# Patient Record
Sex: MALE | Race: BLACK OR AFRICAN AMERICAN | Employment: OTHER | URBAN - METROPOLITAN AREA
[De-identification: names, ages, dates, MRNs, and addresses within clinical notes are randomized per-mention and may not be internally consistent; named-entity substitution may affect disease eponyms.]

---

## 2017-02-15 ENCOUNTER — OFFICE VISIT (OUTPATIENT)
Dept: INTERNAL MEDICINE CLINIC | Age: 82
End: 2017-02-15

## 2017-02-15 VITALS
TEMPERATURE: 97.9 F | WEIGHT: 182.2 LBS | BODY MASS INDEX: 27.61 KG/M2 | HEART RATE: 56 BPM | DIASTOLIC BLOOD PRESSURE: 88 MMHG | SYSTOLIC BLOOD PRESSURE: 157 MMHG | OXYGEN SATURATION: 94 % | HEIGHT: 68 IN

## 2017-02-15 DIAGNOSIS — C90.00 MULTIPLE MYELOMA, REMISSION STATUS UNSPECIFIED (HCC): ICD-10-CM

## 2017-02-15 DIAGNOSIS — I10 ESSENTIAL HYPERTENSION: ICD-10-CM

## 2017-02-15 DIAGNOSIS — Z95.2 S/P AVR (AORTIC VALVE REPLACEMENT): ICD-10-CM

## 2017-02-15 DIAGNOSIS — I48.0 PAROXYSMAL ATRIAL FIBRILLATION (HCC): Primary | ICD-10-CM

## 2017-02-15 NOTE — PROGRESS NOTES
Chief Complaint   Patient presents with    Other     patient states that he came in today because he \"feels sick\". 1. Have you been to the ER, urgent care clinic since your last visit? Hospitalized since your last visit? No    2. Have you seen or consulted any other health care providers outside of the [address] [place] [address] since your last visit? Include any pap smears or colon screening.  No

## 2017-02-15 NOTE — MR AVS SNAPSHOT
Visit Information Date & Time Provider Department Dept. Phone Encounter #  
 2/15/2017 11:30 AM Nicolle Hernandez [facility] Medicine and [address] [address] [id number] Follow-up Instructions Return in about 4 weeks (around 3/15/2017). Follow-up and Disposition History Your Appointments 2/28/2017  9:00 AM  
Any with Flora Mukherjee MD  
[address] [place] [address] and Primary Care [facility] Appt Note: 3 month follow up; 3 month follow up  
 Robel Melgar 89 [facility]  
  
   
 Robel Melgar [address] [id number] Upcoming Health Maintenance Date Due  
 MEDICARE YEARLY EXAM 5/17/2017 GLAUCOMA SCREENING Q2Y 1/21/2018 DTaP/Tdap/Td series (2 - Td) 5/16/2026 Allergies as of 2/15/2017  Review Complete On: 2/15/2017 By: Flora Mukherjee MD  
  
 Severity Noted Reaction Type Reactions Amiodarone  10/09/2014    Other (comments) Bradycardia -excessive Current Immunizations  Reviewed on 8/24/2016 No immunizations on file. Not reviewed this visit You Were Diagnosed With   
  
 Codes Comments Paroxysmal atrial fibrillation (HCC)    -  Primary ICD-10-CM: I48.0 ICD-9-CM: 427.31 Essential hypertension     ICD-10-CM: I10 
ICD-9-CM: 401.9 S/P AVR (aortic valve replacement)     ICD-10-CM: Z47.9 ICD-9-CM: V43.3 Multiple myeloma, remission status unspecified (HCC)     ICD-10-CM: C90.00 ICD-9-CM: 203.00 Vitals BP Pulse Temp Height(growth percentile) Weight(growth percentile) SpO2  
 157/88 (BP 1 Location: Left arm, BP Patient Position: Sitting) (!) 56 97.9 °F (36.6 °C) (Oral) 5' 8\" (1.727 m) 182 lb 3.2 oz (82.6 kg) 94% BMI Smoking Status 27.7 kg/m2 Former Smoker BMI and BSA Data Body Mass Index Body Surface Area  
 27.7 kg/m 2 1.99 m 2 Preferred Pharmacy Pharmacy Name Phone [id number] Senait Velazquez, [address] [place] [address] [phone] Your Updated Medication List  
  
   
This list is accurate as of: 2/15/17  2:14 PM.  Always use your most recent med list.  
  
  
  
  
 albuterol 90 mcg/actuation inhaler Commonly known as:  PROVENTIL HFA, VENTOLIN HFA, PROAIR HFA Take 1 Puff by inhalation every four (4) hours as needed for Wheezing. allopurinol 100 mg tablet Commonly known as:  Rawland Igor Take 1 Tab by mouth daily. amLODIPine 5 mg tablet Commonly known as:  Rhame Libra Take 1 Tab by mouth daily. apixaban 2.5 mg tablet Commonly known as:  Sarah Niece Take 1 Tab by mouth two (2) times a day. bumetanide 0.5 mg tablet Commonly known as:  Raven Gertrudis Take 2 Tabs by mouth two (2) times a day. folic acid 1 mg tablet Commonly known as:  FOLVITE  
  
 hydrALAZINE 50 mg tablet Commonly known as:  APRESOLINE Take 1 Tab by mouth three (3) times daily. omeprazole 20 mg capsule Commonly known as:  PRILOSEC Take 20 mg by mouth daily. potassium chloride 20 mEq tablet Commonly known as:  K-DUR, KLOR-CON Take 20 mEq by mouth daily. propafenone 150 mg tablet Commonly known as:  RYTHMOL Take 150 mg by mouth every eight (8) hours. simvastatin 40 mg tablet Commonly known as:  ZOCOR Take 1 Tab by mouth nightly.  
  
 sodium chloride 0.65 % nasal spray Commonly known as:  OCEAN  
2 Sprays by Both Nostrils route four (4) times daily. We Performed the Following AMB POC EKG ROUTINE W/ 12 LEADS, INTER & REP [68162 CPT(R)] CBC WITH AUTOMATED DIFF [31814 CPT(R)] METABOLIC PANEL, BASIC [17436 CPT(R)] TSH 3RD GENERATION [49549 CPT(R)] Follow-up Instructions Return in about 4 weeks (around 3/15/2017). Introducing [facility]!    
 Gemini Lyman introduces [facility] patient portal. Now you can access parts of your medical record, email your doctor's office, and request medication refills online. 1. In your internet browser, go to [email or web address] 2. Click on the First Time User? Click Here link in the Sign In box. You will see the New Member Sign Up page. 3. Enter your [email or web address] Access Code exactly as it appears below. You will not need to use this code after youve completed the sign-up process. If you do not sign up before the expiration date, you must request a new code. · [employer] Access Code: [email or web address] Expires: 2/26/2017  1:36 PM 
 
4. Enter the last four digits of your Social Security Number (xxxx) and Date of Birth (mm/dd/yyyy) as indicated and click Submit. You will be taken to the next sign-up page. 5. Create a [employer] ID. This will be your [employer] login ID and cannot be changed, so think of one that is secure and easy to remember. 6. Create a [employer] password. You can change your password at any time. 7. Enter your Password Reset Question and Answer. This can be used at a later time if you forget your password. 8. Enter your e-mail address. You will receive e-mail notification when new information is available in [address] 9. Click Sign Up. You can now view and download portions of your medical record. 10. Click the Download Summary menu link to download a portable copy of your medical information. If you have questions, please visit the Frequently Asked Questions section of the [employer] website. Remember, [employer] is NOT to be used for urgent needs. For medical emergencies, dial 911. Now available from your iPhone and Android! Please provide this summary of care documentation to your next provider. Your primary care clinician is listed as Viktoriya Kathleen. If you have any questions after today's visit, please call [phone].

## 2017-02-15 NOTE — PROGRESS NOTES
SPORTS MEDICINE AND PRIMARY CARE  Karen Cali MD, [address] [address]  Phone:  [phone]  Fax: [phone]      Chief Complaint   Patient presents with    Other     patient states that he came in today because he \"feels sick\". SUBECTIVE:    Jorge Marcos is a [de-identified] y.o. male Patient returns today ambulatory, alert and appropriate and has the capacity to give an accurate history. He has a known history of aortic valve replacement 7/26/16 by POLLO Marie MD, pulmonary hypertension, paroxsymal atrial fibrillation, multiple myeloma, gout, epistaxis, chronic kidney disease, and is seen for evaluation. He does not feel right. He feels that he is going through the same thing he did before when he was in atrial fibrillation and wonders if this is the reason for his \"feeling sick. \"  Patient states he \"just feels bad and feels half sick with no energy. \"        Current Outpatient Prescriptions   Medication Sig Dispense Refill    allopurinol (ZYLOPRIM) 100 mg tablet Take 1 Tab by mouth daily. 180 Tab 3    potassium chloride (K-DUR, KLOR-CON) 20 mEq tablet Take 20 mEq by mouth daily.  propafenone (RYTHMOL) 150 mg tablet Take 150 mg by mouth every eight (8) hours.  sodium chloride (OCEAN) 0.65 % nasal spray 2 Sprays by Both Nostrils route four (4) times daily. 45 mL 11    amLODIPine (NORVASC) 5 mg tablet Take 1 Tab by mouth daily. 90 Tab 1    apixaban (ELIQUIS) 2.5 mg tablet Take 1 Tab by mouth two (2) times a day. 180 Tab 3    hydrALAZINE (APRESOLINE) 50 mg tablet Take 1 Tab by mouth three (3) times daily. 100 Tab 11    albuterol (PROVENTIL HFA, VENTOLIN HFA, PROAIR HFA) 90 mcg/actuation inhaler Take 1 Puff by inhalation every four (4) hours as needed for Wheezing. 1 Inhaler 11    bumetanide (BUMEX) 0.5 mg tablet Take 2 Tabs by mouth two (2) times a day. 60 Tab 3    simvastatin (ZOCOR) 40 mg tablet Take 1 Tab by mouth nightly.  90 Tab 3    omeprazole (PRILOSEC) 20 mg capsule Take 20 mg by mouth daily.  folic acid (FOLVITE) 1 mg tablet   0     Past Medical History   Diagnosis Date    Adverse effect of anesthesia      nasal procedure didn't work-patient awake    Alcohol abuse     Aortic regurgitation     CKD (chronic kidney disease), stage III     Epistaxis 11/27/2016    Erectile dysfunction     GI bleed     Gout      gout, osteoarthritis    Hypertension     [facility] [address]     PAF (paroxysmal atrial fibrillation) ([facility] [address] 2/17/16    Pulmonary HTN ([facility] [address] [facility]     Right-sided epistaxis 11/27/2016    S/P AVR (aortic valve replacement) 07/26/2016     merrill gill md -     S/P cardiac cath 7/6/16     severe ai, normal coronaries     S/P colonoscopy 10-8-04    S/P colonoscopy with polypectomy 5/9/16    Skin lesion      Past Surgical History   Procedure Laterality Date    Hx heent       nasal procedure 20 years ago    Hx knee replacement Bilateral      bilateral knee replacement    Hx orthopaedic Bilateral      wrist    Pr cardiac surg procedure unlist       cardiac cath    Pr cardiac surg procedure unlist  07/26/2016     AVR     Allergies   Allergen Reactions    Amiodarone Other (comments)     Bradycardia -excessive       REVIEW OF SYSTEMS:   No chest pain. No shortness of breath.          Social History     Social History    Marital status:      Spouse name: N/A    Number of children: N/A    Years of education: N/A     Social History Main Topics    Smoking status: Former Smoker     Years: 4.00     Quit date: 1/1/1960    Smokeless tobacco: Never Used    Alcohol use No      Comment: quit currently- end of Jun 2016 - prior 4-6 oz/day    Drug use: No    Sexual activity: Not Asked     Other Topics Concern    None     Social History Narrative   r  Family History   Problem Relation Age of Onset    Anemia Mother     No Known Problems Father     Other Brother      drug abuse       OBJECTIVE:  Visit Vitals    /88 (BP 1 Location: Left arm, BP Patient Position: Sitting)    Pulse (!) 56    Temp 97.9 °F (36.6 °C) (Oral)    Ht 5' 8\" (1.727 m)    Wt 182 lb 3.2 oz (82.6 kg)    SpO2 94%    BMI 27.7 kg/m2     ENT: perrla,  eom intact  NECK: supple. Thyroid normal  CHEST: clear to ascultation and percussion   HEART: regular rate and rhythm  ABD: soft, bowel sounds active  EXTREMITIES: no edema, pulse 1+     No visits with results within 3 Month(s) from this visit. Latest known visit with results is:    Admission on 10/04/2016, Discharged on 10/04/2016   Component Date Value Ref Range Status    Ventricular Rate 10/04/2016 70  BPM Final    Atrial Rate 10/04/2016 87  BPM Final    QRS Duration 10/04/2016 98  ms Final    Q-T Interval 10/04/2016 426  ms Final    QTC Calculation (Bezet) 10/04/2016 460  ms Final    Calculated R Axis 10/04/2016 107  degrees Final    Calculated T Axis 10/04/2016 124  degrees Final    Diagnosis 10/04/2016    Final                    Value:Normal sinus rhythm premature atrial complexes with 2nd degree AV block   (Mobitz I)  Rightward axis  Anterior infarct , age undetermined Nonspecific ST and T wave abnormality  When compared with ECG of 27-JUL-2016 22:52,  Vent. rate has decreased BY  36 BPM  Nonspecific T wave abnormality, improved in Inferior leads  Confirmed by Ron Carballo M.D., Bailey Padgett ([id number]) on 10/4/2016 3:46:09 PM            ASSESSMENT:  1. Paroxysmal atrial fibrillation (HCC)    2. Essential hypertension    3. S/P AVR (aortic valve replacement)    4. Multiple myeloma, remission status unspecified (HCC)      Clinically the patient has an irregular heart rate by auscultation, whether it is atrial fibrillation, normal sinus rhythm or PAC's will be determined with an EKG. If he is back in atrial fibrillation we will refer him to Dr. Carl Hua for evaluation. Blood pressure is a little higher than what it usually runs at 157/88 when the nurse took it.       Repeat blood pressure is 126/84 which is completely acceptable. BMI is 27.7 which represents a 7 pound weight increase. We encourage a no added salt diet and suggest he not continue to gain weight as he is doing. Appropriate laboratory studies are requested. He will return to the office in six weeks. PLAN:  .  Orders Placed This Encounter    CBC WITH AUTOMATED DIFF    METABOLIC PANEL, BASIC    TSH 3RD GENERATION    AMB POC EKG ROUTINE W/ 12 LEADS, INTER & REP       Follow-up Disposition:  Return in about 4 weeks (around 3/15/2017). ATTENTION:   This medical record was transcribed using an electronic medical records system. Although proofread, it may and can contain electronic and spelling errors. Other human spelling and other errors may be present. Corrections may be executed at a later time. Please feel free to contact us for any clarifications as needed.

## 2017-02-16 LAB
BASOPHILS # BLD AUTO: 0 X10E3/UL (ref 0–0.2)
BASOPHILS NFR BLD AUTO: 0 %
BUN SERPL-MCNC: 21 MG/DL (ref 8–27)
BUN/CREAT SERPL: 13 (ref 10–22)
CALCIUM SERPL-MCNC: 9.4 MG/DL (ref 8.6–10.2)
CHLORIDE SERPL-SCNC: 100 MMOL/L (ref 96–106)
CO2 SERPL-SCNC: 26 MMOL/L (ref 18–29)
CREAT SERPL-MCNC: 1.57 MG/DL (ref 0.76–1.27)
EOSINOPHIL # BLD AUTO: 0.2 X10E3/UL (ref 0–0.4)
EOSINOPHIL NFR BLD AUTO: 4 %
ERYTHROCYTE [DISTWIDTH] IN BLOOD BY AUTOMATED COUNT: 15.7 % (ref 12.3–15.4)
GLUCOSE SERPL-MCNC: 81 MG/DL (ref 65–99)
HCT VFR BLD AUTO: 40.3 % (ref 37.5–51)
HGB BLD-MCNC: 13.2 G/DL (ref 12.6–17.7)
IMM GRANULOCYTES # BLD: 0 X10E3/UL (ref 0–0.1)
IMM GRANULOCYTES NFR BLD: 0 %
LYMPHOCYTES # BLD AUTO: 0.9 X10E3/UL (ref 0.7–3.1)
LYMPHOCYTES NFR BLD AUTO: 18 %
MCH RBC QN AUTO: 31.7 PG (ref 26.6–33)
MCHC RBC AUTO-ENTMCNC: 32.8 G/DL (ref 31.5–35.7)
MCV RBC AUTO: 97 FL (ref 79–97)
MONOCYTES # BLD AUTO: 0.4 X10E3/UL (ref 0.1–0.9)
MONOCYTES NFR BLD AUTO: 8 %
NEUTROPHILS # BLD AUTO: 3.5 X10E3/UL (ref 1.4–7)
NEUTROPHILS NFR BLD AUTO: 70 %
PLATELET # BLD AUTO: 249 X10E3/UL (ref 150–379)
POTASSIUM SERPL-SCNC: 4 MMOL/L (ref 3.5–5.2)
RBC # BLD AUTO: 4.17 X10E6/UL (ref 4.14–5.8)
SODIUM SERPL-SCNC: 142 MMOL/L (ref 134–144)
TSH SERPL DL<=0.005 MIU/L-ACNC: 1.63 UIU/ML (ref 0.45–4.5)
WBC # BLD AUTO: 5.1 X10E3/UL (ref 3.4–10.8)

## 2017-02-17 ENCOUNTER — HOSPITAL ENCOUNTER (OUTPATIENT)
Dept: CARDIAC CATH/INVASIVE PROCEDURES | Age: 82
Discharge: HOME | End: 2017-02-17
Attending: INTERNAL MEDICINE | Admitting: INTERNAL MEDICINE
Payer: MEDICARE

## 2017-02-17 VITALS
OXYGEN SATURATION: 99 % | TEMPERATURE: 98.3 F | RESPIRATION RATE: 16 BRPM | DIASTOLIC BLOOD PRESSURE: 85 MMHG | WEIGHT: 180 LBS | HEART RATE: 58 BPM | HEIGHT: 68 IN | SYSTOLIC BLOOD PRESSURE: 151 MMHG | BODY MASS INDEX: 27.28 KG/M2

## 2017-02-17 PROCEDURE — 93005 ELECTROCARDIOGRAM TRACING: CPT

## 2017-02-17 PROCEDURE — 77030018729 HC ELECTRD DEFIB PAD CARD -B

## 2017-02-17 PROCEDURE — 74011250636 HC RX REV CODE- 250/636: Performed by: INTERNAL MEDICINE

## 2017-02-17 PROCEDURE — 92960 CARDIOVERSION ELECTRIC EXT: CPT

## 2017-02-17 RX ORDER — SODIUM CHLORIDE 0.9 % (FLUSH) 0.9 %
5-10 SYRINGE (ML) INJECTION AS NEEDED
Status: DISCONTINUED | OUTPATIENT
Start: 2017-02-17 | End: 2017-02-17

## 2017-02-17 RX ORDER — SODIUM CHLORIDE 9 MG/ML
25 INJECTION, SOLUTION INTRAVENOUS CONTINUOUS
Status: DISCONTINUED | OUTPATIENT
Start: 2017-02-17 | End: 2017-02-17 | Stop reason: HOSPADM

## 2017-02-17 RX ORDER — MIDAZOLAM HYDROCHLORIDE 1 MG/ML
.5-1 INJECTION, SOLUTION INTRAMUSCULAR; INTRAVENOUS
Status: DISCONTINUED | OUTPATIENT
Start: 2017-02-17 | End: 2017-02-17

## 2017-02-17 RX ORDER — ATROPINE SULFATE 0.1 MG/ML
.5-1 INJECTION INTRAVENOUS AS NEEDED
Status: DISCONTINUED | OUTPATIENT
Start: 2017-02-17 | End: 2017-02-17

## 2017-02-17 RX ORDER — FENTANYL CITRATE 50 UG/ML
25-100 INJECTION, SOLUTION INTRAMUSCULAR; INTRAVENOUS
Status: DISCONTINUED | OUTPATIENT
Start: 2017-02-17 | End: 2017-02-17

## 2017-02-17 RX ORDER — PROPAFENONE HYDROCHLORIDE 150 MG/1
225 TABLET, FILM COATED ORAL EVERY 8 HOURS
Qty: 90 TAB | Refills: 5 | Status: SHIPPED | OUTPATIENT
Start: 2017-02-17 | End: 2017-07-28 | Stop reason: SDUPTHER

## 2017-02-17 RX ADMIN — FENTANYL CITRATE 50 MCG: 50 INJECTION, SOLUTION INTRAMUSCULAR; INTRAVENOUS at 13:21

## 2017-02-17 RX ADMIN — Medication 10 ML: at 13:25

## 2017-02-17 RX ADMIN — MIDAZOLAM HYDROCHLORIDE 3 MG: 1 INJECTION, SOLUTION INTRAMUSCULAR; INTRAVENOUS at 13:21

## 2017-02-17 RX ADMIN — SODIUM CHLORIDE 25 ML/HR: 900 INJECTION, SOLUTION INTRAVENOUS at 12:15

## 2017-02-17 NOTE — ROUTINE PROCESS
Cardiac Cath Lab Procedure Area Arrival Note:    Cindra Fail. arrived to Cardiac Cath Lab, Procedure Area. Patient identifiers verified with NAME and DATE OF BIRTH. Procedure verified with patient. Consent forms verified. Allergies verified. Patient informed of procedure and plan of care. Questions answered with review. Patient voiced understanding of procedure and plan of care. Patient on cardiac monitor, non-invasive blood pressure, SPO2 monitor. On O2 @ 2 lpm via nasal cannula. IV of normal saline on pump at 25 ml/hr. Patient status doing well without problems. Patient is A&Ox 4. Patient reports no pain. Patient medicated during procedure with orders obtained and verified by Dr. Skyler Kaplan. Refer to patients Cardiac Cath Lab PROCEDURE REPORT for vital signs, assessment, status, and response during procedure, printed at end of case. Printed report on chart or scanned into chart.

## 2017-02-17 NOTE — PROGRESS NOTES
TRANSFER - IN REPORT:    Verbal report received from RIK Bauer on Terrance Mckeon., Procedure Cardioversion , from the Cardiac Cath lab, for routine progression of care. Report consisted of patients Situation, Background, Assessment and Recommendations(SBAR). Information from the following report(s) Procedure Summary, MAR and Recent Results was reviewed with the receiving clinician. Opportunity for questions and clarification was provided. Assessment completed upon patients arrival to [address] [place] [address] and care assumed. Cardiac Cath Lab Recovery Arrival Note:     [facility] [employer] Mckeon. arrived to [facility]. Patient procedure= Cardioversion. Patient on cardiac monitor, non-invasive blood pressure, Patient status doing well without problems. Patient is A&Ox 4.  Patient reports no pain, no chest pain, no n/v.

## 2017-02-17 NOTE — IP AVS SNAPSHOT
Current Discharge Medication List  
  
Take these medications at their scheduled times Dose & Instructions Dispensing Information Comments Morning Noon Evening Bedtime  
 allopurinol 100 mg tablet Commonly known as:  Papa Costa Your next dose is: Today, Tomorrow Other:  ____________ Dose:  100 mg Take 1 Tab by mouth daily. Quantity:  180 Tab Refills:  3  
     
   
   
   
  
 amLODIPine 5 mg tablet Commonly known as:  Lincolnia Libra Your next dose is: Today, Tomorrow Other:  ____________ Dose:  5 mg Take 1 Tab by mouth daily. Quantity:  90 Tab Refills:  1  
     
   
   
   
  
 apixaban 2.5 mg tablet Commonly known as:  Sarah Guadarrama Your next dose is: Today, Tomorrow Other:  ____________ Dose:  2.5 mg Take 1 Tab by mouth two (2) times a day. Quantity:  180 Tab Refills:  3  
     
   
   
   
  
 bumetanide 0.5 mg tablet Commonly known as:  Raven Caba Your next dose is: Today, Tomorrow Other:  ____________ Dose:  1 mg Take 2 Tabs by mouth two (2) times a day. Quantity:  60 Tab Refills:  3  
     
   
   
   
  
 hydrALAZINE 50 mg tablet Commonly known as:  APRESOLINE Your next dose is: Today, Tomorrow Other:  ____________ Dose:  50 mg Take 1 Tab by mouth three (3) times daily. Quantity:  100 Tab Refills:  11  
     
   
   
   
  
 omeprazole 20 mg capsule Commonly known as:  PRILOSEC Your next dose is: Today, Tomorrow Other:  ____________ Dose:  20 mg Take 20 mg by mouth daily. Refills:  0  
     
   
   
   
  
 potassium chloride 20 mEq tablet Commonly known as:  K-DUR, KLOR-CON Your next dose is: Today, Tomorrow Other:  ____________ Dose:  20 mEq Take 20 mEq by mouth daily. Refills:  0  
     
   
   
   
  
 propafenone 150 mg tablet Commonly known as:  RYTHMOL Your next dose is: Today, Tomorrow Other:  ____________ Dose:  225 mg Take 1.5 Tabs by mouth every eight (8) hours. Quantity:  90 Tab Refills:  5  
     
   
   
   
  
 simvastatin 40 mg tablet Commonly known as:  ZOCOR Your next dose is: Today, Tomorrow Other:  ____________ Dose:  40 mg Take 1 Tab by mouth nightly. Quantity:  90 Tab Refills:  3  
     
   
   
   
  
 sodium chloride 0.65 % nasal spray Commonly known as:  OCEAN Your next dose is: Today, Tomorrow Other:  ____________ Dose:  2 Spray 2 Sprays by Both Nostrils route four (4) times daily. Quantity:  45 mL Refills:  11 Take these medications as needed Dose & Instructions Dispensing Information Comments Morning Noon Evening Bedtime  
 albuterol 90 mcg/actuation inhaler Commonly known as:  PROVENTIL HFA, VENTOLIN HFA, PROAIR HFA Your next dose is: Today, Tomorrow Other:  ____________ Dose:  1 Puff Take 1 Puff by inhalation every four (4) hours as needed for Wheezing. Quantity:  1 Inhaler Refills:  11 Take these medications as directed Dose & Instructions Dispensing Information Comments Morning Noon Evening Bedtime  
 folic acid 1 mg tablet Commonly known as:  Google Your next dose is: Today, Tomorrow Other:  ____________ Refills:  0 Where to Get Your Medications Information about where to get these medications is not yet available ! Ask your nurse or doctor about these medications  
  propafenone 150 mg tablet

## 2017-02-17 NOTE — IP AVS SNAPSHOT
[address] [place] [address] 
[phone] Patient: Bhakti Hutson. MRN: [id number] :1935 You are allergic to the following Allergen Reactions Amiodarone Other (comments) Bradycardia -excessive Recent Documentation Height Weight BMI Smoking Status 1.727 m 81.6 kg 27.37 kg/m2 Former Smoker Emergency Contacts Name Discharge Info Relation Home Work Mobile Raquel Rocha DISCHARGE CAREGIVER [3] Other Relative [6] [phone] RochaEz griffin  Child [2] [id number] About your hospitalization You were admitted on:  2017 You last received care in the:  [facility] [address] CATH LAB You were discharged on:  2017 Unit phone number:  [phone] Why you were hospitalized Your primary diagnosis was:  Not on File Providers Seen During Your Hospitalizations Provider Role Specialty Primary office phone Dimitry Marie MD Attending Provider Cardiology [phone] Your Primary Care Physician (PCP) Primary Care Physician Office Phone Office Fax Andria Adler [phone] Follow-up Information Follow up With Details Comments Contact Info Stewart Fair MD   [facility] [address] [place] [address] 
[phone] Your Appointments 2017  9:00 AM EST Any with Stewart Fair MD  
[address] [place] [address] and Primary Care [address] [place] [address]) Robel Melgar [address] [phone]  
[phone] 2017 10:30 AM EDT Any with Stewart Fair MD  
[address] [place] [address] and Primary Care [address] [place] [address]) Robel Melgar [address] [address]  
[phone] Current Discharge Medication List  
  
CONTINUE these medications which have CHANGED Dose & Instructions Dispensing Information Comments Morning Noon Evening Bedtime  
 propafenone 150 mg tablet Commonly known as:  RYTHMOL What changed:  how much to take Your next dose is: Today, Tomorrow Other:  _________ Dose:  225 mg Take 1.5 Tabs by mouth every eight (8) hours. Quantity:  90 Tab Refills:  5 CONTINUE these medications which have NOT CHANGED Dose & Instructions Dispensing Information Comments Morning Noon Evening Bedtime  
 albuterol 90 mcg/actuation inhaler Commonly known as:  PROVENTIL HFA, VENTOLIN HFA, PROAIR HFA Your next dose is: Today, Tomorrow Other:  _________ Dose:  1 Puff Take 1 Puff by inhalation every four (4) hours as needed for Wheezing. Quantity:  1 Inhaler Refills:  11  
     
   
   
   
  
 allopurinol 100 mg tablet Commonly known as:  Roxanne Valdez Your next dose is: Today, Tomorrow Other:  _________ Dose:  100 mg Take 1 Tab by mouth daily. Quantity:  180 Tab Refills:  3  
     
   
   
   
  
 amLODIPine 5 mg tablet Commonly known as:  Naveed Berenicefe Your next dose is: Today, Tomorrow Other:  _________ Dose:  5 mg Take 1 Tab by mouth daily. Quantity:  90 Tab Refills:  1  
     
   
   
   
  
 apixaban 2.5 mg tablet Commonly known as:  Lucas Emanuel Your next dose is: Today, Tomorrow Other:  _________ Dose:  2.5 mg Take 1 Tab by mouth two (2) times a day. Quantity:  180 Tab Refills:  3  
     
   
   
   
  
 bumetanide 0.5 mg tablet Commonly known as:  Grey Hy Your next dose is: Today, Tomorrow Other:  _________ Dose:  1 mg Take 2 Tabs by mouth two (2) times a day. Quantity:  60 Tab Refills:  3  
     
   
   
   
  
 folic acid 1 mg tablet Commonly known as:  Google Your next dose is: Today, Tomorrow Other:  _________ Refills:  0 hydrALAZINE 50 mg tablet Commonly known as:  APRESOLINE Your next dose is: Today, Tomorrow Other:  _________ Dose:  50 mg Take 1 Tab by mouth three (3) times daily. Quantity:  100 Tab Refills:  11  
     
   
   
   
  
 omeprazole 20 mg capsule Commonly known as:  PRILOSEC Your next dose is: Today, Tomorrow Other:  _________ Dose:  20 mg Take 20 mg by mouth daily. Refills:  0  
     
   
   
   
  
 potassium chloride 20 mEq tablet Commonly known as:  K-DUR, KLOR-CON Your next dose is: Today, Tomorrow Other:  _________ Dose:  20 mEq Take 20 mEq by mouth daily. Refills:  0  
     
   
   
   
  
 simvastatin 40 mg tablet Commonly known as:  ZOCOR Your next dose is: Today, Tomorrow Other:  _________ Dose:  40 mg Take 1 Tab by mouth nightly. Quantity:  90 Tab Refills:  3  
     
   
   
   
  
 sodium chloride 0.65 % nasal spray Commonly known as:  OCEAN Your next dose is: Today, Tomorrow Other:  _________ Dose:  2 Spray 2 Sprays by Both Nostrils route four (4) times daily. Quantity:  45 mL Refills:  11 Where to Get Your Medications Information on where to get these meds will be given to you by the nurse or doctor. ! Ask your nurse or doctor about these medications  
  propafenone 150 mg tablet Discharge Instructions Electrical Cardioversion: What to Expect at [facility] Your Recovery Electrical cardioversion is a treatment for an abnormal heartbeat, such as atrial fibrillation, supraventricular tachycardia, or ventricular tachycardia (VT). It uses a brief electrical shock to reset your heart's rhythm. After cardioversion, you may have redness, like a sunburn, where the patches were. The medicines you got to make you sleepy may make you feel drowsy for the rest of the day. Your doctor may have you take medicines to help the heart beat normally and to prevent blood clots. This care sheet gives you a general idea about how long it will take for you to recover. But each person recovers at a different pace. Follow the steps below to feel better as quickly as possible. How can you care for yourself at home? Medicines · Be safe with medicines. Take your medicines exactly as prescribed. Call your doctor if you think you are having a problem with your medicine. You may take one or more of the following medicines: 
¨ Rate-control medicines to slow the heart rate. These include beta-blockers, calcium channel blockers, and digoxin. ¨ Rhythm control medicines that help the heart keep a normal rhythm. ¨ Blood thinners, also called anticoagulants, which help prevent blood clots. You will get more details on the specific medicines your doctor prescribes. Be sure you know how to take your medicines safely. · Do not take any vitamins, over-the-counter medicines, or herbal products without talking to your doctor first. 
Exercise · Start light exercise if your doctor says that it is okay. Even a small amount will help you get stronger, have more energy, and manage your stress. Walking is an easy way to get exercise. Start out by walking a little more than you did in the hospital. Bit by bit, increase the amount you walk. · When you exercise, watch for signs that your heart is working too hard. You are pushing too hard if you cannot talk while you are exercising. If you become short of breath or dizzy or have chest pain, sit down and rest right away. · Check your pulse regularly. Place two fingers on the artery at the palm side of your wrist in line with your thumb. If your heartbeat seems uneven or fast, talk to your doctor. Other instructions · Ask your doctor when you can drive again. · Do not smoke.  If you need help quitting, talk to your doctor about stop-smoking programs and medicines. These can increase your chances of quitting for good. · Limit alcohol. Follow-up care is a key part of your treatment and safety. Be sure to make and go to all appointments, and call your doctor if you are having problems. It's also a good idea to know your test results and keep a list of the medicines you take. When should you call for help? Call 911 anytime you think you may need emergency care. For example, call if: 
· You have chest pain or pressure. This may occur with: ¨ Sweating. ¨ Shortness of breath. ¨ Nausea or vomiting. ¨ Pain that spreads from the chest to the neck, jaw, or one or both shoulders or arms. ¨ A fast or uneven pulse. After calling 911, the  may tell you to chew 1 adult-strength or 2 to 4 low-dose aspirin. Wait for an ambulance. Do not try to drive yourself. · You have symptoms of a stroke. These may include: 
¨ Sudden numbness, tingling, weakness, or loss of movement in your face, arm, or leg, especially on only one side of your body. ¨ Sudden vision changes. ¨ Sudden trouble speaking. ¨ Sudden confusion or trouble understanding simple statements. ¨ Sudden problems with walking or balance. ¨ A sudden, severe headache that is different from past headaches. · You vomit blood or what looks like coffee grounds. · You pass maroon or very bloody stools. · You passed out (lost consciousness). Call your doctor now or seek immediate medical care if: 
· You feel dizzy or lightheaded, or you feel like you may faint. · Your heart rate becomes irregular. · You have shortness of breath. · You have any unusual bleeding, such as: ¨ Bruises or blood spots under the skin. ¨ A nosebleed that you cannot stop. ¨ Bleeding gums when you brush your teeth. ¨ Blood in your urine. ¨ Vaginal bleeding when you are not having your period, or heavy period bleeding. ¨ Your stools are black and tarlike or have streaks of blood. Watch closely for any changes in your health, and be sure to contact your doctor if: 
· You do not get better as expected. Where can you learn more? Go to http://buck-anjelica.info/. Enter A617 in the search box to learn more about \"Electrical Cardioversion: What to Expect at Home. \" Current as of: January 27, 2016 Content Version: 11.1 © 3481-5405 [employer]. Care instructions adapted under license by [employer] (which disclaims liability or warranty for this information). If you have questions about a medical condition or this instruction, always ask your healthcare professional. [address] any warranty or liability for your use of this information. Discharge Orders None Introducing [facility] & HEALTH SERVICES! Lynn Becker introduces Book[employer] patient portal. Now you can access parts of your medical record, email your doctor's office, and request medication refills online. 1. In your internet browser, go to [email or web address] 2. Click on the First Time User? Click Here link in the Sign In box. You will see the New Member Sign Up page. 3. Enter your BookingBug Access Code exactly as it appears below. You will not need to use this code after youve completed the sign-up process. If you do not sign up before the expiration date, you must request a new code. · [employer] Access Code: [email or web address] Expires: 2/26/2017  1:36 PM 
 
4. Enter the last four digits of your Social Security Number (xxxx) and Date of Birth (mm/dd/yyyy) as indicated and click Submit. You will be taken to the next sign-up page. 5. Create a BookingBug ID. This will be your BookingBug login ID and cannot be changed, so think of one that is secure and easy to remember. 6. Create a Book[employer]Bug password. You can change your password at any time. 7. Enter your Password Reset Question and Answer. This can be used at a later time if you forget your password. 8. Enter your e-mail address. You will receive e-mail notification when new information is available in [address] 9. Click Sign Up. You can now view and download portions of your medical record. 10. Click the Download Summary menu link to download a portable copy of your medical information. If you have questions, please visit the Frequently Asked Questions section of the [employer] website. Remember, [employer] is NOT to be used for urgent needs. For medical emergencies, dial 911. Now available from your iPhone and Android! General Information Please provide this summary of care documentation to your next provider. Patient Signature:  ____________________________________________________________ Date:  ____________________________________________________________  
  
Josy Artist Provider Signature:  ____________________________________________________________ Date:  ____________________________________________________________

## 2017-02-17 NOTE — ROUTINE PROCESS
Cardiac Cath Lab Recovery Arrival Note:      Stacy Crawford. arrived to Cardiac Cath Lab, Recovery Area. Staff introduced to patient. Patient identifiers verified with NAME and DATE OF BIRTH. Procedure verified with patient. Consent forms reviewed and signed by patient or authorized representative and verified. Allergies verified. Patient and family oriented to department. Patient and family informed of procedure and plan of care. Questions answered with review. Patient prepped for procedure, per orders from physician, prior to arrival.    Patient on cardiac monitor, non-invasive blood pressure, SPO2 monitor. On RA. Patient is A&Ox 4. Patient reports no pain. Patient in stretcher, in low position, with side rails up, call bell within reach, patient instructed to call if assistance as needed. Patient prep in: [address], [place]. Patient family has pager #   Family in: . Prep by: TOD Flores RN

## 2017-02-17 NOTE — PROCEDURES
Cardiac Catheterization Procedure Note   Patient: Abhishek Smith. MRN: [id number]  SSN: [id number]   YOB: 1935 Age: [de-identified] y.o.   Sex: male    Date of Procedure: 2/17/2017   Pre-procedure Diagnosis: Atrial Fibrillation/Atrial Flutter  Post-procedure Diagnosis: sinus rhythm  Procedure: Cardioversion  :  Dr. Silva Dasilva MD    Assistant(s):  None  Anesthesia: Moderate Sedation   Estimated Blood Loss: Less than 10 mL   Specimens Removed: None  Findings: atrial fibrillation with controlled ventricular response converted to sinus rhythm with 200 joules of biphasic energy x 1 with no immediate complications noted  Complications: None   Implants:  None  Signed by:  Silva Dasilva MD  2/17/2017  1:28 PM

## 2017-02-18 LAB
ATRIAL RATE: 79 BPM
CALCULATED R AXIS, ECG10: 108 DEGREES
CALCULATED T AXIS, ECG11: 140 DEGREES
DIAGNOSIS, 93000: NORMAL
Q-T INTERVAL, ECG07: 436 MS
QRS DURATION, ECG06: 94 MS
QTC CALCULATION (BEZET), ECG08: 424 MS
VENTRICULAR RATE, ECG03: 57 BPM

## 2017-02-20 ENCOUNTER — PATIENT OUTREACH (OUTPATIENT)
Dept: INTERNAL MEDICINE CLINIC | Age: 82
End: 2017-02-20

## 2017-02-20 NOTE — PROGRESS NOTES
Outreach attempted post cardiac cath lab. LM on mobile number listed. Will await return call. Patient has upcoming appointment with Dr Paul Cai 2/28/17.

## 2017-02-25 NOTE — PROCEDURES
[address]   [address] [facility]  Fl[address]       Name:  Leonor Schultz   MR#:  [id number]   :  1935   Account #:  [de-identified]    Date of Procedure:  2017   Date of Adm:  2017       STUDY/CINE: **    ANESTHESIA:  3 mg of Versed and 50 mcg of Fentanyl    START TIME: 1318. STOP TIME: 1324. SEDATION: The patient received 3 mg of Midazolam and 50 mcg of   fentanyl for conscious sedation. HISTORY: This is an elderly. Gentleman who recently had aortic valve   replacement surgery, who has had recurrent atrial flutter, this being the   first episode since his operation. He has been therapeutically   anticoagulated on an uninterrupted basis now for at least 2-3 months. He was noted to be in atrial flutter in the office by his primary care   physician. Clearly, the patient feels worse with this abnormal rhythm   and he was brought to the catheterization lab for elective   cardioversion. DESCRIPTION OF PROCEDURE: The patient was adequately   sedated with the medications listed above. Biphasic energy of 200   joules was delivered x1, converting the rhythm to sinus rhythm. CONCLUSIONS   1. Successful conversion of atrial flutter to his normal sinus rhythm. 2. No immediate complications were noted. ESTIMATED BLOOD LOSS: 0.     SPECIMENS REMOVED: None.          MD NIVIA Lemus / Shira Delgadillo   D:  2017   11:49   T:  2017   12:50   Job #:  [id number]

## 2017-02-28 ENCOUNTER — OFFICE VISIT (OUTPATIENT)
Dept: INTERNAL MEDICINE CLINIC | Age: 82
End: 2017-02-28

## 2017-02-28 VITALS
TEMPERATURE: 98.5 F | RESPIRATION RATE: 16 BRPM | HEART RATE: 78 BPM | OXYGEN SATURATION: 97 % | SYSTOLIC BLOOD PRESSURE: 135 MMHG | HEIGHT: 68 IN | DIASTOLIC BLOOD PRESSURE: 85 MMHG | BODY MASS INDEX: 26.76 KG/M2 | WEIGHT: 176.6 LBS

## 2017-02-28 DIAGNOSIS — I35.1 NONRHEUMATIC AORTIC VALVE INSUFFICIENCY: ICD-10-CM

## 2017-02-28 DIAGNOSIS — I10 ESSENTIAL HYPERTENSION: ICD-10-CM

## 2017-02-28 DIAGNOSIS — I48.0 PAROXYSMAL ATRIAL FIBRILLATION (HCC): Primary | ICD-10-CM

## 2017-02-28 DIAGNOSIS — C90.00 MULTIPLE MYELOMA, REMISSION STATUS UNSPECIFIED (HCC): ICD-10-CM

## 2017-02-28 RX ORDER — ERYTHROMYCIN 5 MG/G
OINTMENT OPHTHALMIC
Refills: 0 | COMMUNITY
Start: 2017-01-11 | End: 2018-05-29 | Stop reason: ALTCHOICE

## 2017-02-28 RX ORDER — LOTEPREDNOL ETABONATE 5 MG/ML
SUSPENSION/ DROPS OPHTHALMIC
Refills: 1 | COMMUNITY
Start: 2017-01-27 | End: 2018-05-29 | Stop reason: ALTCHOICE

## 2017-02-28 RX ORDER — BRIMONIDINE TARTRATE, TIMOLOL MALEATE 2; 5 MG/ML; MG/ML
SOLUTION/ DROPS OPHTHALMIC
Refills: 1 | COMMUNITY
Start: 2017-02-24 | End: 2018-05-29 | Stop reason: ALTCHOICE

## 2017-02-28 RX ORDER — POTASSIUM CHLORIDE 1500 MG/1
TABLET, FILM COATED, EXTENDED RELEASE ORAL
Refills: 5 | COMMUNITY
Start: 2016-12-29 | End: 2019-12-16 | Stop reason: ALTCHOICE

## 2017-02-28 RX ORDER — PROPAFENONE HYDROCHLORIDE 225 MG/1
TABLET, FILM COATED ORAL
Refills: 3 | COMMUNITY
Start: 2017-01-09 | End: 2017-07-28 | Stop reason: DRUGHIGH

## 2017-02-28 RX ORDER — AMOXICILLIN 500 MG/1
CAPSULE ORAL
Refills: 11 | COMMUNITY
Start: 2016-12-28 | End: 2017-07-28 | Stop reason: ALTCHOICE

## 2017-02-28 NOTE — MR AVS SNAPSHOT
Visit Information Date & Time Provider Department Dept. Phone Encounter #  
 2/28/2017  9:00 AM Nicolle Crum [facility] Sports Medicine and Primary Care [phone] [id number] Follow-up Instructions Return in about 3 months (around 5/28/2017). Follow-up and Disposition History Your Appointments 3/29/2017 10:30 AM  
Any with Patria Rondon MD  
[address] [place] [address] and Primary Care [address] [place] [address]) Appt Note: 6 wk  
 Rob[place] Mel[address] [facility]  
  
   
 Rob[address] Mel[address] [phone] Upcoming Health Maintenance Date Due  
 MEDICARE YEARLY EXAM 5/17/2017 GLAUCOMA SCREENING Q2Y 1/21/2018 DTaP/Tdap/Td series (2 - Td) 5/16/2026 Allergies as of 2/28/2017  Review Complete On: 2/28/2017 By: Patria Rondon MD  
  
 Severity Noted Reaction Type Reactions Amiodarone  10/09/2014    Other (comments) Bradycardia -excessive Current Immunizations  Reviewed on 8/24/2016 No immunizations on file. Not reviewed this visit You Were Diagnosed With   
  
 Codes Comments Paroxysmal atrial fibrillation (HCC)    -  Primary ICD-10-CM: I48.0 ICD-9-CM: 427.31   
 Multiple myeloma, remission status unspecified (HCC)     ICD-10-CM: C90.00 ICD-9-CM: 203.00 Essential hypertension     ICD-10-CM: I10 
ICD-9-CM: 401.9 Nonrheumatic aortic valve insufficiency     ICD-10-CM: I35.1 ICD-9-CM: 424.1 Vitals BP  
  
  
  
  
  
 135/85 (BP 1 Location: Right arm, BP Patient Position: Sitting) BMI and BSA Data Body Mass Index Body Surface Area  
 26.85 kg/m 2 1.96 m 2 Preferred Pharmacy Pharmacy Name Phone [id number] [address] [phone] Your Updated Medication List  
  
   
This list is accurate as of: 2/28/17 11:16 AM.  Always use your most recent med list.  
  
  
  
  
 albuterol 90 mcg/actuation inhaler Commonly known as:  PROVENTIL HFA, VENTOLIN HFA, PROAIR HFA Take 1 Puff by inhalation every four (4) hours as needed for Wheezing. allopurinol 100 mg tablet Commonly known as:  Acquanetta Bunk Take 1 Tab by mouth daily. amLODIPine 5 mg tablet Commonly known as:  Cortes Del Take 1 Tab by mouth daily. amoxicillin 500 mg capsule Commonly known as:  AMOXIL TAKE 4 TABLETS BY ORAL ROUTE 60 MINUTES BEFORE YOUR PROCEDURE  
  
 apixaban 2.5 mg tablet Commonly known as:  Adam Reels Take 1 Tab by mouth two (2) times a day. bumetanide 0.5 mg tablet Commonly known as:  Starla Achilles Take 2 Tabs by mouth two (2) times a day. COMBIGAN 0.2-0.5 % Drop ophthalmic solution Generic drug:  brimonidine-timolol INSTILL 1 DROP INTO RIGHT EYE TWICE A DAY  
  
 erythromycin ophthalmic ointment Commonly known as:  ILOTYCIN  
APPLY (1CM) BY OPHTHALMIC ROUTE 2 TIMES EVERY DAY RIBBON INTO THE CANTHUS ARE IN THE RIGHT EYE  
  
 folic acid 1 mg tablet Commonly known as:  FOLVITE  
  
 hydrALAZINE 50 mg tablet Commonly known as:  APRESOLINE Take 1 Tab by mouth three (3) times daily. LOTEMAX 0.5 % ophthalmic suspension Generic drug:  loteprednol etabonate INSTILL 1 DROP 2 TIMES DAILY INTO RIGHT EYE  
  
 omeprazole 20 mg capsule Commonly known as:  PRILOSEC Take 20 mg by mouth daily. * potassium chloride 20 mEq tablet Commonly known as:  K-DUR, KLOR-CON Take 20 mEq by mouth daily. * potassium chloride SR 20 mEq tablet Commonly known as:  K-TAB  
TAKE 1 TABLET BY ORAL ROUTE EVERY DAY WITH FOOD * propafenone 225 mg tablet Commonly known as:  RYTHMOL  
TAKE 1 TABLET BY MOUTH EVERY 8 HRS * propafenone 150 mg tablet Commonly known as:  RYTHMOL Take 1.5 Tabs by mouth every eight (8) hours. simvastatin 40 mg tablet Commonly known as:  ZOCOR Take 1 Tab by mouth nightly.  
  
 sodium chloride 0.65 % nasal spray Commonly known as:  OCEAN  
2 Sprays by Both Nostrils route four (4) times daily. * Notice: This list has 4 medication(s) that are the same as other medications prescribed for you. Read the directions carefully, and ask your doctor or other care provider to review them with you. We Performed the Following AMB POC EKG ROUTINE W/ 12 LEADS, INTER & REP [97024 CPT(R)] Follow-up Instructions Return in about 3 months (around 5/28/2017). Introducing [facility] & HEALTH SERVICES! Connie Jordan introduces AddMyBest patient portal. Now you can access parts of your medical record, email your doctor's office, and request medication refills online. 1. In your internet browser, go to [email or web address] 2. Click on the First Time User? Click Here link in the Sign In box. You will see the New Member Sign Up page. 3. Enter your AddMyBest Access Code exactly as it appears below. You will not need to use this code after youve completed the sign-up process. If you do not sign up before the expiration date, you must request a new code. · AddMyBest Access Code: [email or web address] Expires: 5/29/2017 11:16 AM 
 
4. Enter the last four digits of your Social Security Number (xxxx) and Date of Birth (mm/dd/yyyy) as indicated and click Submit. You will be taken to the next sign-up page. 5. Create a AddMyBest ID. This will be your AddMyBest login ID and cannot be changed, so think of one that is secure and easy to remember. 6. Create a AddMyBest password. You can change your password at any time. 7. Enter your Password Reset Question and Answer. This can be used at a later time if you forget your password. 8. Enter your e-mail address. You will receive e-mail notification when new information is available in [address] 9. Click Sign Up. You can now view and download portions of your medical record. 10. Click the Download Summary menu link to download a portable copy of your medical information. If you have questions, please visit the Frequently Asked Questions section of the [employer] website. Remember, [employer] is NOT to be used for urgent needs. For medical emergencies, dial 911. Now available from your iPhone and Android! Please provide this summary of care documentation to your next provider. Your primary care clinician is listed as Kimmy Alonzo. If you have any questions after today's visit, please call [phone].

## 2017-02-28 NOTE — PROGRESS NOTES
1. Have you been to the ER, urgent care clinic since your last visit? Hospitalized since your last visit? No    2. Have you seen or consulted any other health care providers outside of the [address] [place] [address] since your last visit? Include any pap smears or colon screening.  No

## 2017-03-07 ENCOUNTER — OFFICE VISIT (OUTPATIENT)
Dept: INTERNAL MEDICINE CLINIC | Age: 82
End: 2017-03-07

## 2017-03-07 VITALS
OXYGEN SATURATION: 96 % | BODY MASS INDEX: 26.52 KG/M2 | RESPIRATION RATE: 16 BRPM | HEIGHT: 68 IN | TEMPERATURE: 98.1 F | SYSTOLIC BLOOD PRESSURE: 126 MMHG | HEART RATE: 84 BPM | DIASTOLIC BLOOD PRESSURE: 84 MMHG | WEIGHT: 175 LBS

## 2017-03-07 DIAGNOSIS — C90.00 MULTIPLE MYELOMA, REMISSION STATUS UNSPECIFIED (HCC): ICD-10-CM

## 2017-03-07 DIAGNOSIS — I48.0 PAROXYSMAL ATRIAL FIBRILLATION (HCC): Primary | ICD-10-CM

## 2017-03-07 DIAGNOSIS — Z98.890 HISTORY OF CARDIOVERSION: ICD-10-CM

## 2017-03-07 DIAGNOSIS — I10 ESSENTIAL HYPERTENSION: ICD-10-CM

## 2017-03-07 NOTE — MR AVS SNAPSHOT
Visit Information Date & Time Provider Department Dept. Phone Encounter #  
 3/7/2017 10:45 AM Radha Crystal MD [facility] Sports Medicine and [place] [address] [id number] Your Appointments 3/29/2017 10:30 AM  
Any with Radha Crystal MD  
[address] [place] [address] and Primary Care [address] [place] [address]) Appt Note: 6 wk  
 Robel Melgar 90 1 [facility]  
  
   
 Robel Morenojdona [phone]  
  
    
 5/30/2017 10:45 AM  
Any with Radha Crystal MD  
[address] [place] [address] and Primary Care [address] [place] [address]) Appt Note: 3 month follow up  
 [address] [place] [address]  
[phone] Upcoming Health Maintenance Date Due  
 MEDICARE YEARLY EXAM 5/17/2017 GLAUCOMA SCREENING Q2Y 1/21/2018 DTaP/Tdap/Td series (2 - Td) 5/16/2026 Allergies as of 3/7/2017  Review Complete On: 3/7/2017 By: Radha Crystal MD  
  
 Severity Noted Reaction Type Reactions Amiodarone  10/09/2014    Other (comments) Bradycardia -excessive Current Immunizations  Reviewed on 8/24/2016 No immunizations on file. Not reviewed this visit You Were Diagnosed With   
  
 Codes Comments Paroxysmal atrial fibrillation (HCC)    -  Primary ICD-10-CM: I48.0 ICD-9-CM: 427.31   
 Multiple myeloma, remission status unspecified (HCC)     ICD-10-CM: C90.00 ICD-9-CM: 203.00 Essential hypertension     ICD-10-CM: I10 
ICD-9-CM: 401.9 History of cardioversion     ICD-10-CM: Z98.890 ICD-9-CM: V15.1 Vitals BP Pulse Temp Resp Height(growth percentile) Weight(growth percentile) 126/84 (BP 1 Location: Right arm, BP Patient Position: Sitting) 84 98.1 °F (36.7 °C) (Oral) 16 5' 8\" (1.727 m) 175 lb (79.4 kg) SpO2 BMI Smoking Status 96% 26.61 kg/m2 Former Smoker Vitals History BMI and BSA Data  Body Mass Index Body Surface Area  
 26.61 kg/m 2 1.95 m 2  
  
  
 Preferred Pharmacy Pharmacy Name Phone [facility]/PHARMACY #[id number] Heriberto Tinoco[address] [place] [address] [phone] Your Updated Medication List  
  
   
This list is accurate as of: 3/7/17 12:19 PM.  Always use your most recent med list.  
  
  
  
  
 albuterol 90 mcg/actuation inhaler Commonly known as:  PROVENTIL HFA, VENTOLIN HFA, PROAIR HFA Take 1 Puff by inhalation every four (4) hours as needed for Wheezing. allopurinol 100 mg tablet Commonly known as:  Jackson Better Take 1 Tab by mouth daily. amLODIPine 5 mg tablet Commonly known as:  Roxana Pace Take 1 Tab by mouth daily. amoxicillin 500 mg capsule Commonly known as:  AMOXIL TAKE 4 TABLETS BY ORAL ROUTE 60 MINUTES BEFORE YOUR PROCEDURE  
  
 apixaban 2.5 mg tablet Commonly known as:  Emi Eriberto Take 1 Tab by mouth two (2) times a day. bumetanide 0.5 mg tablet Commonly known as:  Buelah Bake Take 2 Tabs by mouth two (2) times a day. COMBIGAN 0.2-0.5 % Drop ophthalmic solution Generic drug:  brimonidine-timolol INSTILL 1 DROP INTO RIGHT EYE TWICE A DAY  
  
 erythromycin ophthalmic ointment Commonly known as:  ILOTYCIN  
APPLY (1CM) BY OPHTHALMIC ROUTE 2 TIMES EVERY DAY RIBBON INTO THE CANTHUS ARE IN THE RIGHT EYE  
  
 folic acid 1 mg tablet Commonly known as:  FOLVITE  
  
 hydrALAZINE 50 mg tablet Commonly known as:  APRESOLINE Take 1 Tab by mouth three (3) times daily. LOTEMAX 0.5 % ophthalmic suspension Generic drug:  loteprednol etabonate INSTILL 1 DROP 2 TIMES DAILY INTO RIGHT EYE  
  
 omeprazole 20 mg capsule Commonly known as:  PRILOSEC Take 20 mg by mouth daily. * potassium chloride 20 mEq tablet Commonly known as:  K-DUR, KLOR-CON Take 20 mEq by mouth daily. * potassium chloride SR 20 mEq tablet Commonly known as:  K-TAB  
TAKE 1 TABLET BY ORAL ROUTE EVERY DAY WITH FOOD * propafenone 225 mg tablet Commonly known as:  RYTHMOL TAKE 1 TABLET BY MOUTH EVERY 8 HRS * propafenone 150 mg tablet Commonly known as:  RYTHMOL Take 1.5 Tabs by mouth every eight (8) hours. simvastatin 40 mg tablet Commonly known as:  ZOCOR Take 1 Tab by mouth nightly.  
  
 sodium chloride 0.65 % nasal spray Commonly known as:  OCEAN  
2 Sprays by Both Nostrils route four (4) times daily. * Notice: This list has 4 medication(s) that are the same as other medications prescribed for you. Read the directions carefully, and ask your doctor or other care provider to review them with you. We Performed the Following AMB POC EKG ROUTINE W/ 12 LEADS, INTER & REP [18556 CPT(R)] Introducing [facility]! Daija Yen introduces [facility] patient portal. Now you can access parts of your medical record, email your doctor's office, and request medication refills online. 1. In your internet browser, go to [email or web address] 2. Click on the First Time User? Click Here link in the Sign In box. You will see the New Member Sign Up page. 3. Enter your [facility] Access Code exactly as it appears below. You will not need to use this code after youve completed the sign-up process. If you do not sign up before the expiration date, you must request a new code. · [facility] Access Code: [email or web address] Expires: 5/29/2017 11:16 AM 
 
4. Enter the last four digits of your Social Security Number (xxxx) and Date of Birth (mm/dd/yyyy) as indicated and click Submit. You will be taken to the next sign-up page. 5. Create a Adwantedt ID. This will be your [facility] login ID and cannot be changed, so think of one that is secure and easy to remember. 6. Create a [facility] password. You can change your password at any time. 7. Enter your Password Reset Question and Answer. This can be used at a later time if you forget your password. 8. Enter your e-mail address.  You will receive e-mail notification when new information is available in LiveAction. 9. Click Sign Up. You can now view and download portions of your medical record. 10. Click the Download Summary menu link to download a portable copy of your medical information. If you have questions, please visit the Frequently Asked Questions section of the LiveAction website. Remember, LiveAction is NOT to be used for urgent needs. For medical emergencies, dial 911. Now available from your iPhone and Android! Please provide this summary of care documentation to your next provider. Your primary care clinician is listed as Constance Ballesteros. If you have any questions after today's visit, please call [phone].

## 2017-03-07 NOTE — PROGRESS NOTES
1. Have you been to the ER, urgent care clinic since your last visit? Hospitalized since your last visit? No    2. Have you seen or consulted any other health care providers outside of the [address] [place] [address] since your last visit? Include any pap smears or colon screening.  No

## 2017-03-07 NOTE — PROGRESS NOTES
SPORTS MEDICINE AND PRIMARY CARE  Mckenzie Schwartz MD, [address] [facility] [address]  Phone:  [phone]  Fax: [phone]      Chief Complaint   Patient presents with    Follow-up     sick visit for weakness, fatigue and SOB         SUBECTIVE:    Chaoy Rondon. is a [de-identified] y.o. male Patient returns today ambulatory, alert and appropriate and has the capacity to give an accurate history. Since we last saw him he was seen by Dr. Valentino Davis and on 2/17/17 underwent cardioversion with 200 joules of biphasic  times one with no complications and reverted to sinus rhythm. Patient returns today complaining of weakness, short of breath, just not feeling himself and some mild fluttering. This started about two days ago. Current Outpatient Prescriptions   Medication Sig Dispense Refill    amoxicillin (AMOXIL) 500 mg capsule TAKE 4 TABLETS BY ORAL ROUTE 60 MINUTES BEFORE YOUR PROCEDURE  11    COMBIGAN 0.2-0.5 % drop ophthalmic solution INSTILL 1 DROP INTO RIGHT EYE TWICE A DAY  1    erythromycin (ILOTYCIN) ophthalmic ointment APPLY (1CM) BY OPHTHALMIC ROUTE 2 TIMES EVERY DAY RIBBON INTO THE CANTHUS ARE IN THE RIGHT EYE  0    LOTEMAX 0.5 % ophthalmic suspension INSTILL 1 DROP 2 TIMES DAILY INTO RIGHT EYE  1    potassium chloride SR (K-TAB) 20 mEq tablet TAKE 1 TABLET BY ORAL ROUTE EVERY DAY WITH FOOD  5    propafenone (RYTHMOL) 225 mg tablet TAKE 1 TABLET BY MOUTH EVERY 8 HRS  3    propafenone (RYTHMOL) 150 mg tablet Take 1.5 Tabs by mouth every eight (8) hours. 90 Tab 5    allopurinol (ZYLOPRIM) 100 mg tablet Take 1 Tab by mouth daily. 180 Tab 3    potassium chloride (K-DUR, KLOR-CON) 20 mEq tablet Take 20 mEq by mouth daily.  sodium chloride (OCEAN) 0.65 % nasal spray 2 Sprays by Both Nostrils route four (4) times daily. 45 mL 11    amLODIPine (NORVASC) 5 mg tablet Take 1 Tab by mouth daily. 90 Tab 1    apixaban (ELIQUIS) 2.5 mg tablet Take 1 Tab by mouth two (2) times a day.  [address] [place] [address] Tab 3    hydrALAZINE (APRESOLINE) 50 mg tablet Take 1 Tab by mouth three (3) times daily. 100 Tab 11    omeprazole (PRILOSEC) 20 mg capsule Take 20 mg by mouth daily.  folic acid (FOLVITE) 1 mg tablet   0    albuterol (PROVENTIL HFA, VENTOLIN HFA, PROAIR HFA) 90 mcg/actuation inhaler Take 1 Puff by inhalation every four (4) hours as needed for Wheezing. 1 Inhaler 11    bumetanide (BUMEX) 0.5 mg tablet Take 2 Tabs by mouth two (2) times a day. 60 Tab 3    simvastatin (ZOCOR) 40 mg tablet Take 1 Tab by mouth nightly. 80 Tab 3     Past Medical History:   Diagnosis Date    Adverse effect of anesthesia     nasal procedure didn't work-patient awake    Alcohol abuse     Aortic regurgitation     CKD (chronic kidney disease), stage III     Epistaxis 11/27/2016    Erectile dysfunction     GI bleed     Gout     gout, osteoarthritis    History of cardioversion 02/17/2017    Hypertension     [facility] [address]     PAF (paroxysmal atrial fibrillation) ([facility] [address] 2/17/16    Pulmonary HTN ([facility]     Right-sided epistaxis 11/27/2016    S/P AVR (aortic valve replacement) 07/26/2016    merrill gill md -     S/P cardiac cath 7/6/16    severe ai, normal coronaries     S/P colonoscopy 10-8-04    S/P colonoscopy with polypectomy 5/9/16    Skin lesion      Past Surgical History:   Procedure Laterality Date    CARDIAC SURG PROCEDURE UNLIST      cardiac cath   [facility] CARDIAC SURG PROCEDURE UNLIST  07/26/2016    AVR    HX HEENT      nasal procedure 20 years ago    HX KNEE REPLACEMENT Bilateral     bilateral knee replacement    HX ORTHOPAEDIC Bilateral     wrist     Allergies   Allergen Reactions    Amiodarone Other (comments)     Bradycardia -excessive       REVIEW OF SYSTEMS:   No chills. No fever.         Social History     Social History    Marital status:      Spouse name: N/A    Number of children: N/A    Years of education: N/A     Social History Main Topics    Smoking status: Former Smoker     Years: 4.00     Quit date: 1960    Smokeless tobacco: Never Used    Alcohol use No      Comment: quit currently- end of 2016 - prior 4-6 oz/day    Drug use: No    Sexual activity: Not Asked     Other Topics Concern    None     Social History Narrative    Family History: Mother:  36 yrs, Chronic anemiaFather:  80 yrs, I think aneurysm hypertensionBrother(s):  79 yrs,    obese,dm,drug abuse,miSon(s): alive, adopted1 son(s) . Social History: Alcohol Use Patient uses alcohol, Drinks per occasion: 2, Drinks per w [place]: 10. Smoking Status Patient is a never smoker. Marital Status: . Lives w ith: alone. Occupation/W ork: employed full time insurance. Education/School: has highschool diploma.   r  Family History   Problem Relation Age of Onset    Anemia Mother     No Known Problems Father     Other Brother      drug abuse       OBJECTIVE:  Visit Vitals    BP (!) 143/92 (BP 1 Location: Left arm, BP Patient Position: Sitting)    Pulse 84    Temp 98.1 °F (36.7 °C) (Oral)    Resp 16    Ht 5' 8\" (1.727 m)    Wt 175 lb (79.4 kg)    SpO2 96%    BMI 26.61 kg/m2     ENT: perrla,  eom intact  NECK: supple.  Thyroid normal  CHEST: clear to ascultation and percussion   HEART: regular rate and rhythm  ABD: soft, bowel sounds active  EXTREMITIES: no edema, pulse 1+     Admission on 2017, Discharged on 2017   Component Date Value Ref Range Status    Ventricular Rate 2017 57  BPM Final    Atrial Rate 2017 79  BPM Final    QRS Duration 2017 94  ms Final    Q-T Interval 2017 436  ms Final    QTC Calculation (Bezet) 2017 424  ms Final    Calculated R Axis 2017 108  degrees Final    Calculated T Axis 2017 140  degrees Final    Diagnosis 2017    Final                    Value:Normal sinus rhythm with Mobitz I (Wenckebach) block  Rightward axis  Anterior infarct (cited on or before 04-OCT-2016)  ST & T wave abnormality, consider inferolateral ischemia or digitalis effect  When compared with ECG of 04-OCT-2016 14:08,  Rate is slower  Wenkebach block now present. Confirmed by Rizwana Muro M.D., Sylwia Braga ([id number]) on 2/18/2017 4:09:43 PM     Office Visit on 02/15/2017   Component Date Value Ref Range Status    WBC 02/15/2017 5.1  3.4 - 10.8 x10E3/uL Final    RBC 02/15/2017 4.17  4.14 - 5.80 x10E6/uL Final    HGB 02/15/2017 13.2  12.6 - 17.7 g/dL Final    HCT 02/15/2017 40.3  37.5 - 51.0 % Final    MCV 02/15/2017 97  79 - 97 fL Final    MCH 02/15/2017 31.7  26.6 - 33.0 pg Final    MCHC 02/15/2017 32.8  31.5 - 35.7 g/dL Final    RDW 02/15/2017 15.7* 12.3 - 15.4 % Final    PLATELET 91/27/8369 911  150 - 379 x10E3/uL Final    NEUTROPHILS 02/15/2017 70  % Final    Lymphocytes 02/15/2017 18  % Final    MONOCYTES 02/15/2017 8  % Final    EOSINOPHILS 02/15/2017 4  % Final    BASOPHILS 02/15/2017 0  % Final    ABS. NEUTROPHILS 02/15/2017 3.5  1.4 - 7.0 x10E3/uL Final    Abs Lymphocytes 02/15/2017 0.9  0.7 - 3.1 x10E3/uL Final    ABS. MONOCYTES 02/15/2017 0.4  0.1 - 0.9 x10E3/uL Final    ABS. EOSINOPHILS 02/15/2017 0.2  0.0 - 0.4 x10E3/uL Final    ABS. BASOPHILS 02/15/2017 0.0  0.0 - 0.2 x10E3/uL Final    IMMATURE GRANULOCYTES 02/15/2017 0  % Final    ABS. IMM.  GRANS. 02/15/2017 0.0  0.0 - 0.1 x10E3/uL Final    Glucose 02/15/2017 81  65 - 99 mg/dL Final    BUN 02/15/2017 21  8 - 27 mg/dL Final    Creatinine 02/15/2017 1.57* 0.76 - 1.27 mg/dL Final    GFR est non-AA 02/15/2017 41* >59 mL/min/1.73 Final    GFR est AA 02/15/2017 47* >59 mL/min/1.73 Final    BUN/Creatinine ratio 02/15/2017 13  10 - 22 Final    Sodium 02/15/2017 142  134 - 144 mmol/L Final    Potassium 02/15/2017 4.0  3.5 - 5.2 mmol/L Final    Chloride 02/15/2017 100  96 - 106 mmol/L Final    CO2 02/15/2017 26  18 - 29 mmol/L Final    Calcium 02/15/2017 9.4  8.6 - 10.2 mg/dL Final    TSH 02/15/2017 1.630  0.450 - 4.500 uIU/mL Final ASSESSMENT:  1. Paroxysmal atrial fibrillation (HCC)    2. Multiple myeloma, remission status unspecified ([facility] [address]    3. Essential hypertension    4. History of cardioversion      Patient wonders if he is getting ready to come down with a viral illness. However both of us are concerned about his heart. The rhythm is regular at 81 beats per minute. We will do an EKG to make a final determination. Repeat blood pressure is acceptable at 128/84. BMI is approaching ideal body weight. PLAN:  .  Orders Placed This Encounter    AMB POC EKG ROUTINE W/ 12 LEADS, INTER & REP       Follow-up Disposition: Not on File      ATTENTION:   This medical record was transcribed using an electronic medical records system. Although proofread, it may and can contain electronic and spelling errors. Other human spelling and other errors may be present. Corrections may be executed at a later time. Please feel free to contact us for any clarifications as needed.

## 2017-05-08 RX ORDER — SIMVASTATIN 40 MG/1
TABLET, FILM COATED ORAL
Qty: 90 TAB | Refills: 3 | Status: SHIPPED | OUTPATIENT
Start: 2017-05-08 | End: 2018-05-12 | Stop reason: SDUPTHER

## 2017-05-30 ENCOUNTER — OFFICE VISIT (OUTPATIENT)
Dept: INTERNAL MEDICINE CLINIC | Age: 82
End: 2017-05-30

## 2017-05-30 VITALS
HEIGHT: 68 IN | WEIGHT: 182 LBS | BODY MASS INDEX: 27.58 KG/M2 | SYSTOLIC BLOOD PRESSURE: 169 MMHG | HEART RATE: 77 BPM | TEMPERATURE: 98.2 F | DIASTOLIC BLOOD PRESSURE: 99 MMHG | OXYGEN SATURATION: 97 % | RESPIRATION RATE: 18 BRPM

## 2017-05-30 DIAGNOSIS — I48.0 PAROXYSMAL ATRIAL FIBRILLATION (HCC): ICD-10-CM

## 2017-05-30 DIAGNOSIS — I10 ESSENTIAL HYPERTENSION: ICD-10-CM

## 2017-05-30 DIAGNOSIS — Z13.39 SCREENING FOR ALCOHOLISM: ICD-10-CM

## 2017-05-30 DIAGNOSIS — C90.00 MULTIPLE MYELOMA, REMISSION STATUS UNSPECIFIED (HCC): ICD-10-CM

## 2017-05-30 DIAGNOSIS — Z00.00 ROUTINE GENERAL MEDICAL EXAMINATION AT A HEALTH CARE FACILITY: Primary | ICD-10-CM

## 2017-05-30 DIAGNOSIS — I27.20 PULMONARY HTN (HCC): ICD-10-CM

## 2017-05-30 DIAGNOSIS — N18.30 CKD (CHRONIC KIDNEY DISEASE), STAGE III (HCC): ICD-10-CM

## 2017-05-30 DIAGNOSIS — Z98.890 HISTORY OF CARDIOVERSION: ICD-10-CM

## 2017-05-30 RX ORDER — PREDNISOLONE ACETATE 10 MG/ML
SUSPENSION/ DROPS OPHTHALMIC
Refills: 1 | COMMUNITY
Start: 2017-03-10 | End: 2018-05-29 | Stop reason: ALTCHOICE

## 2017-05-30 NOTE — MR AVS SNAPSHOT
Visit Information Date & Time Provider Department Dept. Phone Encounter #  
 5/30/2017 10:45 AM Raheem White MD [facility] Sports Medicine and [place] [address] [id number] Follow-up Instructions Return in about 3 months (around 8/30/2017). Follow-up and Disposition History Upcoming Health Maintenance Date Due  
 MEDICARE YEARLY EXAM 5/17/2017 INFLUENZA AGE 9 TO ADULT 8/1/2017 GLAUCOMA SCREENING Q2Y 4/4/2019 DTaP/Tdap/Td series (2 - Td) 5/16/2026 Allergies as of 5/30/2017  Review Complete On: 5/30/2017 By: Raheem White MD  
  
 Severity Noted Reaction Type Reactions Amiodarone  10/09/2014    Other (comments) Bradycardia -excessive Current Immunizations  Reviewed on 8/24/2016 No immunizations on file. Not reviewed this visit You Were Diagnosed With   
  
 Codes Comments Routine general medical examination at a health care facility    -  Primary ICD-10-CM: Z00.00 ICD-9-CM: V70.0 Screening for alcoholism     ICD-10-CM: Z13.89 ICD-9-CM: V79.1 Multiple myeloma, remission status unspecified (HCC)     ICD-10-CM: C90.00 ICD-9-CM: 203.00 Pulmonary HTN ([address]     ICD-10-CM: I27.2 ICD-9-CM: 416.8 Paroxysmal atrial fibrillation (HCC)     ICD-10-CM: I48.0 ICD-9-CM: 427.31 Essential hypertension     ICD-10-CM: I10 
ICD-9-CM: 401.9 CKD (chronic kidney disease), stage III     ICD-10-CM: N18.3 ICD-9-CM: 585.3 History of cardioversion     ICD-10-CM: Z98.890 ICD-9-CM: V15.1 Vitals BP Pulse Temp Resp Height(growth percentile) Weight(growth percentile) (!) 169/99 (BP 1 Location: Right arm, BP Patient Position: Sitting) 77 98.2 °F (36.8 °C) (Oral) 18 5' 8\" (1.727 m) 182 lb (82.6 kg) SpO2 BMI Smoking Status 97% 27.67 kg/m2 Former Smoker BMI and BSA Data Body Mass Index Body Surface Area  
 27.67 kg/m 2 1.99 m 2 Preferred Pharmacy Pharmacy Name Phone [facility]/PHARMACY #[id number] Gabriel Contreras, [address] [place] [phone] Your Updated Medication List  
  
   
This list is accurate as of: 5/30/17  1:29 PM.  Always use your most recent med list.  
  
  
  
  
 albuterol 90 mcg/actuation inhaler Commonly known as:  PROVENTIL HFA, VENTOLIN HFA, PROAIR HFA Take 1 Puff by inhalation every four (4) hours as needed for Wheezing. allopurinol 100 mg tablet Commonly known as:  Jarome Mistry Take 1 Tab by mouth daily. amLODIPine 5 mg tablet Commonly known as:  Sharion [place] Take 1 Tab by mouth daily. amoxicillin 500 mg capsule Commonly known as:  AMOXIL TAKE 4 TABLETS BY ORAL ROUTE 60 MINUTES BEFORE YOUR PROCEDURE  
  
 apixaban 2.5 mg tablet Commonly known as:  Cyndia Rigoberto Take 1 Tab by mouth two (2) times a day. bumetanide 0.5 mg tablet Commonly known as:  Shirleyann Driscoll Take 2 Tabs by mouth two (2) times a day. COMBIGAN 0.2-0.5 % Drop ophthalmic solution Generic drug:  brimonidine-timolol INSTILL 1 DROP INTO RIGHT EYE TWICE A DAY  
  
 erythromycin ophthalmic ointment Commonly known as:  ILOTYCIN  
APPLY (1CM) BY OPHTHALMIC ROUTE 2 TIMES EVERY DAY RIBBON INTO THE CANTHUS ARE IN THE RIGHT EYE  
  
 folic acid 1 mg tablet Commonly known as:  FOLVITE  
  
 hydrALAZINE 50 mg tablet Commonly known as:  APRESOLINE Take 1 Tab by mouth three (3) times daily. LOTEMAX 0.5 % ophthalmic suspension Generic drug:  loteprednol etabonate INSTILL 1 DROP 2 TIMES DAILY INTO RIGHT EYE  
  
 omeprazole 20 mg capsule Commonly known as:  PRILOSEC Take 20 mg by mouth daily. * potassium chloride 20 mEq tablet Commonly known as:  K-DUR, KLOR-CON Take 20 mEq by mouth daily. * potassium chloride SR 20 mEq tablet Commonly known as:  K-TAB  
TAKE 1 TABLET BY ORAL ROUTE EVERY DAY WITH FOOD  
  
 prednisoLONE acetate 1 % ophthalmic suspension Commonly known as:  PRED FORTE USE 1 DROP INTO RIGHT EYE EVERY DAY UNTIL SEEN * propafenone 225 mg tablet Commonly known as:  RYTHMOL  
TAKE 1 TABLET BY MOUTH EVERY 8 HRS * propafenone 150 mg tablet Commonly known as:  RYTHMOL Take 1.5 Tabs by mouth every eight (8) hours. simvastatin 40 mg tablet Commonly known as:  ZOCOR  
TAKE 1 TABLET BY MOUTH AT BEDTIME  
  
 sodium chloride 0.65 % nasal spray Commonly known as:  OCEAN  
2 Sprays by Both Nostrils route four (4) times daily. * Notice: This list has 4 medication(s) that are the same as other medications prescribed for you. Read the directions carefully, and ask your doctor or other care provider to review them with you. We Performed the Following AMB POC EKG ROUTINE W/ 12 LEADS, INTER & REP [25050 CPT(R)] CBC WITH AUTOMATED DIFF [43267 CPT(R)] METABOLIC PANEL, BASIC [05656 CPT(R)] Follow-up Instructions Return in about 3 months (around 8/30/2017). Introducing [facility]! [facility] introduces Worklight patient portal. Now you can access parts of your medical record, email your doctor's office, and request medication refills online. 1. In your internet browser, go to [email or web address] 2. Click on the First Time User? Click Here link in the Sign In box. You will see the New Member Sign Up page. 3. Enter your Worklight Access Code exactly as it appears below. You will not need to use this code after youve completed the sign-up process. If you do not sign up before the expiration date, you must request a new code. · Worklight Access Code: [email or web address] Expires: 8/28/2017  1:29 PM 
 
4. Enter the last four digits of your Social Security Number (xxxx) and Date of Birth (mm/dd/yyyy) as indicated and click Submit. You will be taken to the next sign-up page. 5. Create a Worklight ID. This will be your Worklight login ID and cannot be changed, so think of one that is secure and easy to remember. 6. Create a Trover password. You can change your password at any time. 7. Enter your Password Reset Question and Answer. This can be used at a later time if you forget your password. 8. Enter your e-mail address. You will receive e-mail notification when new information is available in [address] 9. Click Sign Up. You can now view and download portions of your medical record. 10. Click the Download Summary menu link to download a portable copy of your medical information. If you have questions, please visit the Frequently Asked Questions section of the Trover website. Remember, Trover is NOT to be used for urgent needs. For medical emergencies, dial 911. Now available from your iPhone and Android! Please provide this summary of care documentation to your next provider. Your primary care clinician is listed as Esperanza Nowak. If you have any questions after today's visit, please call [phone].

## 2017-05-30 NOTE — PROGRESS NOTES
1. Have you been to the ER, urgent care clinic since your last visit? Hospitalized since your last visit? No    2. Have you seen or consulted any other health care providers outside of the [facility] since your last visit? Include any pap smears or colon screening. No    This is a Subsequent Medicare Annual Wellness Visit providing Personalized Prevention Plan Services (PPPS) (Performed 12 months after initial AWV and PPPS )    I have reviewed the patient's medical history in detail and updated the computerized patient record. HistoryPatient returns today ambulatory, alert and appropriate and has the capacity to give an accurate history. Patient is concerned about his ears which we will evaluate, his blood pressure as well as his weight. No specific complaints. Patient is seen for evaluation. Past Medical History:   Diagnosis Date    Adverse effect of anesthesia     nasal procedure didn't work-patient awake    Alcohol abuse     Aortic regurgitation     CKD (chronic kidney disease), stage III     Epistaxis 11/27/2016    Erectile dysfunction     GI bleed     Gout     gout, osteoarthritis    History of cardioversion 02/17/2017    Hypertension     [facility]     PAF (paroxysmal atrial fibrillation) ([facility] 2/17/16    Pulmonary HTN ([address]     Right-sided epistaxis 11/27/2016    S/P AVR (aortic valve replacement) 07/26/2016    merrill gill md -     S/P cardiac cath 7/6/16    severe ai, normal coronaries     S/P colonoscopy 10-8-04    S/P colonoscopy with polypectomy 5/9/16    Skin lesion       Past Surgical History:   Procedure Laterality Date    CARDIAC SURG PROCEDURE UNLIST      cardiac cath   [facility] CARDIAC SURG PROCEDURE UNLIST  07/26/2016    AVR    HX HEENT      nasal procedure 20 years ago    HX KNEE REPLACEMENT Bilateral     bilateral knee replacement    HX ORTHOPAEDIC Bilateral     wrist     Current Outpatient Prescriptions   Medication Sig Dispense Refill    prednisoLONE acetate (PRED FORTE) 1 % ophthalmic suspension USE 1 DROP INTO RIGHT EYE EVERY DAY UNTIL SEEN  1    simvastatin (ZOCOR) 40 mg tablet TAKE 1 TABLET BY MOUTH AT BEDTIME 90 Tab 3    COMBIGAN 0.2-0.5 % drop ophthalmic solution INSTILL 1 DROP INTO RIGHT EYE TWICE A DAY  1    erythromycin (ILOTYCIN) ophthalmic ointment APPLY (1CM) BY OPHTHALMIC ROUTE 2 TIMES EVERY DAY RIBBON INTO THE CANTHUS ARE IN THE RIGHT EYE  0    LOTEMAX 0.5 % ophthalmic suspension INSTILL 1 DROP 2 TIMES DAILY INTO RIGHT EYE  1    potassium chloride SR (K-TAB) 20 mEq tablet TAKE 1 TABLET BY ORAL ROUTE EVERY DAY WITH FOOD  5    propafenone (RYTHMOL) 225 mg tablet TAKE 1 TABLET BY MOUTH EVERY 8 HRS  3    propafenone (RYTHMOL) 150 mg tablet Take 1.5 Tabs by mouth every eight (8) hours. 90 Tab 5    allopurinol (ZYLOPRIM) 100 mg tablet Take 1 Tab by mouth daily. 180 Tab 3    potassium chloride (K-DUR, KLOR-CON) 20 mEq tablet Take 20 mEq by mouth daily.  sodium chloride (OCEAN) 0.65 % nasal spray 2 Sprays by Both Nostrils route four (4) times daily. 45 mL 11    amLODIPine (NORVASC) 5 mg tablet Take 1 Tab by mouth daily. 90 Tab 1    apixaban (ELIQUIS) 2.5 mg tablet Take 1 Tab by mouth two (2) times a day. 180 Tab 3    hydrALAZINE (APRESOLINE) 50 mg tablet Take 1 Tab by mouth three (3) times daily. 100 Tab 11    omeprazole (PRILOSEC) 20 mg capsule Take 20 mg by mouth daily.  folic acid (FOLVITE) 1 mg tablet   0    albuterol (PROVENTIL HFA, VENTOLIN HFA, PROAIR HFA) 90 mcg/actuation inhaler Take 1 Puff by inhalation every four (4) hours as needed for Wheezing. 1 Inhaler 11    bumetanide (BUMEX) 0.5 mg tablet Take 2 Tabs by mouth two (2) times a day.  60 Tab 3    amoxicillin (AMOXIL) 500 mg capsule TAKE 4 TABLETS BY ORAL ROUTE 60 MINUTES BEFORE YOUR PROCEDURE  11     Allergies   Allergen Reactions    Amiodarone Other (comments)     Bradycardia -excessive     Family History   Problem Relation Age of Onset    Anemia Mother     No Known Problems Father     Other Brother      drug abuse     Social History   Substance Use Topics    Smoking status: Former Smoker     Years: 4.00     Quit date: 1/1/1960    Smokeless tobacco: Never Used    Alcohol use No      Comment: quit currently- end of Jun 2016 - prior 4-6 oz/day     Patient Active Problem List   Diagnosis Code    Multiple myeloma ([facility] C90.00    Gout M10.9    Hypertension I10    Aortic regurgitation I35.1    CKD (chronic kidney disease), stage III N18.3    Pulmonary HTN (HCC) I27.2    Alcohol abuse F10.10    Erectile dysfunction N52.9    Paroxysmal atrial fibrillation (HCC) I48.0    PAF (paroxysmal atrial fibrillation) (HCC) I48.0    GI bleed K92.2    S/P colonoscopy with polypectomy Z98.890    Skin lesion L98.9    ACP (advance care planning) Z71.89    Acute congestive heart failure (HCC) I50.9    S/P AVR (aortic valve replacement) Z95.2    Postoperative anemia due to acute blood loss D62    Epistaxis R04.0    Right-sided epistaxis R04.0    History of cardioversion Z98.890       Depression Risk Factor Screening:     PHQ over the last two weeks 5/30/2017   Little interest or pleasure in doing things Not at all   Feeling down, depressed or hopeless Not at all   Total Score PHQ 2 0     Alcohol Risk Factor Screening: On any occasion during the past 3 months, have you had more than 4 drinks containing alcohol? No    Do you average more than 14 drinks per week? No      Functional Ability and Level of Safety:     Hearing Loss   mild-to-moderate    Activities of Daily Living   Self-care. Requires assistance with: no ADLs    Fall Risk     Fall Risk Assessment, last 12 mths 5/30/2017   Able to walk? Yes   Fall in past 12 months? No     Abuse Screen   None  Patient is not abused    Review of Systems   A comprehensive review of systems was negative except for that written in the HPI.     Physical Examination     Evaluation of Cognitive Function:  Mood/affect:  neutral  Appearance: age appropriate  Family member/caregiver input:     Visit Vitals    BP (!) 169/99 (BP 1 Location: Right arm, BP Patient Position: Sitting)    Pulse 77    Temp 98.2 °F (36.8 °C) (Oral)    Resp 18    Ht 5' 8\" (1.727 m)    Wt 182 lb (82.6 kg)    SpO2 97%    BMI 27.67 kg/m2     General:  Alert, cooperative, no distress, appears stated age. Head:  Normocephalic, without obvious abnormality, atraumatic. Eyes:  Conjunctivae/corneas clear. PERRL, EOMs intact. Fundi benign   Ears:  Normal TMs and external ear canals both ears. Nose: Nares normal. Septum midline. Mucosa normal. No drainage or sinus tenderness. Throat: Lips, mucosa, and tongue normal. Teeth and gums normal.   Neck: Supple, symmetrical, trachea midline, no adenopathy, thyroid: no enlargement/tenderness/nodules, no carotid bruit and no JVD. Back:   Symmetric, no curvature. ROM normal. No CVA tenderness. Lungs:   Clear to auscultation bilaterally. Chest wall:  No tenderness or deformity. Heart:  Regular rate and rhythm, S1, S2 normal, no murmur, click, rub or gallop. Abdomen:   Soft, non-tender. Bowel sounds normal. No masses,  No organomegaly. Genitalia:  na   Rectal:     Extremities: Extremities normal, atraumatic, no cyanosis or edema. Pulses: 2+ and symmetric all extremities. Skin: Skin color, texture, turgor normal. No rashes or lesions   Lymph nodes: Cervical, supraclavicular, and axillary nodes normal.   Neurologic: CNII-XII intact. Normal strength, sensation and reflexes throughout. Patient Care Team:  Larissa Esparza MD as PCP - General (Internal Medicine)  Sonya Pritchett MD as Referring Provider (Cardiology)  Tessie Lisa MD as Surgeon (Cardiothoracic Surgery)    Advice/Referrals/Counseling   Education and counseling provided:  Are appropriate based on today's review and evaluation      Assessment/Plan       ICD-10-CM ICD-9-CM    1.  Routine general medical examination at a health care facility Z00.00 V70.0    2. Screening for alcoholism Z13.89 V79.1    3. Multiple myeloma, remission status unspecified (HCC) C90.00 203.00 CBC WITH AUTOMATED DIFF      METABOLIC PANEL, BASIC   4. Pulmonary HTN (HCC) I27.2 416.8    5. Paroxysmal atrial fibrillation (HCC) I48.0 427.31 AMB POC EKG ROUTINE W/ 12 LEADS, INTER & REP   6. Essential hypertension I10 401.9    7. CKD (chronic kidney disease), stage III N18.3 585.3    8. History of cardioversion Z98.890 V15.1    . Patient's medical status is stable. He tells us that he has been drinking beer and also tells us that he is going to stop drinking. He wonders if that is contributing to the increase in weight which we do not disagree. I am sure that he will stop considering his heart condition. Patient's repeat blood pressure is 152/84 which I suspect it drops down to normal.      He has some extra systoles on auscultation. We will do an EKG today. His BMI is 27.67 representing a seven pound weight increase. We certainly encourage him to discontinue alcohol and eat a heart healthy diet. He is going to return to see us in a couple of months, sooner if he needs to. Appropriate laboratory studies have also been requested looking at his renal function.

## 2017-05-31 LAB
BASOPHILS # BLD AUTO: 0 X10E3/UL (ref 0–0.2)
BASOPHILS NFR BLD AUTO: 0 %
BUN SERPL-MCNC: 16 MG/DL (ref 8–27)
BUN/CREAT SERPL: 11 (ref 10–24)
CALCIUM SERPL-MCNC: 9.4 MG/DL (ref 8.6–10.2)
CHLORIDE SERPL-SCNC: 100 MMOL/L (ref 96–106)
CO2 SERPL-SCNC: 25 MMOL/L (ref 18–29)
CREAT SERPL-MCNC: 1.47 MG/DL (ref 0.76–1.27)
EOSINOPHIL # BLD AUTO: 0.2 X10E3/UL (ref 0–0.4)
EOSINOPHIL NFR BLD AUTO: 3 %
ERYTHROCYTE [DISTWIDTH] IN BLOOD BY AUTOMATED COUNT: 16.6 % (ref 12.3–15.4)
GLUCOSE SERPL-MCNC: 96 MG/DL (ref 65–99)
HCT VFR BLD AUTO: 43 % (ref 37.5–51)
HGB BLD-MCNC: 14.2 G/DL (ref 12.6–17.7)
IMM GRANULOCYTES # BLD: 0 X10E3/UL (ref 0–0.1)
IMM GRANULOCYTES NFR BLD: 0 %
LYMPHOCYTES # BLD AUTO: 0.8 X10E3/UL (ref 0.7–3.1)
LYMPHOCYTES NFR BLD AUTO: 17 %
MCH RBC QN AUTO: 31.4 PG (ref 26.6–33)
MCHC RBC AUTO-ENTMCNC: 33 G/DL (ref 31.5–35.7)
MCV RBC AUTO: 95 FL (ref 79–97)
MONOCYTES # BLD AUTO: 0.3 X10E3/UL (ref 0.1–0.9)
MONOCYTES NFR BLD AUTO: 7 %
NEUTROPHILS # BLD AUTO: 3.4 X10E3/UL (ref 1.4–7)
NEUTROPHILS NFR BLD AUTO: 73 %
PLATELET # BLD AUTO: 211 X10E3/UL (ref 150–379)
POTASSIUM SERPL-SCNC: 3.8 MMOL/L (ref 3.5–5.2)
RBC # BLD AUTO: 4.52 X10E6/UL (ref 4.14–5.8)
SODIUM SERPL-SCNC: 142 MMOL/L (ref 134–144)
WBC # BLD AUTO: 4.7 X10E3/UL (ref 3.4–10.8)

## 2017-07-19 ENCOUNTER — OFFICE VISIT (OUTPATIENT)
Dept: INTERNAL MEDICINE CLINIC | Age: 82
End: 2017-07-19

## 2017-07-19 VITALS
HEART RATE: 57 BPM | OXYGEN SATURATION: 96 % | DIASTOLIC BLOOD PRESSURE: 93 MMHG | RESPIRATION RATE: 18 BRPM | TEMPERATURE: 97.9 F | WEIGHT: 189.8 LBS | SYSTOLIC BLOOD PRESSURE: 159 MMHG | HEIGHT: 68 IN | BODY MASS INDEX: 28.76 KG/M2

## 2017-07-19 DIAGNOSIS — I48.0 PAF (PAROXYSMAL ATRIAL FIBRILLATION) (HCC): Primary | ICD-10-CM

## 2017-07-19 DIAGNOSIS — C90.00 MULTIPLE MYELOMA, REMISSION STATUS UNSPECIFIED (HCC): ICD-10-CM

## 2017-07-19 DIAGNOSIS — N18.30 CKD (CHRONIC KIDNEY DISEASE), STAGE III (HCC): ICD-10-CM

## 2017-07-19 DIAGNOSIS — I10 ESSENTIAL HYPERTENSION: ICD-10-CM

## 2017-07-19 NOTE — PROGRESS NOTES
Chief Complaint   Patient presents with    Memory Loss     1. Have you been to the ER, urgent care clinic since your last visit? Hospitalized since your last visit? No    2. Have you seen or consulted any other health care providers outside of the [address] [place] [address] since your last visit? Include any pap smears or colon screening.  No

## 2017-07-19 NOTE — MR AVS SNAPSHOT
Visit Information Date & Time Provider Department Dept. Phone Encounter #  
 7/19/2017  9:45 AM MD Shaan Friedrian [facility] [phone] [id number] Follow-up Instructions Return in about 2 months (around 9/19/2017). Your Appointments 8/1/2017  9:00 AM  
Any with Katie Ku MD  
[address] [facility] and Primary Care [address] [place] [address]) Appt Note: 2 month follow up  
 Robel Zhong [phone]  
  
   
 [phone]  
  
    
 9/20/2017  9:15 AM  
Any with Katie Ku MD  
[address] [place] [address] and Primary Care [address] [place] [address]) Appt Note: follow up 2mnths  
 [address]  
[phone] Upcoming Health Maintenance Date Due INFLUENZA AGE 9 TO ADULT 8/1/2017 MEDICARE YEARLY EXAM 5/31/2018 GLAUCOMA SCREENING Q2Y 4/4/2019 DTaP/Tdap/Td series (2 - Td) 5/16/2026 Allergies as of 7/19/2017  Review Complete On: 7/19/2017 By: Katie Ku MD  
  
 Severity Noted Reaction Type Reactions Amiodarone  10/09/2014    Other (comments) Bradycardia -excessive Current Immunizations  Reviewed on 8/24/2016 No immunizations on file. Not reviewed this visit You Were Diagnosed With   
  
 Codes Comments PA[facility]oxysmal [facility] fibrillation) [facility]    -  Primary ICD-10-CM: I48.0 ICD-9-CM: 427.31 Essential hypertension     ICD-10-CM: I10 
ICD-9-CM: 401.9 CKD (chronic kidney disease), stage III     ICD-10-CM: N18.3 ICD-9-CM: 585.3 Multiple myeloma, remission status unspecified (HCC)     ICD-10-CM: C90.00 ICD-9-CM: 203.00 Vitals BP Pulse Temp Resp Height(growth percentile) Weight(growth percentile) (!) 159/93 (BP 1 Location: Right arm, BP Patient Position: Sitting) (!) 57 97.9 °F (36.6 °C) (Oral) 18 5' 8\" (1.727 m) 189 lb 12.8 oz (86.1 kg) SpO2 BMI Smoking Status 96% 28.86 kg/m2 Former Smoker Vitals History BMI and BSA Data Body Mass Index Body Surface Area  
 28.86 kg/m 2 2.03 m 2 Preferred Pharmacy Pharmacy Name Phone [id number] Nella Camara [facility] [phone] Your Updated Medication List  
  
   
This list is accurate as of: 7/19/17 12:17 PM.  Always use your most recent med list.  
  
  
  
  
 albuterol 90 mcg/actuation inhaler Commonly known as:  PROVENTIL HFA, VENTOLIN HFA, PROAIR HFA Take 1 Puff by inhalation every four (4) hours as needed for Wheezing. allopurinol 100 mg tablet Commonly known as:  Vallecito Sale Take 1 Tab by mouth daily. amLODIPine 5 mg tablet Commonly known as:  Estella Cordial Take 1 Tab by mouth daily. amoxicillin 500 mg capsule Commonly known as:  AMOXIL TAKE 4 TABLETS BY ORAL ROUTE 60 MINUTES BEFORE YOUR PROCEDURE  
  
 apixaban 2.5 mg tablet Commonly known as:  Gwenetta Trenton Take 1 Tab by mouth two (2) times a day. bumetanide 0.5 mg tablet Commonly known as:  Veleria Mungo Take 2 Tabs by mouth two (2) times a day. COMBIGAN 0.2-0.5 % Drop ophthalmic solution Generic drug:  brimonidine-timolol INSTILL 1 DROP INTO RIGHT EYE TWICE A DAY  
  
 erythromycin ophthalmic ointment Commonly known as:  ILOTYCIN  
APPLY (1CM) BY OPHTHALMIC ROUTE 2 TIMES EVERY DAY RIBBON INTO THE CANTHUS ARE IN THE RIGHT EYE  
  
 folic acid 1 mg tablet Commonly known as:  FOLVITE  
  
 hydrALAZINE 50 mg tablet Commonly known as:  APRESOLINE Take 1 Tab by mouth three (3) times daily. LOTEMAX 0.5 % ophthalmic suspension Generic drug:  loteprednol etabonate INSTILL 1 DROP 2 TIMES DAILY INTO RIGHT EYE  
  
 omeprazole 20 mg capsule Commonly known as:  PRILOSEC Take 20 mg by mouth daily. * potassium chloride 20 mEq tablet Commonly known as:  K-DUR KLOR-CON Take 20 mEq by mouth daily. * potassium chloride SR 20 mEq tablet Commonly known as:  K-TAB  
TAKE 1 TABLET BY ORAL ROUTE EVERY DAY WITH FOOD  
  
 prednisoLONE acetate 1 % ophthalmic suspension Commonly known as:  PRED FORTE  
USE 1 DROP INTO RIGHT EYE EVERY DAY UNTIL SEEN * propafenone 225 mg tablet Commonly known as:  RYTHMOL  
TAKE 1 TABLET BY MOUTH EVERY 8 HRS * propafenone 150 mg tablet Commonly known as:  RYTHMOL Take 1.5 Tabs by mouth every eight (8) hours. simvastatin 40 mg tablet Commonly known as:  ZOCOR  
TAKE 1 TABLET BY MOUTH AT BEDTIME  
  
 sodium chloride 0.65 % nasal spray Commonly known as:  OCEAN  
2 Sprays by Both Nostrils route four (4) times daily. * Notice: This list has 4 medication(s) that are the same as other medications prescribed for you. Read the directions carefully, and ask your doctor or other care provider to review them with you. Follow-up Instructions Return in about 2 months (around 9/19/2017). Introducing hospitals & [facility] SERVICES! Lokesh Chaudhary introduces [employer] patient portal. Now you can access parts of your medical record, email your doctor's office, and request medication refills online. 1. In your internet browser, go to [email or web address] 2. Click on the First Time User? Click Here link in the Sign In box. You will see the New Member Sign Up page. 3. Enter your [employer] Access Code exactly as it appears below. You will not need to use this code after youve completed the sign-up process. If you do not sign up before the expiration date, you must request a new code. · [employer] Access Code: [email or web address] Expires: 8/28/2017  1:29 PM 
 
4. Enter the last four digits of your Social Security Number (xxxx) and Date of Birth (mm/dd/yyyy) as indicated and click Submit. You will be taken to the next sign-up page. 5. Create a [employer] ID. This will be your [employer] login ID and cannot be changed, so think of one that is secure and easy to remember. 6. Create a [employer] password. You can change your password at any time. 7. Enter your Password Reset Question and Answer. This can be used at a later time if you forget your password. 8. Enter your e-mail address. You will receive e-mail notification when new information is available in [address] 9. Click Sign Up. You can now view and download portions of your medical record. 10. Click the Download Summary menu link to download a portable copy of your medical information. If you have questions, please visit the Frequently Asked Questions section of the [employer] website. Remember, [employer] is NOT to be used for urgent needs. For medical emergencies, dial 911. Now available from your iPhone and Android! Please provide this summary of care documentation to your next provider. Your primary care clinician is listed as Nasim Garcia. If you have any questions after today's visit, please call [phone].

## 2017-07-19 NOTE — PROGRESS NOTES
SPORTS MEDICINE AND PRIMARY CARE  Lea Navarro MD, [address] [facility],[address] [address]  Phone:  [phone]  Fax: [phone]       Chief Complaint   Patient presents with    Memory Loss   . SUBJECTIVE:    Mir Began. is a [de-identified] y.o. male Patient returns today ambulatory, alert and appropriate and has the capacity to give an accurate history. He has a known history of aortic regurg, chronic kidney disease, gout, hypertension, multiple myeloma, paroxysmal atrial fibrillation and is seen for evaluation. Patient returns today concerned about his short term memory. He has been    most of his life and he would remember things easily. Now he has to write them down to remember and he saw the advertisement on television regarding [address] which apparently is an over-the-counter medication for memory. He wonders about the use of it. Current Outpatient Prescriptions   Medication Sig Dispense Refill    prednisoLONE acetate (PRED FORTE) 1 % ophthalmic suspension USE 1 DROP INTO RIGHT EYE EVERY DAY UNTIL SEEN  1    simvastatin (ZOCOR) 40 mg tablet TAKE 1 TABLET BY MOUTH AT BEDTIME 90 Tab 3    amoxicillin (AMOXIL) 500 mg capsule TAKE 4 TABLETS BY ORAL ROUTE 60 MINUTES BEFORE YOUR PROCEDURE  11    COMBIGAN 0.2-0.5 % drop ophthalmic solution INSTILL 1 DROP INTO RIGHT EYE TWICE A DAY  1    erythromycin (ILOTYCIN) ophthalmic ointment APPLY (1CM) BY OPHTHALMIC ROUTE 2 TIMES EVERY DAY RIBBON INTO THE CANTHUS ARE IN THE RIGHT EYE  0    LOTEMAX 0.5 % ophthalmic suspension INSTILL 1 DROP 2 TIMES DAILY INTO RIGHT EYE  1    potassium chloride SR (K-TAB) 20 mEq tablet TAKE 1 TABLET BY ORAL ROUTE EVERY DAY WITH FOOD  5    propafenone (RYTHMOL) 225 mg tablet TAKE 1 TABLET BY MOUTH EVERY 8 HRS  3    propafenone (RYTHMOL) 150 mg tablet Take 1.5 Tabs by mouth every eight (8) hours. 90 Tab 5    allopurinol (ZYLOPRIM) 100 mg tablet Take 1 Tab by mouth daily.  180 Tab 3    potassium chloride (K-DUR, KLOR-CON) 20 mEq tablet Take 20 mEq by mouth daily.  sodium chloride (OCEAN) 0.65 % nasal spray 2 Sprays by Both Nostrils route four (4) times daily. 45 mL 11    amLODIPine (NORVASC) 5 mg tablet Take 1 Tab by mouth daily. 90 Tab 1    apixaban (ELIQUIS) 2.5 mg tablet Take 1 Tab by mouth two (2) times a day. 180 Tab 3    hydrALAZINE (APRESOLINE) 50 mg tablet Take 1 Tab by mouth three (3) times daily. 100 Tab 11    omeprazole (PRILOSEC) 20 mg capsule Take 20 mg by mouth daily.  folic acid (FOLVITE) 1 mg tablet   0    albuterol (PROVENTIL HFA, VENTOLIN HFA, PROAIR HFA) 90 mcg/actuation inhaler Take 1 Puff by inhalation every four (4) hours as needed for Wheezing. 1 Inhaler 11    bumetanide (BUMEX) 0.5 mg tablet Take 2 Tabs by mouth two (2) times a day. 61 Tab 3     Past Medical History:   Diagnosis Date    Adverse effect of anesthesia     nasal procedure didn't work-patient awake    Alcohol abuse     Aortic regurgitation     CKD (chronic kidney disease), stage III     Epistaxis 11/27/2016    Erectile dysfunction     GI bleed     Gout     gout, osteoarthritis    History of cardioversion 02/17/2017    Hypertension     Multiple myeloma ([address]     PAF (paroxysmal atrial fibrillation) ([address] 2/17/16    Pulmonary HTN ([address]     Right-sided epistaxis 11/27/2016    S/P AVR (aortic valve replacement) 07/26/2016    merrill gill md -     S/P cardiac cath 7/6/16    severe ai, normal coronaries     S/P colonoscopy 10-8-04    S/P colonoscopy with polypectomy 5/9/16    Skin lesion      Past Surgical History:   Procedure Laterality Date    CARDIAC SURG PROCEDURE UNLIST      cardiac cath   [address] [place] [address] CARDIAC SURG PROCEDURE UNLIST  07/26/2016    AVR    HX HEENT      nasal procedure 20 years ago    HX KNEE REPLACEMENT Bilateral     bilateral knee replacement    HX ORTHOPAEDIC Bilateral     wrist     Allergies   Allergen Reactions    Amiodarone Other (comments)     Bradycardia -excessive         REVIEW OF SYSTEMS:  General: negative for - chills or fever  ENT: negative for - headaches, nasal congestion or tinnitus  Respiratory: negative for - cough, hemoptysis, shortness of breath or wheezing  Cardiovascular : negative for - chest pain, edema, palpitations or shortness of breath  Gastrointestinal: negative for - abdominal pain, blood in stools, heartburn or nausea/vomiting  Genito-Urinary: no dysuria, trouble voiding, or hematuria  Musculoskeletal: negative for - gait disturbance, joint pain, joint stiffness or joint swelling  Neurological: no TIA or stroke symptoms  Hematologic: no bruises, no bleeding, no swollen glands  Integument: no lumps, mole changes, nail changes or rash  Endocrine: no malaise/lethargy or unexpected weight changes      Social History     Social History    Marital status:      Spouse name: N/A    Number of children: N/A    Years of education: N/A     Social History Main Topics    Smoking status: Former Smoker     Years: 4.00     Quit date: 1960    Smokeless tobacco: Never Used    Alcohol use No      Comment: quit currently- end of 2016 - prior 4-6 oz/day    Drug use: No    Sexual activity: Not Asked     Other Topics Concern    None     Social History Narrative    Family History: Mother:  36 yrs, Chronic anemiaFather:  80 yrs, I think aneurysm hypertensionBrother(s):  79 yrs,    obese,dm,drug abuse,miSon(s): alive, adopted1 son(s) . Social History: Alcohol Use Patient uses alcohol, Drinks per occasion: 2, Drinks per w [place]: 10. Smoking Status Patient is a never smoker. Marital Status: . Lives w ith: alone. Occupation/W ork: employed full time insurance. Education/School: has highschool diploma.      Family History   Problem Relation Age of Onset    Anemia Mother     No Known Problems Father     Other Brother      drug abuse       OBJECTIVE:    Visit Vitals    BP (!) 159/93 (BP 1 Location: Right arm, BP Patient Position: Sitting)  Pulse (!) 57    Temp 97.9 °F (36.6 °C) (Oral)    Resp 18    Ht 5' 8\" (1.727 m)    Wt 189 lb 12.8 oz (86.1 kg)    SpO2 96%    BMI 28.86 kg/m2     CONSTITUTIONAL: well , well nourished, appears age appropriate  EYES: perrla, eom intact  ENMT:moist mucous membranes, pharynx clear  NECK: supple. Thyroid normal  RESPIRATORY: Chest: clear bilaterally   CARDIOVASCULAR: Heart: regular rate and rhythm  GASTROINTESTINAL: Abdomen: soft, bowel sounds active  HEMATOLOGIC: no pathological lymph nodes palpated  MUSCULOSKELETAL: Extremities: no edema, pulse 1+   INTEGUMENT: No unusual rashes or suspicious skin lesions noted. Nails appear normal.  NEUROLOGIC: non-focal exam   MENTAL STATUS: alert and oriented, appropriate affect           ASSESSMENT:  No diagnosis found. We advise the patient that to our knowledge there is no medication that returns memory. There is medication that slows down progression of loss of memory in patient's with Alzheimer's disease and we advise him of that. We further advise him that the complaints that he has regarding his memory are not related to dementia or Alzheimer's disease and given that reassurance. His blood pressure is a little higher than we would like to see it today. At home his blood pressure is usually in the 140/90. We will continue to monitor his blood pressure and make adjustments as needed. BMI approaches ideal body weight. He will return to see us in two or three months, sooner if he needs to. PLAN:  . No orders of the defined types were placed in this encounter. Follow-up Disposition:  Return in about 2 months (around 9/19/2017). ATTENTION:   This medical record was transcribed using an electronic medical records system. Although proofread, it may and can contain electronic and spelling errors. Other human spelling and other errors may be present. Corrections may be executed at a later time.   Please feel free to contact us for any clarifications as needed.

## 2017-07-28 ENCOUNTER — OFFICE VISIT (OUTPATIENT)
Dept: INTERNAL MEDICINE CLINIC | Age: 82
End: 2017-07-28

## 2017-07-28 VITALS
SYSTOLIC BLOOD PRESSURE: 158 MMHG | HEART RATE: 54 BPM | RESPIRATION RATE: 17 BRPM | DIASTOLIC BLOOD PRESSURE: 82 MMHG | WEIGHT: 188.4 LBS | BODY MASS INDEX: 28.55 KG/M2 | TEMPERATURE: 98.3 F | HEIGHT: 68 IN | OXYGEN SATURATION: 97 %

## 2017-07-28 DIAGNOSIS — I10 ESSENTIAL HYPERTENSION: ICD-10-CM

## 2017-07-28 DIAGNOSIS — I48.0 PAF (PAROXYSMAL ATRIAL FIBRILLATION) (HCC): Primary | ICD-10-CM

## 2017-07-28 DIAGNOSIS — R42 EPISODIC LIGHTHEADEDNESS: ICD-10-CM

## 2017-07-28 RX ORDER — PROPAFENONE HYDROCHLORIDE 150 MG/1
150 TABLET, FILM COATED ORAL 2 TIMES DAILY
Qty: 90 TAB | Refills: 5 | Status: ON HOLD
Start: 2017-07-28 | End: 2020-07-05

## 2017-07-28 NOTE — PROGRESS NOTES
SPORTS MEDICINE AND PRIMARY CARE  Roxanne Santa MD, [address] [address]  Phone:  [phone]  Fax: [phone]       Chief Complaint   Patient presents with    Follow-up     Dizziness    . SUBJECTIVE:    Kimberley Peraza is a [de-identified] y.o. male The patient returns today alert, appropriate, ambulatory and has the capacity to give an accurate history. He has a known history of atrial fibrillation on Eliquis and Rythmol, aortic valve replacement, pulmonary hypertension, and primary hypertension. For the past week, he has complained of lightheadedness. The lightheadedness is transient, lasts 30 - 60 seconds, like he is going to faint, but more recently, that is today and yesterday, it felt like he was having the same episodes he has had in the past, as if his heart was not beating correctly. The patient denies chest pain, no shortness of breath. The patient is seen for evaluation. Current Outpatient Prescriptions   Medication Sig Dispense Refill    prednisoLONE acetate (PRED FORTE) 1 % ophthalmic suspension USE 1 DROP INTO RIGHT EYE EVERY DAY UNTIL SEEN  1    simvastatin (ZOCOR) 40 mg tablet TAKE 1 TABLET BY MOUTH AT BEDTIME 90 Tab 3    COMBIGAN 0.2-0.5 % drop ophthalmic solution INSTILL 1 DROP INTO RIGHT EYE TWICE A DAY  1    erythromycin (ILOTYCIN) ophthalmic ointment APPLY (1CM) BY OPHTHALMIC ROUTE 2 TIMES EVERY DAY RIBBON INTO THE CANTHUS ARE IN THE RIGHT EYE  0    LOTEMAX 0.5 % ophthalmic suspension INSTILL 1 DROP 2 TIMES DAILY INTO RIGHT EYE  1    potassium chloride SR (K-TAB) 20 mEq tablet TAKE 1 TABLET BY ORAL ROUTE EVERY DAY WITH FOOD  5    propafenone (RYTHMOL) 150 mg tablet Take 1.5 Tabs by mouth every eight (8) hours. 90 Tab 5    allopurinol (ZYLOPRIM) 100 mg tablet Take 1 Tab by mouth daily. 180 Tab 3    potassium chloride (K-DUR, KLOR-CON) 20 mEq tablet Take 20 mEq by mouth daily.       sodium chloride (OCEAN) 0.65 % nasal spray 2 Sprays by Both Nostrils route four (4) times daily. 45 mL 11    amLODIPine (NORVASC) 5 mg tablet Take 1 Tab by mouth daily. 90 Tab 1    apixaban (ELIQUIS) 2.5 mg tablet Take 1 Tab by mouth two (2) times a day. 180 Tab 3    hydrALAZINE (APRESOLINE) 50 mg tablet Take 1 Tab by mouth three (3) times daily. 100 Tab 11    omeprazole (PRILOSEC) 20 mg capsule Take 20 mg by mouth daily.  folic acid (FOLVITE) 1 mg tablet   0    albuterol (PROVENTIL HFA, VENTOLIN HFA, PROAIR HFA) 90 mcg/actuation inhaler Take 1 Puff by inhalation every four (4) hours as needed for Wheezing. 1 Inhaler 11    bumetanide (BUMEX) 0.5 mg tablet Take 2 Tabs by mouth two (2) times a day. 61 Tab 3     Past Medical History:   Diagnosis Date    Adverse effect of anesthesia     nasal procedure didn't work-patient awake    Alcohol abuse     Aortic regurgitation     CKD (chronic kidney disease), stage III     Episodic lightheadedness 07/28/2017    Epistaxis 11/27/2016    Erectile dysfunction     GI bleed     Gout     gout, osteoarthritis    History of cardioversion 02/17/2017    Hypertension     Multiple myeloma ([address]     PAF (paroxysmal atrial fibrillation) ([address] 2/17/16    Pulmonary HTN ([address]     Right-sided epistaxis 11/27/2016    S/P AVR (aortic valve replacement) 07/26/2016    merrill gill md -     S/P cardiac cath 7/6/16    severe ai, normal coronaries     S/P colonoscopy 10-8-04    S/P colonoscopy with polypectomy 5/9/16    Skin lesion      Past Surgical History:   Procedure Laterality Date    CARDIAC SURG PROCEDURE UNLIST      cardiac cath   Tristen Prior CARDIAC SURG PROCEDURE UNLIST  07/26/2016    AVR    HX HEENT      nasal procedure 20 years ago    HX KNEE REPLACEMENT Bilateral     bilateral knee replacement    HX ORTHOPAEDIC Bilateral     wrist     Allergies   Allergen Reactions    Amiodarone Other (comments)     Bradycardia -excessive         REVIEW OF SYSTEMS:  General: negative for - chills or fever  ENT: negative for - headaches, nasal congestion or tinnitus  Respiratory: negative for - cough, hemoptysis, shortness of breath or wheezing  Cardiovascular : negative for - chest pain, edema, palpitations or shortness of breath  Gastrointestinal: negative for - abdominal pain, blood in stools, heartburn or nausea/vomiting  Genito-Urinary: no dysuria, trouble voiding, or hematuria  Musculoskeletal: negative for - gait disturbance, joint pain, joint stiffness or joint swelling  Neurological: no TIA or stroke symptoms  Hematologic: no bruises, no bleeding, no swollen glands  Integument: no lumps, mole changes, nail changes or rash  Endocrine: no malaise/lethargy or unexpected weight changes      Social History     Social History    Marital status:      Spouse name: N/A    Number of children: N/A    Years of education: N/A     Social History Main Topics    Smoking status: Former Smoker     Years: 4.00     Quit date: 1960    Smokeless tobacco: Never Used    Alcohol use No      Comment: quit currently- end of 2016 - prior 4-6 oz/day    Drug use: No    Sexual activity: Not Asked     Other Topics Concern    None     Social History Narrative    Family History: Mother:  36 yrs, Chronic anemiaFather:  80 yrs, I think aneurysm hypertensionBrother(s):  79 yrs,    obese,dm,drug abuse,miSon(s): alive, adopted1 son(s) . Social History: Alcohol Use Patient uses alcohol, Drinks per occasion: 2, Drinks per w [place]: 10. Smoking Status Patient is a never smoker. Marital Status: . Lives w ith: alone. Occupation/W ork: employed full time insurance. Education/School: has highschool diploma.      Family History   Problem Relation Age of Onset    Anemia Mother     No Known Problems Father     Other Brother      drug abuse       OBJECTIVE:    Visit Vitals    /82 (BP 1 Location: Right arm, BP Patient Position: Sitting)    Pulse (!) 54    Temp 98.3 °F (36.8 °C) (Oral)    Resp 17    Ht 5' 8\" (1.727 m)    Wt 188 lb 6.4 oz (85.5 kg)    SpO2 97%    BMI 28.65 kg/m2     CONSTITUTIONAL: well , well nourished, appears age appropriate  EYES: perrla, eom intact  ENMT:moist mucous membranes, pharynx clear  NECK: supple. Thyroid normal  RESPIRATORY: Chest: clear bilaterally   CARDIOVASCULAR: Heart: regular rate and rhythm  GASTROINTESTINAL: Abdomen: soft, bowel sounds active  HEMATOLOGIC: no pathological lymph nodes palpated  MUSCULOSKELETAL: Extremities: no edema, pulse 1+   INTEGUMENT: No unusual rashes or suspicious skin lesions noted. Nails appear normal.  NEUROLOGIC: non-focal exam   MENTAL STATUS: alert and oriented, appropriate affect           ASSESSMENT:  1. PAF (paroxysmal atrial fibrillation) (HCC)    2. Episodic lightheadedness    3. Essential hypertension      Evaluation of the lightheadedness included an EKG that revealed atrial fibrillation with a rate of 50. He has no postural changes. Blood pressure supine is 128/70, standing 138/78. There is no change in the pulse. We spoke briefly with Dr. Portillo Hughes and we will decrease the Rythmol to 150 mg twice daily. He will see him one day next week. The patient was also advised to go to the emergency room should he have any further concerns at [facility].      BMI is 28.6. We will check his electrolytes to exclude other pathology. No change in blood pressure pills at this time. We noted elevated blood pressure when he first came into the office, but all within acceptable range. He will return to see us in four to six weeks. PLAN:  .  Orders Placed This Encounter    CBC WITH AUTOMATED DIFF    METABOLIC PANEL, BASIC    MAGNESIUM    AMB POC EKG ROUTINE W/ 12 LEADS, INTER & REP       Follow-up Disposition:  Return in about 6 weeks (around 9/8/2017). ATTENTION:   This medical record was transcribed using an electronic medical records system. Although proofread, it may and can contain electronic and spelling errors.   Other human spelling and other errors may be present. Corrections may be executed at a later time. Please feel free to contact us for any clarifications as needed.

## 2017-07-28 NOTE — PROGRESS NOTES
1. Have you been to the ER, urgent care clinic since your last visit? Hospitalized since your last visit? No    2. Have you seen or consulted any other health care providers outside of the [address] [place] [address] since your last visit? Include any pap smears or colon screening.  No

## 2017-07-29 LAB
BASOPHILS # BLD AUTO: 0 X10E3/UL (ref 0–0.2)
BASOPHILS NFR BLD AUTO: 0 %
BUN SERPL-MCNC: 17 MG/DL (ref 8–27)
BUN/CREAT SERPL: 10 (ref 10–24)
CALCIUM SERPL-MCNC: 9.3 MG/DL (ref 8.6–10.2)
CHLORIDE SERPL-SCNC: 102 MMOL/L (ref 96–106)
CO2 SERPL-SCNC: 26 MMOL/L (ref 18–29)
CREAT SERPL-MCNC: 1.67 MG/DL (ref 0.76–1.27)
EOSINOPHIL # BLD AUTO: 0.2 X10E3/UL (ref 0–0.4)
EOSINOPHIL NFR BLD AUTO: 5 %
ERYTHROCYTE [DISTWIDTH] IN BLOOD BY AUTOMATED COUNT: 15.6 % (ref 12.3–15.4)
GLUCOSE SERPL-MCNC: 99 MG/DL (ref 65–99)
HCT VFR BLD AUTO: 37.7 % (ref 37.5–51)
HGB BLD-MCNC: 12.3 G/DL (ref 12.6–17.7)
IMM GRANULOCYTES # BLD: 0 X10E3/UL (ref 0–0.1)
IMM GRANULOCYTES NFR BLD: 0 %
LYMPHOCYTES # BLD AUTO: 0.8 X10E3/UL (ref 0.7–3.1)
LYMPHOCYTES NFR BLD AUTO: 20 %
MAGNESIUM SERPL-MCNC: 1.9 MG/DL (ref 1.6–2.3)
MCH RBC QN AUTO: 31.6 PG (ref 26.6–33)
MCHC RBC AUTO-ENTMCNC: 32.6 G/DL (ref 31.5–35.7)
MCV RBC AUTO: 97 FL (ref 79–97)
MONOCYTES # BLD AUTO: 0.3 X10E3/UL (ref 0.1–0.9)
MONOCYTES NFR BLD AUTO: 6 %
NEUTROPHILS # BLD AUTO: 2.9 X10E3/UL (ref 1.4–7)
NEUTROPHILS NFR BLD AUTO: 69 %
PLATELET # BLD AUTO: 206 X10E3/UL (ref 150–379)
POTASSIUM SERPL-SCNC: 3.7 MMOL/L (ref 3.5–5.2)
RBC # BLD AUTO: 3.89 X10E6/UL (ref 4.14–5.8)
SODIUM SERPL-SCNC: 144 MMOL/L (ref 134–144)
WBC # BLD AUTO: 4.2 X10E3/UL (ref 3.4–10.8)

## 2017-08-10 ENCOUNTER — HOSPITAL ENCOUNTER (EMERGENCY)
Age: 82
Discharge: HOME | End: 2017-08-10
Attending: STUDENT IN AN ORGANIZED HEALTH CARE EDUCATION/TRAINING PROGRAM
Payer: MEDICARE

## 2017-08-10 VITALS
SYSTOLIC BLOOD PRESSURE: 153 MMHG | OXYGEN SATURATION: 98 % | HEIGHT: 68 IN | TEMPERATURE: 97.9 F | HEART RATE: 55 BPM | BODY MASS INDEX: 27.28 KG/M2 | RESPIRATION RATE: 17 BRPM | DIASTOLIC BLOOD PRESSURE: 91 MMHG | WEIGHT: 180 LBS

## 2017-08-10 DIAGNOSIS — R42 EPISODIC LIGHTHEADEDNESS: Primary | ICD-10-CM

## 2017-08-10 LAB
ALBUMIN SERPL BCP-MCNC: 3.6 G/DL (ref 3.5–5)
ALBUMIN/GLOB SERPL: 0.9 {RATIO} (ref 1.1–2.2)
ALP SERPL-CCNC: 67 U/L (ref 45–117)
ALT SERPL-CCNC: 28 U/L (ref 12–78)
ANION GAP BLD CALC-SCNC: 5 MMOL/L (ref 5–15)
AST SERPL W P-5'-P-CCNC: 33 U/L (ref 15–37)
BASOPHILS # BLD AUTO: 0 K/UL (ref 0–0.1)
BASOPHILS # BLD: 0 % (ref 0–1)
BILIRUB SERPL-MCNC: 0.8 MG/DL (ref 0.2–1)
BUN SERPL-MCNC: 16 MG/DL (ref 6–20)
BUN/CREAT SERPL: 9 (ref 12–20)
CALCIUM SERPL-MCNC: 8.3 MG/DL (ref 8.5–10.1)
CHLORIDE SERPL-SCNC: 108 MMOL/L (ref 97–108)
CO2 SERPL-SCNC: 28 MMOL/L (ref 21–32)
CREAT SERPL-MCNC: 1.75 MG/DL (ref 0.7–1.3)
DIFFERENTIAL METHOD BLD: ABNORMAL
EOSINOPHIL # BLD: 0 K/UL (ref 0–0.4)
EOSINOPHIL NFR BLD: 1 % (ref 0–7)
ERYTHROCYTE [DISTWIDTH] IN BLOOD BY AUTOMATED COUNT: 14.5 % (ref 11.5–14.5)
GLOBULIN SER CALC-MCNC: 3.9 G/DL (ref 2–4)
GLUCOSE SERPL-MCNC: 109 MG/DL (ref 65–100)
HCT VFR BLD AUTO: 36.8 % (ref 36.6–50.3)
HGB BLD-MCNC: 12.5 G/DL (ref 12.1–17)
LYMPHOCYTES # BLD AUTO: 14 % (ref 12–49)
LYMPHOCYTES # BLD: 0.6 K/UL (ref 0.8–3.5)
MAGNESIUM SERPL-MCNC: 2.1 MG/DL (ref 1.6–2.4)
MCH RBC QN AUTO: 32.6 PG (ref 26–34)
MCHC RBC AUTO-ENTMCNC: 34 G/DL (ref 30–36.5)
MCV RBC AUTO: 95.8 FL (ref 80–99)
MONOCYTES # BLD: 0.3 K/UL (ref 0–1)
MONOCYTES NFR BLD AUTO: 7 % (ref 5–13)
NEUTS SEG # BLD: 3.4 K/UL (ref 1.8–8)
NEUTS SEG NFR BLD AUTO: 78 % (ref 32–75)
PLATELET # BLD AUTO: 186 K/UL (ref 150–400)
POTASSIUM SERPL-SCNC: 4.4 MMOL/L (ref 3.5–5.1)
PROT SERPL-MCNC: 7.5 G/DL (ref 6.4–8.2)
RBC # BLD AUTO: 3.84 M/UL (ref 4.1–5.7)
RBC MORPH BLD: ABNORMAL
SODIUM SERPL-SCNC: 141 MMOL/L (ref 136–145)
TROPONIN I SERPL-MCNC: 0.04 NG/ML
WBC # BLD AUTO: 4.3 K/UL (ref 4.1–11.1)

## 2017-08-10 PROCEDURE — 93005 ELECTROCARDIOGRAM TRACING: CPT

## 2017-08-10 PROCEDURE — 99285 EMERGENCY DEPT VISIT HI MDM: CPT

## 2017-08-10 PROCEDURE — 80053 COMPREHEN METABOLIC PANEL: CPT | Performed by: STUDENT IN AN ORGANIZED HEALTH CARE EDUCATION/TRAINING PROGRAM

## 2017-08-10 PROCEDURE — 84484 ASSAY OF TROPONIN QUANT: CPT | Performed by: STUDENT IN AN ORGANIZED HEALTH CARE EDUCATION/TRAINING PROGRAM

## 2017-08-10 PROCEDURE — 74011250637 HC RX REV CODE- 250/637: Performed by: STUDENT IN AN ORGANIZED HEALTH CARE EDUCATION/TRAINING PROGRAM

## 2017-08-10 PROCEDURE — 83735 ASSAY OF MAGNESIUM: CPT | Performed by: STUDENT IN AN ORGANIZED HEALTH CARE EDUCATION/TRAINING PROGRAM

## 2017-08-10 PROCEDURE — 74011250636 HC RX REV CODE- 250/636: Performed by: STUDENT IN AN ORGANIZED HEALTH CARE EDUCATION/TRAINING PROGRAM

## 2017-08-10 PROCEDURE — 36415 COLL VENOUS BLD VENIPUNCTURE: CPT | Performed by: STUDENT IN AN ORGANIZED HEALTH CARE EDUCATION/TRAINING PROGRAM

## 2017-08-10 PROCEDURE — 96360 HYDRATION IV INFUSION INIT: CPT

## 2017-08-10 PROCEDURE — 85025 COMPLETE CBC W/AUTO DIFF WBC: CPT | Performed by: STUDENT IN AN ORGANIZED HEALTH CARE EDUCATION/TRAINING PROGRAM

## 2017-08-10 RX ORDER — DIAZEPAM 5 MG/1
2.5 TABLET ORAL
Status: COMPLETED | OUTPATIENT
Start: 2017-08-10 | End: 2017-08-10

## 2017-08-10 RX ORDER — DIAZEPAM 2 MG/1
2 TABLET ORAL
Qty: 10 TAB | Refills: 0 | Status: SHIPPED | OUTPATIENT
Start: 2017-08-10 | End: 2018-05-29

## 2017-08-10 RX ADMIN — DIAZEPAM 2.5 MG: 5 TABLET ORAL at 16:19

## 2017-08-10 RX ADMIN — SODIUM CHLORIDE 1000 ML: 900 INJECTION, SOLUTION INTRAVENOUS at 16:19

## 2017-08-10 NOTE — ED NOTES
Pt given discharge instructions, patient education, prescriptions, and follow up information. Pt states understanding. All questions answered. Pt discharged to home in private vehicle, ride called, ambulatory. Pt A/Ox4, RA, pain controlled.

## 2017-08-10 NOTE — DISCHARGE INSTRUCTIONS
Dizziness: Care Instructions  Your Care Instructions  Dizziness is the feeling of unsteadiness or fuzziness in your head. It is different than having vertigo, which is a feeling that the room is spinning or that you are moving or falling. It is also different from lightheadedness, which is the feeling that you are about to faint. It can be hard to know what causes dizziness. Some people feel dizzy when they have migraine headaches. Sometimes bouts of flu can make you feel dizzy. Some medical conditions, such as heart problems or high blood pressure, can make you feel dizzy. Many medicines can cause dizziness, including medicines for high blood pressure, pain, or anxiety. If a medicine causes your symptoms, your doctor may recommend that you stop or change the medicine. If it is a problem with your heart, you may need medicine to help your heart work better. If there is no clear reason for your symptoms, your doctor may suggest watching and waiting for a while to see if the dizziness goes away on its own. Follow-up care is a key part of your treatment and safety. Be sure to make and go to all appointments, and call your doctor if you are having problems. It's also a good idea to know your test results and keep a list of the medicines you take. How can you care for yourself at home? · If your doctor recommends or prescribes medicine, take it exactly as directed. Call your doctor if you think you are having a problem with your medicine. · Do not drive while you feel dizzy. · Try to prevent falls. Steps you can take include:  ¨ Using nonskid mats, adding grab bars near the tub, and using night-lights. ¨ Clearing your home so that walkways are free of anything you might trip on. ¨ Letting family and friends know that you have been feeling dizzy. This will help them know how to help you. When should you call for help? Call 911 anytime you think you may need emergency care.  For example, call if:  · You passed out (lost consciousness). · You have dizziness along with symptoms of a heart attack. These may include:  ¨ Chest pain or pressure, or a strange feeling in the chest.  ¨ Sweating. ¨ Shortness of breath. ¨ Nausea or vomiting. ¨ Pain, pressure, or a strange feeling in the back, neck, jaw, or upper belly or in one or both shoulders or arms. ¨ Lightheadedness or sudden weakness. ¨ A fast or irregular heartbeat. · You have symptoms of a stroke. These may include:  ¨ Sudden numbness, tingling, weakness, or loss of movement in your face, arm, or leg, especially on only one side of your body. ¨ Sudden vision changes. ¨ Sudden trouble speaking. ¨ Sudden confusion or trouble understanding simple statements. ¨ Sudden problems with walking or balance. ¨ A sudden, severe headache that is different from past headaches. Call your doctor now or seek immediate medical care if:  · You feel dizzy and have a fever, headache, or ringing in your ears. · You have new or increased nausea and vomiting. · Your dizziness does not go away or comes back. Watch closely for changes in your health, and be sure to contact your doctor if:  · You do not get better as expected. Where can you learn more? Go to http://buck-anjelica.info/. Enter Q712 in the search box to learn more about \"Dizziness: Care Instructions. \"  Current as of: March 20, 2017  Content Version: 11.3  © 2181-1162 [employer]. Care instructions adapted under license by [employer] (which disclaims liability or warranty for this information). If you have questions about a medical condition or this instruction, always ask your healthcare professional. [place] [address] any warranty or liability for your use of this information. We hope that we have addressed all of your medical concerns.  The examination and treatment you received in the Emergency Department were for an emergent problem and were not intended as complete care. It is important that you follow up with your healthcare provider(s) for ongoing care. If your symptoms worsen or do not improve as expected, and you are unable to reach your usual health care provider(s), you should return to the Emergency Department. Today's healthcare is undergoing tremendous change, and patient satisfaction surveys are one of the many tools to assess the quality of medical care. You may receive a survey from the [employer] regarding your experience in the Emergency Department. I hope that your experience has been completely positive, particularly the medical care that I provided. As such, please participate in the survey; anything less than excellent does not meet my expectations or intentions. [facility] and [facility] participate in nationally recognized quality of care measures. If your blood pressure is greater than 120/80, as reported below, we urge that you seek medical care to address the potential of high blood pressure, commonly known as hypertension. Hypertension can be hereditary or can be caused by certain medical conditions, pain, stress, or \"white coat syndrome. \"       Please make an appointment with your health care provider(s) for follow up of your Emergency Department visit. VITALS:   Patient Vitals for the past 8 hrs:   Temp Pulse Resp BP SpO2   08/10/17 1645 - 67 20 - 97 %   08/10/17 1615 - (!) 55 16 (!) 139/92 97 %   08/10/17 1600 - (!) 54 17 138/79 99 %   08/10/17 1559 - (!) 53 18 124/72 99 %   08/10/17 1553 - - - - 97 %   08/10/17 1547 98.4 °F (36.9 °C) 68 16 147/86 99 %          Thank you for allowing us to provide you with medical care today. We realize that you have many choices for your emergency care needs. Please choose us in the future for any continued health care needs. [facility], [facility]. Office: [phone]            Recent Results (from the past 24 hour(s))   EKG, 12 LEAD, INITIAL    Collection Time: 08/10/17  3:46 PM   Result Value Ref Range    Ventricular Rate 59 BPM    Atrial Rate 78 BPM    QRS Duration 96 ms    Q-T Interval 446 ms    QTC Calculation (Bezet) 441 ms    Calculated R Axis 107 degrees    Calculated T Axis -155 degrees    Diagnosis       Atrial fibrillation  ST & T wave abnormality, consider inferolateral ischemia  When compared with ECG of 17-FEB-2017 13:36,  Atrial fibrillation has replaced Sinus rhythm     CBC WITH AUTOMATED DIFF    Collection Time: 08/10/17  3:52 PM   Result Value Ref Range    WBC 4.3 4.1 - 11.1 K/uL    RBC 3.84 (L) 4.10 - 5.70 M/uL    HGB 12.5 12.1 - 17.0 g/dL    HCT 36.8 36.6 - 50.3 %    MCV 95.8 80.0 - 99.0 FL    MCH 32.6 26.0 - 34.0 PG    MCHC 34.0 30.0 - 36.5 g/dL    RDW 14.5 11.5 - 14.5 %    PLATELET 046 557 - 184 K/uL    NEUTROPHILS PENDING %    LYMPHOCYTES PENDING %    MONOCYTES PENDING %    EOSINOPHILS PENDING %    BASOPHILS PENDING %    ABS. NEUTROPHILS PENDING K/UL    ABS. LYMPHOCYTES PENDING K/UL    ABS. MONOCYTES PENDING K/UL    ABS. EOSINOPHILS PENDING K/UL    ABS. BASOPHILS PENDING K/UL    DF PENDING    METABOLIC PANEL, COMPREHENSIVE    Collection Time: 08/10/17  3:52 PM   Result Value Ref Range    Sodium 141 136 - 145 mmol/L    Potassium 4.4 3.5 - 5.1 mmol/L    Chloride 108 97 - 108 mmol/L    CO2 28 21 - 32 mmol/L    Anion gap 5 5 - 15 mmol/L    Glucose 109 (H) 65 - 100 mg/dL    BUN 16 6 - 20 MG/DL    Creatinine 1.75 (H) 0.70 - 1.30 MG/DL    BUN/Creatinine ratio 9 (L) 12 - 20      GFR est AA 46 (L) >60 ml/min/1.73m2    GFR est non-AA 38 (L) >60 ml/min/1.73m2    Calcium 8.3 (L) 8.5 - 10.1 MG/DL    Bilirubin, total 0.8 0.2 - 1.0 MG/DL    ALT (SGPT) 28 12 - 78 U/L    AST (SGOT) 33 15 - 37 U/L    Alk.  phosphatase 67 45 - 117 U/L    Protein, total 7.5 6.4 - 8.2 g/dL    Albumin 3.6 3.5 - 5.0 g/dL    Globulin 3.9 2.0 - 4.0 g/dL    A-G Ratio 0.9 (L) 1.1 - 2.2     TROPONIN I    Collection Time: 08/10/17  3:52 PM   Result Value Ref Range    Troponin-I, Qt. 0.04 <0.05 ng/mL   MAGNESIUM    Collection Time: 08/10/17  3:52 PM   Result Value Ref Range    Magnesium 2.1 1.6 - 2.4 mg/dL       No results found.

## 2017-08-10 NOTE — ED PROVIDER NOTES
HPI Comments: [de-identified] y.o. male with extensive past medical history, please see list, significant for HTN, CKD, PAF who presents with chief complaint of lightheadedness. Patient complains of lightheadedness for an unspecified duration, stating it is worse when he stands. Patient states last night and this morning were the worse that the lightheadedness has been. Patient believes his lightheadedness is stress related though he does not have any medications for stress. Patient states he saw his PCP 1 week ago. Patient states he last saw his cardiologist 1 month ago. Patient does not feel as if he is currently in a fib. Patient is on eliquis. Patient states he has some swelling in his legs though he believes that is because he recently started walking. Patient denies syncope or falls. Patient denies any new medication. There are no other acute medical concerns at this time. Social hx: former smoker, current alcohol drinker  PCP: Rashida Galaviz MD  Cardiologist: Joni Jenkins MD, [facility] [place]    Note written by Mika Bailey, as dictated by Cesar Da Silva MD 3:56 PM     The history is provided by the patient. No  was used. Past Medical History:   Diagnosis Date    Adverse effect of anesthesia     nasal procedure didn't work-patient awake    Alcohol abuse     Aortic regurgitation     CKD (chronic kidney disease), stage III     Episodic lightheadedness 07/28/2017    Epistaxis 11/27/2016    Erectile dysfunction     GI bleed     Gout     gout, osteoarthritis    History of cardioversion 02/17/2017    Hypertension     Multiple myeloma ([address]     PAF (paroxysmal atrial fibrillation) ([address] 2/17/16    Pulmonary HTN ([address]     Right-sided epistaxis 11/27/2016    S/P AVR (aortic valve replacement) 07/26/2016    merrill gill md -     S/P cardiac cath 7/6/16    severe ai, normal coronaries     S/P colonoscopy 10-8-04    S/P colonoscopy with polypectomy 5/9/16    Skin lesion Past Surgical History:   Procedure Laterality Date    CARDIAC SURG PROCEDURE UNLIST      cardiac cath   Felicita Ken CARDIAC SURG PROCEDURE UNLIST  2016    AVR    HX HEENT      nasal procedure 20 years ago   Felicita Ken HX KNEE REPLACEMENT Bilateral     bilateral knee replacement    HX ORTHOPAEDIC Bilateral     wrist         Family History:   Problem Relation Age of Onset    Anemia Mother     No Known Problems Father     Other Brother      drug abuse       Social History     Social History    Marital status:      Spouse name: N/A    Number of children: N/A    Years of education: N/A     Occupational History    Not on file. Social History Main Topics    Smoking status: Former Smoker     Years: 4.00     Quit date: 1960    Smokeless tobacco: Never Used    Alcohol use Yes      Comment: 16 oz beer/day    Drug use: No    Sexual activity: Not on file     Other Topics Concern    Not on file     Social History Narrative    Family History: Mother:  36 yrs, Chronic anemiaFather:  80 yrs, I think aneurysm hypertensionBrother(s):  79 yrs,    obese,dm,drug abuse,miSon(s): alive, adopted1 son(s) . Social History: Alcohol Use Patient uses alcohol, Drinks per occasion: 2, Drinks per w [place]: 10. Smoking Status Patient is a never smoker. Marital Status: . Lives w ith: alone. Occupation/W ork: employed full time insurance. Education/School: has highschool diploma. ALLERGIES: Amiodarone    Review of Systems   Constitutional: Negative for appetite change, chills, fatigue and fever. HENT: Negative for congestion, rhinorrhea and sore throat. Respiratory: Negative for cough and shortness of breath. Cardiovascular: Negative for chest pain and leg swelling. Gastrointestinal: Negative for abdominal pain, constipation, nausea and vomiting. Genitourinary: Negative for difficulty urinating and dysuria. Musculoskeletal: Negative for back pain and neck pain.    Skin: Negative for rash and wound. Neurological: Positive for light-headedness. Negative for syncope and headaches. All other systems reviewed and are negative. Vitals:    08/10/17 1547 08/10/17 1553   BP: 147/86    Pulse: 68    Resp: 16    Temp: 98.4 °F (36.9 °C)    SpO2: 99% 97%   Weight: 81.6 kg (180 lb)    Height: 5' 8\" (1.727 m)             Physical Exam   Constitutional: He is oriented to person, place, and time. He appears well-developed. No distress. HENT:   Head: Normocephalic and atraumatic. Eyes: Conjunctivae and EOM are normal.   Neck: Normal range of motion. Neck supple. Cardiovascular: Normal rate and normal heart sounds. An irregular rhythm present. No murmur heard. Pulmonary/Chest: Effort normal and breath sounds normal. No respiratory distress. Abdominal: Soft. Bowel sounds are normal. He exhibits no distension. There is no tenderness. There is no rebound. Musculoskeletal: Normal range of motion. He exhibits edema. He exhibits no tenderness. Trace edema b/l   Neurological: He is alert and oriented to person, place, and time. No cranial nerve deficit. He exhibits normal muscle tone. Coordination normal.   Skin: Skin is warm and dry. Nursing note and vitals reviewed. Note written by Mika Schulz, as dictated by Janette Head MD 3:56 PM      [facility]  ED Course       Procedures    ED EKG interpretation:  Rhythm: atrial fib. Rate (approx.): 59; ST/T wave: non-specific changes; No STEMI. Note written by Mika Schulz, as dictated by Janette Head MD 3:46 PM     The patient is resting comfortably and feels better, is alert, talkative, interactive and in no distress. The repeat examination is unremarkable and benign. The patient is neurologically intact, has a normal mental status and is ambulatory in the ED.  The history, exam, diagnostic testing (if any) and the patient's current condition do not suggest pathologic dysrhythmias, meningitis, stroke, sepsis, subarachnoid hemorrhage, intracranial bleeding, encephalitis or other significant pathology that would warrant further testing, continued ED treatment, admission, neurological consultation, or other specialist evaluation at this point. The vital signs have been stable. The patient's condition is stable and appropriate for discharge. The patient will pursue further outpatient evaluation with the primary care physician or other designated or consulting physician as indicated in the discharge instructions.

## 2017-08-11 ENCOUNTER — PATIENT OUTREACH (OUTPATIENT)
Dept: INTERNAL MEDICINE CLINIC | Age: 82
End: 2017-08-11

## 2017-08-11 LAB
ATRIAL RATE: 78 BPM
CALCULATED R AXIS, ECG10: 107 DEGREES
CALCULATED T AXIS, ECG11: -155 DEGREES
DIAGNOSIS, 93000: NORMAL
Q-T INTERVAL, ECG07: 446 MS
QRS DURATION, ECG06: 96 MS
QTC CALCULATION (BEZET), ECG08: 441 MS
VENTRICULAR RATE, ECG03: 59 BPM

## 2017-08-11 NOTE — PROGRESS NOTES
[facility]  8/10/2017:  Episodic lightheadedness    Hospital Discharge Follow-Up      Date/Time:  2017 1:31 PM    Patient listed on discharge [facility]) report on 8/10/2017. Patient discharged from [facility] for episodic lightheadedness. RRAT score: Medium Risk            19       Total Score        3 Has Seen PCP in Last 6 Months (Yes=3, No=0)    16 Charlson Comorbidity Score (Age + Comorbid Conditions)        Criteria that do not apply:    . Living with Significant Other. Assisted Living. LTAC. SNF. or   Rehab    Patient Length of Stay (>5 days = 3)    IP Visits Last 12 Months (1-3=4, 4=9, >4=11)    Pt. Coverage (Medicare=5 , Medicaid, or Self-Pay=4)     Moderate    Medical History:     Past Medical History:   Diagnosis Date    Adverse effect of anesthesia     nasal procedure didn't work-patient awake    Alcohol abuse     Aortic regurgitation     CKD (chronic kidney disease), stage III     Episodic lightheadedness 2017    Epistaxis 2016    Erectile dysfunction     GI bleed     Gout     gout, osteoarthritis    History of cardioversion 2017    Hypertension     Multiple myeloma ([address]     PAF (paroxysmal atrial fibrillation) ([address] 16    Pulmonary HTN ([address]     Right-sided epistaxis 2016    S/P AVR (aortic valve replacement) 2016    merrill gill md -     S/P cardiac cath 16    severe ai, normal coronaries     S/P colonoscopy 10-8-04    S/P colonoscopy with polypectomy 16    Skin lesion        Nurse Navigator(NN) contacted the patient by telephone to perform post H(hospital/ED) discharge assessment. Verified  and address with patient as identifiers. Provided introduction to self, and explanation of the Nurses Navigator role. Chart Review:  [de-identified] y.o. male with extensive past medical history, please see list, significant for HTN, CKD, PAF who presents with chief complaint of lightheadedness.  Patient complains of lightheadedness for an unspecified duration, stating it is worse when he stands. Patient states last night and this morning were the worse that the lightheadedness has been. Patient believes his lightheadedness is stress related though he does not have any medications for stress. Patient states he saw his PCP 1 week ago. Patient states he last saw his cardiologist 1 month ago. Patient does not feel as if he is currently in a fib. Patient is on eliquis. Patient states he has some swelling in his legs though he believes that is because he recently started walking. Patient denies syncope or falls. Patient denies any new medication. There are no other acute medical concerns at this time.                               ED EKG interpretation:  Rhythm: atrial fib. Rate (approx.): 59; ST/T wave: non-specific changes; No STEMI.  08/10/17 1700 97.9 °F (36.6 °C)  55 17 57 beats per minute  153/91 --     RBC 3.84 (L     Glucose 109 (H) mg/dL      BUN 16 MG/DL     Creatinine 1.75 (H) MG/DL     BUN/Creatinine ratio 9 (L)       GFR est AA 46 (L) ml/min/1.73m2     GFR est non-AA 38 (L)     · Red Flags: You passed out (lost consciousness). · You have dizziness along with symptoms of a heart attack. These may include:  ¨ Chest pain or pressure, or a strange feeling in the chest.  ¨ Sweating. ¨ Shortness of breath. ¨ Nausea or vomiting. ¨ Pain, pressure, or a strange feeling in the back, neck, jaw, or upper belly or in one or both shoulders or arms. ¨ Lightheadedness or sudden weakness. ¨ A fast or irregular heartbeat. · You have symptoms of a stroke. These may include:  ¨ Sudden numbness, tingling, weakness, or loss of movement in your face, arm, or leg, especially on only one side of your body. ¨ Sudden vision changes. ¨ Sudden trouble speaking. ¨ Sudden confusion or trouble understanding simple statements. ¨ Sudden problems with walking or balance.   A sudden, severe headache that is different from past headaches. Diet:   Patient reports: Renal Diet     Activity:    Patient reports: somewalking outside the house    Medication:   Performed medication reconciliation with patient, and patient verbalizes understanding of administration of home medications. There were no barriers to obtaining medications identified at this time. Support system:  patient and friend    Discharge Instructions :  Reviewed discharge instructions with patient. Patient verbalizes understanding of discharge instructions and follow-up care. Labs Reviewed:  Recent Labs      08/10/17   1552   WBC  4.3   HGB  12.5   HCT  36.8   PLT  186     Recent Labs      08/10/17   1552   NA  141   K  4.4   CL  108   CO2  28   GLU  109*   BUN  16   CREA  1.75*   CA  8.3*   MG  2.1   ALB  3.6   SGOT  33   ALT  28     IAdvance Care Planning:   Patient was offered the opportunity to discuss advance care planning:  no     Does patient have an Advance Directive:  yes   If no, did you provide information on Caring Connections?  no     PCP/Specialist follow up: Patient scheduled to follow up with Magdalene Richard MD on 8/14/2017. Reviewed red flags with patient, and patient verbalizes understanding. Patient given an opportunity to ask questions. No other clinical/social/functional needs noted. The patient agrees to contact the PCP office for questions related to their healthcare. The patient expressed thanks, offered no additional questions and ended the call. Case management plan: Attempt to contact the patient by telephone or during office visit within the next 7-10 days. Will continue to follow as necessary for the next 30 days. Will reassess for case management needs prior to discharge from case management service on or about 30 days.

## 2017-08-14 ENCOUNTER — OFFICE VISIT (OUTPATIENT)
Dept: INTERNAL MEDICINE CLINIC | Age: 82
End: 2017-08-14

## 2017-08-14 VITALS
OXYGEN SATURATION: 98 % | BODY MASS INDEX: 28.37 KG/M2 | TEMPERATURE: 98.4 F | RESPIRATION RATE: 16 BRPM | SYSTOLIC BLOOD PRESSURE: 137 MMHG | DIASTOLIC BLOOD PRESSURE: 82 MMHG | HEIGHT: 68 IN | HEART RATE: 83 BPM | WEIGHT: 187.2 LBS

## 2017-08-14 DIAGNOSIS — Z95.2 S/P AVR (AORTIC VALVE REPLACEMENT): ICD-10-CM

## 2017-08-14 DIAGNOSIS — R42 EPISODIC LIGHTHEADEDNESS: Primary | ICD-10-CM

## 2017-08-14 DIAGNOSIS — I10 ESSENTIAL HYPERTENSION: ICD-10-CM

## 2017-08-14 RX ORDER — PAROXETINE 10 MG/1
10 TABLET, FILM COATED ORAL DAILY
Qty: 30 TAB | Refills: 3 | Status: SHIPPED | OUTPATIENT
Start: 2017-08-14 | End: 2017-12-08 | Stop reason: SDUPTHER

## 2017-08-14 NOTE — MR AVS SNAPSHOT
Visit Information Date & Time Provider Department Dept. Phone Encounter #  
 8/14/2017  9:00 AM Nicolle Moraes [facility] [phone] [id number] Follow-up Instructions Return in about 2 months (around 10/14/2017). Follow-up and Disposition History Your Appointments 9/20/2017  9:15 AM  
Any with Valerie Jacobson MD  
[address] [place] [address] and Primary Care [facility] Appt Note: follow up 2mnths  
 Robel Miguel[address]ann [address] [facility]  
  
   
 [address] [id number] Upcoming Health Maintenance Date Due INFLUENZA AGE 9 TO ADULT 8/1/2017 MEDICARE YEARLY EXAM 5/31/2018 GLAUCOMA SCREENING Q2Y 4/4/2019 DTaP/Tdap/Td series (2 - Td) 5/16/2026 Allergies as of 8/14/2017  Review Complete On: 8/14/2017 By: Valerie Jacobson MD  
  
 Severity Noted Reaction Type Reactions Amiodarone  10/09/2014    Other (comments) Bradycardia -excessive Current Immunizations  Reviewed on 8/24/2016 No immunizations on file. Not reviewed this visit You Were Diagnosed With   
  
 Codes Comments Episodic lightheadedness    -  Primary ICD-10-CM: N16 ICD-9-CM: 780.4 S/P AVR (aortic valve replacement)     ICD-10-CM: Q86.9 ICD-9-CM: V43.3 Essential hypertension     ICD-10-CM: I10 
ICD-9-CM: 401.9 Vitals BP Pulse Temp Resp Height(growth percentile) Weight(growth percentile) 137/82 (BP 1 Location: Left arm, BP Patient Position: Sitting) 83 98.4 °F (36.9 °C) (Oral) 16 5' 8\" (1.727 m) 187 lb 3.2 oz (84.9 kg) SpO2 BMI Smoking Status 98% 28.46 kg/m2 Former Smoker BMI and BSA Data Body Mass Index Body Surface Area  
 28.46 kg/m 2 2.02 m 2 Preferred Pharmacy Pharmacy Name Phone [id number] Nicole Citizen, [address] [place] [address] [phone] Your Updated Medication List  
  
   
 This list is accurate as of: 8/14/17 10:53 AM.  Always use your most recent med list.  
  
  
  
  
 albuterol 90 mcg/actuation inhaler Commonly known as:  PROVENTIL HFA, VENTOLIN HFA, PROAIR HFA Take 1 Puff by inhalation every four (4) hours as needed for Wheezing. allopurinol 100 mg tablet Commonly known as:  Tim Bloom Take 1 Tab by mouth daily. amLODIPine 5 mg tablet Commonly known as:  Mango Lute Take 1 Tab by mouth daily. apixaban 2.5 mg tablet Commonly known as:  Alvera Gouge Take 1 Tab by mouth two (2) times a day. bumetanide 0.5 mg tablet Commonly known as:  Bernell Rumble Take 2 Tabs by mouth two (2) times a day. COMBIGAN 0.2-0.5 % Drop ophthalmic solution Generic drug:  brimonidine-timolol INSTILL 1 DROP INTO RIGHT EYE TWICE A DAY  
  
 diazePAM 2 mg tablet Commonly known as:  VALIUM Take 1 Tab by mouth every eight (8) hours as needed for Anxiety. Max Daily Amount: 6 mg.  
  
 erythromycin ophthalmic ointment Commonly known as:  ILOTYCIN  
APPLY (1CM) BY OPHTHALMIC ROUTE 2 TIMES EVERY DAY RIBBON INTO THE CANTHUS ARE IN THE RIGHT EYE  
  
 folic acid 1 mg tablet Commonly known as:  FOLVITE  
  
 hydrALAZINE 50 mg tablet Commonly known as:  APRESOLINE Take 1 Tab by mouth three (3) times daily. LOTEMAX 0.5 % ophthalmic suspension Generic drug:  loteprednol etabonate INSTILL 1 DROP 2 TIMES DAILY INTO RIGHT EYE  
  
 omeprazole 20 mg capsule Commonly known as:  PRILOSEC Take 20 mg by mouth daily. PARoxetine 10 mg tablet Commonly known as:  PAXIL Take 1 Tab by mouth daily. * potassium chloride 20 mEq tablet Commonly known as:  K-DUR, KLOR-CON Take 20 mEq by mouth daily. * potassium chloride SR 20 mEq tablet Commonly known as:  K-TAB  
TAKE 1 TABLET BY ORAL ROUTE EVERY DAY WITH FOOD  
  
 prednisoLONE acetate 1 % ophthalmic suspension Commonly known as:  PRED FORTE  
USE 1 DROP INTO RIGHT EYE EVERY DAY UNTIL SEEN  
  
 propafenone 150 mg tablet Commonly known as:  RYTHMOL Take 1 Tab by mouth two (2) times a day. simvastatin 40 mg tablet Commonly known as:  ZOCOR  
TAKE 1 TABLET BY MOUTH AT BEDTIME  
  
 sodium chloride 0.65 % nasal spray Commonly known as:  OCEAN  
2 Sprays by Both Nostrils route four (4) times daily. * Notice: This list has 2 medication(s) that are the same as other medications prescribed for you. Read the directions carefully, and ask your doctor or other care provider to review them with you. Prescriptions Sent to Pharmacy Refills PARoxetine (PAXIL) 10 mg tablet 3 Sig: Take 1 Tab by mouth daily. Class: Normal  
 Pharmacy: [address] [place] [address] #: [phone] Route: Oral  
  
Follow-up Instructions Return in about 2 months (around 10/14/2017). Introducing [facility] SERVICES! Joseph Bowman introduces [employer] patient portal. Now you can access parts of your medical record, email your doctor's office, and request medication refills online. 1. In your internet browser, go to [email or web address]://e Health Access. [email or web address] Access 2. Click on the First Time User? Click Here link in the Sign In box. You will see the New Member Sign Up page. 3. Enter your [employer] Access Code exactly as it appears below. You will not need to use this code after youve completed the sign-up process. If you do not sign up before the expiration date, you must request a new code. · [employer] Access Code: [email or web address] Expires: 8/28/2017  1:29 PM 
 
4. Enter the last four digits of your Social Security Number (xxxx) and Date of Birth (mm/dd/yyyy) as indicated and click Submit. You will be taken to the next sign-up page. 5. Create a [employer] ID. This will be your [employer] login ID and cannot be changed, so think of one that is secure and easy to remember. 6. Create a [employer] password. You can change your password at any time. 7. Enter your Password Reset Question and Answer. This can be used at a later time if you forget your password. 8. Enter your e-mail address. You will receive e-mail notification when new information is available in [address] 9. Click Sign Up. You can now view and download portions of your medical record. 10. Click the Download Summary menu link to download a portable copy of your medical information. If you have questions, please visit the Frequently Asked Questions section of the [employer] website. Remember, [employer] is NOT to be used for urgent needs. For medical emergencies, dial 911. Now available from your iPhone and Android! Please provide this summary of care documentation to your next provider. Your primary care clinician is listed as Roshan Hilliard. If you have any questions after today's visit, please call [phone].

## 2017-08-14 NOTE — PROGRESS NOTES
SPORTS MEDICINE AND PRIMARY CARE  Sunday Chen MD, [address] [address]  Phone:  [phone]  Fax: [phone]       Chief Complaint   Patient presents with    Follow-up     ER follow up for Dizziness   . SUBJECTIVE:    Bennye Essex. is a [de-identified] y.o. male Patient returns today alert, ambulatory and has the capacity to give an accurate history. He has a known history of chronic kidney disease, paroxysmal atrial fibrillation, status post aortic valve replacement for severe AI and normal coronaries. Since we last saw him he was seen in the emergency department by Anthony Chilel M.D., complaining of lightheadedness. It was worse when he stood. Last night the lightheadedness was more severe. He also noted some swelling of the .  His exam was benign, neurologically intact, vital signs were stable. EKG - ST-T wave changes. CBC and CMP were all unremarkable. .  Troponin  negative. Antelmo Little He comes in today for follow up. Prior to the visit in the ER he had a heated debate with a family member, which may have been a trigger for his anxiety and lightheadedness. They gave him Diazepam, for which he's been taking it as he needs to for the lightheadedness. The anxiety has resolved. The patient is seen for evaluation. Current Outpatient Prescriptions   Medication Sig Dispense Refill    PARoxetine (PAXIL) 10 mg tablet Take 1 Tab by mouth daily. 30 Tab 3    diazePAM (VALIUM) 2 mg tablet Take 1 Tab by mouth every eight (8) hours as needed for Anxiety. Max Daily Amount: 6 mg. 10 Tab 0    propafenone (RYTHMOL) 150 mg tablet Take 1 Tab by mouth two (2) times a day.  90 Tab 5    prednisoLONE acetate (PRED FORTE) 1 % ophthalmic suspension USE 1 DROP INTO RIGHT EYE EVERY DAY UNTIL SEEN  1    simvastatin (ZOCOR) 40 mg tablet TAKE 1 TABLET BY MOUTH AT BEDTIME 90 Tab 3    COMBIGAN 0.2-0.5 % drop ophthalmic solution INSTILL 1 DROP INTO RIGHT EYE TWICE A DAY  1    erythromycin (ILOTYCIN) ophthalmic ointment APPLY (1CM) BY OPHTHALMIC ROUTE 2 TIMES EVERY DAY RIBBON INTO THE CANTHUS ARE IN THE RIGHT EYE  0    LOTEMAX 0.5 % ophthalmic suspension INSTILL 1 DROP 2 TIMES DAILY INTO RIGHT EYE  1    potassium chloride SR (K-TAB) 20 mEq tablet TAKE 1 TABLET BY ORAL ROUTE EVERY DAY WITH FOOD  5    allopurinol (ZYLOPRIM) 100 mg tablet Take 1 Tab by mouth daily. 180 Tab 3    potassium chloride (K-DUR, KLOR-CON) 20 mEq tablet Take 20 mEq by mouth daily.  sodium chloride (OCEAN) 0.65 % nasal spray 2 Sprays by Both Nostrils route four (4) times daily. 45 mL 11    amLODIPine (NORVASC) 5 mg tablet Take 1 Tab by mouth daily. 90 Tab 1    apixaban (ELIQUIS) 2.5 mg tablet Take 1 Tab by mouth two (2) times a day. 180 Tab 3    hydrALAZINE (APRESOLINE) 50 mg tablet Take 1 Tab by mouth three (3) times daily. 100 Tab 11    omeprazole (PRILOSEC) 20 mg capsule Take 20 mg by mouth daily.  folic acid (FOLVITE) 1 mg tablet   0    albuterol (PROVENTIL HFA, VENTOLIN HFA, PROAIR HFA) 90 mcg/actuation inhaler Take 1 Puff by inhalation every four (4) hours as needed for Wheezing. 1 Inhaler 11    bumetanide (BUMEX) 0.5 mg tablet Take 2 Tabs by mouth two (2) times a day. 61 Tab 3     Past Medical History:   Diagnosis Date    Adverse effect of anesthesia     nasal procedure didn't work-patient awake    Alcohol abuse     Aortic regurgitation     CKD (chronic kidney disease), stage III     Episodic lightheadedness 07/28/2017    Episodic lightheadedness     Epistaxis 11/27/2016    Erectile dysfunction     GI bleed     Gout     gout, osteoarthritis    History of cardioversion 02/17/2017    Hypertension     Multiple myeloma ([address]     PAF (paroxysmal atrial fibrillation) ([address] 2/17/16    Pulmonary HTN ([address]     Right-sided epistaxis 11/27/2016    S/P AVR (aortic valve replacement) 07/26/2016    merrill gill md -     S/P cardiac cath 7/6/16    severe ai, normal coronaries     S/P colonoscopy 10-8-04  S/P colonoscopy with polypectomy 16    Skin lesion      Past Surgical History:   Procedure Laterality Date    CARDIAC SURG PROCEDURE UNLIST      cardiac cath   [employer] CARDIAC SURG PROCEDURE UNLIST  2016    AVR    HX HEENT      nasal procedure 20 years ago    HX KNEE REPLACEMENT Bilateral     bilateral knee replacement    HX ORTHOPAEDIC Bilateral     wrist     Allergies   Allergen Reactions    Amiodarone Other (comments)     Bradycardia -excessive         REVIEW OF SYSTEMS:  General: negative for - chills or fever  ENT: negative for - headaches, nasal congestion or tinnitus  Respiratory: negative for - cough, hemoptysis, shortness of breath or wheezing  Cardiovascular : negative for - chest pain, edema, palpitations or shortness of breath  Gastrointestinal: negative for - abdominal pain, blood in stools, heartburn or nausea/vomiting  Genito-Urinary: no dysuria, trouble voiding, or hematuria  Musculoskeletal: negative for - gait disturbance, joint pain, joint stiffness or joint swelling  Neurological: no TIA or stroke symptoms  Hematologic: no bruises, no bleeding, no swollen glands  Integument: no lumps, mole changes, nail changes or rash  Endocrine: no malaise/lethargy or unexpected weight changes      Social History     Social History    Marital status:      Spouse name: N/A    Number of children: N/A    Years of education: N/A     Social History Main Topics    Smoking status: Former Smoker     Years: 4.00     Quit date: 1960    Smokeless tobacco: Never Used    Alcohol use Yes      Comment: 16 oz beer/day    Drug use: No    Sexual activity: Not Asked     Other Topics Concern    None     Social History Narrative    Family History: Mother:  36 yrs, Chronic anemiaFather:  80 yrs, I think aneurysm hypertensionBrother(s):  79 yrs,    obese,dm,drug abuse,miSon(s): alive, adopted1 son(s) .     Social History: Alcohol Use Patient uses alcohol, Drinks per occasion: 2, Drinks per w [place]: 10. Smoking Status Patient is a never smoker. Marital Status: . Lives w ith: alone. Occupation/W ork: employed full time insurance. Education/School: has highschool diploma. Family History   Problem Relation Age of Onset    Anemia Mother     No Known Problems Father     Other Brother      drug abuse       OBJECTIVE:    Visit Vitals    /82 (BP 1 Location: Left arm, BP Patient Position: Sitting)    Pulse 83    Temp 98.4 °F (36.9 °C) (Oral)    Resp 16    Ht 5' 8\" (1.727 m)    Wt 187 lb 3.2 oz (84.9 kg)    SpO2 98%    BMI 28.46 kg/m2     CONSTITUTIONAL: well , well nourished, appears age appropriate  EYES: perrla, eom intact  ENMT:moist mucous membranes, pharynx clear  NECK: supple. Thyroid normal  RESPIRATORY: Chest: clear bilaterally   CARDIOVASCULAR: Heart: regular rate and rhythm  GASTROINTESTINAL: Abdomen: soft, bowel sounds active  HEMATOLOGIC: no pathological lymph nodes palpated  MUSCULOSKELETAL: Extremities: no edema, pulse 1+   INTEGUMENT: No unusual rashes or suspicious skin lesions noted. Nails appear normal.  NEUROLOGIC: non-focal exam   MENTAL STATUS: alert and oriented, appropriate affect           ASSESSMENT:  1. Episodic lightheadedness    2. S/P AVR (aortic valve replacement)    3. Essential hypertension      Patient has no symptoms of dizziness when we do postural orthostatics. Supine is 122/84, sitting 144/90 and standing 146/92, demonstrating no postural hypotension. He is requesting a refill on the Valium 2 mg. We declined and explained to him the reason we don't use the drug. He'll use Paxil as a substitute. Blood pressure control at baseline is good. BMI has changed a little. He'll return to the office as previously scheduled. His auscultation of his heart confirms normal sinus rhythm.         PLAN:  .  Orders Placed This Encounter    PARoxetine (PAXIL) 10 mg tablet       Follow-up Disposition:  Return in about 2 months (around 10/14/2017). ATTENTION:   This medical record was transcribed using an electronic medical records system. Although proofread, it may and can contain electronic and spelling errors. Other human spelling and other errors may be present. Corrections may be executed at a later time. Please feel free to contact us for any clarifications as needed.

## 2017-08-14 NOTE — PROGRESS NOTES
1. Have you been to the ER, urgent care clinic since your last visit? Hospitalized since your last visit? Yes Where: [facility]     2. Have you seen or consulted any other health care providers outside of the [address] [place] [address] since your last visit? Include any pap smears or colon screening.  Yes Reason for visit: Dizziness

## 2017-08-27 RX ORDER — APIXABAN 2.5 MG/1
TABLET, FILM COATED ORAL
Qty: 180 TAB | Refills: 3 | Status: SHIPPED | OUTPATIENT
Start: 2017-08-27 | End: 2018-09-10 | Stop reason: SDUPTHER

## 2017-09-18 RX ORDER — HYDRALAZINE HYDROCHLORIDE 50 MG/1
TABLET, FILM COATED ORAL
Qty: 100 TAB | Refills: 5 | Status: SHIPPED | OUTPATIENT
Start: 2017-09-18 | End: 2017-10-19 | Stop reason: SDUPTHER

## 2017-10-16 ENCOUNTER — OFFICE VISIT (OUTPATIENT)
Dept: INTERNAL MEDICINE CLINIC | Age: 82
End: 2017-10-16

## 2017-10-16 VITALS
SYSTOLIC BLOOD PRESSURE: 127 MMHG | OXYGEN SATURATION: 97 % | WEIGHT: 176.4 LBS | TEMPERATURE: 97.7 F | HEART RATE: 68 BPM | BODY MASS INDEX: 26.73 KG/M2 | HEIGHT: 68 IN | RESPIRATION RATE: 16 BRPM | DIASTOLIC BLOOD PRESSURE: 79 MMHG

## 2017-10-16 DIAGNOSIS — I10 ESSENTIAL HYPERTENSION: Primary | ICD-10-CM

## 2017-10-16 DIAGNOSIS — N18.30 CKD (CHRONIC KIDNEY DISEASE), STAGE III (HCC): ICD-10-CM

## 2017-10-16 DIAGNOSIS — I48.0 PAROXYSMAL ATRIAL FIBRILLATION (HCC): ICD-10-CM

## 2017-10-16 NOTE — PROGRESS NOTES
SPORTS MEDICINE AND PRIMARY CARE  Jose Mulligan MD, Milagros Nettles, [address] [address]  Phone:  [phone]  Fax: [phone]      Chief Complaint   Patient presents with    Hypertension         SUBECTIVE:    Stacy Crawford. is a [de-identified] y.o. male Patient returns today alert, appropriate, ambulatory and has the capacity to give an accurate history. He has a known history of paroxysmal atrial fibrillation, status post aortic valve replacement, alcohol abuse, CKD, and is seen for evaluation. He admits he is no longer exercising, admits he is drinking at least one beer he claims once a week, although I suspect more often, and is seen for evaluation. Current Outpatient Prescriptions   Medication Sig Dispense Refill    hydrALAZINE (APRESOLINE) 50 mg tablet TAKE 1 TAB BY MOUTH THREE (3) TIMES DAILY. 100 Tab 5    ELIQUIS 2.5 mg tablet TAKE 1 TAB BY MOUTH TWO (2) TIMES A DAY. 180 Tab 3    PARoxetine (PAXIL) 10 mg tablet Take 1 Tab by mouth daily. 30 Tab 3    prednisoLONE acetate (PRED FORTE) 1 % ophthalmic suspension USE 1 DROP INTO RIGHT EYE EVERY DAY UNTIL SEEN  1    simvastatin (ZOCOR) 40 mg tablet TAKE 1 TABLET BY MOUTH AT BEDTIME 90 Tab 3    COMBIGAN 0.2-0.5 % drop ophthalmic solution INSTILL 1 DROP INTO RIGHT EYE TWICE A DAY  1    erythromycin (ILOTYCIN) ophthalmic ointment APPLY (1CM) BY OPHTHALMIC ROUTE 2 TIMES EVERY DAY RIBBON INTO THE CANTHUS ARE IN THE RIGHT EYE  0    LOTEMAX 0.5 % ophthalmic suspension INSTILL 1 DROP 2 TIMES DAILY INTO RIGHT EYE  1    potassium chloride SR (K-TAB) 20 mEq tablet TAKE 1 TABLET BY ORAL ROUTE EVERY DAY WITH FOOD  5    allopurinol (ZYLOPRIM) 100 mg tablet Take 1 Tab by mouth daily. 180 Tab 3    potassium chloride (K-DUR, KLOR-CON) 20 mEq tablet Take 20 mEq by mouth daily.  sodium chloride (OCEAN) 0.65 % nasal spray 2 Sprays by Both Nostrils route four (4) times daily. 45 mL 11    amLODIPine (NORVASC) 5 mg tablet Take 1 Tab by mouth daily. 90 Tab 1    omeprazole (PRILOSEC) 20 mg capsule Take 20 mg by mouth daily.  bumetanide (BUMEX) 0.5 mg tablet Take 2 Tabs by mouth two (2) times a day. 60 Tab 3    diazePAM (VALIUM) 2 mg tablet Take 1 Tab by mouth every eight (8) hours as needed for Anxiety. Max Daily Amount: 6 mg. 10 Tab 0    propafenone (RYTHMOL) 150 mg tablet Take 1 Tab by mouth two (2) times a day. 90 Tab 5    folic acid (FOLVITE) 1 mg tablet   0    albuterol (PROVENTIL HFA, VENTOLIN HFA, PROAIR HFA) 90 mcg/actuation inhaler Take 1 Puff by inhalation every four (4) hours as needed for Wheezing. 1 Inhaler 11     Past Medical History:   Diagnosis Date    Adverse effect of anesthesia     nasal procedure didn't work-patient awake    Alcohol abuse     Aortic regurgitation     CKD (chronic kidney disease), stage III     Episodic lightheadedness 07/28/2017    Episodic lightheadedness     Epistaxis 11/27/2016    Erectile dysfunction     GI bleed     Gout     gout, osteoarthritis    History of cardioversion 02/17/2017    Hypertension     Multiple myeloma ([address]     PAF (paroxysmal atrial fibrillation) ([address] 02/17/2016    eliquis    Pulmonary HTN     Right-sided epistaxis 11/27/2016    S/P AVR (aortic valve replacement) 07/26/2016    merrill gill md -     S/P cardiac cath 7/6/16    severe ai, normal coronaries     S/P colonoscopy 10-8-04    S/P colonoscopy with polypectomy 5/9/16    Skin lesion      Past Surgical History:   Procedure Laterality Date    CARDIAC SURG PROCEDURE UNLIST      cardiac cath   [facility] CARDIAC SURG PROCEDURE UNLIST  07/26/2016    AVR    HX HEENT      nasal procedure 20 years ago    HX KNEE REPLACEMENT Bilateral     bilateral knee replacement    HX ORTHOPAEDIC Bilateral     wrist     Allergies   Allergen Reactions    Amiodarone Other (comments)     Bradycardia -excessive       REVIEW OF SYSTEMS:   No chest pain, no shortness of breath.         Social History     Social History    Marital status:  Spouse name: N/A    Number of children: N/A    Years of education: N/A     Social History Main Topics    Smoking status: Former Smoker     Years: 4.00     Quit date: 1960    Smokeless tobacco: Never Used    Alcohol use Yes      Comment: 16 oz beer/day    Drug use: No    Sexual activity: Not Asked     Other Topics Concern    None     Social History Narrative    Family History: Mother:  36 yrs, Chronic anemiaFather:  80 yrs, I think aneurysm hypertensionBrother(s):  79 yrs,    obese,dm,drug abuse,miSon(s): alive, adopted1 son(s) . Social History: Alcohol Use Patient uses alcohol, Drinks per occasion: 2, Drinks per w [place]: 10. Smoking Status Patient is a never smoker. Marital Status: . Lives w ith: alone. Occupation/W ork: employed full time insurance. Education/School: has highschool diploma.   r  Family History   Problem Relation Age of Onset    Anemia Mother     No Known Problems Father     Other Brother      drug abuse       OBJECTIVE:  Visit Vitals    /79    Pulse 68    Temp 97.7 °F (36.5 °C) (Oral)    Resp 16    Ht 5' 8\" (1.727 m)    Wt 176 lb 6.4 oz (80 kg)    SpO2 97%    BMI 26.82 kg/m2     ENT: perrla,  eom intact  NECK: supple.  Thyroid normal  CHEST: clear to ascultation and percussion   HEART: regular rate and rhythm  ABD: soft, bowel sounds active  EXTREMITIES: no edema, pulse 1+     Admission on 08/10/2017, Discharged on 08/10/2017   Component Date Value Ref Range Status    Ventricular Rate 08/10/2017 59  BPM Final    Atrial Rate 08/10/2017 78  BPM Final    QRS Duration 08/10/2017 96  ms Final    Q-T Interval 08/10/2017 446  ms Final    QTC Calculation (Bezet) 08/10/2017 441  ms Final    Calculated R Axis 08/10/2017 107  degrees Final    Calculated T Axis 08/10/2017 -155  degrees Final    Diagnosis 08/10/2017    Final                    Value:Atrial fibrillation  ST & T wave abnormality, consider inferolateral ischemia  When compared with ECG of 17-FEB-2017 13:36,  Atrial fibrillation has replaced Sinus rhythm  Confirmed by Fuad Armendariz MD, Riana Hammonds ([id number]) on 8/11/2017 1:35:05 AM      WBC 08/10/2017 4.3  4.1 - 11.1 K/uL Final    RBC 08/10/2017 3.84* 4.10 - 5.70 M/uL Final    HGB 08/10/2017 12.5  12.1 - 17.0 g/dL Final    HCT 08/10/2017 36.8  36.6 - 50.3 % Final    MCV 08/10/2017 95.8  80.0 - 99.0 FL Final    MCH 08/10/2017 32.6  26.0 - 34.0 PG Final    MCHC 08/10/2017 34.0  30.0 - 36.5 g/dL Final    RDW 08/10/2017 14.5  11.5 - 14.5 % Final    PLATELET 26/55/1838 804  150 - 400 K/uL Final    NEUTROPHILS 08/10/2017 78* 32 - 75 % Final    LYMPHOCYTES 08/10/2017 14  12 - 49 % Final    MONOCYTES 08/10/2017 7  5 - 13 % Final    EOSINOPHILS 08/10/2017 1  0 - 7 % Final    BASOPHILS 08/10/2017 0  0 - 1 % Final    ABS. NEUTROPHILS 08/10/2017 3.4  1.8 - 8.0 K/UL Final    ABS. LYMPHOCYTES 08/10/2017 0.6* 0.8 - 3.5 K/UL Final    ABS. MONOCYTES 08/10/2017 0.3  0.0 - 1.0 K/UL Final    ABS. EOSINOPHILS 08/10/2017 0.0  0.0 - 0.4 K/UL Final    ABS.  BASOPHILS 08/10/2017 0.0  0.0 - 0.1 K/UL Final    DF 08/10/2017 SMEAR SCANNED    Final    RBC COMMENTS 08/10/2017     Final                    Value:ANISOCYTOSIS  1+      Sodium 08/10/2017 141  136 - 145 mmol/L Final    Potassium 08/10/2017 4.4  3.5 - 5.1 mmol/L Final    SPECIMEN HEMOLYZED, RESULTS MAY BE AFFECTED    Chloride 08/10/2017 108  97 - 108 mmol/L Final    CO2 08/10/2017 28  21 - 32 mmol/L Final    Anion gap 08/10/2017 5  5 - 15 mmol/L Final    Glucose 08/10/2017 109* 65 - 100 mg/dL Final    BUN 08/10/2017 16  6 - 20 MG/DL Final    Creatinine 08/10/2017 1.75* 0.70 - 1.30 MG/DL Final    BUN/Creatinine ratio 08/10/2017 9* 12 - 20   Final    GFR est AA 08/10/2017 46* >60 ml/min/1.73m2 Final    GFR est non-AA 08/10/2017 38* >60 ml/min/1.73m2 Final    Comment: Estimated GFR is calculated using the IDMS-traceable Modification of Diet in Renal Disease (MDRD) Study equation, reported for both  Americans (GFRAA) and non- Americans (GFRNA), and normalized to 1.73m2 body surface area. The physician must decide which value applies to the patient. The MDRD study equation should only be used in individuals age 25 or older. It has not been validated for the following: pregnant women, patients with serious comorbid conditions, or on certain medications, or persons with extremes of body size, muscle mass, or nutritional status.  Calcium 08/10/2017 8.3* 8.5 - 10.1 MG/DL Final    Bilirubin, total 08/10/2017 0.8  0.2 - 1.0 MG/DL Final    ALT (SGPT) 08/10/2017 28  12 - 78 U/L Final    AST (SGOT) 08/10/2017 33  15 - 37 U/L Final    Alk. phosphatase 08/10/2017 67  45 - 117 U/L Final    Protein, total 08/10/2017 7.5  6.4 - 8.2 g/dL Final    Albumin 08/10/2017 3.6  3.5 - 5.0 g/dL Final    Globulin 08/10/2017 3.9  2.0 - 4.0 g/dL Final    A-G Ratio 08/10/2017 0.9* 1.1 - 2.2   Final    Troponin-I, Qt. 08/10/2017 0.04  <0.05 ng/mL Final    Comment: The presence of detectable troponin above the reference range indicates myocardial injury which may be due to ischemia, myocarditis, trauma, etc.  Clinical correlation is necessary to establish the significance of this finding. Sequential testing is recommended to determine if the typical rise and fall of cTnI is demonstrated. Note:  Cardiac troponin I has a relatively long half life and may be present well after the CK MB has returned to baseline. The reference range is based on the 99th percentile of the referent population.       Magnesium 08/10/2017 2.1  1.6 - 2.4 mg/dL Final   Office Visit on 07/28/2017   Component Date Value Ref Range Status    WBC 07/28/2017 4.2  3.4 - 10.8 x10E3/uL Final    RBC 07/28/2017 3.89* 4.14 - 5.80 x10E6/uL Final    HGB 07/28/2017 12.3* 12.6 - 17.7 g/dL Final    HCT 07/28/2017 37.7  37.5 - 51.0 % Final    MCV 07/28/2017 97  79 - 97 fL Final    MCH 07/28/2017 31.6  26.6 - 33.0 pg Final    MCHC 07/28/2017 32.6  31.5 - 35.7 g/dL Final    RDW 07/28/2017 15.6* 12.3 - 15.4 % Final    PLATELET 22/55/9584 805  150 - 379 x10E3/uL Final    NEUTROPHILS 07/28/2017 69  % Final    Lymphocytes 07/28/2017 20  % Final    MONOCYTES 07/28/2017 6  % Final    EOSINOPHILS 07/28/2017 5  % Final    BASOPHILS 07/28/2017 0  % Final    ABS. NEUTROPHILS 07/28/2017 2.9  1.4 - 7.0 x10E3/uL Final    Abs Lymphocytes 07/28/2017 0.8  0.7 - 3.1 x10E3/uL Final    ABS. MONOCYTES 07/28/2017 0.3  0.1 - 0.9 x10E3/uL Final    ABS. EOSINOPHILS 07/28/2017 0.2  0.0 - 0.4 x10E3/uL Final    ABS. BASOPHILS 07/28/2017 0.0  0.0 - 0.2 x10E3/uL Final    IMMATURE GRANULOCYTES 07/28/2017 0  % Final    ABS. IMM. GRANS. 07/28/2017 0.0  0.0 - 0.1 x10E3/uL Final    Glucose 07/28/2017 99  65 - 99 mg/dL Final    BUN 07/28/2017 17  8 - 27 mg/dL Final    Creatinine 07/28/2017 1.67* 0.76 - 1.27 mg/dL Final    GFR est non-AA 07/28/2017 38* >59 mL/min/1.73 Final    GFR est AA 07/28/2017 44* >59 mL/min/1.73 Final    BUN/Creatinine ratio 07/28/2017 10  10 - 24 Final    Sodium 07/28/2017 144  134 - 144 mmol/L Final    Potassium 07/28/2017 3.7  3.5 - 5.2 mmol/L Final    Chloride 07/28/2017 102  96 - 106 mmol/L Final    CO2 07/28/2017 26  18 - 29 mmol/L Final    Calcium 07/28/2017 9.3  8.6 - 10.2 mg/dL Final    Magnesium 07/28/2017 1.9  1.6 - 2.3 mg/dL Final          ASSESSMENT:  1. Essential hypertension    2. CKD (chronic kidney disease), stage III    3. Paroxysmal atrial fibrillation (HCC)      We strongly encouraged the patient to discontinue alcohol use as he is slipping down to a path he was previously on. He assures me that he will. We encouraged him to increase his physical activity for 30 minutes five days a week. He tells me he will start tomorrow. He is approaching his ideal body weight  26.2. We . .. Chuckie Coleman He'll return to the office in three months, sooner if he has any problems. Will check his renal status .    sinus rhythm. PLAN:  .  Orders Placed This Encounter    RENAL FUNCTION PANEL       Follow-up Disposition:  Return in about 3 months (around 1/16/2018). ATTENTION:   This medical record was transcribed using an electronic medical records system. Although proofread, it may and can contain electronic and spelling errors. Other human spelling and other errors may be present. Corrections may be executed at a later time. Please feel free to contact us for any clarifications as needed.

## 2017-10-16 NOTE — MR AVS SNAPSHOT
Visit Information Date & Time Provider Department Dept. Phone Encounter #  
 10/16/2017  9:15 AM Nicol[facility]ward [facility] [phone] [id number] Follow-up Instructions Return in about 3 months (around 1/16/2018). Follow-up and Disposition History Upcoming Health Maintenance Date Due INFLUENZA AGE 9 TO ADULT 8/1/2017 MEDICARE YEARLY EXAM 5/31/2018 GLAUCOMA SCREENING Q2Y 8/31/2019 DTaP/Tdap/Td series (2 - Td) 5/16/2026 Allergies as of 10/16/2017  Review Complete On: 10/16/2017 By: Domenica Ruffin MD  
  
 Severity Noted Reaction Type Reactions Amiodarone  10/09/2014    Other (comments) Bradycardia -excessive Current Immunizations  Reviewed on 8/24/2016 No immunizations on file. Not reviewed this visit You Were Diagnosed With   
  
 Codes Comments Essential hypertension    -  Primary ICD-10-CM: I10 
ICD-9-CM: 401.9 CKD (chronic kidney disease), stage III     ICD-10-CM: N18.3 ICD-9-CM: 298. 3 Paroxysmal atrial fibrillation (HCC)     ICD-10-CM: I48.0 ICD-9-CM: 427.31 Vitals BP Pulse Temp Resp Height(growth percentile) Weight(growth percentile) 127/79 68 97.7 °F (36.5 °C) (Oral) 16 5' 8\" (1.727 m) 176 lb 6.4 oz (80 kg) SpO2 BMI Smoking Status 97% 26.82 kg/m2 Former Smoker BMI and BSA Data Body Mass Index Body Surface Area  
 26.82 kg/m 2 1.96 m 2 Preferred Pharmacy Pharmacy Name Phone [id number] Isac [address] [phone] Your Updated Medication List  
  
   
This list is accurate as of: 10/16/17 11:24 AM.  Always use your most recent med list.  
  
  
  
  
 albuterol 90 mcg/actuation inhaler Commonly known as:  PROVENTIL HFA, VENTOLIN HFA, PROAIR HFA Take 1 Puff by inhalation every four (4) hours as needed for Wheezing. allopurinol 100 mg tablet Commonly known as:  Madonna Riley Take 1 Tab by mouth daily. amLODIPine 5 mg tablet Commonly known as:  Christian Reyes Take 1 Tab by mouth daily. bumetanide 0.5 mg tablet Commonly known as:  Cleveland Acuña Take 2 Tabs by mouth two (2) times a day. COMBIGAN 0.2-0.5 % Drop ophthalmic solution Generic drug:  brimonidine-timolol INSTILL 1 DROP INTO RIGHT EYE TWICE A DAY  
  
 diazePAM 2 mg tablet Commonly known as:  VALIUM Take 1 Tab by mouth every eight (8) hours as needed for Anxiety. Max Daily Amount: 6 mg. ELIQUIS 2.5 mg tablet Generic drug:  apixaban TAKE 1 TAB BY MOUTH TWO (2) TIMES A DAY. erythromycin ophthalmic ointment Commonly known as:  ILOTYCIN  
APPLY (1CM) BY OPHTHALMIC ROUTE 2 TIMES EVERY DAY RIBBON INTO THE CANTHUS ARE IN THE RIGHT EYE  
  
 folic acid 1 mg tablet Commonly known as:  FOLVITE  
  
 hydrALAZINE 50 mg tablet Commonly known as:  APRESOLINE  
TAKE 1 TAB BY MOUTH THREE (3) TIMES DAILY. LOTEMAX 0.5 % ophthalmic suspension Generic drug:  loteprednol etabonate INSTILL 1 DROP 2 TIMES DAILY INTO RIGHT EYE  
  
 omeprazole 20 mg capsule Commonly known as:  PRILOSEC Take 20 mg by mouth daily. PARoxetine 10 mg tablet Commonly known as:  PAXIL Take 1 Tab by mouth daily. * potassium chloride 20 mEq tablet Commonly known as:  K-DUR, KLOR-CON Take 20 mEq by mouth daily. * potassium chloride SR 20 mEq tablet Commonly known as:  K-TAB  
TAKE 1 TABLET BY ORAL ROUTE EVERY DAY WITH FOOD  
  
 prednisoLONE acetate 1 % ophthalmic suspension Commonly known as:  PRED FORTE  
USE 1 DROP INTO RIGHT EYE EVERY DAY UNTIL SEEN  
  
 propafenone 150 mg tablet Commonly known as:  RYTHMOL Take 1 Tab by mouth two (2) times a day. simvastatin 40 mg tablet Commonly known as:  ZOCOR  
TAKE 1 TABLET BY MOUTH AT BEDTIME  
  
 sodium chloride 0.65 % nasal spray Commonly known as:  OCEAN  
 2 Sprays by Both Nostrils route four (4) times daily. * Notice: This list has 2 medication(s) that are the same as other medications prescribed for you. Read the directions carefully, and ask your doctor or other care provider to review them with you. We Performed the Following [place] COLLECTION VENOUS BLOOD,VENIPUNCTURE [id number] CPT(R)] RENAL FUNCTION PANEL [70612 CPT(R)] Follow-up Instructions Return in about 3 months (around 1/16/2018). Introducing [facility]! Montserrat Patel introduces [employer] patient portal. Now you can access parts of your medical record, email your doctor's office, and request medication refills online. 1. In your internet browser, go to [email or web address] 2. Click on the First Time User? Click Here link in the Sign In box. You will see the New Member Sign Up page. 3. Enter your [employer] Access Code exactly as it appears below. You will not need to use this code after youve completed the sign-up process. If you do not sign up before the expiration date, you must request a new code. · [employer] Access Code: [email or web address] Expires: 11/25/2017  1:54 PM 
 
4. Enter the last four digits of your Social Security Number (xxxx) and Date of Birth (mm/dd/yyyy) as indicated and click Submit. You will be taken to the next sign-up page. 5. Create a [employer] ID. This will be your [employer] login ID and cannot be changed, so think of one that is secure and easy to remember. 6. Create a [employer] password. You can change your password at any time. 7. Enter your Password Reset Question and Answer. This can be used at a later time if you forget your password. 8. Enter your e-mail address. You will receive e-mail notification when new information is available in [address] 9. Click Sign Up. You can now view and download portions of your medical record.  
10. Click the Download Summary menu link to download a portable copy of your medical information. If you have questions, please visit the Frequently Asked Questions section of the [employer] website. Remember, [employer] is NOT to be used for urgent needs. For medical emergencies, dial 911. Now available from your iPhone and Android! Please provide this summary of care documentation to your next provider. Your primary care clinician is listed as Hudson Nava. If you have any questions after today's visit, please call [phone].

## 2017-10-16 NOTE — PROGRESS NOTES
Patient returns today alert, appropriate, ambulatory, and has the capacity to give an accurate history. He has a known history of CKD, gout, hypertension, multiple myeloma, and is seen for evaluation. There is a history of paroxysmal atrial fibrillation, for which he is on Eliquis.

## 2017-10-16 NOTE — PROGRESS NOTES
1. Have you been to the ER, urgent care clinic since your last visit? Hospitalized since your last visit? No    2. Have you seen or consulted any other health care providers outside of the [address] [place] [address] since your last visit? Include any pap smears or colon screening.  No

## 2017-10-18 LAB
ALBUMIN SERPL-MCNC: 4.3 G/DL (ref 3.5–4.7)
BUN SERPL-MCNC: 16 MG/DL (ref 8–27)
BUN/CREAT SERPL: 10 (ref 10–24)
CALCIUM SERPL-MCNC: 8.8 MG/DL (ref 8.6–10.2)
CHLORIDE SERPL-SCNC: 99 MMOL/L (ref 96–106)
CO2 SERPL-SCNC: 29 MMOL/L (ref 18–29)
CREAT SERPL-MCNC: 1.57 MG/DL (ref 0.76–1.27)
GLUCOSE SERPL-MCNC: 83 MG/DL (ref 65–99)
PHOSPHATE SERPL-MCNC: 3.1 MG/DL (ref 2.5–4.5)
POTASSIUM SERPL-SCNC: 3.8 MMOL/L (ref 3.5–5.2)
SODIUM SERPL-SCNC: 146 MMOL/L (ref 134–144)

## 2017-10-19 RX ORDER — HYDRALAZINE HYDROCHLORIDE 50 MG/1
TABLET, FILM COATED ORAL
Qty: 270 TAB | Refills: 3 | Status: SHIPPED | OUTPATIENT
Start: 2017-10-19 | End: 2018-09-27 | Stop reason: ALTCHOICE

## 2017-12-08 RX ORDER — PAROXETINE 10 MG/1
TABLET, FILM COATED ORAL
Qty: 90 TAB | Refills: 3 | Status: SHIPPED | OUTPATIENT
Start: 2017-12-08 | End: 2018-09-27 | Stop reason: ALTCHOICE

## 2018-01-16 ENCOUNTER — OFFICE VISIT (OUTPATIENT)
Dept: INTERNAL MEDICINE CLINIC | Age: 83
End: 2018-01-16

## 2018-01-16 VITALS
TEMPERATURE: 97.8 F | SYSTOLIC BLOOD PRESSURE: 124 MMHG | HEART RATE: 64 BPM | OXYGEN SATURATION: 97 % | HEIGHT: 68 IN | WEIGHT: 181.5 LBS | DIASTOLIC BLOOD PRESSURE: 82 MMHG | RESPIRATION RATE: 16 BRPM | BODY MASS INDEX: 27.51 KG/M2

## 2018-01-16 DIAGNOSIS — N52.9 ERECTILE DYSFUNCTION, UNSPECIFIED ERECTILE DYSFUNCTION TYPE: ICD-10-CM

## 2018-01-16 DIAGNOSIS — N18.30 CKD (CHRONIC KIDNEY DISEASE), STAGE III (HCC): ICD-10-CM

## 2018-01-16 DIAGNOSIS — I50.9 ACUTE CONGESTIVE HEART FAILURE, UNSPECIFIED CONGESTIVE HEART FAILURE TYPE: ICD-10-CM

## 2018-01-16 DIAGNOSIS — C90.00 MULTIPLE MYELOMA, REMISSION STATUS UNSPECIFIED (HCC): ICD-10-CM

## 2018-01-16 DIAGNOSIS — I48.0 PAF (PAROXYSMAL ATRIAL FIBRILLATION) (HCC): ICD-10-CM

## 2018-01-16 DIAGNOSIS — R53.1 WEAKNESS: ICD-10-CM

## 2018-01-16 DIAGNOSIS — I10 ESSENTIAL HYPERTENSION: Primary | ICD-10-CM

## 2018-01-16 DIAGNOSIS — F10.10 ALCOHOL ABUSE: ICD-10-CM

## 2018-01-16 NOTE — PROGRESS NOTES
Diagnoses and all orders for this visit:    1. Essential hypertension    2. Multiple myeloma, remission status unspecified ([facility]  Assessment & Plan: This condition is managed by Specialist.  Key Oncology Meds     Patient is on no Oncologic meds. Key Pain Meds     The patient is on no pain meds. Lab Results   Component Value Date/Time    WBC 4.3 08/10/2017 03:52 PM    ABS. NEUTROPHILS 3.4 08/10/2017 03:52 PM    HGB 12.5 08/10/2017 03:52 PM    HCT 36.8 08/10/2017 03:52 PM    PLATELET 805 39/34/2347 03:52 PM    Creatinine 1.57 10/16/2017 06:18 PM    BUN 16 10/16/2017 06:18 PM    ALT (SGPT) 28 08/10/2017 03:52 PM    AST (SGOT) 33 08/10/2017 03:52 PM    Albumin 4.3 10/16/2017 06:18 PM         3. PAF (paroxysmal atrial fibrillation) ([facility]  Assessment & Plan: This condition is managed by Specialist.  Key CAD CHF Meds             hydrALAZINE (APRESOLINE) 50 mg tablet  (Taking) TAKE 1 TAB BY MOUTH THREE (3) TIMES DAILY. ELIQUIS 2.5 mg tablet  (Taking) TAKE 1 TAB BY MOUTH TWO (2) TIMES A DAY. propafenone (RYTHMOL) 150 mg tablet  (Taking) Take 1 Tab by mouth two (2) times a day. amLODIPine (NORVASC) 5 mg tablet  (Taking) Take 1 Tab by mouth daily. bumetanide (BUMEX) 0.5 mg tablet  (Taking) Take 2 Tabs by mouth two (2) times a day. Key Antihyperlipidemia Meds             simvastatin (ZOCOR) 40 mg tablet  (Taking) TAKE 1 TABLET BY MOUTH AT BEDTIME        Lab Results   Component Value Date/Time    B-type Natriuretic Peptide 109.5 08/25/2016 11:12 AM    Sodium 146 10/16/2017 06:18 PM    Potassium 3.8 10/16/2017 06:18 PM    INR 1.0 07/30/2016 03:55 AM    Prothrombin time 10.0 07/30/2016 03:55 AM         4. Acute congestive heart failure, unspecified congestive heart failure type [facility])  Assessment & Plan: This condition is managed by Specialist.  Key CAD CHF Meds             hydrALAZINE (APRESOLINE) 50 mg tablet  (Taking) TAKE 1 TAB BY MOUTH THREE (3) TIMES DAILY.     ELIQUIS 2.5 mg tablet (Taking) TAKE 1 TAB BY MOUTH TWO (2) TIMES A DAY. propafenone (RYTHMOL) 150 mg tablet  (Taking) Take 1 Tab by mouth two (2) times a day. amLODIPine (NORVASC) 5 mg tablet  (Taking) Take 1 Tab by mouth daily. bumetanide (BUMEX) 0.5 mg tablet  (Taking) Take 2 Tabs by mouth two (2) times a day. Key Antihyperlipidemia Meds             simvastatin (ZOCOR) 40 mg tablet  (Taking) TAKE 1 TABLET BY MOUTH AT BEDTIME        Lab Results   Component Value Date/Time    B-type Natriuretic Peptide 109.5 08/25/2016 11:12 AM    Sodium 146 10/16/2017 06:18 PM    Potassium 3.8 10/16/2017 06:18 PM    INR 1.0 07/30/2016 03:55 AM    Prothrombin time 10.0 07/30/2016 03:55 AM         5. CKD (chronic kidney disease), stage III  -     HEPATITIS PANEL, ACUTE  -     RENAL FUNCTION PANEL  -     RPR    6. Alcohol abuse  -     HIV 1/2 AG/AB, 4TH GENERATION,W RFLX CONFIRM    7. Erectile dysfunction, unspecified erectile dysfunction type    8. Weakness   -     HEPATITIS PANEL, ACUTE    SPORTS MEDICINE AND PRIMARY CARE  Leonetta Heimlich, MD, [place] [id number]  Phone:  [phone]  Fax: [phone]       Chief Complaint   Patient presents with    Hypertension     f/u   . Elysia Meek returns today with known history of multiple myeloma, paroxysmal atrial fibrillation, chronic kidney disease, primary hypertension and is seen for evaluation. Patient complains of left groin discomfort, wonders if he has a hernia, although thought he saw some puffiness there that is no longer present. Patient also continues to have a drink every once in a while he states. Other new complaints denied except he's not exercising. Corinn Bloch. is a 81 y.o. male        Current Outpatient Prescriptions   Medication Sig Dispense Refill    PARoxetine (PAXIL) 10 mg tablet TAKE 1 TAB BY MOUTH DAILY. 90 Tab 3    hydrALAZINE (APRESOLINE) 50 mg tablet TAKE 1 TAB BY MOUTH THREE (3) TIMES DAILY.  270 Tab 3    ELIQUIS 2.5 mg tablet TAKE 1 TAB BY MOUTH TWO (2) TIMES A DAY. 180 Tab 3    diazePAM (VALIUM) 2 mg tablet Take 1 Tab by mouth every eight (8) hours as needed for Anxiety. Max Daily Amount: 6 mg. 10 Tab 0    propafenone (RYTHMOL) 150 mg tablet Take 1 Tab by mouth two (2) times a day. 90 Tab 5    prednisoLONE acetate (PRED FORTE) 1 % ophthalmic suspension USE 1 DROP INTO RIGHT EYE EVERY DAY UNTIL SEEN  1    simvastatin (ZOCOR) 40 mg tablet TAKE 1 TABLET BY MOUTH AT BEDTIME 90 Tab 3    COMBIGAN 0.2-0.5 % drop ophthalmic solution INSTILL 1 DROP INTO RIGHT EYE TWICE A DAY  1    erythromycin (ILOTYCIN) ophthalmic ointment APPLY (1CM) BY OPHTHALMIC ROUTE 2 TIMES EVERY DAY RIBBON INTO THE CANTHUS ARE IN THE RIGHT EYE  0    potassium chloride SR (K-TAB) 20 mEq tablet TAKE 1 TABLET BY ORAL ROUTE EVERY DAY WITH FOOD  5    allopurinol (ZYLOPRIM) 100 mg tablet Take 1 Tab by mouth daily. 180 Tab 3    potassium chloride (K-DUR, KLOR-CON) 20 mEq tablet Take 20 mEq by mouth daily.  sodium chloride (OCEAN) 0.65 % nasal spray 2 Sprays by Both Nostrils route four (4) times daily. 45 mL 11    amLODIPine (NORVASC) 5 mg tablet Take 1 Tab by mouth daily. 90 Tab 1    omeprazole (PRILOSEC) 20 mg capsule Take 20 mg by mouth daily.  bumetanide (BUMEX) 0.5 mg tablet Take 2 Tabs by mouth two (2) times a day. 60 Tab 3    LOTEMAX 0.5 % ophthalmic suspension INSTILL 1 DROP 2 TIMES DAILY INTO RIGHT EYE  1    folic acid (FOLVITE) 1 mg tablet   0    albuterol (PROVENTIL HFA, VENTOLIN HFA, PROAIR HFA) 90 mcg/actuation inhaler Take 1 Puff by inhalation every four (4) hours as needed for Wheezing.  1 Inhaler 11     Past Medical History:   Diagnosis Date    Adverse effect of anesthesia     nasal procedure didn't work-patient awake    Alcohol abuse     Aortic regurgitation     CKD (chronic kidney disease), stage III     Episodic lightheadedness 07/28/2017    Episodic lightheadedness     Epistaxis 11/27/2016    Erectile dysfunction     GI bleed     Gout     gout, osteoarthritis    History of cardioversion 02/17/2017    Hypertension     [facility] myeloma ([facility]     PAF (paroxysmal atrial fibrillation) ([facility] 02/17/2016    eliquis    Pulmonary HTN     Right-sided epistaxis 11/27/2016    S/P AVR (aortic valve replacement) 07/26/2016    merrill gill md -     S/P cardiac cath 7/6/16    severe ai, normal coronaries     S/P colonoscopy 10-8-04    S/P colonoscopy with polypectomy 5/9/16    Skin lesion      Past Surgical History:   Procedure Laterality Date    CARDIAC SURG PROCEDURE UNLIST      cardiac cath   [facility] CARDIAC SURG PROCEDURE UNLIST  07/26/2016    AVR    HX HEENT      nasal procedure 20 years ago    HX KNEE REPLACEMENT Bilateral     bilateral knee replacement    HX ORTHOPAEDIC Bilateral     wrist     Allergies   Allergen Reactions    Amiodarone Other (comments)     Bradycardia -excessive         REVIEW OF SYSTEMS:  General: negative for - chills or fever  ENT: negative for - headaches, nasal congestion or tinnitus  Respiratory: negative for - cough, hemoptysis, shortness of breath or wheezing  Cardiovascular : negative for - chest pain, edema, palpitations or shortness of breath  Gastrointestinal: negative for - abdominal pain, blood in stools, heartburn or nausea/vomiting  Genito-Urinary: no dysuria, trouble voiding, or hematuria  Musculoskeletal: negative for - gait disturbance, joint pain, joint stiffness or joint swelling  Neurological: no TIA or stroke symptoms  Hematologic: no bruises, no bleeding, no swollen glands  Integument: no lumps, mole changes, nail changes or rash  Endocrine: no malaise/lethargy or unexpected weight changes      Social History     Social History    Marital status:      Spouse name: N/A    Number of children: N/A    Years of education: N/A     Social History Main Topics    Smoking status: Former Smoker     Years: 4.00     Quit date: 1/1/1960    Smokeless tobacco: Never Used   [facility] Alcohol use Yes      Comment: 16 oz beer/day    Drug use: No    Sexual activity: Not Currently     Other Topics Concern    None     Social History Narrative    Family History: Mother:  36 yrs, Chronic anemiaFather:  80 yrs, I think aneurysm hypertensionBrother(s):  79 yrs,    obese,dm,drug abuse,miSon(s): alive, adopted1 son(s) . Social History: Alcohol Use Patient uses alcohol, Drinks per occasion: 2, Drinks per w [place]: 10. Smoking Status Patient is a never smoker. Marital Status: . Lives w ith: alone. Occupation/W ork: employed full time insurance. Education/School: has highschool diploma. Family History   Problem Relation Age of Onset    Anemia Mother     No Known Problems Father     Other Brother      drug abuse       OBJECTIVE:    Visit Vitals    /82    Pulse 64    Temp 97.8 °F (36.6 °C) (Oral)    Resp 16    Ht 5' 8\" (1.727 m)    Wt 181 lb 8 oz (82.3 kg)    SpO2 97%    BMI 27.6 kg/m2     CONSTITUTIONAL: well , well nourished, appears age appropriate  EYES: perrla, eom intact  ENMT:moist mucous membranes, pharynx clear  NECK: supple. Thyroid normal  RESPIRATORY: Chest: clear bilaterally   CARDIOVASCULAR: Heart: regular rate and rhythm  GASTROINTESTINAL: Abdomen: soft, bowel sounds active  HEMATOLOGIC: no pathological lymph nodes palpated  MUSCULOSKELETAL: Extremities: no edema, pulse 1+   INTEGUMENT: No unusual rashes or suspicious skin lesions noted. Nails appear normal.  NEUROLOGIC: non-focal exam   MENTAL STATUS: alert and oriented, appropriate affect           ASSESSMENT:  1. Essential hypertension    2. Multiple myeloma, remission status unspecified ([address]    3. PAF (paroxysmal atrial fibrillation) ([address]    4. Acute congestive heart failure, unspecified congestive heart failure type ([address]    5. CKD (chronic kidney disease), stage III    6. Alcohol abuse    7. Erectile dysfunction, unspecified erectile dysfunction type    8.  Weakness       Blood pressure control is at goal and we congratulate him. We encouraged physical activity for 30 minutes five days a week, as well as a heart healthy diet. His BMI represents approaching ideal body weight. He's considering intimate relationship with his current friend. He's going to try Viagra, which we don't see a contraindication for. His dentist asked him to stop Eliquis for 48 hours prior to the procedure. We suggest 2-3 days prior to the procedure so we agree with the dentist.  He wants to make it as short as possible because of the risk of stroke. He continues to drink alcohol. We advised him that he has a diagnosis of alcoholic dependence, which of course he denies, and states he'll work on it. He'll return to see me in about 3-4 months, sooner if he needs to. He's reminded he can walk in and see us at any time if he's unable to get an appointment and has an urgency. PLAN:  .  Orders Placed This Encounter    HEPATITIS PANEL, ACUTE    RENAL FUNCTION PANEL    HIV 1/2 AG/AB, 4TH GENERATION,W RFLX CONFIRM    RPR       Follow-up Disposition:  Return in about 3 months (around 4/16/2018). ATTENTION:   This medical record was transcribed using an electronic medical records system. Although proofread, it may and can contain electronic and spelling errors. Other human spelling and other errors may be present. Corrections may be executed at a later time. Please feel free to contact us for any clarifications as needed.

## 2018-01-16 NOTE — PROGRESS NOTES
1. Have you been to the ER, urgent care clinic since your last visit? Hospitalized since your last visit? No    2. Have you seen or consulted any other health care providers outside of the [address] [place] [address] since your last visit? Include any pap smears or colon screening.  No     Complaints of abdominal pain

## 2018-01-16 NOTE — ASSESSMENT & PLAN NOTE
This condition is managed by Specialist.  Key CAD CHF Meds             hydrALAZINE (APRESOLINE) 50 mg tablet  (Taking) TAKE 1 TAB BY MOUTH THREE (3) TIMES DAILY. ELIQUIS 2.5 mg tablet  (Taking) TAKE 1 TAB BY MOUTH TWO (2) TIMES A DAY. propafenone (RYTHMOL) 150 mg tablet  (Taking) Take 1 Tab by mouth two (2) times a day. amLODIPine (NORVASC) 5 mg tablet  (Taking) Take 1 Tab by mouth daily. bumetanide (BUMEX) 0.5 mg tablet  (Taking) Take 2 Tabs by mouth two (2) times a day.         Key Antihyperlipidemia Meds             simvastatin (ZOCOR) 40 mg tablet  (Taking) TAKE 1 TABLET BY MOUTH AT BEDTIME        Lab Results   Component Value Date/Time    B-type Natriuretic Peptide 109.5 08/25/2016 11:12 AM    Sodium 146 10/16/2017 06:18 PM    Potassium 3.8 10/16/2017 06:18 PM    INR 1.0 07/30/2016 03:55 AM    Prothrombin time 10.0 07/30/2016 03:55 AM

## 2018-01-16 NOTE — MR AVS SNAPSHOT
Domingo Zelaya 
 
 
 [address] [address] 
[phone] Patient: Nichol Edge. MRN: [id number] :1935 Visit Information Date & Time Provider Department Dept. Phone Encounter #  
 2018 10:30 AM MD Guru Cabrales [facility] Sports Medicine and [place] [id number] Follow-up Instructions Return in about 3 months (around 2018). Follow-up and Disposition History Your Appointments 2018  9:00 AM  
Any with Dana Ahumada MD  
[address] [place] [address] and Primary Care [address] [place] [address]) Appt Note: 3 month f/u htn  
 [address]  
  
   
 [address] [id number] Upcoming Health Maintenance Date Due  
 MEDICARE YEARLY EXAM 2018 GLAUCOMA SCREENING Q2Y 2019 DTaP/Tdap/Td series (2 - Td) 2026 Allergies as of 2018  Review Complete On: 2018 By: Dana Ahumada MD  
  
 Severity Noted Reaction Type Reactions Amiodarone  10/09/2014    Other (comments) Bradycardia -excessive Current Immunizations  Reviewed on 2016 No immunizations on file. Not reviewed this visit You Were Diagnosed With   
  
 Codes Comments Essential hypertension    -  Primary ICD-10-CM: I10 
ICD-9-CM: 401.9 Multiple myeloma, remission status unspecified (HCC)     ICD-10-CM: C90.00 ICD-9-CM: 203.00   
 PAF (paroxysmal atrial fibrillation) ([facility])     ICD-10-CM: I48.0 ICD-9-CM: 427.31 Acute congestive heart failure, unspecified congestive heart failure type ([facility]     ICD-10-CM: I50.9 ICD-9-CM: 428.0 CKD (chronic kidney disease), stage III     ICD-10-CM: N18.3 ICD-9-CM: 689. 3 Alcohol abuse     ICD-10-CM: F10.10 ICD-9-CM: 305.00 Erectile dysfunction, unspecified erectile dysfunction type     ICD-10-CM: N52.9 ICD-9-CM: 607.84 Weakness     ICD-10-CM: R53.1 ICD-9-CM: 780.79 Vitals BP Pulse Temp Resp Height(growth percentile) Weight(growth percentile) 124/82 64 97.8 °F (36.6 °C) (Oral) 16 5' 8\" (1.727 m) 181 lb 8 oz (82.3 kg) SpO2 BMI Smoking Status 97% 27.6 kg/m2 Former Smoker Vitals History BMI and BSA Data Body Mass Index Body Surface Area  
 27.6 kg/m 2 1.99 m 2 Preferred Pharmacy Pharmacy Name Phone [id number] Andrew Lock, [address] [place] [address] [phone] Your Updated Medication List  
  
   
This list is accurate as of: 1/16/18 12:39 PM.  Always use your most recent med list.  
  
  
  
  
 albuterol 90 mcg/actuation inhaler Commonly known as:  PROVENTIL HFA, VENTOLIN HFA, PROAIR HFA Take 1 Puff by inhalation every four (4) hours as needed for Wheezing. allopurinol 100 mg tablet Commonly known as:  Richie Dwayne Take 1 Tab by mouth daily. amLODIPine 5 mg tablet Commonly known as:  Michael Moi Take 1 Tab by mouth daily. bumetanide 0.5 mg tablet Commonly known as:  Lexx Schumacher Take 2 Tabs by mouth two (2) times a day. COMBIGAN 0.2-0.5 % Drop ophthalmic solution Generic drug:  brimonidine-timolol INSTILL 1 DROP INTO RIGHT EYE TWICE A DAY  
  
 diazePAM 2 mg tablet Commonly known as:  VALIUM Take 1 Tab by mouth every eight (8) hours as needed for Anxiety. Max Daily Amount: 6 mg. ELIQUIS 2.5 mg tablet Generic drug:  apixaban TAKE 1 TAB BY MOUTH TWO (2) TIMES A DAY. erythromycin ophthalmic ointment Commonly known as:  ILOTYCIN  
APPLY (1CM) BY OPHTHALMIC ROUTE 2 TIMES EVERY DAY RIBBON INTO THE CANTHUS ARE IN THE RIGHT EYE  
  
 folic acid 1 mg tablet Commonly known as:  FOLVITE  
  
 hydrALAZINE 50 mg tablet Commonly known as:  APRESOLINE  
TAKE 1 TAB BY MOUTH THREE (3) TIMES DAILY. LOTEMAX 0.5 % ophthalmic suspension Generic drug:  loteprednol etabonate INSTILL 1 DROP 2 TIMES DAILY INTO RIGHT EYE  
  
 omeprazole 20 mg capsule Commonly known as:  PRILOSEC Take 20 mg by mouth daily. PARoxetine 10 mg tablet Commonly known as:  PAXIL TAKE 1 TAB BY MOUTH DAILY. * potassium chloride 20 mEq tablet Commonly known as:  K-DUR, KLOR-CON Take 20 mEq by mouth daily. * potassium chloride SR 20 mEq tablet Commonly known as:  K-TAB  
TAKE 1 TABLET BY ORAL ROUTE EVERY DAY WITH FOOD  
  
 prednisoLONE acetate 1 % ophthalmic suspension Commonly known as:  PRED FORTE  
USE 1 DROP INTO RIGHT EYE EVERY DAY UNTIL SEEN  
  
 propafenone 150 mg tablet Commonly known as:  RYTHMOL Take 1 Tab by mouth two (2) times a day. simvastatin 40 mg tablet Commonly known as:  ZOCOR  
TAKE 1 TABLET BY MOUTH AT BEDTIME  
  
 sodium chloride 0.65 % nasal spray Commonly known as:  OCEAN  
2 Sprays by Both Nostrils route four (4) times daily. * Notice: This list has 2 medication(s) that are the same as other medications prescribed for you. Read the directions carefully, and ask your doctor or other care provider to review them with you. We Performed the Following HEPATITIS PANEL, ACUTE [50624 CPT(R)] HIV 1/2 AG/AB, 4TH GENERATION,W RFLX CONFIRM J728654 CPT(R)] RENAL FUNCTION PANEL [86591 CPT(R)] RPR [58881 CPT(R)] Follow-up Instructions Return in about 3 months (around 4/16/2018). Introducing [facility]! Shashank Luu introduces Etherpad patient portal. Now you can access parts of your medical record, email your doctor's office, and request medication refills online. 1. In your internet browser, go to [email or web address] 2. Click on the First Time User? Click Here link in the Sign In box. You will see the New Member Sign Up page. 3. Enter your Etherpad Access Code exactly as it appears below. You will not need to use this code after youve completed the sign-up process.  If you do not sign up before the expiration date, you must request a new code. · Quantason Access Code: [email or web address] Expires: 4/16/2018 12:39 PM 
 
4. Enter the last four digits of your Social Security Number (xxxx) and Date of Birth (mm/dd/yyyy) as indicated and click Submit. You will be taken to the next sign-up page. 5. Create a Quantason ID. This will be your Quantason login ID and cannot be changed, so think of one that is secure and easy to remember. 6. Create a [employer] password. You can change your password at any time. 7. Enter your Password Reset Question and Answer. This can be used at a later time if you forget your password. 8. Enter your e-mail address. You will receive e-mail notification when new information is available in [address] 9. Click Sign Up. You can now view and download portions of your medical record. 10. Click the Download Summary menu link to download a portable copy of your medical information. If you have questions, please visit the Frequently Asked Questions section of the [employer] website. Remember, Quantason is NOT to be used for urgent needs. For medical emergencies, dial 911. Now available from your iPhone and Android! Please provide this summary of care documentation to your next provider. Your primary care clinician is listed as Mariana Tesfaye. If you have any questions after today's visit, please call [phone].

## 2018-01-16 NOTE — ASSESSMENT & PLAN NOTE
This condition is managed by Specialist.  Key Oncology Meds     Patient is on no Oncologic meds. Key Pain Meds     The patient is on no pain meds. Lab Results   Component Value Date/Time    WBC 4.3 08/10/2017 03:52 PM    ABS.  NEUTROPHILS 3.4 08/10/2017 03:52 PM    HGB 12.5 08/10/2017 03:52 PM    HCT 36.8 08/10/2017 03:52 PM    PLATELET 036 80/50/2779 03:52 PM    Creatinine 1.57 10/16/2017 06:18 PM    BUN 16 10/16/2017 06:18 PM    ALT (SGPT) 28 08/10/2017 03:52 PM    AST (SGOT) 33 08/10/2017 03:52 PM    Albumin 4.3 10/16/2017 06:18 PM

## 2018-01-17 LAB
ALBUMIN SERPL-MCNC: 4 G/DL (ref 3.5–4.7)
BUN SERPL-MCNC: 20 MG/DL (ref 8–27)
BUN/CREAT SERPL: 12 (ref 10–24)
CALCIUM SERPL-MCNC: 8.8 MG/DL (ref 8.6–10.2)
CHLORIDE SERPL-SCNC: 101 MMOL/L (ref 96–106)
CO2 SERPL-SCNC: 25 MMOL/L (ref 18–29)
CREAT SERPL-MCNC: 1.66 MG/DL (ref 0.76–1.27)
GLUCOSE SERPL-MCNC: 94 MG/DL (ref 65–99)
HAV IGM SERPL QL IA: NEGATIVE
HBV CORE IGM SERPL QL IA: NEGATIVE
HBV SURFACE AG SERPL QL IA: NEGATIVE
HCV AB S/CO SERPL IA: 0.1 S/CO RATIO (ref 0–0.9)
HIV 1+2 AB+HIV1 P24 AG SERPL QL IA: NON REACTIVE
PHOSPHATE SERPL-MCNC: 3 MG/DL (ref 2.5–4.5)
POTASSIUM SERPL-SCNC: 3.6 MMOL/L (ref 3.5–5.2)
RPR SER QL: NON REACTIVE
SODIUM SERPL-SCNC: 140 MMOL/L (ref 134–144)

## 2018-02-22 RX ORDER — ALLOPURINOL 100 MG/1
TABLET ORAL
Qty: 180 TAB | Refills: 2 | Status: SHIPPED | OUTPATIENT
Start: 2018-02-22 | End: 2018-12-10 | Stop reason: SDUPTHER

## 2018-04-11 ENCOUNTER — TELEPHONE (OUTPATIENT)
Dept: INTERNAL MEDICINE CLINIC | Age: 83
End: 2018-04-11

## 2018-04-11 NOTE — TELEPHONE ENCOUNTER
----- Message from [address] [place] [address] sent at 4/11/2018  1:59 PM EDT -----  Regarding: FW: Dr. Armaan Thomas      ----- Message -----     From: Marium Perez     Sent: 4/11/2018   1:32 PM       To: [facility] Front Office  Subject: Dr. Bhargavi Marquis wants to speak with the nurse about his latest labs about abnormal Creatine and wanted to know if he should come in for another lab work before his next appt on 4/16? Pt best contact [phone].

## 2018-04-11 NOTE — TELEPHONE ENCOUNTER
I called patient and he wants to come in for a lab appointment so the results are back for Dr. Kj Menon review for his appointment next week on the 16th

## 2018-04-12 ENCOUNTER — CLINICAL SUPPORT (OUTPATIENT)
Dept: INTERNAL MEDICINE CLINIC | Age: 83
End: 2018-04-12

## 2018-04-12 DIAGNOSIS — N18.30 CKD (CHRONIC KIDNEY DISEASE), STAGE III (HCC): Primary | ICD-10-CM

## 2018-04-13 LAB
ALBUMIN SERPL-MCNC: 4.3 G/DL (ref 3.5–4.7)
BASOPHILS # BLD AUTO: 0 X10E3/UL (ref 0–0.2)
BASOPHILS NFR BLD AUTO: 0 %
BUN SERPL-MCNC: 22 MG/DL (ref 8–27)
BUN/CREAT SERPL: 13 (ref 10–24)
CALCIUM SERPL-MCNC: 8.5 MG/DL (ref 8.6–10.2)
CHLORIDE SERPL-SCNC: 100 MMOL/L (ref 96–106)
CO2 SERPL-SCNC: 28 MMOL/L (ref 18–29)
CREAT SERPL-MCNC: 1.75 MG/DL (ref 0.76–1.27)
EOSINOPHIL # BLD AUTO: 0.1 X10E3/UL (ref 0–0.4)
EOSINOPHIL NFR BLD AUTO: 3 %
ERYTHROCYTE [DISTWIDTH] IN BLOOD BY AUTOMATED COUNT: 15.1 % (ref 12.3–15.4)
GFR SERPLBLD CREATININE-BSD FMLA CKD-EPI: 36 ML/MIN/1.73
GFR SERPLBLD CREATININE-BSD FMLA CKD-EPI: 41 ML/MIN/1.73
GLUCOSE SERPL-MCNC: 92 MG/DL (ref 65–99)
HCT VFR BLD AUTO: 40.9 % (ref 37.5–51)
HGB BLD-MCNC: 13.3 G/DL (ref 13–17.7)
IMM GRANULOCYTES # BLD: 0 X10E3/UL (ref 0–0.1)
IMM GRANULOCYTES NFR BLD: 0 %
LYMPHOCYTES # BLD AUTO: 0.8 X10E3/UL (ref 0.7–3.1)
LYMPHOCYTES NFR BLD AUTO: 20 %
MCH RBC QN AUTO: 32.4 PG (ref 26.6–33)
MCHC RBC AUTO-ENTMCNC: 32.5 G/DL (ref 31.5–35.7)
MCV RBC AUTO: 100 FL (ref 79–97)
MONOCYTES # BLD AUTO: 0.2 X10E3/UL (ref 0.1–0.9)
MONOCYTES NFR BLD AUTO: 6 %
NEUTROPHILS # BLD AUTO: 2.9 X10E3/UL (ref 1.4–7)
NEUTROPHILS NFR BLD AUTO: 71 %
PHOSPHATE SERPL-MCNC: 3.1 MG/DL (ref 2.5–4.5)
PLATELET # BLD AUTO: 196 X10E3/UL (ref 150–379)
POTASSIUM SERPL-SCNC: 3.6 MMOL/L (ref 3.5–5.2)
RBC # BLD AUTO: 4.1 X10E6/UL (ref 4.14–5.8)
SODIUM SERPL-SCNC: 144 MMOL/L (ref 134–144)
WBC # BLD AUTO: 4.2 X10E3/UL (ref 3.4–10.8)

## 2018-04-25 ENCOUNTER — OFFICE VISIT (OUTPATIENT)
Dept: INTERNAL MEDICINE CLINIC | Age: 83
End: 2018-04-25

## 2018-04-25 VITALS
DIASTOLIC BLOOD PRESSURE: 80 MMHG | SYSTOLIC BLOOD PRESSURE: 124 MMHG | OXYGEN SATURATION: 98 % | BODY MASS INDEX: 27.43 KG/M2 | HEIGHT: 68 IN | TEMPERATURE: 97.8 F | HEART RATE: 70 BPM | WEIGHT: 181 LBS | RESPIRATION RATE: 18 BRPM

## 2018-04-25 DIAGNOSIS — I10 ESSENTIAL HYPERTENSION: ICD-10-CM

## 2018-04-25 DIAGNOSIS — Z98.890 HISTORY OF CARDIOVERSION: ICD-10-CM

## 2018-04-25 DIAGNOSIS — C90.00 MULTIPLE MYELOMA, REMISSION STATUS UNSPECIFIED (HCC): ICD-10-CM

## 2018-04-25 DIAGNOSIS — I48.0 PAF (PAROXYSMAL ATRIAL FIBRILLATION) (HCC): ICD-10-CM

## 2018-04-25 DIAGNOSIS — F10.10 ALCOHOL ABUSE: ICD-10-CM

## 2018-04-25 DIAGNOSIS — N18.30 CKD (CHRONIC KIDNEY DISEASE), STAGE III (HCC): Primary | ICD-10-CM

## 2018-04-25 NOTE — PROGRESS NOTES
SPORTS MEDICINE AND PRIMARY CARE  Drew Yepez MD, [address] [address]  Phone:  [phone]  Fax: [phone]      Chief Complaint   Patient presents with    Hypertension     f/u         SUBECTIVE:    Papito Chavez is a 81 y.o. male Patient returns today with known history of chronic alcoholism, CKD, primary hypertension, multiple myeloma, paroxysmal atrial fibrillation, and is seen for evaluation. He's concerned about the creatinine. We have had a discussion with him in the past and have a discussion with him again today. Other new complaints denied and patient is seen for evaluation. He's not drinking as much as he used to he tells me. Current Outpatient Prescriptions   Medication Sig Dispense Refill    allopurinol (ZYLOPRIM) 100 mg tablet TAKE 1 TAB BY MOUTH DAILY. 180 Tab 2    PARoxetine (PAXIL) 10 mg tablet TAKE 1 TAB BY MOUTH DAILY. 90 Tab 3    hydrALAZINE (APRESOLINE) 50 mg tablet TAKE 1 TAB BY MOUTH THREE (3) TIMES DAILY. 270 Tab 3    ELIQUIS 2.5 mg tablet TAKE 1 TAB BY MOUTH TWO (2) TIMES A DAY. 180 Tab 3    prednisoLONE acetate (PRED FORTE) 1 % ophthalmic suspension USE 1 DROP INTO RIGHT EYE EVERY DAY UNTIL SEEN  1    simvastatin (ZOCOR) 40 mg tablet TAKE 1 TABLET BY MOUTH AT BEDTIME 90 Tab 3    COMBIGAN 0.2-0.5 % drop ophthalmic solution INSTILL 1 DROP INTO RIGHT EYE TWICE A DAY  1    potassium chloride SR (K-TAB) 20 mEq tablet TAKE 1 TABLET BY ORAL ROUTE EVERY DAY WITH FOOD  5    potassium chloride (K-DUR, KLOR-CON) 20 mEq tablet Take 20 mEq by mouth daily.  sodium chloride (OCEAN) 0.65 % nasal spray 2 Sprays by Both Nostrils route four (4) times daily. 45 mL 11    amLODIPine (NORVASC) 5 mg tablet Take 1 Tab by mouth daily. 90 Tab 1    omeprazole (PRILOSEC) 20 mg capsule Take 20 mg by mouth daily.       albuterol (PROVENTIL HFA, VENTOLIN HFA, PROAIR HFA) 90 mcg/actuation inhaler Take 1 Puff by inhalation every four (4) hours as needed for Wheezing. 1 Inhaler 11    bumetanide (BUMEX) 0.5 mg tablet Take 2 Tabs by mouth two (2) times a day. 60 Tab 3    diazePAM (VALIUM) 2 mg tablet Take 1 Tab by mouth every eight (8) hours as needed for Anxiety. Max Daily Amount: 6 mg. 10 Tab 0    propafenone (RYTHMOL) 150 mg tablet Take 1 Tab by mouth two (2) times a day. 90 Tab 5    erythromycin (ILOTYCIN) ophthalmic ointment APPLY (1CM) BY OPHTHALMIC ROUTE 2 TIMES EVERY DAY RIBBON INTO THE CANTHUS ARE IN THE RIGHT EYE  0    LOTEMAX 0.5 % ophthalmic suspension INSTILL 1 DROP 2 TIMES DAILY INTO RIGHT EYE  1    folic acid (FOLVITE) 1 mg tablet   0     Past Medical History:   Diagnosis Date    Adverse effect of anesthesia     nasal procedure didn't work-patient awake    Alcohol abuse     Aortic regurgitation     CKD (chronic kidney disease), stage III 2011    Episodic lightheadedness 07/28/2017    Episodic lightheadedness     Epistaxis 11/27/2016    Erectile dysfunction     GI bleed     Gout     gout, osteoarthritis    History of cardioversion 02/17/2017    Hypertension     Multiple myeloma ([address]     PAF (paroxysmal atrial fibrillation) ([address] 02/17/2016    eliquis    [facility] HTN [facility]     Right-sided epistaxis 11/27/2016    S/P AVR (aortic valve replacement) 07/26/2016    merrill gill md -     S/P cardiac cath 7/6/16    severe ai, normal coronaries     S/P colonoscopy 10-8-04    S/P colonoscopy with polypectomy 5/9/16    Skin lesion      Past Surgical History:   Procedure Laterality Date    CARDIAC SURG PROCEDURE UNLIST      cardiac cath   [facility] CARDIAC SURG PROCEDURE UNLIST  07/26/2016    AVR    HX HEENT      nasal procedure 20 years ago    HX KNEE REPLACEMENT Bilateral     bilateral knee replacement    HX ORTHOPAEDIC Bilateral     wrist     Allergies   Allergen Reactions    Amiodarone Other (comments)     Bradycardia -excessive       REVIEW OF SYSTEMS:   States the alcoholism is not an addiction, it's just a habit.         Social History Social History    Marital status:      Spouse name: N/A    Number of children: N/A    Years of education: N/A     Social History Main Topics    Smoking status: Former Smoker     Years: 4.00     Quit date: 1960    Smokeless tobacco: Never Used    Alcohol use Yes      Comment: 16 oz beer/day    Drug use: No    Sexual activity: Not Currently     Other Topics Concern    None     Social History Narrative    Family History: Mother:  36 yrs, Chronic anemiaFather:  80 yrs, I think aneurysm hypertensionBrother(s):  79 yrs,    obese,dm,drug abuse,miSon(s): alive, adopted1 son(s) . Social History: Alcohol Use Patient uses alcohol, Drinks per occasion: 2, Drinks per w [place]: 10. Smoking Status Patient is a never smoker. Marital Status: . Lives w ith: alone. Occupation/W ork: employed full time insurance. Education/School: has highschool diploma.   r  Family History   Problem Relation Age of Onset    Anemia Mother     No Known Problems Father     Other Brother      drug abuse       OBJECTIVE:  Visit Vitals    BP (!) 144/94    Pulse 70    Temp 97.8 °F (36.6 °C) (Oral)    Resp 18    Ht 5' 8\" (1.727 m)    Wt 181 lb (82.1 kg)    SpO2 98%    BMI 27.52 kg/m2     ENT: perrla,  eom intact  NECK: supple.  Thyroid normal  CHEST: clear to ascultation and percussion   HEART: regular rate and rhythm  ABD: soft, bowel sounds active  EXTREMITIES: no edema, pulse 1+     Clinical Support on 2018   Component Date Value Ref Range Status    WBC 2018 4.2  3.4 - 10.8 x10E3/uL Final    RBC 2018 4.10* 4.14 - 5.80 x10E6/uL Final    HGB 2018 13.3  13.0 - 17.7 g/dL Final    HCT 2018 40.9  37.5 - 51.0 % Final    MCV 2018 100* 79 - 97 fL Final    MCH 2018 32.4  26.6 - 33.0 pg Final    MCHC 2018 32.5  31.5 - 35.7 g/dL Final    RDW 2018 15.1  12.3 - 15.4 % Final    PLATELET  633  150 - 379 x10E3/uL Final    NEUTROPHILS 04/12/2018 71  Not Estab. % Final    Lymphocytes 04/12/2018 20  Not Estab. % Final    MONOCYTES 04/12/2018 6  Not Estab. % Final    EOSINOPHILS 04/12/2018 3  Not Estab. % Final    BASOPHILS 04/12/2018 0  Not Estab. % Final    ABS. NEUTROPHILS 04/12/2018 2.9  1.4 - 7.0 x10E3/uL Final    Abs Lymphocytes 04/12/2018 0.8  0.7 - 3.1 x10E3/uL Final    ABS. MONOCYTES 04/12/2018 0.2  0.1 - 0.9 x10E3/uL Final    ABS. EOSINOPHILS 04/12/2018 0.1  0.0 - 0.4 x10E3/uL Final    ABS. BASOPHILS 04/12/2018 0.0  0.0 - 0.2 x10E3/uL Final    IMMATURE GRANULOCYTES 04/12/2018 0  Not Estab. % Final    ABS. IMM. GRANS. 04/12/2018 0.0  0.0 - 0.1 x10E3/uL Final    Glucose 04/12/2018 92  65 - 99 mg/dL Final    BUN 04/12/2018 22  8 - 27 mg/dL Final    Creatinine 04/12/2018 1.75* 0.76 - 1.27 mg/dL Final    GFR est non-AA 04/12/2018 36* >59 mL/min/1.73 Final    GFR est AA 04/12/2018 41* >59 mL/min/1.73 Final    BUN/Creatinine ratio 04/12/2018 13  10 - 24 Final    Sodium 04/12/2018 144  134 - 144 mmol/L Final    Potassium 04/12/2018 3.6  3.5 - 5.2 mmol/L Final    Chloride 04/12/2018 100  96 - 106 mmol/L Final    CO2 04/12/2018 28  18 - 29 mmol/L Final    Calcium 04/12/2018 8.5* 8.6 - 10.2 mg/dL Final    Phosphorus 04/12/2018 3.1  2.5 - 4.5 mg/dL Final    Albumin 04/12/2018 4.3  3.5 - 4.7 g/dL Final          ASSESSMENT:  1. CKD (chronic kidney disease), stage III    2. PAF (paroxysmal atrial fibrillation) ([facility]    3. Multiple myeloma, remission status unspecified ([facility]    4. History of cardioversion    5. Essential hypertension    6. Alcohol abuse      As we have previously on numerous occasions we encourage him to discontinue alcohol completely. MCV elevation is noted, will check a B12 level. He also has a history of vitamin D deficiency, which we'll repeat at the level today. BP repeat is 124/80, which is completely acceptable. He had a component of white coat hypertension when he first came in.     We encouraged physical activity 30 minutes five days a week and continue a heart healthy diet and most importantly discontinue the alcohol. PLAN:  .  Orders Placed This Encounter    RENAL FUNCTION PANEL    VITAMIN B12 & FOLATE    VITAMIN D, 1, 25 DIHYDROXY       Follow-up Disposition:  Return in about 3 months (around 7/25/2018). ATTENTION:   This medical record was transcribed using an electronic medical records system. Although proofread, it may and can contain electronic and spelling errors. Other human spelling and other errors may be present. Corrections may be executed at a later time. Please feel free to contact us for any clarifications as needed.

## 2018-04-25 NOTE — PROGRESS NOTES
1. Have you been to the ER, urgent care clinic since your last visit? Hospitalized since your last visit? No    2. Have you seen or consulted any other health care providers outside of the [address] [place] [address] since your last visit? Include any pap smears or colon screening.  No     Wants to discuss lab levels

## 2018-04-26 LAB
1,25(OH)2D3 SERPL-MCNC: 62.7 PG/ML (ref 19.9–79.3)
ALBUMIN SERPL-MCNC: 4.1 G/DL (ref 3.5–4.7)
BUN SERPL-MCNC: 14 MG/DL (ref 8–27)
BUN/CREAT SERPL: 9 (ref 10–24)
CALCIUM SERPL-MCNC: 8.5 MG/DL (ref 8.6–10.2)
CHLORIDE SERPL-SCNC: 102 MMOL/L (ref 96–106)
CO2 SERPL-SCNC: 26 MMOL/L (ref 18–29)
CREAT SERPL-MCNC: 1.64 MG/DL (ref 0.76–1.27)
FOLATE SERPL-MCNC: 7.7 NG/ML
GFR SERPLBLD CREATININE-BSD FMLA CKD-EPI: 39 ML/MIN/1.73
GFR SERPLBLD CREATININE-BSD FMLA CKD-EPI: 45 ML/MIN/1.73
GLUCOSE SERPL-MCNC: 88 MG/DL (ref 65–99)
PHOSPHATE SERPL-MCNC: 3.4 MG/DL (ref 2.5–4.5)
POTASSIUM SERPL-SCNC: 3.8 MMOL/L (ref 3.5–5.2)
SODIUM SERPL-SCNC: 143 MMOL/L (ref 134–144)
VIT B12 SERPL-MCNC: 558 PG/ML (ref 232–1245)

## 2018-04-30 ENCOUNTER — HOSPITAL ENCOUNTER (EMERGENCY)
Age: 83
Discharge: HOME | End: 2018-04-30
Attending: EMERGENCY MEDICINE
Payer: MEDICARE

## 2018-04-30 ENCOUNTER — APPOINTMENT (OUTPATIENT)
Dept: GENERAL RADIOLOGY | Age: 83
End: 2018-04-30
Attending: EMERGENCY MEDICINE
Payer: MEDICARE

## 2018-04-30 VITALS
WEIGHT: 183 LBS | RESPIRATION RATE: 18 BRPM | SYSTOLIC BLOOD PRESSURE: 144 MMHG | TEMPERATURE: 98 F | HEART RATE: 61 BPM | DIASTOLIC BLOOD PRESSURE: 76 MMHG | HEIGHT: 68 IN | BODY MASS INDEX: 27.74 KG/M2 | OXYGEN SATURATION: 99 %

## 2018-04-30 DIAGNOSIS — R00.1 BRADYCARDIA: ICD-10-CM

## 2018-04-30 DIAGNOSIS — R10.13 ABDOMINAL PAIN, EPIGASTRIC: Primary | ICD-10-CM

## 2018-04-30 DIAGNOSIS — I48.91 ATRIAL FIBRILLATION, UNSPECIFIED TYPE (HCC): ICD-10-CM

## 2018-04-30 LAB
ALBUMIN SERPL-MCNC: 3.3 G/DL (ref 3.5–5)
ALBUMIN/GLOB SERPL: 0.9 {RATIO} (ref 1.1–2.2)
ALP SERPL-CCNC: 76 U/L (ref 45–117)
ALT SERPL-CCNC: 37 U/L (ref 12–78)
ANION GAP SERPL CALC-SCNC: 11 MMOL/L (ref 5–15)
APPEARANCE UR: CLEAR
AST SERPL-CCNC: 46 U/L (ref 15–37)
BACTERIA URNS QL MICRO: NEGATIVE /HPF
BASOPHILS # BLD: 0 K/UL (ref 0–0.1)
BASOPHILS NFR BLD: 0 % (ref 0–1)
BILIRUB SERPL-MCNC: 0.5 MG/DL (ref 0.2–1)
BILIRUB UR QL: NEGATIVE
BUN SERPL-MCNC: 19 MG/DL (ref 6–20)
BUN/CREAT SERPL: 12 (ref 12–20)
CALCIUM SERPL-MCNC: 7.9 MG/DL (ref 8.5–10.1)
CHLORIDE SERPL-SCNC: 106 MMOL/L (ref 97–108)
CO2 SERPL-SCNC: 27 MMOL/L (ref 21–32)
COLOR UR: ABNORMAL
CREAT SERPL-MCNC: 1.64 MG/DL (ref 0.7–1.3)
DIFFERENTIAL METHOD BLD: ABNORMAL
EOSINOPHIL # BLD: 0.1 K/UL (ref 0–0.4)
EOSINOPHIL NFR BLD: 3 % (ref 0–7)
EPITH CASTS URNS QL MICRO: NORMAL /LPF
ERYTHROCYTE [DISTWIDTH] IN BLOOD BY AUTOMATED COUNT: 14.3 % (ref 11.5–14.5)
GLOBULIN SER CALC-MCNC: 3.6 G/DL (ref 2–4)
GLUCOSE SERPL-MCNC: 97 MG/DL (ref 65–100)
GLUCOSE UR STRIP.AUTO-MCNC: NEGATIVE MG/DL
HCT VFR BLD AUTO: 37.5 % (ref 36.6–50.3)
HGB BLD-MCNC: 12.7 G/DL (ref 12.1–17)
HGB UR QL STRIP: NEGATIVE
IMM GRANULOCYTES # BLD: 0 K/UL (ref 0–0.04)
IMM GRANULOCYTES NFR BLD AUTO: 0 % (ref 0–0.5)
KETONES UR QL STRIP.AUTO: NEGATIVE MG/DL
LEUKOCYTE ESTERASE UR QL STRIP.AUTO: NEGATIVE
LIPASE SERPL-CCNC: 105 U/L (ref 73–393)
LYMPHOCYTES # BLD: 0.8 K/UL (ref 0.8–3.5)
LYMPHOCYTES NFR BLD: 18 % (ref 12–49)
MCH RBC QN AUTO: 33 PG (ref 26–34)
MCHC RBC AUTO-ENTMCNC: 33.9 G/DL (ref 30–36.5)
MCV RBC AUTO: 97.4 FL (ref 80–99)
MONOCYTES # BLD: 0.3 K/UL (ref 0–1)
MONOCYTES NFR BLD: 6 % (ref 5–13)
NEUTS SEG # BLD: 3.3 K/UL (ref 1.8–8)
NEUTS SEG NFR BLD: 73 % (ref 32–75)
NITRITE UR QL STRIP.AUTO: NEGATIVE
NRBC # BLD: 0 K/UL (ref 0–0.01)
NRBC BLD-RTO: 0 PER 100 WBC
PH UR STRIP: 5.5 [PH] (ref 5–8)
PLATELET # BLD AUTO: 164 K/UL (ref 150–400)
PMV BLD AUTO: 9.4 FL (ref 8.9–12.9)
POTASSIUM SERPL-SCNC: 3.2 MMOL/L (ref 3.5–5.1)
PROT SERPL-MCNC: 6.9 G/DL (ref 6.4–8.2)
PROT UR STRIP-MCNC: 30 MG/DL
RBC # BLD AUTO: 3.85 M/UL (ref 4.1–5.7)
RBC #/AREA URNS HPF: NORMAL /HPF (ref 0–5)
RBC MORPH BLD: ABNORMAL
SODIUM SERPL-SCNC: 144 MMOL/L (ref 136–145)
SP GR UR REFRACTOMETRY: 1.01 (ref 1–1.03)
TROPONIN I BLD-MCNC: <0.04 NG/ML (ref 0–0.08)
TROPONIN I BLD-MCNC: <0.04 NG/ML (ref 0–0.08)
UROBILINOGEN UR QL STRIP.AUTO: 0.2 EU/DL (ref 0.2–1)
WBC # BLD AUTO: 4.5 K/UL (ref 4.1–11.1)
WBC URNS QL MICRO: NORMAL /HPF (ref 0–4)

## 2018-04-30 PROCEDURE — 83690 ASSAY OF LIPASE: CPT | Performed by: EMERGENCY MEDICINE

## 2018-04-30 PROCEDURE — 36415 COLL VENOUS BLD VENIPUNCTURE: CPT | Performed by: EMERGENCY MEDICINE

## 2018-04-30 PROCEDURE — 81001 URINALYSIS AUTO W/SCOPE: CPT | Performed by: EMERGENCY MEDICINE

## 2018-04-30 PROCEDURE — 74011250637 HC RX REV CODE- 250/637: Performed by: EMERGENCY MEDICINE

## 2018-04-30 PROCEDURE — 71046 X-RAY EXAM CHEST 2 VIEWS: CPT

## 2018-04-30 PROCEDURE — 84484 ASSAY OF TROPONIN QUANT: CPT

## 2018-04-30 PROCEDURE — 85025 COMPLETE CBC W/AUTO DIFF WBC: CPT | Performed by: EMERGENCY MEDICINE

## 2018-04-30 PROCEDURE — 93005 ELECTROCARDIOGRAM TRACING: CPT

## 2018-04-30 PROCEDURE — 80053 COMPREHEN METABOLIC PANEL: CPT | Performed by: EMERGENCY MEDICINE

## 2018-04-30 PROCEDURE — 99285 EMERGENCY DEPT VISIT HI MDM: CPT

## 2018-04-30 RX ORDER — GUAIFENESIN 100 MG/5ML
325 LIQUID (ML) ORAL
Status: COMPLETED | OUTPATIENT
Start: 2018-04-30 | End: 2018-04-30

## 2018-04-30 RX ORDER — POTASSIUM CHLORIDE 750 MG/1
40 TABLET, FILM COATED, EXTENDED RELEASE ORAL
Status: COMPLETED | OUTPATIENT
Start: 2018-04-30 | End: 2018-04-30

## 2018-04-30 RX ADMIN — POTASSIUM CHLORIDE 40 MEQ: 750 TABLET, FILM COATED, EXTENDED RELEASE ORAL at 06:06

## 2018-04-30 RX ADMIN — ASPIRIN 81 MG 324 MG: 81 TABLET ORAL at 03:58

## 2018-04-30 NOTE — DISCHARGE INSTRUCTIONS
Atrial Fibrillation: Care Instructions  Your Care Instructions    Atrial fibrillation is an irregular and often fast heartbeat. Treating this condition is important for several reasons. It can cause blood clots, which can travel from your heart to your brain and cause a stroke. If you have a fast heartbeat, you may feel lightheaded, dizzy, and weak. An irregular heartbeat can also increase your risk for heart failure. Atrial fibrillation is often the result of another heart condition, such as high blood pressure or coronary artery disease. Making changes to improve your heart condition will help you stay healthy and active. Follow-up care is a key part of your treatment and safety. Be sure to make and go to all appointments, and call your doctor if you are having problems. It's also a good idea to know your test results and keep a list of the medicines you take. How can you care for yourself at home? Medicines  ? · Take your medicines exactly as prescribed. Call your doctor if you think you are having a problem with your medicine. You will get more details on the specific medicines your doctor prescribes. ? · If your doctor has given you a blood thinner to prevent a stroke, be sure you get instructions about how to take your medicine safely. Blood thinners can cause serious bleeding problems. ? · Do not take any vitamins, over-the-counter drugs, or herbal products without talking to your doctor first.   ? Lifestyle changes  ? · Do not smoke. Smoking can increase your chance of a stroke and heart attack. If you need help quitting, talk to your doctor about stop-smoking programs and medicines. These can increase your chances of quitting for good. ? · Eat a heart-healthy diet. ? · Stay at a healthy weight. Lose weight if you need to.   ? · Limit alcohol to 2 drinks a day for men and 1 drink a day for women. Too much alcohol can cause health problems. ? · Avoid colds and flu.  Get a pneumococcal vaccine shot. If you have had one before, ask your doctor whether you need another dose. Get a flu shot every year. If you must be around people with colds or flu, wash your hands often. Activity  ? · If your doctor recommends it, get more exercise. Walking is a good choice. Bit by bit, increase the amount you walk every day. Try for at least 30 minutes on most days of the week. You also may want to swim, bike, or do other activities. Your doctor may suggest that you join a cardiac rehabilitation program so that you can have help increasing your physical activity safely. ? · Start light exercise if your doctor says it is okay. Even a small amount will help you get stronger, have more energy, and manage stress. Walking is an easy way to get exercise. Start out by walking a little more than you did in the hospital. Gradually increase the amount you walk. ? · When you exercise, watch for signs that your heart is working too hard. You are pushing too hard if you cannot talk while you are exercising. If you become short of breath or dizzy or have chest pain, sit down and rest immediately. ? · Check your pulse regularly. Place two fingers on the artery at the palm side of your wrist, in line with your thumb. If your heartbeat seems uneven or fast, talk to your doctor. When should you call for help? Call 911 anytime you think you may need emergency care. For example, call if:  ? · You have symptoms of a heart attack. These may include:  ¨ Chest pain or pressure, or a strange feeling in the chest.  ¨ Sweating. ¨ Shortness of breath. ¨ Nausea or vomiting. ¨ Pain, pressure, or a strange feeling in the back, neck, jaw, or upper belly or in one or both shoulders or arms. ¨ Lightheadedness or sudden weakness. ¨ A fast or irregular heartbeat. After you call 911, the  may tell you to chew 1 adult-strength or 2 to 4 low-dose aspirin. Wait for an ambulance. Do not try to drive yourself.    ? · You have symptoms of a stroke. These may include:  ¨ Sudden numbness, tingling, weakness, or loss of movement in your face, arm, or leg, especially on only one side of your body. ¨ Sudden vision changes. ¨ Sudden trouble speaking. ¨ Sudden confusion or trouble understanding simple statements. ¨ Sudden problems with walking or balance. ¨ A sudden, severe headache that is different from past headaches. ? · You passed out (lost consciousness). ?Call your doctor now or seek immediate medical care if:  ? · You have new or increased shortness of breath. ? · You feel dizzy or lightheaded, or you feel like you may faint. ? · Your heart rate becomes irregular. ? · You can feel your heart flutter in your chest or skip heartbeats. Tell your doctor if these symptoms are new or worse. ? Watch closely for changes in your health, and be sure to contact your doctor if you have any problems. Where can you learn more? Go to http://buck-anjelica.info/. Enter U020 in the search box to learn more about \"Atrial Fibrillation: Care Instructions. \"  Current as of: September 21, 2016  Content Version: 11.4  © 1227-1097 [employer]. Care instructions adapted under license by [employer] (which disclaims liability or warranty for this information). If you have questions about a medical condition or this instruction, always ask your healthcare professional. [address] any warranty or liability for your use of this information. Bradycardia: Care Instructions  Your Care Instructions    Bradycardia is a slow heart rate. If your heart beats too slowly, it can't supply your body with enough blood. This can make you weak or dizzy. Or it may make you pass out. Sometimes medicine can cause this problem. If this happens, your doctor may have you adjust one of your medicines. If a medicine is not the problem, your doctor may recommend a pacemaker.   It is important to treat bradycardia so that you don't get more serious health problems. Your doctor will want to see you on a routine schedule to make sure that your heartbeat is normal.  Follow-up care is a key part of your treatment and safety. Be sure to make and go to all appointments, and call your doctor if you are having problems. It's also a good idea to know your test results and keep a list of the medicines you take. How can you care for yourself at home? · Take your medicines exactly as prescribed. Call your doctor if you think you are having a problem with your medicine. If your bradycardia is caused by another disease, your doctor will try to treat the disease. If it is caused by heart medicines, he or she will adjust your medicines. · Make lifestyle changes to improve your heart health. ¨ Get regular exercise. Try for 30 minutes on most days of the week. If you do not have other heart problems, you likely do not have limits on the type or level of activity that you can do. You may want to walk, swim, bike, or do other activities. Ask your doctor what level of exercise is safe for you. ¨ To control your cholesterol, avoid foods with a lot of fat, saturated fat, or sodium. Try to eat more fiber. And if your doctor says it's okay, get some exercise on most days. ¨ Do not smoke. Smoking can make your heart condition worse. If you need help quitting, talk to your doctor about stop-smoking programs and medicines. These can increase your chances of quitting for good. ¨ Limit alcohol to 2 drinks a day for men and 1 drink a day for women. Too much alcohol can cause health problems. Pacemaker  If you have a pacemaker, you will get more specific information about it. Be sure to:  · Check your pulse as your doctor tells you. · Have your pacemaker checked as often as your doctor recommends. You may be able to do this over the phone or computer. · Avoid strong magnetic or electrical fields.  These include wand metal detectors used in airports, MRIs, welding equipment, and generators. · You will be checked several times right after you get your pacemaker and when it is time to have the battery changed. Batteries last for 5 to 15 years. · You can talk on a cell phone. But keep it 6 inches away from your pacemaker. · Microwaves, TVs, radios, and kitchen and bathroom appliances won't harm you. When should you call for help? Call 911 anytime you think you may need emergency care. For example, call if:  ? · You have symptoms of sudden heart failure. These may include:  ¨ Severe trouble breathing. ¨ A fast or irregular heartbeat. ¨ Coughing up pink, foamy mucus. ¨ You passed out. ? · You have symptoms of a stroke. These may include:  ¨ Sudden numbness, tingling, weakness, or loss of movement in your face, arm, or leg, especially on only one side of your body. ¨ Sudden vision changes. ¨ Sudden trouble speaking. ¨ Sudden confusion or trouble understanding simple statements. ¨ Sudden problems with walking or balance. ¨ A sudden, severe headache that is different from past headaches. ?Call your doctor now or seek immediate medical care if:  ? · You have new or changed symptoms of heart failure, such as:  ¨ New or increased shortness of breath. ¨ New or worse swelling in your legs, ankles, or feet. ¨ Sudden weight gain, such as more than 2 to 3 pounds in a day or 5 pounds in a week. (Your doctor may suggest a different range of weight gain.)  ¨ Feeling dizzy or lightheaded or like you may faint. ¨ Feeling so tired or weak that you cannot do your usual activities. ¨ Not sleeping well. Shortness of breath wakes you at night. You need extra pillows to prop yourself up to breathe easier. ? Watch closely for changes in your health, and be sure to contact your doctor if:  ? · You do not get better as expected. Where can you learn more? Go to http://buck-anjelica.info/.   Enter U572 in the search box to learn more about \"Bradycardia: Care Instructions. \"  Current as of: September 21, 2016  Content Version: 11.4  © 1691-7898 [employer]. Care instructions adapted under license by [employer] (which disclaims liability or warranty for this information). If you have questions about a medical condition or this instruction, always ask your healthcare professional. [facility] any warranty or liability for your use of this information. Indigestion (Dyspepsia or Heartburn): Care Instructions  Your Care Instructions  Sometimes it can be hard to pinpoint the cause of indigestion. (It is also called dyspepsia or heartburn.) Most cases of an upset stomach with bloating, burning, burping, and nausea are minor and go away within several hours. Home treatment and over-the-counter medicine often are able to control symptoms. But if you take medicine to relieve your indigestion without making diet and lifestyle changes, your symptoms are likely to return again and again. If you get indigestion often, it may be a sign of a more serious medical problem. Be sure to follow up with your doctor, who may want to do tests to be sure of the cause of your indigestion. Follow-up care is a key part of your treatment and safety. Be sure to make and go to all appointments, and call your doctor if you are having problems. It's also a good idea to know your test results and keep a list of the medicines you take. How can you care for yourself at home? · Your doctor may recommend over-the-counter medicine. For mild or occasional indigestion, antacids such as Gaviscon, Mylanta, Maalox, or Tums, may help. Be safe with medicines. Be careful when you take over-the-counter antacid medicines. Many of these medicines have aspirin in them. Read the label to make sure that you are not taking more than the recommended dose. Too much aspirin can be harmful.   · Your doctor also may recommend over-the-counter acid reducers, such as Pepcid AC, Tagamet HB, Zantac 75, or Prilosec. Read and follow all instructions on the label. If you use these medicines often, talk with your doctor. · Change your eating habits. ¨ It's best to eat several small meals instead of two or three large meals. ¨ After you eat, wait 2 to 3 hours before you lie down. ¨ Chocolate, mint, and alcohol can make GERD worse. ¨ Spicy foods, foods that have a lot of acid (like tomatoes and oranges), and coffee can make GERD symptoms worse in some people. If your symptoms are worse after you eat a certain food, you may want to stop eating that food to see if your symptoms get better. · Do not smoke or chew tobacco. Smoking can make GERD worse. If you need help quitting, talk to your doctor about stop-smoking programs and medicines. These can increase your chances of quitting for good. · If you have GERD symptoms at night, raise the head of your bed 6 to 8 inches. You can do this by putting the frame on blocks or placing a foam wedge under the head of your mattress. (Adding extra pillows does not work.)  · Do not wear tight clothing around your middle. · Lose weight if you need to. Losing just 5 to 10 pounds can help. · Do not take anti-inflammatory medicines, such as aspirin, ibuprofen (Advil, Motrin), or naproxen (Aleve). These can irritate the stomach. If you need a pain medicine, try acetaminophen (Tylenol), which does not cause stomach upset. When should you call for help? Call your doctor now or seek immediate medical care if:  ? · You have new or worse belly pain. ? · You are vomiting. ? Watch closely for changes in your health, and be sure to contact your doctor if:  ? · You have new or worse symptoms of indigestion. ? · You have trouble or pain swallowing. ? · You are losing weight. ? · You do not get better as expected. Where can you learn more? Go to http://leigh.info/.   Enter Q501 in the search box to learn more about \"Indigestion (Dyspepsia or Heartburn): Care Instructions. \"  Current as of: May 12, 2017  Content Version: 11.4  © 2337-0798 [employer]. Care instructions adapted under license by [employer] (which disclaims liability or warranty for this information). If you have questions about a medical condition or this instruction, always ask your healthcare professional. [place] [address] any warranty or liability for your use of this information.

## 2018-04-30 NOTE — ED NOTES
Patient educated on discharge instructions  . Patient verbalized understanding of eduction. Patient given discharge instructions . Patient ambulated out of ED . No acute distress noted. Emergency Department Nursing Plan of Care       The Nursing Plan of Care is developed from the Nursing assessment and Emergency Department Attending provider initial evaluation. The plan of care may be reviewed in the ED Provider note.     The Plan of Care was developed with the following considerations:   Patient / Family readiness to learn indicated by:verbalized understanding  Persons(s) to be included in education: patient  Barriers to Learning/Limitations:No    Signed     Jenifer Peralta RN    4/30/2018   6:20 AM

## 2018-04-30 NOTE — ED NOTES
Patient presented to the ED today for complaints of blood pressure check . Patient says he feel like he has ingestion. Patient reports not having a history of ingestion. Respirations are even and unlabored. Skin is dry,warm, and intact.

## 2018-05-01 LAB
ATRIAL RATE: 78 BPM
CALCULATED R AXIS, ECG10: 113 DEGREES
CALCULATED T AXIS, ECG11: 155 DEGREES
DIAGNOSIS, 93000: NORMAL
Q-T INTERVAL, ECG07: 464 MS
QRS DURATION, ECG06: 108 MS
QTC CALCULATION (BEZET), ECG08: 447 MS
VENTRICULAR RATE, ECG03: 56 BPM

## 2018-05-12 RX ORDER — SIMVASTATIN 40 MG/1
TABLET, FILM COATED ORAL
Qty: 90 TAB | Refills: 3 | Status: SHIPPED | OUTPATIENT
Start: 2018-05-12 | End: 2018-05-25 | Stop reason: SDUPTHER

## 2018-05-25 ENCOUNTER — CLINICAL SUPPORT (OUTPATIENT)
Dept: INTERNAL MEDICINE CLINIC | Age: 83
End: 2018-05-25

## 2018-05-25 VITALS
HEART RATE: 74 BPM | DIASTOLIC BLOOD PRESSURE: 88 MMHG | HEIGHT: 68 IN | BODY MASS INDEX: 27.74 KG/M2 | WEIGHT: 183 LBS | SYSTOLIC BLOOD PRESSURE: 145 MMHG

## 2018-05-25 DIAGNOSIS — I10 ESSENTIAL HYPERTENSION: Primary | ICD-10-CM

## 2018-05-25 RX ORDER — SIMVASTATIN 40 MG/1
TABLET, FILM COATED ORAL
Qty: 90 TAB | Refills: 3 | Status: SHIPPED | OUTPATIENT
Start: 2018-05-25 | End: 2018-05-25 | Stop reason: SDUPTHER

## 2018-05-25 RX ORDER — SIMVASTATIN 40 MG/1
TABLET, FILM COATED ORAL
Qty: 90 TAB | Refills: 3 | Status: SHIPPED | OUTPATIENT
Start: 2018-05-25 | End: 2019-05-26 | Stop reason: SDUPTHER

## 2018-05-25 NOTE — PROGRESS NOTES
Chief Complaint   Patient presents with    Blood Pressure Check       Patient came in for bp check today and he takes amlodipine 5 mg, bumex 0.5 mg, and hydralazine 50 mg. Patient's bp was elevated reading at 145/88 pulse 74. Patient was told to make follow up apt within the next couple of weeks to see Dr. Kathryn Washburn.

## 2018-05-25 NOTE — MR AVS SNAPSHOT
[facility] 
 
 
 [id number] 
[phone] Patient: Lily Underwood. MRN: [id number] :1935 Visit Information Date & Time Provider Department Dept. Phone Encounter #  
 2018  4:00 PM [facility] and [address] [place] [address] [phone] [id number] Your Appointments 2018 11:45 AM  
Any with Magdalene Richard MD  
[address] [place] [address] and Primary Care [facility]) Appt Note: follow up bp  
 [address] [phone]  
  
   
 [address] [id number]  
  
    
 2018  9:45 AM  
Any with Magdalene Richard MD  
[address] [place] [address] and Primary Care [facility]) Appt Note: 3 MONTH FOLLOW UP  
 [address] [id number]  
[phone] Upcoming Health Maintenance Date Due  
 MEDICARE YEARLY EXAM 2018 Influenza Age 5 to Adult 2018 GLAUCOMA SCREENING Q2Y 3/8/2020 DTaP/Tdap/Td series (2 - Td) 2026 Allergies as of 2018  Review Complete On: 2018 By: José Luis Carrillo Severity Noted Reaction Type Reactions Amiodarone  10/09/2014    Other (comments) Bradycardia -excessive Current Immunizations  Reviewed on 2016 No immunizations on file. Not reviewed this visit Vitals BP Pulse Height(growth percentile) Weight(growth percentile) BMI Smoking Status 145/88 (BP 1 Location: Right arm, BP Patient Position: Sitting) 74 5' 8\" (1.727 m) 183 lb (83 kg) 27.83 kg/m2 Former Smoker BMI and BSA Data Body Mass Index Body Surface Area  
 27.83 kg/m 2 2 m 2 Preferred Pharmacy Pharmacy Name Phone [id number] [place] [phone] Your Updated Medication List  
  
   
This list is accurate as of 18  8:35 PM.  Always use your most recent med list.  
  
  
  
  
 albuterol 90 mcg/actuation inhaler Commonly known as:  PROVENTIL HFA, VENTOLIN HFA, PROAIR HFA Take 1 Puff by inhalation every four (4) hours as needed for Wheezing. allopurinol 100 mg tablet Commonly known as:  ZYLOPRIM  
TAKE 1 TAB BY MOUTH DAILY. bumetanide 0.5 mg tablet Commonly known as:  Carter Saul Take 2 Tabs by mouth two (2) times a day. COMBIGAN 0.2-0.5 % Drop ophthalmic solution Generic drug:  brimonidine-timolol INSTILL 1 DROP INTO RIGHT EYE TWICE A DAY  
  
 diazePAM 2 mg tablet Commonly known as:  VALIUM Take 1 Tab by mouth every eight (8) hours as needed for Anxiety. Max Daily Amount: 6 mg. ELIQUIS 2.5 mg tablet Generic drug:  apixaban TAKE 1 TAB BY MOUTH TWO (2) TIMES A DAY. erythromycin ophthalmic ointment Commonly known as:  ILOTYCIN  
APPLY (1CM) BY OPHTHALMIC ROUTE 2 TIMES EVERY DAY RIBBON INTO THE CANTHUS ARE IN THE RIGHT EYE  
  
 folic acid 1 mg tablet Commonly known as:  FOLVITE  
  
 hydrALAZINE 50 mg tablet Commonly known as:  APRESOLINE  
TAKE 1 TAB BY MOUTH THREE (3) TIMES DAILY. LOTEMAX 0.5 % ophthalmic suspension Generic drug:  loteprednol etabonate INSTILL 1 DROP 2 TIMES DAILY INTO RIGHT EYE  
  
 omeprazole 20 mg capsule Commonly known as:  PRILOSEC Take 20 mg by mouth daily. PARoxetine 10 mg tablet Commonly known as:  PAXIL TAKE 1 TAB BY MOUTH DAILY. * potassium chloride 20 mEq tablet Commonly known as:  K-DUR, KLOR-CON Take 20 mEq by mouth daily. * potassium chloride SR 20 mEq tablet Commonly known as:  K-TAB  
TAKE 1 TABLET BY ORAL ROUTE EVERY DAY WITH FOOD  
  
 prednisoLONE acetate 1 % ophthalmic suspension Commonly known as:  PRED FORTE  
USE 1 DROP INTO RIGHT EYE EVERY DAY UNTIL SEEN  
  
 propafenone 150 mg tablet Commonly known as:  RYTHMOL Take 1 Tab by mouth two (2) times a day. simvastatin 40 mg tablet Commonly known as:  ZOCOR  
TAKE 1 TABLET BY MOUTH AT BEDTIME  
 sodium chloride 0.65 % nasal squeeze bottle Commonly known as:  OCEAN  
2 Sprays by Both Nostrils route four (4) times daily. * Notice: This list has 2 medication(s) that are the same as other medications prescribed for you. Read the directions carefully, and ask your doctor or other care provider to review them with you. Introducing [facility] SERVICES! Sena Jones introduces [employer] patient portal. Now you can access parts of your medical record, email your doctor's office, and request medication refills online. 1. In your internet browser, go to [email or web address] 2. Click on the First Time User? Click Here link in the Sign In box. You will see the New Member Sign Up page. 3. Enter your [employer] Access Code exactly as it appears below. You will not need to use this code after youve completed the sign-up process. If you do not sign up before the expiration date, you must request a new code. · [employer] Access Code: [email or web address] Expires: 7/24/2018 11:17 AM 
 
4. Enter the last four digits of your Social Security Number (xxxx) and Date of Birth (mm/dd/yyyy) as indicated and click Submit. You will be taken to the next sign-up page. 5. Create a [employer] ID. This will be your [employer] login ID and cannot be changed, so think of one that is secure and easy to remember. 6. Create a [employer] password. You can change your password at any time. 7. Enter your Password Reset Question and Answer. This can be used at a later time if you forget your password. 8. Enter your e-mail address. You will receive e-mail notification when new information is available in [address] 9. Click Sign Up. You can now view and download portions of your medical record. 10. Click the Download Summary menu link to download a portable copy of your medical information.  
 
If you have questions, please visit the Frequently Asked Questions section of the [employer]. Remember, atokoret is NOT to be used for urgent needs. For medical emergencies, dial 911. Now available from your iPhone and Android! Please provide this summary of care documentation to your next provider. Your primary care clinician is listed as Casey Gomez. If you have any questions after today's visit, please call [phone].

## 2018-05-29 ENCOUNTER — OFFICE VISIT (OUTPATIENT)
Dept: INTERNAL MEDICINE CLINIC | Age: 83
End: 2018-05-29

## 2018-05-29 VITALS
OXYGEN SATURATION: 98 % | BODY MASS INDEX: 26.83 KG/M2 | HEART RATE: 81 BPM | TEMPERATURE: 97.8 F | HEIGHT: 68 IN | RESPIRATION RATE: 18 BRPM | WEIGHT: 177 LBS | DIASTOLIC BLOOD PRESSURE: 81 MMHG | SYSTOLIC BLOOD PRESSURE: 121 MMHG

## 2018-05-29 DIAGNOSIS — N18.30 CKD (CHRONIC KIDNEY DISEASE), STAGE III (HCC): ICD-10-CM

## 2018-05-29 DIAGNOSIS — I48.0 PAF (PAROXYSMAL ATRIAL FIBRILLATION) (HCC): Primary | ICD-10-CM

## 2018-05-29 DIAGNOSIS — R06.02 SOB (SHORTNESS OF BREATH): ICD-10-CM

## 2018-05-29 DIAGNOSIS — I10 ESSENTIAL HYPERTENSION: ICD-10-CM

## 2018-05-29 DIAGNOSIS — I35.1 NONRHEUMATIC AORTIC VALVE INSUFFICIENCY: ICD-10-CM

## 2018-05-29 DIAGNOSIS — Z98.890 HISTORY OF CARDIOVERSION: ICD-10-CM

## 2018-05-29 DIAGNOSIS — C90.00 MULTIPLE MYELOMA, REMISSION STATUS UNSPECIFIED (HCC): ICD-10-CM

## 2018-05-29 NOTE — ACP (ADVANCE CARE PLANNING)

## 2018-05-29 NOTE — PROGRESS NOTES
1. Have you been to the ER, urgent care clinic since your last visit? Hospitalized since your last visit? No    2. Have you seen or consulted any other health care providers outside of the [address] [place] [address] since your last visit? Include any pap smears or colon screening.  No     Wants to discuss BP and ED

## 2018-05-29 NOTE — PATIENT INSTRUCTIONS
Body Mass Index: Care Instructions  Your Care Instructions    Body mass index (BMI) can help you see if your weight is raising your risk for health problems. It uses a formula to compare how much you weigh with how tall you are. · A BMI lower than 18.5 is considered underweight. · A BMI between 18.5 and 24.9 is considered healthy. · A BMI between 25 and 29.9 is considered overweight. A BMI of 30 or higher is considered obese. If your BMI is in the normal range, it means that you have a lower risk for weight-related health problems. If your BMI is in the overweight or obese range, you may be at increased risk for weight-related health problems, such as high blood pressure, heart disease, stroke, arthritis or joint pain, and diabetes. If your BMI is in the underweight range, you may be at increased risk for health problems such as fatigue, lower protection (immunity) against illness, muscle loss, bone loss, hair loss, and hormone problems. BMI is just one measure of your risk for weight-related health problems. You may be at higher risk for health problems if you are not active, you eat an unhealthy diet, or you drink too much alcohol or use tobacco products. Follow-up care is a key part of your treatment and safety. Be sure to make and go to all appointments, and call your doctor if you are having problems. It's also a good idea to know your test results and keep a list of the medicines you take. How can you care for yourself at home? · Practice healthy eating habits. This includes eating plenty of fruits, vegetables, whole grains, lean protein, and low-fat dairy. · If your doctor recommends it, get more exercise. Walking is a good choice. Bit by bit, increase the amount you walk every day. Try for at least 30 minutes on most days of the week. · Do not smoke. Smoking can increase your risk for health problems. If you need help quitting, talk to your doctor about stop-smoking programs and medicines. These can increase your chances of quitting for good. · Limit alcohol to 2 drinks a day for men and 1 drink a day for women. Too much alcohol can cause health problems. If you have a BMI higher than 25  · Your doctor may do other tests to check your risk for weight-related health problems. This may include measuring the distance around your waist. A waist measurement of more than 40 inches in men or 35 inches in women can increase the risk of weight-related health problems. · Talk with your doctor about steps you can take to stay healthy or improve your health. You may need to make lifestyle changes to lose weight and stay healthy, such as changing your diet and getting regular exercise. If you have a BMI lower than 18.5  · Your doctor may do other tests to check your risk for health problems. · Talk with your doctor about steps you can take to stay healthy or improve your health. You may need to make lifestyle changes to gain or maintain weight and stay healthy, such as getting more healthy foods in your diet and doing exercises to build muscle. Where can you learn more? Go to http://buck-anjelica.info/. Enter S176 in the search box to learn more about \"Body Mass Index: Care Instructions. \"  Current as of: October 13, 2016  Content Version: 11.4  © 7100-4899 [employer]. Care instructions adapted under license by [employer] (which disclaims liability or warranty for this information). If you have questions about a medical condition or this instruction, always ask your healthcare professional. [address] any warranty or liability for your use of this information.

## 2018-05-29 NOTE — PROGRESS NOTES
SPORTS MEDICINE AND PRIMARY CARE  Roxanne Santa MD, [address] [address]  Phone:  [phone]  Fax: [phone]       Chief Complaint   Patient presents with    Hypertension     f/u   . SUBJECTIVE:    Kimberley Dumont. is a 81 y.o. male Patient returns today with known history of paroxysmal atrial fibrillation, primary hypertension, alcohol abuse, CKD, CHF, multiple myeloma, S/P aortic valve replacement, and is seen for evaluation. Since we last saw him he went to the ER because he thought he had acute indigestion and was evaluated and subsequently released with reassurance. He's also been seen by urologist over at [facility] on two different occasions, first to receive some Viagra, which was not helpful, and secondly to find out why he's having problems with erections. patient is seen for evaluation. Current Outpatient Prescriptions   Medication Sig Dispense Refill    simvastatin (ZOCOR) 40 mg tablet TAKE 1 TABLET BY MOUTH AT BEDTIME 90 Tab 3    allopurinol (ZYLOPRIM) 100 mg tablet TAKE 1 TAB BY MOUTH DAILY. 180 Tab 2    PARoxetine (PAXIL) 10 mg tablet TAKE 1 TAB BY MOUTH DAILY. 90 Tab 3    hydrALAZINE (APRESOLINE) 50 mg tablet TAKE 1 TAB BY MOUTH THREE (3) TIMES DAILY. 270 Tab 3    ELIQUIS 2.5 mg tablet TAKE 1 TAB BY MOUTH TWO (2) TIMES A DAY. 180 Tab 3    propafenone (RYTHMOL) 150 mg tablet Take 1 Tab by mouth two (2) times a day. 90 Tab 5    potassium chloride SR (K-TAB) 20 mEq tablet TAKE 1 TABLET BY ORAL ROUTE EVERY DAY WITH FOOD  5    sodium chloride (OCEAN) 0.65 % nasal spray 2 Sprays by Both Nostrils route four (4) times daily. 45 mL 11    omeprazole (PRILOSEC) 20 mg capsule Take 20 mg by mouth daily.  albuterol (PROVENTIL HFA, VENTOLIN HFA, PROAIR HFA) 90 mcg/actuation inhaler Take 1 Puff by inhalation every four (4) hours as needed for Wheezing.  1 Inhaler 11     Past Medical History:   Diagnosis Date    Adverse effect of anesthesia nasal procedure didn't work-patient awake    Alcohol abuse     Aortic regurgitation     CKD (chronic kidney disease), stage III 2011    Episodic lightheadedness 07/28/2017    Episodic lightheadedness     Epistaxis 11/27/2016    Erectile dysfunction     GI bleed     Gout     gout, osteoarthritis    Heart failure (HCC)     acute heart failure    History of cardioversion 02/17/2017    Hypertension     Ill-defined condition     weakness    [facility]     PAF (paroxysmal atrial fibrillation) ([facility] 02/17/2016    eliquis    Pulmonary HTN ([facility]     Right-sided epistaxis 11/27/2016    S/P AVR (aortic valve replacement) 07/26/2016    merrill gill md -     S/P cardiac cath 7/6/16    severe ai, normal coronaries     S/P colonoscopy 10-8-04    S/P colonoscopy with polypectomy 5/9/16    Skin lesion      Past Surgical History:   Procedure Laterality Date    CARDIAC SURG PROCEDURE UNLIST      cardiac cath   [facility] CARDIAC SURG PROCEDURE UNLIST  07/26/2016    AVR    HX HEENT      nasal procedure 20 years ago    HX KNEE REPLACEMENT Bilateral     bilateral knee replacement    HX ORTHOPAEDIC Bilateral     wrist     Allergies   Allergen Reactions    Amiodarone Other (comments)     Bradycardia -excessive         REVIEW OF SYSTEMS:  General: negative for - chills or fever  ENT: negative for - headaches, nasal congestion or tinnitus  Respiratory: negative for - cough, hemoptysis, shortness of breath or wheezing  Cardiovascular : negative for - chest pain, edema, palpitations or shortness of breath  Gastrointestinal: negative for - abdominal pain, blood in stools, heartburn or nausea/vomiting  Genito-Urinary: no dysuria, trouble voiding, or hematuria  Musculoskeletal: negative for - gait disturbance, joint pain, joint stiffness or joint swelling  Neurological: no TIA or stroke symptoms  Hematologic: no bruises, no bleeding, no swollen glands  Integument: no lumps, mole changes, nail changes or rash  Endocrine: no malaise/lethargy or unexpected weight changes      Social History     Social History    Marital status:      Spouse name: N/A    Number of children: N/A    Years of education: N/A     Social History Main Topics    Smoking status: Former Smoker     Years: 4.00     Quit date: 1960    Smokeless tobacco: Never Used    Alcohol use Yes      Comment: 16 oz beer/day    Drug use: No    Sexual activity: Not Currently     Other Topics Concern    None     Social History Narrative    Family History: Mother:  36 yrs, Chronic anemiaFather:  80 yrs, I think aneurysm hypertensionBrother(s):  79 yrs,    obese,dm,drug abuse,miSon(s): alive, adopted1 son(s) . Social History: Alcohol Use Patient uses alcohol, Drinks per occasion: 2, Drinks per w [place]: 10. Smoking Status Patient is a never smoker. Marital Status: . Lives w ith: alone. Occupation/W ork: employed full time insurance. Education/School: has highschool diploma. Family History   Problem Relation Age of Onset    Anemia Mother     No Known Problems Father     Other Brother      drug abuse       OBJECTIVE:    Visit Vitals    /81    Pulse 81    Temp 97.8 °F (36.6 °C) (Oral)    Resp 18    Ht 5' 8\" (1.727 m)    Wt 177 lb (80.3 kg)    SpO2 98%    BMI 26.91 kg/m2     CONSTITUTIONAL: well , well nourished, appears age appropriate  EYES: perrla, eom intact  ENMT:moist mucous membranes, pharynx clear  NECK: supple. Thyroid normal  RESPIRATORY: Chest: clear bilaterally   CARDIOVASCULAR: Heart: regular rate and rhythm  GASTROINTESTINAL: Abdomen: soft, bowel sounds active  HEMATOLOGIC: no pathological lymph nodes palpated  MUSCULOSKELETAL: Extremities: no edema, pulse 1+   INTEGUMENT: No unusual rashes or suspicious skin lesions noted. Nails appear normal.  NEUROLOGIC: non-focal exam   MENTAL STATUS: alert and oriented, appropriate affect           ASSESSMENT:  1.  PAF (paroxysmal atrial fibrillation) ([facility] 2. Multiple myeloma, remission status unspecified ([facility]    3. History of cardioversion    4. Essential hypertension    5. CKD (chronic kidney disease), stage III    6. Nonrheumatic aortic valve insufficiency    7. SOB (shortness of breath)      Patient is clinically in sinus rhythm currently with occasional extrasystole. We note he was in [place] on his last EKG. We advised him to follow urology's recommendations, they want to do a testosterone level and then make appropriate recommendations. I advised him again that alcohol is a contributing factor to his ED. I don't think it's testosterone, although he may have a low testosterone level because of his age, I don't think that is contributing to his ED. Will assess his CKD. BMI is discussed below. He'll return to the office in 2-3 months or as needed. PLAN:  .  Orders Placed This Encounter    CBC WITH AUTOMATED DIFF    METABOLIC PANEL, BASIC    BNP       Follow-up Disposition:  Return in about 3 months (around 8/29/2018). ATTENTION:   This medical record was transcribed using an electronic medical records system. Although proofread, it may and can contain electronic and spelling errors. Other human spelling and other errors may be present. Corrections may be executed at a later time. Please feel free to contact us for any clarifications as needed.

## 2018-05-29 NOTE — MR AVS SNAPSHOT
[facility] 
 
 
 [address] [address] 
[phone] Patient: Ti Justice. MRN: [id number] :1935 Visit Information Date & Time Provider Department Dept. Phone Encounter #  
 2018 11:45 AM Clara Powell MD [facility] Sports Medicine and [facility] 34 [id number] Follow-up Instructions Return in about 3 months (around 2018). Follow-up and Disposition History Your Appointments 2018  9:00 AM  
Any with Clara Powell MD  
[address] [place] [address] and Primary Care Libia Hanson) Appt Note: 3 month f/u  
 Robel Melgar 90 1 [facility]  
  
   
 Robel Melgar 90 [id number] Upcoming Health Maintenance Date Due  
 MEDICARE YEARLY EXAM 2018 Influenza Age 5 to Adult 2018 GLAUCOMA SCREENING Q2Y 3/8/2020 DTaP/Tdap/Td series (2 - Td) 2026 Allergies as of 2018  Review Complete On: 2018 By: Clara Powell MD  
  
 Severity Noted Reaction Type Reactions Amiodarone  10/09/2014    Other (comments) Bradycardia -excessive Current Immunizations  Reviewed on 2016 No immunizations on file. Not reviewed this visit You Were Diagnosed With   
  
 Codes Comments PAF (paroxysmal atrial fibrillation) ([facility].)    -  Primary ICD-10-CM: I48.0 ICD-9-CM: 427.31   
 Multiple myeloma, remission status unspecified (HCC)     ICD-10-CM: C90.00 ICD-9-CM: 203.00 History of cardioversion     ICD-10-CM: Z98.890 ICD-9-CM: V15.1 Essential hypertension     ICD-10-CM: I10 
ICD-9-CM: 401.9 CKD (chronic kidney disease), stage III     ICD-10-CM: N18.3 ICD-9-CM: 585.3 Nonrheumatic aortic valve insufficiency     ICD-10-CM: I35.1 ICD-9-CM: 424.1 SOB (shortness of breath)     ICD-10-CM: R06.02 
ICD-9-CM: 786.05 Vitals BP Pulse Temp Resp Height(growth percentile) Weight(growth percentile) 121/81 81 97.8 °F (36.6 °C) (Oral) 18 5' 8\" (1.727 m) 177 lb (80.3 kg) SpO2 BMI Smoking Status 98% 26.91 kg/m2 Former Smoker Vitals History BMI and BSA Data Body Mass Index Body Surface Area  
 26.91 kg/m 2 1.96 m 2 Preferred Pharmacy Pharmacy Name Phone [facility]/PHARMACY #[id number] Tino Riley [address] [place] [address] [phone] Your Updated Medication List  
  
   
This list is accurate as of 5/29/18  1:41 PM.  Always use your most recent med list.  
  
  
  
  
 albuterol 90 mcg/actuation inhaler Commonly known as:  PROVENTIL HFA, VENTOLIN HFA, PROAIR HFA Take 1 Puff by inhalation every four (4) hours as needed for Wheezing. allopurinol 100 mg tablet Commonly known as:  ZYLOPRIM  
TAKE 1 TAB BY MOUTH DAILY. ELIQUIS 2.5 mg tablet Generic drug:  apixaban TAKE 1 TAB BY MOUTH TWO (2) TIMES A DAY. hydrALAZINE 50 mg tablet Commonly known as:  APRESOLINE  
TAKE 1 TAB BY MOUTH THREE (3) TIMES DAILY. omeprazole 20 mg capsule Commonly known as:  PRILOSEC Take 20 mg by mouth daily. PARoxetine 10 mg tablet Commonly known as:  PAXIL TAKE 1 TAB BY MOUTH DAILY. potassium chloride SR 20 mEq tablet Commonly known as:  K-TAB  
TAKE 1 TABLET BY ORAL ROUTE EVERY DAY WITH FOOD  
  
 propafenone 150 mg tablet Commonly known as:  RYTHMOL Take 1 Tab by mouth two (2) times a day. simvastatin 40 mg tablet Commonly known as:  ZOCOR  
TAKE 1 TABLET BY MOUTH AT BEDTIME  
  
 sodium chloride 0.65 % nasal squeeze bottle Commonly known as:  OCEAN  
2 Sprays by Both Nostrils route four (4) times daily. We Performed the Following BNP [id number] CPT(R)] CBC WITH AUTOMATED DIFF [58192 CPT(R)] METABOLIC PANEL, BASIC [93655 CPT(R)] Follow-up Instructions Return in about 3 months (around 8/29/2018). Patient Instructions Body Mass Index: Care Instructions Your Care Instructions Body mass index (BMI) can help you see if your weight is raising your risk for health problems. It uses a formula to compare how much you weigh with how tall you are. · A BMI lower than 18.5 is considered underweight. · A BMI between 18.5 and 24.9 is considered healthy. · A BMI between 25 and 29.9 is considered overweight. A BMI of 30 or higher is considered obese. If your BMI is in the normal range, it means that you have a lower risk for weight-related health problems. If your BMI is in the overweight or obese range, you may be at increased risk for weight-related health problems, such as high blood pressure, heart disease, stroke, arthritis or joint pain, and diabetes. If your BMI is in the underweight range, you may be at increased risk for health problems such as fatigue, lower protection (immunity) against illness, muscle loss, bone loss, hair loss, and hormone problems. BMI is just one measure of your risk for weight-related health problems. You may be at higher risk for health problems if you are not active, you eat an unhealthy diet, or you drink too much alcohol or use tobacco products. Follow-up care is a key part of your treatment and safety. Be sure to make and go to all appointments, and call your doctor if you are having problems. It's also a good idea to know your test results and keep a list of the medicines you take. How can you care for yourself at home? · Practice healthy eating habits. This includes eating plenty of fruits, vegetables, whole grains, lean protein, and low-fat dairy. · If your doctor recommends it, get more exercise. Walking is a good choice. Bit by bit, increase the amount you walk every day. Try for at least 30 minutes on most days of the week. · Do not smoke. Smoking can increase your risk for health problems.  If you need help quitting, talk to your doctor about stop-smoking programs and medicines. These can increase your chances of quitting for good. · Limit alcohol to 2 drinks a day for men and 1 drink a day for women. Too much alcohol can cause health problems. If you have a BMI higher than 25 · Your doctor may do other tests to check your risk for weight-related health problems. This may include measuring the distance around your waist. A waist measurement of more than 40 inches in men or 35 inches in women can increase the risk of weight-related health problems. · Talk with your doctor about steps you can take to stay healthy or improve your health. You may need to make lifestyle changes to lose weight and stay healthy, such as changing your diet and getting regular exercise. If you have a BMI lower than 18.5 · Your doctor may do other tests to check your risk for health problems. · Talk with your doctor about steps you can take to stay healthy or improve your health. You may need to make lifestyle changes to gain or maintain weight and stay healthy, such as getting more healthy foods in your diet and doing exercises to build muscle. Where can you learn more? Go to http://buck-anjelica.info/. Enter S176 in the search box to learn more about \"Body Mass Index: Care Instructions. \" Current as of: October 13, 2016 Content Version: 11.4 © 6635-1512 Healthwise, Incorporated. Care instructions adapted under license by [employer] (which disclaims liability or warranty for this information). If you have questions about a medical condition or this instruction, always ask your healthcare professional. [place] [address] any warranty or liability for your use of this information. Introducing [facility] & HEALTH SERVICES! [facility] introduces [employer] patient portal. Now you can access parts of your medical record, email your doctor's office, and request medication refills online. 1. In your internet browser, go to [email or web address] Materialt 2. Click on the First Time User? Click Here link in the Sign In box. You will see the New Member Sign Up page. 3. Enter your [employer] Access Code exactly as it appears below. You will not need to use this code after youve completed the sign-up process. If you do not sign up before the expiration date, you must request a new code. · [employer] Access Code: [email or web address] Expires: 7/24/2018 11:17 AM 
 
4. Enter the last four digits of your Social Security Number (xxxx) and Date of Birth (mm/dd/yyyy) as indicated and click Submit. You will be taken to the next sign-up page. 5. Create a Misocat ID. This will be your [employer] login ID and cannot be changed, so think of one that is secure and easy to remember. 6. Create a [employer] password. You can change your password at any time. 7. Enter your Password Reset Question and Answer. This can be used at a later time if you forget your password. 8. Enter your e-mail address. You will receive e-mail notification when new information is available in [address] 9. Click Sign Up. You can now view and download portions of your medical record. 10. Click the Download Summary menu link to download a portable copy of your medical information. If you have questions, please visit the Frequently Asked Questions section of the [employer] website. Remember, [employer] is NOT to be used for urgent needs. For medical emergencies, dial 911. Now available from your iPhone and Android! Please provide this summary of care documentation to your next provider. Your primary care clinician is listed as Olga Camarena. If you have any questions after today's visit, please call [phone].

## 2018-05-30 LAB
BASOPHILS # BLD AUTO: 0 X10E3/UL (ref 0–0.2)
BASOPHILS NFR BLD AUTO: 0 %
BNP SERPL-MCNC: 168.3 PG/ML (ref 0–100)
BUN SERPL-MCNC: 23 MG/DL (ref 8–27)
BUN/CREAT SERPL: 16 (ref 10–24)
CALCIUM SERPL-MCNC: 8.9 MG/DL (ref 8.6–10.2)
CHLORIDE SERPL-SCNC: 105 MMOL/L (ref 96–106)
CO2 SERPL-SCNC: 24 MMOL/L (ref 18–29)
CREAT SERPL-MCNC: 1.48 MG/DL (ref 0.76–1.27)
EOSINOPHIL # BLD AUTO: 0.1 X10E3/UL (ref 0–0.4)
EOSINOPHIL NFR BLD AUTO: 3 %
ERYTHROCYTE [DISTWIDTH] IN BLOOD BY AUTOMATED COUNT: 15.2 % (ref 12.3–15.4)
GFR SERPLBLD CREATININE-BSD FMLA CKD-EPI: 44 ML/MIN/1.73
GFR SERPLBLD CREATININE-BSD FMLA CKD-EPI: 51 ML/MIN/1.73
GLUCOSE SERPL-MCNC: 85 MG/DL (ref 65–99)
HCT VFR BLD AUTO: 40.3 % (ref 37.5–51)
HGB BLD-MCNC: 13.3 G/DL (ref 13–17.7)
IMM GRANULOCYTES # BLD: 0 X10E3/UL (ref 0–0.1)
IMM GRANULOCYTES NFR BLD: 0 %
LYMPHOCYTES # BLD AUTO: 0.7 X10E3/UL (ref 0.7–3.1)
LYMPHOCYTES NFR BLD AUTO: 18 %
MCH RBC QN AUTO: 32.5 PG (ref 26.6–33)
MCHC RBC AUTO-ENTMCNC: 33 G/DL (ref 31.5–35.7)
MCV RBC AUTO: 99 FL (ref 79–97)
MONOCYTES # BLD AUTO: 0.2 X10E3/UL (ref 0.1–0.9)
MONOCYTES NFR BLD AUTO: 5 %
NEUTROPHILS # BLD AUTO: 3 X10E3/UL (ref 1.4–7)
NEUTROPHILS NFR BLD AUTO: 74 %
PLATELET # BLD AUTO: 210 X10E3/UL (ref 150–379)
POTASSIUM SERPL-SCNC: 3.9 MMOL/L (ref 3.5–5.2)
RBC # BLD AUTO: 4.09 X10E6/UL (ref 4.14–5.8)
SODIUM SERPL-SCNC: 144 MMOL/L (ref 134–144)
WBC # BLD AUTO: 4 X10E3/UL (ref 3.4–10.8)

## 2018-06-04 ENCOUNTER — OFFICE VISIT (OUTPATIENT)
Dept: INTERNAL MEDICINE CLINIC | Age: 83
End: 2018-06-04

## 2018-06-04 VITALS
WEIGHT: 176.5 LBS | RESPIRATION RATE: 16 BRPM | HEIGHT: 68 IN | OXYGEN SATURATION: 97 % | HEART RATE: 52 BPM | DIASTOLIC BLOOD PRESSURE: 82 MMHG | TEMPERATURE: 97.7 F | BODY MASS INDEX: 26.75 KG/M2 | SYSTOLIC BLOOD PRESSURE: 134 MMHG

## 2018-06-04 DIAGNOSIS — F10.10 ALCOHOL ABUSE: ICD-10-CM

## 2018-06-04 DIAGNOSIS — I10 ESSENTIAL HYPERTENSION: ICD-10-CM

## 2018-06-04 DIAGNOSIS — K64.2 GRADE III HEMORRHOIDS: Primary | ICD-10-CM

## 2018-06-04 DIAGNOSIS — N18.30 CKD (CHRONIC KIDNEY DISEASE), STAGE III (HCC): ICD-10-CM

## 2018-06-04 DIAGNOSIS — K92.2 GASTROINTESTINAL HEMORRHAGE, UNSPECIFIED GASTROINTESTINAL HEMORRHAGE TYPE: ICD-10-CM

## 2018-06-04 RX ORDER — HYDROCORTISONE ACETATE 25 MG/1
25 SUPPOSITORY RECTAL EVERY 12 HOURS
Qty: 12 EACH | Refills: 11 | Status: SHIPPED | OUTPATIENT
Start: 2018-06-04 | End: 2019-01-14

## 2018-06-04 NOTE — MR AVS SNAPSHOT
[address] [facility] 
 
 
 Robel More[address] [address] 
[phone] Patient: Vonnie Hilario. MRN: [id number] :[id number] Visit Information Date & Time Provider Department Dept. Phone Encounter #  
 2018 11:30 AM Nicolle Mancia 80 Sports Medicine and [facility] 34 [id number] Follow-up Instructions Follow-up and Disposition History Your Appointments 2018  9:00 AM  
Any with Steven Rene MD  
[address] [place] [address] and Primary Care [address] [place] [address]) Appt Note: 3 month f/u  
 Robel Morenojdona [address] 1 [facility]  
  
   
 Robel [address] 90 [id number] Upcoming Health Maintenance Date Due  
 MEDICARE YEARLY EXAM 2018 Influenza Age 5 to Adult 2018 GLAUCOMA SCREENING Q2Y 3/8/2020 DTaP/Tdap/Td series (2 - Td) 2026 Allergies as of 2018  Review Complete On: 2018 By: Daren Grace LPN Severity Noted Reaction Type Reactions Amiodarone  10/09/2014    Other (comments) Bradycardia -excessive Current Immunizations  Reviewed on 2016 No immunizations on file. Not reviewed this visit You Were Diagnosed With   
  
 Codes Comments Grade III hemorrhoids    -  Primary ICD-10-CM: N63.6 ICD-9-CM: 455.0 CKD (chronic kidney disease), stage III     ICD-10-CM: N18.3 ICD-9-CM: 013. 3 Essential hypertension     ICD-10-CM: I10 
ICD-9-CM: 401.9 Gastrointestinal hemorrhage, unspecified gastrointestinal hemorrhage type     ICD-10-CM: K92.2 ICD-9-CM: 578.9 Alcohol abuse     ICD-10-CM: F10.10 ICD-9-CM: 305.00 Vitals BP Pulse Temp Resp Height(growth percentile) Weight(growth percentile) 134/82 (!) 52 97.7 °F (36.5 °C) (Oral) 16 5' 8\" (1.727 m) 176 lb 8 oz (80.1 kg) SpO2 BMI Smoking Status 97% 26.84 kg/m2 Former Smoker Vitals History BMI and BSA Data Body Mass Index Body Surface Area  
 26.84 kg/m 2 1.96 m 2 Preferred Pharmacy Pharmacy Name Phone [facility]/PHARMACY #[id number] Sofi Elsa, [address] [place] [address] [phone] Your Updated Medication List  
  
   
This list is accurate as of 6/4/18  2:00 PM.  Always use your most recent med list.  
  
  
  
  
 albuterol 90 mcg/actuation inhaler Commonly known as:  PROVENTIL HFA, VENTOLIN HFA, PROAIR HFA Take 1 Puff by inhalation every four (4) hours as needed for Wheezing. allopurinol 100 mg tablet Commonly known as:  ZYLOPRIM  
TAKE 1 TAB BY MOUTH DAILY. ELIQUIS 2.5 mg tablet Generic drug:  apixaban TAKE 1 TAB BY MOUTH TWO (2) TIMES A DAY. hydrALAZINE 50 mg tablet Commonly known as:  APRESOLINE  
TAKE 1 TAB BY MOUTH THREE (3) TIMES DAILY. hydrocortisone 25 mg Supp Commonly known as:  ANUSOL-HC Insert 1 Suppository into rectum every twelve (12) hours. omeprazole 20 mg capsule Commonly known as:  PRILOSEC Take 20 mg by mouth daily. PARoxetine 10 mg tablet Commonly known as:  PAXIL TAKE 1 TAB BY MOUTH DAILY. potassium chloride SR 20 mEq tablet Commonly known as:  K-TAB  
TAKE 1 TABLET BY ORAL ROUTE EVERY DAY WITH FOOD  
  
 propafenone 150 mg tablet Commonly known as:  RYTHMOL Take 1 Tab by mouth two (2) times a day. simvastatin 40 mg tablet Commonly known as:  ZOCOR  
TAKE 1 TABLET BY MOUTH AT BEDTIME  
  
 sodium chloride 0.65 % nasal squeeze bottle Commonly known as:  OCEAN  
2 Sprays by Both Nostrils route four (4) times daily. witch hazel-glycerin 12.5-50 % pad Commonly known as:  TUCKS  
1 Pad by PeriANAL route as needed for Pain. Prescriptions Sent to Pharmacy Refills  
 hydrocortisone (ANUSOL-HC) 25 mg supp 11 Sig: Insert 1 Suppository into rectum every twelve (12) hours.   
 Class: Normal  
 Pharmacy: [address] [place] [address] Ph #: [phone] Route: Rectal  
 witch hazel-glycerin (TUCKS) 12.5-50 % pad 11 Si Pad by PeriANAL route as needed for Pain. Class: Normal  
 Pharmacy: [address] Ph #: [phone] Route: PeriANAL Introducing \[facility]\"" & HEALTH SERVICES! [facility] introduces [employer] patient portal. Now you can access parts of your medical record, email your doctor's office, and request medication refills online. 1. In your internet browser, go to [email or web address] 2. Click on the First Time User? Click Here link in the Sign In box. You will see the New Member Sign Up page. 3. Enter your [employer] Access Code exactly as it appears below. You will not need to use this code after youve completed the sign-up process. If you do not sign up before the expiration date, you must request a new code. · [employer] Access Code: [email or web address] Expires: 2018 11:17 AM 
 
4. Enter the last four digits of your Social Security Number (xxxx) and Date of Birth (mm/dd/yyyy) as indicated and click Submit. You will be taken to the next sign-up page. 5. Create a iT[employer]in ID. This will be your [employer] login ID and cannot be changed, so think of one that is secure and easy to remember. 6. Create a iT[employer] password. You can change your password at any time. 7. Enter your Password Reset Question and Answer. This can be used at a later time if you forget your password. 8. Enter your e-mail address. You will receive e-mail notification when new information is available in [address] 9. Click Sign Up. You can now view and download portions of your medical record. 10. Click the Download Summary menu link to download a portable copy of your medical information. If you have questions, please visit the Frequently Asked Questions section of the [employer] website. Remember, [employer] is NOT to be used for urgent needs. For medical emergencies, dial 911. Now available from your iPhone and Android! Please provide this summary of care documentation to your next provider. Your primary care clinician is listed as Hiral Pruett. If you have any questions after today's visit, please call [phone].

## 2018-06-04 NOTE — PROGRESS NOTES
SPORTS MEDICINE AND PRIMARY CARE  Francisco Valenzuela MD, [address] [address]  Phone:  [phone]  Fax: [phone]      Chief Complaint   Patient presents with    Melena         SUBECTIVE:    Austin Aguero is a 81 y.o. male Patient returns today with known history of polyps, S/P colonoscopy and polypectomy 05/09/16, chronic alcohol abuse, CKD, multiple myeloma, primary hypertension and is seen for evaluation. He complains of rectal bleeding and notes blood on the tissue when he wipes. He had that on the last visit, but failed to mention it. Other new complaints denied and patient is seen for evaluation. Current Outpatient Prescriptions   Medication Sig Dispense Refill    hydrocortisone (ANUSOL-HC) 25 mg supp Insert 1 Suppository into rectum every twelve (12) hours. 12 Each 11    witch hazel-glycerin (TUCKS) 12.5-50 % pad 1 Pad by PeriANAL route as needed for Pain. 100 Pad 11    simvastatin (ZOCOR) 40 mg tablet TAKE 1 TABLET BY MOUTH AT BEDTIME 90 Tab 3    PARoxetine (PAXIL) 10 mg tablet TAKE 1 TAB BY MOUTH DAILY. 90 Tab 3    ELIQUIS 2.5 mg tablet TAKE 1 TAB BY MOUTH TWO (2) TIMES A DAY. 180 Tab 3    propafenone (RYTHMOL) 150 mg tablet Take 1 Tab by mouth two (2) times a day. 90 Tab 5    potassium chloride SR (K-TAB) 20 mEq tablet TAKE 1 TABLET BY ORAL ROUTE EVERY DAY WITH FOOD  5    sodium chloride (OCEAN) 0.65 % nasal spray 2 Sprays by Both Nostrils route four (4) times daily. 45 mL 11    omeprazole (PRILOSEC) 20 mg capsule Take 20 mg by mouth daily.  albuterol (PROVENTIL HFA, VENTOLIN HFA, PROAIR HFA) 90 mcg/actuation inhaler Take 1 Puff by inhalation every four (4) hours as needed for Wheezing. 1 Inhaler 11    allopurinol (ZYLOPRIM) 100 mg tablet TAKE 1 TAB BY MOUTH DAILY. 180 Tab 2    hydrALAZINE (APRESOLINE) 50 mg tablet TAKE 1 TAB BY MOUTH THREE (3) TIMES DAILY.  270 Tab 3     Past Medical History:   Diagnosis Date    Adverse effect of anesthesia nasal procedure didn't work-patient awake    Alcohol abuse     Aortic regurgitation     CKD (chronic kidney disease), stage III     Episodic lightheadedness 2017    Episodic lightheadedness     Epistaxis 2016    Erectile dysfunction     GI bleed     Gout     gout, osteoarthritis    Heart failure (HCC)     acute heart failure    Hemorrhoids     History of cardioversion 2017    Hypertension     Ill-defined condition     weakness    Multiple myeloma (HCC)     PAF (paroxysmal atrial fibrillation) ([facility] [address] 2016    eliquis    Pulmonary HTN ([facility] [address]     Right-sided epistaxis 2016    S/P AVR (aortic valve replacement) 2016    merrill gill md -     S/P cardiac cath 16    severe ai, normal coronaries     S/P colonoscopy 10-8-04    S/P colonoscopy with polypectomy 2016    Kar Yeboah MD    Skin lesion      Past Surgical History:   Procedure Laterality Date    CARDIAC SURG PROCEDURE UNLIST      cardiac cath   [address] [place] [address] CARDIAC SURG PROCEDURE UNLIST  2016    AVR    HX HEENT      nasal procedure 20 years ago    HX KNEE REPLACEMENT Bilateral     bilateral knee replacement    HX ORTHOPAEDIC Bilateral     wrist     Allergies   Allergen Reactions    Amiodarone Other (comments)     Bradycardia -excessive       REVIEW OF SYSTEMS:   No melena and no magdiel clots or bleeding. Social History     Social History    Marital status:      Spouse name: N/A    Number of children: N/A    Years of education: N/A     Social History Main Topics    Smoking status: Former Smoker     Years: 4.00     Quit date: 1960    Smokeless tobacco: Never Used    Alcohol use Yes      Comment: ocassional    Drug use: No    Sexual activity: Not Currently     Other Topics Concern    None     Social History Narrative    Family History:  Mother:  36 yrs, Chronic anemiaFather:  80 yrs, I think aneurysm hypertensionBrother(s):  79 yrs, obese,dm,drug abuse,miSon(s): alive, adopted1 son(s) . Social History: Alcohol Use Patient uses alcohol, Drinks per occasion: 2, Drinks per w [place]: 10. Smoking Status Patient is a never smoker. Marital Status: . Lives w ith: alone. Occupation/W ork: employed full time insurance. Education/School: has highschool diploma.   r  Family History   Problem Relation Age of Onset    Anemia Mother     No Known Problems Father     Other Brother      drug abuse       OBJECTIVE:  Visit Vitals    /82    Pulse (!) 52    Temp 97.7 °F (36.5 °C) (Oral)    Resp 16    Ht 5' 8\" (1.727 m)    Wt 176 lb 8 oz (80.1 kg)    SpO2 97%    BMI 26.84 kg/m2     ENT: perrla,  eom intact  NECK: supple. Thyroid normal  CHEST: clear to ascultation and percussion   HEART: regular rate and rhythm  ABD: soft, bowel sounds active  EXTREMITIES: no edema, pulse 1+     Office Visit on 05/29/2018   Component Date Value Ref Range Status    WBC 05/29/2018 4.0  3.4 - 10.8 x10E3/uL Final    RBC 05/29/2018 4.09* 4.14 - 5.80 x10E6/uL Final    HGB 05/29/2018 13.3  13.0 - 17.7 g/dL Final    HCT 05/29/2018 40.3  37.5 - 51.0 % Final    MCV 05/29/2018 99* 79 - 97 fL Final    MCH 05/29/2018 32.5  26.6 - 33.0 pg Final    MCHC 05/29/2018 33.0  31.5 - 35.7 g/dL Final    RDW 05/29/2018 15.2  12.3 - 15.4 % Final    PLATELET 09/51/3523 330  150 - 379 x10E3/uL Final    NEUTROPHILS 05/29/2018 74  Not Estab. % Final    Lymphocytes 05/29/2018 18  Not Estab. % Final    MONOCYTES 05/29/2018 5  Not Estab. % Final    EOSINOPHILS 05/29/2018 3  Not Estab. % Final    BASOPHILS 05/29/2018 0  Not Estab. % Final    ABS. NEUTROPHILS 05/29/2018 3.0  1.4 - 7.0 x10E3/uL Final    Abs Lymphocytes 05/29/2018 0.7  0.7 - 3.1 x10E3/uL Final    ABS. MONOCYTES 05/29/2018 0.2  0.1 - 0.9 x10E3/uL Final    ABS. EOSINOPHILS 05/29/2018 0.1  0.0 - 0.4 x10E3/uL Final    ABS.  BASOPHILS 05/29/2018 0.0  0.0 - 0.2 x10E3/uL Final    IMMATURE GRANULOCYTES 05/29/2018 0  Not Estab. % Final    ABS. IMM. GRANS. 05/29/2018 0.0  0.0 - 0.1 x10E3/uL Final    Glucose 05/29/2018 85  65 - 99 mg/dL Final    Comment: Specimen received in contact with cells. No visible hemolysis  present. However GLUC may be decreased and K increased. Clinical  correlation indicated.  BUN 05/29/2018 23  8 - 27 mg/dL Final    Creatinine 05/29/2018 1.48* 0.76 - 1.27 mg/dL Final    GFR est non-AA 05/29/2018 44* >59 mL/min/1.73 Final    GFR est AA 05/29/2018 51* >59 mL/min/1.73 Final    BUN/Creatinine ratio 05/29/2018 16  10 - 24 Final    Sodium 05/29/2018 144  134 - 144 mmol/L Final    Potassium 05/29/2018 3.9  3.5 - 5.2 mmol/L Final    Comment: Specimen received in contact with cells. No visible hemolysis  present. However GLUC may be decreased and K increased. Clinical  correlation indicated.  Chloride 05/29/2018 105  96 - 106 mmol/L Final    CO2 05/29/2018 24  18 - 29 mmol/L Final    Comment: **Effective June 11, 2018 Carbon Dioxide, Total**    reference interval will be changing to:                Age                  Male          Female       0 days   - 30 days         16 - 34        16 - 34      31 days   -  1 year         15 - 25        15 - 25       2 years  -  5 years        16 - 34        14 - 32       6 years  - 12 years        23 - 32        22 - 32                 >12 years        21 - 34        20 - 34      Calcium 05/29/2018 8.9  8.6 - 10.2 mg/dL Final    B-type Natriuretic Peptide 05/29/2018 168.3* 0.0 - 100.0 pg/mL Final   Admission on 04/30/2018, Discharged on 04/30/2018   Component Date Value Ref Range Status    Ventricular Rate 04/30/2018 56  BPM Final    Atrial Rate 04/30/2018 78  BPM Final    QRS Duration 04/30/2018 108  ms Final    Q-T Interval 04/30/2018 464  ms Final    QTC Calculation (Bezet) 04/30/2018 447  ms Final    Calculated R Axis 04/30/2018 113  degrees Final    Calculated T Axis 04/30/2018 155  degrees Final    Diagnosis 04/30/2018    Final Value:Probable sinus rhythm with probable Wenckebach second degree AV HB  Right axis deviation  ST & T wave abnormality, consider inferolateral ischemia  When compared with ECG of 10-AUG-2017 15:46,  No significant change was found  Confirmed by [facility] ([id number]) on 5/1/2018 9:31:49 PM      WBC 04/30/2018 4.5  4.1 - 11.1 K/uL Final    Comment: Due to mathematical rounding between the [address], and the new [employer] analyzers, the reported automated differential may vary by up to +/- 0.5% per cell line. This finding may produce a result that is 100% +/- 3%, which is clinically insignificant.  RBC 04/30/2018 3.85* 4.10 - 5.70 M/uL Final    HGB 04/30/2018 12.7  12.1 - 17.0 g/dL Final    HCT 04/30/2018 37.5  36.6 - 50.3 % Final    MCV 04/30/2018 97.4  80.0 - 99.0 FL Final    MCH 04/30/2018 33.0  26.0 - 34.0 PG Final    MCHC 04/30/2018 33.9  30.0 - 36.5 g/dL Final    RDW 04/30/2018 14.3  11.5 - 14.5 % Final    PLATELET 29/51/6830 417  150 - 400 K/uL Final    MPV 04/30/2018 9.4  8.9 - 12.9 FL Final    NRBC 04/30/2018 0.0  0  WBC Final    ABSOLUTE NRBC 04/30/2018 0.00  0.00 - 0.01 K/uL Final    NEUTROPHILS 04/30/2018 73  32 - 75 % Final    LYMPHOCYTES 04/30/2018 18  12 - 49 % Final    MONOCYTES 04/30/2018 6  5 - 13 % Final    EOSINOPHILS 04/30/2018 3  0 - 7 % Final    BASOPHILS 04/30/2018 0  0 - 1 % Final    IMMATURE GRANULOCYTES 04/30/2018 0  0.0 - 0.5 % Final    ABS. NEUTROPHILS 04/30/2018 3.3  1.8 - 8.0 K/UL Final    ABS. LYMPHOCYTES 04/30/2018 0.8  0.8 - 3.5 K/UL Final    ABS. MONOCYTES 04/30/2018 0.3  0.0 - 1.0 K/UL Final    ABS. EOSINOPHILS 04/30/2018 0.1  0.0 - 0.4 K/UL Final    ABS. BASOPHILS 04/30/2018 0.0  0.0 - 0.1 K/UL Final    ABS. IMM.  GRANS. 04/30/2018 0.0  0.00 - 0.04 K/UL Final    DF 04/30/2018 SMEAR SCANNED    Final    RBC COMMENTS 04/30/2018 NORMOCYTIC, NORMOCHROMIC    Final    Lipase 04/30/2018 105  73 - 393 U/L Final    Sodium 04/30/2018 144  136 - 145 mmol/L Final    Potassium 04/30/2018 3.2* 3.5 - 5.1 mmol/L Final    Chloride 04/30/2018 106  97 - 108 mmol/L Final    CO2 04/30/2018 27  21 - 32 mmol/L Final    Anion gap 04/30/2018 11  5 - 15 mmol/L Final    Glucose 04/30/2018 97  65 - 100 mg/dL Final    BUN 04/30/2018 19  6 - 20 MG/DL Final    Creatinine 04/30/2018 1.64* 0.70 - 1.30 MG/DL Final    BUN/Creatinine ratio 04/30/2018 12  12 - 20   Final    GFR est AA 04/30/2018 49* >60 ml/min/1.73m2 Final    GFR est non-AA 04/30/2018 41* >60 ml/min/1.73m2 Final    Comment: Estimated GFR is calculated using the IDMS-traceable Modification of Diet in Renal Disease (MDRD) Study equation, reported for both  Americans (GFRAA) and non- Americans (GFRNA), and normalized to 1.73m2 body surface area. The physician must decide which value applies to the patient. The MDRD study equation should only be used in individuals age 25 or older. It has not been validated for the following: pregnant women, patients with serious comorbid conditions, or on certain medications, or persons with extremes of body size, muscle mass, or nutritional status.  Calcium 04/30/2018 7.9* 8.5 - 10.1 MG/DL Final    Bilirubin, total 04/30/2018 0.5  0.2 - 1.0 MG/DL Final    ALT (SGPT) 04/30/2018 37  12 - 78 U/L Final    AST (SGOT) 04/30/2018 46* 15 - 37 U/L Final    Alk.  phosphatase 04/30/2018 76  45 - 117 U/L Final    Protein, total 04/30/2018 6.9  6.4 - 8.2 g/dL Final    Albumin 04/30/2018 3.3* 3.5 - 5.0 g/dL Final    Globulin 04/30/2018 3.6  2.0 - 4.0 g/dL Final    A-G Ratio 04/30/2018 0.9* 1.1 - 2.2   Final    Color 04/30/2018 YELLOW/STRAW    Final    Color Reference Range: Straw, Yellow or Dark Yellow    Appearance 04/30/2018 CLEAR  CLEAR   Final    Specific gravity 04/30/2018 1.015  1.003 - 1.030   Final    pH (UA) 04/30/2018 5.5  5.0 - 8.0   Final    Protein 04/30/2018 30* NEG mg/dL Final    Glucose 04/30/2018 NEGATIVE   NEG mg/dL Final    Ketone 04/30/2018 NEGATIVE   NEG mg/dL Final    Bilirubin 04/30/2018 NEGATIVE   NEG   Final    Blood 04/30/2018 NEGATIVE   NEG   Final    Urobilinogen 04/30/2018 0.2  0.2 - 1.0 EU/dL Final    Nitrites 04/30/2018 NEGATIVE   NEG   Final    Leukocyte Esterase 04/30/2018 NEGATIVE   NEG   Final    Troponin-I (POC) 04/30/2018 <0.04  0.00 - 0.08 ng/mL Final    Comment: (NOTE)  The presence of detectable troponin indicates myocardial injury which   may be due to ischemia, myocarditis, trauma, etc. Clinical   correlation is necessary to determine the significance of this   finding. Recommend establishing a baseline troponin using main   clinical laboratory method if serial determinations are anticipated.  WBC 04/30/2018 0-4  0 - 4 /hpf Final    RBC 04/30/2018 0-5  0 - 5 /hpf Final    Epithelial cells 04/30/2018 FEW  FEW /lpf Final    Epithelial cell category consists of squamous cells and /or transitional urothelial cells. Renal tubular cells, if present, are separately identified as such.  Bacteria 04/30/2018 NEGATIVE   NEG /hpf Final    Troponin-I (POC) 04/30/2018 <0.04  0.00 - 0.08 ng/mL Final    Comment: (NOTE)  The presence of detectable troponin indicates myocardial injury which   may be due to ischemia, myocarditis, trauma, etc. Clinical   correlation is necessary to determine the significance of this   finding. Recommend establishing a baseline troponin using main   clinical laboratory method if serial determinations are anticipated.      Office Visit on 04/25/2018   Component Date Value Ref Range Status    Glucose 04/25/2018 88  65 - 99 mg/dL Final    BUN 04/25/2018 14  8 - 27 mg/dL Final    Creatinine 04/25/2018 1.64* 0.76 - 1.27 mg/dL Final    GFR est non-AA 04/25/2018 39* >59 mL/min/1.73 Final    GFR est AA 04/25/2018 45* >59 mL/min/1.73 Final    BUN/Creatinine ratio 04/25/2018 9* 10 - 24 Final    Sodium 04/25/2018 143  134 - 144 mmol/L Final    Potassium 04/25/2018 3.8  3.5 - 5.2 mmol/L Final    Chloride 04/25/2018 102  96 - 106 mmol/L Final    CO2 04/25/2018 26  18 - 29 mmol/L Final    Calcium 04/25/2018 8.5* 8.6 - 10.2 mg/dL Final    Phosphorus 04/25/2018 3.4  2.5 - 4.5 mg/dL Final    Albumin 04/25/2018 4.1  3.5 - 4.7 g/dL Final    Vitamin B12 04/25/2018 558  232 - 1245 pg/mL Final    Folate 04/25/2018 7.7  >3.0 ng/mL Final    Comment: A serum folate concentration of less than 3.1 ng/mL is  considered to represent clinical deficiency.  Calcitriol (Vit D 1, 25 di-OH) 04/25/2018 62.7  19.9 - 79.3 pg/mL Final   Clinical Support on 04/12/2018   Component Date Value Ref Range Status    WBC 04/12/2018 4.2  3.4 - 10.8 x10E3/uL Final    RBC 04/12/2018 4.10* 4.14 - 5.80 x10E6/uL Final    HGB 04/12/2018 13.3  13.0 - 17.7 g/dL Final    HCT 04/12/2018 40.9  37.5 - 51.0 % Final    MCV 04/12/2018 100* 79 - 97 fL Final    MCH 04/12/2018 32.4  26.6 - 33.0 pg Final    MCHC 04/12/2018 32.5  31.5 - 35.7 g/dL Final    RDW 04/12/2018 15.1  12.3 - 15.4 % Final    PLATELET 14/29/6794 049  150 - 379 x10E3/uL Final    NEUTROPHILS 04/12/2018 71  Not Estab. % Final    Lymphocytes 04/12/2018 20  Not Estab. % Final    MONOCYTES 04/12/2018 6  Not Estab. % Final    EOSINOPHILS 04/12/2018 3  Not Estab. % Final    BASOPHILS 04/12/2018 0  Not Estab. % Final    ABS. NEUTROPHILS 04/12/2018 2.9  1.4 - 7.0 x10E3/uL Final    Abs Lymphocytes 04/12/2018 0.8  0.7 - 3.1 x10E3/uL Final    ABS. MONOCYTES 04/12/2018 0.2  0.1 - 0.9 x10E3/uL Final    ABS. EOSINOPHILS 04/12/2018 0.1  0.0 - 0.4 x10E3/uL Final    ABS. BASOPHILS 04/12/2018 0.0  0.0 - 0.2 x10E3/uL Final    IMMATURE GRANULOCYTES 04/12/2018 0  Not Estab. % Final    ABS. IMM.  GRANS. 04/12/2018 0.0  0.0 - 0.1 x10E3/uL Final    Glucose 04/12/2018 92  65 - 99 mg/dL Final    BUN 04/12/2018 22  8 - 27 mg/dL Final    Creatinine 04/12/2018 1.75* 0.76 - 1.27 mg/dL Final    GFR est non-AA 04/12/2018 36* >59 mL/min/1.73 Final    GFR est AA 04/12/2018 41* >59 mL/min/1.73 Final    BUN/Creatinine ratio 04/12/2018 13  10 - 24 Final    Sodium 04/12/2018 144  134 - 144 mmol/L Final    Potassium 04/12/2018 3.6  3.5 - 5.2 mmol/L Final    Chloride 04/12/2018 100  96 - 106 mmol/L Final    CO2 04/12/2018 28  18 - 29 mmol/L Final    Calcium 04/12/2018 8.5* 8.6 - 10.2 mg/dL Final    Phosphorus 04/12/2018 3.1  2.5 - 4.5 mg/dL Final    Albumin 04/12/2018 4.3  3.5 - 4.7 g/dL Final          ASSESSMENT:  1. Grade III hemorrhoids    2. CKD (chronic kidney disease), stage III    3. Essential hypertension    4. Gastrointestinal hemorrhage, unspecified gastrointestinal hemorrhage type    5. Alcohol abuse      Rectal exam is remarkable for external hemorrhoids, particularly superiorly, as well as internal hemorrhoids. Prostate is 1+. Stool is brown. He has hemorrhoidal bleeding as the likely cause of the complaint today and we'll give him Tucks and Anusol HC suppositories. If this doesn't allow the symptoms to subside we will refer him back to his gastroenterologist.    BP control is at goal.    As usual we encouraged him to discontinue alcohol completely. He'll return to the office as scheduled or in about 2-3 months. PLAN:  .  Orders Placed This Encounter    hydrocortisone (ANUSOL-HC) 25 mg supp    witch hazel-glycerin (TUCKS) 12.5-50 % pad       Follow-up Disposition: Not on File      ATTENTION:   This medical record was transcribed using an electronic medical records system. Although proofread, it may and can contain electronic and spelling errors. Other human spelling and other errors may be present. Corrections may be executed at a later time. Please feel free to contact us for any clarifications as needed.

## 2018-06-04 NOTE — PROGRESS NOTES
1. Have you been to the ER, urgent care clinic since your last visit? Hospitalized since your last visit? No    2. Have you seen or consulted any other health care providers outside of the [address] [place] [address] since your last visit? Include any pap smears or colon screening.  No     Complaints of blood in stool

## 2018-07-05 ENCOUNTER — TELEPHONE (OUTPATIENT)
Dept: INTERNAL MEDICINE CLINIC | Age: 83
End: 2018-07-05

## 2018-07-05 NOTE — TELEPHONE ENCOUNTER
Patient called stating that the Paxil is causing his sexual desire to go away and he wants to stop the medication. Dr.. Bessy Saldaña states it is okay to stop if he wants too, but its not the paxil he doesn't think so.

## 2018-09-10 RX ORDER — APIXABAN 2.5 MG/1
TABLET, FILM COATED ORAL
Qty: 180 TAB | Refills: 3 | Status: SHIPPED | OUTPATIENT
Start: 2018-09-10 | End: 2018-09-15 | Stop reason: CLARIF

## 2018-09-15 RX ORDER — APIXABAN 2.5 MG/1
TABLET, FILM COATED ORAL
Qty: 180 TAB | Refills: 3 | Status: SHIPPED | OUTPATIENT
Start: 2018-09-15 | End: 2019-09-29 | Stop reason: SDUPTHER

## 2018-09-20 ENCOUNTER — TELEPHONE (OUTPATIENT)
Dept: INTERNAL MEDICINE CLINIC | Age: 83
End: 2018-09-20

## 2018-09-20 NOTE — TELEPHONE ENCOUNTER
Patient called stating that his cardiologist wants to switch him to Metoprolol instead of the hydralazine, per Dr. Kizzy Robertson that is okay. Patient will call with the dose.

## 2018-09-27 ENCOUNTER — OFFICE VISIT (OUTPATIENT)
Dept: INTERNAL MEDICINE CLINIC | Age: 83
End: 2018-09-27

## 2018-09-27 VITALS
RESPIRATION RATE: 17 BRPM | TEMPERATURE: 98 F | WEIGHT: 169.6 LBS | HEART RATE: 80 BPM | BODY MASS INDEX: 25.7 KG/M2 | DIASTOLIC BLOOD PRESSURE: 82 MMHG | OXYGEN SATURATION: 98 % | SYSTOLIC BLOOD PRESSURE: 129 MMHG | HEIGHT: 68 IN

## 2018-09-27 DIAGNOSIS — Z98.890 HISTORY OF CARDIOVERSION: ICD-10-CM

## 2018-09-27 DIAGNOSIS — I10 ESSENTIAL HYPERTENSION: ICD-10-CM

## 2018-09-27 DIAGNOSIS — Z00.00 MEDICARE ANNUAL WELLNESS VISIT, SUBSEQUENT: Primary | ICD-10-CM

## 2018-09-27 DIAGNOSIS — R42 EPISODIC LIGHTHEADEDNESS: ICD-10-CM

## 2018-09-27 DIAGNOSIS — Z13.31 SCREENING FOR DEPRESSION: ICD-10-CM

## 2018-09-27 DIAGNOSIS — I27.20 PULMONARY HTN (HCC): ICD-10-CM

## 2018-09-27 DIAGNOSIS — N18.30 CKD (CHRONIC KIDNEY DISEASE), STAGE III (HCC): ICD-10-CM

## 2018-09-27 DIAGNOSIS — Z13.39 SCREENING FOR ALCOHOLISM: ICD-10-CM

## 2018-09-27 DIAGNOSIS — I48.0 PAF (PAROXYSMAL ATRIAL FIBRILLATION) (HCC): ICD-10-CM

## 2018-09-27 DIAGNOSIS — R06.02 SOB (SHORTNESS OF BREATH): ICD-10-CM

## 2018-09-27 DIAGNOSIS — C90.00 MULTIPLE MYELOMA, REMISSION STATUS UNSPECIFIED (HCC): ICD-10-CM

## 2018-09-27 DIAGNOSIS — I35.1 NONRHEUMATIC AORTIC VALVE INSUFFICIENCY: ICD-10-CM

## 2018-09-27 RX ORDER — BUMETANIDE 1 MG/1
1 TABLET ORAL DAILY
Qty: 30 TAB | Refills: 5 | Status: SHIPPED | OUTPATIENT
Start: 2018-09-27 | End: 2018-09-28 | Stop reason: SDUPTHER

## 2018-09-27 RX ORDER — METOPROLOL SUCCINATE 25 MG/1
25 TABLET, EXTENDED RELEASE ORAL
Qty: 30 TAB | Refills: 11 | Status: SHIPPED | OUTPATIENT
Start: 2018-09-27 | End: 2018-09-28 | Stop reason: SDUPTHER

## 2018-09-27 RX ORDER — AMLODIPINE BESYLATE 5 MG/1
TABLET ORAL
COMMUNITY
Start: 2018-07-12 | End: 2019-08-09

## 2018-09-27 NOTE — MR AVS SNAPSHOT
Patricio Erik 
 
 
 [address] [id number] 
[phone] Patient: Jearldine Nissen. MRN: [id number] :1935 Visit Information Date & Time Provider Department Dept. Phone Encounter #  
 2018 12:15 PM Cesar Garcia MD [facility] [phone] [id number] Follow-up Instructions Return in about 1 week (around 10/4/2018) for bp check. Follow-up and Disposition History Upcoming Health Maintenance Date Due Shingrix Vaccine Age 50> (1 of 2) 1986 MEDICARE YEARLY EXAM 2018 Influenza Age 5 to Adult 3/31/2019* GLAUCOMA SCREENING Q2Y 2020 DTaP/Tdap/Td series (2 - Td) 2026 *Topic was postponed. The date shown is not the original due date. Allergies as of 2018  Review Complete On: 2018 By: Cesar Garcia MD  
  
 Severity Noted Reaction Type Reactions Amiodarone  10/09/2014    Other (comments) Bradycardia -excessive Current Immunizations  Reviewed on 2016 No immunizations on file. Not reviewed this visit You Were Diagnosed With   
  
 Codes Comments Medicare annual wellness visit, subsequent    -  Primary ICD-10-CM: Z00.00 ICD-9-CM: V70.0 Screening for alcoholism     ICD-10-CM: Z13.89 ICD-9-CM: V79.1 Screening for depression     ICD-10-CM: Z13.89 ICD-9-CM: V79.0   
 PAF (paroxysmal atrial fibrillation) (HCC)     ICD-10-CM: I48.0 ICD-9-CM: 427.31   
 Multiple myeloma, remission status unspecified (HCC)     ICD-10-CM: C90.00 ICD-9-CM: 203.00 Pulmonary HTN ([address]     ICD-10-CM: I27.20 ICD-9-CM: 416.8 Episodic lightheadedness     ICD-10-CM: R42 ICD-9-CM: 780.4 History of cardioversion     ICD-10-CM: Z98.890 ICD-9-CM: V15.1 Essential hypertension     ICD-10-CM: I10 
ICD-9-CM: 401.9 Nonrheumatic aortic valve insufficiency     ICD-10-CM: I35.1 ICD-9-CM: 424.1 CKD (chronic kidney disease), stage III     ICD-10-CM: N18.3 ICD-9-CM: 585.3 SOB (shortness of breath)     ICD-10-CM: R06.02 
ICD-9-CM: 786.05 Vitals BP Pulse Temp Resp Height(growth percentile) Weight(growth percentile) 129/82 (BP 1 Location: Left arm, BP Patient Position: Sitting) 80 98 °F (36.7 °C) (Oral) 17 5' 8\" (1.727 m) 169 lb 9.6 oz (76.9 kg) SpO2 BMI Smoking Status 98% 25.79 kg/m2 Former Smoker Vitals History BMI and BSA Data Body Mass Index Body Surface Area 25.79 kg/m 2 1.92 m 2 Preferred Pharmacy Pharmacy Name Phone [facility] #[id number] Senaitumesh Velazquez, [address] [place] [address] [phone] Your Updated Medication List  
  
   
This list is accurate as of 9/27/18  2:04 PM.  Always use your most recent med list.  
  
  
  
  
 albuterol 90 mcg/actuation inhaler Commonly known as:  PROVENTIL HFA, VENTOLIN HFA, PROAIR HFA Take 1 Puff by inhalation every four (4) hours as needed for Wheezing. allopurinol 100 mg tablet Commonly known as:  ZYLOPRIM  
TAKE 1 TAB BY MOUTH DAILY. amLODIPine 5 mg tablet Commonly known as:  NORVASC  
  
 bumetanide 1 mg tablet Commonly known as:  Raven Gertrudis Take 1 Tab by mouth daily. ELIQUIS 2.5 mg tablet Generic drug:  apixaban TAKE 1 TAB BY MOUTH TWO (2) TIMES A DAY. hydrocortisone 25 mg Supp Commonly known as:  ANUSOL-HC Insert 1 Suppository into rectum every twelve (12) hours. metoprolol succinate 25 mg XL tablet Commonly known as:  TOPROL-XL Take 1 Tab by mouth nightly. omeprazole 20 mg capsule Commonly known as:  PRILOSEC Take 20 mg by mouth daily. potassium chloride SR 20 mEq tablet Commonly known as:  K-TAB  
TAKE 1 TABLET BY ORAL ROUTE EVERY DAY WITH FOOD  
  
 propafenone 150 mg tablet Commonly known as:  RYTHMOL Take 1 Tab by mouth two (2) times a day. simvastatin 40 mg tablet Commonly known as:  ZOCOR  
 TAKE 1 TABLET BY MOUTH AT BEDTIME  
  
 sodium chloride 0.65 % nasal squeeze bottle Commonly known as:  OCEAN  
2 Sprays by Both Nostrils route four (4) times daily. witch hazel-glycerin 12.5-50 % pad Commonly known as:  TUCKS  
1 Pad by PeriANAL route as needed for Pain. Prescriptions Sent to Pharmacy Refills  
 bumetanide (BUMEX) 1 mg tablet 5 Sig: Take 1 Tab by mouth daily. Class: Normal  
 Pharmacy: [address] [place], [place] [address]kins [facility] Ph #: [phone] Route: Oral  
 metoprolol succinate (TOPROL-XL) 25 mg XL tablet 11 Sig: Take 1 Tab by mouth nightly. Class: Normal  
 Pharmacy: [address] [place] [address] Ph #: [phone] Route: Oral  
  
We Performed the Following BNP [id number] CPT(R)] [address] [[id number] HCPCS] METABOLIC PANEL, BASIC [36163 CPT(R)] MI ANNUAL ALCOHOL SCREEN 15 MIN [id number] HCPCS] Follow-up Instructions Return in about 1 week (around 10/4/2018) for bp check. Patient Instructions Medicare Wellness Visit, Male The best way to live healthy is to have a lifestyle where you eat a well-balanced diet, exercise regularly, limit alcohol use, and quit all forms of tobacco/nicotine, if applicable. Regular preventive services are another way to keep healthy. Preventive services (vaccines, screening tests, monitoring & exams) can help personalize your care plan, which helps you manage your own care. Screening tests can find health problems at the earliest stages, when they are easiest to treat. Cheryl Villanueva follows the current, evidence-based guidelines published by the [facility] Jayme Gifty (USPSTF) when recommending preventive services for our patients.  Because we follow these guidelines, sometimes recommendations change over time as research supports it. (For example, a prostate screening blood test is no longer routinely recommended for men with no symptoms.) Of course, you and your doctor may decide to screen more often for some diseases, based on your risk and co-morbidities (chronic disease you are already diagnosed with). Preventive services for you include: - Medicare offers their members a free annual wellness visit, which is time for you and your primary care provider to discuss and plan for your preventive service needs. Take advantage of this benefit every year! 
-All adults over age 72 should receive the recommended pneumonia vaccines. Current USPSTF guidelines recommend a series of two vaccines for the best pneumonia protection.  
-All adults should have a flu vaccine yearly and an ECG. All adults age 61 and older should receive a shingles vaccine once in their lifetime.   
-All adults age 38-68 who are overweight should have a diabetes screening test once every three years.  
-Other screening tests & preventive services for persons with diabetes include: an eye exam to screen for diabetic retinopathy, a kidney function test, a foot exam, and stricter control over your cholesterol.  
-Cardiovascular screening for adults with routine risk involves an electrocardiogram (ECG) at intervals determined by the provider.  
-Colorectal cancer screening should be done for adults age 54-65 with no increased risk factors for colorectal cancer. There are a number of acceptable methods of screening for this type of cancer. Each test has its own benefits and drawbacks. Discuss with your provider what is most appropriate for you during your annual wellness visit.  The different tests include: colonoscopy (considered the best screening method), a fecal occult blood test, a fecal DNA test, and sigmoidoscopy. 
-All adults born between [facility] should be screened once for Hepatitis C. 
 -An Abdominal Aortic Aneurysm (AAA) Screening is recommended for men age 73-68 who has ever smoked in their lifetime. Here is a list of your current Health Maintenance items (your personalized list of preventive services) with a due date: 
Health Maintenance Due Topic Date Due  Shingles Vaccine (1 of 2) 11/01/1985 [place] Annual Well Visit  05/31/2018 Introducing [facility]! Daija Yen introduces [employer] patient portal. Now you can access parts of your medical record, email your doctor's office, and request medication refills online. 1. In your internet browser, go to [email or web address] 2. Click on the First Time User? Click Here link in the Sign In box. You will see the New Member Sign Up page. 3. Enter your [employer] Access Code exactly as it appears below. You will not need to use this code after youve completed the sign-up process. If you do not sign up before the expiration date, you must request a new code. · [employer] Access Code: [email or web address] Expires: 12/26/2018  2:04 PM 
 
4. Enter the last four digits of your Social Security Number (xxxx) and Date of Birth (mm/dd/yyyy) as indicated and click Submit. You will be taken to the next sign-up page. 5. Create a [employer] ID. This will be your [employer] login ID and cannot be changed, so think of one that is secure and easy to remember. 6. Create a [employer] password. You can change your password at any time. 7. Enter your Password Reset Question and Answer. This can be used at a later time if you forget your password. 8. Enter your e-mail address. You will receive e-mail notification when new information is available in [address] 9. Click Sign Up. You can now view and download portions of your medical record. 10. Click the Download Summary menu link to download a portable copy of your medical information.  
 
If you have questions, please visit the Frequently Asked Questions section of the [employer]. Remember, [employer] is NOT to be used for urgent needs. For medical emergencies, dial 911. Now available from your iPhone and Android! Please provide this summary of care documentation to your next provider. Your primary care clinician is listed as Wiley Prabhakar. If you have any questions after today's visit, please call [phone].

## 2018-09-27 NOTE — ACP (ADVANCE CARE PLANNING)

## 2018-09-27 NOTE — PATIENT INSTRUCTIONS
Medicare Wellness Visit, Male The best way to live healthy is to have a lifestyle where you eat a well-balanced diet, exercise regularly, limit alcohol use, and quit all forms of tobacco/nicotine, if applicable. Regular preventive services are another way to keep healthy. Preventive services (vaccines, screening tests, monitoring & exams) can help personalize your care plan, which helps you manage your own care. Screening tests can find health problems at the earliest stages, when they are easiest to treat. [address] T[facility]ha Villanueva follows the current, evidence-based guidelines published by the [facility] Jayme Gifty ([facility]) when recommending preventive services for our patients. Because we follow these guidelines, sometimes recommendations change over time as research supports it. (For example, a prostate screening blood test is no longer routinely recommended for men with no symptoms.) Of course, you and your doctor may decide to screen more often for some diseases, based on your risk and co-morbidities (chronic disease you are already diagnosed with). Preventive services for you include: - Medicare offers their members a free annual wellness visit, which is time for you and your primary care provider to discuss and plan for your preventive service needs. Take advantage of this benefit every year! 
-All adults over age 72 should receive the recommended pneumonia vaccines. Current USPSTF guidelines recommend a series of two vaccines for the best pneumonia protection.  
-All adults should have a flu vaccine yearly and an ECG.  All adults age 61 and older should receive a shingles vaccine once in their lifetime.   
-All adults age 38-68 who are overweight should have a diabetes screening test once every three years.  
-Other screening tests & preventive services for persons with diabetes include: an eye exam to screen for diabetic retinopathy, a kidney function test, a foot exam, and stricter control over your cholesterol.  
-Cardiovascular screening for adults with routine risk involves an electrocardiogram (ECG) at intervals determined by the provider.  
-Colorectal cancer screening should be done for adults age 54-65 with no increased risk factors for colorectal cancer. There are a number of acceptable methods of screening for this type of cancer. Each test has its own benefits and drawbacks. Discuss with your provider what is most appropriate for you during your annual wellness visit. The different tests include: colonoscopy (considered the best screening method), a fecal occult blood test, a fecal DNA test, and sigmoidoscopy. 
-All adults born between [facility] should be screened once for Hepatitis C. 
-An Abdominal Aortic Aneurysm (AAA) Screening is recommended for men age 73-68 who has ever smoked in their lifetime. Here is a list of your current Health Maintenance items (your personalized list of preventive services) with a due date: 
Health Maintenance Due Topic Date Due  Shingles Vaccine (1 of 2) 11/01/1985 [facility] Annual Well Visit  05/31/2018

## 2018-09-27 NOTE — PROGRESS NOTES
This is the Subsequent Medicare Annual Wellness Exam, performed 12 months or more after the Initial AWV or the last Subsequent AWV I have reviewed the patient's medical history in detail and updated the computerized patient record. History Past Medical History:  
Diagnosis Date  Adverse effect of anesthesia   
 nasal procedure didn't work-patient awake  Alcohol abuse  Aortic regurgitation  CKD (chronic kidney disease), stage III 2011  Episodic lightheadedness 07/28/2017  Episodic lightheadedness  Epistaxis 11/27/2016  Erectile dysfunction  GI bleed  Gout   
 gout, osteoarthritis  Heart failure (HCC)   
 acute heart failure  Hemorrhoids  History of cardioversion 02/17/2017  Hypertension  Ill-defined condition   
 weakness  Multiple myeloma ([facility]  PAF (paroxysmal atrial fibrillation) ([facility] 02/17/2016  
 eliquis  Pulmonary HTN ([facility]  Right-sided epistaxis 11/27/2016  S/P AVR (aortic valve replacement) 07/26/2016  
 merrill gill md -   
 S/P cardiac cath 7/6/16  
 severe ai, normal coronaries  S/P colonoscopy 10-8-04  S/P colonoscopy with polypectomy 05/09/2016 Kar Billingsley MD  
 Skin lesion Past Surgical History:  
Procedure Laterality Date  CARDIAC SURG PROCEDURE UNLIST    
 cardiac cath  CARDIAC SURG PROCEDURE UNLIST  07/26/2016 AVR  HX HEENT    
 nasal procedure 20 years ago  HX KNEE REPLACEMENT Bilateral   
 bilateral knee replacement  HX ORTHOPAEDIC Bilateral   
 wrist  
 
Current Outpatient Prescriptions Medication Sig Dispense Refill  amLODIPine (NORVASC) 5 mg tablet  bumetanide (BUMEX) 1 mg tablet Take 1 Tab by mouth daily. 30 Tab 5  
 metoprolol succinate (TOPROL-XL) 25 mg XL tablet Take 1 Tab by mouth nightly. 30 Tab 11  
 ELIQUIS 2.5 mg tablet TAKE 1 TAB BY MOUTH TWO (2) TIMES A DAY.  180 Tab 3  
 simvastatin (ZOCOR) 40 mg tablet TAKE 1 TABLET BY MOUTH AT BEDTIME 90 Tab 3  
  allopurinol (ZYLOPRIM) 100 mg tablet TAKE 1 TAB BY MOUTH DAILY. 180 Tab 2  propafenone (RYTHMOL) 150 mg tablet Take 1 Tab by mouth two (2) times a day. 90 Tab 5  potassium chloride SR (K-TAB) 20 mEq tablet TAKE 1 TABLET BY ORAL ROUTE EVERY DAY WITH FOOD  5  
 sodium chloride (OCEAN) 0.65 % nasal spray 2 Sprays by Both Nostrils route four (4) times daily. 45 mL 11  
 hydrocortisone (ANUSOL-HC) 25 mg supp Insert 1 Suppository into rectum every twelve (12) hours. 12 Each 11  
 witch hazel-glycerin (TUCKS) 12.5-50 % pad 1 Pad by PeriANAL route as needed for Pain. 100 Pad 11  
 omeprazole (PRILOSEC) 20 mg capsule Take 20 mg by mouth daily.  albuterol (PROVENTIL HFA, VENTOLIN HFA, PROAIR HFA) 90 mcg/actuation inhaler Take 1 Puff by inhalation every four (4) hours as needed for Wheezing. 1 Inhaler 11 Allergies Allergen Reactions  Amiodarone Other (comments) Bradycardia -excessive Family History Problem Relation Age of Onset  Anemia Mother  No Known Problems Father  Other Brother   
  drug abuse Social History Substance Use Topics  Smoking status: Former Smoker Years: 4.00 Quit date: 1/1/1960  Smokeless tobacco: Never Used  Alcohol use Yes Comment: ocassional  
 
Patient Active Problem List  
Diagnosis Code  Multiple myeloma (HCC) C90.00  Gout M10.9  Hypertension I10  
 Aortic regurgitation I35.1  CKD (chronic kidney disease), stage III N18.3  Pulmonary HTN (HCC) I27.20  Alcohol abuse F10.10  Erectile dysfunction N52.9  
 PAF (paroxysmal atrial fibrillation) (HCC) I48.0  GI bleed K92.2  
 S/P colonoscopy with polypectomy Z98.890  
 Skin lesion L98.9  ACP (advance care planning) Z71.89  
 Acute congestive heart failure (HCC) I50.9  S/P AVR (aortic valve replacement) Z95.2  Postoperative anemia due to acute blood loss D62  Epistaxis R04.0  Right-sided epistaxis R04.0  History of cardioversion P58.349  
  Episodic lightheadedness R42  Hemorrhoids K64.9 Depression Risk Factor Screening: PHQ over the last two weeks 6/4/2018 Little interest or pleasure in doing things Not at all Feeling down, depressed, irritable, or hopeless Not at all Total Score PHQ 2 0 Alcohol Risk Factor Screening: You do not drink alcohol or very rarely. Functional Ability and Level of Safety:  
Hearing Loss Hearing is good. Activities of Daily Living The home contains: no safety equipment. Patient does total self care Fall Risk Fall Risk Assessment, last 12 mths 6/4/2018 Able to walk? Yes Fall in past 12 months? No  
 
 
Abuse Screen Patient is not abused Cognitive Screening Evaluation of Cognitive Function: 
Has your family/caregiver stated any concerns about your memory: no 
Normal 
 
Patient Care Team  
Patient Care Team: 
Linnie Boxer, MD as PCP - General (Internal Medicine) Gerhardt Hard, MD as Referring Provider (Cardiology) Mervat Rodriguez MD as Surgeon (Cardiothoracic Surgery) Ramandeep Tellez RN as Ambulatory Care Navigator Assessment/Plan Education and counseling provided: 
Are appropriate based on today's review and evaluation Diagnoses and all orders for this visit: 
 
1. Medicare annual wellness visit, subsequent -     Annual  Alcohol Screen 15 min () -     Depression Screen Annual 
 
2. Screening for alcoholism -     Annual  Alcohol Screen 15 min () 3. Screening for depression -     Depression Screen Annual 
 
4. PAF (paroxysmal atrial fibrillation) ([facility] 5. Multiple myeloma, remission status unspecified ([facility] 6. Pulmonary HTN ([facility] 7. Episodic lightheadedness 8. History of cardioversion 9. Essential hypertension 10. Nonrheumatic aortic valve insufficiency 11. CKD (chronic kidney disease), stage III 
-     METABOLIC PANEL, BASIC 12. SOB (shortness of breath) -     BNP Other orders -     bumetanide (BUMEX) 1 mg tablet; Take 1 Tab by mouth daily. -     metoprolol succinate (TOPROL-XL) 25 mg XL tablet; Take 1 Tab by mouth nightly. Health Maintenance Due Topic Date Due  Shingrix Vaccine Age 50> (1 of 2) 11/01/1986  MEDICARE YEARLY EXAM  05/31/2018 [facility] Collin Lopez MD, [address] [address] Phone:  [phone]  Fax: [phone] Chief Complaint Patient presents with  Hypertension SUBECTIVE: 
 
Eulalio Garibay. is a 81 y.o. male Patient returns today with history of paroxysmal atrial fibrillation, primary hypertension, multiple myeloma, cardiomyopathy, aortic regurgitation,CKD and is seen for evaluation. Since we last saw him he saw Dr. Aleida Kowalski, as he'd been referred by Dr. Nanette Parker to consider EP studies. Patient wasn't having symptoms at that time and therefore was not pursuing. He now notes he's probably having symptoms and is going to consider it. Dr. Aleida Kowalski wanted him placed on a beta blocker and referred him back to us to make that adjustment and change. Patient is seen for evaluation. Current Outpatient Prescriptions Medication Sig Dispense Refill  amLODIPine (NORVASC) 5 mg tablet  bumetanide (BUMEX) 1 mg tablet Take 1 Tab by mouth daily. 30 Tab 5  
 metoprolol succinate (TOPROL-XL) 25 mg XL tablet Take 1 Tab by mouth nightly. 30 Tab 11  
 ELIQUIS 2.5 mg tablet TAKE 1 TAB BY MOUTH TWO (2) TIMES A DAY. 180 Tab 3  
 simvastatin (ZOCOR) 40 mg tablet TAKE 1 TABLET BY MOUTH AT BEDTIME 90 Tab 3  
 allopurinol (ZYLOPRIM) 100 mg tablet TAKE 1 TAB BY MOUTH DAILY. 180 Tab 2  propafenone (RYTHMOL) 150 mg tablet Take 1 Tab by mouth two (2) times a day. 90 Tab 5  potassium chloride SR (K-TAB) 20 mEq tablet TAKE 1 TABLET BY ORAL ROUTE EVERY DAY WITH FOOD  5  
 sodium chloride (OCEAN) 0.65 % nasal spray 2 Sprays by Both Nostrils route four (4) times daily.  45 mL 11  
  hydrocortisone (ANUSOL-HC) 25 mg supp Insert 1 Suppository into rectum every twelve (12) hours. 12 Each 11  
 witch hazel-glycerin (TUCKS) 12.5-50 % pad 1 Pad by PeriANAL route as needed for Pain. 100 Pad 11  
 omeprazole (PRILOSEC) 20 mg capsule Take 20 mg by mouth daily.  albuterol (PROVENTIL HFA, VENTOLIN HFA, PROAIR HFA) 90 mcg/actuation inhaler Take 1 Puff by inhalation every four (4) hours as needed for Wheezing. 1 Inhaler 11 Past Medical History:  
Diagnosis Date  Adverse effect of anesthesia   
 nasal procedure didn't work-patient awake  Alcohol abuse  Aortic regurgitation  CKD (chronic kidney disease), stage III 2011  Episodic lightheadedness 07/28/2017  Episodic lightheadedness  Epistaxis 11/27/2016  Erectile dysfunction  GI bleed  Gout   
 gout, osteoarthritis  Heart failure (HCC)   
 acute heart failure  Hemorrhoids  History of cardioversion 02/17/2017  Hypertension  Ill-defined condition   
 weakness  Multiple myeloma ([facility]  PAF (paroxysmal atrial fibrillation) ([facility] 02/17/2016  
 eliquis  Pulmonary HTN ([facility]  Right-sided epistaxis 11/27/2016  S/P AVR (aortic valve replacement) 07/26/2016  
 merrill gill md -   
 S/P cardiac cath 7/6/16  
 severe ai, normal coronaries  S/P colonoscopy 10-8-04  S/P colonoscopy with polypectomy 05/09/2016 Kar Jeffrey MD  
 Skin lesion Past Surgical History:  
Procedure Laterality Date  CARDIAC SURG PROCEDURE UNLIST    
 cardiac cath  CARDIAC SURG PROCEDURE UNLIST  07/26/2016 AVR  HX HEENT    
 nasal procedure 20 years ago  HX KNEE REPLACEMENT Bilateral   
 bilateral knee replacement  HX ORTHOPAEDIC Bilateral   
 wrist  
 
Allergies Allergen Reactions  Amiodarone Other (comments) Bradycardia -excessive REVIEW OF SYSTEMS: 
 Patient stopped the Paxil. He doesn't feel he needs it anymore. Social History Social History  Marital status:  Spouse name: N/A  
 Number of children: N/A  
 Years of education: N/A Social History Main Topics  Smoking status: Former Smoker Years: 4.00 Quit date: 1960  Smokeless tobacco: Never Used  Alcohol use Yes Comment: ocassional  
 Drug use: No  
 Sexual activity: Not Currently Other Topics Concern  None Social History Narrative Family History: Mother:  36 yrs, Chronic anemiaFather:  80 yrs, I think aneurysm hypertensionBrother(s):  79 yrs,  
 obese,dm,drug abuse,miSon(s): alive, adopted1 son(s) . Social History: Alcohol Use Patient uses alcohol, Drinks per occasion: 2, Drinks per w [place]: 10. Smoking Status Patient is a never smoker. Marital Status: . Lives w ith: alone. Occupation/W ork: employed full time insurance. Education/School: has highschool diploma.  
r 
Family History Problem Relation Age of Onset  Anemia Mother  No Known Problems Father  Other Brother   
  drug abuse OBJECTIVE: 
Visit Vitals  /82 (BP 1 Location: Left arm, BP Patient Position: Sitting)  Pulse 80  Temp 98 °F (36.7 °C) (Oral)  Resp 17  Ht 5' 8\" (1.727 m)  Wt 169 lb 9.6 oz (76.9 kg)  SpO2 98%  BMI 25.79 kg/m2 ENT: perrla,  eom intact NECK: supple. Thyroid normal 
CHEST: clear to ascultation and percussion HEART: irregular rate and rhythm ABD: soft, bowel sounds active EXTREMITIES: no edema, pulse 1+ No visits with results within 3 Month(s) from this visit. Latest known visit with results is: 
 
Office Visit on 2018 Component Date Value Ref Range Status  WBC 2018 4.0  3.4 - 10.8 x10E3/uL Final  
 RBC 2018 4.09* 4.14 - 5.80 x10E6/uL Final  
 HGB 2018 13.3  13.0 - 17.7 g/dL Final  
 HCT 2018 40.3  37.5 - 51.0 % Final  
 MCV 2018 99* 79 - 97 fL Final  
 MCH 2018 32.5  26.6 - 33.0 pg Final  
  MCHC 05/29/2018 33.0  31.5 - 35.7 g/dL Final  
 RDW 05/29/2018 15.2  12.3 - 15.4 % Final  
 PLATELET 15/36/4396 542  150 - 379 x10E3/uL Final  
 NEUTROPHILS 05/29/2018 74  Not Estab. % Final  
 Lymphocytes 05/29/2018 18  Not Estab. % Final  
 MONOCYTES 05/29/2018 5  Not Estab. % Final  
 EOSINOPHILS 05/29/2018 3  Not Estab. % Final  
 BASOPHILS 05/29/2018 0  Not Estab. % Final  
 ABS. NEUTROPHILS 05/29/2018 3.0  1.4 - 7.0 x10E3/uL Final  
 Abs Lymphocytes 05/29/2018 0.7  0.7 - 3.1 x10E3/uL Final  
 ABS. MONOCYTES 05/29/2018 0.2  0.1 - 0.9 x10E3/uL Final  
 ABS. EOSINOPHILS 05/29/2018 0.1  0.0 - 0.4 x10E3/uL Final  
 ABS. BASOPHILS 05/29/2018 0.0  0.0 - 0.2 x10E3/uL Final  
 IMMATURE GRANULOCYTES 05/29/2018 0  Not Estab. % Final  
 ABS. IMM. GRANS. 05/29/2018 0.0  0.0 - 0.1 x10E3/uL Final  
 Glucose 05/29/2018 85  65 - 99 mg/dL Final  
 Comment: Specimen received in contact with cells. No visible hemolysis 
present. However GLUC may be decreased and K increased. Clinical 
correlation indicated.  BUN 05/29/2018 23  8 - 27 mg/dL Final  
 Creatinine 05/29/2018 1.48* 0.76 - 1.27 mg/dL Final  
 GFR est non-AA 05/29/2018 44* >59 mL/min/1.73 Final  
 GFR est AA 05/29/2018 51* >59 mL/min/1.73 Final  
 BUN/Creatinine ratio 05/29/2018 16  10 - 24 Final  
 Sodium 05/29/2018 144  134 - 144 mmol/L Final  
 Potassium 05/29/2018 3.9  3.5 - 5.2 mmol/L Final  
 Comment: Specimen received in contact with cells. No visible hemolysis 
present. However GLUC may be decreased and K increased. Clinical 
correlation indicated.  Chloride 05/29/2018 105  96 - 106 mmol/L Final  
 CO2 05/29/2018 24  18 - 29 mmol/L Final  
 Comment: **Effective June 11, 2018 Carbon Dioxide, Total** 
  reference interval will be changing to: Age                  Male          Female 
     0 days   - 30 days         12 - 34        16 - 34 
    31 days   -  1 year         13 - 21        12 - 22 2 years  -  5 years        17 - 26        17 - 26 
     6 years  - 12 years        23 - 32        19 - 32 
               >12 years        20 - 34        20 - 34 
  
 Calcium 05/29/2018 8.9  8.6 - 10.2 mg/dL Final  
 B-type Natriuretic Peptide 05/29/2018 168.3* 0.0 - 100.0 pg/mL Final  
 
  
 
ASSESSMENT: 
1. Medicare annual wellness visit, subsequent 2. Screening for alcoholism 3. Screening for depression 4. PAF ([facility]oxysmal [facility] fibrillation) ([facility] 5. Multiple myeloma, remission status unspecified ([facility] 6. Pulmonary HTN ([facility] 7. Episodic lightheadedness 8. History of cardioversion 9. Essential hypertension 10. Nonrheumatic aortic valve insufficiency 11. CKD (chronic kidney disease), stage III 12. SOB (shortness of breath) Barbi's allergist has requested change from Hydralazine to a beta blocker. We'll make that change. He's only taking Hydralazine twice a day, not three times a day. He's not taking Bumex as requested, but is taking the potassium. We encouraged him to reinstitute Bumex. Will stop Hydralazine and place him on Toprol XL 25 mg q.h.s. Will check a renal function today, as well as BNP. He'll return to the office in one month primarily for a blood pressure check. I have discussed the diagnosis with the patient and the intended plan as seen in the 
orders above. The patient understands and agees with the plan. The patient has  
received an after visit summary and questions were answered concerning 
future plans Patient labs and/or xrays were reviewed Past records were reviewed. PLAN: 
. Orders Placed This Encounter  Depression Screen Annual  
 METABOLIC PANEL, BASIC  BNP  amLODIPine (NORVASC) 5 mg tablet  bumetanide (BUMEX) 1 mg tablet  metoprolol succinate (TOPROL-XL) 25 mg XL tablet Follow-up Disposition: 
Return in about 1 week (around 10/4/2018) for bp check.  
 
 
 
 
 
 
ATTENTION:  
 This medical record was transcribed using an electronic medical records system. Although proofread, it may and can contain electronic and spelling errors. Other human spelling and other errors may be present. Corrections may be executed at a later time. Please feel free to contact us for any clarifications as needed.

## 2018-09-28 LAB
BNP SERPL-MCNC: 134.3 PG/ML (ref 0–100)
BUN SERPL-MCNC: 12 MG/DL (ref 8–27)
BUN/CREAT SERPL: 8 (ref 10–24)
CALCIUM SERPL-MCNC: 9.1 MG/DL (ref 8.6–10.2)
CHLORIDE SERPL-SCNC: 103 MMOL/L (ref 96–106)
CO2 SERPL-SCNC: 21 MMOL/L (ref 20–29)
CREAT SERPL-MCNC: 1.47 MG/DL (ref 0.76–1.27)
GLUCOSE SERPL-MCNC: 92 MG/DL (ref 65–99)
POTASSIUM SERPL-SCNC: 3.6 MMOL/L (ref 3.5–5.2)
SODIUM SERPL-SCNC: 147 MMOL/L (ref 134–144)

## 2018-09-28 RX ORDER — METOPROLOL SUCCINATE 25 MG/1
25 TABLET, EXTENDED RELEASE ORAL
Qty: 90 TAB | Refills: 3 | Status: SHIPPED | OUTPATIENT
Start: 2018-09-28 | End: 2019-07-17

## 2018-09-28 RX ORDER — BUMETANIDE 1 MG/1
1 TABLET ORAL DAILY
Qty: 90 TAB | Refills: 3 | Status: SHIPPED | OUTPATIENT
Start: 2018-09-28 | End: 2018-10-24 | Stop reason: SDUPTHER

## 2018-10-04 ENCOUNTER — CLINICAL SUPPORT (OUTPATIENT)
Dept: INTERNAL MEDICINE CLINIC | Age: 83
End: 2018-10-04

## 2018-10-04 VITALS
SYSTOLIC BLOOD PRESSURE: 143 MMHG | DIASTOLIC BLOOD PRESSURE: 84 MMHG | WEIGHT: 171.9 LBS | BODY MASS INDEX: 26.14 KG/M2 | HEART RATE: 60 BPM

## 2018-10-04 DIAGNOSIS — I10 ESSENTIAL HYPERTENSION: Primary | ICD-10-CM

## 2018-10-04 RX ORDER — PAROXETINE 10 MG/1
TABLET, FILM COATED ORAL
Qty: 90 TAB | Refills: 3 | Status: SHIPPED | OUTPATIENT
Start: 2018-10-04 | End: 2019-01-14

## 2018-10-04 NOTE — MR AVS SNAPSHOT
[place] [address] [address] 
[phone] Patient: Lida Palm. MRN: [id number] :[id number] Visit Information Date & Time Provider Department Dept. Phone Encounter #  
 10/4/2018  9:40 AM [facility] Medicine and [address] [place] [address] [phone] [id number] Your Appointments 1/3/2019  9:15 AM  
Any with Anamaria Weinstein MD  
[address] [place] [address] and Primary Care Hernán Johnson) Appt Note: 3 Month Follow Up  
 [address] [place] [id number] Upcoming Health Maintenance Date Due Shingrix Vaccine Age 50> (1 of 2) 1986 Influenza Age 5 to Adult 3/31/2019* MEDICARE YEARLY EXAM 2019 GLAUCOMA SCREENING Q2Y 2020 DTaP/Tdap/Td series (2 - Td) 2026 *Topic was postponed. The date shown is not the original due date. Allergies as of 10/4/2018  Review Complete On: 2018 By: Juanito Huggins MD  
  
 Severity Noted Reaction Type Reactions Amiodarone  10/09/2014    Other (comments) Bradycardia -excessive Current Immunizations  Reviewed on 2016 No immunizations on file. Not reviewed this visit Vitals BP Pulse Weight(growth percentile) BMI Smoking Status 143/84 60 171 lb 14.4 oz (78 kg) 26.14 kg/m2 Former Smoker Vitals History BMI and BSA Data Body Mass Index Body Surface Area  
 26.14 kg/m 2 1.93 m 2 Preferred Pharmacy Pharmacy Name Phone [id number] [address] [phone] Your Updated Medication List  
  
   
This list is accurate as of 10/4/18 11:05 AM.  Always use your most recent med list.  
  
  
  
  
 albuterol 90 mcg/actuation inhaler Commonly known as:  PROVENTIL HFA, VENTOLIN HFA, PROAIR HFA Take 1 Puff by inhalation every four (4) hours as needed for Wheezing. allopurinol 100 mg tablet Commonly known as:  ZYLOPRIM  
TAKE 1 TAB BY MOUTH DAILY. amLODIPine 5 mg tablet Commonly known as:  NORVASC  
  
 bumetanide 1 mg tablet Commonly known as:  Cathi Fountain Take 1 Tab by mouth daily. ELIQUIS 2.5 mg tablet Generic drug:  apixaban TAKE 1 TAB BY MOUTH TWO (2) TIMES A DAY. hydrocortisone 25 mg Supp Commonly known as:  ANUSOL-HC Insert 1 Suppository into rectum every twelve (12) hours. metoprolol succinate 25 mg XL tablet Commonly known as:  TOPROL-XL Take 1 Tab by mouth nightly. omeprazole 20 mg capsule Commonly known as:  PRILOSEC Take 20 mg by mouth daily. PARoxetine 10 mg tablet Commonly known as:  PAXIL TAKE 1 TABLET BY MOUTH DAILY. potassium chloride SR 20 mEq tablet Commonly known as:  K-TAB  
TAKE 1 TABLET BY ORAL ROUTE EVERY DAY WITH FOOD  
  
 propafenone 150 mg tablet Commonly known as:  RYTHMOL Take 1 Tab by mouth two (2) times a day. simvastatin 40 mg tablet Commonly known as:  ZOCOR  
TAKE 1 TABLET BY MOUTH AT BEDTIME  
  
 sodium chloride 0.65 % nasal squeeze bottle Commonly known as:  OCEAN  
2 Sprays by Both Nostrils route four (4) times daily. witch hazel-glycerin 12.5-50 % pad Commonly known as:  TUCKS  
1 Pad by PeriANAL route as needed for Pain. Introducing [facility]! [facility] introduces [employer] patient portal. Now you can access parts of your medical record, email your doctor's office, and request medication refills online. 1. In your internet browser, go to [email or web address] 2. Click on the First Time User? Click Here link in the Sign In box. You will see the New Member Sign Up page. 3. Enter your [employer] Access Code exactly as it appears below. You will not need to use this code after youve completed the sign-up process. If you do not sign up before the expiration date, you must request a new code. · [employer] Access Code: [email or web address] Expires: 12/26/2018  2:04 PM 
 
4. Enter the last four digits of your Social Security Number (xxxx) and Date of Birth (mm/dd/yyyy) as indicated and click Submit. You will be taken to the next sign-up page. 5. Create a [employer] ID. This will be your [employer] login ID and cannot be changed, so think of one that is secure and easy to remember. 6. Create a [employer] password. You can change your password at any time. 7. Enter your Password Reset Question and Answer. This can be used at a later time if you forget your password. 8. Enter your e-mail address. You will receive e-mail notification when new information is available in [address] 9. Click Sign Up. You can now view and download portions of your medical record. 10. Click the Download Summary menu link to download a portable copy of your medical information. If you have questions, please visit the Frequently Asked Questions section of the [employer] website. Remember, [employer] is NOT to be used for urgent needs. For medical emergencies, dial 911. Now available from your iPhone and Android! Please provide this summary of care documentation to your next provider. Your primary care clinician is listed as Dario Aguayo. If you have any questions after today's visit, please call [phone].

## 2018-10-24 RX ORDER — BUMETANIDE 1 MG/1
1 TABLET ORAL DAILY
Qty: 90 TAB | Refills: 3 | Status: SHIPPED | OUTPATIENT
Start: 2018-10-24 | End: 2019-12-10 | Stop reason: SDUPTHER

## 2018-12-10 RX ORDER — ALLOPURINOL 100 MG/1
TABLET ORAL
Qty: 180 TAB | Refills: 2 | Status: SHIPPED | OUTPATIENT
Start: 2018-12-10 | End: 2019-12-22

## 2018-12-28 ENCOUNTER — TELEPHONE (OUTPATIENT)
Dept: CARDIAC REHAB | Age: 83
End: 2018-12-28

## 2019-01-02 ENCOUNTER — TELEPHONE (OUTPATIENT)
Dept: CARDIAC REHAB | Age: 84
End: 2019-01-02

## 2019-01-02 NOTE — TELEPHONE ENCOUNTER
1/2/2019 Cardiac Wellness: Sent Mr. Lily Underwood. an e-mail regarding his insurance benefits and CR paperwork 12/28/2018. He stated to [facility] and I both that he called his insurance to see if he would be covered for rehab other than a copy and they told him he would be. When I called [employer] they said they follow Medicare guidelines and if those diagnosis the referral is for are not covered by Medicare, they also will not cover them. Alanis Parnell     This is the e-mail that was sent to Mr. Nicky Dolan Friday, 12/28/2018:    Good afternoon,  Attached are several papers for your intake appointment on Wednesday. Please complete these and bring them to your appointment. Please arrive about 10 minutes early, as there are other forms that I am not able to email you. The diagnosis codes that are linked to your referral are I50.33 (advanced on chronic heart failure) and I48.3 (a-flutter). These are the numbers we give insurance companies to confirm coverage for Cardiac Rehab. They also require our CPT code, 51925. It is our policy to call insurance companies prior to a patients first appointment, but you are more than welcome to call [facility] again with these codes to make sure they tell you the same information as well. We hope you have a wonderful weekend and see you in the new year!     Thank you,  Shea Garcia

## 2019-01-02 NOTE — TELEPHONE ENCOUNTER
Pt has been requesting to return to cardiac rehab. Stated he spoke with his insurance company and they would cover everything but his co-pay of 10$. Reviewed pt's chart for qualifying diagnosis - patient has a dx of HF, but EF negates pt's eligibility. Discussed with patient that we cannot guarantee that our services would be covered d/t not having a medicare approved diagnosis - pt verbalized again that his insurance company told him they would. Patient  called patient's insurance company as per our protocol(see note). The insurance rep told PSR that they follow Medicare guidelines. PSR emailed patient his initiial appointment paperwork along with a message stating she had checked with his insurance and if he would like to discuss the response she received, to let her know.

## 2019-01-02 NOTE — TELEPHONE ENCOUNTER
1/2/2019 Cardiac Wellness: Mr. Flex Beebe called at 3:05pm to let us know he was running late, but was leaving his house to be on the way. I called Mr. Flex Beebe. to follow up with patient regarding the intake appointment scheduled at 300pmm. Left voicemail message and requested patient to return my call following Mr. Flex Beebe. no show for this appointment to be rescheduled.  [facility], [facility]

## 2019-01-03 ENCOUNTER — OFFICE VISIT (OUTPATIENT)
Dept: INTERNAL MEDICINE CLINIC | Age: 84
End: 2019-01-03

## 2019-01-03 VITALS
HEART RATE: 64 BPM | BODY MASS INDEX: 26.61 KG/M2 | DIASTOLIC BLOOD PRESSURE: 74 MMHG | WEIGHT: 175.6 LBS | OXYGEN SATURATION: 98 % | TEMPERATURE: 97.8 F | RESPIRATION RATE: 18 BRPM | SYSTOLIC BLOOD PRESSURE: 158 MMHG | HEIGHT: 68 IN

## 2019-01-03 DIAGNOSIS — I48.0 PAF (PAROXYSMAL ATRIAL FIBRILLATION) (HCC): ICD-10-CM

## 2019-01-03 DIAGNOSIS — Z95.2 S/P AVR (AORTIC VALVE REPLACEMENT): ICD-10-CM

## 2019-01-03 DIAGNOSIS — N18.30 CKD (CHRONIC KIDNEY DISEASE), STAGE III (HCC): ICD-10-CM

## 2019-01-03 DIAGNOSIS — F10.10 ALCOHOL ABUSE: ICD-10-CM

## 2019-01-03 DIAGNOSIS — I27.20 PULMONARY HTN (HCC): ICD-10-CM

## 2019-01-03 DIAGNOSIS — I10 ESSENTIAL HYPERTENSION: Primary | ICD-10-CM

## 2019-01-03 NOTE — PROGRESS NOTES
SPORTS MEDICINE AND PRIMARY CARE  Duyen Yarbrough MD, [address] [address]  Phone:  [phone]  Fax: [phone]       Chief Complaint   Patient presents with    Hypertension     f/u   . SUBJECTIVE:    Sun Jerome. is a 81 y.o. 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 Since we last saw him patient made a decision to stop the exercise program he was doing and now he has no stamina and therefore is requesting rehab to be started again. Dr. Sully Tellez is in the process of completing the appropriate paperwork. Other new complaints denied. Patient continues to take his medications on a regular basis and is seen for evaluation. Current Outpatient Medications   Medication Sig Dispense Refill    allopurinol (ZYLOPRIM) 100 mg tablet TAKE 1 TAB BY MOUTH DAILY. 180 Tab 2    bumetanide (BUMEX) 1 mg tablet Take 1 Tab by mouth daily. 90 Tab 3    PARoxetine (PAXIL) 10 mg tablet TAKE 1 TABLET BY MOUTH DAILY. 90 Tab 3    metoprolol succinate (TOPROL-XL) 25 mg XL tablet Take 1 Tab by mouth nightly. 90 Tab 3    amLODIPine (NORVASC) 5 mg tablet       ELIQUIS 2.5 mg tablet TAKE 1 TAB BY MOUTH TWO (2) TIMES A DAY. 180 Tab 3    hydrocortisone (ANUSOL-HC) 25 mg supp Insert 1 Suppository into rectum every twelve (12) hours. 12 Each 11    witch hazel-glycerin (TUCKS) 12.5-50 % pad 1 Pad by PeriANAL route as needed for Pain. 100 Pad 11    simvastatin (ZOCOR) 40 mg tablet TAKE 1 TABLET BY MOUTH AT BEDTIME 90 Tab 3    propafenone (RYTHMOL) 150 mg tablet Take 1 Tab by mouth two (2) times a day. 90 Tab 5    potassium chloride SR (K-TAB) 20 mEq tablet TAKE 1 TABLET BY ORAL ROUTE EVERY DAY WITH FOOD  5    sodium chloride (OCEAN) 0.65 % nasal spray 2 Sprays by Both Nostrils route four (4) times daily.  45 mL 11    omeprazole (PRILOSEC) 20 mg capsule Take 20 mg by mouth daily.  albuterol (PROVENTIL HFA, VENTOLIN HFA, PROAIR HFA) 90 mcg/actuation inhaler Take 1 Puff by inhalation every four (4) hours as needed for Wheezing. 1 Inhaler 11     Past Medical History:   Diagnosis Date    Adverse effect of anesthesia     nasal procedure didn't work-patient awake    Alcohol abuse     Aortic regurgitation     CKD (chronic kidney disease), stage III ([facility] 2011    Episodic lightheadedness 07/28/2017    Episodic lightheadedness     Epistaxis 11/27/2016    Erectile dysfunction     GI bleed     Gout     gout, osteoarthritis    Heart failure (HCC)     acute heart failure    Hemorrhoids     History of cardioversion 02/17/2017    Hypertension     Ill-defined condition     weakness    Multiple myeloma (HCC)     PAF (paroxysmal atrial fibrillation) ([facility] 02/17/2016    eliquis    Pulmonary HTN ([facility]     Right-sided epistaxis 11/27/2016    S/P AVR (aortic valve replacement) 07/26/2016    merrill gill md -     S/P cardiac cath 7/6/16    severe ai, normal coronaries     S/P colonoscopy 10-8-04    S/P colonoscopy with polypectomy 05/09/2016    Kar Yeboah MD    Skin lesion      Past Surgical History:   Procedure Laterality Date    CARDIAC SURG PROCEDURE UNLIST      cardiac cath   [facility] CARDIAC SURG PROCEDURE UNLIST  07/26/2016    AVR    HX HEENT      nasal procedure 20 years ago    HX KNEE REPLACEMENT Bilateral     bilateral knee replacement    HX ORTHOPAEDIC Bilateral     wrist     Allergies   Allergen Reactions    Amiodarone Other (comments)     Bradycardia -excessive         REVIEW OF SYSTEMS:  General: negative for - chills or fever  ENT: negative for - headaches, nasal congestion or tinnitus  Respiratory: negative for - cough, hemoptysis, shortness of breath or wheezing  Cardiovascular : negative for - chest pain, edema, palpitations or shortness of breath  Gastrointestinal: negative for - abdominal pain, blood in stools, heartburn or nausea/vomiting  Genito-Urinary: no dysuria, trouble voiding, or hematuria  Musculoskeletal: negative for - gait disturbance, joint pain, joint stiffness or joint swelling  Neurological: no TIA or stroke symptoms  Hematologic: no bruises, no bleeding, no swollen glands  Integument: no lumps, mole changes, nail changes or rash  Endocrine: no malaise/lethargy or unexpected weight changes      Social History     Socioeconomic History    Marital status:      Spouse name: Not on file    Number of children: Not on file    Years of education: Not on file    Highest education level: Not on file   Tobacco Use    Smoking status: Former Smoker     Years: 4.00     Last attempt to quit: 1960     Years since quittin.0    Smokeless tobacco: Never Used   Substance and Sexual Activity    Alcohol use: Yes     Comment: ocassional    Drug use: No    Sexual activity: Not Currently   Social History Narrative    Family History: Mother:  36 yrs, Chronic anemiaFather:  80 yrs, I think aneurysm hypertensionBrother(s):  79 yrs,    obese,dm,drug abuse,miSon(s): alive, adopted1 son(s) . Social History: Alcohol Use Patient uses alcohol, Drinks per occasion: 2, Drinks per w [place]: 10. Smoking Status Patient is a never smoker. Marital Status: . Lives w ith: alone. Occupation/W ork: employed full time insurance. Education/School: has highschool diploma. Family History   Problem Relation Age of Onset    Anemia Mother     No Known Problems Father     Other Brother         drug abuse       OBJECTIVE:    Visit Vitals  /74   Pulse 64   Temp 97.8 °F (36.6 °C) (Oral)   Resp 18   Ht 5' 8\" (1.727 m)   Wt 175 lb 9.6 oz (79.7 kg)   SpO2 98%   BMI 26.70 kg/m²     CONSTITUTIONAL: well , well nourished, appears age appropriate  EYES: perrla, eom intact  ENMT:moist mucous membranes, pharynx clear  NECK: supple.  Thyroid normal  RESPIRATORY: Chest: clear bilaterally   CARDIOVASCULAR: Heart: regular rate and rhythm  GASTROINTESTINAL: Abdomen: soft, bowel sounds active  HEMATOLOGIC: no pathological lymph nodes palpated  MUSCULOSKELETAL: Extremities: no edema, pulse 1+   INTEGUMENT: No unusual rashes or suspicious skin lesions noted. Nails appear normal.  NEUROLOGIC: non-focal exam   MENTAL STATUS: alert and oriented, appropriate affect           ASSESSMENT:  1. Essential hypertension    2. CKD (chronic kidney disease), stage III ([address]    3. Pulmonary HTN ([facility]    4. PAF (paroxysmal atrial fibrillation) ([facility] [address]    5. Alcohol abuse    6. S/P AVR (aortic valve replacement)      Repeat blood pressure is 127 systolic. We ask him to check his blood pressure at home and if it does not drop less than 130/80 then we will make an adjustment in his Amlodipine by increasing the dose to 10 mg. We will check the status of his known history of chronic kidney disease with appropriate laboratory studies, send him results in the mail. We will also check his gammopathy profile. We encourage him to get back into his exercise program.  I think starting off with rehab again is very appropriate and just encouraged him to keep doing that after he finishes and plan to do that for the rest of his life. He will be back to see us in three to four months, sooner if he has any problems. I have discussed the diagnosis with the patient and the intended plan as seen in the  orders above. The patient understands and agees with the plan. The patient has   received an after visit summary and questions were answered concerning  future plans  Patient labs and/or xrays were reviewed  Past records were reviewed. PLAN:  .  Orders Placed This Encounter    RENAL FUNCTION PANEL    GAMMOPATHY EVAL, SPEP/SELENA, IG QT/FLC       Follow-up Disposition:  Return in about 4 months (around 5/3/2019). ATTENTION:   This medical record was transcribed using an electronic medical records system. Although proofread, it may and can contain electronic and spelling errors. Other human spelling and other errors may be present. Corrections may be executed at a later time. Please feel free to contact us for any clarifications as needed.

## 2019-01-03 NOTE — PROGRESS NOTES
1. Have you been to the ER, urgent care clinic since your last visit? Hospitalized since your last visit? No    2. Have you seen or consulted any other health care providers outside of the [address] [place] [address] since your last visit? Include any pap smears or colon screening.  No    Wants to discuss cardiac rehab

## 2019-01-04 ENCOUNTER — TELEPHONE (OUTPATIENT)
Dept: CARDIAC REHAB | Age: 84
End: 2019-01-04

## 2019-01-04 NOTE — TELEPHONE ENCOUNTER
Spoke with patient regarding a new referral that pt's cardiologist faxed over. Diagnosis code (I35.1), nonrheumatic aortic (valve) insufficiency, is not a qualifying diagnosis for CR. Informed patient of this. Also told him a fax of all CMS diagnosis codes that are covered for CR would be sent to his cardiologist office to try and see if he has one that would allow him to enroll in the program here. This fax has been sent.

## 2019-01-07 DIAGNOSIS — C90.00 MULTIPLE MYELOMA, REMISSION STATUS UNSPECIFIED (HCC): Primary | ICD-10-CM

## 2019-01-07 LAB
ALBUMIN SERPL ELPH-MCNC: 3.8 G/DL (ref 2.9–4.4)
ALBUMIN SERPL-MCNC: 4.3 G/DL (ref 3.5–4.7)
ALBUMIN/GLOB SERPL: 1 {RATIO} (ref 0.7–1.7)
ALPHA1 GLOB SERPL ELPH-MCNC: 0.3 G/DL (ref 0–0.4)
ALPHA2 GLOB SERPL ELPH-MCNC: 1.1 G/DL (ref 0.4–1)
B-GLOBULIN SERPL ELPH-MCNC: 1.3 G/DL (ref 0.7–1.3)
BUN SERPL-MCNC: 24 MG/DL (ref 8–27)
BUN/CREAT SERPL: 14 (ref 10–24)
CALCIUM SERPL-MCNC: 8.2 MG/DL (ref 8.6–10.2)
CHLORIDE SERPL-SCNC: 99 MMOL/L (ref 96–106)
CO2 SERPL-SCNC: 28 MMOL/L (ref 20–29)
CREAT SERPL-MCNC: 1.71 MG/DL (ref 0.76–1.27)
GAMMA GLOB SERPL ELPH-MCNC: 1.2 G/DL (ref 0.4–1.8)
GLOBULIN SER-MCNC: 3.9 G/DL (ref 2.2–3.9)
GLUCOSE SERPL-MCNC: 96 MG/DL (ref 65–99)
IGA SERPL-MCNC: 336 MG/DL (ref 61–437)
IGG SERPL-MCNC: 1334 MG/DL (ref 700–1600)
IGM SERPL-MCNC: 35 MG/DL (ref 15–143)
INTERPRETATION SERPL IEP-IMP: ABNORMAL
KAPPA LC FREE SER-MCNC: 37.4 MG/L (ref 3.3–19.4)
KAPPA LC FREE/LAMBDA FREE SER: 2.04 {RATIO} (ref 0.26–1.65)
LAMBDA LC FREE SERPL-MCNC: 18.3 MG/L (ref 5.7–26.3)
M PROTEIN SERPL ELPH-MCNC: 0.7 G/DL
PHOSPHATE SERPL-MCNC: 2.9 MG/DL (ref 2.5–4.5)
PLEASE NOTE:, 149534: ABNORMAL
POTASSIUM SERPL-SCNC: 3.3 MMOL/L (ref 3.5–5.2)
PROT SERPL-MCNC: 7.7 G/DL (ref 6–8.5)
SODIUM SERPL-SCNC: 144 MMOL/L (ref 134–144)

## 2019-01-08 ENCOUNTER — TELEPHONE (OUTPATIENT)
Dept: INTERNAL MEDICINE CLINIC | Age: 84
End: 2019-01-08

## 2019-01-11 ENCOUNTER — TELEPHONE (OUTPATIENT)
Dept: CARDIAC REHAB | Age: 84
End: 2019-01-11

## 2019-01-11 NOTE — TELEPHONE ENCOUNTER
1/11/2019 Cardiac Rehab: This is his discharge note from his previous rehab intake 8/24/2016 for Valve surgery, 7/26/2016:    Massiel Lauri Completed phase II cardiac rehab and attended 24 monitored sessions from 08/24/16 - 11/02/16. Massiel Carreon is interested in maintaining optimal health and will work with Dr. Fidelia Bryant. Massiel Carreon has improved his endurance and stamina through regular exercise, lost 4 1/2 lbs and mantained his waist measument. Blood pressure is 110/78 and is WNL. He has also improved his nutrition, [employer] and depression scores and these were reviewed with patient.        Massielmyke Carreon plans to continue exercising at home and has set revised goals that include walking 5 times a week for 30 minutes.   Eugenio Moulton RN  11/2/2016

## 2019-01-11 NOTE — TELEPHONE ENCOUNTER
1/11/2019 Cardiac Wellness: Mr. John Mcleod has called and come by several times to get back into cardiac wellness program. He has had several attempts at a referral to cover his rehab, but has had  No new cardiac event that would qualify him to come back. Our  sent over the medicare guideline list of qualifying diagnosis to Dr. Tristen Adkins and or his nurse to view. They sent back a referral for a diagnosis that Mr. Tyler Marie has been in the program with before, Z95.4 for valve surgery (7/26/2016). He had been in cardiac rehab under this diagnosis already. Mr. Tyler Marie states he has contacted his insurance twice and they will cover his sessions, but he will have a copay of $10.00 per visit. I have made him aware that with coming in under an already used diagnosis [employer] may not cover the program and if he would not mind signing an ABN form stating he would be responsible if [employer] did not cover and he cannot fault us, [address] [facility] for the bill should it not be covered. Mr. Tyler Marie states he does not mind signing the form and he will have to take it up with his insurance should he get billed for the sessions.  Qian Moore

## 2019-01-14 ENCOUNTER — HOSPITAL ENCOUNTER (OUTPATIENT)
Dept: CARDIAC REHAB | Age: 84
Discharge: HOME | End: 2019-01-14
Payer: MEDICARE

## 2019-01-14 VITALS — BODY MASS INDEX: 26.07 KG/M2 | HEIGHT: 68 IN | WEIGHT: 172 LBS

## 2019-01-14 VITALS — BODY MASS INDEX: 26.15 KG/M2 | WEIGHT: 172 LBS

## 2019-01-14 PROCEDURE — 93798 PHYS/QHP OP CAR RHAB W/ECG: CPT

## 2019-01-14 NOTE — CARDIO/PULMONARY
Cortes Harman  81 y.o. presented to cardiac wellness for orientation and exercise tolerance test today with a primary diagnosis of AVR 07/26/16. His EF is 70%. Cortes Harman has a cardiac history of aortic insufficency, CKD, Pulmonary HTN, PAF, multiple myeloma, gout, bilat. knee replacements and  DHF. Cardiac risk factors include dyslipidemia, hypertension, in activity and these were reviewed with [facility]. Cortes Harman lives alone. He works as an  part time. YULIET-D, depression score, is 5 and this is considered mild. The result was discussed with the patient who affirms score to be accurate. Patient denied chest pain or SOB during 6 minute walk and was in a-fib. Cortes Harman will exercise 3 days a week in cardiac wellness. [employer] will not cover the education classes and an education manual given and reviewed briefly. We will continue to educate him when he comes in for exercise. Barbi attended cardiac rehab from 08/24/2016 - 11/02/16 for 24 visits. He was informed that insurance may not cover this episode and he voiced understanding of this and signed a medicare ABN. He also called the insurance company himself.     Celeste Jarquin, RN  1/14/2019

## 2019-01-16 ENCOUNTER — HOSPITAL ENCOUNTER (OUTPATIENT)
Dept: CARDIAC REHAB | Age: 84
Discharge: HOME | End: 2019-01-16
Payer: MEDICARE

## 2019-01-16 VITALS — WEIGHT: 174 LBS | BODY MASS INDEX: 26.46 KG/M2

## 2019-01-16 PROCEDURE — 93798 PHYS/QHP OP CAR RHAB W/ECG: CPT

## 2019-01-18 ENCOUNTER — APPOINTMENT (OUTPATIENT)
Dept: CARDIAC REHAB | Age: 84
End: 2019-01-18
Payer: MEDICARE

## 2019-01-18 ENCOUNTER — TELEPHONE (OUTPATIENT)
Dept: CARDIAC REHAB | Age: 84
End: 2019-01-18

## 2019-01-18 NOTE — TELEPHONE ENCOUNTER
1/18/2019 Cardiac Wellness: Mr. Jill Klein. called to cancel today's appointment d/t conflicting appt. Will return for next appointment.  Keny Gupta

## 2019-01-21 ENCOUNTER — HOSPITAL ENCOUNTER (OUTPATIENT)
Dept: CARDIAC REHAB | Age: 84
Discharge: HOME | End: 2019-01-21
Payer: MEDICARE

## 2019-01-21 VITALS — WEIGHT: 177 LBS | BODY MASS INDEX: 26.91 KG/M2

## 2019-01-21 PROCEDURE — 93798 PHYS/QHP OP CAR RHAB W/ECG: CPT

## 2019-01-21 NOTE — PROGRESS NOTES
Fax sent to Edwardo Muniz (s/p valve 07/26/16) just started attending cardiac rehab. His exercise rhythm is irregular, possible a-fib but we are concerned that he may be having some junctional beats. He c/o fatigue and slight SOB, denied CP. Please find his exercise rhythm strips attached.

## 2019-01-22 ENCOUNTER — OFFICE VISIT (OUTPATIENT)
Dept: ONCOLOGY | Age: 84
End: 2019-01-22

## 2019-01-22 VITALS
BODY MASS INDEX: 27.04 KG/M2 | HEART RATE: 86 BPM | SYSTOLIC BLOOD PRESSURE: 118 MMHG | TEMPERATURE: 98.7 F | WEIGHT: 178.4 LBS | HEIGHT: 68 IN | OXYGEN SATURATION: 97 % | RESPIRATION RATE: 20 BRPM | DIASTOLIC BLOOD PRESSURE: 75 MMHG

## 2019-01-22 DIAGNOSIS — I48.0 PAF (PAROXYSMAL ATRIAL FIBRILLATION) (HCC): ICD-10-CM

## 2019-01-22 DIAGNOSIS — Z95.2 S/P AVR (AORTIC VALVE REPLACEMENT): ICD-10-CM

## 2019-01-22 DIAGNOSIS — D47.2 GAMMOPATHY: Primary | ICD-10-CM

## 2019-01-22 NOTE — PROGRESS NOTES
[facility] [place] [address] [address], [address], [place]: [phone]  F: [phone]    Reason for Visit:   Geri Casas is a 81 y.o. male who is seen in consultation at the request of Dr. Kathryn Washburn for evaluation of paraproteinemia    History of Present Illness:   Patient is a 81 y.o. male with PMH as below who is seen for evaluation of paraproteinemia. He has AFIB and is on Eliquis    He has had worsening creatinine recently and hence had further work up. This included an abnormal gammopathy evaluation and he comes for further evaluation    Has had no fevers, chills, sweats, falls, back pain, infections, bleeding, lumps, change in weight or appetite. He has no new rashes, abdominal pain, diarrhea or dysuria    Quit smoking 40 years ago  ETOH- Moderate         Past Medical History:   Diagnosis Date    Adverse effect of anesthesia     nasal procedure didn't work-patient awake    Alcohol abuse     Aortic regurgitation     CKD (chronic kidney disease), stage III ([address] 2011    Episodic lightheadedness 07/28/2017    Episodic lightheadedness     Epistaxis 11/27/2016    Erectile dysfunction     GI bleed     Gout     gout, osteoarthritis    Heart failure (HCC)     acute heart failure    Hemorrhoids     History of cardioversion 02/17/2017    Hypertension     Ill-defined condition     weakness    Multiple myeloma ([address]     PAF (paroxysmal atrial fibrillation) ([address] 02/17/2016    eliquis    Pulmonary HTN ([address]     Right-sided epistaxis 11/27/2016    S/P AVR (aortic valve replacement) 07/26/2016    merrill gill md -     S/P cardiac cath 7/6/16    severe ai, normal coronaries     S/P colonoscopy 10-8-04    S/P colonoscopy with polypectomy 05/09/2016    Kar Nobles MD    Skin lesion       Past Surgical History:   Procedure Laterality Date    CARDIAC SURG PROCEDURE UNLIST      cardiac cath   [facility] CARDIAC SURG PROCEDURE UNLIST  07/26/2016    AVR    HX HEENT      nasal procedure 20 years ago    HX KNEE REPLACEMENT Bilateral     bilateral knee replacement    HX ORTHOPAEDIC Bilateral     wrist      Social History     Tobacco Use    Smoking status: Former Smoker     Years: 4.00     Last attempt to quit: 1960     Years since quittin.0    Smokeless tobacco: Never Used   Substance Use Topics    Alcohol use: Yes     Comment: ocassional      Family History   Problem Relation Age of Onset    Anemia Mother     No Known Problems Father     Other Brother         drug abuse     Current Outpatient Medications   Medication Sig    peg 400-propylene glycol (SYSTANE, PROPYLENE GLYCOL,) 0.4-0.3 % drop as needed.  allopurinol (ZYLOPRIM) 100 mg tablet TAKE 1 TAB BY MOUTH DAILY.  bumetanide (BUMEX) 1 mg tablet Take 1 Tab by mouth daily.  metoprolol succinate (TOPROL-XL) 25 mg XL tablet Take 1 Tab by mouth nightly.  amLODIPine (NORVASC) 5 mg tablet     ELIQUIS 2.5 mg tablet TAKE 1 TAB BY MOUTH TWO (2) TIMES A DAY.  simvastatin (ZOCOR) 40 mg tablet TAKE 1 TABLET BY MOUTH AT BEDTIME    propafenone (RYTHMOL) 150 mg tablet Take 1 Tab by mouth two (2) times a day.  potassium chloride SR (K-TAB) 20 mEq tablet TAKE 1 TABLET BY ORAL ROUTE EVERY DAY WITH FOOD    sodium chloride (OCEAN) 0.65 % nasal spray 2 Sprays by Both Nostrils route four (4) times daily. No current facility-administered medications for this visit. Allergies   Allergen Reactions    Amiodarone Other (comments)     Bradycardia -excessive        Review of Systems: A complete review of systems was obtained, negative except as described above.     Physical Exam:     Visit Vitals  /75 (BP 1 Location: Left arm, BP Patient Position: Sitting)   Pulse 86   Temp 98.7 °F (37.1 °C) (Oral)   Resp 20   Ht 5' 8\" (1.727 m)   Wt 178 lb 6.4 oz (80.9 kg)   SpO2 97%   BMI 27.13 kg/m²     ECOG PS: 0  General: No distress  Eyes: PERRLA, anicteric sclerae  HENT: Atraumatic, OP clear  Neck: Supple  Lymphatic: No cervical, supraclavicular, or inguinal adenopathy  Respiratory: CTAB, normal respiratory effort  CV: Normal rate, regular rhythm, no murmurs, no peripheral edema  GI: Soft, nontender, nondistended, no masses, no hepatomegaly, no splenomegaly  MS: Normal gait and station. Digits without clubbing or cyanosis. Skin: No rashes, ecchymoses, or petechiae. Normal temperature, turgor, and texture. Psych: Alert, oriented, appropriate affect, normal judgment/insight    Results:     Lab Results   Component Value Date/Time    WBC 4.0 05/29/2018 01:31 PM    HGB 13.3 05/29/2018 01:31 PM    HCT 40.3 05/29/2018 01:31 PM    PLATELET 130 72/19/3894 01:31 PM    MCV 99 (H) 05/29/2018 01:31 PM    ABS. NEUTROPHILS 3.0 05/29/2018 01:31 PM     Lab Results   Component Value Date/Time    Sodium 144 01/03/2019 10:16 AM    Potassium 3.3 (L) 01/03/2019 10:16 AM    Chloride 99 01/03/2019 10:16 AM    CO2 28 01/03/2019 10:16 AM    Glucose 96 01/03/2019 10:16 AM    BUN 24 01/03/2019 10:16 AM    Creatinine 1.71 (H) 01/03/2019 10:16 AM    GFR est AA 42 (L) 01/03/2019 10:16 AM    GFR est non-AA 36 (L) 01/03/2019 10:16 AM    Calcium 8.2 (L) 01/03/2019 10:16 AM    Glucose (POC) 166 (H) 08/02/2016 11:29 AM     Lab Results   Component Value Date/Time    Bilirubin, total 0.5 04/30/2018 04:08 AM    ALT (SGPT) 37 04/30/2018 04:08 AM    AST (SGOT) 46 (H) 04/30/2018 04:08 AM    Alk.  phosphatase 76 04/30/2018 04:08 AM    Protein, total 7.7 01/03/2019 10:16 AM    Albumin 4.3 01/03/2019 10:16 AM    Globulin 3.6 04/30/2018 04:08 AM     Lab Results   Component Value Date/Time    Iron % saturation 17 (L) 08/02/2016 03:54 AM    TIBC 206 (L) 08/02/2016 03:54 AM    Vitamin B12 558 04/25/2018 11:11 AM    Folate 7.7 04/25/2018 11:11 AM    Sed rate (ESR) >130 (H) 03/20/2011 02:10 PM    Sed rate, automated 60 (H) 02/16/2016 09:12 PM    C-Reactive protein 16.40 (H) 03/20/2011 02:10 PM    C-Reactive Protein, Cardiac 2.92 10/09/2014 12:37 PM    TSH 1.630 02/15/2017 02:03 PM    M-Angus 0.7 (H) 01/03/2019 10:16 AM    Lipase 105 04/30/2018 04:08 AM    Hep C Virus Ab 0.1 01/16/2018 12:28 PM    HIV SCREEN 4TH GENERATION WRFX Non Reactive 01/16/2018 12:28 PM     Lab Results   Component Value Date/Time    INR 1.0 07/30/2016 03:55 AM    aPTT 27.6 07/30/2016 03:55 AM    D-dimer 1.34 (H) 02/16/2016 09:12 PM     Component      Latest Ref Rng & Units 1/3/2019          10:16 AM   Please note       Comment   Free Kappa Lt Chains, serum      3.3 - 19.4 mg/L 37.4 (H)   Free Lambda Lt Chains, serum      5.7 - 26.3 mg/L 18.3   Kappa/Lambda ratio, serum      0.26 - 1.65 2.04 (H)     Records reviewed and summarized above. Pathology report(s) reviewed above. Radiology report(s) reviewed above. 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 Robert Pritchett JrLouie's care.     Signed By: Christina Colindres MD

## 2019-01-23 ENCOUNTER — HOSPITAL ENCOUNTER (OUTPATIENT)
Dept: CARDIAC REHAB | Age: 84
Discharge: HOME | End: 2019-01-23
Payer: MEDICARE

## 2019-01-23 VITALS — WEIGHT: 174 LBS | BODY MASS INDEX: 26.46 KG/M2

## 2019-01-23 PROCEDURE — 93798 PHYS/QHP OP CAR RHAB W/ECG: CPT

## 2019-01-25 ENCOUNTER — HOSPITAL ENCOUNTER (OUTPATIENT)
Dept: CARDIAC REHAB | Age: 84
Discharge: HOME | End: 2019-01-25
Payer: MEDICARE

## 2019-01-25 VITALS — BODY MASS INDEX: 26.3 KG/M2 | WEIGHT: 173 LBS

## 2019-01-25 PROCEDURE — 93798 PHYS/QHP OP CAR RHAB W/ECG: CPT

## 2019-01-28 ENCOUNTER — HOSPITAL ENCOUNTER (OUTPATIENT)
Dept: CARDIAC REHAB | Age: 84
Discharge: HOME | End: 2019-01-28
Payer: MEDICARE

## 2019-01-28 VITALS — BODY MASS INDEX: 26 KG/M2 | WEIGHT: 171 LBS

## 2019-01-28 PROCEDURE — 93798 PHYS/QHP OP CAR RHAB W/ECG: CPT

## 2019-01-29 ENCOUNTER — HOSPITAL ENCOUNTER (OUTPATIENT)
Dept: GENERAL RADIOLOGY | Age: 84
Discharge: HOME | End: 2019-01-29
Attending: INTERNAL MEDICINE
Payer: MEDICARE

## 2019-01-29 DIAGNOSIS — D47.2 GAMMOPATHY: ICD-10-CM

## 2019-01-29 PROCEDURE — 74011250636 HC RX REV CODE- 250/636

## 2019-01-29 PROCEDURE — 77075 RADEX OSSEOUS SURVEY COMPL: CPT

## 2019-02-01 ENCOUNTER — HOSPITAL ENCOUNTER (OUTPATIENT)
Dept: CARDIAC REHAB | Age: 84
Discharge: HOME | End: 2019-02-01
Payer: MEDICARE

## 2019-02-01 VITALS — WEIGHT: 173 LBS | BODY MASS INDEX: 26.3 KG/M2

## 2019-02-01 PROCEDURE — 93798 PHYS/QHP OP CAR RHAB W/ECG: CPT

## 2019-02-08 ENCOUNTER — HOSPITAL ENCOUNTER (OUTPATIENT)
Dept: CARDIAC REHAB | Age: 84
Discharge: HOME | End: 2019-02-08
Payer: MEDICARE

## 2019-02-08 VITALS — BODY MASS INDEX: 26.3 KG/M2 | WEIGHT: 173 LBS

## 2019-02-08 PROCEDURE — 93798 PHYS/QHP OP CAR RHAB W/ECG: CPT

## 2019-02-11 ENCOUNTER — HOSPITAL ENCOUNTER (OUTPATIENT)
Dept: CARDIAC REHAB | Age: 84
Discharge: HOME | End: 2019-02-11
Payer: MEDICARE

## 2019-02-11 ENCOUNTER — APPOINTMENT (OUTPATIENT)
Dept: CARDIAC REHAB | Age: 84
End: 2019-02-11
Payer: MEDICARE

## 2019-02-11 VITALS — BODY MASS INDEX: 26.76 KG/M2 | WEIGHT: 176 LBS

## 2019-02-11 PROCEDURE — 93798 PHYS/QHP OP CAR RHAB W/ECG: CPT

## 2019-02-13 ENCOUNTER — HOSPITAL ENCOUNTER (OUTPATIENT)
Dept: CARDIAC REHAB | Age: 84
Discharge: HOME | End: 2019-02-13
Payer: MEDICARE

## 2019-02-13 ENCOUNTER — LAB ONLY (OUTPATIENT)
Dept: INTERNAL MEDICINE CLINIC | Age: 84
End: 2019-02-13

## 2019-02-13 VITALS — WEIGHT: 175 LBS | BODY MASS INDEX: 26.61 KG/M2

## 2019-02-13 DIAGNOSIS — I10 ESSENTIAL HYPERTENSION: Primary | ICD-10-CM

## 2019-02-13 PROCEDURE — 93798 PHYS/QHP OP CAR RHAB W/ECG: CPT

## 2019-02-14 LAB
CHOLEST SERPL-MCNC: 217 MG/DL (ref 100–199)
HDLC SERPL-MCNC: 85 MG/DL
LDLC SERPL CALC-MCNC: 111 MG/DL (ref 0–99)
TRIGL SERPL-MCNC: 106 MG/DL (ref 0–149)
VLDLC SERPL CALC-MCNC: 21 MG/DL (ref 5–40)

## 2019-02-15 ENCOUNTER — HOSPITAL ENCOUNTER (OUTPATIENT)
Dept: CARDIAC REHAB | Age: 84
Discharge: HOME | End: 2019-02-15
Payer: MEDICARE

## 2019-02-15 VITALS — WEIGHT: 172 LBS | BODY MASS INDEX: 26.15 KG/M2

## 2019-02-15 PROCEDURE — 93798 PHYS/QHP OP CAR RHAB W/ECG: CPT

## 2019-02-18 ENCOUNTER — HOSPITAL ENCOUNTER (OUTPATIENT)
Dept: CARDIAC REHAB | Age: 84
Discharge: HOME | End: 2019-02-18
Payer: MEDICARE

## 2019-02-18 VITALS — BODY MASS INDEX: 26.61 KG/M2 | WEIGHT: 175 LBS

## 2019-02-18 PROCEDURE — 93798 PHYS/QHP OP CAR RHAB W/ECG: CPT

## 2019-02-19 ENCOUNTER — OFFICE VISIT (OUTPATIENT)
Dept: ONCOLOGY | Age: 84
End: 2019-02-19

## 2019-02-19 VITALS
TEMPERATURE: 98 F | WEIGHT: 177 LBS | BODY MASS INDEX: 26.83 KG/M2 | OXYGEN SATURATION: 93 % | DIASTOLIC BLOOD PRESSURE: 87 MMHG | HEART RATE: 63 BPM | HEIGHT: 68 IN | SYSTOLIC BLOOD PRESSURE: 146 MMHG | RESPIRATION RATE: 20 BRPM

## 2019-02-19 DIAGNOSIS — I48.0 PAF (PAROXYSMAL ATRIAL FIBRILLATION) (HCC): ICD-10-CM

## 2019-02-19 DIAGNOSIS — D47.2 MGUS (MONOCLONAL GAMMOPATHY OF UNKNOWN SIGNIFICANCE): Primary | ICD-10-CM

## 2019-02-19 RX ORDER — TIMOLOL MALEATE 5 MG/ML
SOLUTION/ DROPS OPHTHALMIC
Refills: 0 | COMMUNITY
Start: 2019-02-14 | End: 2020-10-23

## 2019-02-19 RX ORDER — PREDNISOLONE ACETATE 10 MG/ML
SUSPENSION/ DROPS OPHTHALMIC
Refills: 0 | COMMUNITY
Start: 2019-02-06 | End: 2020-06-29

## 2019-02-19 NOTE — PROGRESS NOTES
Mili Harrison. is a 81 y.o. male here today for follow up, Paraproteinemia. 1. Have you been to the ER, urgent care clinic since your last visit? Hospitalized since your last visit? No     2. Have you seen or consulted any other health care providers outside of the [address] [place] [address] since your last visit? Include any pap smears or colon screening.  Opthamologist, cardiologist

## 2019-02-19 NOTE — PROGRESS NOTES
[facility] [place] at [address]   [address], [address]  W: [phone]  F: [phone]    Reason for Visit:   Kimberley Dumont. is a 81 y.o. male who is seen for paraproteinemia- MGUS    History of Present Illness:   Patient is a 81 y.o. male with PMH as below who is seen for evaluation of paraproteinemia. He has AFIB and is on Eliquis    He has had worsening creatinine recently and hence had further work up. This included an abnormal gammopathy evaluation. He comes to discuss results. He forgot to get a UPEP done    Has had no fevers, chills, sweats, falls, back pain, infections, bleeding, lumps, change in weight or appetite. He has no new rashes, abdominal pain, diarrhea or dysuria    Quit smoking 40 years ago  ETOH- Moderate         Past Medical History:   Diagnosis Date    Adverse effect of anesthesia     nasal procedure didn't work-patient awake    Alcohol abuse     Aortic regurgitation     CKD (chronic kidney disease), stage III ([address] 2011    Episodic lightheadedness 07/28/2017    Episodic lightheadedness     Epistaxis 11/27/2016    Erectile dysfunction     GI bleed     Gout     gout, osteoarthritis    Heart failure (HCC)     acute heart failure    Hemorrhoids     History of cardioversion 02/17/2017    Hypertension     Ill-defined condition     weakness    Multiple myeloma ([address]     PAF (paroxysmal atrial fibrillation) ([address] 02/17/2016    eliquis    Pulmonary HTN ([address]     Right-sided epistaxis 11/27/2016    S/P AVR (aortic valve replacement) 07/26/2016    merrill gill md -     S/P cardiac cath 7/6/16    severe ai, normal coronaries     S/P colonoscopy 10-8-04    S/P colonoscopy with polypectomy 05/09/2016    Kar Brantley MD    Skin lesion       Past Surgical History:   Procedure Laterality Date    CARDIAC SURG PROCEDURE UNLIST      cardiac cath   [address] [place] [address] CARDIAC SURG PROCEDURE UNLIST  07/26/2016    AVR    HX HEENT      nasal procedure 20 years ago    HX KNEE REPLACEMENT Bilateral     bilateral knee replacement    HX ORTHOPAEDIC Bilateral     wrist      Social History     Tobacco Use    Smoking status: Former Smoker     Years: 4.00     Last attempt to quit: 1960     Years since quittin.1    Smokeless tobacco: Never Used   Substance Use Topics    Alcohol use: Yes     Comment: ocassional      Family History   Problem Relation Age of Onset    Anemia Mother     No Known Problems Father     Other Brother         drug abuse     Current Outpatient Medications   Medication Sig    prednisoLONE acetate (PRED FORTE) 1 % ophthalmic suspension INSTILL 1 DROP INTO RIGHT EYE TWICE A DAY    timolol (TIMOPTIC) 0.5 % ophthalmic solution INSTILL 1 DROP INTO RIGHT EYE EVERY MORNING    peg 400-propylene glycol (SYSTANE, PROPYLENE GLYCOL,) 0.4-0.3 % drop as needed.  allopurinol (ZYLOPRIM) 100 mg tablet TAKE 1 TAB BY MOUTH DAILY.  bumetanide (BUMEX) 1 mg tablet Take 1 Tab by mouth daily.  metoprolol succinate (TOPROL-XL) 25 mg XL tablet Take 1 Tab by mouth nightly.  amLODIPine (NORVASC) 5 mg tablet     ELIQUIS 2.5 mg tablet TAKE 1 TAB BY MOUTH TWO (2) TIMES A DAY.  simvastatin (ZOCOR) 40 mg tablet TAKE 1 TABLET BY MOUTH AT BEDTIME    propafenone (RYTHMOL) 150 mg tablet Take 1 Tab by mouth two (2) times a day.  potassium chloride SR (K-TAB) 20 mEq tablet TAKE 1 TABLET BY ORAL ROUTE EVERY DAY WITH FOOD    sodium chloride (OCEAN) 0.65 % nasal spray 2 Sprays by Both Nostrils route four (4) times daily. No current facility-administered medications for this visit. Allergies   Allergen Reactions    Amiodarone Other (comments)     Bradycardia -excessive        Review of Systems: A complete review of systems was obtained, negative except as described above.     Physical Exam:     Visit Vitals  /87 (BP 1 Location: Right arm, BP Patient Position: Sitting)   Pulse 63   Temp 98 °F (36.7 °C) (Oral)   Resp 20   Ht 5' 8\" (1.727 m)   Wt 177 lb (80.3 kg)   SpO2 93%   BMI 26.91 kg/m²     ECOG PS: 0  General: No distress  Eyes: PERRLA, anicteric sclerae  HENT: Atraumatic, OP clear  MS: Normal gait and station. Digits without clubbing or cyanosis. Skin: No rashes, ecchymoses, or petechiae. Normal temperature, turgor, and texture. Psych: Alert, oriented, appropriate affect, normal judgment/insight    Results:     Lab Results   Component Value Date/Time    WBC 4.0 05/29/2018 01:31 PM    HGB 13.3 05/29/2018 01:31 PM    HCT 40.3 05/29/2018 01:31 PM    PLATELET 340 46/72/1937 01:31 PM    MCV 99 (H) 05/29/2018 01:31 PM    ABS. NEUTROPHILS 3.0 05/29/2018 01:31 PM     Lab Results   Component Value Date/Time    Sodium 144 01/03/2019 10:16 AM    Potassium 3.3 (L) 01/03/2019 10:16 AM    Chloride 99 01/03/2019 10:16 AM    CO2 28 01/03/2019 10:16 AM    Glucose 96 01/03/2019 10:16 AM    BUN 24 01/03/2019 10:16 AM    Creatinine 1.71 (H) 01/03/2019 10:16 AM    GFR est AA 42 (L) 01/03/2019 10:16 AM    GFR est non-AA 36 (L) 01/03/2019 10:16 AM    Calcium 8.2 (L) 01/03/2019 10:16 AM    Glucose (POC) 166 (H) 08/02/2016 11:29 AM     Lab Results   Component Value Date/Time    Bilirubin, total 0.5 04/30/2018 04:08 AM    ALT (SGPT) 37 04/30/2018 04:08 AM    AST (SGOT) 46 (H) 04/30/2018 04:08 AM    Alk.  phosphatase 76 04/30/2018 04:08 AM    Protein, total 7.7 01/03/2019 10:16 AM    Albumin 4.3 01/03/2019 10:16 AM    Globulin 3.6 04/30/2018 04:08 AM     Lab Results   Component Value Date/Time    Iron % saturation 17 (L) 08/02/2016 03:54 AM    TIBC 206 (L) 08/02/2016 03:54 AM    Vitamin B12 558 04/25/2018 11:11 AM    Folate 7.7 04/25/2018 11:11 AM    Sed rate (ESR) >130 (H) 03/20/2011 02:10 PM    Sed rate, automated 60 (H) 02/16/2016 09:12 PM    C-Reactive protein 16.40 (H) 03/20/2011 02:10 PM    C-Reactive Protein, Cardiac 2.92 10/09/2014 12:37 PM    TSH 1.630 02/15/2017 02:03 PM    M-Angus 0.7 (H) 01/03/2019 10:16 AM    Lipase 105 04/30/2018 04:08 AM    Hep C Virus Ab 0.1 01/16/2018 12:28 PM    HIV SCREEN 4TH GENERATION WRFX Non Reactive 01/16/2018 12:28 PM     Lab Results   Component Value Date/Time    INR 1.0 07/30/2016 03:55 AM    aPTT 27.6 07/30/2016 03:55 AM    D-dimer 1.34 (H) 02/16/2016 09:12 PM     Component      Latest Ref Rng & Units 1/3/2019          10:16 AM   Please note       Comment   Free Kappa Lt Chains, serum      3.3 - 19.4 mg/L 37.4 (H)   Free Lambda Lt Chains, serum      5.7 - 26.3 mg/L 18.3   Kappa/Lambda ratio, serum      0.26 - 1.65 2.04 (H)     Records reviewed and summarized above. Pathology report(s) reviewed above. Radiology report(s) reviewed above. Skeletal survey    No lytic lesions      Assessment:   1) Paraproteinemia- MGUS  IgG monoclonal protein with kappa light chain  Abnormal LC ratio  No end organ damage  Has known CKD that is likely unrelated  Skeletal survey was negative    Discussed the spectrum of plasma cell disorders in detail  Likely has MGUS - which has a 10-20% risk for transforming to Myeloma in 20 years  Will continue observation  Gave him written information   Has limited understanding      2) CKD    3) Afib  On Eliquis    4) CHF      Plan:     24 HOUR URINE- UPEP- will review  Observe with cbc, cmp, gammopathy eval q 6 months to yearly    I appreciate the opportunity to participate in Mr. Pito Cox 's care.     Signed By: Zulema De La Garza MD

## 2019-02-20 ENCOUNTER — HOSPITAL ENCOUNTER (OUTPATIENT)
Dept: CARDIAC REHAB | Age: 84
Discharge: HOME | End: 2019-02-20
Payer: MEDICARE

## 2019-02-20 VITALS — BODY MASS INDEX: 27.22 KG/M2 | WEIGHT: 179 LBS

## 2019-02-20 PROCEDURE — 93798 PHYS/QHP OP CAR RHAB W/ECG: CPT

## 2019-02-25 LAB
ALBUMIN 24H MFR UR ELPH: 80.1 %
ALPHA1 GLOB 24H MFR UR ELPH: 0.2 %
ALPHA2 GLOB 24H MFR UR ELPH: 2.8 %
B-GLOBULIN MFR UR ELPH: 12.5 %
GAMMA GLOB 24H MFR UR ELPH: 4.4 %
INTERPRETATION UR IFE-IMP: ABNORMAL
M PROTEIN 24H MFR UR ELPH: 1.6 %
M PROTEIN 24H UR ELPH-MRATE: 12 MG/24 HR
NOTE, 149533: ABNORMAL
PROT 24H UR-MRATE: 728 MG/24 HR (ref 30–150)
PROT UR-MCNC: 52 MG/DL

## 2019-03-27 ENCOUNTER — TELEPHONE (OUTPATIENT)
Dept: CARDIAC REHAB | Age: 84
End: 2019-03-27

## 2019-03-27 NOTE — TELEPHONE ENCOUNTER
3/27/2019: Cardiac Wellness: Called [facility] Guillermina Korean. regarding absence from the Cardiac Wellness Program. Has been out d/t injuring foot.  Renetta Turner

## 2019-05-09 ENCOUNTER — OFFICE VISIT (OUTPATIENT)
Dept: INTERNAL MEDICINE CLINIC | Age: 84
End: 2019-05-09

## 2019-05-09 VITALS
HEIGHT: 68 IN | HEART RATE: 64 BPM | BODY MASS INDEX: 25.84 KG/M2 | RESPIRATION RATE: 16 BRPM | SYSTOLIC BLOOD PRESSURE: 133 MMHG | OXYGEN SATURATION: 95 % | TEMPERATURE: 97.8 F | WEIGHT: 170.5 LBS | DIASTOLIC BLOOD PRESSURE: 82 MMHG

## 2019-05-09 DIAGNOSIS — I35.1 NONRHEUMATIC AORTIC VALVE INSUFFICIENCY: ICD-10-CM

## 2019-05-09 DIAGNOSIS — I10 ESSENTIAL HYPERTENSION: ICD-10-CM

## 2019-05-09 DIAGNOSIS — R41.3 MEMORY LOSS: ICD-10-CM

## 2019-05-09 DIAGNOSIS — N52.9 ERECTILE DYSFUNCTION, UNSPECIFIED ERECTILE DYSFUNCTION TYPE: ICD-10-CM

## 2019-05-09 DIAGNOSIS — R42 EPISODIC LIGHTHEADEDNESS: ICD-10-CM

## 2019-05-09 DIAGNOSIS — I50.9 ACUTE CONGESTIVE HEART FAILURE, UNSPECIFIED HEART FAILURE TYPE (HCC): ICD-10-CM

## 2019-05-09 DIAGNOSIS — N18.30 CKD (CHRONIC KIDNEY DISEASE), STAGE III (HCC): ICD-10-CM

## 2019-05-09 DIAGNOSIS — I48.0 PAF (PAROXYSMAL ATRIAL FIBRILLATION) (HCC): Primary | ICD-10-CM

## 2019-05-09 DIAGNOSIS — D47.2 MGUS (MONOCLONAL GAMMOPATHY OF UNKNOWN SIGNIFICANCE): ICD-10-CM

## 2019-05-09 DIAGNOSIS — Z98.890 HISTORY OF CARDIOVERSION: ICD-10-CM

## 2019-05-09 DIAGNOSIS — R79.9 ABNORMAL FINDING OF BLOOD CHEMISTRY: ICD-10-CM

## 2019-05-09 RX ORDER — ACYCLOVIR 400 MG/1
TABLET ORAL
Refills: 3 | Status: ON HOLD | COMMUNITY
Start: 2019-05-02 | End: 2020-07-05

## 2019-05-09 NOTE — PROGRESS NOTES
[facility] Mckenzie Schwartz MD, [address] [address] Phone:  [phone]  Fax: [phone] 
================== Chief Complaint Patient presents with  Hypertension  
  f/u [place]  SUBJECTIVE: 
  Jilda Race. is a 81 y.o. male Patient returns today after a visit with Dr. Nestor Mota on 02/19/19 with paraproteinemia/MGUS, IgG monoclonal protein with kappa light chain, no end organ damage, skeletal survey was negative, and she discussed with him that he likely has MGUS and has a 10-20% risk of transferring to a myeloma in 20 years. She gave him written information because of his limited understanding. Other medical problems include paroxysmal atrial fibrillation, primary hypertension, history of cardioversion, ED, episodic headaches, CKD3, aortic regurgitation, history of acute CHF, and is seen for evaluation. Patient returns today voicing no new complaints. He states he is doing well. He continues to drink occasionally, states it makes him relax, helps him to sleep at night. Other new complaints denied and he is seen for evaluation. He has no chest pain, shortness of breath or palpitations. Current Outpatient Medications Medication Sig Dispense Refill  acyclovir (ZOVIRAX) 400 mg tablet TAKE 1 TABLET BY MOUTH TWO TIMES PER DAY UNTIL SEEN  3  
 prednisoLONE acetate (PRED FORTE) 1 % ophthalmic suspension INSTILL 1 DROP INTO RIGHT EYE TWICE A DAY  0  
 timolol (TIMOPTIC) 0.5 % ophthalmic solution INSTILL 1 DROP INTO RIGHT EYE EVERY MORNING  0  
 peg 400-propylene glycol (SYSTANE, PROPYLENE GLYCOL,) 0.4-0.3 % drop as needed.  allopurinol (ZYLOPRIM) 100 mg tablet TAKE 1 TAB BY MOUTH DAILY. 180 Tab 2  
 bumetanide (BUMEX) 1 mg tablet Take 1 Tab by mouth daily. 90 Tab 3  
 metoprolol succinate (TOPROL-XL) 25 mg XL tablet Take 1 Tab by mouth nightly. 90 Tab 3  
 amLODIPine (NORVASC) 5 mg tablet  ELIQUIS 2.5 mg tablet TAKE 1 TAB BY MOUTH TWO (2) TIMES A DAY. 180 Tab 3  
 simvastatin (ZOCOR) 40 mg tablet TAKE 1 TABLET BY MOUTH AT BEDTIME 90 Tab 3  propafenone (RYTHMOL) 150 mg tablet Take 1 Tab by mouth two (2) times a day. 90 Tab 5  potassium chloride SR (K-TAB) 20 mEq tablet TAKE 1 TABLET BY ORAL ROUTE EVERY DAY WITH FOOD  5  
 sodium chloride (OCEAN) 0.65 % nasal spray 2 Sprays by Both Nostrils route four (4) times daily. 45 mL 11 Past Medical History:  
Diagnosis Date  Adverse effect of anesthesia   
 nasal procedure didn't work-patient awake  Alcohol abuse  Aortic regurgitation  CKD (chronic kidney disease), stage III ([address] 2011  Episodic lightheadedness 07/28/2017  Episodic lightheadedness  Epistaxis 11/27/2016  Erectile dysfunction  GI bleed  Gout   
 gout, osteoarthritis  Heart failure (HCC)   
 acute heart failure  Hemorrhoids  History of cardioversion 02/17/2017  Hypertension  Ill-defined condition   
 weakness  Multiple myeloma ([facility]  PAF (paroxysmal atrial fibrillation) ([facility] 02/17/2016  
 eliquis  Pulmonary HTN ([facility]  Right-sided epistaxis 11/27/2016  S/P AVR (aortic valve replacement) 07/26/2016  
 merrill gill md -   
 S/P cardiac cath 7/6/16  
 severe ai, normal coronaries  S/P colonoscopy 10-8-04  S/P colonoscopy with polypectomy 05/09/2016 Kar Sanches MD  
 Skin lesion Past Surgical History:  
Procedure Laterality Date  CARDIAC SURG PROCEDURE UNLIST    
 cardiac cath  CARDIAC SURG PROCEDURE UNLIST  07/26/2016 AVR  HX HEENT    
 nasal procedure 20 years ago  HX KNEE REPLACEMENT Bilateral   
 bilateral knee replacement  HX ORTHOPAEDIC Bilateral   
 wrist  
 
Allergies Allergen Reactions  Amiodarone Other (comments) Bradycardia -excessive REVIEW OF SYSTEMS: 
General: negative for - chills or fever ENT: negative for - headaches, nasal congestion or tinnitus Respiratory: negative for - cough, hemoptysis, shortness of breath or wheezing Cardiovascular : negative for - chest pain, edema, palpitations or shortness of breath Gastrointestinal: negative for - abdominal pain, blood in stools, heartburn or nausea/vomiting Genito-Urinary: no dysuria, trouble voiding, or hematuria Musculoskeletal: negative for - gait disturbance, joint pain, joint stiffness or joint swelling Neurological: no TIA or stroke symptoms Hematologic: no bruises, no bleeding, no swollen glands Integument: no lumps, mole changes, nail changes or rash Endocrine: no malaise/lethargy or unexpected weight changes Social History Socioeconomic History  Marital status:  Spouse name: Not on file  Number of children: Not on file  Years of education: Not on file  Highest education level: Not on file Tobacco Use  Smoking status: Former Smoker Years: 4.00 Last attempt to quit: 1960 Years since quittin.3  Smokeless tobacco: Never Used Substance and Sexual Activity  Alcohol use: Yes Comment: ocassional  
 Drug use: No  
 Sexual activity: Not Currently Social History Narrative Family History: Mother:  36 yrs, Chronic anemiaFather:  80 yrs, I think aneurysm hypertensionBrother(s):  79 yrs,  
 obese,dm,drug abuse,miSon(s): alive, adopted1 son(s) . Social History: Alcohol Use Patient uses alcohol, Drinks per occasion: 2, Drinks per w [place]: 10. Smoking Status Patient is a never smoker. Marital Status: . Lives w ith: alone. Occupation/W ork: employed full time insurance. Education/School: has highschool diploma. Family History Problem Relation Age of Onset  Anemia Mother  No Known Problems Father  Other Brother   
     drug abuse Tonia Gearing OBJECTIVE: 
 
Visit Vitals /82 Pulse 64 Temp 97.8 °F (36.6 °C) (Oral) Resp 16 Ht 5' 8\" (1.727 m) Wt 170 lb 8 oz (77.3 kg) SpO2 95% BMI 25.92 kg/m² CONSTITUTIONAL: well , well nourished, appears age appropriate EYES: perrla, eom intact ENMT:moist mucous membranes, pharynx clear NECK: supple. Thyroid normal 
RESPIRATORY: Chest: clear bilaterally CARDIOVASCULAR: Heart: regular rate and rhythm GASTROINTESTINAL: Abdomen: soft, bowel sounds active HEMATOLOGIC: no pathological lymph nodes palpated MUSCULOSKELETAL: Extremities: no edema, pulse 1+ INTEGUMENT: No unusual rashes or suspicious skin lesions noted. Nails appear normal. 
NEUROLOGIC: non-focal exam  
MENTAL STATUS: alert and oriented, appropriate affect ASSESSMENT: 
Patient's medical status is stable. Since we last saw him his BMI reflects a 7 lb weight loss, which puts him at his ideal body weight. We encourage him to maintain this weight and not continue to lose weight. The murmur of AI has not changed. There is no evidence of cardiac decompensation. Will assess CKD with appropriate lab studies. He has had no further lightheadedness. His heart by auscultation is normal sinus rhythm today. His BP control is at goal.  He will be back to see us in three to four months, sooner if he has any problems. We encourage compliance I have discussed the diagnosis with the patient and the intended plan as seen in the 
orders above. The patient understands and agees with the plan. The patient has  
received an after visit summary and questions were answered concerning 
future plans Patient labs and/or xrays were reviewed Past records were reviewed. PLAN: 
. Orders Placed This Encounter  URINALYSIS W/ RFLX MICROSCOPIC  CBC WITH AUTOMATED DIFF  
 METABOLIC PANEL, COMPREHENSIVE  LIPID PANEL  
 HEMOGLOBIN A1C WITH EAG  
 acyclovir (ZOVIRAX) 400 mg tablet Follow-up and Dispositions · Return in about 4 months (around 9/9/2019).  
  
  
 
 
 
 
ATTENTION:  
This medical record was transcribed using an electronic medical records system. Although proofread, it may and can contain electronic and spelling errors. Other human spelling and other errors may be present. Corrections may be executed at a later time. Please feel free to contact us for any clarifications as needed.

## 2019-05-09 NOTE — PROGRESS NOTES
1. Have you been to the ER, urgent care clinic since your last visit? Hospitalized since your last visit? No 
 
2. Have you seen or consulted any other health care providers outside of the [address] [place] [address] since your last visit? Include any pap smears or colon screening.  No

## 2019-05-10 LAB
ALBUMIN SERPL-MCNC: 4.7 G/DL (ref 3.5–4.7)
ALBUMIN/GLOB SERPL: 1.5 {RATIO} (ref 1.2–2.2)
ALP SERPL-CCNC: 84 IU/L (ref 39–117)
ALT SERPL-CCNC: 18 IU/L (ref 0–44)
APPEARANCE UR: CLEAR
AST SERPL-CCNC: 27 IU/L (ref 0–40)
BACTERIA #/AREA URNS HPF: NORMAL /[HPF]
BASOPHILS # BLD AUTO: 0 X10E3/UL (ref 0–0.2)
BASOPHILS NFR BLD AUTO: 0 %
BILIRUB SERPL-MCNC: 1.2 MG/DL (ref 0–1.2)
BILIRUB UR QL STRIP: NEGATIVE
BUN SERPL-MCNC: 25 MG/DL (ref 8–27)
BUN/CREAT SERPL: 12 (ref 10–24)
CALCIUM SERPL-MCNC: 9 MG/DL (ref 8.6–10.2)
CASTS URNS QL MICRO: NORMAL /LPF
CHLORIDE SERPL-SCNC: 98 MMOL/L (ref 96–106)
CHOLEST SERPL-MCNC: 208 MG/DL (ref 100–199)
CO2 SERPL-SCNC: 29 MMOL/L (ref 20–29)
COLOR UR: YELLOW
CREAT SERPL-MCNC: 2.1 MG/DL (ref 0.76–1.27)
EOSINOPHIL # BLD AUTO: 0.1 X10E3/UL (ref 0–0.4)
EOSINOPHIL NFR BLD AUTO: 2 %
EPI CELLS #/AREA URNS HPF: NORMAL /HPF (ref 0–10)
ERYTHROCYTE [DISTWIDTH] IN BLOOD BY AUTOMATED COUNT: 15.7 % (ref 12.3–15.4)
EST. AVERAGE GLUCOSE BLD GHB EST-MCNC: 111 MG/DL
GLOBULIN SER CALC-MCNC: 3.2 G/DL (ref 1.5–4.5)
GLUCOSE SERPL-MCNC: 91 MG/DL (ref 65–99)
GLUCOSE UR QL: NEGATIVE
HBA1C MFR BLD: 5.5 % (ref 4.8–5.6)
HCT VFR BLD AUTO: 42.3 % (ref 37.5–51)
HDLC SERPL-MCNC: 87 MG/DL
HGB BLD-MCNC: 14.7 G/DL (ref 13–17.7)
HGB UR QL STRIP: NEGATIVE
IMM GRANULOCYTES # BLD AUTO: 0 X10E3/UL (ref 0–0.1)
IMM GRANULOCYTES NFR BLD AUTO: 0 %
KETONES UR QL STRIP: NEGATIVE
LDLC SERPL CALC-MCNC: 89 MG/DL (ref 0–99)
LEUKOCYTE ESTERASE UR QL STRIP: NEGATIVE
LYMPHOCYTES # BLD AUTO: 0.8 X10E3/UL (ref 0.7–3.1)
LYMPHOCYTES NFR BLD AUTO: 15 %
MCH RBC QN AUTO: 34.2 PG (ref 26.6–33)
MCHC RBC AUTO-ENTMCNC: 34.8 G/DL (ref 31.5–35.7)
MCV RBC AUTO: 98 FL (ref 79–97)
MICRO URNS: ABNORMAL
MONOCYTES # BLD AUTO: 0.3 X10E3/UL (ref 0.1–0.9)
MONOCYTES NFR BLD AUTO: 5 %
MUCOUS THREADS URNS QL MICRO: PRESENT
NEUTROPHILS # BLD AUTO: 4.2 X10E3/UL (ref 1.4–7)
NEUTROPHILS NFR BLD AUTO: 78 %
NITRITE UR QL STRIP: NEGATIVE
PH UR STRIP: 5.5 [PH] (ref 5–7.5)
PLATELET # BLD AUTO: 218 X10E3/UL (ref 150–379)
POTASSIUM SERPL-SCNC: 3.6 MMOL/L (ref 3.5–5.2)
PROT SERPL-MCNC: 7.9 G/DL (ref 6–8.5)
PROT UR QL STRIP: ABNORMAL
RBC # BLD AUTO: 4.3 X10E6/UL (ref 4.14–5.8)
RBC #/AREA URNS HPF: NORMAL /HPF (ref 0–2)
SODIUM SERPL-SCNC: 144 MMOL/L (ref 134–144)
SP GR UR: 1.01 (ref 1–1.03)
TRIGL SERPL-MCNC: 160 MG/DL (ref 0–149)
UROBILINOGEN UR STRIP-MCNC: 0.2 MG/DL (ref 0.2–1)
VLDLC SERPL CALC-MCNC: 32 MG/DL (ref 5–40)
WBC # BLD AUTO: 5.4 X10E3/UL (ref 3.4–10.8)
WBC #/AREA URNS HPF: NORMAL /HPF (ref 0–5)

## 2019-05-22 ENCOUNTER — OFFICE VISIT (OUTPATIENT)
Dept: INTERNAL MEDICINE CLINIC | Age: 84
End: 2019-05-22

## 2019-05-22 VITALS
BODY MASS INDEX: 25.88 KG/M2 | HEIGHT: 68 IN | DIASTOLIC BLOOD PRESSURE: 84 MMHG | TEMPERATURE: 97.8 F | RESPIRATION RATE: 16 BRPM | SYSTOLIC BLOOD PRESSURE: 124 MMHG | WEIGHT: 170.8 LBS | OXYGEN SATURATION: 98 % | HEART RATE: 61 BPM

## 2019-05-22 DIAGNOSIS — N18.30 CKD (CHRONIC KIDNEY DISEASE), STAGE III (HCC): ICD-10-CM

## 2019-05-22 DIAGNOSIS — F10.10 ALCOHOL ABUSE: ICD-10-CM

## 2019-05-22 DIAGNOSIS — Z98.890 HISTORY OF CARDIOVERSION: ICD-10-CM

## 2019-05-22 DIAGNOSIS — I10 ESSENTIAL HYPERTENSION: Primary | ICD-10-CM

## 2019-05-22 DIAGNOSIS — I48.0 PAF (PAROXYSMAL ATRIAL FIBRILLATION) (HCC): ICD-10-CM

## 2019-05-22 DIAGNOSIS — S90.414A ABRASION OF TOE OF RIGHT FOOT, INITIAL ENCOUNTER: ICD-10-CM

## 2019-05-22 DIAGNOSIS — I35.1 NONRHEUMATIC AORTIC VALVE INSUFFICIENCY: ICD-10-CM

## 2019-05-22 DIAGNOSIS — D47.2 MGUS (MONOCLONAL GAMMOPATHY OF UNKNOWN SIGNIFICANCE): ICD-10-CM

## 2019-05-22 RX ORDER — PREDNISONE 20 MG/1
40 TABLET ORAL
Qty: 10 TAB | Refills: 1 | Status: SHIPPED | OUTPATIENT
Start: 2019-05-22 | End: 2019-05-22 | Stop reason: SDUPTHER

## 2019-05-22 RX ORDER — PREDNISONE 20 MG/1
40 TABLET ORAL
Qty: 10 TAB | Refills: 1 | Status: SHIPPED | OUTPATIENT
Start: 2019-05-22 | End: 2019-07-17 | Stop reason: ALTCHOICE

## 2019-05-22 NOTE — PROGRESS NOTES
1. Have you been to the ER, urgent care clinic since your last visit? Hospitalized since your last visit? No    2. Have you seen or consulted any other health care providers outside of the [address] [place] [address] since your last visit? Include any pap smears or colon screening.  No     Wants to discuss left hand pain and secretion around toes

## 2019-05-22 NOTE — PROGRESS NOTES
SPORTS MEDICINE AND PRIMARY CARE  Sosa North MD, [address] [address]  Phone:  [phone]  Fax: [phone]      Chief Complaint   Patient presents with    Hand Pain     left         SUBECTIVE:    Fernando Krueger. is a 81 y.o. male Patient returns today with known history of paroxysmal atrial fibrillation, MGUS, hypertension, history of cardioversion, CKD, alcohol abuse, aortic regurgitation, and is seen for evaluation. Patient returns today concerned about his wrist and hand on the left. He notes swelling in the dorsal aspect of the hand. He has trouble making a fist with the hand, it is uncomfortable for him. In addition, it appears he had carpal tunnel surgery years ago. You can barely see the surgical scar. Hypothenar atrophy. Current Outpatient Medications   Medication Sig Dispense Refill    predniSONE (DELTASONE) 20 mg tablet Take 40 mg by mouth daily (with breakfast). 10 Tab 1    acyclovir (ZOVIRAX) 400 mg tablet TAKE 1 TABLET BY MOUTH TWO TIMES PER DAY UNTIL SEEN  3    prednisoLONE acetate (PRED FORTE) 1 % ophthalmic suspension INSTILL 1 DROP INTO RIGHT EYE TWICE A DAY  0    timolol (TIMOPTIC) 0.5 % ophthalmic solution INSTILL 1 DROP INTO RIGHT EYE EVERY MORNING  0    peg 400-propylene glycol (SYSTANE, PROPYLENE GLYCOL,) 0.4-0.3 % drop as needed.  allopurinol (ZYLOPRIM) 100 mg tablet TAKE 1 TAB BY MOUTH DAILY. 180 Tab 2    bumetanide (BUMEX) 1 mg tablet Take 1 Tab by mouth daily. 90 Tab 3    metoprolol succinate (TOPROL-XL) 25 mg XL tablet Take 1 Tab by mouth nightly. 90 Tab 3    amLODIPine (NORVASC) 5 mg tablet       ELIQUIS 2.5 mg tablet TAKE 1 TAB BY MOUTH TWO (2) TIMES A DAY. 180 Tab 3    simvastatin (ZOCOR) 40 mg tablet TAKE 1 TABLET BY MOUTH AT BEDTIME 90 Tab 3    propafenone (RYTHMOL) 150 mg tablet Take 1 Tab by mouth two (2) times a day.  90 Tab 5    potassium chloride SR (K-TAB) 20 mEq tablet TAKE 1 TABLET BY ORAL ROUTE EVERY DAY WITH FOOD  5    sodium chloride (OCEAN) 0.65 % nasal spray 2 Sprays by Both Nostrils route four (4) times daily. 45 mL 11     Past Medical History:   Diagnosis Date    Adverse effect of anesthesia     nasal procedure didn't work-patient awake    Alcohol abuse     Aortic regurgitation     CKD (chronic kidney disease), [facility] III ([facility]     Episodic lightheadedness 2017    Episodic lightheadedness     Epistaxis 2016    Erectile dysfunction     GI bleed     Gout     gout, osteoarthritis    Heart failure (HCC)     acute heart failure    Hemorrhoids     History of cardioversion 2017    Hypertension     Ill-defined condition     weakness    Multiple myeloma (HCC)     PAF (paroxysmal atrial fibrillation) ([facility] 2016    eliquis    Pulmonary HTN ([facility]     Right-sided epistaxis 2016    S/P AVR (aortic valve replacement) 2016    merrill gill md -     S/P cardiac cath 16    severe ai, normal coronaries     S/P colonoscopy 10-8-04    S/P colonoscopy with polypectomy 2016    Kar Barron MD    Skin lesion      Past Surgical History:   Procedure Laterality Date    CARDIAC SURG PROCEDURE UNLIST      cardiac cath   [address] [place] [address] CARDIAC SURG PROCEDURE UNLIST  2016    AVR    HX HEENT      nasal procedure 20 years ago    HX KNEE REPLACEMENT Bilateral     bilateral knee replacement    HX ORTHOPAEDIC Bilateral     wrist     Allergies   Allergen Reactions    Amiodarone Other (comments)     Bradycardia -excessive       REVIEW OF SYSTEMS:   No chills or fever. He notes it is hard to  the steering wheel with his left hand.         Social History     Socioeconomic History    Marital status:      Spouse name: Not on file    Number of children: Not on file    Years of education: Not on file    Highest education level: Not on file   Tobacco Use    Smoking status: Former Smoker     Years: 4.00     Last attempt to quit: 1960     Years since quittin.4  Smokeless tobacco: Never Used   Substance and Sexual Activity    Alcohol use: Yes     Comment: ocassional    Drug use: No    Sexual activity: Not Currently   Social History Narrative    Family History: Mother:  36 yrs, Chronic anemiaFather:  80 yrs, I think aneurysm hypertensionBrother(s):  79 yrs,    obese,dm,drug abuse,miSon(s): alive, adopted1 son(s) . Social History: Alcohol Use Patient uses alcohol, Drinks per occasion: 2, Drinks per w [place]: 10. Smoking Status Patient is a never smoker. Marital Status: . Lives w ith: alone. Occupation/W ork: employed full time insurance. Education/School: has highschool diploma.   r  Family History   Problem Relation Age of Onset    Anemia Mother     No Known Problems Father     Other Brother         drug abuse       OBJECTIVE:  Visit Vitals  /84   Pulse 61   Temp 97.8 °F (36.6 °C) (Oral)   Resp 16   Ht 5' 8\" (1.727 m)   Wt 170 lb 12.8 oz (77.5 kg)   SpO2 98%   BMI 25.97 kg/m²     ENT: perrla,  eom intact  NECK: supple.  Thyroid normal  CHEST: clear to ascultation and percussion   HEART: regular rate and rhythm  ABD: soft, bowel sounds active  EXTREMITIES: no edema, pulse 1+     Office Visit on 2019   Component Date Value Ref Range Status    Specific Gravity 2019 1.013  1.005 - 1.030 Final    pH (UA) 2019 5.5  5.0 - 7.5 Final    Color 2019 Yellow  Yellow Final    Appearance 2019 Clear  Clear Final    Leukocyte Esterase 2019 Negative  Negative Final    Protein 2019 1+* Negative/Trace Final    Glucose 2019 Negative  Negative Final    Ketone 2019 Negative  Negative Final    Blood 2019 Negative  Negative Final    Bilirubin 2019 Negative  Negative Final    Urobilinogen 2019 0.2  0.2 - 1.0 mg/dL Final    Nitrites 2019 Negative  Negative Final    Microscopic Examination 2019 See additional order   Final    Microscopic was indicated and was performed.  WBC 05/09/2019 5.4  3.4 - 10.8 x10E3/uL Final    RBC 05/09/2019 4.30  4.14 - 5.80 x10E6/uL Final    HGB 05/09/2019 14.7  13.0 - 17.7 g/dL Final    HCT 05/09/2019 42.3  37.5 - 51.0 % Final    MCV 05/09/2019 98* 79 - 97 fL Final    MCH 05/09/2019 34.2* 26.6 - 33.0 pg Final    MCHC 05/09/2019 34.8  31.5 - 35.7 g/dL Final    RDW 05/09/2019 15.7* 12.3 - 15.4 % Final    PLATELET 13/30/3606 126  150 - 379 x10E3/uL Final    NEUTROPHILS 05/09/2019 78  Not Estab. % Final    Lymphocytes 05/09/2019 15  Not Estab. % Final    MONOCYTES 05/09/2019 5  Not Estab. % Final    EOSINOPHILS 05/09/2019 2  Not Estab. % Final    BASOPHILS 05/09/2019 0  Not Estab. % Final    ABS. NEUTROPHILS 05/09/2019 4.2  1.4 - 7.0 x10E3/uL Final    Abs Lymphocytes 05/09/2019 0.8  0.7 - 3.1 x10E3/uL Final    ABS. MONOCYTES 05/09/2019 0.3  0.1 - 0.9 x10E3/uL Final    ABS. EOSINOPHILS 05/09/2019 0.1  0.0 - 0.4 x10E3/uL Final    ABS. BASOPHILS 05/09/2019 0.0  0.0 - 0.2 x10E3/uL Final    IMMATURE GRANULOCYTES 05/09/2019 0  Not Estab. % Final    ABS. IMM. GRANS. 05/09/2019 0.0  0.0 - 0.1 x10E3/uL Final    Glucose 05/09/2019 91  65 - 99 mg/dL Final    BUN 05/09/2019 25  8 - 27 mg/dL Final    Creatinine 05/09/2019 2.10* 0.76 - 1.27 mg/dL Final    GFR est non-AA 05/09/2019 28* >59 mL/min/1.73 Final    GFR est AA 05/09/2019 33* >59 mL/min/1.73 Final    BUN/Creatinine ratio 05/09/2019 12  10 - 24 Final    Sodium 05/09/2019 144  134 - 144 mmol/L Final    Potassium 05/09/2019 3.6  3.5 - 5.2 mmol/L Final    Chloride 05/09/2019 98  96 - 106 mmol/L Final    CO2 05/09/2019 29  20 - 29 mmol/L Final    Calcium 05/09/2019 9.0  8.6 - 10.2 mg/dL Final    Protein, total 05/09/2019 7.9  6.0 - 8.5 g/dL Final    Albumin 05/09/2019 4.7  3.5 - 4.7 g/dL Final    GLOBULIN, TOTAL 05/09/2019 3.2  1.5 - 4.5 g/dL Final    A-G Ratio 05/09/2019 1.5  1.2 - 2.2 Final    Bilirubin, total 05/09/2019 1.2  0.0 - 1.2 mg/dL Final    Alk. phosphatase 05/09/2019 84  39 - 117 IU/L Final    AST (SGOT) 05/09/2019 27  0 - 40 IU/L Final    ALT (SGPT) 05/09/2019 18  0 - 44 IU/L Final    Cholesterol, total 05/09/2019 208* 100 - 199 mg/dL Final    Triglyceride 05/09/2019 160* 0 - 149 mg/dL Final    HDL Cholesterol 05/09/2019 87  >39 mg/dL Final    VLDL, calculated 05/09/2019 32  5 - 40 mg/dL Final    LDL, calculated 05/09/2019 89  0 - 99 mg/dL Final    Hemoglobin A1c 05/09/2019 5.5  4.8 - 5.6 % Final    Comment:          Prediabetes: 5.7 - 6.4           Diabetes: >6.4           Glycemic control for adults with diabetes: <7.0      Estimated average glucose 05/09/2019 111  mg/dL Final    WBC 05/09/2019 0-5  0 - 5 /hpf Final    RBC 05/09/2019 0-2  0 - 2 /hpf Final    Epithelial cells 05/09/2019 0-10  0 - 10 /hpf Final    Casts 05/09/2019 None seen  None seen /lpf Final    Mucus 05/09/2019 Present  Not Estab. Final    Bacteria 05/09/2019 None seen  None seen/Few Final          ASSESSMENT:  1. Essential hypertension    2. PAF (paroxysmal atrial fibrillation) ([facility]    3. MGUS (monoclonal gammopathy of unknown significance)    4. History of cardioversion    5. CKD (chronic kidney disease), stage III ([address]    6. Alcohol abuse    7. Nonrheumatic aortic valve insufficiency    8. Abrasion of toe of right foot, initial encounter      Certainly has a synovitis in the dorsal aspect of the hand. I wonder if that is contributing to the trouble he is having with his left hand. We give him a course of prednisone and see if that will quiet it down. If it fails to quiet down, will ask him to see a hand specialist to see if he has anything further they can suggest.  The recurrent carpal tunnel syndrome neuropathy may be contributing to this issue. BP control is at goal.  BMI is at goal.  He will return to the office as previously scheduled. Cultures have been taken.   Refer him to podiatrist for his onychomycosis, which is very vast on his toes, as well as what seems to be serous drainage from the base of the great toes bilaterally. I have discussed the diagnosis with the patient and the intended plan as seen in the  orders above. The patient understands and agees with the plan. The patient has   received an after visit summary and questions were answered concerning  future plans  Patient labs and/or xrays were reviewed  Past records were reviewed. PLAN:  .  Orders Placed This Encounter    CULTURE, BODY FLUID W GRAM STAIN    REFERRAL TO PODIATRY    predniSONE (DELTASONE) 20 mg tablet                     ATTENTION:   This medical record was transcribed using an electronic medical records system. Although proofread, it may and can contain electronic and spelling errors. Other human spelling and other errors may be present. Corrections may be executed at a later time. Please feel free to contact us for any clarifications as needed.

## 2019-05-26 LAB — BACTERIA FLD CULT: NORMAL

## 2019-05-26 RX ORDER — SIMVASTATIN 40 MG/1
TABLET, FILM COATED ORAL
Qty: 90 TAB | Refills: 3 | Status: SHIPPED | OUTPATIENT
Start: 2019-05-26 | End: 2020-06-09

## 2019-06-24 ENCOUNTER — TELEPHONE (OUTPATIENT)
Dept: CARDIAC REHAB | Age: 84
End: 2019-06-24

## 2019-06-24 NOTE — TELEPHONE ENCOUNTER
6/24/2019: Cardiac Wellness: Called Omer Zavala. regarding absence from the Cardiac Wellness Program.His \"work schedule\" has been busy, so he has not been able to return. Wants to come back in 1 month. We will reach out to patient next month, July. Mireya Us     3/27/2019: Cardiac Wellness: Called Parker Viktoriyagordykenya López. regarding absence from the Cardiac Wellness Program. Has been out d/t injuring foot.  Mireya Us          Electronically signed by Lurene Dakin at 03/27/19 4642

## 2019-07-08 ENCOUNTER — TELEPHONE (OUTPATIENT)
Dept: CARDIAC REHAB | Age: 84
End: 2019-07-08

## 2019-07-08 NOTE — TELEPHONE ENCOUNTER
7/8/2019: Cardiac Wellness: Last call to Renella Blizzard. regarding absence from the Cardiac Wellness Program. Wants to come back, but cannot at this moment, no reason given as to why not. Wants another call the beginning of next month, but he has not been in since 2/20/19 and his intake was 1/14/19. 13/36 sessions complete, will discharge. Samaria Henley     6/24/2019: Cardiac Wellness: Called Iam Gamaarsenio. regarding absence from the Cardiac Wellness Program.His \"work schedule\" has been busy, so he has not been able to return. Wants to come back in 1 month. We will reach out to patient next month, July.  Samaria Henley      3/27/2019: Cardiac Wellness: Kary Velez Jr. regarding absence from the Cardiac Wellness Program. Has been out d/t injuring foot. Ellie Kate            Electronically signed by Bj Viera at 03/27/19 1318              Electronically signed by Bj Viera at 06/24/19 1611

## 2019-07-17 ENCOUNTER — OFFICE VISIT (OUTPATIENT)
Dept: INTERNAL MEDICINE CLINIC | Age: 84
End: 2019-07-17

## 2019-07-17 VITALS
BODY MASS INDEX: 25.49 KG/M2 | RESPIRATION RATE: 16 BRPM | HEART RATE: 64 BPM | OXYGEN SATURATION: 97 % | SYSTOLIC BLOOD PRESSURE: 137 MMHG | TEMPERATURE: 98 F | DIASTOLIC BLOOD PRESSURE: 89 MMHG | HEIGHT: 68 IN | WEIGHT: 168.2 LBS

## 2019-07-17 DIAGNOSIS — F10.10 ALCOHOL ABUSE: Primary | ICD-10-CM

## 2019-07-17 DIAGNOSIS — I35.1 NONRHEUMATIC AORTIC VALVE INSUFFICIENCY: ICD-10-CM

## 2019-07-17 DIAGNOSIS — N18.30 CKD (CHRONIC KIDNEY DISEASE), STAGE III (HCC): ICD-10-CM

## 2019-07-17 DIAGNOSIS — Z98.890 HISTORY OF CARDIOVERSION: ICD-10-CM

## 2019-07-17 DIAGNOSIS — I27.20 PULMONARY HTN (HCC): ICD-10-CM

## 2019-07-17 DIAGNOSIS — I10 ESSENTIAL HYPERTENSION: ICD-10-CM

## 2019-07-17 DIAGNOSIS — I48.0 PAF (PAROXYSMAL ATRIAL FIBRILLATION) (HCC): ICD-10-CM

## 2019-07-17 DIAGNOSIS — D47.2 MGUS (MONOCLONAL GAMMOPATHY OF UNKNOWN SIGNIFICANCE): ICD-10-CM

## 2019-07-17 RX ORDER — METOPROLOL SUCCINATE 25 MG/1
12.5 TABLET, EXTENDED RELEASE ORAL
Qty: 90 TAB | Refills: 3 | Status: SHIPPED | OUTPATIENT
Start: 2019-07-17 | End: 2020-05-13 | Stop reason: SDUPTHER

## 2019-07-17 NOTE — PROGRESS NOTES
1. Have you been to the ER, urgent care clinic since your last visit? Hospitalized since your last visit? No    2. Have you seen or consulted any other health care providers outside of the [address] [place] [address] since your last visit? Include any pap smears or colon screening.  No     Wants to discuss weakness and no energy

## 2019-07-18 LAB
BASOPHILS # BLD AUTO: 0 X10E3/UL (ref 0–0.2)
BASOPHILS NFR BLD AUTO: 0 %
BUN SERPL-MCNC: 15 MG/DL (ref 8–27)
BUN/CREAT SERPL: 11 (ref 10–24)
CALCIUM SERPL-MCNC: 8.7 MG/DL (ref 8.6–10.2)
CHLORIDE SERPL-SCNC: 100 MMOL/L (ref 96–106)
CO2 SERPL-SCNC: 24 MMOL/L (ref 20–29)
CREAT SERPL-MCNC: 1.38 MG/DL (ref 0.76–1.27)
EOSINOPHIL # BLD AUTO: 0.2 X10E3/UL (ref 0–0.4)
EOSINOPHIL NFR BLD AUTO: 3 %
ERYTHROCYTE [DISTWIDTH] IN BLOOD BY AUTOMATED COUNT: 15.4 % (ref 12.3–15.4)
GLUCOSE SERPL-MCNC: 91 MG/DL (ref 65–99)
HCT VFR BLD AUTO: 39.8 % (ref 37.5–51)
HGB BLD-MCNC: 13.3 G/DL (ref 13–17.7)
IMM GRANULOCYTES # BLD AUTO: 0 X10E3/UL (ref 0–0.1)
IMM GRANULOCYTES NFR BLD AUTO: 0 %
LYMPHOCYTES # BLD AUTO: 0.9 X10E3/UL (ref 0.7–3.1)
LYMPHOCYTES NFR BLD AUTO: 17 %
MCH RBC QN AUTO: 34.5 PG (ref 26.6–33)
MCHC RBC AUTO-ENTMCNC: 33.4 G/DL (ref 31.5–35.7)
MCV RBC AUTO: 103 FL (ref 79–97)
MONOCYTES # BLD AUTO: 0.3 X10E3/UL (ref 0.1–0.9)
MONOCYTES NFR BLD AUTO: 5 %
NEUTROPHILS # BLD AUTO: 4.1 X10E3/UL (ref 1.4–7)
NEUTROPHILS NFR BLD AUTO: 75 %
PLATELET # BLD AUTO: 223 X10E3/UL (ref 150–450)
POTASSIUM SERPL-SCNC: 3.7 MMOL/L (ref 3.5–5.2)
RBC # BLD AUTO: 3.86 X10E6/UL (ref 4.14–5.8)
SODIUM SERPL-SCNC: 146 MMOL/L (ref 134–144)
WBC # BLD AUTO: 5.5 X10E3/UL (ref 3.4–10.8)

## 2019-08-09 ENCOUNTER — HOSPITAL ENCOUNTER (EMERGENCY)
Age: 84
Discharge: HOME | End: 2019-08-09
Attending: EMERGENCY MEDICINE | Admitting: EMERGENCY MEDICINE
Payer: MEDICARE

## 2019-08-09 ENCOUNTER — APPOINTMENT (OUTPATIENT)
Dept: GENERAL RADIOLOGY | Age: 84
End: 2019-08-09
Attending: EMERGENCY MEDICINE
Payer: MEDICARE

## 2019-08-09 VITALS
OXYGEN SATURATION: 97 % | RESPIRATION RATE: 20 BRPM | TEMPERATURE: 97.1 F | SYSTOLIC BLOOD PRESSURE: 129 MMHG | HEART RATE: 60 BPM | DIASTOLIC BLOOD PRESSURE: 85 MMHG

## 2019-08-09 DIAGNOSIS — R06.02 SOB (SHORTNESS OF BREATH): Primary | ICD-10-CM

## 2019-08-09 LAB
ALBUMIN SERPL-MCNC: 3.3 G/DL (ref 3.5–5)
ALBUMIN/GLOB SERPL: 0.8 {RATIO} (ref 1.1–2.2)
ALP SERPL-CCNC: 69 U/L (ref 45–117)
ALT SERPL-CCNC: 18 U/L (ref 12–78)
ANION GAP SERPL CALC-SCNC: 7 MMOL/L (ref 5–15)
AST SERPL-CCNC: 15 U/L (ref 15–37)
ATRIAL RATE: 79 BPM
BASOPHILS # BLD: 0 K/UL (ref 0–0.1)
BASOPHILS NFR BLD: 0 % (ref 0–1)
BILIRUB SERPL-MCNC: 0.7 MG/DL (ref 0.2–1)
BNP SERPL-MCNC: 1216 PG/ML
BUN SERPL-MCNC: 32 MG/DL (ref 6–20)
BUN/CREAT SERPL: 15 (ref 12–20)
CALCIUM SERPL-MCNC: 9 MG/DL (ref 8.5–10.1)
CALCULATED R AXIS, ECG10: 110 DEGREES
CALCULATED T AXIS, ECG11: 126 DEGREES
CHLORIDE SERPL-SCNC: 108 MMOL/L (ref 97–108)
CO2 SERPL-SCNC: 30 MMOL/L (ref 21–32)
COMMENT, HOLDF: NORMAL
CREAT SERPL-MCNC: 2.16 MG/DL (ref 0.7–1.3)
DIAGNOSIS, 93000: NORMAL
DIFFERENTIAL METHOD BLD: ABNORMAL
EOSINOPHIL # BLD: 0.4 K/UL (ref 0–0.4)
EOSINOPHIL NFR BLD: 7 % (ref 0–7)
ERYTHROCYTE [DISTWIDTH] IN BLOOD BY AUTOMATED COUNT: 13 % (ref 11.5–14.5)
GLOBULIN SER CALC-MCNC: 4.1 G/DL (ref 2–4)
GLUCOSE SERPL-MCNC: 88 MG/DL (ref 65–100)
HCT VFR BLD AUTO: 36.8 % (ref 36.6–50.3)
HGB BLD-MCNC: 12.4 G/DL (ref 12.1–17)
IMM GRANULOCYTES # BLD AUTO: 0 K/UL (ref 0–0.04)
IMM GRANULOCYTES NFR BLD AUTO: 0 % (ref 0–0.5)
LYMPHOCYTES # BLD: 0.8 K/UL (ref 0.8–3.5)
LYMPHOCYTES NFR BLD: 17 % (ref 12–49)
MCH RBC QN AUTO: 34.3 PG (ref 26–34)
MCHC RBC AUTO-ENTMCNC: 33.7 G/DL (ref 30–36.5)
MCV RBC AUTO: 101.9 FL (ref 80–99)
MONOCYTES # BLD: 0.4 K/UL (ref 0–1)
MONOCYTES NFR BLD: 9 % (ref 5–13)
NEUTS SEG # BLD: 3.3 K/UL (ref 1.8–8)
NEUTS SEG NFR BLD: 67 % (ref 32–75)
NRBC # BLD: 0 K/UL (ref 0–0.01)
NRBC BLD-RTO: 0 PER 100 WBC
PLATELET # BLD AUTO: 233 K/UL (ref 150–400)
PMV BLD AUTO: 9.6 FL (ref 8.9–12.9)
POTASSIUM SERPL-SCNC: 3.3 MMOL/L (ref 3.5–5.1)
PROT SERPL-MCNC: 7.4 G/DL (ref 6.4–8.2)
Q-T INTERVAL, ECG07: 400 MS
QRS DURATION, ECG06: 90 MS
QTC CALCULATION (BEZET), ECG08: 450 MS
RBC # BLD AUTO: 3.61 M/UL (ref 4.1–5.7)
SAMPLES BEING HELD,HOLD: NORMAL
SODIUM SERPL-SCNC: 145 MMOL/L (ref 136–145)
TROPONIN I SERPL-MCNC: <0.05 NG/ML
VENTRICULAR RATE, ECG03: 76 BPM
WBC # BLD AUTO: 4.9 K/UL (ref 4.1–11.1)

## 2019-08-09 PROCEDURE — 84484 ASSAY OF TROPONIN QUANT: CPT

## 2019-08-09 PROCEDURE — 71046 X-RAY EXAM CHEST 2 VIEWS: CPT

## 2019-08-09 PROCEDURE — 85025 COMPLETE CBC W/AUTO DIFF WBC: CPT

## 2019-08-09 PROCEDURE — 93005 ELECTROCARDIOGRAM TRACING: CPT

## 2019-08-09 PROCEDURE — 80053 COMPREHEN METABOLIC PANEL: CPT

## 2019-08-09 PROCEDURE — 83880 ASSAY OF NATRIURETIC PEPTIDE: CPT

## 2019-08-09 PROCEDURE — 36415 COLL VENOUS BLD VENIPUNCTURE: CPT

## 2019-08-09 PROCEDURE — 99285 EMERGENCY DEPT VISIT HI MDM: CPT

## 2019-08-09 NOTE — ED PROVIDER NOTES
81 y.o. male with past medical history significant for HTN, pulmonary HTN, PAF, multiple myeloma, s/p AVR, cardioversion, heart failure, and cardiac cath who presents from home with chief complaint of SOB. Patient states he was seen by his Cardiologist, Dr. Honey Chavis, ~1 day ago for ECHO. Patient reports his visit was unremarkble. Patient states ~5 days ago his chronic SOB worsened. Patient describes his SOB as \"occuring upon exertion. \" Patient presents to [facility] ED with persisting SOB and accompanying sx of weakness and weight loss described as \"due to my refridgerator breaking. \" Patient states he is compliant with all medications. Patient notes he was in PT however stopped. Patient denies medication changes. Pateint denies chest pain. There are no other acute medical concerns at this time. Social hx: (+) Tobacco use (Former), (+) EtOH use, No illicit drug use. PCP: Kerry Morales MD    Note written by Mika Parker, as dictated by Rush Chamorro MD 6:50 PM     The history is provided by the patient. No  was used. Past Medical History:   Diagnosis Date    Adverse effect of anesthesia     nasal procedure didn't work-patient awake    Alcohol abuse     Aortic regurgitation     CKD (chronic kidney disease), stage III ([address] 2011    Episodic lightheadedness 07/28/2017    Episodic lightheadedness     Epistaxis 11/27/2016    Erectile dysfunction     GI bleed     Gout     gout, osteoarthritis    Heart failure (HCC)     acute heart failure    Hemorrhoids     History of cardioversion 02/17/2017    Hypertension     Ill-defined condition     weakness    Multiple myeloma (HCC)     PAF (paroxysmal atrial fibrillation) ([address] 02/17/2016    eliquis    Pulmonary HTN ([address]     Right-sided epistaxis 11/27/2016    S/P AVR (aortic valve replacement) 07/26/2016    merrill gill md -     S/P cardiac cath 7/6/16    severe ai, normal coronaries     S/P colonoscopy 10-8-04    S/P colonoscopy with polypectomy 2016    Kar Arthur MD    Skin lesion        Past Surgical History:   Procedure Laterality Date    CARDIAC SURG PROCEDURE UNLIST      cardiac cath   [place] CARDIAC SURG PROCEDURE UNLIST  2016    AVR    HX HEENT      nasal procedure 20 years ago   [place] [facility] HX KNEE REPLACEMENT Bilateral     bilateral knee replacement    HX ORTHOPAEDIC Bilateral     wrist         Family History:   Problem Relation Age of Onset    Anemia Mother     No Known Problems Father     Other Brother         drug abuse       Social History     Socioeconomic History    Marital status:      Spouse name: Not on file    Number of children: Not on file    Years of education: Not on file    Highest education level: Not on file   Occupational History    Not on file   Social Needs    Financial resource strain: Not on file    Food insecurity:     Worry: Not on file     Inability: Not on file    Transportation needs:     Medical: Not on file     Non-medical: Not on file   Tobacco Use    Smoking status: Former Smoker     Years: 4.00     Last attempt to quit: 1960     Years since quittin.6    Smokeless tobacco: Never Used   Substance and Sexual Activity    Alcohol use: Yes     Comment: ocassional    Drug use: No    Sexual activity: Not Currently   Lifestyle    Physical activity:     Days per week: Not on file     Minutes per session: Not on file    Stress: Not on file   Relationships    Social connections:     Talks on phone: Not on file     Gets together: Not on file     Attends Oriental orthodox service: Not on file     Active member of club or organization: Not on file     Attends meetings of clubs or organizations: Not on file     Relationship status: Not on file    Intimate partner violence:     Fear of current or ex partner: Not on file     Emotionally abused: Not on file     Physically abused: Not on file     Forced sexual activity: Not on file   Other Topics Concern    Not on file Social History Narrative    Family History: Mother:  36 yrs, Chronic anemiaFather:  80 yrs, I think aneurysm hypertensionBrother(s):  79 yrs,    obese,dm,drug abuse,miSon(s): alive, adopted1 son(s) . Social History: Alcohol Use Patient uses alcohol, Drinks per occasion: 2, Drinks per w [place]: 10. Smoking Status Patient is a never smoker. Marital Status: . Lives w ith: alone. Occupation/W ork: employed full time insurance. Education/School: has highschool diploma. ALLERGIES: Amiodarone    Review of Systems   Constitutional: Positive for unexpected weight change. Negative for appetite change, chills and fever. HENT: Negative for rhinorrhea, sore throat and trouble swallowing. Eyes: Negative for photophobia. Respiratory: Positive for shortness of breath. Negative for cough. Cardiovascular: Negative for chest pain and palpitations. Gastrointestinal: Negative for abdominal pain, nausea and vomiting. Genitourinary: Negative for dysuria, frequency and hematuria. Musculoskeletal: Negative for arthralgias. Neurological: Positive for weakness. Negative for dizziness and syncope. Psychiatric/Behavioral: Negative for behavioral problems. The patient is not nervous/anxious. All other systems reviewed and are negative. Vitals:    19 1827 19 1846 19 1848   BP:  113/72    Pulse: 83 68    Resp:  16    Temp:  97.1 °F (36.2 °C)    SpO2: 96% 93% 96%            Physical Exam   Constitutional: He is oriented to person, place, and time. He appears well-developed and well-nourished. No distress. HENT:   Head: Normocephalic. Eyes: Conjunctivae and EOM are normal.   Neck: Normal range of motion. Neck supple. Cardiovascular: Normal rate and normal heart sounds. An irregular rhythm present. Pulmonary/Chest: Effort normal and breath sounds normal. No respiratory distress. He has no wheezes. Sternotomy scar. Abdominal: Soft.  Bowel sounds are normal. He exhibits no distension. There is no tenderness. Musculoskeletal: Normal range of motion. Neurological: He is alert and oriented to person, place, and time. Skin: Skin is warm and dry. Psychiatric: He has a normal mood and affect. His behavior is normal. Judgment and thought content normal.   Nursing note and vitals reviewed. Note written by Mika Lopez, as dictated by Aimee Chandra MD 6:50 PM     [facility]       Procedures      Note: Patient  had an echocardiogram  done yesterday but results do not seem to be available. He had an echocardiogram March of this year which showed normal left ventricular systolic and diastolic function, prosthetic aortic valve, and trace aortic regurgitation. Patient is complaining of feeling bad. He is not in physical therapy nor cardiac rehab. He says he does not feel well enough to participate in these activities. His shortness of breath and weakness has been going on for at least months but is worse the last 5 days. He is not gaining weight. He does not have edema. He is not short of breath at rest.  Medications were not changed yesterday. 7:01 PM  Lester Mandujano MD  ED EKG interpretation:  Rhythm: atrial fib; and irregular. Rate (approx.): 75; Axis: normal; P wave: ; QRS interval: normal ; ST/T wave: T wave inverted; in  Lead: ; Other findings: . This EKG was interpreted by Lester Mandujano MD,ED Provider. 7:05 PM        Note: Initially I thought the patient saw his cardiologist yesterday but he said he was just sent in for an echo. He says he talked to the office today and it sounds like he was instructed to go to the ER. Patient is comfortable at rest.  The chest x-ray and BNP do not suggest CHF. He has atrial fibrillation with a rate in the 50s and 60s. Blood pressure systolic is approximately 110. His medications were reviewed. Creatinine is a bit higher than it has been in the past at 2.   He seems frustrated that I have no recommendations regarding his medications or how to get him feeling better. I will call his cardiology group and see if they have any suggestions. Jolie Koenig MD  9:17 PM     PROGRESS NOTE:  9:41 PM  Provider spoke to Dr. Desai who suggests to hold his Amlodipine over the week and have pt f/u.

## 2019-08-10 NOTE — DISCHARGE INSTRUCTIONS

## 2019-08-19 DIAGNOSIS — D47.2 MGUS (MONOCLONAL GAMMOPATHY OF UNKNOWN SIGNIFICANCE): ICD-10-CM

## 2019-08-22 ENCOUNTER — TELEPHONE (OUTPATIENT)
Dept: ONCOLOGY | Age: 84
End: 2019-08-22

## 2019-08-22 NOTE — TELEPHONE ENCOUNTER
Call returned to patient's son. Son was not expecting call, states patient is calling doctors and asking them to call son for an update. Explained natural history of MGUS and that we are monitoring patient with lab work every 6-12 months however no interventions are needed at this time. He understands and thanked for call.

## 2019-08-22 NOTE — TELEPHONE ENCOUNTER
Patient called and stated that he would like to know if Dr. Zenon Cobb can call and talk to patients son Roula Earing [phone] and give him information on patient so they can make some medical decisions.

## 2019-09-03 ENCOUNTER — OFFICE VISIT (OUTPATIENT)
Dept: INTERNAL MEDICINE CLINIC | Age: 84
End: 2019-09-03

## 2019-09-03 VITALS
RESPIRATION RATE: 16 BRPM | TEMPERATURE: 98.1 F | DIASTOLIC BLOOD PRESSURE: 74 MMHG | BODY MASS INDEX: 23.92 KG/M2 | WEIGHT: 157.8 LBS | SYSTOLIC BLOOD PRESSURE: 120 MMHG | OXYGEN SATURATION: 96 % | HEART RATE: 66 BPM | HEIGHT: 68 IN

## 2019-09-03 DIAGNOSIS — N18.30 CKD (CHRONIC KIDNEY DISEASE), STAGE III (HCC): ICD-10-CM

## 2019-09-03 DIAGNOSIS — D47.2 MGUS (MONOCLONAL GAMMOPATHY OF UNKNOWN SIGNIFICANCE): ICD-10-CM

## 2019-09-03 DIAGNOSIS — I27.20 PULMONARY HTN (HCC): ICD-10-CM

## 2019-09-03 DIAGNOSIS — I10 ESSENTIAL HYPERTENSION: Primary | ICD-10-CM

## 2019-09-03 DIAGNOSIS — Z95.2 S/P AVR (AORTIC VALVE REPLACEMENT): ICD-10-CM

## 2019-09-03 DIAGNOSIS — F10.10 ALCOHOL ABUSE: ICD-10-CM

## 2019-09-03 DIAGNOSIS — Z98.890 HISTORY OF CARDIOVERSION: ICD-10-CM

## 2019-09-03 NOTE — PROGRESS NOTES
SPORTS MEDICINE AND PRIMARY CARE  Michelle Junior MD, [address] [address]  Phone:  [phone]  Fax: [phone]     [facility] SUBJECTIVE:    Flower Cooney. is a 81 y.o. male Patient returns today saying he feels awful. He has no energy, his balance is not good anymore. He has been to the ER on two different occasions, the most recent on 08/09/19, where he was seen by Tasneem Atkins. He had a chest xray and BNP that did not suggest CHF. He had a-fib with a rate in the 50s and 60s and BP was 110. Creatinine was a bit higher than normal in the past at 2. He was frustrated in the ER that no new medications regarding his medications were made to try and get him to feel better. He spoke with Dr. Desai of the cardiology group, who suggested he hold Amlodipine over the weekend and then follow up with the patient. Patient has a known history of aortic valve replacement, pulmonary hypertension, MGUS, primary hypertension, S/P cardioversion, CKD stage 3, alcohol abuse and has not drank anything for two months. CBC was normal, potassium was 3.3, BUN was 32, creatinine 2.16, and proBNP was 1,216. He is seen for evaluation. Current Outpatient Medications   Medication Sig Dispense Refill    metoprolol succinate (TOPROL-XL) 25 mg XL tablet Take 0.5 Tabs by mouth nightly. 90 Tab 3    simvastatin (ZOCOR) 40 mg tablet TAKE 1 TABLET BY MOUTH EVERYDAY AT BEDTIME 90 Tab 3    acyclovir (ZOVIRAX) 400 mg tablet TAKE 1 TABLET BY MOUTH TWO TIMES PER DAY UNTIL SEEN  3    prednisoLONE acetate (PRED FORTE) 1 % ophthalmic suspension INSTILL 1 DROP INTO RIGHT EYE TWICE A DAY  0    timolol (TIMOPTIC) 0.5 % ophthalmic solution INSTILL 1 DROP INTO RIGHT EYE EVERY MORNING  0    allopurinol (ZYLOPRIM) 100 mg tablet TAKE 1 TAB BY MOUTH DAILY. 180 Tab 2    bumetanide (BUMEX) 1 mg tablet Take 1 Tab by mouth daily. 90 Tab 3    ELIQUIS 2.5 mg tablet TAKE 1 TAB BY MOUTH TWO (2) TIMES A DAY. 180 Tab 3    propafenone (RYTHMOL) 150 mg tablet Take 1 Tab by mouth two (2) times a day. 90 Tab 5    potassium chloride SR (K-TAB) 20 mEq tablet TAKE 1 TABLET BY ORAL ROUTE EVERY DAY WITH FOOD  5    sodium chloride (OCEAN) 0.65 % nasal spray 2 Sprays by Both Nostrils route four (4) times daily. 45 mL 11     Past Medical History:   Diagnosis Date    Adverse effect of anesthesia     nasal procedure didn't work-patient awake    Alcohol abuse     Aortic regurgitation     CKD (chronic kidney disease), stage III ([facility] 2011    Episodic lightheadedness 07/28/2017    Episodic lightheadedness     Epistaxis 11/27/2016    Erectile dysfunction     GI bleed     Gout     gout, osteoarthritis    Heart failure (HCC)     acute heart failure    Hemorrhoids     History of cardioversion 02/17/2017    Hypertension     Ill-defined condition     weakness    Multiple myeloma (HCC)     PAF (paroxysmal atrial fibrillation) ([facility] 02/17/2016    eliquis    [facility]     Right-sided epistaxis 11/27/2016    S/P AVR (aortic valve replacement) 07/26/2016    merrill gill md -     S/P cardiac cath 7/6/16    severe ai, normal coronaries     S/P colonoscopy 10-8-04    S/P colonoscopy with polypectomy 05/09/2016    Kar Bray MD    Skin lesion      Past Surgical History:   Procedure Laterality Date    CARDIAC SURG PROCEDURE UNLIST      cardiac cath   Philipp Drain CARDIAC SURG PROCEDURE UNLIST  07/26/2016    AVR    HX HEENT      nasal procedure 20 years ago    HX KNEE REPLACEMENT Bilateral     bilateral knee replacement    HX ORTHOPAEDIC Bilateral     wrist     Allergies   Allergen Reactions    Amiodarone Other (comments)     Bradycardia -excessive         REVIEW OF SYSTEMS:  General: negative for - chills or fever  ENT: negative for - headaches, nasal congestion or tinnitus  Respiratory: negative for - cough, hemoptysis, shortness of breath or wheezing  Cardiovascular : negative for - chest pain, edema, palpitations or shortness of breath  Gastrointestinal: negative for - abdominal pain, blood in stools, heartburn or nausea/vomiting  Genito-Urinary: no dysuria, trouble voiding, or hematuria  Musculoskeletal: negative for - gait disturbance, joint pain, joint stiffness or joint swelling  Neurological: no TIA or stroke symptoms  Hematologic: no bruises, no bleeding, no swollen glands  Integument: no lumps, mole changes, nail changes or rash  Endocrine: no malaise/lethargy or unexpected weight changes      Social History     Socioeconomic History    Marital status:      Spouse name: Not on file    Number of children: Not on file    Years of education: Not on file    Highest education level: Not on file   Tobacco Use    Smoking status: Former Smoker     Years: 4.00     Last attempt to quit: 1960     Years since quittin.7    Smokeless tobacco: Never Used   Substance and Sexual Activity    Alcohol use: Not Currently     Comment: ocassional    Drug use: No    Sexual activity: Not Currently   Social History Narrative    Family History: Mother:  36 yrs, Chronic anemiaFather:  80 yrs, I think aneurysm hypertensionBrother(s):  79 yrs,    obese,dm,drug abuse,miSon(s): alive, adopted1 son(s) . Social History: Alcohol Use Patient uses alcohol, Drinks per occasion: 2, Drinks per w [place]: 10. Smoking Status Patient is a never smoker. Marital Status: . Lives w ith: alone. Occupation/W ork: employed full time insurance. Education/School: has highschool diploma.      Family History   Problem Relation Age of Onset    Anemia Mother     No Known Problems Father     Other Brother         drug abuse       OBJECTIVE:    Visit Vitals  /74   Pulse 66   Temp 98.1 °F (36.7 °C) (Oral)   Resp 16   Ht 5' 8\" (1.727 m)   Wt 157 lb 12.8 oz (71.6 kg)   SpO2 96%   BMI 23.99 kg/m²     CONSTITUTIONAL: well , well nourished, appears age appropriate  EYES: perrla, eom intact  ENMT:moist mucous membranes, pharynx clear  NECK: supple. Thyroid normal  RESPIRATORY: Chest: clear bilaterally   CARDIOVASCULAR: Heart: regular rate and rhythm  GASTROINTESTINAL: Abdomen: soft, bowel sounds active  HEMATOLOGIC: no pathological lymph nodes palpated  MUSCULOSKELETAL: Extremities: no edema, pulse 1+   INTEGUMENT: No unusual rashes or suspicious skin lesions noted. Nails appear normal.  NEUROLOGIC: non-focal exam   MENTAL STATUS: alert and oriented, appropriate affect           ASSESSMENT:  1. Essential hypertension    2. Pulmonary HTN ([address]    3. S/P AVR (aortic valve replacement)    4. MGUS (monoclonal gammopathy of unknown significance)    5. History of cardioversion    6. CKD (chronic kidney disease), stage III ([address]    7. Alcohol abuse      Patient in atrial fibrillation with appropriate ventricular response. He is on Metoprolol, as well as Eliquis. That may be a contributing factor to his decreased stamina, as when he was in sinus rhythm he did not have that complaint. I am concerned about his CKD and will repeat his renal function. We advise him to avoid nephrotoxic drugs. He is currently not on any. BP control is at goal.    Finally, he is not drinking for two months, which is amazing. So we cannot incriminate the alcohol current use to his complaint. Will check his blood count and renal function and have him come back in a week. I have discussed the diagnosis with the patient and the intended plan as seen in the  orders above. The patient understands and agees with the plan. The patient has   received an after visit summary and questions were answered concerning  future plans  Patient labs and/or xrays were reviewed  Past records were reviewed. PLAN:  .  Orders Placed This Encounter    RENAL FUNCTION PANEL    CBC WITH AUTOMATED DIFF    PROT+CREATU (RANDOM)       Follow-up and Dispositions    · Return in about 2 weeks (around 9/17/2019).                 ATTENTION:   This medical record was transcribed using an electronic medical records system. Although proofread, it may and can contain electronic and spelling errors. Other human spelling and other errors may be present. Corrections may be executed at a later time. Please feel free to contact us for any clarifications as needed.

## 2019-09-03 NOTE — PROGRESS NOTES
1. Have you been to the ER, urgent care clinic since your last visit? Hospitalized since your last visit? Yes When: 8-9-19 Where: [facility] Reason for visit: sick    2. Have you seen or consulted any other health care providers outside of the [address] [place] [address] since your last visit? Include any pap smears or colon screening.  No

## 2019-09-04 LAB
ALBUMIN SERPL-MCNC: 4.4 G/DL (ref 3.5–4.7)
BASOPHILS # BLD AUTO: 0 X10E3/UL (ref 0–0.2)
BASOPHILS NFR BLD AUTO: 0 %
BUN SERPL-MCNC: 21 MG/DL (ref 8–27)
BUN/CREAT SERPL: 9 (ref 10–24)
CALCIUM SERPL-MCNC: 9.6 MG/DL (ref 8.6–10.2)
CHLORIDE SERPL-SCNC: 103 MMOL/L (ref 96–106)
CO2 SERPL-SCNC: 23 MMOL/L (ref 20–29)
CREAT SERPL-MCNC: 2.24 MG/DL (ref 0.76–1.27)
CREAT UR-MCNC: 200.4 MG/DL
EOSINOPHIL # BLD AUTO: 0.3 X10E3/UL (ref 0–0.4)
EOSINOPHIL NFR BLD AUTO: 7 %
ERYTHROCYTE [DISTWIDTH] IN BLOOD BY AUTOMATED COUNT: 14.6 % (ref 12.3–15.4)
GLUCOSE SERPL-MCNC: 68 MG/DL (ref 65–99)
HCT VFR BLD AUTO: 41.7 % (ref 37.5–51)
HGB BLD-MCNC: 13.5 G/DL (ref 13–17.7)
IMM GRANULOCYTES # BLD AUTO: 0 X10E3/UL (ref 0–0.1)
IMM GRANULOCYTES NFR BLD AUTO: 0 %
LYMPHOCYTES # BLD AUTO: 0.8 X10E3/UL (ref 0.7–3.1)
LYMPHOCYTES NFR BLD AUTO: 17 %
MCH RBC QN AUTO: 33.3 PG (ref 26.6–33)
MCHC RBC AUTO-ENTMCNC: 32.4 G/DL (ref 31.5–35.7)
MCV RBC AUTO: 103 FL (ref 79–97)
MONOCYTES # BLD AUTO: 0.3 X10E3/UL (ref 0.1–0.9)
MONOCYTES NFR BLD AUTO: 6 %
NEUTROPHILS # BLD AUTO: 3.2 X10E3/UL (ref 1.4–7)
NEUTROPHILS NFR BLD AUTO: 70 %
PHOSPHATE SERPL-MCNC: 3.1 MG/DL (ref 2.5–4.5)
PLATELET # BLD AUTO: 239 X10E3/UL (ref 150–450)
POTASSIUM SERPL-SCNC: 3.8 MMOL/L (ref 3.5–5.2)
PROT UR-MCNC: 147.6 MG/DL
PROT/CREAT UR: 737 MG/G CREAT (ref 0–200)
RBC # BLD AUTO: 4.05 X10E6/UL (ref 4.14–5.8)
SODIUM SERPL-SCNC: 146 MMOL/L (ref 134–144)
WBC # BLD AUTO: 4.7 X10E3/UL (ref 3.4–10.8)

## 2019-09-09 ENCOUNTER — HOSPITAL ENCOUNTER (EMERGENCY)
Age: 84
Discharge: HOME | End: 2019-09-10
Attending: EMERGENCY MEDICINE | Admitting: EMERGENCY MEDICINE
Payer: MEDICARE

## 2019-09-09 ENCOUNTER — APPOINTMENT (OUTPATIENT)
Dept: GENERAL RADIOLOGY | Age: 84
End: 2019-09-09
Attending: EMERGENCY MEDICINE
Payer: MEDICARE

## 2019-09-09 DIAGNOSIS — F32.A DEPRESSION, UNSPECIFIED DEPRESSION TYPE: ICD-10-CM

## 2019-09-09 DIAGNOSIS — R53.83 FATIGUE, UNSPECIFIED TYPE: Primary | ICD-10-CM

## 2019-09-09 LAB
ALBUMIN SERPL-MCNC: 3.5 G/DL (ref 3.5–5)
ALBUMIN/GLOB SERPL: 0.9 {RATIO} (ref 1.1–2.2)
ALP SERPL-CCNC: 77 U/L (ref 45–117)
ALT SERPL-CCNC: 18 U/L (ref 12–78)
ANION GAP SERPL CALC-SCNC: 8 MMOL/L (ref 5–15)
AST SERPL-CCNC: 12 U/L (ref 15–37)
ATRIAL RATE: 214 BPM
BASOPHILS # BLD: 0 K/UL (ref 0–0.1)
BASOPHILS NFR BLD: 0 % (ref 0–1)
BILIRUB SERPL-MCNC: 1 MG/DL (ref 0.2–1)
BNP SERPL-MCNC: 861 PG/ML
BUN SERPL-MCNC: 30 MG/DL (ref 6–20)
BUN/CREAT SERPL: 13 (ref 12–20)
CALCIUM SERPL-MCNC: 9.1 MG/DL (ref 8.5–10.1)
CALCULATED P AXIS, ECG09: -92 DEGREES
CALCULATED R AXIS, ECG10: 104 DEGREES
CALCULATED T AXIS, ECG11: 42 DEGREES
CHLORIDE SERPL-SCNC: 105 MMOL/L (ref 97–108)
CO2 SERPL-SCNC: 29 MMOL/L (ref 21–32)
COMMENT, HOLDF: NORMAL
CREAT SERPL-MCNC: 2.25 MG/DL (ref 0.7–1.3)
DIAGNOSIS, 93000: NORMAL
DIFFERENTIAL METHOD BLD: ABNORMAL
EOSINOPHIL # BLD: 0.2 K/UL (ref 0–0.4)
EOSINOPHIL NFR BLD: 4 % (ref 0–7)
ERYTHROCYTE [DISTWIDTH] IN BLOOD BY AUTOMATED COUNT: 13.1 % (ref 11.5–14.5)
GLOBULIN SER CALC-MCNC: 3.8 G/DL (ref 2–4)
GLUCOSE SERPL-MCNC: 95 MG/DL (ref 65–100)
HCT VFR BLD AUTO: 38.8 % (ref 36.6–50.3)
HGB BLD-MCNC: 13.1 G/DL (ref 12.1–17)
IMM GRANULOCYTES # BLD AUTO: 0 K/UL (ref 0–0.04)
IMM GRANULOCYTES NFR BLD AUTO: 0 % (ref 0–0.5)
LYMPHOCYTES # BLD: 0.8 K/UL (ref 0.8–3.5)
LYMPHOCYTES NFR BLD: 16 % (ref 12–49)
MCH RBC QN AUTO: 34.2 PG (ref 26–34)
MCHC RBC AUTO-ENTMCNC: 33.8 G/DL (ref 30–36.5)
MCV RBC AUTO: 101.3 FL (ref 80–99)
MONOCYTES # BLD: 0.3 K/UL (ref 0–1)
MONOCYTES NFR BLD: 6 % (ref 5–13)
NEUTS SEG # BLD: 3.8 K/UL (ref 1.8–8)
NEUTS SEG NFR BLD: 74 % (ref 32–75)
NRBC # BLD: 0 K/UL (ref 0–0.01)
NRBC BLD-RTO: 0 PER 100 WBC
PLATELET # BLD AUTO: 195 K/UL (ref 150–400)
PMV BLD AUTO: 10.1 FL (ref 8.9–12.9)
POTASSIUM SERPL-SCNC: 3.3 MMOL/L (ref 3.5–5.1)
PROT SERPL-MCNC: 7.3 G/DL (ref 6.4–8.2)
Q-T INTERVAL, ECG07: 426 MS
QRS DURATION, ECG06: 90 MS
QTC CALCULATION (BEZET), ECG08: 446 MS
RBC # BLD AUTO: 3.83 M/UL (ref 4.1–5.7)
SAMPLES BEING HELD,HOLD: NORMAL
SODIUM SERPL-SCNC: 142 MMOL/L (ref 136–145)
TROPONIN I SERPL-MCNC: <0.05 NG/ML
TSH SERPL DL<=0.05 MIU/L-ACNC: 1.08 UIU/ML (ref 0.36–3.74)
VENTRICULAR RATE, ECG03: 66 BPM
WBC # BLD AUTO: 5.2 K/UL (ref 4.1–11.1)

## 2019-09-09 PROCEDURE — 80053 COMPREHEN METABOLIC PANEL: CPT

## 2019-09-09 PROCEDURE — 71045 X-RAY EXAM CHEST 1 VIEW: CPT

## 2019-09-09 PROCEDURE — 93005 ELECTROCARDIOGRAM TRACING: CPT

## 2019-09-09 PROCEDURE — 84443 ASSAY THYROID STIM HORMONE: CPT

## 2019-09-09 PROCEDURE — 85025 COMPLETE CBC W/AUTO DIFF WBC: CPT

## 2019-09-09 PROCEDURE — 99283 EMERGENCY DEPT VISIT LOW MDM: CPT

## 2019-09-09 PROCEDURE — 36415 COLL VENOUS BLD VENIPUNCTURE: CPT

## 2019-09-09 PROCEDURE — 99284 EMERGENCY DEPT VISIT MOD MDM: CPT

## 2019-09-09 PROCEDURE — 84484 ASSAY OF TROPONIN QUANT: CPT

## 2019-09-09 PROCEDURE — 83880 ASSAY OF NATRIURETIC PEPTIDE: CPT

## 2019-09-09 NOTE — ED TRIAGE NOTES
Pt states that he feels generalized weakness. He states that she was seen for the same symptoms about a month ago with no findings. Pt states that he has recently been getting SOB at rest and with some exertion.

## 2019-09-10 VITALS
DIASTOLIC BLOOD PRESSURE: 92 MMHG | RESPIRATION RATE: 19 BRPM | HEART RATE: 66 BPM | SYSTOLIC BLOOD PRESSURE: 133 MMHG | OXYGEN SATURATION: 99 %

## 2019-09-10 NOTE — ED NOTES
1:  MD reviewed discharge instructions and options with patient and patient verbalized understanding. RN reviewed discharge instructions using teachback method. Pt ambulated to exit without difficulty and in no signs of acute distress escorted by self, and self  will drive home. No complaints or needs expressed at this time. Patient was counseled on medications prescribed at discharge. VSS, verbalized relief from most intense pain. Patient to call PCP in the morning for appointment.

## 2019-09-10 NOTE — DISCHARGE INSTRUCTIONS
Patient Education        Learning About Depression Screening  What is depression screening? Depression screening is a way to see if you have depression symptoms. It may be done by a doctor or counselor. This screening is often part of a routine checkup. That's because your mental health is just as important as your physical health. Depression is a medical illness that affects how you feel, think, and act. You may:  · Have less energy. · Lose interest in your daily activities. · Feel sad and grouchy for a long time. Depression is very common. It affects men and women of all ages. Many things can trigger depression. Some people become depressed after they have a stroke or find out they have a major illness like cancer or heart disease. The death of a loved one, a breakup, or changes in the natural brain chemicals may lead to depression. It can run in families. Most experts believe that a combination of family history (a person's genes) and stressful life events can cause depression. What happens during screening? Your doctor may ask about your feelings, any changes in eating habits, your energy level, and your interest in your daily tasks. He or she may ask other questions, such as how well you are sleeping and if you can focus on the things you do. This may be an informal talk between the two of you. Or your doctor may ask you to fill out a quick form and then talk about your answers. Some diseases or changes in your body can cause symptoms that look like depression. So your doctor may do blood tests to help rule out other problems, such as hormone changes, a low thyroid level, or anemia. What happens after screening? If you have signs of depression, your doctor will talk to you about your options. Doctors usually treat depression with medicines or counseling. Often, combining the two works best. Many people don't get help because they think that they'll get over the depression on their own.  But people with depression may not get better unless they get treatment. Many people feel embarrassed or ashamed about having depression. But it isn't a sign of personal weakness. It's not a character flaw. A person who is depressed is not \"crazy. \" Depression is caused by changes in the brain. A serious symptom of depression is thinking about death or suicide. If you or someone you care about talks about this or about feeling hopeless, get help right away. It's important to know that depression can be treated. The first step toward feeling better is often just seeing that the problem exists. Where can you learn more? Go to http://buckIntelliworksanjelica.info/. Enter T185 in the search box to learn more about \"Learning About Depression Screening. \"  Current as of: September 11, 2018  Content Version: 12.1  © 8751-1651 [employer]. Care instructions adapted under license by [employer] (which disclaims liability or warranty for this information). If you have questions about a medical condition or this instruction, always ask your healthcare professional. [address] any warranty or liability for your use of this information.

## 2019-09-10 NOTE — ED PROVIDER NOTES
81 y.o. male with past medical history significant for HTN, CKD, Alcohol abuse, paroxysmal atrial fibrillation, Multiple myeloma, heart failure who presents from home with chief complaint of fatigue. Pt states over the past month he has had persistent fatigue, generalized weakness and lack of energy. He notes he no longer wants to do anything or leave his house, stating he used to be active but no longer \"has energy. \" He notes the symptoms wax and wan, noting \"some days are worse than others,\" but today was worse than usual. Pt repeatedly states he is fatigued and \"has no energy, but also expressed trouble sleeping at night. He no longer has an appetite, has had recent unexpected weight loss and has mild shortness of breath. Pt further states \"I think i'm getting ready to die. \" He lives alone and does not have any family in the area. He denies any pain. He is currently on Eliquis. Pt is followed by a PCP and has told him about his current complaints. Pt specifically denies any fever, chills, headache, cough, congestion, leg swelling, sore throat, rash, chest pain, abdominal pain, nausea, vomiting, diarrhea, dysuria, or urinary frequency. There are no other acute medical concerns at this time. PMHx: Significant for HTN, Gout, osteoarthritis, CKD, Alcohol abuse, paroxysmal atrial fibrillation, Multiple myeloma, heart failure, Hemorrhoids    PSHx: Significant for bilateral knee replacement, cardiac catheterization  Social Hx: negative tobacco use, occasional EtOH use, negative Illicit Drug use    PCP: Alok Castro MD    Note written by Mika Christie, as dictated by Almas Kirk MD 12:59 AM    The history is provided by the patient. No  was used.         Past Medical History:   Diagnosis Date    Adverse effect of anesthesia     nasal procedure didn't work-patient awake    Alcohol abuse     Aortic regurgitation     Chronic kidney disease     CKD [facility]     Episodic lightheadedness 2017    Episodic lightheadedness     Epistaxis 2016    Erectile dysfunction     GI bleed     Gout     gout, osteoarthritis    Heart failure (HCC)     acute heart failure    Hemorrhoids     History of cardioversion 2017    Hypertension     Ill-defined condition     weakness    Multiple myeloma (HCC)     [facility]    eliquis    Pulmonary HTN ([facility]     Right-sided epistaxis 2016    S/P AVR (aortic valve replacement) 2016    merrill gill md -     S/P cardiac cath 16    severe ai, normal coronaries     S/P colonoscopy 10-8-04    S/P colonoscopy with polypectomy 2016    Kar Sandoval MD    Skin lesion        Past Surgical History:   Procedure Laterality Date    CARDIAC SURG PROCEDURE UNLIST      cardiac cath   William CARDIAC SURG PROCEDURE UNLIST  2016    AVR    HX HEENT      nasal procedure 20 years ago   William HX KNEE REPLACEMENT Bilateral     bilateral knee replacement    HX ORTHOPAEDIC Bilateral     wrist         Family History:   Problem Relation Age of Onset    Anemia Mother     No Known Problems Father     Other Brother         drug abuse       Social History     Socioeconomic History    Marital status:      Spouse name: Not on file    Number of children: Not on file    Years of education: Not on file    Highest education level: Not on file   Occupational History    Not on file   Social Needs    Financial resource strain: Not on file    Food insecurity:     Worry: Not on file     Inability: Not on file    Transportation needs:     Medical: Not on file     Non-medical: Not on file   Tobacco Use    Smoking status: Former Smoker     Years: 4.00     Last attempt to quit: 1960     Years since quittin.7    Smokeless tobacco: Never Used   Substance and Sexual Activity    Alcohol use: Not Currently     Comment: ocassional    Drug use: No    Sexual activity: Not Currently   Lifestyle    Physical activity:     Days per week: Not on file     Minutes per session: Not on file    Stress: Not on file   Relationships    Social connections:     Talks on phone: Not on file     Gets together: Not on file     Attends [employer] service: Not on file     Active member of club or organization: Not on file     Attends meetings of clubs or organizations: Not on file     Relationship status: Not on file    Intimate partner violence:     Fear of current or ex partner: Not on file     Emotionally abused: Not on file     Physically abused: Not on file     Forced sexual activity: Not on file   Other Topics Concern    Not on file   Social History Narrative    Family History: Mother:  36 yrs, Chronic anemiaFather:  80 yrs, I think aneurysm hypertensionBrother(s):  79 yrs,    obese,dm,drug abuse,miSon(s): alive, adopted1 son(s) . Social History: Alcohol Use Patient uses alcohol, Drinks per occasion: 2, Drinks per w [place]: 10. Smoking Status Patient is a never smoker. Marital Status: . Lives w ith: alone. Occupation/W ork: employed full time insurance. Education/School: has highschool diploma. ALLERGIES: Amiodarone    Review of Systems   Constitutional: Positive for appetite change, fatigue and unexpected weight change. Negative for chills and fever. HENT: Negative for congestion and sore throat. Eyes: Negative for pain. Respiratory: Positive for shortness of breath. Negative for cough. Cardiovascular: Negative for chest pain and leg swelling. Gastrointestinal: Negative for abdominal pain, diarrhea, nausea and vomiting. Genitourinary: Negative for dysuria and flank pain. Musculoskeletal: Negative for back pain and neck pain. Skin: Negative for rash. Neurological: Positive for weakness. Negative for dizziness and headaches.    Psychiatric/Behavioral: Positive for behavioral problems and sleep disturbance. Vitals:    09/09/19 1708 09/09/19 1727 09/09/19 2330 09/10/19 0100   BP:   156/90 (!) 133/92   Pulse: 79 77 (!) 53 66   Resp:   18 19   SpO2: 98% 99% 100% 99%   Temp 98F Oral         Physical Exam   Constitutional: He is oriented to person, place, and time. He appears well-developed and well-nourished. HENT:   Head: Normocephalic. Mouth/Throat: Oropharynx is clear and moist and mucous membranes are normal.   Eyes: Conjunctivae are normal.   Neck: Normal range of motion. Neck supple. No thyromegaly present. Cardiovascular: Normal rate. An irregularly irregular rhythm present. No murmur heard. Pulmonary/Chest: Effort normal and breath sounds normal. No respiratory distress. Abdominal: Soft. Bowel sounds are normal. There is no tenderness. Musculoskeletal: Normal range of motion. No leg swelling   Neurological: He is alert and oriented to person, place, and time. No gross motor or sensory deficits   Skin: Skin is warm. Capillary refill takes less than 2 seconds. No rash noted. Note written by Mika Darling, as dictated by Dwayne Monroy MD 12:59 AM     MDM  Number of Diagnoses or Management Options  Depression, unspecified depression type: Fatigue, unspecified type:   Diagnosis management comments: Patient reporting fatigue and poor sleep and appetite. Loss of interest and activities used to enjoy. Concerns about money. No suicidal thoughts or plan. No hallucinations or HI. Labs unremarkable. Normal TSH. Discussed with patient my concern that patient has depression and advised that he call his family doctor to discuss his symptoms and possible initiation of medications to treat his symptoms. Patient understands and is agreeable with plan.        Amount and/or Complexity of Data Reviewed  Clinical lab tests: reviewed  Tests in the radiology section of CPT®: reviewed  Tests in the medicine section of CPT®: reviewed           Procedures      12:46 AM  Patient's results have been reviewed with them. Patient and/or family have verbally conveyed their understanding and agreement of the patient's signs, symptoms, diagnosis, treatment and prognosis and additionally agree to follow up as recommended or return to the Emergency Room should their condition change prior to follow-up. Discharge instructions have also been provided to the patient with some educational information regarding their diagnosis as well a list of reasons why they would want to return to the ER prior to their follow-up appointment should their condition change.

## 2019-09-12 ENCOUNTER — OFFICE VISIT (OUTPATIENT)
Dept: INTERNAL MEDICINE CLINIC | Age: 84
End: 2019-09-12

## 2019-09-12 DIAGNOSIS — D47.2 MGUS (MONOCLONAL GAMMOPATHY OF UNKNOWN SIGNIFICANCE): ICD-10-CM

## 2019-09-12 DIAGNOSIS — F10.10 ALCOHOL ABUSE: ICD-10-CM

## 2019-09-12 DIAGNOSIS — F32.A DEPRESSION, UNSPECIFIED DEPRESSION TYPE: ICD-10-CM

## 2019-09-12 DIAGNOSIS — I10 ESSENTIAL HYPERTENSION: Primary | ICD-10-CM

## 2019-09-12 DIAGNOSIS — N18.30 CKD (CHRONIC KIDNEY DISEASE), STAGE III (HCC): ICD-10-CM

## 2019-09-12 DIAGNOSIS — I27.20 PULMONARY HTN (HCC): ICD-10-CM

## 2019-09-12 RX ORDER — ESCITALOPRAM OXALATE 10 MG/1
10 TABLET ORAL DAILY
Qty: 30 TAB | Refills: 4 | Status: SHIPPED | OUTPATIENT
Start: 2019-09-12 | End: 2020-02-12

## 2019-09-12 NOTE — PROGRESS NOTES
SPORTS MEDICINE AND PRIMARY CARE  Gabriel Corea MD, [address] [address]  Phone:  [phone]  Fax: [phone]       Chief Complaint   Patient presents with   [facility] ED Follow-up   . SUBJECTIVE:    Martell Braxton is a 81 y.o. male Patient returns today with known history of primary hypertension, MGUS, pulmonary hypertension, CKD, history of alcohol abuse, and is seen for evaluation. Since we last saw him he was seen in the ER on 09/09 by Arpit Montemayor MD, complaining of fatigue, generalized weakness, lack of energy, does not want to do anything or leave the house. He used to be active, but just does not have the energy to be active, he told the ER physician. He was felt to be depressed. Labs were unremarkable that were done in the ER, including CBC, CMP, creatinine 2.25, BUN 30, troponin levels that were normal, TSH that was normal, chest xray was negative. Patient is seen for evaluation. Patient returns today stating he had thought about asking the [facility] to take him away from here. He does not have a plan and is not suicidal, however. 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 Current Outpatient Medications   Medication Sig Dispense Refill    escitalopram oxalate (LEXAPRO) 10 mg tablet Take 1 Tab by mouth daily. 30 Tab 4    metoprolol succinate (TOPROL-XL) 25 mg XL tablet Take 0.5 Tabs by mouth nightly. 90 Tab 3    simvastatin (ZOCOR) 40 mg tablet TAKE 1 TABLET BY MOUTH EVERYDAY AT BEDTIME 90 Tab 3    acyclovir (ZOVIRAX) 400 mg tablet TAKE 1 TABLET BY MOUTH TWO TIMES PER DAY UNTIL SEEN  3    prednisoLONE acetate (PRED FORTE) 1 % ophthalmic suspension INSTILL 1 DROP INTO RIGHT EYE TWICE A DAY  0    timolol (TIMOPTIC) 0.5 % ophthalmic solution INSTILL 1 DROP INTO RIGHT EYE EVERY MORNING  0    allopurinol (ZYLOPRIM) 100 mg tablet TAKE 1 TAB BY MOUTH DAILY.  180 Tab 2    bumetanide (BUMEX) 1 mg tablet Take 1 Tab by mouth daily. 90 Tab 3    ELIQUIS 2.5 mg tablet TAKE 1 TAB BY MOUTH TWO (2) TIMES A DAY. 180 Tab 3    propafenone (RYTHMOL) 150 mg tablet Take 1 Tab by mouth two (2) times a day. 90 Tab 5    potassium chloride SR (K-TAB) 20 mEq tablet TAKE 1 TABLET BY ORAL ROUTE EVERY DAY WITH FOOD  5    sodium chloride (OCEAN) 0.65 % nasal spray 2 Sprays by Both Nostrils route four (4) times daily. 45 mL 11     Past Medical History:   Diagnosis Date    Adverse effect of anesthesia     nasal procedure didn't work-patient awake    Alcohol abuse     Aortic regurgitation     Chronic kidney disease     CKD (chronic kidney disease), stage III ([address] 2011    Episodic lightheadedness 07/28/2017    Episodic lightheadedness     Epistaxis 11/27/2016    Erectile dysfunction     GI bleed     Gout     gout, osteoarthritis    Heart failure (HCC)     acute heart failure    Hemorrhoids     History of cardioversion 02/17/2017    Hypertension     Ill-defined condition     weakness    Multiple myeloma (HCC)     PAF (paroxysmal atrial fibrillation) ([facility] 02/17/2016    eliquis    Pulmonary HTN ([facility]     Right-sided epistaxis 11/27/2016    S/P AVR (aortic valve replacement) 07/26/2016    merrill gill md -     S/P cardiac cath 7/6/16    severe ai, normal coronaries     S/P colonoscopy 10-8-04    S/P colonoscopy with polypectomy 05/09/2016    Kar Avalos MD    Skin lesion      Past Surgical History:   Procedure Laterality Date    CARDIAC SURG PROCEDURE UNLIST      cardiac cath   [facility] CARDIAC SURG PROCEDURE UNLIST  07/26/2016    AVR    HX HEENT      nasal procedure 20 years ago    HX KNEE REPLACEMENT Bilateral     bilateral knee replacement    HX ORTHOPAEDIC Bilateral     wrist     Allergies   Allergen Reactions    Amiodarone Other (comments)     Bradycardia -excessive         REVIEW OF SYSTEMS:  General: negative for - chills or fever  ENT: negative for - headaches, nasal congestion or tinnitus  Respiratory: negative for - cough, hemoptysis, shortness of breath or wheezing  Cardiovascular : negative for - chest pain, edema, palpitations or shortness of breath  Gastrointestinal: negative for - abdominal pain, blood in stools, heartburn or nausea/vomiting  Genito-Urinary: no dysuria, trouble voiding, or hematuria  Musculoskeletal: negative for - gait disturbance, joint pain, joint stiffness or joint swelling  Neurological: no TIA or stroke symptoms  Hematologic: no bruises, no bleeding, no swollen glands  Integument: no lumps, mole changes, nail changes or rash  Endocrine: no malaise/lethargy or unexpected weight changes      Social History     Socioeconomic History    Marital status:      Spouse name: Not on file    Number of children: Not on file    Years of education: Not on file    Highest education level: Not on file   Tobacco Use    Smoking status: Former Smoker     Years: 4.00     Last attempt to quit: 1960     Years since quittin.7    Smokeless tobacco: Never Used   Substance and Sexual Activity    Alcohol use: Not Currently     Comment: ocassional    Drug use: No    Sexual activity: Not Currently   Social History Narrative    Family History: Mother:  36 yrs, Chronic anemiaFather:  80 yrs, I think aneurysm hypertensionBrother(s):  79 yrs,    obese,dm,drug abuse,miSon(s): alive, adopted1 son(s) . Social History: Alcohol Use Patient uses alcohol, Drinks per occasion: 2, Drinks per w [place]: 10. Smoking Status Patient is a never smoker. Marital Status: . Lives w ith: alone. Occupation/W ork: employed full time insurance. Education/School: has highschool diploma.      Family History   Problem Relation Age of Onset    Anemia Mother     No Known Problems Father     Other Brother         drug abuse       OBJECTIVE:    Visit Vitals  BP (!) 151/101   Pulse (!) 59   Temp 97.8 °F (36.6 °C) (Oral)   Resp 16   Ht 5' 8\" (1.727 m)   Wt 151 lb 9.6 oz (68.8 kg)   SpO2 95%   BMI 23.05 kg/m²     CONSTITUTIONAL: well , well nourished, appears age appropriate  EYES: perrla, eom intact  ENMT:moist mucous membranes, pharynx clear  NECK: supple. Thyroid normal  RESPIRATORY: Chest: clear bilaterally   CARDIOVASCULAR: Heart: regular rate and rhythm  GASTROINTESTINAL: Abdomen: soft, bowel sounds active  HEMATOLOGIC: no pathological lymph nodes palpated  MUSCULOSKELETAL: Extremities: no edema, pulse 1+   INTEGUMENT: No unusual rashes or suspicious skin lesions noted. Nails appear normal.  NEUROLOGIC: non-focal exam   MENTAL STATUS: alert and oriented, appropriate affect           ASSESSMENT:  1. Essential hypertension    2. MGUS (monoclonal gammopathy of unknown significance)    3. Pulmonary HTN ([address]    4. CKD (chronic kidney disease), stage III ([address]    5. Alcohol abuse    6. Depression, unspecified depression type      Repeat blood pressure is 140/88, which is acceptable. The MGUS is followed by Dr. Elaine Bravo and he has been stable. His CKD is stable. There is a history of alcohol abuse. He is not drinking alcohol currently. I think he is depressed. He does not disagree. Will place him on Lexapro. Will refer him to psychiatry. He will be back to see me in a month. Once again he is not suicidal.      I have discussed the diagnosis with the patient and the intended plan as seen in the  orders above. The patient understands and agees with the plan. The patient has   received an after visit summary and questions were answered concerning  future plans  Patient labs and/or xrays were reviewed  Past records were reviewed. PLAN:  .  Orders Placed This Encounter    REFERRAL TO PSYCHIATRY    escitalopram oxalate (LEXAPRO) 10 mg tablet       Follow-up and Dispositions    · Return in about 4 weeks (around 10/10/2019). ATTENTION:   This medical record was transcribed using an electronic medical records system.   Although proofread, it may and can contain electronic and spelling errors. Other human spelling and other errors may be present. Corrections may be executed at a later time. Please feel free to contact us for any clarifications as needed.

## 2019-09-12 NOTE — PROGRESS NOTES
1. Have you been to the ER, urgent care clinic since your last visit? Hospitalized since your last visit? Yes When: 9-9-19 Where: [facility] Reason for visit: feeling weak    2. Have you seen or consulted any other health care providers outside of the [facility] since your last visit? Include any pap smears or colon screening.  No    Complaints of feeling weak

## 2019-09-13 VITALS
RESPIRATION RATE: 16 BRPM | BODY MASS INDEX: 22.97 KG/M2 | DIASTOLIC BLOOD PRESSURE: 88 MMHG | TEMPERATURE: 97.8 F | HEIGHT: 68 IN | SYSTOLIC BLOOD PRESSURE: 140 MMHG | WEIGHT: 151.6 LBS | OXYGEN SATURATION: 95 % | HEART RATE: 59 BPM

## 2019-09-14 ENCOUNTER — APPOINTMENT (OUTPATIENT)
Dept: CT IMAGING | Age: 84
End: 2019-09-14
Attending: EMERGENCY MEDICINE
Payer: MEDICARE

## 2019-09-14 ENCOUNTER — HOSPITAL ENCOUNTER (EMERGENCY)
Age: 84
Discharge: HOME | End: 2019-09-14
Attending: EMERGENCY MEDICINE
Payer: MEDICARE

## 2019-09-14 ENCOUNTER — APPOINTMENT (OUTPATIENT)
Dept: GENERAL RADIOLOGY | Age: 84
End: 2019-09-14
Attending: EMERGENCY MEDICINE
Payer: MEDICARE

## 2019-09-14 VITALS
HEART RATE: 67 BPM | OXYGEN SATURATION: 98 % | RESPIRATION RATE: 16 BRPM | SYSTOLIC BLOOD PRESSURE: 149 MMHG | DIASTOLIC BLOOD PRESSURE: 97 MMHG | TEMPERATURE: 98.8 F

## 2019-09-14 DIAGNOSIS — F32.A DEPRESSION, UNSPECIFIED DEPRESSION TYPE: ICD-10-CM

## 2019-09-14 DIAGNOSIS — R42 DIZZINESS: Primary | ICD-10-CM

## 2019-09-14 LAB
ALBUMIN SERPL-MCNC: 3.5 G/DL (ref 3.5–5)
ALBUMIN/GLOB SERPL: 1 {RATIO} (ref 1.1–2.2)
ALP SERPL-CCNC: 78 U/L (ref 45–117)
ALT SERPL-CCNC: 20 U/L (ref 12–78)
AMPHET UR QL SCN: NEGATIVE
ANION GAP SERPL CALC-SCNC: 9 MMOL/L (ref 5–15)
APPEARANCE UR: CLEAR
AST SERPL-CCNC: 16 U/L (ref 15–37)
BACTERIA URNS QL MICRO: NEGATIVE /HPF
BARBITURATES UR QL SCN: NEGATIVE
BASOPHILS # BLD: 0 K/UL (ref 0–0.1)
BASOPHILS NFR BLD: 0 % (ref 0–1)
BENZODIAZ UR QL: NEGATIVE
BILIRUB SERPL-MCNC: 1 MG/DL (ref 0.2–1)
BILIRUB UR QL: NEGATIVE
BUN SERPL-MCNC: 31 MG/DL (ref 6–20)
BUN/CREAT SERPL: 16 (ref 12–20)
CALCIUM SERPL-MCNC: 9 MG/DL (ref 8.5–10.1)
CANNABINOIDS UR QL SCN: NEGATIVE
CHLORIDE SERPL-SCNC: 105 MMOL/L (ref 97–108)
CO2 SERPL-SCNC: 28 MMOL/L (ref 21–32)
COCAINE UR QL SCN: NEGATIVE
COLOR UR: ABNORMAL
CREAT SERPL-MCNC: 2 MG/DL (ref 0.7–1.3)
DIFFERENTIAL METHOD BLD: ABNORMAL
DRUG SCRN COMMENT,DRGCM: NORMAL
EOSINOPHIL # BLD: 0.1 K/UL (ref 0–0.4)
EOSINOPHIL NFR BLD: 3 % (ref 0–7)
EPITH CASTS URNS QL MICRO: ABNORMAL /LPF
ERYTHROCYTE [DISTWIDTH] IN BLOOD BY AUTOMATED COUNT: 13.2 % (ref 11.5–14.5)
ETHANOL SERPL-MCNC: <10 MG/DL
GLOBULIN SER CALC-MCNC: 3.6 G/DL (ref 2–4)
GLUCOSE SERPL-MCNC: 89 MG/DL (ref 65–100)
GLUCOSE UR STRIP.AUTO-MCNC: NEGATIVE MG/DL
HCT VFR BLD AUTO: 37.9 % (ref 36.6–50.3)
HGB BLD-MCNC: 12.9 G/DL (ref 12.1–17)
HGB UR QL STRIP: ABNORMAL
HYALINE CASTS URNS QL MICRO: ABNORMAL /LPF (ref 0–5)
IMM GRANULOCYTES # BLD AUTO: 0 K/UL (ref 0–0.04)
IMM GRANULOCYTES NFR BLD AUTO: 0 % (ref 0–0.5)
KETONES UR QL STRIP.AUTO: NEGATIVE MG/DL
LEUKOCYTE ESTERASE UR QL STRIP.AUTO: NEGATIVE
LYMPHOCYTES # BLD: 1 K/UL (ref 0.8–3.5)
LYMPHOCYTES NFR BLD: 20 % (ref 12–49)
MCH RBC QN AUTO: 34.3 PG (ref 26–34)
MCHC RBC AUTO-ENTMCNC: 34 G/DL (ref 30–36.5)
MCV RBC AUTO: 100.8 FL (ref 80–99)
METHADONE UR QL: NEGATIVE
MONOCYTES # BLD: 0.4 K/UL (ref 0–1)
MONOCYTES NFR BLD: 7 % (ref 5–13)
NEUTS SEG # BLD: 3.6 K/UL (ref 1.8–8)
NEUTS SEG NFR BLD: 70 % (ref 32–75)
NITRITE UR QL STRIP.AUTO: NEGATIVE
NRBC # BLD: 0 K/UL (ref 0–0.01)
NRBC BLD-RTO: 0 PER 100 WBC
OPIATES UR QL: NEGATIVE
PCP UR QL: NEGATIVE
PH UR STRIP: 5.5 [PH] (ref 5–8)
PLATELET # BLD AUTO: 194 K/UL (ref 150–400)
PMV BLD AUTO: 10.1 FL (ref 8.9–12.9)
POTASSIUM SERPL-SCNC: 3.2 MMOL/L (ref 3.5–5.1)
PROT SERPL-MCNC: 7.1 G/DL (ref 6.4–8.2)
PROT UR STRIP-MCNC: 100 MG/DL
RBC # BLD AUTO: 3.76 M/UL (ref 4.1–5.7)
RBC #/AREA URNS HPF: ABNORMAL /HPF (ref 0–5)
SODIUM SERPL-SCNC: 142 MMOL/L (ref 136–145)
SP GR UR REFRACTOMETRY: 1.01 (ref 1–1.03)
TROPONIN I SERPL-MCNC: <0.05 NG/ML
UA: UC IF INDICATED,UAUC: ABNORMAL
UROBILINOGEN UR QL STRIP.AUTO: 1 EU/DL (ref 0.2–1)
WBC # BLD AUTO: 5.2 K/UL (ref 4.1–11.1)
WBC URNS QL MICRO: ABNORMAL /HPF (ref 0–4)

## 2019-09-14 PROCEDURE — 70450 CT HEAD/BRAIN W/O DYE: CPT

## 2019-09-14 PROCEDURE — 71046 X-RAY EXAM CHEST 2 VIEWS: CPT

## 2019-09-14 PROCEDURE — 36415 COLL VENOUS BLD VENIPUNCTURE: CPT

## 2019-09-14 PROCEDURE — 84484 ASSAY OF TROPONIN QUANT: CPT

## 2019-09-14 PROCEDURE — 80307 DRUG TEST PRSMV CHEM ANLYZR: CPT

## 2019-09-14 PROCEDURE — 93005 ELECTROCARDIOGRAM TRACING: CPT

## 2019-09-14 PROCEDURE — 81001 URINALYSIS AUTO W/SCOPE: CPT

## 2019-09-14 PROCEDURE — 80053 COMPREHEN METABOLIC PANEL: CPT

## 2019-09-14 PROCEDURE — 99284 EMERGENCY DEPT VISIT MOD MDM: CPT

## 2019-09-14 PROCEDURE — 85025 COMPLETE CBC W/AUTO DIFF WBC: CPT

## 2019-09-14 PROCEDURE — 90791 PSYCH DIAGNOSTIC EVALUATION: CPT

## 2019-09-14 RX ORDER — MECLIZINE HYDROCHLORIDE 25 MG/1
25 TABLET ORAL
Qty: 30 TAB | Refills: 0 | Status: SHIPPED | OUTPATIENT
Start: 2019-09-14 | End: 2019-10-01 | Stop reason: SDUPTHER

## 2019-09-14 NOTE — ED PROVIDER NOTES
81 y.o. male with past medical history significant for A-fib, HTN, gout, aortic regurgitation, CKD, S/P colonoscopy, alcohol abuse, S/P AVR, epistaxis, and heart failure who presents from wheelchair with chief complaint of depression; suicidal. Pt reports recent depression and suicidal ideation. Pt also c/o intermittent dizziness for 1 month. Pt notes he is unsteady on his feet. Pt states his dizziness is worse on \"uneven surfaces\". Pt was evaluated by Carlene Huddleston MD (PCP) on 09/12/19 and was prescribed Lexapro. Pt reports he is taking Eliquis for A-fib. Pt denies previous suicide attempt. Pt denies fever, syncope, chest pain, palpitations, or headache. There are no other acute medical concerns at this time. Social hx: Denies EtOH, tobacco, or illicit drug use. PCP: Carlene Huddleston MD    Note written by Mika Scott, as dictated by Zunilda Sykes MD 6:25 PM      The history is provided by the patient. No  was used. Past Medical History:   Diagnosis Date    Adverse effect of anesthesia     nasal procedure didn't work-patient awake    Alcohol abuse     Aortic regurgitation     Chronic kidney disease     CKD (chronic kidney disease), stage III ([address] 2011    Episodic lightheadedness 07/28/2017    Episodic lightheadedness     Epistaxis 11/27/2016    Erectile dysfunction     GI bleed     Gout     gout, osteoarthritis    Heart failure (HCC)     acute heart failure    Hemorrhoids     History of cardioversion 02/17/2017    Hypertension     Ill-defined condition     weakness    Multiple myeloma (HCC)     PAF (paroxysmal atrial fibrillation) ([address] 02/17/2016    eliquis    Pulmonary HTN ([address]     Right-sided epistaxis 11/27/2016    S/P AVR (aortic valve replacement) 07/26/2016    merrill gill md -     S/P cardiac cath 7/6/16    severe ai, normal coronaries     S/P colonoscopy 10-8-04    S/P colonoscopy with polypectomy 05/09/2016    Kar Arthur, MD    Skin lesion        Past Surgical History:   Procedure Laterality Date    CARDIAC SURG PROCEDURE UNLIST      cardiac cath   [facility] CARDIAC SURG PROCEDURE UNLIST  2016    AVR    HX HEENT      nasal procedure 20 years ago   [facility] HX KNEE REPLACEMENT Bilateral     bilateral knee replacement    HX ORTHOPAEDIC Bilateral     wrist         Family History:   Problem Relation Age of Onset    Anemia Mother     No Known Problems Father     Other Brother         drug abuse       Social History     Socioeconomic History    Marital status:      Spouse name: Not on file    Number of children: Not on file    Years of education: Not on file    Highest education level: Not on file   Occupational History    Not on file   Social Needs    Financial resource strain: Not on file    Food insecurity:     Worry: Not on file     Inability: Not on file    Transportation needs:     Medical: Not on file     Non-medical: Not on file   Tobacco Use    Smoking status: Former Smoker     Years: 4.00     Last attempt to quit: 1960     Years since quittin.7    Smokeless tobacco: Never Used   Substance and Sexual Activity    Alcohol use: Not Currently     Comment: ocassional    Drug use: No    Sexual activity: Not Currently   Lifestyle    Physical activity:     Days per week: Not on file     Minutes per session: Not on file    Stress: Not on file   Relationships    Social connections:     Talks on phone: Not on file     Gets together: Not on file     Attends Islam service: Not on file     Active member of club or organization: Not on file     Attends meetings of clubs or organizations: Not on file     Relationship status: Not on file    Intimate partner violence:     Fear of current or ex partner: Not on file     Emotionally abused: Not on file     Physically abused: Not on file     Forced sexual activity: Not on file   Other Topics Concern    Not on file   Social History Narrative    Family History: Mother:  36 yrs, Chronic anemiaFather:  80 yrs, I think aneurysm hypertensionBrother(s):  79 yrs,    obese,dm,drug abuse,miSon(s): alive, adopted1 son(s) . Social History: Alcohol Use Patient uses alcohol, Drinks per occasion: 2, Drinks per w [place]: 10. Smoking Status Patient is a never smoker. Marital Status: . Lives w ith: alone. Occupation/W ork: employed full time insurance. Education/School: has highschool diploma. ALLERGIES: Amiodarone    Review of Systems   Constitutional: Negative for fever. Eyes: Negative for visual disturbance. Respiratory: Positive for shortness of breath. Negative for cough and wheezing. Cardiovascular: Negative for chest pain, palpitations and leg swelling. Gastrointestinal: Negative for abdominal pain, diarrhea, nausea and vomiting. Genitourinary: Negative for dysuria. Musculoskeletal: Negative. Negative for back pain and neck stiffness. Skin: Negative for rash. Neurological: Positive for dizziness. Negative for syncope and headaches. Psychiatric/Behavioral: Positive for behavioral problems. Negative for confusion. All other systems reviewed and are negative. Vitals:    19 1651   BP: (!) 145/99   Pulse: 72   Resp: 18   Temp: 97.8 °F (36.6 °C)   SpO2: 97%            Physical Exam   Constitutional: He appears well-developed and well-nourished. No distress. HENT:   Head: Normocephalic. Eyes: Pupils are equal, round, and reactive to light. Right eye exhibits no nystagmus. Left eye exhibits no nystagmus. No nystagmus. Neck: Normal range of motion. Cardiovascular: Normal rate and regular rhythm. No murmur heard. Pulmonary/Chest: Effort normal and breath sounds normal. No respiratory distress. Abdominal: Soft. There is no tenderness. Musculoskeletal: Normal range of motion. He exhibits no edema. Neurological: He is alert. He has normal strength. No cranial nerve deficit.    Skin: Skin is warm and dry.   Psychiatric: He has a normal mood and affect. His behavior is normal.   Nursing note and vitals reviewed. Note written by Mika Aguirre, as dictated by Jade Hui MD 6:25 PM       MDM       Procedures    ED EKG interpretation:  Rhythm: Sinus rhythm with 1st degree AV block; and regular . Rate (approx.): 54 BPM; ST/T wave: Non specific ST changes; Note written by Mika Aguirre, as dictated by Jade Hui MD 7:32 PM    PROGRESS NOTE:  9:42 PM  Pt seen by [facility] AISHWARYA, who thought Pt did not need to be admitted. Pt's labs are unremarkable. Will discharge with medication for dizziness. Pt received follow up info by [facility] AISHWARYA for depression.

## 2019-09-14 NOTE — BSMART NOTE
Comprehensive Assessment Form Part 1      Section I - Disposition    Axis I - Major Depressive d/o, recurrent, mild, without psychotic features with some anxiety     Depression and Anxiety by hx     Alcohol abuse by hx  Axis II - deferred  Axis III - HTN, CKD, paroxysmal atrial fibrillation, Multiple myeloma, heart failure  Axis IV - lives alone, could benefit from in-home mental health support [facility]), lack of structure  [place] V - 50      The Medical Doctor to Psychiatrist conference was not completed. Medical doctor is in agreement with this counselor's assessment and plan of care. The plan is to discharge with out-patient resources, particularly for [facility] and attend [facility] tomorrow to speak to his  and counselor at [employer]orship. The physician consulted was Dr. Ej Roberts. The admitting Psychiatrist will be Dr. Joaquín López. The admitting Diagnosis is n/a. The Payor source is [address] [place] [address]. Section II - Integrated Summary  Summary:     Patient is a 82 yo black male who arrives at ED via EMS with chief complaint of dizziness off and on for the past month. He reports feeling depressed during this time and experienced some suicidal thoughts but no plan or attempts. He reports loss of energy and trouble sleeping at night. Patient said, \"I think I'm just getting older and beginning to feel things I haven't ever felt before, like being depressed. \"   Patient presents as very pleasant and friendly as evidenced by smiling and demonstrating excellent eye contact. When asked about wanting to hurt himself he said, \"Well I've just been depressed off and on so I guess I began to think maybe it's my time to go but I would never do anything like that myself. I'm a Muslim man. \"  Patient denies suicidal ideation at time of assessment contracts for safety, stated he wanted to go to Worship tomorrow, and try [facility] on Monday morning for counseling services.   He denies homicidal ideation, denies auditory/visual hallucinations, is not delusional, and is oriented X4. Patient's ETOH is <10 and drug screen is negative. Thought process was linear, logical, and goal directed. Attention, concentration and judgments were good. Patient has no history of psych admissions, reports no previous suicide attempts, and is not followed by a psychiatrist or counselor He was recently prescribed Lexapro 20mg 1X daily about 3 days ago. He states that he cannot tell any difference but his MD told him it might take a few weeks before he feels any different. Patient is not requesting psychiatric admission but prefers to attend [facility] tomorrow and go to [facility] on Monday to inquire about Mental Health Support services. Counselor provided appropriate resources. The patient has demonstrated mental capacity to provide informed consent. The information is given by the patient and past medical records. The Chief Complaint is depression. The Precipitant Factors are lives alone, just started Lexapro 3 days ago. Previous Hospitalizations: none reported  The patient has not previously been in restraints. Current Psychiatrist and/or  is n/a. Lethality Assessment:    The potential for suicide noted by the following: ideation . The potential for homicide is not noted. The patient has not been a perpetrator of sexual or physical abuse. There are not pending charges. The patient is not felt to be at risk for self harm or harm to others. The attending nurse was advised no further monitoring is necessary at this time. .    Section III - Psychosocial  The patient's overall mood and attitude is pleasant and smiling. Feelings of helplessness and hopelessness are not observed. Generalized anxiety is not observed. Panic is not observed. Phobias are not observed. Obsessive compulsive tendencies are not observed. Section IV - Mental Status Exam  The patient's appearance shows no evidence of impairment.   The patient's behavior shows no evidence of impairment. The patient is oriented to time, place, person and situation. The patient's speech is soft. The patient's mood is euthymic. The range of affect shows no evidence of impairment. The patient's thought content demonstrates no evidence of impairment. The thought process shows no evidence of impairment. The patient's perception shows no evidence of impairment. The patient's memory shows no evidence of impairment. The patient's appetite shows no evidence of impairment. The patient's sleep shows no evidence of impairment. The patient's insight shows no evidence of impairment. The patient's judgement shows no evidence of impairment. Section V - Substance Abuse  The patient is not using substances. Section VI - Living Arrangements  The patient is single. The patient lives alone. The patient has one child age 61. The patient does plan to return home upon discharge. The patient does not have legal issues pending. The patient's source of income comes from social security. Rastafarian and cultural practices have been noted and include: \"I'm a Sabianism\". The patient's greatest support comes from Jew and this person will be involved with the treatment. The patient has not been in an event described as horrible or outside the realm of ordinary life experience either currently or in the past.  The patient has not been a victim of sexual/physical abuse. Section VII - Other Areas of Clinical Concern  The highest grade achieved is 4 years college with the overall quality of school experience being described as ok. The patient is currently unemployed and speaks [place] as a primary language. The patient has no communication impairments affecting communication. The patient's preference for learning can be described as: can read and write adequately.   The patient's hearing is normal.  The patient's vision is normal.      Maytalitan Patience, LPC

## 2019-09-14 NOTE — ED NOTES
1904: Received update from [facility] that patient will cleared from Behavioral health. Patient will go  to [facility] tomorrow and will follow-up with provided resources. 2059: Called CT to inform patient is ready for scan    2123: Patient to CT via stretcher accompanied by ann clarke    2151: Patient verbalizes understanding of discharge instructions and prescription given by this ER RN. Opportunity for questions provided. Patient in no apparent distress. Patient ambulatory upon discharge.    Visit Vitals  BP (!) 149/97 (BP 1 Location: Right arm, BP Patient Position: At rest;Sitting)   Pulse 67   Temp 98.8 °F (37.1 °C)   Resp 16   SpO2 98%

## 2019-09-14 NOTE — ED TRIAGE NOTES
Patient reports that he has been dizzy off and on for 1 month. He has also been thinking about killing himself during that time. No attempt. He would like to speak with someone about the process to be admitted.

## 2019-09-15 LAB
ATRIAL RATE: 326 BPM
CALCULATED R AXIS, ECG10: 101 DEGREES
CALCULATED T AXIS, ECG11: 102 DEGREES
DIAGNOSIS, 93000: NORMAL
Q-T INTERVAL, ECG07: 488 MS
QRS DURATION, ECG06: 94 MS
QTC CALCULATION (BEZET), ECG08: 462 MS
VENTRICULAR RATE, ECG03: 54 BPM

## 2019-09-15 NOTE — DISCHARGE INSTRUCTIONS
Patient Education        Depression Treatment: Care Instructions  Your Care Instructions    Depression is a condition that affects the way you feel, think, and act. It causes symptoms such as low energy, loss of interest in daily activities, and sadness or grouchiness that goes on for a long time. Depression is very common and affects men and women of all ages. Depression is a medical illness caused by changes in the natural chemicals in your brain. It is not a character flaw, and it does not mean that you are a bad or weak person. It does not mean that you are going crazy. It is important to know that depression can be treated. Medicines, counseling, and self-care can all help. Many people do not get help because they are embarrassed or think that they will get over the depression on their own. But some people do not get better without treatment. Follow-up care is a key part of your treatment and safety. Be sure to make and go to all appointments, and call your doctor if you are having problems. It's also a good idea to know your test results and keep a list of the medicines you take. How can you care for yourself at home? Learn about antidepressant medicines  Antidepressant medicines can improve or end the symptoms of depression. You may need to take the medicine for at least 6 months, and often longer. Keep taking your medicine even if you feel better. If you stop taking it too soon, your symptoms may come back or get worse. You may start to feel better within 1 to 3 weeks of taking antidepressant medicine. But it can take as many as 6 to 8 weeks to see more improvement. Talk to your doctor if you have problems with your medicine or if you do not notice any improvement after 3 weeks. Antidepressants can make you feel tired, dizzy, or nervous. Some people have dry mouth, constipation, headaches, sexual problems, an upset stomach, or diarrhea.  Many of these side effects are mild and go away on their own after you take the medicine for a few weeks. Some may last longer. Talk to your doctor if side effects bother you too much. You might be able to try a different medicine. If you are pregnant or breastfeeding, talk to your doctor about what medicines you can take. Learn about counseling  In many cases, counseling can work as well as medicines to treat mild to moderate depression. Counseling is done by licensed mental health providers, such as psychologists, social workers, and some types of nurses. It can be done in one-on-one sessions or in a group setting. Many people find group sessions helpful. Cognitive-behavioral therapy is a type of counseling. In this treatment therapy, you learn how to see and change unhelpful thinking styles that may be adding to your depression. Counseling and medicines often work well when used together. To manage depression  · Be physically active. Getting 30 minutes of exercise each day is good for your body and your mind. Begin slowly if it is hard for you to get started. If you already exercise, keep it up. · Plan something pleasant for yourself every day. Include activities that you have enjoyed in the past.  · Get enough sleep. Talk to your doctor if you have problems sleeping. · Eat a balanced diet. If you do not feel hungry, eat small snacks rather than large meals. · Do not drink alcohol, use illegal drugs, or take medicines that your doctor has not prescribed for you. They may interfere with your treatment. · Spend time with family and friends. It may help to speak openly about your depression with people you trust.  · Take your medicines exactly as prescribed. Call your doctor if you think you are having a problem with your medicine. · Do not make major life decisions while you are depressed. Depression may change the way you think. You will be able to make better decisions after you feel better. · Think positively.  Challenge negative thoughts with statements such as \"I am hopeful\"; \"Things will get better\"; and \"I can ask for the help I need. \" Write down these statements and read them often, even if you don't believe them yet. · Be patient with yourself. It took time for your depression to develop, and it will take time for your symptoms to improve. Do not take on too much or be too hard on yourself. · Learn all you can about depression from written and online materials. · Check out behavioral health classes to learn more about dealing with depression. · Keep the numbers for these national suicide hotlines: 9-419-881-TALK (8-550.501.3068) and 5-878-YQPQGQC (0-657.513.9724). If you or someone you know talks about suicide or feeling hopeless, get help right away. When should you call for help? Call 911 anytime you think you may need emergency care. For example, call if:    · You feel you cannot stop from hurting yourself or someone else.   [facility] your doctor now or seek immediate medical care if:    · You hear voices.     · You feel much more depressed.    Watch closely for changes in your health, and be sure to contact your doctor if:    · You are having problems with your depression medicine.     · You are not getting better as expected. Where can you learn more? Go to http://buck-anjelica.info/. Enter W837 in the search box to learn more about \"Depression Treatment: Care Instructions. \"  Current as of: September 11, 2018  Content Version: 12.1  © 5945-9624 [employer]. Care instructions adapted under license by [employer] (which disclaims liability or warranty for this information). If you have questions about a medical condition or this instruction, always ask your healthcare professional. [address] any warranty or liability for your use of this information.

## 2019-09-20 ENCOUNTER — OFFICE VISIT (OUTPATIENT)
Dept: INTERNAL MEDICINE CLINIC | Age: 84
End: 2019-09-20

## 2019-09-20 VITALS
BODY MASS INDEX: 22.9 KG/M2 | TEMPERATURE: 97.9 F | HEIGHT: 68 IN | RESPIRATION RATE: 18 BRPM | SYSTOLIC BLOOD PRESSURE: 130 MMHG | DIASTOLIC BLOOD PRESSURE: 92 MMHG | HEART RATE: 70 BPM | OXYGEN SATURATION: 98 % | WEIGHT: 151.1 LBS

## 2019-09-20 DIAGNOSIS — N18.30 CKD (CHRONIC KIDNEY DISEASE), STAGE III (HCC): ICD-10-CM

## 2019-09-20 DIAGNOSIS — I35.1 NONRHEUMATIC AORTIC VALVE INSUFFICIENCY: ICD-10-CM

## 2019-09-20 DIAGNOSIS — I27.20 PULMONARY HTN (HCC): ICD-10-CM

## 2019-09-20 DIAGNOSIS — I10 ESSENTIAL HYPERTENSION: Primary | ICD-10-CM

## 2019-09-20 DIAGNOSIS — Z98.890 HISTORY OF CARDIOVERSION: ICD-10-CM

## 2019-09-20 DIAGNOSIS — I48.0 PAF (PAROXYSMAL ATRIAL FIBRILLATION) (HCC): ICD-10-CM

## 2019-09-20 DIAGNOSIS — R42 EPISODIC LIGHTHEADEDNESS: ICD-10-CM

## 2019-09-20 DIAGNOSIS — F10.10 ALCOHOL ABUSE: ICD-10-CM

## 2019-09-20 NOTE — PROGRESS NOTES
1. Have you been to the ER, urgent care clinic since your last visit? Hospitalized since your last visit? Yes When: 8-14-19 Where: [facility] Reason for visit: sick    2. Have you seen or consulted any other health care providers outside of the [address] [place] [address] since your last visit? Include any pap smears or colon screening.  No

## 2019-09-20 NOTE — PROGRESS NOTES
SPORTS MEDICINE AND PRIMARY CARE  Nabila Schofield MD, [address] [address]  Phone:  [phone]  Fax: [phone]      Chief Complaint   Patient presents with    ED Follow-up         SUBECTIVE:    Smitha Abebe. is a 81 y.o. male Patient returns today saying that \"the same old thing is going on\". He has a known history of primary hypertension, paroxysmal atrial fibrillation, pulmonary hypertension, cardioversion history, episodic lightheadedness, CKD, occasional alcohol use, which she has stopped, and aortic regurgitation. Atrial fibrillation is something that comes and goes. Current Outpatient Medications   Medication Sig Dispense Refill    meclizine (ANTIVERT) 25 mg tablet Take 1 Tab by mouth three (3) times daily as needed for Dizziness for up to 10 days. 30 Tab 0    escitalopram oxalate (LEXAPRO) 10 mg tablet Take 1 Tab by mouth daily. 30 Tab 4    metoprolol succinate (TOPROL-XL) 25 mg XL tablet Take 0.5 Tabs by mouth nightly. 90 Tab 3    simvastatin (ZOCOR) 40 mg tablet TAKE 1 TABLET BY MOUTH EVERYDAY AT BEDTIME 90 Tab 3    acyclovir (ZOVIRAX) 400 mg tablet TAKE 1 TABLET BY MOUTH TWO TIMES PER DAY UNTIL SEEN  3    prednisoLONE acetate (PRED FORTE) 1 % ophthalmic suspension INSTILL 1 DROP INTO RIGHT EYE TWICE A DAY  0    timolol (TIMOPTIC) 0.5 % ophthalmic solution INSTILL 1 DROP INTO RIGHT EYE EVERY MORNING  0    allopurinol (ZYLOPRIM) 100 mg tablet TAKE 1 TAB BY MOUTH DAILY. 180 Tab 2    bumetanide (BUMEX) 1 mg tablet Take 1 Tab by mouth daily. 90 Tab 3    ELIQUIS 2.5 mg tablet TAKE 1 TAB BY MOUTH TWO (2) TIMES A DAY. 180 Tab 3    propafenone (RYTHMOL) 150 mg tablet Take 1 Tab by mouth two (2) times a day. 90 Tab 5    potassium chloride SR (K-TAB) 20 mEq tablet TAKE 1 TABLET BY ORAL ROUTE EVERY DAY WITH FOOD  5    sodium chloride (OCEAN) 0.65 % nasal spray 2 Sprays by Both Nostrils route four (4) times daily.  45 mL 11     Past Medical History:   Diagnosis Date    Adverse effect of anesthesia     nasal procedure didn't work-patient awake    Alcohol abuse     Aortic regurgitation     Chronic kidney disease     CKD [facility]     Episodic lightheadedness 2017    Episodic lightheadedness     Epistaxis 2016    Erectile dysfunction     GI bleed     Gout     gout, osteoarthritis    Heart failure (HCC)     acute heart failure    Hemorrhoids     History of cardioversion 2017    Hypertension     Ill-defined condition     weakness    Multiple myeloma (HCC)     PAF (paroxysmal atrial fibrillation) ([facility] 2016    eliquis    [facility]     Right-sided epistaxis 2016    S/P AVR (aortic valve replacement) 2016    merrill gill md -     S/P cardiac cath 16    severe ai, normal coronaries     S/P colonoscopy 10-8-04    S/P colonoscopy with polypectomy 2016    Kar Bhatt MD    Skin lesion      Past Surgical History:   Procedure Laterality Date    CARDIAC SURG PROCEDURE UNLIST      cardiac cath   [facility] CARDIAC SURG PROCEDURE UNLIST  2016    AVR    HX HEENT      nasal procedure 20 years ago    HX KNEE REPLACEMENT Bilateral     bilateral knee replacement    HX ORTHOPAEDIC Bilateral     wrist     Allergies   Allergen Reactions    Amiodarone Other (comments)     Bradycardia -excessive       REVIEW OF SYSTEMS:   Fatigue episodically.         Social History     Socioeconomic History    Marital status:      Spouse name: Not on file    Number of children: Not on file    Years of education: Not on file    Highest education level: Not on file   Tobacco Use    Smoking status: Former Smoker     Years: 4.00     Last attempt to quit: 1960     Years since quittin.7    Smokeless tobacco: Never Used   Substance and Sexual Activity    Alcohol use: Not Currently     Comment: ocassional    Drug use: No    Sexual activity: Not Currently   Social History Narrative    Family History: Mother:  36 yrs, Chronic anemiaFather:  80 yrs, I think aneurysm hypertensionBrother(s):  79 yrs,    obese,dm,drug abuse,miSon(s): alive, adopted1 son(s) . Social History: Alcohol Use Patient uses alcohol, Drinks per occasion: 2, Drinks per w [place]: 10. Smoking Status Patient is a never smoker. Marital Status: . Lives w ith: alone. Occupation/W ork: employed full time insurance. Education/School: has highschool diploma.   r  Family History   Problem Relation Age of Onset    Anemia Mother     No Known Problems Father     Other Brother         drug abuse       OBJECTIVE:  Visit Vitals  BP (!) 130/92   Pulse 70   Temp 97.9 °F (36.6 °C) (Oral)   Resp 18   Ht 5' 8\" (1.727 m)   Wt 151 lb 1.6 oz (68.5 kg)   SpO2 98%   BMI 22.97 kg/m²     ENT: perrla,  eom intact  NECK: supple. Thyroid normal  CHEST: clear to ascultation and percussion   HEART: regular rate and rhythm  ABD: soft, bowel sounds active  EXTREMITIES: no edema, pulse 1+          ASSESSMENT:  1. Essential hypertension    2. PAF (paroxysmal atrial fibrillation) ([address]    3. Pulmonary HTN ([address]    4. History of cardioversion    5. Episodic lightheadedness    6. CKD (chronic kidney disease), stage III ([address]    7. Alcohol abuse    8. Nonrheumatic aortic valve insufficiency      Blood pressure control is adequate. He is in sinus rhythm currently on auscultation. He has a known history of paroxysmal atrial fibrillation. I wondered if paroxysmal atrial fibrillation was causing him to feel bad, but currently he is in sinus rhythm, but still feels bad today. Apparently he was seen in the ER, was given Meclizine, which he has taken and is not ______________. He claims to have stopped alcohol abuse ______________. He will be back to see us in about six months, sooner if he has any problems. I have discussed the diagnosis with the patient and the intended plan as seen in the  orders above.   The patient understands and agees with the plan. The patient has   received an after visit summary and questions were answered concerning  future plans  Patient labs and/or xrays were reviewed  Past records were reviewed. PLAN:  . No orders of the defined types were placed in this encounter. Follow-up and Dispositions    · Return in about 6 weeks (around 11/1/2019). ATTENTION:   This medical record was transcribed using an electronic medical records system. Although proofread, it may and can contain electronic and spelling errors. Other human spelling and other errors may be present. Corrections may be executed at a later time. Please feel free to contact us for any clarifications as needed.

## 2019-09-25 ENCOUNTER — OFFICE VISIT (OUTPATIENT)
Dept: INTERNAL MEDICINE CLINIC | Age: 84
End: 2019-09-25

## 2019-09-25 VITALS
DIASTOLIC BLOOD PRESSURE: 99 MMHG | WEIGHT: 148.3 LBS | SYSTOLIC BLOOD PRESSURE: 144 MMHG | OXYGEN SATURATION: 98 % | TEMPERATURE: 98 F | HEIGHT: 68 IN | RESPIRATION RATE: 18 BRPM | HEART RATE: 72 BPM | BODY MASS INDEX: 22.48 KG/M2

## 2019-09-25 DIAGNOSIS — N18.30 CKD (CHRONIC KIDNEY DISEASE), STAGE III (HCC): ICD-10-CM

## 2019-09-25 DIAGNOSIS — R42 EPISODIC LIGHTHEADEDNESS: ICD-10-CM

## 2019-09-25 DIAGNOSIS — F10.10 ALCOHOL ABUSE: ICD-10-CM

## 2019-09-25 DIAGNOSIS — Z98.890 HISTORY OF CARDIOVERSION: ICD-10-CM

## 2019-09-25 DIAGNOSIS — F32.A DEPRESSION, UNSPECIFIED DEPRESSION TYPE: ICD-10-CM

## 2019-09-25 DIAGNOSIS — I10 ESSENTIAL HYPERTENSION: Primary | ICD-10-CM

## 2019-09-25 DIAGNOSIS — I48.0 PAF (PAROXYSMAL ATRIAL FIBRILLATION) (HCC): ICD-10-CM

## 2019-09-25 NOTE — PROGRESS NOTES
SPORTS MEDICINE AND PRIMARY CARE  Rainer Shetty MD, [address] [address]  Phone:  [phone]  Fax: [phone]      Chief Complaint   Patient presents with    Dizziness         SUBECTIVE:    Bev Patel. is a 81 y.o. male Patient returns today with known history of primary hypertension, paroxysmal a-fib, S/P cardioversion, episodic lightheadedness, CKD stage 3, history of alcohol abuse and depression, and is seen for evaluation. Patient notes that he just feels bad. He notes that his balance is terrible, he has had some dizzy spells, and he just wants to know what is going on. Current Outpatient Medications   Medication Sig Dispense Refill    escitalopram oxalate (LEXAPRO) 10 mg tablet Take 1 Tab by mouth daily. 30 Tab 4    metoprolol succinate (TOPROL-XL) 25 mg XL tablet Take 0.5 Tabs by mouth nightly. 90 Tab 3    simvastatin (ZOCOR) 40 mg tablet TAKE 1 TABLET BY MOUTH EVERYDAY AT BEDTIME 90 Tab 3    acyclovir (ZOVIRAX) 400 mg tablet TAKE 1 TABLET BY MOUTH TWO TIMES PER DAY UNTIL SEEN  3    prednisoLONE acetate (PRED FORTE) 1 % ophthalmic suspension INSTILL 1 DROP INTO RIGHT EYE TWICE A DAY  0    timolol (TIMOPTIC) 0.5 % ophthalmic solution INSTILL 1 DROP INTO RIGHT EYE EVERY MORNING  0    allopurinol (ZYLOPRIM) 100 mg tablet TAKE 1 TAB BY MOUTH DAILY. 180 Tab 2    bumetanide (BUMEX) 1 mg tablet Take 1 Tab by mouth daily. 90 Tab 3    ELIQUIS 2.5 mg tablet TAKE 1 TAB BY MOUTH TWO (2) TIMES A DAY. 180 Tab 3    propafenone (RYTHMOL) 150 mg tablet Take 1 Tab by mouth two (2) times a day. 90 Tab 5    potassium chloride SR (K-TAB) 20 mEq tablet TAKE 1 TABLET BY ORAL ROUTE EVERY DAY WITH FOOD  5    sodium chloride (OCEAN) 0.65 % nasal spray 2 Sprays by Both Nostrils route four (4) times daily.  45 mL 11     Past Medical History:   Diagnosis Date    Adverse effect of anesthesia     nasal procedure didn't work-patient awake    Alcohol abuse     Aortic regurgitation     Chronic kidney disease     CKD (chronic [facility] disease), stage III ([facility]     Depression     Episodic lightheadedness 2017    Episodic lightheadedness     Epistaxis 2016    Erectile dysfunction     GI bleed     Gout     gout, osteoarthritis    Heart failure (HCC)     acute heart failure    Hemorrhoids     History of cardioversion 2017    Hypertension     Ill-defined condition     weakness    Multiple myeloma (HCC)     PAF (paroxysmal atrial fibrillation) ([facility] 2016    eliquis    Pulmonary HTN ([facility]     Right-sided epistaxis 2016    S/P AVR (aortic valve replacement) 2016    merrill gill md -     S/P cardiac cath 16    severe ai, normal coronaries     S/P colonoscopy 10-8-04    S/P colonoscopy with polypectomy 2016    Kar Aguilar MD    Skin lesion      Past Surgical History:   Procedure Laterality Date    CARDIAC SURG PROCEDURE UNLIST      cardiac cath   [facility] CARDIAC SURG PROCEDURE UNLIST  2016    AVR    HX HEENT      nasal procedure 20 years ago    HX KNEE REPLACEMENT Bilateral     bilateral knee replacement    HX ORTHOPAEDIC Bilateral     wrist     Allergies   Allergen Reactions    Amiodarone Other (comments)     Bradycardia -excessive       REVIEW OF SYSTEMS:   No chest pain, no SOB. Social History     Socioeconomic History    Marital status:      Spouse name: Not on file    Number of children: Not on file    Years of education: Not on file    Highest education level: Not on file   Tobacco Use    Smoking status: Former Smoker     Years: 4.00     Last attempt to quit: 1960     Years since quittin.7    Smokeless tobacco: Never Used   Substance and Sexual Activity    Alcohol use: Not Currently     Comment: ocassional    Drug use: No    Sexual activity: Not Currently   Social History Narrative    Family History:  Mother:  36 yrs, Chronic anemiaFather:  80 yrs, I think aneurysm hypertensionBrother(s):  79 yrs,    obese,dm,drug abuse,miSon(s): alive, adopted1 son(s) . Social History: Alcohol Use Patient uses alcohol, Drinks per occasion: 2, Drinks per w [place]: 10. Smoking Status Patient is a never smoker. Marital Status: . Lives w ith: alone. Occupation/W ork: employed full time insurance. Education/School: has highschool diploma.   r  Family History   Problem Relation Age of Onset    Anemia Mother     No Known Problems Father     Other Brother         drug abuse       OBJECTIVE:  Visit Vitals  BP (!) 144/99   Pulse 72   Temp 98 °F (36.7 °C) (Oral)   Resp 18   Ht 5' 8\" (1.727 m)   Wt 148 lb 4.8 oz (67.3 kg)   SpO2 98%   BMI 22.55 kg/m²     ENT: perrla,  eom intact  NECK: supple. Thyroid normal  CHEST: clear to ascultation and percussion   HEART: regular rate and rhythm  ABD: soft, bowel sounds active  EXTREMITIES: no edema, pulse 1+          ASSESSMENT:  1. Essential hypertension    2. PAF (paroxysmal atrial fibrillation) ([address]    3. History of cardioversion    4. Episodic lightheadedness    5. CKD (chronic kidney disease), stage III ([address]    6. Alcohol abuse    7. Depression, unspecified depression type      BP is at the upper limits of normal, no adjustments will be made today. He is in normal sinus rhythm at the time of our visitation. CKD is stable.  ______________ abusing alcohol now. I think the major issue is probably related to depression and if not effective in reversing it will ask him to see a psychiatrist for his opinion. We gave some different things to try and improve his mood and talking to him ______________. He will return to see us in two months or as previously scheduled. I have discussed the diagnosis with the patient and the intended plan as seen in the  orders above. The patient understands and agees with the plan.   The patient has   received an after visit summary and questions were answered concerning  future plans  Patient labs and/or xrays were reviewed  Past records were reviewed. PLAN:  .  Orders Placed This Encounter    REFERRAL TO PSYCHIATRY       Follow-up and Dispositions    · Return in about 1 month (around 10/25/2019). ATTENTION:   This medical record was transcribed using an electronic medical records system. Although proofread, it may and can contain electronic and spelling errors. Other human spelling and other errors may be present. Corrections may be executed at a later time. Please feel free to contact us for any clarifications as needed.

## 2019-09-25 NOTE — PROGRESS NOTES
1. Have you been to the ER, urgent care clinic since your last visit? Hospitalized since your last visit? No    2. Have you seen or consulted any other health care providers outside of the [address] [place] [address] since your last visit? Include any pap smears or colon screening.  No     Dizziness,off balance, and sick

## 2019-09-29 RX ORDER — APIXABAN 2.5 MG/1
TABLET, FILM COATED ORAL
Qty: 180 TAB | Refills: 3 | Status: SHIPPED | OUTPATIENT
Start: 2019-09-29 | End: 2020-10-28 | Stop reason: ALTCHOICE

## 2019-11-01 ENCOUNTER — OFFICE VISIT (OUTPATIENT)
Dept: INTERNAL MEDICINE CLINIC | Age: 84
End: 2019-11-01

## 2019-11-01 VITALS
RESPIRATION RATE: 18 BRPM | HEART RATE: 89 BPM | BODY MASS INDEX: 22.25 KG/M2 | SYSTOLIC BLOOD PRESSURE: 128 MMHG | TEMPERATURE: 97.6 F | WEIGHT: 146.8 LBS | HEIGHT: 68 IN | DIASTOLIC BLOOD PRESSURE: 80 MMHG | OXYGEN SATURATION: 98 %

## 2019-11-01 DIAGNOSIS — I27.20 PULMONARY HTN (HCC): ICD-10-CM

## 2019-11-01 DIAGNOSIS — Z13.39 SCREENING FOR ALCOHOLISM: ICD-10-CM

## 2019-11-01 DIAGNOSIS — R42 EPISODIC LIGHTHEADEDNESS: ICD-10-CM

## 2019-11-01 DIAGNOSIS — D47.2 MGUS (MONOCLONAL GAMMOPATHY OF UNKNOWN SIGNIFICANCE): ICD-10-CM

## 2019-11-01 DIAGNOSIS — Z13.31 SCREENING FOR DEPRESSION: ICD-10-CM

## 2019-11-01 DIAGNOSIS — I10 ESSENTIAL HYPERTENSION: ICD-10-CM

## 2019-11-01 DIAGNOSIS — N18.30 CKD (CHRONIC KIDNEY DISEASE), STAGE III (HCC): ICD-10-CM

## 2019-11-01 DIAGNOSIS — Z00.00 MEDICARE ANNUAL WELLNESS VISIT, SUBSEQUENT: Primary | ICD-10-CM

## 2019-11-01 DIAGNOSIS — F10.10 ALCOHOL ABUSE: ICD-10-CM

## 2019-11-01 DIAGNOSIS — I48.0 PAF (PAROXYSMAL ATRIAL FIBRILLATION) (HCC): ICD-10-CM

## 2019-11-01 NOTE — PROGRESS NOTES
SPORTS MEDICINE AND PRIMARY CARE  Nazanin Jackson MD, [address] [address]  Phone:  [phone]  Fax: [phone]      Chief Complaint   Patient presents with    Annual Wellness Visit         SUBECTIVE:    Bassem Kirk. is a 81 y.o. male Patient returns today for a Medicare annual wellness exam.  He has a known history of primary hypertension, monoclonal gammopathy of unknown significance, followed by Dr. Suzi Hubbard, paroxysmal atrial fibrillation, pulmonary hypertension, episodic lightheadedness, CKD, history of alcohol abuse, and is seen for evaluation. Patient returns today stating he feels a little better. He was concerned about the diagnosis of depression. ___________________ at 81years old and we congratulate him on his 84rd birthday. Patient is seen for evaluation. Current Outpatient Medications   Medication Sig Dispense Refill    meclizine (ANTIVERT) 25 mg tablet Take 1 Tab by mouth three (3) times daily as needed for Dizziness. 60 Tab 3    ELIQUIS 2.5 mg tablet TAKE 1 TABLET BY MOUTH TWICE A  Tab 3    escitalopram oxalate (LEXAPRO) 10 mg tablet Take 1 Tab by mouth daily. 30 Tab 4    metoprolol succinate (TOPROL-XL) 25 mg XL tablet Take 0.5 Tabs by mouth nightly. 90 Tab 3    simvastatin (ZOCOR) 40 mg tablet TAKE 1 TABLET BY MOUTH EVERYDAY AT BEDTIME 90 Tab 3    acyclovir (ZOVIRAX) 400 mg tablet TAKE 1 TABLET BY MOUTH TWO TIMES PER DAY UNTIL SEEN  3    prednisoLONE acetate (PRED FORTE) 1 % ophthalmic suspension INSTILL 1 DROP INTO RIGHT EYE TWICE A DAY  0    timolol (TIMOPTIC) 0.5 % ophthalmic solution INSTILL 1 DROP INTO RIGHT EYE EVERY MORNING  0    allopurinol (ZYLOPRIM) 100 mg tablet TAKE 1 TAB BY MOUTH DAILY. 180 Tab 2    bumetanide (BUMEX) 1 mg tablet Take 1 Tab by mouth daily. 90 Tab 3    propafenone (RYTHMOL) 150 mg tablet Take 1 Tab by mouth two (2) times a day.  90 Tab 5    potassium chloride SR (K-TAB) 20 mEq tablet TAKE 1 TABLET BY ORAL ROUTE EVERY DAY WITH FOOD  5    sodium chloride (OCEAN) 0.65 % nasal spray 2 Sprays by Both Nostrils route four (4) times daily. 45 mL 11     Past Medical History:   Diagnosis Date    Adverse effect of anesthesia     nasal procedure didn't work-patient awake    Alcohol abuse     Aortic regurgitation     Chronic kidney disease     CKD (chronic kidney disease), stage III ([facility] 2011    Depression     Episodic lightheadedness 07/28/2017    Episodic lightheadedness     Epistaxis 11/27/2016    Erectile dysfunction     GI bleed     Gout     gout, osteoarthritis    Heart failure (HCC)     acute heart failure    Hemorrhoids     History of cardioversion 02/17/2017    Hypertension     Ill-defined condition     weakness    Multiple myeloma (HCC)     PAF (paroxysmal atrial fibrillation) ([facility] 02/17/2016    eliquis    [facility]     Right-sided epistaxis 11/27/2016    S/P AVR (aortic valve replacement) 07/26/2016    merrill gill md -     S/P cardiac cath 7/6/16    severe ai, normal coronaries     S/P colonoscopy 10-8-04    S/P colonoscopy with polypectomy 05/09/2016    Kar Sterling MD    Skin lesion      Past Surgical History:   Procedure Laterality Date    CARDIAC SURG PROCEDURE UNLIST      cardiac cath   Charlie  CARDIAC SURG PROCEDURE UNLIST  07/26/2016    AVR    HX HEENT      nasal procedure 20 years ago    HX KNEE REPLACEMENT Bilateral     bilateral knee replacement    HX ORTHOPAEDIC Bilateral     wrist     Allergies   Allergen Reactions    Amiodarone Other (comments)     Bradycardia -excessive       REVIEW OF SYSTEMS:   No chest pain, no shortness of breath.         Social History     Socioeconomic History    Marital status:      Spouse name: Not on file    Number of children: Not on file    Years of education: Not on file    Highest education level: Not on file   Tobacco Use    Smoking status: Former Smoker     Years: 4.00     Last attempt to quit: 1/1/1960     Years since quittin.8    Smokeless tobacco: Never Used   Substance and Sexual Activity    Alcohol use: Not Currently     Comment: ocassional    Drug use: No    Sexual activity: Not Currently   Social History Narrative    Family History: Mother:  36 yrs, Chronic anemiaFather:  80 yrs, I think aneurysm hypertensionBrother(s):  79 yrs,    obese,dm,drug abuse,miSon(s): alive, adopted1 son(s) . Makayla Quinn [phone]    Social History: Alcohol Use Patient uses alcohol, Drinks per occasion: 2, Drinks per w Cheesh-Na: 10. Smoking Status Patient is a never smoker. Marital Status: . Lives w ith: alone. Occupation/W ork: employed full time insurance. Education/School: has highschool diploma.   r  Family History   Problem Relation Age of Onset    Anemia Mother     No Known Problems Father     Other Brother         drug abuse       OBJECTIVE:  Visit Vitals  /80   Pulse 89   Temp 97.6 °F (36.4 °C) (Oral)   Resp 18   Ht 5' 8\" (1.727 m)   Wt 146 lb 12.8 oz (66.6 kg)   SpO2 98%   BMI 22.32 kg/m²     ENT: perrla,  eom intact  NECK: supple. Thyroid normal  CHEST: clear to ascultation and percussion   HEART: regular rate and rhythm  ABD: soft, bowel sounds active  EXTREMITIES: no edema, pulse 1+          ASSESSMENT:  1. Medicare annual wellness visit, subsequent    2. Screening for alcoholism    3. Screening for depression    4. Essential hypertension    5. MGUS (monoclonal gammopathy of unknown significance)    6. PAF (paroxysmal atrial fibrillation) (HCC)    7. Pulmonary HTN (HCC)    8. Episodic lightheadedness    9. CKD (chronic kidney disease), stage III ([facility]    10. Alcohol abuse      Repeat BP is 128/80, which is completely acceptable. His MGUS is followed by hematology and has been stable. He has a history of paroxysmal atrial fibrillation, on auscultation today he is in sinus rhythm. The lightheadedness seems to have subsided. His chronic kidney disease is stable.   He is no longer abusing alcohol. At 81years old I think he is making satisfactory progress and will see me back in three to four months, sooner if he has any problems, and tell him to enjoy his 84rd birthday. I have discussed the diagnosis with the patient and the intended plan as seen in the  orders above. The patient understands and agees with the plan. The patient has   received an after visit summary and questions were answered concerning  future plans  Patient labs and/or xrays were reviewed  Past records were reviewed. PLAN:  .  Orders Placed This Encounter    Depression Screen Annual       Follow-up and Dispositions    · Return in about 3 months (around 2/1/2020). ATTENTION:   This medical record was transcribed using an electronic medical records system. Although proofread, it may and can contain electronic and spelling errors. Other human spelling and other errors may be present. Corrections may be executed at a later time. Please feel free to contact us for any clarifications as needed.

## 2019-11-01 NOTE — PROGRESS NOTES
1. Have you been to the ER, urgent care clinic since your last visit? Hospitalized since your last visit? No    2. Have you seen or consulted any other health care providers outside of the [address] [place] [address] since your last visit? Include any pap smears or colon screening. No       This is the Subsequent Medicare Annual Wellness Exam, performed 12 months or more after the Initial AWV or the last Subsequent AWV    I have reviewed the patient's medical history in detail and updated the computerized patient record.      History     Patient Active Problem List   Diagnosis Code    [facility] C90.00    Gout M10.9    Hypertension I10    Aortic regurgitation I35.1    CKD (chronic kidney disease), stage III (HCC) N18.3    Pulmonary HTN (HCC) I27.20    Alcohol abuse F10.10    Erectile dysfunction N52.9    PAF (paroxysmal atrial fibrillation) (HCC) I48.0    GI bleed K92.2    S/P colonoscopy with polypectomy Z98.890    Skin lesion L98.9    ACP (advance care planning) Z71.89    Acute congestive heart failure (HCC) I50.9    S/P AVR (aortic valve replacement) Z95.2    Postoperative anemia due to acute blood loss D62    Epistaxis R04.0    Right-sided epistaxis R04.0    History of cardioversion Z98.890    Episodic lightheadedness R42    Hemorrhoids K64.9    MGUS (monoclonal gammopathy of unknown significance) D47.2    Depression F32.9     Past Medical History:   Diagnosis Date    Adverse effect of anesthesia     nasal procedure didn't work-patient awake    Alcohol abuse     Aortic regurgitation     Chronic kidney disease     CKD (chronic kidney disease), stage III ([facility] 2011    Depression     Episodic lightheadedness 07/28/2017    Episodic lightheadedness     Epistaxis 11/27/2016    Erectile dysfunction     GI bleed     Gout     gout, osteoarthritis    Heart failure (HCC)     acute heart failure    Hemorrhoids     History of cardioversion 02/17/2017    Hypertension     Ill-defined condition     weakness    Multiple myeloma ([facility]     PAF (paroxysmal atrial fibrillation) ([facility] 02/17/2016    eliquis    Pulmonary HTN ([facility]     Right-sided epistaxis 11/27/2016    S/P AVR (aortic valve replacement) 07/26/2016    merrill gill md -     S/P cardiac cath 7/6/16    severe ai, normal coronaries     S/P colonoscopy 10-8-04    S/P colonoscopy with polypectomy 05/09/2016    Kar Ugalde MD    Skin lesion       Past Surgical History:   Procedure Laterality Date    CARDIAC SURG PROCEDURE UNLIST      cardiac cath   [facility] CARDIAC SURG PROCEDURE UNLIST  07/26/2016    AVR    HX HEENT      nasal procedure 20 years ago    HX KNEE REPLACEMENT Bilateral     bilateral knee replacement    HX ORTHOPAEDIC Bilateral     wrist     Current Outpatient Medications   Medication Sig Dispense Refill    meclizine (ANTIVERT) 25 mg tablet Take 1 Tab by mouth three (3) times daily as needed for Dizziness. 60 Tab 3    ELIQUIS 2.5 mg tablet TAKE 1 TABLET BY MOUTH TWICE A  Tab 3    escitalopram oxalate (LEXAPRO) 10 mg tablet Take 1 Tab by mouth daily. 30 Tab 4    metoprolol succinate (TOPROL-XL) 25 mg XL tablet Take 0.5 Tabs by mouth nightly. 90 Tab 3    simvastatin (ZOCOR) 40 mg tablet TAKE 1 TABLET BY MOUTH EVERYDAY AT BEDTIME 90 Tab 3    acyclovir (ZOVIRAX) 400 mg tablet TAKE 1 TABLET BY MOUTH TWO TIMES PER DAY UNTIL SEEN  3    prednisoLONE acetate (PRED FORTE) 1 % ophthalmic suspension INSTILL 1 DROP INTO RIGHT EYE TWICE A DAY  0    timolol (TIMOPTIC) 0.5 % ophthalmic solution INSTILL 1 DROP INTO RIGHT EYE EVERY MORNING  0    allopurinol (ZYLOPRIM) 100 mg tablet TAKE 1 TAB BY MOUTH DAILY. 180 Tab 2    bumetanide (BUMEX) 1 mg tablet Take 1 Tab by mouth daily. 90 Tab 3    propafenone (RYTHMOL) 150 mg tablet Take 1 Tab by mouth two (2) times a day.  90 Tab 5    potassium chloride SR (K-TAB) 20 mEq tablet TAKE 1 TABLET BY ORAL ROUTE EVERY DAY WITH FOOD  5    sodium chloride (OCEAN) 0.65 % nasal spray 2 Sprays by Both Nostrils route four (4) times daily. 45 mL 11     Allergies   Allergen Reactions    Amiodarone Other (comments)     Bradycardia -excessive       Family History   Problem Relation Age of Onset    Anemia Mother     No Known Problems Father     Other Brother         drug abuse     Social History     Tobacco Use    Smoking status: Former Smoker     Years: 4.00     Last attempt to quit: 1960     Years since quittin.8    Smokeless tobacco: Never Used   Substance Use Topics    Alcohol use: Not Currently     Comment: ocassional       Depression Risk Factor Screening:     3 most recent PHQ Screens 2019   Little interest or pleasure in doing things Several days   Feeling down, depressed, irritable, or hopeless Several days   Total Score PHQ 2 2   Trouble falling or staying asleep, or sleeping too much -   Feeling tired or having little energy -   Poor appetite, weight loss, or overeating -   Feeling bad about yourself - or that you are a failure or have let yourself or your family down -   Trouble concentrating on things such as school, work, reading, or watching TV -   Moving or speaking so slowly that other people could have noticed; or the opposite being so fidgety that others notice -   Thoughts of being better off dead, or hurting yourself in some way -   PHQ 9 Score -   How difficult have these problems made it for you to do your work, take care of your home and get along with others -       Alcohol Risk Factor Screening (MALE > 65): Do you average more 1 drink per night or more than 7 drinks a week: No    In the past three months have you have had more than 4 drinks containing alcohol on one occasion: No      Functional Ability and Level of Safety:   Hearing: Hearing is good. Activities of Daily Living: The home contains: no safety equipment.   Patient does total self care    Ambulation: with no difficulty    Fall Risk:  Fall Risk Assessment, last 12 mths 2019 Able to walk? Yes   Fall in past 12 months?  No       Abuse Screen:  Patient is not abused    Cognitive Screening   Has your family/caregiver stated any concerns about your memory: no  Cognitive Screening: Normal - MMSE (Mini Mental Status Exam)    Patient Care Team   Patient Care Team:  Nickie Avelar MD as PCP - General (Internal Medicine)  Nickie Avelar MD as PCP - [facility]  Bala Dixon MD as Referring Provider (Cardiology)  Lo Watts MD as Surgeon (Cardiothoracic Surgery)    Assessment/Plan   Education and counseling provided:  Are appropriate based on today's review and evaluation        Health Maintenance Due   Topic Date Due    MEDICARE YEARLY EXAM  09/28/2019

## 2019-11-01 NOTE — PATIENT INSTRUCTIONS
Medicare Wellness Visit, Male The best way to live healthy is to have a lifestyle where you eat a well-balanced diet, exercise regularly, limit alcohol use, and quit all forms of tobacco/nicotine, if applicable. Regular preventive services are another way to keep healthy. Preventive services (vaccines, screening tests, monitoring & exams) can help personalize your care plan, which helps you manage your own care. Screening tests can find health problems at the earliest stages, when they are easiest to treat. [facility] follows the current, evidence-based guidelines published by the [facility] Jayme Gifty ([facility]) when recommending preventive services for our patients. Because we follow these guidelines, sometimes recommendations change over time as research supports it. (For example, a prostate screening blood test is no longer routinely recommended for men with no symptoms). Of course, you and your doctor may decide to screen more often for some diseases, based on your risk and co-morbidities (chronic disease you are already diagnosed with). Preventive services for you include: - Medicare offers their members a free annual wellness visit, which is time for you and your primary care provider to discuss and plan for your preventive service needs. Take advantage of this benefit every year! 
-All adults over age 72 should receive the recommended pneumonia vaccines. Current USPSTF guidelines recommend a series of two vaccines for the best pneumonia protection.  
-All adults should have a flu vaccine yearly and tetanus vaccine every 10 years. 
-All adults age 48 and older should receive the shingles vaccines (series of two vaccines).       
-All adults age 38-68 who are overweight should have a diabetes screening test once every three years.  
-Other screening tests & preventive services for persons with diabetes include: an eye exam to screen for diabetic retinopathy, a kidney function test, a foot exam, and stricter control over your cholesterol.  
-Cardiovascular screening for adults with routine risk involves an electrocardiogram (ECG) at intervals determined by the provider.  
-Colorectal cancer screening should be done for adults age 54-65 with no increased risk factors for colorectal cancer. There are a number of acceptable methods of screening for this type of cancer. Each test has its own benefits and drawbacks. Discuss with your provider what is most appropriate for you during your annual wellness visit. The different tests include: colonoscopy (considered the best screening method), a fecal occult blood test, a fecal DNA test, and sigmoidoscopy. 
-All adults born between [facility] should be screened once for Hepatitis C. 
-An Abdominal Aortic Aneurysm (AAA) Screening is recommended for men age 73-68 who has ever smoked in their lifetime. Here is a list of your current Health Maintenance items (your personalized list of preventive services) with a due date: 
Health Maintenance Due Topic Date Due  
 Annual Well Visit  09/28/2019

## 2019-12-10 RX ORDER — BUMETANIDE 1 MG/1
TABLET ORAL
Qty: 90 TAB | Refills: 3 | Status: SHIPPED | OUTPATIENT
Start: 2019-12-10 | End: 2020-05-13 | Stop reason: SDUPTHER

## 2019-12-16 ENCOUNTER — OFFICE VISIT (OUTPATIENT)
Dept: INTERNAL MEDICINE CLINIC | Age: 84
End: 2019-12-16

## 2019-12-16 VITALS
WEIGHT: 154.3 LBS | BODY MASS INDEX: 23.39 KG/M2 | TEMPERATURE: 97.9 F | RESPIRATION RATE: 16 BRPM | DIASTOLIC BLOOD PRESSURE: 105 MMHG | SYSTOLIC BLOOD PRESSURE: 178 MMHG | OXYGEN SATURATION: 98 % | HEIGHT: 68 IN | HEART RATE: 56 BPM

## 2019-12-16 DIAGNOSIS — I35.1 NONRHEUMATIC AORTIC VALVE INSUFFICIENCY: ICD-10-CM

## 2019-12-16 DIAGNOSIS — N18.30 CKD (CHRONIC KIDNEY DISEASE), STAGE III (HCC): ICD-10-CM

## 2019-12-16 DIAGNOSIS — I10 ESSENTIAL HYPERTENSION: Primary | ICD-10-CM

## 2019-12-16 DIAGNOSIS — Z95.2 S/P AVR (AORTIC VALVE REPLACEMENT): ICD-10-CM

## 2019-12-16 DIAGNOSIS — F10.10 ALCOHOL ABUSE: ICD-10-CM

## 2019-12-16 DIAGNOSIS — Z98.890 HISTORY OF CARDIOVERSION: ICD-10-CM

## 2019-12-16 NOTE — PROGRESS NOTES
1. Have you been to the ER, urgent care clinic since your last visit? Hospitalized since your last visit? No    2. Have you seen or consulted any other health care providers outside of the [address] [place] [address] since your last visit? Include any pap smears or colon screening.  No     Want to discuss DMV issue

## 2019-12-16 NOTE — PROGRESS NOTES
SPORTS MEDICINE AND PRIMARY CARE  Yareli Mensah MD, [place] [address] [address]  Phone:  [phone]  Fax: [phone]      Chief Complaint   Patient presents with    Form Completion         SUBECTIVE:    Martínez Crowe. is a 81 y.o. male Patient returns today after not showing up for an appointment with psychologist, Yazmin Don, on 11/27/19. He has a known history of primary hypertension, cardioversion, CKD, alcohol abuse, in remission, aortic regurgitation, and is seen for evaluation. He is status post aortic valve replacement on 07/26/16. Patient returns today requesting a DMV form to be completed. He did not take his BP pills this morning. He is seen for evaluation. He does not understand why they want forms completed. He had no accidents, no dings against his record, he tells me. Patient is seen for evaluation. Current Outpatient Medications   Medication Sig Dispense Refill    bumetanide (BUMEX) 1 mg tablet TAKE 1 TABLET BY MOUTH EVERY DAY 90 Tab 3    meclizine (ANTIVERT) 25 mg tablet Take 1 Tab by mouth three (3) times daily as needed for Dizziness. 60 Tab 3    ELIQUIS 2.5 mg tablet TAKE 1 TABLET BY MOUTH TWICE A  Tab 3    escitalopram oxalate (LEXAPRO) 10 mg tablet Take 1 Tab by mouth daily. 30 Tab 4    metoprolol succinate (TOPROL-XL) 25 mg XL tablet Take 0.5 Tabs by mouth nightly. 90 Tab 3    simvastatin (ZOCOR) 40 mg tablet TAKE 1 TABLET BY MOUTH EVERYDAY AT BEDTIME 90 Tab 3    acyclovir (ZOVIRAX) 400 mg tablet TAKE 1 TABLET BY MOUTH TWO TIMES PER DAY UNTIL SEEN  3    prednisoLONE acetate (PRED FORTE) 1 % ophthalmic suspension INSTILL 1 DROP INTO RIGHT EYE TWICE A DAY  0    timolol (TIMOPTIC) 0.5 % ophthalmic solution INSTILL 1 DROP INTO RIGHT EYE EVERY MORNING  0    allopurinol (ZYLOPRIM) 100 mg tablet TAKE 1 TAB BY MOUTH DAILY. 180 Tab 2    propafenone (RYTHMOL) 150 mg tablet Take 1 Tab by mouth two (2) times a day.  90 Tab 5    sodium chloride (OCEAN) 0.65 % nasal spray 2 Sprays by Both Nostrils route four (4) times daily. 45 mL 11     Past Medical History:   Diagnosis Date    Adverse effect of anesthesia     nasal procedure didn't work-patient awake    Alcohol abuse     Aortic regurgitation     Chronic kidney disease     CKD ([facility]ronic [facility] disease), [facility] III [facility]     Depression     Episodic lightheadedness 2017    Episodic lightheadedness     Epistaxis 2016    Erectile dysfunction     GI bleed     Gout     gout, osteoarthritis    Heart failure (HCC)     acute heart failure    Hemorrhoids     History of cardioversion 2017    Hypertension     Ill-defined condition     weakness    Multiple myeloma (HCC)     PAF (paroxysmal atrial fibrillation) ([facility] 2016    eliquis    Pulmonary HTN ([facility]     Right-sided epistaxis 2016    S/P AVR (aortic valve replacement) 2016    merrill gill md -     S/P cardiac cath 16    severe ai, normal coronaries     S/P colonoscopy 10-8-04    S/P colonoscopy with polypectomy 2016    Kar Livingston MD    Skin lesion      Past Surgical History:   Procedure Laterality Date    CARDIAC SURG PROCEDURE UNLIST      cardiac cath   [facility] CARDIAC SURG PROCEDURE UNLIST  2016    AVR    HX HEENT      nasal procedure 20 years ago    HX KNEE REPLACEMENT Bilateral     bilateral knee replacement    HX ORTHOPAEDIC Bilateral     wrist     Allergies   Allergen Reactions    Amiodarone Other (comments)     Bradycardia -excessive       REVIEW OF SYSTEMS:   No chest pain, no shortness of breath.         Social History     Socioeconomic History    Marital status:      Spouse name: Not on file    Number of children: Not on file    Years of education: Not on file    Highest education level: Not on file   Tobacco Use    Smoking status: Former Smoker     Years: 4.00     Last attempt to quit: 1960     Years since quittin.9    Smokeless tobacco: Never Used   Substance and Sexual Activity    Alcohol use: Not Currently     Comment: ocassional    Drug use: No    Sexual activity: Not Currently   Social History Narrative    Family History: Mother:  36 yrs, Chronic anemiaFather:  80 yrs, I think aneurysm hypertensionBrother(s):  79 yrs,    obese,dm,drug abuse,miSon(s): alive, adopted1 son(s) . Baylee Mathiasford [phone]    Social History: Alcohol Use Patient uses alcohol, Drinks per occasion: 2, Drinks per w [place]: 10. Smoking Status Patient is a never smoker. Marital Status: . Lives w ith: alone. Occupation/W ork: employed full time insurance. Education/School: has highschool diploma.   r  Family History   Problem Relation Age of Onset    Anemia Mother     No Known Problems Father     Other Brother         drug abuse       OBJECTIVE:  Visit Vitals  BP (!) 178/105   Pulse (!) 56   Temp 97.9 °F (36.6 °C) (Oral)   Resp 16   Ht 5' 8\" (1.727 m)   Wt 154 lb 4.8 oz (70 kg)   SpO2 98%   BMI 23.46 kg/m²     ENT: perrla,  eom intact  NECK: supple. Thyroid normal  CHEST: clear to ascultation and percussion   HEART: regular rate and rhythm  ABD: soft, bowel sounds active  EXTREMITIES: no edema, pulse 1+     No visits with results within 3 Month(s) from this visit.    Latest known visit with results is:   Admission on 2019, Discharged on 2019   Component Date Value Ref Range Status    WBC 2019 5.2  4.1 - 11.1 K/uL Final    RBC 2019 3.76* 4.10 - 5.70 M/uL Final    HGB 2019 12.9  12.1 - 17.0 g/dL Final    HCT 2019 37.9  36.6 - 50.3 % Final    MCV 2019 100.8* 80.0 - 99.0 FL Final    MCH 2019 34.3* 26.0 - 34.0 PG Final    MCHC 2019 34.0  30.0 - 36.5 g/dL Final    RDW 2019 13.2  11.5 - 14.5 % Final    PLATELET  199  150 - 400 K/uL Final    MPV 2019 10.1  8.9 - 12.9 FL Final    NRBC 2019 0.0  0  WBC Final    ABSOLUTE NRBC 2019 0. 00  0.00 - 0.01 K/uL Final    NEUTROPHILS 09/14/2019 70  32 - 75 % Final    LYMPHOCYTES 09/14/2019 20  12 - 49 % Final    MONOCYTES 09/14/2019 7  5 - 13 % Final    EOSINOPHILS 09/14/2019 3  0 - 7 % Final    BASOPHILS 09/14/2019 0  0 - 1 % Final    IMMATURE GRANULOCYTES 09/14/2019 0  0.0 - 0.5 % Final    ABS. NEUTROPHILS 09/14/2019 3.6  1.8 - 8.0 K/UL Final    ABS. LYMPHOCYTES 09/14/2019 1.0  0.8 - 3.5 K/UL Final    ABS. MONOCYTES 09/14/2019 0.4  0.0 - 1.0 K/UL Final    ABS. EOSINOPHILS 09/14/2019 0.1  0.0 - 0.4 K/UL Final    ABS. BASOPHILS 09/14/2019 0.0  0.0 - 0.1 K/UL Final    ABS. IMM. GRANS. 09/14/2019 0.0  0.00 - 0.04 K/UL Final    DF 09/14/2019 AUTOMATED    Final    Sodium 09/14/2019 142  136 - 145 mmol/L Final    Potassium 09/14/2019 3.2* 3.5 - 5.1 mmol/L Final    Chloride 09/14/2019 105  97 - 108 mmol/L Final    CO2 09/14/2019 28  21 - 32 mmol/L Final    Anion gap 09/14/2019 9  5 - 15 mmol/L Final    Glucose 09/14/2019 89  65 - 100 mg/dL Final    BUN 09/14/2019 31* 6 - 20 MG/DL Final    Creatinine 09/14/2019 2.00* 0.70 - 1.30 MG/DL Final    BUN/Creatinine ratio 09/14/2019 16  12 - 20   Final    GFR est AA 09/14/2019 39* >60 ml/min/1.73m2 Final    GFR est non-AA 09/14/2019 32* >60 ml/min/1.73m2 Final    Calcium 09/14/2019 9.0  8.5 - 10.1 MG/DL Final    Bilirubin, total 09/14/2019 1.0  0.2 - 1.0 MG/DL Final    ALT (SGPT) 09/14/2019 20  12 - 78 U/L Final    AST (SGOT) 09/14/2019 16  15 - 37 U/L Final    Alk.  phosphatase 09/14/2019 78  45 - 117 U/L Final    Protein, total 09/14/2019 7.1  6.4 - 8.2 g/dL Final    Albumin 09/14/2019 3.5  3.5 - 5.0 g/dL Final    Globulin 09/14/2019 3.6  2.0 - 4.0 g/dL Final    A-G Ratio 09/14/2019 1.0* 1.1 - 2.2   Final    ALCOHOL(ETHYL),SERUM 09/14/2019 <10  <10 MG/DL Final    AMPHETAMINES 09/14/2019 NEGATIVE   NEG   Final    BARBITURATES 09/14/2019 NEGATIVE   NEG   Final    BENZODIAZEPINES 09/14/2019 NEGATIVE   NEG   Final    COCAINE 09/14/2019 NEGATIVE   NEG   Final    METHADONE 09/14/2019 NEGATIVE   NEG   Final    OPIATES 09/14/2019 NEGATIVE   NEG   Final    PCP(PHENCYCLIDINE) 09/14/2019 NEGATIVE   NEG   Final    THC (TH-CANNABINOL) 09/14/2019 NEGATIVE   NEG   Final    Drug screen comment 09/14/2019 (NOTE)   Final    Comment: This test is a screen for drugs of abuse in a medical setting only   (i.e., they are unconfirmed results and as such must not be used for   non-medical purposes e.g., employment testing, legal testing). Due to   its inherent nature, false positive (FP) and false negative (FN)   results may be obtained. Therefore, if necessary for medical care,   recommend confirmation of positive findings by GC/MS. The cutoff   values (i.e., the level at which this screening test becomes positive   for a given drug group) are:    Amphetamine/Methamphetamine: 300 ng/mL  Barbiturates:                200 ng/mL  Benzodiazepines:             200 ng/mL  Cocaine:                     150 ng/mL  Methadone:                   300 ng/mL  Opiates:                     300 ng/mL   Phencyclidine, PCP:           25 ng/mL  Marijuana, THC:               50 ng/mL    This screening test can identify the presence of the following drugs   when above the cutoff value; see list posted on the intranet. It can   be viewed by richard                           ecting in sequence the following from the [address] home   page: [place]; [address], Resources; [facility], Physician Resources Q to Z; \"UDS (Urine Drug Screen   Automated) List of Detectable Drugs. \"     Or use web address:   [email or web address]  UDS%20List%20of%20Detectable%20Drugs. pdf      Color 09/14/2019 YELLOW/STRAW    Final    Color Reference Range: Straw, Yellow or Dark Yellow    Appearance 09/14/2019 CLEAR  CLEAR   Final    Specific gravity 09/14/2019 1.011  1.003 - 1.030   Final    pH (UA) 09/14/2019 5.5  5.0 - 8.0 Final    Protein 09/14/2019 100* NEG mg/dL Final    Glucose 09/14/2019 NEGATIVE   NEG mg/dL Final    Ketone 09/14/2019 NEGATIVE   NEG mg/dL Final    Bilirubin 09/14/2019 NEGATIVE   NEG   Final    Blood 09/14/2019 SMALL* NEG   Final    Urobilinogen 09/14/2019 1.0  0.2 - 1.0 EU/dL Final    Nitrites 09/14/2019 NEGATIVE   NEG   Final    Leukocyte Esterase 09/14/2019 NEGATIVE   NEG   Final    WBC 09/14/2019 0-4  0 - 4 /hpf Final    RBC 09/14/2019 0-5  0 - 5 /hpf Final    Epithelial cells 09/14/2019 FEW  FEW /lpf Final    Epithelial cell category consists of squamous cells and /or transitional urothelial cells. Renal tubular cells, if present, are separately identified as such.  Bacteria 09/14/2019 NEGATIVE   NEG /hpf Final    UA:UC IF INDICATED 09/14/2019 CULTURE NOT INDICATED BY UA RESULT  CNI   Final    Hyaline cast 09/14/2019 0-2  0 - 5 /lpf Final    Troponin-I, Qt. 09/14/2019 <0.05  <0.05 ng/mL Final    Ventricular Rate 09/14/2019 54  BPM Final    Atrial Rate 09/14/2019 326  BPM Final    QRS Duration 09/14/2019 94  ms Final    Q-T Interval 09/14/2019 488  ms Final    QTC Calculation (Bezet) 09/14/2019 462  ms Final    Calculated R Axis 09/14/2019 101  degrees Final    Calculated T Axis 09/14/2019 102  degrees Final    Diagnosis 09/14/2019    Final                    Value:Atrial flutter  Anterior infarct , age undetermined  When compared with ECG of 09-SEP-2019 17:35,    Nonspecific T wave abnormality no longer evident in Inferior leads  T wave inversion no longer evident in Anterior leads  T wave inversion more evident in Lateral leads  Confirmed by Danielito Hendrix ([id number]) on 9/15/2019 4:16:49 PM            ASSESSMENT:  1. Essential hypertension    2. History of cardioversion    3. CKD (chronic kidney disease), stage III ([address]    4. Alcohol abuse    5. Nonrheumatic aortic valve insufficiency    6.  S/P AVR (aortic valve replacement)      **Patient states he is not suicidal, does not feel depressed and does not want to see a psychologist or psychiatrist.  Because of those statements then, we complete the Department of Motor Vehicle's medical report, indicating that he should have both the knowledge and road test.  If acceptable with completing those tests then we certainly would see no reason that he cannot drive. He tells me that he is not abusing alcohol anymore. He is a little forgetful as he forgot to take his medicine this morning, but it does not seem to be pathologic. Blood pressure is not adequately controlled because he failed to take his medicine this morning, and for that reason we will ask him to come back in a week for a blood pressure check. Other medical problems clinically are stable. He will be back to see me formally in about three months. *  I have discussed the diagnosis with the patient and the intended plan as seen in the  orders above. The patient understands and agees with the plan. The patient has   received an after visit summary and questions were answered concerning  future plans  Patient labs and/or xrays were reviewed  Past records were reviewed. PLAN:  .  Orders Placed This Encounter    RENAL FUNCTION PANEL       Follow-up and Dispositions    · Return in about 2 months (around 2/16/2020). ATTENTION:   This medical record was transcribed using an electronic medical records system. Although proofread, it may and can contain electronic and spelling errors. Other human spelling and other errors may be present. Corrections may be executed at a later time. Please feel free to contact us for any clarifications as needed.

## 2019-12-22 RX ORDER — ALLOPURINOL 100 MG/1
TABLET ORAL
Qty: 90 TAB | Refills: 5 | Status: SHIPPED | OUTPATIENT
Start: 2019-12-22 | End: 2020-09-18 | Stop reason: SDUPTHER

## 2019-12-23 ENCOUNTER — CLINICAL SUPPORT (OUTPATIENT)
Dept: INTERNAL MEDICINE CLINIC | Age: 84
End: 2019-12-23

## 2019-12-23 VITALS
SYSTOLIC BLOOD PRESSURE: 142 MMHG | WEIGHT: 152.2 LBS | DIASTOLIC BLOOD PRESSURE: 99 MMHG | HEART RATE: 62 BPM | BODY MASS INDEX: 23.14 KG/M2

## 2019-12-23 DIAGNOSIS — I10 ESSENTIAL HYPERTENSION: Primary | ICD-10-CM

## 2019-12-23 NOTE — PROGRESS NOTES
Pt came in today for bp check. Pt states he is taking   Amlodipine 5 mg 1 tab PO daily  Metoprolol Succinate 25 mg 1/2 tab PO @ night   Weight-152.2lb  Pulse-62  BP-142/99  Per Dr. Kizzy Robertson pt is to start taking Amlodipine 5 mg 2 tabs PO daily. Return in 1 week for another bp check.      Cha Guerrier

## 2019-12-30 ENCOUNTER — CLINICAL SUPPORT (OUTPATIENT)
Dept: INTERNAL MEDICINE CLINIC | Age: 84
End: 2019-12-30

## 2019-12-30 VITALS
BODY MASS INDEX: 23.11 KG/M2 | DIASTOLIC BLOOD PRESSURE: 89 MMHG | WEIGHT: 152 LBS | HEART RATE: 58 BPM | SYSTOLIC BLOOD PRESSURE: 145 MMHG

## 2019-12-30 DIAGNOSIS — I10 ESSENTIAL HYPERTENSION: Primary | ICD-10-CM

## 2019-12-30 NOTE — PROGRESS NOTES
Pt came in today for bp check. Pt states he is taking   Amlodipine 5 mg 2 tab PO daily  Metoprolol Succinate 25 mg 1 tab PO @ night   Weight-152lb  Pulse-58  BP-145/89  Per Dr. Wayne Loera pt is to continue taking bp meds as directed above with no changes. Return at next scheduled appointment with Vinita Dominguez.     Lucy Ryder

## 2020-01-01 NOTE — PROGRESS NOTES
Infant is tolerating gavage feeds.  No desaturations or alarms.  Voiding  and stooling.    SPORTS MEDICINE AND PRIMARY CARE  Merlin Moles, MD, [address] [address]  Phone:  [phone]  Fax: [phone]       Chief Complaint   Patient presents with    Hypertension     f/u   . SUBJECTIVE:    Alfred Baig. is a 81 y.o. male Patient returns today with known history of alcohol abuse, aortic regurgitation, CKD stage 3, status post cardioversion, primary hypertension, MGUS of unknown significance, followed by Dr. Yehuda Jaime, paroxysmal atrial fibrillation, on Rhythmol, and Eliquis, pulmonary hypertension, and is seen for evaluation. Patient comes in today complaining of fatigue. He feels like he did when he was first found to have atrial fibrillation, but not quite that bad. He continues to drink occasionally, but he says that is not a problem. Patient is seen for evaluation. Current Outpatient Medications   Medication Sig Dispense Refill    simvastatin (ZOCOR) 40 mg tablet TAKE 1 TABLET BY MOUTH EVERYDAY AT BEDTIME 90 Tab 3    acyclovir (ZOVIRAX) 400 mg tablet TAKE 1 TABLET BY MOUTH TWO TIMES PER DAY UNTIL SEEN  3    prednisoLONE acetate (PRED FORTE) 1 % ophthalmic suspension INSTILL 1 DROP INTO RIGHT EYE TWICE A DAY  0    timolol (TIMOPTIC) 0.5 % ophthalmic solution INSTILL 1 DROP INTO RIGHT EYE EVERY MORNING  0    allopurinol (ZYLOPRIM) 100 mg tablet TAKE 1 TAB BY MOUTH DAILY. 180 Tab 2    bumetanide (BUMEX) 1 mg tablet Take 1 Tab by mouth daily. 90 Tab 3    metoprolol succinate (TOPROL-XL) 25 mg XL tablet Take 1 Tab by mouth nightly. 90 Tab 3    amLODIPine (NORVASC) 5 mg tablet       ELIQUIS 2.5 mg tablet TAKE 1 TAB BY MOUTH TWO (2) TIMES A DAY. 180 Tab 3    propafenone (RYTHMOL) 150 mg tablet Take 1 Tab by mouth two (2) times a day. 90 Tab 5    potassium chloride SR (K-TAB) 20 mEq tablet TAKE 1 TABLET BY ORAL ROUTE EVERY DAY WITH FOOD  5    sodium chloride (OCEAN) 0.65 % nasal spray 2 Sprays by Both Nostrils route four (4) times daily. 45 mL 11     Past Medical History:   Diagnosis Date    Adverse effect of anesthesia     nasal procedure didn't work-patient awake    Alcohol abuse     Aortic regurgitation     CKD (chronic kidney disease), stage III ([facility] 2011    Episodic lightheadedness 07/28/2017    Episodic lightheadedness     Epistaxis 11/27/2016    Erectile dysfunction     GI bleed     Gout     gout, osteoarthritis    Heart failure (HCC)     acute heart failure    Hemorrhoids     History of cardioversion 02/17/2017    Hypertension     Ill-defined condition     weakness    Multiple myeloma (HCC)     PAF (paroxysmal atrial fibrillation) ([facility] 02/17/2016    eliquis    [facility]     Right-sided epistaxis 11/27/2016    S/P AVR (aortic valve replacement) 07/26/2016    merrill gill md -     S/P cardiac cath 7/6/16    severe ai, normal coronaries     S/P colonoscopy 10-8-04    S/P colonoscopy with polypectomy 05/09/2016    Kar Chester MD    Skin lesion      Past Surgical History:   Procedure Laterality Date    CARDIAC SURG PROCEDURE UNLIST      cardiac cath   Michael Flax CARDIAC SURG PROCEDURE UNLIST  07/26/2016    AVR    HX HEENT      nasal procedure 20 years ago    HX KNEE REPLACEMENT Bilateral     bilateral knee replacement    HX ORTHOPAEDIC Bilateral     wrist     Allergies   Allergen Reactions    Amiodarone Other (comments)     Bradycardia -excessive         REVIEW OF SYSTEMS:  General: negative for - chills or fever  ENT: negative for - headaches, nasal congestion or tinnitus  Respiratory: negative for - cough, hemoptysis, shortness of breath or wheezing  Cardiovascular : negative for - chest pain, edema, palpitations or shortness of breath  Gastrointestinal: negative for - abdominal pain, blood in stools, heartburn or nausea/vomiting  Genito-Urinary: no dysuria, trouble voiding, or hematuria  Musculoskeletal: negative for - gait disturbance, joint pain, joint stiffness or joint swelling  Neurological: no TIA or stroke symptoms  Hematologic: no bruises, no bleeding, no swollen glands  Integument: no lumps, mole changes, nail changes or rash  Endocrine: no malaise/lethargy or unexpected weight changes      Social History     Socioeconomic History    Marital status:      Spouse name: Not on file    Number of children: Not on file    Years of education: Not on file    Highest education level: Not on file   Tobacco Use    Smoking status: Former Smoker     Years: 4.00     Last attempt to quit: 1960     Years since quittin.5    Smokeless tobacco: Never Used   Substance and Sexual Activity    Alcohol use: Yes     Comment: ocassional    Drug use: No    Sexual activity: Not Currently   Social History Narrative    Family History: Mother:  36 yrs, Chronic anemiaFather:  80 yrs, I think aneurysm hypertensionBrother(s):  79 yrs,    obese,dm,drug abuse,miSon(s): alive, adopted1 son(s) . Social History: Alcohol Use Patient uses alcohol, Drinks per occasion: 2, Drinks per w [place]: 10. Smoking Status Patient is a never smoker. Marital Status: . Lives w ith: alone. Occupation/W ork: employed full time insurance. Education/School: has highschool diploma. Family History   Problem Relation Age of Onset    Anemia Mother     No Known Problems Father     Other Brother         drug abuse       OBJECTIVE:    Visit Vitals  /89   Pulse 64   Temp 98 °F (36.7 °C) (Oral)   Resp 16   Ht 5' 8\" (1.727 m)   Wt 168 lb 3.2 oz (76.3 kg)   SpO2 97%   BMI 25.57 kg/m²     CONSTITUTIONAL: well , well nourished, appears age appropriate  EYES: perrla, eom intact  ENMT:moist mucous membranes, pharynx clear  NECK: supple.  Thyroid normal  RESPIRATORY: Chest: clear bilaterally   CARDIOVASCULAR: Heart: regular rate and rhythm  GASTROINTESTINAL: Abdomen: soft, bowel sounds active  HEMATOLOGIC: no pathological lymph nodes palpated  MUSCULOSKELETAL: Extremities: no edema, pulse 1+   INTEGUMENT: No unusual rashes or suspicious skin lesions noted. Nails appear normal.  NEUROLOGIC: non-focal exam   MENTAL STATUS: alert and oriented, appropriate affect           ASSESSMENT:  1. Alcohol abuse    2. Nonrheumatic aortic valve insufficiency    3. CKD (chronic kidney disease), stage III ([facility]    4. History of cardioversion    5. Essential hypertension    6. MGUS (monoclonal gammopathy of unknown significance)    7. PAF (paroxysmal atrial fibrillation) ([facility]    8. Pulmonary HTN ([facility]      Patient states he tried to walk to the store to get something to drink and had to actually sit down in the grass because he got so tired, but then it is 90 degrees now. Will do an EKG today and when I listen to his heart he is in sinus rhythm with occasional extrasystole to exclude the possibility of recurrent atrial fibrillation. We advise him that he should not be out in the 90 degree weather. I think that \"zaps his strength\". Other pathology needs to be excluded and will be done so not only with EKG, but with appropriate lab studies. We are concerned about his renal function. He has stage 3 CKD and we will check his renal function. His BMI is at his ideal body weight and has been so for a period of time. His weight has remained fairly stable in the 168-170 range. He will be back to see us in about three months, sooner if he has any problems. We spoke with Dr. Tian Zavala after he reviewed the EKG. He noted she was in atrial fibrillation with a slow ventricular response and suggested we decrease the beta blocker, suggested we not start the Rhythmol. He noted that he had sick sinus syndrome. We called the patient, advised him of Dr. Jacqueline Boas recommendation and will have him come back to see us in two to three weeks. I have discussed the diagnosis with the patient and the intended plan as seen in the  orders above. The patient understands and agees with the plan.   The patient has   received an after visit summary and questions were answered concerning  future plans  Patient labs and/or xrays were reviewed  Past records were reviewed. PLAN:  .  Orders Placed This Encounter    CBC WITH AUTOMATED DIFF    METABOLIC PANEL, BASIC    AMB POC EKG ROUTINE W/ 12 LEADS, INTER & REP       Follow-up and Dispositions    · Return in about 3 months (around 10/17/2019). ATTENTION:   This medical record was transcribed using an electronic medical records system. Although proofread, it may and can contain electronic and spelling errors. Other human spelling and other errors may be present. Corrections may be executed at a later time. Please feel free to contact us for any clarifications as needed.

## 2020-01-15 NOTE — MR AVS SNAPSHOT
Visit Information Date & Time Provider Department Dept. Phone Encounter #  
 7/28/2017  1:45 PM Vilma Cortez MD [facility] [address] [id number] Your Appointments 8/1/2017  9:00 AM  
Any with Vilma Cortez MD  
[address] [facility] [address] [place] [address]) Appt Note: 2 month follow up  
 Robel Melgar [phone]  
  
   
 Robel Melgar 90 [phone]  
  
    
 9/20/2017  9:15 AM  
Any with Vilma Cortez MD  
[address] [facility] [address] [place] [address]) Appt Note: follow up 2mnths  
 [address]  
[phone] Upcoming Health Maintenance Date Due INFLUENZA AGE 9 TO ADULT 8/1/2017 MEDICARE YEARLY EXAM 5/31/2018 GLAUCOMA SCREENING Q2Y 4/4/2019 DTaP/Tdap/Td series (2 - Td) 5/16/2026 Allergies as of 7/28/2017  Review Complete On: 7/28/2017 By: [facility] Severity Noted Reaction Type Reactions Amiodarone  10/09/2014    Other (comments) Bradycardia -excessive Current Immunizations  Reviewed on 8/24/2016 No immunizations on file. Not reviewed this visit You Were Diagnosed With   
  
 Codes Comments PAF (paroxysmal atrial fibrillation) ([facility]    -  Primary ICD-10-CM: I48.0 ICD-9-CM: 427.31 Episodic lightheadedness     ICD-10-CM: R42 ICD-9-CM: 780.4 Essential hypertension     ICD-10-CM: I10 
ICD-9-CM: 401.9 Vitals BP Pulse Temp Resp Height(growth percentile) Weight(growth percentile) 158/82 (BP 1 Location: Right arm, BP Patient Position: Sitting) (!) 54 98.3 °F (36.8 °C) (Oral) 17 5' 8\" (1.727 m) 188 lb 6.4 oz (85.5 kg) SpO2 BMI Smoking Status 97% 28.65 kg/m2 Former Smoker BMI and BSA Data Body Mass Index Body Surface Area  
 28.65 kg/m 2 2.03 m 2 Preferred Pharmacy Pharmacy Name Phone [id number] Jina Varma, [address] [phone] Your Updated Medication List  
  
   
This list is accurate as of: 7/28/17  2:11 PM.  Always use your most recent med list.  
  
  
  
  
 albuterol 90 mcg/actuation inhaler Commonly known as:  PROVENTIL HFA, VENTOLIN HFA, PROAIR HFA Take 1 Puff by inhalation every four (4) hours as needed for Wheezing. allopurinol 100 mg tablet Commonly known as:  Lucinda Angel Take 1 Tab by mouth daily. amLODIPine 5 mg tablet Commonly known as:  Blakejuany Fairbanks Take 1 Tab by mouth daily. apixaban 2.5 mg tablet Commonly known as:  Evelyn Alok Take 1 Tab by mouth two (2) times a day. bumetanide 0.5 mg tablet Commonly known as:  Сергей Daubs Take 2 Tabs by mouth two (2) times a day. COMBIGAN 0.2-0.5 % Drop ophthalmic solution Generic drug:  brimonidine-timolol INSTILL 1 DROP INTO RIGHT EYE TWICE A DAY  
  
 erythromycin ophthalmic ointment Commonly known as:  ILOTYCIN  
APPLY (1CM) BY OPHTHALMIC ROUTE 2 TIMES EVERY DAY RIBBON INTO THE CANTHUS ARE IN THE RIGHT EYE  
  
 folic acid 1 mg tablet Commonly known as:  FOLVITE  
  
 hydrALAZINE 50 mg tablet Commonly known as:  APRESOLINE Take 1 Tab by mouth three (3) times daily. LOTEMAX 0.5 % ophthalmic suspension Generic drug:  loteprednol etabonate INSTILL 1 DROP 2 TIMES DAILY INTO RIGHT EYE  
  
 omeprazole 20 mg capsule Commonly known as:  PRILOSEC Take 20 mg by mouth daily. * potassium chloride 20 mEq tablet Commonly known as:  K-DUR, KLOR-CON Take 20 mEq by mouth daily. * potassium chloride SR 20 mEq tablet Commonly known as:  K-TAB  
TAKE 1 TABLET BY ORAL ROUTE EVERY DAY WITH FOOD  
  
 prednisoLONE acetate 1 % ophthalmic suspension Commonly known as:  PRED FORTE  
USE 1 DROP INTO RIGHT EYE EVERY DAY UNTIL SEEN  
  
 propafenone 150 mg tablet Commonly known as:  RYTHMOL Take 1.5 Tabs by mouth every eight (8) hours. simvastatin 40 mg tablet Commonly known as:  ZOCOR  
TAKE 1 TABLET BY MOUTH AT BEDTIME  
  
 sodium chloride 0.65 % nasal spray Commonly known as:  OCEAN  
2 Sprays by Both Nostrils route four (4) times daily. * Notice: This list has 2 medication(s) that are the same as other medications prescribed for you. Read the directions carefully, and ask your doctor or other care provider to review them with you. We Performed the Following AMB POC EKG ROUTINE W/ 12 LEADS, INTER & REP [76943 CPT(R)] CBC WITH AUTOMATED DIFF [95261 CPT(R)] MAGNESIUM [id number] CPT(R)] METABOLIC PANEL, BASIC [39492 CPT(R)] Introducing [facility]! Bobby Mercado introduces [employer] patient portal. Now you can access parts of your medical record, email your doctor's office, and request medication refills online. 1. In your internet browser, go to [email or web address] 2. Click on the First Time User? Click Here link in the Sign In box. You will see the New Member Sign Up page. 3. Enter your [employer] Access Code exactly as it appears below. You will not need to use this code after youve completed the sign-up process. If you do not sign up before the expiration date, you must request a new code. · [employer] Access Code: [email or web address] Expires: 8/28/2017  1:29 PM 
 
4. Enter the last four digits of your Social Security Number (xxxx) and Date of Birth (mm/dd/yyyy) as indicated and click Submit. You will be taken to the next sign-up page. 5. Create a [employer] ID. This will be your [employer] login ID and cannot be changed, so think of one that is secure and easy to remember. 6. Create a [employer] password. You can change your password at any time. 7. Enter your Password Reset Question and Answer. This can be used at a later time if you forget your password. 8. Enter your e-mail address. You will receive e-mail notification when new information is available in [address] 9. Click Sign Up. You can now view and download portions of your medical record. 10. Click the Download Summary menu link to download a portable copy of your medical information. If you have questions, please visit the Frequently Asked Questions section of the [employer] website. Remember, [employer] is NOT to be used for urgent needs. For medical emergencies, dial 911. Now available from your iPhone and Android! Please provide this summary of care documentation to your next provider. Your primary care clinician is listed as Magaly Rice. If you have any questions after today's visit, please call [phone]. Dehydration 2/2 diarrhea

## 2020-02-12 RX ORDER — ESCITALOPRAM OXALATE 10 MG/1
TABLET ORAL
Qty: 90 TAB | Refills: 1 | Status: SHIPPED | OUTPATIENT
Start: 2020-02-12 | End: 2020-08-14

## 2020-02-21 ENCOUNTER — HOSPITAL ENCOUNTER (OUTPATIENT)
Dept: CT IMAGING | Age: 85
Discharge: HOME | End: 2020-02-21
Attending: INTERNAL MEDICINE
Payer: MEDICARE

## 2020-02-21 ENCOUNTER — OFFICE VISIT (OUTPATIENT)
Dept: INTERNAL MEDICINE CLINIC | Age: 85
End: 2020-02-21

## 2020-02-21 VITALS
OXYGEN SATURATION: 98 % | SYSTOLIC BLOOD PRESSURE: 146 MMHG | RESPIRATION RATE: 16 BRPM | DIASTOLIC BLOOD PRESSURE: 93 MMHG | HEART RATE: 58 BPM | WEIGHT: 162.1 LBS | HEIGHT: 68 IN | TEMPERATURE: 97.8 F | BODY MASS INDEX: 24.57 KG/M2

## 2020-02-21 DIAGNOSIS — I27.20 PULMONARY HTN (HCC): ICD-10-CM

## 2020-02-21 DIAGNOSIS — Z95.2 S/P AVR (AORTIC VALVE REPLACEMENT): ICD-10-CM

## 2020-02-21 DIAGNOSIS — S09.90XA TRAUMATIC INJURY OF HEAD, INITIAL ENCOUNTER: ICD-10-CM

## 2020-02-21 DIAGNOSIS — I10 ESSENTIAL HYPERTENSION: ICD-10-CM

## 2020-02-21 DIAGNOSIS — N18.30 CKD (CHRONIC KIDNEY DISEASE), STAGE III (HCC): ICD-10-CM

## 2020-02-21 DIAGNOSIS — I48.0 PAF (PAROXYSMAL ATRIAL FIBRILLATION) (HCC): Primary | ICD-10-CM

## 2020-02-21 DIAGNOSIS — F10.10 ALCOHOL ABUSE: ICD-10-CM

## 2020-02-21 DIAGNOSIS — D47.2 MGUS (MONOCLONAL GAMMOPATHY OF UNKNOWN SIGNIFICANCE): ICD-10-CM
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
## 2020-02-21 PROCEDURE — 70450 CT HEAD/BRAIN W/O DYE: CPT

## 2020-02-21 NOTE — PROGRESS NOTES
1. Have you been to the ER, urgent care clinic since your last visit? Hospitalized since your last visit? No    2. Have you seen or consulted any other health care providers outside of the [address] [place] [address] since your last visit? Include any pap smears or colon screening.  No     Wants to discuss fall scar on head L&R leg

## 2020-02-21 NOTE — PROGRESS NOTES
SPORTS MEDICINE AND PRIMARY CARE  Claudette Wheeler MD, [address] [address]  Phone:  [phone]  Fax: [phone]       Chief Complaint   Patient presents with    Hypertension   . SUBJECTIVE:    Venkat Vizcarra is a 81 y.o. male Patient returns today saying he had a fall. Something happened to his car and he had to walk through some woods, tripped and hit his head. He says there was no loss of consciousness and just wants it checked. This happened about ten days ago. He did not pass out, he states. Other medical problems include paroxysmal atrial fibrillation, for which he takes Eliquis, monoclonal gammopathy of unknown significance, status post AVR replacement,  pulmonary hypertension, primary hypertension, chronic kidney disease, history of alcohol abuse, and is seen for evaluation. Current Outpatient Medications   Medication Sig Dispense Refill    escitalopram oxalate (LEXAPRO) 10 mg tablet TAKE 1 TABLET BY MOUTH EVERY DAY 90 Tab 1    allopurinol (ZYLOPRIM) 100 mg tablet TAKE 1 TABLET BY MOUTH EVERY DAY 90 Tab 5    bumetanide (BUMEX) 1 mg tablet TAKE 1 TABLET BY MOUTH EVERY DAY 90 Tab 3    meclizine (ANTIVERT) 25 mg tablet Take 1 Tab by mouth three (3) times daily as needed for Dizziness. 60 Tab 3    ELIQUIS 2.5 mg tablet TAKE 1 TABLET BY MOUTH TWICE A  Tab 3    metoprolol succinate (TOPROL-XL) 25 mg XL tablet Take 0.5 Tabs by mouth nightly. 90 Tab 3    simvastatin (ZOCOR) 40 mg tablet TAKE 1 TABLET BY MOUTH EVERYDAY AT BEDTIME 90 Tab 3    acyclovir (ZOVIRAX) 400 mg tablet TAKE 1 TABLET BY MOUTH TWO TIMES PER DAY UNTIL SEEN  3    prednisoLONE acetate (PRED FORTE) 1 % ophthalmic suspension INSTILL 1 DROP INTO RIGHT EYE TWICE A DAY  0    timolol (TIMOPTIC) 0.5 % ophthalmic solution INSTILL 1 DROP INTO RIGHT EYE EVERY MORNING  0    propafenone (RYTHMOL) 150 mg tablet Take 1 Tab by mouth two (2) times a day.  90 Tab 5    sodium chloride (OCEAN) 0.65 % nasal spray 2 Sprays by Both Nostrils route four (4) times daily. 45 mL 11     Past Medical History:   Diagnosis Date    Adverse effect of anesthesia     nasal procedure didn't work-patient awake    Alcohol abuse     Aortic regurgitation     Chronic kidney disease     CKD (chronic kidney disease), stage III ([address] 2011    Depression     Episodic lightheadedness 07/28/2017    Episodic lightheadedness     Epistaxis 11/27/2016    Erectile dysfunction     Fall 02/11/2020    GI bleed     Gout     gout, osteoarthritis    Heart failure (HCC)     acute heart failure    Hemorrhoids     History of cardioversion 02/17/2017    Hypertension     Ill-defined condition     weakness    Multiple myeloma (HCC)     PAF ([facility]oxysmal [facility] fibrillation) [facility] 02/17/2016    eliquis    Pulmonary HTN ([facility]     Right-sided epistaxis 11/27/2016    S/P AVR (aortic valve replacement) 07/26/2016    merrill gill md -     S/P cardiac cath 7/6/16    severe ai, normal coronaries     S/P colonoscopy 10-8-04    S/P colonoscopy with polypectomy 05/09/2016    Kar Billingsley MD    Skin lesion      Past Surgical History:   Procedure Laterality Date    CARDIAC SURG PROCEDURE UNLIST      cardiac cath   [facility] CARDIAC SURG PROCEDURE UNLIST  07/26/2016    AVR    HX HEENT      nasal procedure 20 years ago    HX KNEE REPLACEMENT Bilateral     bilateral knee replacement    HX ORTHOPAEDIC Bilateral     wrist     Allergies   Allergen Reactions    Amiodarone Other (comments)     Bradycardia -excessive         REVIEW OF SYSTEMS:  General: negative for - chills or fever  ENT: negative for - headaches, nasal congestion or tinnitus  Respiratory: negative for - cough, hemoptysis, shortness of breath or wheezing  Cardiovascular : negative for - chest pain, edema, palpitations or shortness of breath  Gastrointestinal: negative for - abdominal pain, blood in stools, heartburn or nausea/vomiting  Genito-Urinary: no dysuria, trouble voiding, or hematuria  Musculoskeletal: negative for - gait disturbance, joint pain, joint stiffness or joint swelling  Neurological: no TIA or stroke symptoms  Hematologic: no bruises, no bleeding, no swollen glands  Integument: no lumps, mole changes, nail changes or rash  Endocrine: no malaise/lethargy or unexpected weight changes      Social History     Socioeconomic History    Marital status:      Spouse name: Not on file    Number of children: Not on file    Years of education: Not on file    Highest education level: Not on file   Tobacco Use    Smoking status: Former Smoker     Years: 4.00     Last attempt to quit: 1960     Years since quittin.1    Smokeless tobacco: Never Used   Substance and Sexual Activity    Alcohol use: Not Currently     Comment: ocassional    Drug use: No    Sexual activity: Not Currently   Social History Narrative    Family History: Mother:  36 yrs, Chronic anemiaFather:  80 yrs, I think aneurysm hypertensionBrother(s):  79 yrs,    obese,dm,drug abuse,miSon(s): alive, adopted1 son(s) . Ector Ferrari [phone]    Social History: Alcohol Use Patient uses alcohol, Drinks per occasion: 2, Drinks per w [place]: 10. Smoking Status Patient is a never smoker. Marital Status: . Lives w ith: alone. Occupation/W ork: employed full time insurance. Education/School: has highschool diploma. Family History   Problem Relation Age of Onset    Anemia Mother     No Known Problems Father     Other Brother         drug abuse       OBJECTIVE:    Visit Vitals  BP (!) 146/93   Pulse (!) 58   Temp 97.8 °F (36.6 °C) (Oral)   Resp 16   Ht 5' 8\" (1.727 m)   Wt 162 lb 1.6 oz (73.5 kg)   SpO2 98%   BMI 24.65 kg/m²     CONSTITUTIONAL: well , well nourished, appears age appropriate  EYES: perrla, eom intact  ENMT:moist mucous membranes, pharynx clear  NECK: supple.  Thyroid normal  RESPIRATORY: Chest: clear bilaterally   CARDIOVASCULAR: Heart: regular rate and rhythm  GASTROINTESTINAL: Abdomen: soft, bowel sounds active  HEMATOLOGIC: no pathological lymph nodes palpated  MUSCULOSKELETAL: Extremities: no edema, pulse 1+   INTEGUMENT: No unusual rashes or suspicious skin lesions noted. Nails appear normal.  NEUROLOGIC: non-focal exam   MENTAL STATUS: alert and oriented, appropriate affect           ASSESSMENT:  1. PAF (paroxysmal atrial fibrillation) ([address]    2. MGUS (monoclonal gammopathy of unknown significance)    3. S/P AVR (aortic valve replacement)    4. Pulmonary HTN ([address]    5. Essential hypertension    6. CKD (chronic kidney disease), stage III ([address]    7. Alcohol abuse    8. Traumatic injury of head, initial encounter      Patient with paroxysmal atrial fibrillation, now in sinus rhythm on auscultation. The monoclonal gammopathy is followed by hematology. BP control is elevated slightly and may be related to the issues at hand. No adjustments will be made. History of CKD. His last renal function was in September. Will check it again today. He claims not to be using alcohol currently. I am concerned about the traumatic injury to the head, the fall, especially since he is on Eliquis. Will ask for a stat CT of the head today. He will be back to see us in one month. I have discussed the diagnosis with the patient and the intended plan as seen in the  orders above. The patient understands and agees with the plan. The patient has   received an after visit summary and questions were answered concerning  future plans  Patient labs and/or xrays were reviewed  Past records were reviewed. PLAN:  .  Orders Placed This Encounter    CT HEAD WO CONT    RENAL FUNCTION PANEL    CBC WITH AUTOMATED DIFF       Follow-up and Dispositions    · Return in about 4 weeks (around 3/20/2020). ATTENTION:   This medical record was transcribed using an electronic medical records system.   Although proofread, it may and can contain electronic and spelling errors. Other human spelling and other errors may be present. Corrections may be executed at a later time. Please feel free to contact us for any clarifications as needed.

## 2020-02-22 LAB
ALBUMIN SERPL-MCNC: 4.2 G/DL (ref 3.6–4.6)
BASOPHILS # BLD AUTO: 0 X10E3/UL (ref 0–0.2)
BASOPHILS NFR BLD AUTO: 1 %
BUN SERPL-MCNC: 19 MG/DL (ref 8–27)
BUN/CREAT SERPL: 12 (ref 10–24)
CALCIUM SERPL-MCNC: 8.7 MG/DL (ref 8.6–10.2)
CHLORIDE SERPL-SCNC: 99 MMOL/L (ref 96–106)
CO2 SERPL-SCNC: 27 MMOL/L (ref 20–29)
CREAT SERPL-MCNC: 1.62 MG/DL (ref 0.76–1.27)
EOSINOPHIL # BLD AUTO: 0.3 X10E3/UL (ref 0–0.4)
EOSINOPHIL NFR BLD AUTO: 7 %
ERYTHROCYTE [DISTWIDTH] IN BLOOD BY AUTOMATED COUNT: 13.1 % (ref 11.6–15.4)
GLUCOSE SERPL-MCNC: 101 MG/DL (ref 65–99)
HCT VFR BLD AUTO: 35.9 % (ref 37.5–51)
HGB BLD-MCNC: 12.6 G/DL (ref 13–17.7)
IMM GRANULOCYTES # BLD AUTO: 0 X10E3/UL (ref 0–0.1)
IMM GRANULOCYTES NFR BLD AUTO: 0 %
LYMPHOCYTES # BLD AUTO: 0.8 X10E3/UL (ref 0.7–3.1)
LYMPHOCYTES NFR BLD AUTO: 19 %
MCH RBC QN AUTO: 35.1 PG (ref 26.6–33)
MCHC RBC AUTO-ENTMCNC: 35.1 G/DL (ref 31.5–35.7)
MCV RBC AUTO: 100 FL (ref 79–97)
MONOCYTES # BLD AUTO: 0.3 X10E3/UL (ref 0.1–0.9)
MONOCYTES NFR BLD AUTO: 8 %
NEUTROPHILS # BLD AUTO: 2.8 X10E3/UL (ref 1.4–7)
NEUTROPHILS NFR BLD AUTO: 65 %
PHOSPHATE SERPL-MCNC: 3.7 MG/DL (ref 2.8–4.1)
PLATELET # BLD AUTO: 191 X10E3/UL (ref 150–450)
POTASSIUM SERPL-SCNC: 3.6 MMOL/L (ref 3.5–5.2)
RBC # BLD AUTO: 3.59 X10E6/UL (ref 4.14–5.8)
SODIUM SERPL-SCNC: 142 MMOL/L (ref 134–144)
WBC # BLD AUTO: 4.2 X10E3/UL (ref 3.4–10.8)

## 2020-03-14 ENCOUNTER — APPOINTMENT (OUTPATIENT)
Dept: GENERAL RADIOLOGY | Age: 85
End: 2020-03-14
Attending: PHYSICIAN ASSISTANT
Payer: MEDICARE

## 2020-03-14 ENCOUNTER — HOSPITAL ENCOUNTER (EMERGENCY)
Age: 85
Discharge: HOME | End: 2020-03-14
Attending: EMERGENCY MEDICINE
Payer: MEDICARE

## 2020-03-14 VITALS
RESPIRATION RATE: 20 BRPM | WEIGHT: 150 LBS | HEIGHT: 68 IN | DIASTOLIC BLOOD PRESSURE: 75 MMHG | BODY MASS INDEX: 22.73 KG/M2 | OXYGEN SATURATION: 100 % | TEMPERATURE: 98.9 F | HEART RATE: 72 BPM | SYSTOLIC BLOOD PRESSURE: 133 MMHG

## 2020-03-14 DIAGNOSIS — M10.9 ACUTE GOUT OF MULTIPLE SITES, UNSPECIFIED CAUSE: ICD-10-CM

## 2020-03-14 DIAGNOSIS — R55 SYNCOPE AND COLLAPSE: Primary | ICD-10-CM

## 2020-03-14 LAB
ALBUMIN SERPL-MCNC: 3.4 G/DL (ref 3.5–5)
ALBUMIN/GLOB SERPL: 0.9 {RATIO} (ref 1.1–2.2)
ALP SERPL-CCNC: 69 U/L (ref 45–117)
ALT SERPL-CCNC: 12 U/L (ref 12–78)
ANION GAP SERPL CALC-SCNC: 9 MMOL/L (ref 5–15)
APPEARANCE UR: CLEAR
AST SERPL-CCNC: 18 U/L (ref 15–37)
BACTERIA URNS QL MICRO: NEGATIVE /HPF
BASOPHILS # BLD: 0 K/UL (ref 0–0.1)
BASOPHILS NFR BLD: 0 % (ref 0–1)
BILIRUB SERPL-MCNC: 1.8 MG/DL (ref 0.2–1)
BILIRUB UR QL: NEGATIVE
BUN SERPL-MCNC: 18 MG/DL (ref 6–20)
BUN/CREAT SERPL: 10 (ref 12–20)
CALCIUM SERPL-MCNC: 8.5 MG/DL (ref 8.5–10.1)
CHLORIDE SERPL-SCNC: 102 MMOL/L (ref 97–108)
CK SERPL-CCNC: 168 U/L (ref 39–308)
CO2 SERPL-SCNC: 30 MMOL/L (ref 21–32)
COLOR UR: ABNORMAL
CREAT SERPL-MCNC: 1.8 MG/DL (ref 0.7–1.3)
DIFFERENTIAL METHOD BLD: ABNORMAL
EOSINOPHIL # BLD: 0.1 K/UL (ref 0–0.4)
EOSINOPHIL NFR BLD: 1 % (ref 0–7)
EPITH CASTS URNS QL MICRO: ABNORMAL /LPF
ERYTHROCYTE [DISTWIDTH] IN BLOOD BY AUTOMATED COUNT: 12.8 % (ref 11.5–14.5)
ETHANOL SERPL-MCNC: <10 MG/DL
GLOBULIN SER CALC-MCNC: 4 G/DL (ref 2–4)
GLUCOSE SERPL-MCNC: 105 MG/DL (ref 65–100)
GLUCOSE UR STRIP.AUTO-MCNC: NEGATIVE MG/DL
HCT VFR BLD AUTO: 36.3 % (ref 36.6–50.3)
HGB BLD-MCNC: 12.5 G/DL (ref 12.1–17)
HGB UR QL STRIP: ABNORMAL
IMM GRANULOCYTES # BLD AUTO: 0 K/UL (ref 0–0.04)
IMM GRANULOCYTES NFR BLD AUTO: 0 % (ref 0–0.5)
KETONES UR QL STRIP.AUTO: NEGATIVE MG/DL
LEUKOCYTE ESTERASE UR QL STRIP.AUTO: NEGATIVE
LYMPHOCYTES # BLD: 0.6 K/UL (ref 0.8–3.5)
LYMPHOCYTES NFR BLD: 8 % (ref 12–49)
MCH RBC QN AUTO: 34.3 PG (ref 26–34)
MCHC RBC AUTO-ENTMCNC: 34.4 G/DL (ref 30–36.5)
MCV RBC AUTO: 99.7 FL (ref 80–99)
MONOCYTES # BLD: 0.6 K/UL (ref 0–1)
MONOCYTES NFR BLD: 9 % (ref 5–13)
NEUTS SEG # BLD: 5.6 K/UL (ref 1.8–8)
NEUTS SEG NFR BLD: 82 % (ref 32–75)
NITRITE UR QL STRIP.AUTO: NEGATIVE
NRBC # BLD: 0 K/UL (ref 0–0.01)
NRBC BLD-RTO: 0 PER 100 WBC
PH UR STRIP: 7 [PH] (ref 5–8)
PLATELET # BLD AUTO: 156 K/UL (ref 150–400)
PMV BLD AUTO: 9.9 FL (ref 8.9–12.9)
POTASSIUM SERPL-SCNC: 3.8 MMOL/L (ref 3.5–5.1)
PROT SERPL-MCNC: 7.4 G/DL (ref 6.4–8.2)
PROT UR STRIP-MCNC: 100 MG/DL
RBC # BLD AUTO: 3.64 M/UL (ref 4.1–5.7)
RBC #/AREA URNS HPF: ABNORMAL /HPF (ref 0–5)
RBC MORPH BLD: ABNORMAL
SODIUM SERPL-SCNC: 141 MMOL/L (ref 136–145)
SP GR UR REFRACTOMETRY: 1.01 (ref 1–1.03)
TROPONIN I SERPL-MCNC: <0.05 NG/ML
UA: UC IF INDICATED,UAUC: ABNORMAL
URATE SERPL-MCNC: 11.1 MG/DL (ref 3.5–7.2)
UROBILINOGEN UR QL STRIP.AUTO: 1 EU/DL (ref 0.2–1)
WBC # BLD AUTO: 6.9 K/UL (ref 4.1–11.1)
WBC URNS QL MICRO: ABNORMAL /HPF (ref 0–4)

## 2020-03-14 PROCEDURE — 84484 ASSAY OF TROPONIN QUANT: CPT

## 2020-03-14 PROCEDURE — 80053 COMPREHEN METABOLIC PANEL: CPT

## 2020-03-14 PROCEDURE — 82550 ASSAY OF CK (CPK): CPT

## 2020-03-14 PROCEDURE — 73610 X-RAY EXAM OF ANKLE: CPT

## 2020-03-14 PROCEDURE — 99284 EMERGENCY DEPT VISIT MOD MDM: CPT

## 2020-03-14 PROCEDURE — 74011250636 HC RX REV CODE- 250/636: Performed by: PHYSICIAN ASSISTANT

## 2020-03-14 PROCEDURE — 85025 COMPLETE CBC W/AUTO DIFF WBC: CPT

## 2020-03-14 PROCEDURE — 93005 ELECTROCARDIOGRAM TRACING: CPT

## 2020-03-14 PROCEDURE — 84550 ASSAY OF BLOOD/URIC ACID: CPT

## 2020-03-14 PROCEDURE — 80307 DRUG TEST PRSMV CHEM ANLYZR: CPT

## 2020-03-14 PROCEDURE — 96374 THER/PROPH/DIAG INJ IV PUSH: CPT

## 2020-03-14 PROCEDURE — 73562 X-RAY EXAM OF KNEE 3: CPT

## 2020-03-14 PROCEDURE — 36415 COLL VENOUS BLD VENIPUNCTURE: CPT

## 2020-03-14 PROCEDURE — 81001 URINALYSIS AUTO W/SCOPE: CPT

## 2020-03-14 RX ORDER — DEXAMETHASONE SODIUM PHOSPHATE 4 MG/ML
10 INJECTION, SOLUTION INTRA-ARTICULAR; INTRALESIONAL; INTRAMUSCULAR; INTRAVENOUS; SOFT TISSUE
Status: COMPLETED | OUTPATIENT
Start: 2020-03-14 | End: 2020-03-14

## 2020-03-14 RX ORDER — TRAMADOL HYDROCHLORIDE 50 MG/1
50 TABLET ORAL
Qty: 10 TAB | Refills: 0 | Status: SHIPPED | OUTPATIENT
Start: 2020-03-14 | End: 2020-03-17

## 2020-03-14 RX ORDER — PREDNISONE 20 MG/1
20 TABLET ORAL 2 TIMES DAILY
Qty: 10 TAB | Refills: 0 | Status: SHIPPED | OUTPATIENT
Start: 2020-03-14 | End: 2020-03-19

## 2020-03-14 RX ADMIN — DEXAMETHASONE SODIUM PHOSPHATE 10 MG: 4 INJECTION INTRA-ARTICULAR; INTRALESIONAL; INTRAMUSCULAR; INTRAVENOUS; SOFT TISSUE at 18:57

## 2020-03-14 NOTE — DISCHARGE INSTRUCTIONS
Patient Education        Gout: Care Instructions  Your Care Instructions    Gout is a form of arthritis caused by a buildup of uric acid crystals in a joint. It causes sudden attacks of pain, swelling, redness, and stiffness, usually in one joint, especially the big toe. Gout usually comes on without a cause. But it can be brought on by drinking alcohol (especially beer) or eating seafood and red meat. Taking certain medicines, such as diuretics or aspirin, also can bring on an attack of gout. Taking your medicines as prescribed and following up with your doctor regularly can help you avoid gout attacks in the future. Follow-up care is a key part of your treatment and safety. Be sure to make and go to all appointments, and call your doctor if you are having problems. It's also a good idea to know your test results and keep a list of the medicines you take. How can you care for yourself at home? · If the joint is swollen, put ice or a cold pack on the area for 10 to 20 minutes at a time. Put a thin cloth between the ice and your skin. · Prop up the sore limb on a pillow when you ice it or anytime you sit or lie down during the next 3 days. Try to keep it above the level of your heart. This will help reduce swelling. · Rest sore joints. Avoid activities that put weight or strain on the joints for a few days. Take short rest breaks from your regular activities during the day. · Take your medicines exactly as prescribed. Call your doctor if you think you are having a problem with your medicine. · Take pain medicines exactly as directed. ? If the doctor gave you a prescription medicine for pain, take it as prescribed. ? If you are not taking a prescription pain medicine, ask your doctor if you can take an over-the-counter medicine. · Eat less seafood and red meat. · Check with your doctor before drinking alcohol. · Losing weight, if you are overweight, may help reduce attacks of gout.  But do not go on a \"crash diet. \" Losing a lot of weight in a short amount of time can cause a gout attack. When should you call for help? Call your doctor now or seek immediate medical care if:    · You have a fever.     · The joint is so painful you cannot use it.     · You have sudden, unexplained swelling, redness, warmth, or severe pain in one or more joints.    Watch closely for changes in your health, and be sure to contact your doctor if:    · You have joint pain.     · Your symptoms get worse or are not improving after 2 or 3 days. Where can you learn more? Go to http://buck-anjelica.info/  Enter E531 in the search box to learn more about \"Gout: Care Instructions. \"  Current as of: December 8, 2019Content Version: 12.4  © 5986-9711 [employer]. Care instructions adapted under license by [employer] (which disclaims liability or warranty for this information). If you have questions about a medical condition or this instruction, always ask your healthcare professional. [address] any warranty or liability for your use of this information. Patient Education        Purine-Restricted Diet: Care Instructions  Your Care Instructions    Purines are substances that are found in some foods. Your body turns purines into uric acid. High levels of uric acid can cause gout, which is a form of arthritis that causes pain and inflammation in joints. You may be able to help control the amount of uric acid in your body by limiting high-purine foods in your diet. Follow-up care is a key part of your treatment and safety. Be sure to make and go to all appointments, and call your doctor if you are having problems. It's also a good idea to know your test results and keep a list of the medicines you take. How can you care for yourself at home? · Plan your meals and snacks around foods that are low in purines and are safe for you to eat.  These foods include:  ? Jie Nguyen vegetables and tomatoes. ? Fruits. ? Whole-grain breads, rice, and cereals. ? Eggs, peanut butter, and nuts. ? Low-fat milk, cheese, and other milk products. ? Popcorn. ? Gelatin desserts, chocolate, cocoa, and cakes and sweets, in small amounts. · You can eat certain foods that are medium-high in purines, but eat them only once in a while. These foods include:  ? Legumes, such as dried beans and dried peas. You can have 1 cup cooked legumes each day. ? Asparagus, cauliflower, spinach, mushrooms, and green peas. ? Fish and seafood (other than very high-purine seafood). ? Oatmeal, wheat bran, and wheat germ. · Limit very high-purine foods, including:  ? Organ meats, such as liver, kidneys, sweetbreads, and brains. ? Meats, including murphy, beef, pork, and lamb. ? Game meats and any other meats in large amounts. ? Anchovies, sardines, herring, mackerel, and scallops. ? Gravy. ? Beer. Where can you learn more? Go to http://buck-anjelica.info/  Enter F448 in the search box to learn more about \"Purine-Restricted Diet: Care Instructions. \"  Current as of: August 21, 2019Content Version: 12.4  © 9452-6596 [employer]. Care instructions adapted under license by [employer] (which disclaims liability or warranty for this information). If you have questions about a medical condition or this instruction, always ask your healthcare professional. [address] [address] any warranty or liability for your use of this information. Patient Education     Fainting: After Your Visit to the Emergency Room  Your Care Instructions  You were seen in the emergency room because you fainted. This means you passed out, or lost consciousness. The treatment you need depends on the reason why you fainted. In many cases fainting is not serious. The doctor may have ordered tests to rule out more serious causes of fainting.   Even though you have been released from the emergency room, you still need to watch for any problems. The doctor carefully checked you. But sometimes problems can develop later. If you have new symptoms, or if your symptoms do not get better, return to the emergency room or call your doctor right away. A visit to the emergency room is only one step in your treatment. Even if you feel better, you still need to do what your doctor recommends, such as going to all suggested follow-up appointments and taking medicines exactly as directed. This will help you recover and help prevent future problems. How can you care for yourself at home? · Drink plenty of fluids to prevent dehydration. If you have kidney, heart, or liver disease and have to limit fluids, talk with your doctor before you increase your fluid intake. When should you call for help? Call 911 if:  · You have chest pain or pressure. This may occur with:  ¨ Sweating. ¨ Shortness of breath. ¨ Nausea or vomiting. ¨ Pain that spreads from the chest to the neck, jaw, or one or both shoulders or arms. ¨ Dizziness or lightheadedness. ¨ A fast or uneven pulse. After calling 911, chew 1 adult-strength aspirin. Wait for an ambulance. Do not try to drive yourself. · You have signs of a stroke. These may include:  ¨ Sudden numbness, paralysis, or weakness in your face, arm, or leg, especially on only one side of your body. ¨ New problems with walking or balance. ¨ Sudden vision changes. ¨ Drooling or slurred speech. ¨ New problems speaking or understanding simple statements, or feeling confused. ¨ A sudden, severe headache that is different from past headaches. Return to the emergency room now if:  · You passed out (lost consciousness) again. · You have a seizure. Where can you learn more? Go to Rocket Internet.be  Enter J227 in the search box to learn more about \"Fainting: After Your Visit to the Emergency Room. \"   © 5166-9232 [employer].  Care instructions adapted under license by [facility] (which disclaims liability or warranty for this information). This care instruction is for use with your licensed healthcare professional. If you have questions about a medical condition or this instruction, always ask your healthcare professional. [address] any warranty or liability for your use of this information. Content Version: 9.4.44148;  Last Revised: September 21, 2010

## 2020-03-14 NOTE — ED PROVIDER NOTES
EMERGENCY DEPARTMENT HISTORY AND PHYSICAL EXAM    Date: 3/14/2020  Patient Name: Damion Galeano. History of Presenting Illness     Chief Complaint   Patient presents with    Fall     pt reports he fell yesterday and injured his left knee and ankle yesterday, pt poor historian regarding symptoms, reports he blacked out yesterday         History Provided By: Patient    HPI: Damion Ibarra is a 81 y.o. male with a PMH of hypertension, gout, CKD, AR, A. fib, multiple myeloma, depression, CHF who presents with left knee and ankle swelling and redness. Patient states he was at [facility] yesterday got up to go to the bathroom and felt lightheaded and ended up on the floor. Patient initially reports that he does not think that he blacked out although he states he woke up on the floor and does not remember the entire incident. Patient does report that EMS was called to [facility] but he refused treatment at that time. Patient also reports that he was playing cards yesterday, did not eat and that was likely the reason that he passed out. Patient denies any specific injury to the left leg during the incident but states he woke up this morning with symptoms. Patient states pain is worse with ambulation. Patient has been walking with a cane due to pain. Patient currently denies any chest pain, abdominal pain, nausea, vomiting, headache, dizziness, fever, chills, shortness of breath or any other symptoms. Patient denies any alcohol use yesterday. PCP: Katarina Howe MD    Current Outpatient Medications   Medication Sig Dispense Refill    predniSONE (DELTASONE) 20 mg tablet Take 20 mg by mouth two (2) times a day for 5 days. 10 Tab 0    traMADoL (Ultram) 50 mg tablet Take 1 Tab by mouth every eight (8) hours as needed for Pain for up to 3 days.  Max Daily Amount: 150 mg. 10 Tab 0    escitalopram oxalate (LEXAPRO) 10 mg tablet TAKE 1 TABLET BY MOUTH EVERY DAY 90 Tab 1    allopurinol (ZYLOPRIM) 100 mg tablet TAKE 1 TABLET BY MOUTH EVERY DAY 90 Tab 5    bumetanide (BUMEX) 1 mg tablet TAKE 1 TABLET BY MOUTH EVERY DAY 90 Tab 3    meclizine (ANTIVERT) 25 mg tablet Take 1 Tab by mouth three (3) times daily as needed for Dizziness. 60 Tab 3    ELIQUIS 2.5 mg tablet TAKE 1 TABLET BY MOUTH TWICE A  Tab 3    metoprolol succinate (TOPROL-XL) 25 mg XL tablet Take 0.5 Tabs by mouth nightly. 90 Tab 3    simvastatin (ZOCOR) 40 mg tablet TAKE 1 TABLET BY MOUTH EVERYDAY AT BEDTIME 90 Tab 3    acyclovir (ZOVIRAX) 400 mg tablet TAKE 1 TABLET BY MOUTH TWO TIMES PER DAY UNTIL SEEN  3    prednisoLONE acetate (PRED FORTE) 1 % ophthalmic suspension INSTILL 1 DROP INTO RIGHT EYE TWICE A DAY  0    timolol (TIMOPTIC) 0.5 % ophthalmic solution INSTILL 1 DROP INTO RIGHT EYE EVERY MORNING  0    propafenone (RYTHMOL) 150 mg tablet Take 1 Tab by mouth two (2) times a day. 90 Tab 5    sodium chloride (OCEAN) 0.65 % nasal spray 2 Sprays by Both Nostrils route four (4) times daily. 45 mL 11       Past History     Past Medical History:  Past Medical History:   Diagnosis Date    Adverse effect of anesthesia     nasal procedure didn't work-patient awake    Alcohol abuse     Aortic regurgitation     Chronic kidney disease     CKD (chronic kidney disease), stage III ([address] 2011    Depression     Episodic lightheadedness 07/28/2017    Episodic lightheadedness     Epistaxis 11/27/2016    Erectile dysfunction     Fall 02/11/2020    GI bleed     Gout     gout, osteoarthritis    Heart failure (HCC)     acute heart failure    Hemorrhoids     History of cardioversion 02/17/2017    Hypertension     Ill-defined condition     weakness    Multiple myeloma (HCC)     PAF (paroxysmal atrial fibrillation) ([address] 02/17/2016    eliquis    Pulmonary HTN [facility] [address]     Right-sided epistaxis 11/27/2016    S/P AVR (aortic valve replacement) 07/26/2016    merrill gill md -     S/P cardiac cath 7/6/16    severe ai, normal coronaries     S/P colonoscopy 10-8-04    S/P colonoscopy with polypectomy 2016    Kar Amanda MD    Skin lesion        Past Surgical History:  Past Surgical History:   Procedure Laterality Date    CARDIAC SURG PROCEDURE UNLIST      cardiac cath   [facility] CARDIAC SURG PROCEDURE UNLIST  2016    AVR    HX HEENT      nasal procedure 20 years ago   [facility] HX KNEE REPLACEMENT Bilateral     bilateral knee replacement    HX ORTHOPAEDIC Bilateral     wrist       Family History:  Family History   Problem Relation Age of Onset    Anemia Mother     No Known Problems Father     Other Brother         drug abuse       Social History:  Social History     Tobacco Use    Smoking status: Former Smoker     Years: 4.00     Last attempt to quit: 1960     Years since quittin.2    Smokeless tobacco: Never Used   Substance Use Topics    Alcohol use: Not Currently     Comment: ocassional    Drug use: No       Allergies: Allergies   Allergen Reactions    Amiodarone Other (comments)     Bradycardia -excessive         Review of Systems   Review of Systems   Constitutional: Negative for activity change, appetite change, chills and fever. Respiratory: Negative for cough and shortness of breath. Cardiovascular: Negative for chest pain. Gastrointestinal: Negative for abdominal pain, nausea and vomiting. Musculoskeletal: Positive for arthralgias, gait problem and joint swelling. Allergic/Immunologic: Negative for immunocompromised state. Neurological: Positive for syncope and light-headedness. Negative for speech difficulty, weakness and headaches. All other systems reviewed and are negative. Physical Exam     Vitals:    20 1720 20 1900 20   BP: 130/82 143/84 133/75   Pulse: 68  72   Resp: 18  20   Temp: 98.9 °F (37.2 °C)     SpO2: 94% 100% 100%   Weight: 68 kg (150 lb)     Height: 5' 8\" (1.727 m)       Physical Exam  Vitals signs and nursing note reviewed.    Constitutional:       General: He is not in acute distress. Appearance: He is well-developed. HENT:      Head: Normocephalic and atraumatic. Right Ear: Tympanic membrane normal.      Left Ear: Tympanic membrane normal.      Mouth/Throat:      Mouth: Mucous membranes are moist.      Pharynx: No oropharyngeal exudate. Eyes:      General: Scleral icterus (mild) present. Conjunctiva/sclera: Conjunctivae normal.      Pupils: Pupils are equal, round, and reactive to light. Cardiovascular:      Rate and Rhythm: Normal rate and regular rhythm. Heart sounds: Normal heart sounds. Pulmonary:      Effort: Pulmonary effort is normal. No respiratory distress. Breath sounds: Normal breath sounds. No wheezing or rales. Abdominal:      General: Bowel sounds are normal. There is no distension. Palpations: Abdomen is soft. Tenderness: There is no abdominal tenderness. There is no guarding or rebound. Musculoskeletal:      Left knee: He exhibits decreased range of motion, swelling and effusion. He exhibits no ecchymosis, no deformity, no laceration, no erythema and normal alignment. Left ankle: He exhibits decreased range of motion and swelling. He exhibits no ecchymosis, no deformity, no laceration and normal pulse. Tenderness. Lateral malleolus and medial malleolus tenderness found. Achilles tendon normal.   Skin:     General: Skin is warm and dry. Neurological:      Mental Status: He is alert and oriented to person, place, and time. Diagnostic Study Results     Labs -     No results found for this or any previous visit (from the past 12 hour(s)). Radiologic Studies -   XR KNEE LT 3 V   Final Result   IMPRESSION: No acute bony abnormality. Normal alignment of the left knee   prosthesis. A moderate joint effusion diffuse soft tissue swelling are noted. XR ANKLE LT MIN 3 V   Final Result   IMPRESSION: No acute bony abnormality. Anterior and lateral soft tissue swelling   is noted.         CT Results  (Last 48 hours)    None        CXR Results  (Last 48 hours)    None            Medical Decision Making   I am the first provider for this patient. I reviewed the vital signs, available nursing notes, past medical history, past surgical history, family history and social history. Vital Signs-Reviewed the patient's vital signs. Records Reviewed: Old Medical Records    ED Course as of Mar 15 1046   Sat Mar 14, 2020   1750 ED EKG interpretation:  Rhythm: normal sinus rhythm with first-degree AV block; and regular . Rate (approx.): 67; Axis: right axis deviation; P wave: normal; QRS interval: normal ; ST/T wave: T wave inverted; in  Lead: V5  and V6. This EKG was interpreted by ED attending, Dr. Flaquita Valerio and Jonna Mayes PA-C,ED Provider.       [AH]      ED Course User Index  [AH] Meena Toledo PA-C          Disposition:  discharged    DISCHARGE NOTE:   6:40 PM      Care plan outlined and precautions discussed. Patient has no new complaints, changes, or physical findings. Results of labs and xrays were reviewed with the patient. All medications were reviewed with the patient; will d/c home. All of pt's questions and concerns were addressed. Patient was instructed and agrees to follow up with PCP prn, as well as to return to the ED upon further deterioration. Patient is ready to go home. Follow-up Information     Follow up With Specialties Details Why Contact Info    Milo Riddle MD Internal Medicine Schedule an appointment as soon as possible for a visit in 2 days As needed [address] [address] [phone]      [facility] EMERGENCY DEPT Emergency Medicine  If symptoms worsen [facility]  [phone]          Discharge Medication List as of 3/14/2020  6:56 PM      START taking these medications    Details   predniSONE (DELTASONE) 20 mg tablet Take 20 mg by mouth two (2) times a day for 5 days. , Normal, Disp-10 Tab, R-0      traMADoL (Ultram) 50 mg tablet Take 1 Tab by mouth every eight (8) hours as needed for Pain for up to 3 days. Max Daily Amount: 150 mg., Normal, Disp-10 Tab, R-0         CONTINUE these medications which have NOT CHANGED    Details   escitalopram oxalate (LEXAPRO) 10 mg tablet TAKE 1 TABLET BY MOUTH EVERY DAY, Normal, Disp-90 Tab, R-1      allopurinol (ZYLOPRIM) 100 mg tablet TAKE 1 TABLET BY MOUTH EVERY DAY, Normal, Disp-90 Tab, R-5      bumetanide (BUMEX) 1 mg tablet TAKE 1 TABLET BY MOUTH EVERY DAY, Normal, Disp-90 Tab, R-3      meclizine (ANTIVERT) 25 mg tablet Take 1 Tab by mouth three (3) times daily as needed for Dizziness., Normal, Disp-60 Tab, R-3      ELIQUIS 2.5 mg tablet TAKE 1 TABLET BY MOUTH TWICE A DAY, Normal, Disp-180 Tab, R-3      metoprolol succinate (TOPROL-XL) 25 mg XL tablet Take 0.5 Tabs by mouth nightly., Normal, Disp-90 Tab, R-3      simvastatin (ZOCOR) 40 mg tablet TAKE 1 TABLET BY MOUTH EVERYDAY AT BEDTIME, Normal, Disp-90 Tab, R-3      acyclovir (ZOVIRAX) 400 mg tablet TAKE 1 TABLET BY MOUTH TWO TIMES PER DAY UNTIL SEEN, Historical Med, R-3      prednisoLONE acetate (PRED FORTE) 1 % ophthalmic suspension INSTILL 1 DROP INTO RIGHT EYE TWICE A DAY, Historical Med, R-0      timolol (TIMOPTIC) 0.5 % ophthalmic solution INSTILL 1 DROP INTO RIGHT EYE EVERY MORNING, Historical Med, R-0      propafenone (RYTHMOL) 150 mg tablet Take 1 Tab by mouth two (2) times a day., No Print, Disp-90 Tab, R-5      sodium chloride (OCEAN) 0.65 % nasal spray 2 Sprays by Both Nostrils route four (4) times daily. , Normal, Disp-45 mL, R-11             Provider Notes (Medical Decision Making):   Patient presents with syncope and joint swelling. Ddx: vasovagal/situational syncope, cardiogenic syncope, orthostatic hypotension, dehydration, gout flare, arthralgia, joint effusion. Will get labs, EKG. Procedures:  Procedures    Please note that this dictation was completed with Dragon, computer voice recognition software.   Quite often unanticipated grammatical, syntax, homophones, and other interpretive errors are inadvertently transcribed by the computer software. Please disregard these errors. Additionally, please excuse any errors that have escaped final proofreading. Diagnosis     Clinical Impression:   1. Syncope and collapse    2.  Acute gout of multiple sites, unspecified cause

## 2020-03-14 NOTE — ED NOTES
Bedside and Verbal shift change report given to Claudia RN (oncoming nurse) by Murray Hong RN (offgoing nurse). Report included the following information SBAR, Kardex, ED Summary, Intake/Output, MAR and Accordion.

## 2020-03-14 NOTE — ED NOTES
Patient a poor medical history historian. Old lower chest - epigastric scar noted. Patient reported having surgery on his \"heart\". The patient unable to provide rationale for the surgery.

## 2020-03-14 NOTE — ED NOTES
Emergency Department Nursing Plan of Care       The Nursing Plan of Care is developed from the Nursing assessment and Emergency Department Attending provider initial evaluation. The plan of care may be reviewed in the ED Provider note.     The Plan of Care was developed with the following considerations:   Patient / Family readiness to learn indicated by:verbalized understanding  Persons(s) to be included in education: patient  Barriers to Learning/Limitations:No    Signed     Marquez Chavez RN    3/14/2020   6:18 PM

## 2020-03-14 NOTE — ED NOTES
Emergency Department Nursing Plan of Care       The Nursing Plan of Care is developed from the Nursing assessment and Emergency Department Attending provider initial evaluation. The plan of care may be reviewed in the ED Provider note.     The Plan of Care was developed with the following considerations:   Patient / Family readiness to learn indicated by:verbalized understanding  Persons(s) to be included in education: patient  Barriers to Learning/Limitations:No    Signed     Angle De La Rosa RN    3/14/2020   5:52 PM

## 2020-03-16 ENCOUNTER — TELEPHONE (OUTPATIENT)
Dept: CASE MANAGEMENT | Age: 85
End: 2020-03-16

## 2020-03-16 LAB
ATRIAL RATE: 67 BPM
CALCULATED P AXIS, ECG09: 67 DEGREES
CALCULATED R AXIS, ECG10: 108 DEGREES
CALCULATED T AXIS, ECG11: 177 DEGREES
DIAGNOSIS, 93000: NORMAL
P-R INTERVAL, ECG05: 222 MS
Q-T INTERVAL, ECG07: 444 MS
QRS DURATION, ECG06: 94 MS
QTC CALCULATION (BEZET), ECG08: 469 MS
VENTRICULAR RATE, ECG03: 67 BPM

## 2020-03-16 NOTE — TELEPHONE ENCOUNTER
CM receive consult per consult \" Patient lives alone has complicated medical issues cardiac on blood thinner and very poor historian came for a fall possible candidate for [facility] or [facility]. CM called patient states name and  inform reason for call. Patient states he would rather come to hospital for PT. CM will send referral to [address] for therapy.      [facility]  [phone]

## 2020-03-26 DIAGNOSIS — M10.9 ACUTE GOUT OF MULTIPLE SITES, UNSPECIFIED CAUSE: ICD-10-CM

## 2020-03-26 RX ORDER — PREDNISONE 20 MG/1
20 TABLET ORAL 2 TIMES DAILY
Qty: 10 TAB | Refills: 0 | OUTPATIENT
Start: 2020-03-26 | End: 2020-03-31

## 2020-03-26 RX ORDER — TRAMADOL HYDROCHLORIDE 50 MG/1
50 TABLET ORAL
Qty: 10 TAB | Refills: 0 | OUTPATIENT
Start: 2020-03-26 | End: 2020-03-29

## 2020-05-13 RX ORDER — BUMETANIDE 1 MG/1
TABLET ORAL
Qty: 90 TAB | Refills: 3 | Status: SHIPPED | OUTPATIENT
Start: 2020-05-13 | End: 2020-10-28 | Stop reason: ALTCHOICE

## 2020-05-13 RX ORDER — METOPROLOL SUCCINATE 25 MG/1
12.5 TABLET, EXTENDED RELEASE ORAL
Qty: 90 TAB | Refills: 3 | Status: SHIPPED | OUTPATIENT
Start: 2020-05-13 | End: 2020-07-10

## 2020-06-01 ENCOUNTER — HOSPITAL ENCOUNTER (EMERGENCY)
Age: 85
Discharge: HOME | End: 2020-06-01
Attending: EMERGENCY MEDICINE
Payer: MEDICARE

## 2020-06-01 ENCOUNTER — APPOINTMENT (OUTPATIENT)
Dept: GENERAL RADIOLOGY | Age: 85
End: 2020-06-01
Attending: NURSE PRACTITIONER
Payer: MEDICARE

## 2020-06-01 VITALS
HEIGHT: 68 IN | TEMPERATURE: 98.2 F | HEART RATE: 74 BPM | WEIGHT: 150 LBS | BODY MASS INDEX: 22.73 KG/M2 | RESPIRATION RATE: 18 BRPM | OXYGEN SATURATION: 97 % | DIASTOLIC BLOOD PRESSURE: 97 MMHG | SYSTOLIC BLOOD PRESSURE: 141 MMHG

## 2020-06-01 DIAGNOSIS — S62.305A CLOSED NONDISPLACED FRACTURE OF FOURTH METACARPAL BONE OF LEFT HAND, UNSPECIFIED PORTION OF METACARPAL, INITIAL ENCOUNTER: Primary | ICD-10-CM

## 2020-06-01 LAB
APPEARANCE UR: CLEAR
BACTERIA URNS QL MICRO: NEGATIVE /HPF
BILIRUB UR QL: NEGATIVE
COLOR UR: ABNORMAL
EPITH CASTS URNS QL MICRO: ABNORMAL /LPF
GLUCOSE UR STRIP.AUTO-MCNC: NEGATIVE MG/DL
HGB UR QL STRIP: ABNORMAL
KETONES UR QL STRIP.AUTO: NEGATIVE MG/DL
LEUKOCYTE ESTERASE UR QL STRIP.AUTO: NEGATIVE
NITRITE UR QL STRIP.AUTO: NEGATIVE
PH UR STRIP: 5.5 [PH] (ref 5–8)
PROT UR STRIP-MCNC: 100 MG/DL
RBC #/AREA URNS HPF: ABNORMAL /HPF (ref 0–5)
SP GR UR REFRACTOMETRY: 1.01 (ref 1–1.03)
UA: UC IF INDICATED,UAUC: ABNORMAL
UROBILINOGEN UR QL STRIP.AUTO: 1 EU/DL (ref 0.2–1)
WBC URNS QL MICRO: ABNORMAL /HPF (ref 0–4)

## 2020-06-01 PROCEDURE — 81001 URINALYSIS AUTO W/SCOPE: CPT

## 2020-06-01 PROCEDURE — 99283 EMERGENCY DEPT VISIT LOW MDM: CPT

## 2020-06-01 PROCEDURE — 73110 X-RAY EXAM OF WRIST: CPT

## 2020-06-01 PROCEDURE — 75810000053 HC SPLINT APPLICATION

## 2020-06-01 PROCEDURE — 74011250637 HC RX REV CODE- 250/637: Performed by: NURSE PRACTITIONER

## 2020-06-01 PROCEDURE — 73130 X-RAY EXAM OF HAND: CPT

## 2020-06-01 RX ORDER — ACETAMINOPHEN 325 MG/1
650 TABLET ORAL
Status: COMPLETED | OUTPATIENT
Start: 2020-06-01 | End: 2020-06-01

## 2020-06-01 RX ORDER — HYDROCODONE BITARTRATE AND ACETAMINOPHEN 5; 325 MG/1; MG/1
1 TABLET ORAL
Qty: 4 TAB | Refills: 0 | Status: SHIPPED | OUTPATIENT
Start: 2020-06-01 | End: 2020-06-04 | Stop reason: ALTCHOICE

## 2020-06-01 RX ADMIN — ACETAMINOPHEN 650 MG: 325 TABLET ORAL at 12:30

## 2020-06-01 NOTE — ED NOTES
Pt presents to ED ambulatory complaining of left wrist pain and swelling after a fall. Pt reports increase in frequency of falls. Pt reports he takes 9 home medications, but reports he does not know the names or dosages of them. Pt denies head injury/LOC. Pt left wrist noted to be swollen. Pt reports he has a walker/cane but does not use them. Pt is alert and oriented x 4, RR even and unlabored, skin is warm and dry. Assessment completed and pt updated on plan of care. Call bell in reach. Emergency Department Nursing Plan of Care       The Nursing Plan of Care is developed from the Nursing assessment and Emergency Department Attending provider initial evaluation. The plan of care may be reviewed in the ED Provider note.     The Plan of Care was developed with the following considerations:   Patient / Family readiness to learn indicated by:verbalized understanding  Persons(s) to be included in education: patient  Barriers to Learning/Limitations:No    Signed     Harsha Ryan RN    6/1/2020   11:58 AM

## 2020-06-01 NOTE — PROGRESS NOTES
Date of previous inpatient admission/ ED visit? 3/15/2020    What brought the patient back to ED? left wrist pain and swelling after a fall. Pt reports increase in frequency of falls    Did patient decline recommended services during last admission/ ED visit (if yes, what)? No    Has patient seen a provider since their last inpatient admission/ED visit (if yes, when)? No     PCP: First and Last name: Dr. James Heard    Name of Practice: [facility]    Are you a current patient: Yes/No: Yes    Approximate date of last visit: 2/22/2020     CM Interventions:  From previous inpatient admission/ED visit: follow-up with PCP  From current inpatient admission/ED visit:   [employer] patient. CM opened case for assessment of D/C planning needs, CM reviewed chart. patient 2 ED visit in 6 month. Patient report frequent fall at home. Patient lives alone referral for pt sent to [facility] on 3/16/2020. PCP follow-up 6/4/2020 @ 1:15 THIS IS NOT A VIRTUAL APPOINTMENT.     [address] [place] [address]  [phone]

## 2020-06-01 NOTE — ED PROVIDER NOTES
EMERGENCY DEPARTMENT HISTORY AND PHYSICAL EXAM    Date: 6/1/2020  Patient Name: Yaima Lewis. History of Presenting Illness     Chief Complaint   Patient presents with    Hand Pain     pt c/o lt hand pain,swelling x 3 days,per report he hit something. History Provided By: Patient    HPI: Yaima Ledezma is a 81 y.o. male with a PMH of CKD, pulmonary HTN, heart failure, Depression, frequent falls,  who presents with hand pain. Onset 2 days ago. Patient states he has a history of due to balance issues. States he follows at least twice a month and has been evaluated by his PCP Dr. Lyly Montanez. States this time he fell after stumbling over an object in the house. He states he fell onto his left hand. Reports swelling, limited movement and bruising. States he needs something strong for pain. Denies history of hand fractures or sprains. Has not tried anything at home for relief. PCP: Lizzeth Del Valle MD    Current Outpatient Medications   Medication Sig Dispense Refill    HYDROcodone-acetaminophen (Norco) 5-325 mg per tablet Take 1 Tab by mouth every six (6) hours as needed for Pain for up to 3 days. Max Daily Amount: 4 Tabs. 4 Tab 0    metoprolol succinate (TOPROL-XL) 25 mg XL tablet Take 0.5 Tabs by mouth nightly. 90 Tab 3    bumetanide (BUMEX) 1 mg tablet TAKE 1 TABLET BY MOUTH EVERY DAY 90 Tab 3    predniSONE (DELTASONE) 5 mg tablet Take 1 Tab by mouth two (2) times a day. 30 Tab 0    escitalopram oxalate (LEXAPRO) 10 mg tablet TAKE 1 TABLET BY MOUTH EVERY DAY 90 Tab 1    allopurinol (ZYLOPRIM) 100 mg tablet TAKE 1 TABLET BY MOUTH EVERY DAY 90 Tab 5    meclizine (ANTIVERT) 25 mg tablet Take 1 Tab by mouth three (3) times daily as needed for Dizziness.  60 Tab 3    ELIQUIS 2.5 mg tablet TAKE 1 TABLET BY MOUTH TWICE A  Tab 3    simvastatin (ZOCOR) 40 mg tablet TAKE 1 TABLET BY MOUTH EVERYDAY AT BEDTIME 90 Tab 3    acyclovir (ZOVIRAX) 400 mg tablet TAKE 1 TABLET BY MOUTH TWO TIMES PER DAY UNTIL SEEN  3    prednisoLONE acetate (PRED FORTE) 1 % ophthalmic suspension INSTILL 1 DROP INTO RIGHT EYE TWICE A DAY  0    timolol (TIMOPTIC) 0.5 % ophthalmic solution INSTILL 1 DROP INTO RIGHT EYE EVERY MORNING  0    propafenone (RYTHMOL) 150 mg tablet Take 1 Tab by mouth two (2) times a day. 90 Tab 5    sodium chloride (OCEAN) 0.65 % nasal spray 2 Sprays by Both Nostrils route four (4) times daily. 45 mL 11       Past History     Past Medical History:  Past Medical History:   Diagnosis Date    Adverse effect of anesthesia     nasal procedure didn't work-patient awake    Alcohol abuse     Aortic regurgitation     Chronic kidney disease     CKD (chronic kidney disease), stage III ([address] 2011    Depression     Episodic lightheadedness 07/28/2017    Episodic lightheadedness     Epistaxis 11/27/2016    Erectile dysfunction     Fall 02/11/2020    GI bleed     Gout     gout, osteoarthritis    Heart failure (HCC)     acute heart failure    Hemorrhoids     History of cardioversion 02/17/2017    Hypertension     Ill-defined condition     weakness    Multiple myeloma (HCC)     PAF (paroxysmal atrial fibrillation) ([facility] 02/17/2016    eliquis    Pulmonary HTN ([facility]     Right-sided epistaxis 11/27/2016    S/P AVR (aortic valve replacement) 07/26/2016    merrill gill md -     S/P cardiac cath 7/6/16    severe ai, normal coronaries     S/P colonoscopy 10-8-04    S/P colonoscopy with polypectomy 05/09/2016    Jayant P. Elvera Buerger, MD    Skin lesion        Past Surgical History:  Past Surgical History:   Procedure Laterality Date    CARDIAC SURG PROCEDURE UNLIST      cardiac cath   [facility] CARDIAC SURG PROCEDURE UNLIST  07/26/2016    AVR    HX HEENT      nasal procedure 20 years ago    HX KNEE REPLACEMENT Bilateral     bilateral knee replacement    HX ORTHOPAEDIC Bilateral     wrist       Family History:  Family History   Problem Relation Age of Onset    Anemia Mother     No Known Problems Father     Other Brother         drug abuse       Social History:  Social History     Tobacco Use    Smoking status: Former Smoker     Years: 4.00     Last attempt to quit: 1960     Years since quittin.4    Smokeless tobacco: Never Used   Substance Use Topics    Alcohol use: Yes     Comment: ocassional    Drug use: No       Allergies: Allergies   Allergen Reactions    Amiodarone Other (comments)     Bradycardia -excessive         Review of Systems   Review of Systems   Constitutional: Negative for chills and fever. Respiratory: Negative for shortness of breath. Cardiovascular: Negative for chest pain. Musculoskeletal: Positive for arthralgias and joint swelling. Negative for back pain, gait problem, myalgias and neck pain. Skin: Negative for color change, pallor and wound. Neurological: Negative for weakness, numbness and headaches. All other systems reviewed and are negative. Physical Exam     Vitals:    20 1131   BP: (!) 141/97   Pulse: 74   Resp: 18   Temp: 98.2 °F (36.8 °C)   SpO2: 97%   Weight: 68 kg (150 lb)   Height: 5' 8\" (1.727 m)     Physical Exam  Vitals signs and nursing note reviewed. Constitutional:       General: He is not in acute distress. Appearance: He is well-developed. HENT:      Head: Normocephalic and atraumatic. Nose: Nose normal.   Eyes:      Extraocular Movements: Extraocular movements intact. Conjunctiva/sclera: Conjunctivae normal.      Pupils: Pupils are equal, round, and reactive to light. Neck:      Musculoskeletal: Normal range of motion and neck supple. Cardiovascular:      Rate and Rhythm: Normal rate and regular rhythm. Pulses: Normal pulses. Heart sounds: Normal heart sounds. Pulmonary:      Effort: Pulmonary effort is normal.      Breath sounds: Normal breath sounds.    Musculoskeletal:      Left hand: He exhibits decreased range of motion (flexion and inversion), tenderness (Dorsal L hand, TTP to DIP of thumb ) and swelling. He exhibits normal capillary refill and no deformity. Normal sensation noted. Normal strength noted. Lymphadenopathy:      Cervical: No cervical adenopathy. Skin:     General: Skin is warm and dry. Neurological:      Mental Status: He is alert and oriented to person, place, and time. GCS: GCS eye subscore is 4. GCS verbal subscore is 5. GCS motor subscore is 6. Cranial Nerves: No cranial nerve deficit. Sensory: Sensation is intact. No sensory deficit. Motor: Motor function is intact. Coordination: Coordination is intact. Gait: Gait is intact. Psychiatric:         Thought Content: Thought content normal.           Diagnostic Study Results     Labs -     Recent Results (from the past 12 hour(s))   URINALYSIS W/ REFLEX CULTURE    Collection Time: 06/01/20  1:18 PM   Result Value Ref Range    Color YELLOW/STRAW      Appearance CLEAR CLEAR      Specific gravity 1.015 1.003 - 1.030      pH (UA) 5.5 5.0 - 8.0      Protein 100 (A) NEG mg/dL    Glucose Negative NEG mg/dL    Ketone Negative NEG mg/dL    Bilirubin Negative NEG      Blood SMALL (A) NEG      Urobilinogen 1.0 0.2 - 1.0 EU/dL    Nitrites Negative NEG      Leukocyte Esterase Negative NEG      WBC 0-4 0 - 4 /hpf    RBC 0-5 0 - 5 /hpf    Epithelial cells FEW FEW /lpf    Bacteria Negative NEG /hpf    UA:UC IF INDICATED CULTURE NOT INDICATED BY UA RESULT CNI         Radiologic Studies -   XR HAND LT MIN 3 V   Final Result   IMPRESSION:       1. Subacute nondisplaced fracture of the fourth of intercarpal is likely days or   weeks old. No x-ray evidence of acute fracture. 2. Age indeterminate scapholunate ligament disruption. 3. Age-indeterminate soft tissue swelling is partially calcified posterior to   the carpus. XR WRIST LT AP/LAT/OBL MIN 3V   Final Result   IMPRESSION:       1. Subacute nondisplaced fracture of the fourth of intercarpal is likely days or   weeks old.  No x-ray evidence of acute fracture. 2. Age indeterminate scapholunate ligament disruption. 3. Age-indeterminate soft tissue swelling is partially calcified posterior to   the carpus. CT Results  (Last 48 hours)    None        CXR Results  (Last 48 hours)    None            Medical Decision Making   I am the first provider for this patient. I reviewed the vital signs, available nursing notes, past medical history, past surgical history, family history and social history. Vital Signs-Reviewed the patient's vital signs. Records Reviewed: Nursing Notes, Old Medical Records and Previous Radiology Studies    ED Course as of Jun 01 2041   Mon Jun 01, 2020   1255 Xray significant for fracture but appears to be subacute. Due to limited ROM and swelling, plan to immobiolize with volar splint with ortho follow up. Advised pt that rx for norco will increase risks for falls and to only take every 6-8 hours if in severe pain. [NA]      ED Course User Index  [NA] Rodolfo Zambrano NP          Disposition:  Discharge     DISCHARGE NOTE:       Care plan outlined and precautions discussed. Patient has no new complaints, changes, or physical findings. . All of pt's questions and concerns were addressed. Patient was instructed and agrees to follow up with ortho, as well as to return to the ED upon further deterioration. Patient is ready to go home. Follow-up Information     Follow up With Specialties Details Why Tyrell Landin MD Internal Medicine On 6/4/2020 Your appointment time is 1:15, Please arrive 15 mins early, Bring  INS card, picture ID,and discharge papers.  PLEASE GO TO THE OFFICE, THIS IS NOT A VIRTUAL APPOINTMENT [facility] [address]  [place] [address]  [phone]      Nory Dean  Schedule an appointment as soon as possible for a visit fracture evaluation  [address] [place] [address] [address]  [address] [place] [address]  [phone]          Discharge Medication List as of 6/1/2020 12:56 PM      START taking these medications    Details   HYDROcodone-acetaminophen (Norco) 5-325 mg per tablet Take 1 Tab by mouth every six (6) hours as needed for Pain for up to 3 days. Max Daily Amount: 4 Tabs., Normal, Disp-4 Tab, R-0         CONTINUE these medications which have NOT CHANGED    Details   metoprolol succinate (TOPROL-XL) 25 mg XL tablet Take 0.5 Tabs by mouth nightly., Normal, Disp-90 Tab, R-3      bumetanide (BUMEX) 1 mg tablet TAKE 1 TABLET BY MOUTH EVERY DAY, Normal, Disp-90 Tab, R-3      predniSONE (DELTASONE) 5 mg tablet Take 1 Tab by mouth two (2) times a day., Normal, Disp-30 Tab, R-0      escitalopram oxalate (LEXAPRO) 10 mg tablet TAKE 1 TABLET BY MOUTH EVERY DAY, Normal, Disp-90 Tab, R-1      allopurinol (ZYLOPRIM) 100 mg tablet TAKE 1 TABLET BY MOUTH EVERY DAY, Normal, Disp-90 Tab, R-5      meclizine (ANTIVERT) 25 mg tablet Take 1 Tab by mouth three (3) times daily as needed for Dizziness., Normal, Disp-60 Tab, R-3      ELIQUIS 2.5 mg tablet TAKE 1 TABLET BY MOUTH TWICE A DAY, Normal, Disp-180 Tab, R-3      simvastatin (ZOCOR) 40 mg tablet TAKE 1 TABLET BY MOUTH EVERYDAY AT BEDTIME, Normal, Disp-90 Tab, R-3      acyclovir (ZOVIRAX) 400 mg tablet TAKE 1 TABLET BY MOUTH TWO TIMES PER DAY UNTIL SEEN, Historical Med, R-3      prednisoLONE acetate (PRED FORTE) 1 % ophthalmic suspension INSTILL 1 DROP INTO RIGHT EYE TWICE A DAY, Historical Med, R-0      timolol (TIMOPTIC) 0.5 % ophthalmic solution INSTILL 1 DROP INTO RIGHT EYE EVERY MORNING, Historical Med, R-0      propafenone (RYTHMOL) 150 mg tablet Take 1 Tab by mouth two (2) times a day., No Print, Disp-90 Tab, R-5      sodium chloride (OCEAN) 0.65 % nasal spray 2 Sprays by Both Nostrils route four (4) times daily. , Normal, Disp-45 mL, R-11             Provider Notes (Medical Decision Making):   DDX: Fall, hand sprain vs fracture vs contusion  Procedures:  Procedures    Please note that this dictation was completed with [employer] voice recognition software. Quite often unanticipated grammatical, syntax, homophones, and other interpretive errors are inadvertently transcribed by the computer software. Please disregard these errors. Additionally, please excuse any errors that have escaped final proofreading. Diagnosis     Clinical Impression:   1.  Closed nondisplaced fracture of fourth metacarpal bone of left hand, unspecified portion of metacarpal, initial encounter

## 2020-06-01 NOTE — DISCHARGE INSTRUCTIONS
Patient Education        Broken Hand: Care Instructions  Your Care Instructions  A hand can break (fracture) during sports, a fall, or other accidents. The break may happen when your hand twists, is hit, or is used to protect you in a fall. Fractures can range from a small, hairline crack, to a bone or bones broken into two or more pieces. Your treatment depends on how bad the break is. Your doctor may have put your hand in a brace, splint, or cast to allow it to heal or to keep it stable until you see another doctor. It may take weeks or months for your hand to heal. You can help it heal with some care at home. You heal best when you take good care of yourself. Eat a variety of healthy foods, and don't smoke. Follow-up care is a key part of your treatment and safety. Be sure to make and go to all appointments, and call your doctor if you are having problems. It's also a good idea to know your test results and keep a list of the medicines you take. How can you care for yourself at home? · Put ice or a cold pack on your hand for 10 to 20 minutes at a time. Try to do this every 1 to 2 hours for the next 3 days (when you are awake). Put a thin cloth between the ice and your cast or splint. Keep your cast or splint dry. · Follow the cast care instructions your doctor gives you. If you have a splint, do not take it off unless your doctor tells you to. · Be safe with medicines. Take pain medicines exactly as directed. ? If the doctor gave you a prescription medicine for pain, take it as prescribed. ? If you are not taking a prescription pain medicine, ask your doctor if you can take an over-the-counter medicine. · Prop up your hand on pillows when you sit or lie down in the first few days after the injury. Keep your hand higher than the level of your heart. This will help reduce swelling. · Follow instructions for exercises to keep your arm strong.   · Wiggle your uninjured fingers often to reduce swelling and stiffness, but do not use that hand to grasp or carry anything. When should you call for help? Call your doctor now or seek immediate medical care if:  · You have new or worse pain. · Your hand or fingers are cool or pale or change color. · Your cast or splint feels too tight. · You have tingling, weakness, or numbness in your hand or fingers. Watch closely for changes in your health, and be sure to contact your doctor if:  · You do not get better as expected. · You have problems with your cast or splint. Where can you learn more? Go to http://buck-anjelica.info/  Enter Q241 in the search box to learn more about \"Broken Hand: Care Instructions. \"  Current as of: March 2, 2020               Content Version: 12.5  © 0938-0532 [employer]. Care instructions adapted under license by [employer] (which disclaims liability or warranty for this information). If you have questions about a medical condition or this instruction, always ask your healthcare professional. [address] any warranty or liability for your use of this information.

## 2020-06-02 ENCOUNTER — PATIENT OUTREACH (OUTPATIENT)
Dept: FAMILY MEDICINE CLINIC | Age: 85
End: 2020-06-02

## 2020-06-03 NOTE — PROGRESS NOTES
Patient contacted regarding recent discharge and COVID-19 risk. Discussed COVID-19 related testing which was not done at this time. Test results were not done. Patient informed of results, if available? n/a    Care Transition Nurse/ Ambulatory Care Manager contacted the patient by telephone to perform post discharge assessment. Verified name and  with patient as identifiers. Patient has following risk factors of: CHF, HTN/fall. CTN/ACM reviewed discharge instructions, medical action plan and red flags related to discharge diagnosis. Reviewed and educated them on any new and changed medications related to discharge diagnosis. Advised obtaining a 90-day supply of all daily and as-needed medications. Education provided regarding infection prevention, and signs and symptoms of COVID-19 and when to seek medical attention with patient who verbalized understanding. Discussed exposure protocols and quarantine from [address] you at higher risk for severe illness  and given an opportunity for questions and concerns. The patient agrees to contact the COVID-19 hotline [phone] or PCP office for questions related to their healthcare. CTN/ACM provided contact information for future reference. From CDC: Are you at higher risk for severe illness?  Wash your hands often.  Avoid close contact (6 feet, which is about two arm lengths) with people who are sick.  Put distance between yourself and other people if COVID-19 is spreading in your community.  Clean and disinfect frequently touched surfaces.  Avoid all cruise travel and non-essential air travel.  Call your healthcare professional if you have concerns about COVID-19 and your underlying condition or if you are sick.     For more information on steps you can take to protect yourself, see CDC's How to Protect Yourself      Patient/family/caregiver given information for Stephani Trinh and agrees to enroll no  Patient's preferred e-mail: n/a  Patient's preferred phone number: n/a  Based on Loop alert triggers, patient will be contacted by nurse care manager for worsening symptoms. Plan for follow-up call in 7-14 days based on severity of symptoms and risk factors.     Follow-up:  6/4/2020 1:15 PM Olga Lidia Garcia MD [facility]

## 2020-06-04 ENCOUNTER — OFFICE VISIT (OUTPATIENT)
Dept: INTERNAL MEDICINE CLINIC | Age: 85
End: 2020-06-04

## 2020-06-04 VITALS
SYSTOLIC BLOOD PRESSURE: 155 MMHG | BODY MASS INDEX: 22.85 KG/M2 | TEMPERATURE: 97.7 F | HEIGHT: 68 IN | OXYGEN SATURATION: 98 % | RESPIRATION RATE: 18 BRPM | WEIGHT: 150.8 LBS | HEART RATE: 69 BPM | DIASTOLIC BLOOD PRESSURE: 103 MMHG

## 2020-06-04 DIAGNOSIS — F10.10 ALCOHOL ABUSE: ICD-10-CM

## 2020-06-04 DIAGNOSIS — I35.1 NONRHEUMATIC AORTIC VALVE INSUFFICIENCY: ICD-10-CM

## 2020-06-04 DIAGNOSIS — I48.0 PAF (PAROXYSMAL ATRIAL FIBRILLATION) (HCC): ICD-10-CM

## 2020-06-04 DIAGNOSIS — I10 ESSENTIAL HYPERTENSION: ICD-10-CM

## 2020-06-04 DIAGNOSIS — D47.2 MGUS (MONOCLONAL GAMMOPATHY OF UNKNOWN SIGNIFICANCE): ICD-10-CM

## 2020-06-04 DIAGNOSIS — S62.398D: Primary | ICD-10-CM

## 2020-06-04 DIAGNOSIS — N18.30 CKD (CHRONIC KIDNEY DISEASE), STAGE III (HCC): ICD-10-CM

## 2020-06-04 PROBLEM — S62.308A: Status: ACTIVE | Noted: 2020-06-04

## 2020-06-04 NOTE — PROGRESS NOTES
1. Have you been to the ER, urgent care clinic since your last visit? Hospitalized since your last visit? Yes When: 6-1-2020 Where: [facility] Reason for visit: fall    2. Have you seen or consulted any other health care providers outside of the [address] [place] [address] [place] since your last visit? Include any pap smears or colon screening.  No    ED follow up  NEEDS TOE NAILS CUT

## 2020-06-04 NOTE — PROGRESS NOTES
SPORTS MEDICINE AND PRIMARY CARE  Osiris Souza MD, [address] [address]  Phone:  [phone]  Fax: [phone]       Chief Complaint   Patient presents with   [facility] ED Follow-up   . SUBJECTIVE:    Naila Waldron is a 81 y.o. male Patient returns today following a visit to the emergency room on 06/01/20, where he presented with hand pain of two days duration. He stated he had issues with balance and falls at least twice a month. He states he fell after stumbling over an object in a house. He fell onto his left hand and reported swelling and limitation of range of motion and bruising. He says he needed something stronger for pain and was seen in the emergency room. There, x-rays were taken of the hand and revealed subacute non displaced fracture of the fourth intercarpal, likely days or weeks old, no x-ray evidence of acute fracture, age indeterminate saphenous ligament disruption, age indeterminate soft tissue swelling, particularly calcified posterior to the carpus. Patient comes in today for follow up. He has a known history of alcohol abuse, paroxysmal atrial fibrillation, MGUS, non rheumatic aortic valve insufficiency, primary hypertension and CKD. Current Outpatient Medications   Medication Sig Dispense Refill    metoprolol succinate (TOPROL-XL) 25 mg XL tablet Take 0.5 Tabs by mouth nightly.  90 Tab 3    bumetanide (BUMEX) 1 mg tablet TAKE 1 TABLET BY MOUTH EVERY DAY 90 Tab 3    escitalopram oxalate (LEXAPRO) 10 mg tablet TAKE 1 TABLET BY MOUTH EVERY DAY 90 Tab 1    allopurinol (ZYLOPRIM) 100 mg tablet TAKE 1 TABLET BY MOUTH EVERY DAY 90 Tab 5    ELIQUIS 2.5 mg tablet TAKE 1 TABLET BY MOUTH TWICE A  Tab 3    simvastatin (ZOCOR) 40 mg tablet TAKE 1 TABLET BY MOUTH EVERYDAY AT BEDTIME 90 Tab 3    acyclovir (ZOVIRAX) 400 mg tablet TAKE 1 TABLET BY MOUTH TWO TIMES PER DAY UNTIL SEEN  3    prednisoLONE acetate (PRED FORTE) 1 % ophthalmic suspension INSTILL 1 DROP INTO RIGHT EYE TWICE A DAY  0    timolol (TIMOPTIC) 0.5 % ophthalmic solution INSTILL 1 DROP INTO RIGHT EYE EVERY MORNING  0    propafenone (RYTHMOL) 150 mg tablet Take 1 Tab by mouth two (2) times a day. 90 Tab 5    sodium chloride (OCEAN) 0.65 % nasal spray 2 Sprays by Both Nostrils route four (4) times daily. 45 mL 11     Past Medical History:   Diagnosis Date    Adverse effect of anesthesia     nasal procedure didn't work-patient awake    Alcohol abuse     Aortic regurgitation     Chronic kidney disease     CKD (chronic kidney disease), stage III ([facility] 2011    Depression     Episodic lightheadedness 07/28/2017    Episodic lightheadedness     Epistaxis 11/27/2016    Erectile dysfunction     Fall 02/11/2020    GI bleed     Gout     gout, osteoarthritis    Heart failure (HCC)     acute heart failure    Hemorrhoids     History of cardioversion 02/17/2017    Hypertension     Ill-defined condition     weakness    Multiple myeloma (HCC)     PAF (paroxysmal atrial fibrillation) ([facility] [facility] 02/17/2016    eliquis    Pulmonary HTN ([facility]     Right-sided epistaxis 11/27/2016    S/P AVR (aortic valve replacement) 07/26/2016    merrill gill md -     S/P cardiac cath 7/6/16    severe ai, normal coronaries     S/P colonoscopy 10-8-04    S/P colonoscopy with polypectomy 05/09/2016    Kar Atkins MD    Skin lesion      Past Surgical History:   Procedure Laterality Date    CARDIAC SURG PROCEDURE UNLIST      cardiac cath   [address] [place] [address] CARDIAC SURG PROCEDURE UNLIST  07/26/2016    AVR    HX HEENT      nasal procedure 20 years ago    HX KNEE REPLACEMENT Bilateral     bilateral knee replacement    HX ORTHOPAEDIC Bilateral     wrist     Allergies   Allergen Reactions    Amiodarone Other (comments)     Bradycardia -excessive         REVIEW OF SYSTEMS:  General: negative for - chills or fever  ENT: negative for - headaches, nasal congestion or tinnitus  Respiratory: negative for - cough, hemoptysis, shortness of breath or wheezing  Cardiovascular : negative for - chest pain, edema, palpitations or shortness of breath  Gastrointestinal: negative for - abdominal pain, blood in stools, heartburn or nausea/vomiting  Genito-Urinary: no dysuria, trouble voiding, or hematuria  Musculoskeletal: negative for - gait disturbance, joint pain, joint stiffness or joint swelling  Neurological: no TIA or stroke symptoms  Hematologic: no bruises, no bleeding, no swollen glands  Integument: no lumps, mole changes, nail changes or rash  Endocrine: no malaise/lethargy or unexpected weight changes      Social History     Socioeconomic History    Marital status:      Spouse name: Not on file    Number of children: Not on file    Years of education: Not on file    Highest education level: Not on file   Tobacco Use    Smoking status: Former Smoker     Years: 4.00     Last attempt to quit: 1960     Years since quittin.4    Smokeless tobacco: Never Used   Substance and Sexual Activity    Alcohol use: Yes     Comment: ocassional    Drug use: No    Sexual activity: Not Currently   Social History Narrative    Family History: Mother:  36 yrs, Chronic anemiaFather:  80 yrs, I think aneurysm hypertensionBrother(s):  79 yrs,    obese,dm,drug abuse,miSon(s): alive, adopted1 son(s) . [facility] [phone]    Social History: Alcohol Use Patient uses alcohol, Drinks per occasion: 2, Drinks per w [employer] [place] [employer] [place]: 10. Smoking Status Patient is a never smoker. Marital Status: . Lives w ith: alone. Occupation/W ork: employed full time insurance. Education/School: has highschool diploma.      Family History   Problem Relation Age of Onset    Anemia Mother     No Known Problems Father     Other Brother         drug abuse       OBJECTIVE:    Visit Vitals  BP (!) 155/103   Pulse 69   Temp 97.7 °F (36.5 °C) (Oral)   Resp 18   Ht 5' 8\" (1.727 m)   Wt 150 lb 12.8 oz (68.4 kg)   SpO2 98%   BMI 22.93 kg/m²     CONSTITUTIONAL: well , well nourished, appears age appropriate  EYES: perrla, eom intact  ENMT:moist mucous membranes, pharynx clear  NECK: supple. Thyroid normal  RESPIRATORY: Chest: clear bilaterally   CARDIOVASCULAR: Heart: regular rate and rhythm  GASTROINTESTINAL: Abdomen: soft, bowel sounds active  HEMATOLOGIC: no pathological lymph nodes palpated  MUSCULOSKELETAL: Extremities: no edema, pulse 1+   INTEGUMENT: No unusual rashes or suspicious skin lesions noted. Nails appear normal.  NEUROLOGIC: non-focal exam   MENTAL STATUS: alert and oriented, appropriate affect           ASSESSMENT:  1. Closed nondisplaced fracture of other part of fourth metacarpal bone with routine healing, unspecified laterality, subsequent encounter    2. PAF (paroxysmal atrial fibrillation) ([address]    3. MGUS (monoclonal gammopathy of unknown significance)    4. Nonrheumatic aortic valve insufficiency    5. Alcohol abuse    6. Essential hypertension    7. CKD (chronic kidney disease), stage III ([address]      Patient has a splint from the hand up to the elbow and states he remains uncomfortable. We suggest he see a hand specialist because he is concerned he has fallen more than once and may have broken it again. He has a history of paroxysmal atrial fibrillation, on auscultation today he is in normal sinus rhythm. He continues to take Eliquis, but we asked him to look at his list to confirm that he has that medication at home, and if he does not then he will call the nurse here at the office and we will renew it for him. He has been seen and is followed by hematology for MGUS. He also has been followed by cardiology for his aortic insufficiency. He has a history of alcohol abuse, he claims he is not drinking, however I think the abuse has caused the neurologic deficits that he is currently exhibiting, particularly the loss of balance.   As he walks around the office, we see that he holds on to the walls, etc. We suggest a cane and he declines. Blood pressure is up somewhat today and that is why I am concerned he is not taking his medications properly. We suggest he get some help at home. He says he does not want any help and certainly he does not want any family members to help. There is a history of chronic kidney disease, for which we will look at his renal status. He will be back to see us in two months. I have discussed the diagnosis with the patient and the intended plan as seen in the  orders above. The patient understands and agees with the plan. The patient has   received an after visit summary and questions were answered concerning  future plans  Patient labs and/or xrays were reviewed  Past records were reviewed. PLAN:  .  Orders Placed This Encounter    RENAL FUNCTION PANEL    REFERRAL TO ORTHOPEDIC SURGERY       Follow-up and Dispositions    · Return in about 3 months (around 9/4/2020). ATTENTION:   This medical record was transcribed using an electronic medical records system. Although proofread, it may and can contain electronic and spelling errors. Other human spelling and other errors may be present. Corrections may be executed at a later time. Please feel free to contact us for any clarifications as needed.

## 2020-06-05 LAB
ALBUMIN SERPL-MCNC: 3.9 G/DL (ref 3.6–4.6)
BUN SERPL-MCNC: 25 MG/DL (ref 8–27)
BUN/CREAT SERPL: 14 (ref 10–24)
CALCIUM SERPL-MCNC: 9.1 MG/DL (ref 8.6–10.2)
CHLORIDE SERPL-SCNC: 104 MMOL/L (ref 96–106)
CO2 SERPL-SCNC: 18 MMOL/L (ref 20–29)
CREAT SERPL-MCNC: 1.75 MG/DL (ref 0.76–1.27)
GLUCOSE SERPL-MCNC: 96 MG/DL (ref 65–99)
PHOSPHATE SERPL-MCNC: 3.1 MG/DL (ref 2.8–4.1)
POTASSIUM SERPL-SCNC: 4.5 MMOL/L (ref 3.5–5.2)
SODIUM SERPL-SCNC: 148 MMOL/L (ref 134–144)

## 2020-06-06 ENCOUNTER — APPOINTMENT (OUTPATIENT)
Dept: GENERAL RADIOLOGY | Age: 85
End: 2020-06-06
Attending: EMERGENCY MEDICINE
Payer: MEDICARE

## 2020-06-06 ENCOUNTER — HOSPITAL ENCOUNTER (EMERGENCY)
Age: 85
Discharge: HOME | End: 2020-06-06
Attending: EMERGENCY MEDICINE
Payer: MEDICARE

## 2020-06-06 VITALS
DIASTOLIC BLOOD PRESSURE: 98 MMHG | HEIGHT: 68 IN | WEIGHT: 150 LBS | HEART RATE: 61 BPM | TEMPERATURE: 98.2 F | RESPIRATION RATE: 15 BRPM | OXYGEN SATURATION: 98 % | BODY MASS INDEX: 22.73 KG/M2 | SYSTOLIC BLOOD PRESSURE: 130 MMHG

## 2020-06-06 DIAGNOSIS — S42.402A OCCULT CLOSED FRACTURE OF LEFT ELBOW, INITIAL ENCOUNTER: Primary | ICD-10-CM

## 2020-06-06 PROCEDURE — 73090 X-RAY EXAM OF FOREARM: CPT

## 2020-06-06 PROCEDURE — 99283 EMERGENCY DEPT VISIT LOW MDM: CPT

## 2020-06-06 PROCEDURE — 74011250637 HC RX REV CODE- 250/637: Performed by: EMERGENCY MEDICINE

## 2020-06-06 PROCEDURE — 73080 X-RAY EXAM OF ELBOW: CPT

## 2020-06-06 RX ORDER — AMLODIPINE BESYLATE 5 MG/1
TABLET ORAL
COMMUNITY
Start: 2020-03-12 | End: 2020-08-25 | Stop reason: DRUGHIGH

## 2020-06-06 RX ORDER — POTASSIUM CHLORIDE 1500 MG/1
TABLET, EXTENDED RELEASE ORAL
COMMUNITY
Start: 2020-05-17 | End: 2020-12-11

## 2020-06-06 RX ORDER — TRAMADOL HYDROCHLORIDE AND ACETAMINOPHEN 37.5; 325 MG/1; MG/1
1 TABLET ORAL
Qty: 15 TAB | Refills: 0 | Status: SHIPPED | OUTPATIENT
Start: 2020-06-06 | End: 2020-06-09

## 2020-06-06 RX ORDER — TRAMADOL HYDROCHLORIDE 50 MG/1
25 TABLET ORAL
Status: COMPLETED | OUTPATIENT
Start: 2020-06-06 | End: 2020-06-06

## 2020-06-06 RX ADMIN — TRAMADOL HYDROCHLORIDE 25 MG: 50 TABLET, FILM COATED ORAL at 16:17

## 2020-06-06 NOTE — ED PROVIDER NOTES
EMERGENCY DEPARTMENT HISTORY AND PHYSICAL EXAM      Date: 6/6/2020  Patient Name: Naila Plummer. History of Presenting Illness     Chief Complaint   Patient presents with    Elbow Pain       History Provided By: Patient    HPI: Naila Waldron, 81 y.o. male presents to the ED with cc of atraumatic left elbow pain. Patient has a history of atrial fibrillation, chronic insufficiency and most recently frequent falls. In fact was seen here recently for hand pain. He was diagnosed with a subacute metacarpal fracture and possible scaphoid injury. He was placed in a splint. Patient did go to his PCP yesterday and that is viewable on this EMR. Patient had blood work that showed baseline renal function. He mentioned the elbow pain to the provider. Patient does have a history of gout. He denies any associated fever chills nausea or vomiting. He denies any chest pain or recent falls since his visit here. There are no other complaints, changes, or physical findings at this time. PCP: Kesha Queen MD    No current facility-administered medications on file prior to encounter. Current Outpatient Medications on File Prior to Encounter   Medication Sig Dispense Refill    metoprolol succinate (TOPROL-XL) 25 mg XL tablet Take 0.5 Tabs by mouth nightly. 90 Tab 3    bumetanide (BUMEX) 1 mg tablet TAKE 1 TABLET BY MOUTH EVERY DAY 90 Tab 3    escitalopram oxalate (LEXAPRO) 10 mg tablet TAKE 1 TABLET BY MOUTH EVERY DAY 90 Tab 1    allopurinol (ZYLOPRIM) 100 mg tablet TAKE 1 TABLET BY MOUTH EVERY DAY 90 Tab 5    ELIQUIS 2.5 mg tablet TAKE 1 TABLET BY MOUTH TWICE A  Tab 3    simvastatin (ZOCOR) 40 mg tablet TAKE 1 TABLET BY MOUTH EVERYDAY AT BEDTIME 90 Tab 3    propafenone (RYTHMOL) 150 mg tablet Take 1 Tab by mouth two (2) times a day.  90 Tab 5    amLODIPine (NORVASC) 5 mg tablet TAKE 1 TABLET BY MOUTH DAILY      Klor-Con M20 20 mEq tablet TAKE 1 TABLET BY MOUTH EVERY DAY WITH FOOD      acyclovir (ZOVIRAX) 400 mg tablet TAKE 1 TABLET BY MOUTH TWO TIMES PER DAY UNTIL SEEN  3    prednisoLONE acetate (PRED FORTE) 1 % ophthalmic suspension INSTILL 1 DROP INTO RIGHT EYE TWICE A DAY  0    timolol (TIMOPTIC) 0.5 % ophthalmic solution INSTILL 1 DROP INTO RIGHT EYE EVERY MORNING  0    sodium chloride (OCEAN) 0.65 % nasal spray 2 Sprays by Both Nostrils route four (4) times daily. 45 mL 11       Past History     Past Medical History:  Past Medical History:   Diagnosis Date    Adverse effect of anesthesia     nasal procedure didn't work-patient awake    Alcohol abuse     Aortic regurgitation     Chronic kidney disease     CKD (chronic kidney disease), stage III ([facility] [facility] 2011    Depression     Episodic lightheadedness 07/28/2017    Episodic lightheadedness     Epistaxis 11/27/2016    Erectile dysfunction     Fall 02/11/2020    GI bleed     Gout     gout, osteoarthritis    Heart failure (HCC)     acute heart failure    Hemorrhoids     History of cardioversion 02/17/2017    Hypertension     Ill-defined condition     weakness    Multiple myeloma (HCC)     PAF (paroxysmal atrial fibrillation) ([facility] 02/17/2016    eliquis    Pulmonary HTN ([facility]     Right-sided epistaxis 11/27/2016    S/P AVR (aortic valve replacement) 07/26/2016    merrill gill md -     S/P cardiac cath 7/6/16    severe ai, normal coronaries     S/P colonoscopy 10-8-04    S/P colonoscopy with polypectomy 05/09/2016    Kar Bah MD    Skin lesion        Past Surgical History:  Past Surgical History:   Procedure Laterality Date    CARDIAC SURG PROCEDURE UNLIST      cardiac cath   [facility] CARDIAC SURG PROCEDURE UNLIST  07/26/2016    AVR    HX HEENT      nasal procedure 20 years ago   [facility] HX KNEE REPLACEMENT Bilateral     bilateral knee replacement    HX ORTHOPAEDIC Bilateral     wrist       Family History:  Family History   Problem Relation Age of Onset    Anemia Mother     No Known Problems Father     Other Brother drug abuse       Social History:  Social History     Tobacco Use    Smoking status: Former Smoker     Years: 4.00     Last attempt to quit: 1960     Years since quittin.4    Smokeless tobacco: Never Used   Substance Use Topics    Alcohol use: Yes     Comment: ocassional    Drug use: No       Allergies: Allergies   Allergen Reactions    Amiodarone Other (comments)     Bradycardia -excessive         Review of Systems   Review of Systems   Constitutional: Negative. Negative for chills and fever. HENT: Negative. Negative for congestion, rhinorrhea and sinus pressure. Eyes: Negative. Negative for discharge and redness. Respiratory: Negative. Negative for chest tightness and shortness of breath. Cardiovascular: Negative. Negative for chest pain. Gastrointestinal: Negative. Negative for abdominal pain and blood in stool. Endocrine: Negative. Genitourinary: Negative. Negative for flank pain and hematuria. Musculoskeletal: Positive for arthralgias. Negative for back pain, joint swelling, myalgias and neck pain. Skin: Negative. Negative for rash. Neurological: Negative. Negative for dizziness, seizures, weakness, numbness and headaches. Hematological: Negative. All other systems reviewed and are negative. Physical Exam   Physical Exam  Vitals signs reviewed. HENT:      Head: Normocephalic and atraumatic. Right Ear: Tympanic membrane normal.      Left Ear: Tympanic membrane normal.      Nose: Nose normal.      Mouth/Throat:      Mouth: Mucous membranes are moist.   Eyes:      Extraocular Movements: Extraocular movements intact. Conjunctiva/sclera: Conjunctivae normal.      Pupils: Pupils are equal, round, and reactive to light. Neck:      Musculoskeletal: Normal range of motion and neck supple. Cardiovascular:      Rate and Rhythm: Normal rate and regular rhythm. Pulses: Normal pulses. Heart sounds: Normal heart sounds.    Pulmonary:      Effort: Pulmonary effort is normal.      Breath sounds: Normal breath sounds. Abdominal:      General: Abdomen is flat. Palpations: Abdomen is soft. Musculoskeletal:         General: Swelling and tenderness present. No deformity or signs of injury. Left elbow: He exhibits swelling. He exhibits normal range of motion, no effusion, no deformity and no laceration. Tenderness found. Olecranon process tenderness noted. Left wrist: He exhibits tenderness. He exhibits normal range of motion, no bony tenderness, no swelling and no crepitus. Left forearm: He exhibits no tenderness, no bony tenderness, no swelling, no edema, no deformity and no laceration. Left hand: He exhibits tenderness and swelling. Comments: Patient has splint with Ace wrap on left forearm and wrist.  There is swelling in the hands. There is good cap refill however. Moves splint and there is no underlying redness or warmth noted in the forearm or hand. Examination of the left elbow reveals a gouty tophi. There is swelling and it is tender. There is no warmth or redness however to the site consistent with possible septic bursitis or septic arthritis. There is no palpable cords or signs of DVT. He has brisk radial pulses. There is no ischemic signs   Lymphadenopathy:      Cervical: No cervical adenopathy. Neurological:      Mental Status: He is alert. 3:46 PM reviewed records and results with patient. It appears he possibly has an occult elbow fracture. Suspect based on his mechanism fall he could be a radial head. Clinically has no signs of septic arthritis or bursitis. There is no warmth or redness to the area. 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 Plan will be to place an sugar tong splint. Will give low-dose tramadol due to his age and fall risk.   Will have case management follow-up to assist with evaluation at home as he has had several falls.    4:50 PM-patient appears to tolerate the tramadol without any adverse effect he is able to ambulate to the bathroom. Diagnostic Study Results     Labs -   No results found for this or any previous visit (from the past 12 hour(s)). Radiologic Studies -   XR ELBOW LT MIN 3 V   Final Result   IMPRESSION:    1. Large joint effusion consistent with elbow fracture, although the exact site   of fractures not seen. 2. Significant soft tissue swelling over the olecranon process. 3. Significant degenerative change. XR FOREARM LT AP/LAT   Final Result   IMPRESSION: No acute abnormality of the radius or ulna. This is not a standard   examination of the elbow. If there are symptoms at the elbow, a standard 3 view   elbow examination is recommended. CT Results  (Last 48 hours)    None        CXR Results  (Last 48 hours)    None          Medical Decision Making   I am the first provider for this patient. I reviewed the vital signs, available nursing notes, past medical history, past surgical history, family history and social history. Vital Signs-Reviewed the patient's vital signs. Patient Vitals for the past 12 hrs:   Temp Pulse Resp BP SpO2   06/06/20 1512    (!) 130/98 98 %   06/06/20 1431 98.2 °F (36.8 °C) 61 15 (!) 146/92 98 %           Records Reviewed: Nursing Notes, Old Medical Records, Previous Radiology Studies and Previous Laboratory Studies    Provider Notes (Medical Decision Making):   Differential diagnosis-olecranon bursitis septic bursitis, septic arthritis, gouty arthritis, occult fracture, DVT, coronary syndrome, compartment syndrome    Impression/plan-81year-old male right-hand-dominant to the ER with left elbow pain. He has a history of gout but states this is not typical.  He does have clinical signs of gouty tophi in his olecranon area. There is no warmth or redness noted to the area. He does have tenderness of the olecranon area.   He is free range of motion in the elbow that does not support septic arthritis. Due to recent subacute fracture and wrist will obtain x-rays to rule out any other fractures in that area. Expect patient to be discharged with close follow-up to orthopedics. ED Course:   Initial assessment performed. The patients presenting problems have been discussed, and they are in agreement with the care plan formulated and outlined with them. I have encouraged them to ask questions as they arise throughout their visit. Priscilla Herman MD      Disposition:  home      DISCHARGE PLAN:  1. Discharge Medication List as of 6/6/2020  3:52 PM      START taking these medications    Details   traMADol-acetaminophen (Ultracet) 37.5-325 mg per tablet Take 1 Tab by mouth every eight (8) hours as needed for Pain for up to 5 days.  Max Daily Amount: 3 Tabs., Print, Disp-15 Tab, R-0         CONTINUE these medications which have NOT CHANGED    Details   metoprolol succinate (TOPROL-XL) 25 mg XL tablet Take 0.5 Tabs by mouth nightly., Normal, Disp-90 Tab, R-3      bumetanide (BUMEX) 1 mg tablet TAKE 1 TABLET BY MOUTH EVERY DAY, Normal, Disp-90 Tab, R-3      escitalopram oxalate (LEXAPRO) 10 mg tablet TAKE 1 TABLET BY MOUTH EVERY DAY, Normal, Disp-90 Tab, R-1      allopurinol (ZYLOPRIM) 100 mg tablet TAKE 1 TABLET BY MOUTH EVERY DAY, Normal, Disp-90 Tab, R-5      ELIQUIS 2.5 mg tablet TAKE 1 TABLET BY MOUTH TWICE A DAY, Normal, Disp-180 Tab, R-3      simvastatin (ZOCOR) 40 mg tablet TAKE 1 TABLET BY MOUTH EVERYDAY AT BEDTIME, Normal, Disp-90 Tab, R-3      propafenone (RYTHMOL) 150 mg tablet Take 1 Tab by mouth two (2) times a day., No Print, Disp-90 Tab, R-5      amLODIPine (NORVASC) 5 mg tablet TAKE 1 TABLET BY MOUTH DAILY, Historical Med      Klor-Con M20 20 mEq tablet TAKE 1 TABLET BY MOUTH EVERY DAY WITH FOOD, Historical Med, EDUARDO      acyclovir (ZOVIRAX) 400 mg tablet TAKE 1 TABLET BY MOUTH TWO TIMES PER DAY UNTIL SEEN, Historical Med, R-3      prednisoLONE acetate (PRED FORTE) 1 % ophthalmic suspension INSTILL 1 DROP INTO RIGHT EYE TWICE A DAY, Historical Med, R-0      timolol (TIMOPTIC) 0.5 % ophthalmic solution INSTILL 1 DROP INTO RIGHT EYE EVERY MORNING, Historical Med, R-0      sodium chloride (OCEAN) 0.65 % nasal spray 2 Sprays by Both Nostrils route four (4) times daily. , Normal, Disp-45 mL, R-11           2. Follow-up Information     Follow up With Specialties Details Why Contact Info    Ricardo Wilkerson MD Internal Medicine In 2 days  [facility]  [address] [place] [address],[address]  [facility] [address]      Massiel Mathur MD Orthopedic Surgery Schedule an appointment as soon as possible for a visit in 1 week  [address]  [address] [place] [address] DEPT Emergency Medicine  If symptoms worsen [address]        3. Return to ED if worse     Diagnosis     Clinical Impression:   1. Occult closed fracture of left elbow, initial encounter        Attestations:    Ida Sofia MD    Please note that this dictation was completed with [employer], the [employer] voice recognition software. Quite often unanticipated grammatical, syntax, homophones, and other interpretive errors are inadvertently transcribed by the computer software. Please disregard these errors. Please excuse any errors that have escaped final proofreading. Thank you.

## 2020-06-06 NOTE — ED NOTES
Emergency Department Nursing Plan of Care       The Nursing Plan of Care is developed from the Nursing assessment and Emergency Department Attending provider initial evaluation. The plan of care may be reviewed in the ED Provider note. The Plan of Care was developed with the following considerations:   Patient / Family readiness to learn indicated by:verbalized understanding  Persons(s) to be included in education: patient  Barriers to Learning/Limitations:No    Signed     Jed Bunk    6/6/2020   2:40 PM    Patient is alert and oriented x 4 and in no acute distress at this time. Respirations are at a regular rate, depth, and pattern. Patient updated on plan of care and has no questions or concerns at this time. Call bell within reach. Will continue to monitor. Please reference nursing assessment.

## 2020-06-06 NOTE — ED NOTES
Discharge instructions were given to the patient by POLLO Daiz RN. .     The patient left the Emergency Department ambulatory, alert and oriented and in no acute distress with 1 prescription(s). The patient was encouraged to call or return to the ED for worsening symptoms or problems and was encouraged to schedule a follow up appointment for continuing care. Patient leaving ED accompanied by self. The patient verbalized understanding of discharge instructions and prescriptions, all questions were answered. The patient has no further concerns at this time. Patient declined wheelchair transfer upon ED discharge.

## 2020-06-06 NOTE — ED NOTES
There were issues with the printer and we were unable to obtain durable medical equipment paperwork. Charge nurse aware.

## 2020-06-06 NOTE — DISCHARGE INSTRUCTIONS
Patient Education        Broken Arm: Care Instructions  Your Care Instructions  Fractures can range from a small, hairline crack, to a bone or bones broken into two or more pieces. Your treatment depends on how bad the break is. Your doctor may have put your arm in a splint or cast to allow it to heal or to keep it stable until you see another doctor. It may take weeks or months for your arm to heal. You can help your arm heal with some care at home. You heal best when you take good care of yourself. Eat a variety of healthy foods, and don't smoke. You may have had a sedative to help you relax. You may be unsteady after having sedation. It can take a few hours for the medicine's effects to wear off. Common side effects of sedation include nausea, vomiting, and feeling sleepy or tired. The doctor has checked you carefully, but problems can develop later. If you notice any problems or new symptoms, get medical treatment right away. Follow-up care is a key part of your treatment and safety. Be sure to make and go to all appointments, and call your doctor if you are having problems. It's also a good idea to know your test results and keep a list of the medicines you take. How can you care for yourself at home? · If the doctor gave you a sedative:  ? For 24 hours, don't do anything that requires attention to detail, such as going to work, making important decisions, or signing any legal documents. It takes time for the medicine's effects to completely wear off.  ? For your safety, do not drive or operate any machinery that could be dangerous. Wait until the medicine wears off and you can think clearly and react easily. · Put ice or a cold pack on your arm for 10 to 20 minutes at a time. Try to do this every 1 to 2 hours for the next 3 days (when you are awake). Put a thin cloth between the ice and your cast or splint. Keep the cast or splint dry. · Follow the cast care instructions your doctor gives you.  If you have a splint, do not take it off unless your doctor tells you to. · Be safe with medicines. Take pain medicines exactly as directed. ? If the doctor gave you a prescription medicine for pain, take it as prescribed. ? If you are not taking a prescription pain medicine, ask your doctor if you can take an over-the-counter medicine. · Prop up your arm on pillows when you sit or lie down in the first few days after the injury. Keep the arm higher than the level of your heart. This will help reduce swelling. · Follow instructions for exercises to keep your arm strong. · Wiggle your fingers and wrist often to reduce swelling and stiffness. When should you call for help? [id number] anytime you think you may need emergency care. For example, call if:  · You are very sleepy and you have trouble waking up. Call your doctor now or seek immediate medical care if:  · You have new or worse nausea or vomiting. · You have new or worse pain. · Your hand or fingers are cool or pale or change color. · Your cast or splint feels too tight. · You have tingling, weakness, or numbness in your hand or fingers. Watch closely for changes in your health, and be sure to contact your doctor if:  · You do not get better as expected. · You have problems with your cast or splint. Where can you learn more? Go to http://buck-anjelica.info/  Enter L064 in the search box to learn more about \"Broken Arm: Care Instructions. \"  Current as of: March 2, 2020               Content Version: 12.5  © 2006-2020 [employer]wise, [employer]. Care instructions adapted under license by [employer] (which disclaims liability or warranty for this information). If you have questions about a medical condition or this instruction, always ask your healthcare professional. [address] any warranty or liability for your use of this information.        MyChart Activation    Thank you for requesting access to [employer]. Please follow the instructions below to securely access and download your online medical record. [employer] allows you to send messages to your doctor, view your test results, renew your prescriptions, schedule appointments, and more. How Do I Sign Up? 1. In your internet browser, go to [email or web address]  2. Click on the First Time User? Click Here link in the Sign In box. You will be redirect to the New Member Sign Up page. 3. Enter your [employer] Access Code exactly as it appears below. You will not need to use this code after youve completed the sign-up process. If you do not sign up before the expiration date, you must request a new code. [employer] Access Code: [email or web address]  Expires: 2020  2:53 PM (This is the date your [employer] access code will )    4. Enter the last four digits of your Social Security Number (xxxx) and Date of Birth (mm/dd/yyyy) as indicated and click Submit. You will be taken to the next sign-up page. 5. Create a [employer] ID. This will be your [employer] login ID and cannot be changed, so think of one that is secure and easy to remember. 6. Create a [employer] password. You can change your password at any time. 7. Enter your Password Reset Question and Answer. This can be used at a later time if you forget your password. 8. Enter your e-mail address. You will receive e-mail notification when new information is available in [address] 9. Click Sign Up. You can now view and download portions of your medical record. 10. Click the Download Summary menu link to download a portable copy of your medical information. Additional Information    If you have questions, please visit the Frequently Asked Questions section of the [employer] website at [email or web address] Remember, [employer] is NOT to be used for urgent needs. For medical emergencies, dial 911.

## 2020-06-08 ENCOUNTER — PATIENT OUTREACH (OUTPATIENT)
Dept: INTERNAL MEDICINE CLINIC | Age: 85
End: 2020-06-08

## 2020-06-08 ENCOUNTER — TELEPHONE (OUTPATIENT)
Dept: CASE MANAGEMENT | Age: 85
End: 2020-06-08

## 2020-06-08 NOTE — TELEPHONE ENCOUNTER
Case Management follow-up call post discharge to check on environmental challenges/medications/appointment follow up/and questions/concerns. CM receive after hour consult states \"fall risk evaluate for physical therapy and home health needs he lives alone\"     CM contact patient left  to return call.       [address] [place] [address]  [phone]

## 2020-06-09 ENCOUNTER — HOSPITAL ENCOUNTER (EMERGENCY)
Age: 85
Discharge: STILL ADMITTED OR ADMINISTRATIVE | End: 2020-06-09

## 2020-06-09 RX ORDER — SIMVASTATIN 40 MG/1
TABLET, FILM COATED ORAL
Qty: 90 TAB | Refills: 3 | Status: SHIPPED | OUTPATIENT
Start: 2020-06-09 | End: 2020-10-23

## 2020-06-09 RX ORDER — TRAMADOL HYDROCHLORIDE AND ACETAMINOPHEN 37.5; 325 MG/1; MG/1
1 TABLET ORAL
Qty: 15 TAB | Refills: 0 | Status: SHIPPED | OUTPATIENT
Start: 2020-06-09 | End: 2020-06-14

## 2020-06-11 NOTE — PROGRESS NOTES
Patient contacted regarding recent discharge and COVID-19 risk. Discussed COVID-19 related testing which was not done at this time. Test results were not done. Patient informed of results, if available? n/a    Care Transition Nurse/ Ambulatory Care Manager contacted the family by telephone to perform post discharge assessment. Verified name and  with family as identifiers. Patient has following risk factors of: chronic kidney disease. CTN/ACM reviewed discharge instructions, medical action plan and red flags related to discharge diagnosis. Reviewed and educated them on any new and changed medications related to discharge diagnosis. Advised obtaining a 90-day supply of all daily and as-needed medications. Education provided regarding infection prevention, and signs and symptoms of COVID-19 and when to seek medical attention with family who verbalized understanding. Discussed exposure protocols and quarantine from [address] you at higher risk for severe illness  and given an opportunity for questions and concerns. The family agrees to contact the COVID-19 hotline [phone] or PCP office for questions related to their healthcare. CTN/ACM provided contact information for future reference. From CDC: Are you at higher risk for severe illness?  Wash your hands often.  Avoid close contact (6 feet, which is about two arm lengths) with people who are sick.  Put distance between yourself and other people if COVID-19 is spreading in your community.  Clean and disinfect frequently touched surfaces.  Avoid all cruise travel and non-essential air travel.  Call your healthcare professional if you have concerns about COVID-19 and your underlying condition or if you are sick.     For more information on steps you can take to protect yourself, see CDC's How to Protect Yourself      Patient/family/caregiver given information for Stephani Trinh and agrees to enroll no  Patient's preferred e-mail:  n/a  Patient's preferred phone number: n/a  Based on Loop alert triggers, patient will be contacted by nurse care manager for worsening symptoms. Plan for follow-up call in 7-14 days based on severity of symptoms and risk factors.

## 2020-06-16 ENCOUNTER — HOSPITAL ENCOUNTER (EMERGENCY)
Age: 85
Discharge: STILL ADMITTED OR ADMINISTRATIVE | End: 2020-06-16
Attending: EMERGENCY MEDICINE
Payer: MEDICARE

## 2020-06-16 VITALS
OXYGEN SATURATION: 100 % | RESPIRATION RATE: 17 BRPM | HEART RATE: 62 BPM | BODY MASS INDEX: 24.99 KG/M2 | HEIGHT: 65 IN | TEMPERATURE: 97.6 F | SYSTOLIC BLOOD PRESSURE: 161 MMHG | WEIGHT: 150 LBS | DIASTOLIC BLOOD PRESSURE: 111 MMHG

## 2020-06-16 PROCEDURE — 75810000275 HC EMERGENCY DEPT VISIT NO LEVEL OF CARE

## 2020-06-19 ENCOUNTER — OFFICE VISIT (OUTPATIENT)
Dept: INTERNAL MEDICINE CLINIC | Age: 85
End: 2020-06-19

## 2020-06-19 VITALS
RESPIRATION RATE: 14 BRPM | HEIGHT: 65 IN | SYSTOLIC BLOOD PRESSURE: 154 MMHG | BODY MASS INDEX: 24.81 KG/M2 | OXYGEN SATURATION: 98 % | DIASTOLIC BLOOD PRESSURE: 92 MMHG | HEART RATE: 77 BPM | TEMPERATURE: 97.8 F | WEIGHT: 148.9 LBS

## 2020-06-19 DIAGNOSIS — I27.20 PULMONARY HTN (HCC): ICD-10-CM

## 2020-06-19 DIAGNOSIS — D47.2 MGUS (MONOCLONAL GAMMOPATHY OF UNKNOWN SIGNIFICANCE): ICD-10-CM

## 2020-06-19 DIAGNOSIS — I48.0 PAF (PAROXYSMAL ATRIAL FIBRILLATION) (HCC): ICD-10-CM

## 2020-06-19 DIAGNOSIS — Z95.2 S/P AVR (AORTIC VALVE REPLACEMENT): ICD-10-CM

## 2020-06-19 DIAGNOSIS — I35.1 NONRHEUMATIC AORTIC VALVE INSUFFICIENCY: ICD-10-CM

## 2020-06-19 DIAGNOSIS — R53.81 DEBILITY: ICD-10-CM

## 2020-06-19 DIAGNOSIS — S62.398D: Primary | ICD-10-CM
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
## 2020-06-19 DIAGNOSIS — F32.A DEPRESSION, UNSPECIFIED DEPRESSION TYPE: ICD-10-CM

## 2020-06-19 DIAGNOSIS — N18.30 CKD (CHRONIC KIDNEY DISEASE), STAGE III (HCC): ICD-10-CM

## 2020-06-19 DIAGNOSIS — I10 ESSENTIAL HYPERTENSION: ICD-10-CM

## 2020-06-19 DIAGNOSIS — C90.00 MULTIPLE MYELOMA, REMISSION STATUS UNSPECIFIED (HCC): ICD-10-CM

## 2020-06-19 NOTE — PROGRESS NOTES
SPORTS MEDICINE [facility] PRIMARY [facility]ARE  Troy Trevino MD, [address] [address]  Phone:  [phone]  Fax: [phone]       Chief Complaint   Patient presents with    Depression   . SUBJECTIVE:    Chloe Gonzalez is a 81 y.o. male Patient is seen today with a known history of fracture of the fourth metacarpal bone, debility, pulmonary hypertension, fall, depression, paroxysmal atrial fibrillation, MGUS, CKD and primary hypertension. He did not keep the appointment to see the orthopedic doctor. He has an orthopedic doctor appointment on Tuesday. He does not know how to get there. He claims his insurance company will pay for him to go to the doctor's office and wants our assistance. He has reached the point where he knows he cannot live in his house by himself. He has a reverse mortgage on his house, so he does not know what to do. He does have family, but they have nothing to do with him, and he has not seen his son apparently for some time. He spoke to his son on one occasion and because of things that have happened in the past, he does not seem to want to become involved in his care. Patient is seen for evaluation. Current Outpatient Medications   Medication Sig Dispense Refill    simvastatin (ZOCOR) 40 mg tablet TAKE 1 TABLET BY MOUTH EVERYDAY AT BEDTIME 90 Tab 3    amLODIPine (NORVASC) 5 mg tablet TAKE 1 TABLET BY MOUTH DAILY      Klor-Con M20 20 mEq tablet TAKE 1 TABLET BY MOUTH EVERY DAY WITH FOOD      metoprolol succinate (TOPROL-XL) 25 mg XL tablet Take 0.5 Tabs by mouth nightly.  90 Tab 3    bumetanide (BUMEX) 1 mg tablet TAKE 1 TABLET BY MOUTH EVERY DAY 90 Tab 3    escitalopram oxalate (LEXAPRO) 10 mg tablet TAKE 1 TABLET BY MOUTH EVERY DAY 90 Tab 1    allopurinol (ZYLOPRIM) 100 mg tablet TAKE 1 TABLET BY MOUTH EVERY DAY 90 Tab 5    ELIQUIS 2.5 mg tablet TAKE 1 TABLET BY MOUTH TWICE A  Tab 3    acyclovir (ZOVIRAX) 400 mg tablet TAKE 1 TABLET BY MOUTH TWO TIMES PER DAY UNTIL SEEN  3    prednisoLONE acetate (PRED FORTE) 1 % ophthalmic suspension INSTILL 1 DROP INTO RIGHT EYE TWICE A DAY  0    timolol (TIMOPTIC) 0.5 % ophthalmic solution INSTILL 1 DROP INTO RIGHT EYE EVERY MORNING  0    propafenone (RYTHMOL) 150 mg tablet Take 1 Tab by mouth two (2) times a day. 90 Tab 5    sodium chloride (OCEAN) 0.65 % nasal spray 2 Sprays by Both Nostrils route four (4) times daily. 45 mL 11     Past Medical History:   Diagnosis Date    Adverse effect of anesthesia     nasal procedure didn't work-patient awake    Alcohol abuse     Aortic regurgitation     Chronic kidney disease     CKD (chronic kidney disease), stage III ([facility] 2011    Depression     Episodic lightheadedness 07/28/2017    Episodic lightheadedness     Epistaxis 11/27/2016    Erectile dysfunction     Fall 02/11/2020    GI bleed     Gout     gout, osteoarthritis    Heart failure (HCC)     acute heart failure    Hemorrhoids     History of cardioversion 02/17/2017    Hypertension     Ill-defined condition     weakness    Multiple myeloma (HCC)     PAF (paroxysmal atrial fibrillation) ([facility] 02/17/2016    eliquis    Pulmonary HTN ([facility]     Right-sided epistaxis 11/27/2016    S/P AVR (aortic valve replacement) 07/26/2016    merrill gill md -     S/P cardiac cath 7/6/16    severe ai, normal coronaries     S/P colonoscopy 10-8-04    S/P colonoscopy with polypectomy 05/09/2016    Kar Pina MD    Skin lesion      Past Surgical History:   Procedure Laterality Date    CARDIAC SURG PROCEDURE UNLIST      cardiac cath   [address] [place] [address] CARDIAC SURG PROCEDURE UNLIST  07/26/2016    AVR    HX HEENT      nasal procedure 20 years ago    HX KNEE REPLACEMENT Bilateral     bilateral knee replacement    HX ORTHOPAEDIC Bilateral     wrist     Allergies   Allergen Reactions    Amiodarone Other (comments)     Bradycardia -excessive         REVIEW OF SYSTEMS:  General: negative for - chills or fever  ENT: negative for - headaches, nasal congestion or tinnitus  Respiratory: negative for - cough, hemoptysis, shortness of breath or wheezing  Cardiovascular : negative for - chest pain, edema, palpitations or shortness of breath  Gastrointestinal: negative for - abdominal pain, blood in stools, heartburn or nausea/vomiting  Genito-Urinary: no dysuria, trouble voiding, or hematuria  Musculoskeletal: negative for - gait disturbance, joint pain, joint stiffness or joint swelling  Neurological: no TIA or stroke symptoms  Hematologic: no bruises, no bleeding, no swollen glands  Integument: no lumps, mole changes, nail changes or rash  Endocrine: no malaise/lethargy or unexpected weight changes      Social History     Socioeconomic History    Marital status:      Spouse name: Not on file    Number of children: Not on file    Years of education: Not on file    Highest education level: Not on file   Tobacco Use    Smoking status: Former Smoker     Years: 4.00     Last attempt to quit: 1960     Years since quittin.5    Smokeless tobacco: Never Used   Substance and Sexual Activity    Alcohol use: Yes     Comment: ocassional    Drug use: No    Sexual activity: Not Currently   Social History Narrative    Family History: Mother:  36 yrs, Chronic anemiaFather:  80 yrs, I think aneurysm hypertensionBrother(s):  79 yrs,    obese,dm,drug abuse,miSon(s): alive, adopted1 son(s) . Hakeem Simon [phone]    Social History: Alcohol Use Patient uses alcohol, Drinks per occasion: 2, Drinks per w [place]: 10. Smoking Status Patient is a never smoker. Marital Status: . Lives w ith: alone. Occupation/W ork: employed full time insurance. Education/School: has highschool diploma.      Family History   Problem Relation Age of Onset    Anemia Mother     No Known Problems Father     Other Brother         drug abuse       OBJECTIVE:    Visit Vitals  BP (!) 154/92   Pulse 77   Temp 97.8 °F (36.6 °C) (Oral)   Resp 14   Ht 5' 5\" (1.651 m)   Wt 148 lb 14.4 oz (67.5 kg)   SpO2 98%   BMI 24.78 kg/m²     CONSTITUTIONAL: well , well nourished, appears age appropriate  EYES: perrla, eom intact  ENMT:moist mucous membranes, pharynx clear  NECK: supple. Thyroid normal  RESPIRATORY: Chest: clear bilaterally   CARDIOVASCULAR: Heart: regular rate and rhythm  GASTROINTESTINAL: Abdomen: soft, bowel sounds active  HEMATOLOGIC: no pathological lymph nodes palpated  MUSCULOSKELETAL: Extremities: no edema, pulse 1+   INTEGUMENT: No unusual rashes or suspicious skin lesions noted. Nails appear normal.  NEUROLOGIC: non-focal exam   MENTAL STATUS: alert and oriented, appropriate affect           ASSESSMENT:  1. Closed nondisplaced fracture of other part of fourth metacarpal bone with routine healing, unspecified laterality, subsequent encounter    2. PAF (paroxysmal atrial fibrillation) ([address]    3. Multiple myeloma, remission status unspecified ([address]    4. S/P AVR (aortic valve replacement)    5. Pulmonary HTN ([address]    6. Nonrheumatic aortic valve insufficiency    7. Essential hypertension    8. CKD (chronic kidney disease), stage III ([address]    9. Debility    10. Fall, subsequent encounter    11. Depression, unspecified depression type    12. MGUS (monoclonal gammopathy of unknown significance)      Natalya will attempt to help him with his appointment to see the orthopedist for fracture of his finger. He has a known history of paroxysmal atrial fibrillation and is supposed to be taking Eliquis 2.5 mg twice a day. I assume he is doing that, however he did not bring his medications in. Previous history of multiple myeloma, now MGUS. His aortic valve is functioning well, there is no evidence of cardiac decompensation. Blood pressure is up a little bit. We will ask him to bring his medications on his next visit before we adjust any medications. He has a known history of CKD.   We had checked his renal function two weeks ago and found there was little change with a GFR now of 41. He has progressive debility, increased risk for falls and no family support to help him get from his home to assisted living facility. We will send a  out through home health to see if she can be of assistance, but I think he needs more than that because he will need to sell his house, pay off the reverse mortgage and use the remainder of the finances to go to assisted living because he really can no longer care for himself in the home. It may be that when the  goes out and skilled nursing goes out they will determine that he will need adult protective services. He will be back to see us in a month, sooner if needed. I have discussed the diagnosis with the patient and the intended plan as seen in the  orders above. The patient understands and agees with the plan. The patient has   received an after visit summary and questions were answered concerning  future plans  Patient labs and/or xrays were reviewed  Past records were reviewed. PLAN:  .  Orders Placed This Encounter    REFERRAL TO ORTHOPEDIC SURGERY    REFERRAL TO HOME HEALTH       Follow-up and Dispositions    · Return in about 4 weeks (around 7/17/2020). ATTENTION:   This medical record was transcribed using an electronic medical records system. Although proofread, it may and can contain electronic and spelling errors. Other human spelling and other errors may be present. Corrections may be executed at a later time. Please feel free to contact us for any clarifications as needed.

## 2020-06-19 NOTE — ASSESSMENT & PLAN NOTE
Stable, based on history, physical exam and review of pertinent labs, studies and medications; meds reconciled; continue current treatment plan. Key CAD CHF Meds             simvastatin (ZOCOR) 40 mg tablet (Taking) TAKE 1 TABLET BY MOUTH EVERYDAY AT BEDTIME    amLODIPine (NORVASC) 5 mg tablet (Taking) TAKE 1 TABLET BY MOUTH DAILY    metoprolol succinate (TOPROL-XL) 25 mg XL tablet (Taking) Take 0.5 Tabs by mouth nightly. bumetanide (BUMEX) 1 mg tablet (Taking) TAKE 1 TABLET BY MOUTH EVERY DAY    ELIQUIS 2.5 mg tablet (Taking) TAKE 1 TABLET BY MOUTH TWICE A DAY    propafenone (RYTHMOL) 150 mg tablet (Taking) Take 1 Tab by mouth two (2) times a day.         Lab Results   Component Value Date/Time    Sodium 148 06/04/2020 04:17 PM    Potassium 4.5 06/04/2020 04:17 PM    Cholesterol, total 208 05/09/2019 11:59 AM    HDL Cholesterol 87 05/09/2019 11:59 AM    LDL, calculated 89 05/09/2019 11:59 AM    Triglyceride 160 05/09/2019 11:59 AM

## 2020-06-19 NOTE — PROGRESS NOTES
1. Have you been to the ER, urgent care clinic since your last visit? Hospitalized since your last visit? Yes When: 6/16/20    2. Have you seen or consulted any other health care providers outside of the [address] [place] [address] since your last visit? Include any pap smears or colon screening. Yes Where[facility]    Pt been having weakness and depression.

## 2020-06-29 ENCOUNTER — APPOINTMENT (OUTPATIENT)
Dept: CT IMAGING | Age: 85
End: 2020-06-29
Attending: EMERGENCY MEDICINE
Payer: MEDICARE

## 2020-06-29 ENCOUNTER — PATIENT OUTREACH (OUTPATIENT)
Dept: INTERNAL MEDICINE CLINIC | Age: 85
End: 2020-06-29

## 2020-06-29 ENCOUNTER — HOSPITAL ENCOUNTER (EMERGENCY)
Age: 85
Discharge: HOME | End: 2020-06-30
Attending: EMERGENCY MEDICINE | Admitting: EMERGENCY MEDICINE
Payer: MEDICARE

## 2020-06-29 VITALS
WEIGHT: 150 LBS | RESPIRATION RATE: 16 BRPM | HEIGHT: 68 IN | OXYGEN SATURATION: 100 % | BODY MASS INDEX: 22.73 KG/M2 | DIASTOLIC BLOOD PRESSURE: 93 MMHG | SYSTOLIC BLOOD PRESSURE: 124 MMHG | HEART RATE: 80 BPM | TEMPERATURE: 98 F

## 2020-06-29 DIAGNOSIS — Z79.01 CHRONIC ANTICOAGULATION: ICD-10-CM

## 2020-06-29 DIAGNOSIS — F32.A DEPRESSION, UNSPECIFIED DEPRESSION TYPE: ICD-10-CM

## 2020-06-29 DIAGNOSIS — R53.1 GENERALIZED WEAKNESS: ICD-10-CM

## 2020-06-29 DIAGNOSIS — E87.6 ACUTE HYPOKALEMIA: Primary | ICD-10-CM

## 2020-06-29 DIAGNOSIS — N18.30 CKD (CHRONIC KIDNEY DISEASE) STAGE 3, GFR 30-59 ML/MIN (HCC): ICD-10-CM

## 2020-06-29 PROCEDURE — 85025 COMPLETE CBC W/AUTO DIFF WBC: CPT

## 2020-06-29 PROCEDURE — 70450 CT HEAD/BRAIN W/O DYE: CPT

## 2020-06-29 PROCEDURE — 36415 COLL VENOUS BLD VENIPUNCTURE: CPT

## 2020-06-29 PROCEDURE — 93005 ELECTROCARDIOGRAM TRACING: CPT

## 2020-06-29 PROCEDURE — 99285 EMERGENCY DEPT VISIT HI MDM: CPT

## 2020-06-29 PROCEDURE — 81001 URINALYSIS AUTO W/SCOPE: CPT

## 2020-06-29 RX ORDER — TRAMADOL HYDROCHLORIDE AND ACETAMINOPHEN 37.5; 325 MG/1; MG/1
1 TABLET ORAL
Status: ON HOLD | COMMUNITY
End: 2020-07-05

## 2020-06-30 ENCOUNTER — TELEPHONE (OUTPATIENT)
Dept: CASE MANAGEMENT | Age: 85
End: 2020-06-30

## 2020-06-30 ENCOUNTER — HOSPITAL ENCOUNTER (EMERGENCY)
Age: 85
Discharge: HOME | End: 2020-07-01
Attending: EMERGENCY MEDICINE
Payer: MEDICARE

## 2020-06-30 DIAGNOSIS — F32.A DEPRESSION, UNSPECIFIED DEPRESSION TYPE: ICD-10-CM

## 2020-06-30 DIAGNOSIS — R63.0 APPETITE LOSS: Primary | ICD-10-CM

## 2020-06-30 LAB
ALBUMIN SERPL-MCNC: 2.3 G/DL (ref 3.5–5)
ALBUMIN/GLOB SERPL: 0.4 {RATIO} (ref 1.1–2.2)
ALP SERPL-CCNC: 69 U/L (ref 45–117)
ALT SERPL-CCNC: 13 U/L (ref 12–78)
ANION GAP SERPL CALC-SCNC: 9 MMOL/L (ref 5–15)
APPEARANCE UR: CLEAR
AST SERPL-CCNC: 17 U/L (ref 15–37)
ATRIAL RATE: 208 BPM
BACTERIA URNS QL MICRO: NEGATIVE /HPF
BASOPHILS # BLD: 0.1 K/UL (ref 0–0.1)
BASOPHILS NFR BLD: 1 % (ref 0–1)
BILIRUB SERPL-MCNC: 0.8 MG/DL (ref 0.2–1)
BILIRUB UR QL CFM: NEGATIVE
BNP SERPL-MCNC: 3868 PG/ML (ref 0–450)
BUN SERPL-MCNC: 32 MG/DL (ref 6–20)
BUN/CREAT SERPL: 15 (ref 12–20)
CALCIUM SERPL-MCNC: 8.9 MG/DL (ref 8.5–10.1)
CALCULATED P AXIS, ECG09: -83 DEGREES
CALCULATED R AXIS, ECG10: 108 DEGREES
CALCULATED T AXIS, ECG11: 85 DEGREES
CHLORIDE SERPL-SCNC: 101 MMOL/L (ref 97–108)
CK SERPL-CCNC: 67 U/L (ref 39–308)
CO2 SERPL-SCNC: 28 MMOL/L (ref 21–32)
COLOR UR: ABNORMAL
CREAT SERPL-MCNC: 2.2 MG/DL (ref 0.7–1.3)
DIAGNOSIS, 93000: NORMAL
DIFFERENTIAL METHOD BLD: ABNORMAL
EOSINOPHIL # BLD: 0.1 K/UL (ref 0–0.4)
EOSINOPHIL NFR BLD: 1 % (ref 0–7)
EPITH CASTS URNS QL MICRO: ABNORMAL /LPF
ERYTHROCYTE [DISTWIDTH] IN BLOOD BY AUTOMATED COUNT: 14.6 % (ref 11.5–14.5)
GLOBULIN SER CALC-MCNC: 5.6 G/DL (ref 2–4)
GLUCOSE SERPL-MCNC: 78 MG/DL (ref 65–100)
GLUCOSE UR STRIP.AUTO-MCNC: NEGATIVE MG/DL
HCT VFR BLD AUTO: 35.7 % (ref 36.6–50.3)
HGB BLD-MCNC: 11.7 G/DL (ref 12.1–17)
HGB UR QL STRIP: NEGATIVE
IMM GRANULOCYTES # BLD AUTO: 0 K/UL (ref 0–0.04)
IMM GRANULOCYTES NFR BLD AUTO: 0 % (ref 0–0.5)
KETONES UR QL STRIP.AUTO: ABNORMAL MG/DL
LEUKOCYTE ESTERASE UR QL STRIP.AUTO: NEGATIVE
LYMPHOCYTES # BLD: 0.7 K/UL (ref 0.8–3.5)
LYMPHOCYTES NFR BLD: 12 % (ref 12–49)
MCH RBC QN AUTO: 33.3 PG (ref 26–34)
MCHC RBC AUTO-ENTMCNC: 32.8 G/DL (ref 30–36.5)
MCV RBC AUTO: 101.7 FL (ref 80–99)
MONOCYTES # BLD: 0.5 K/UL (ref 0–1)
MONOCYTES NFR BLD: 8 % (ref 5–13)
NEUTS SEG # BLD: 4.7 K/UL (ref 1.8–8)
NEUTS SEG NFR BLD: 78 % (ref 32–75)
NITRITE UR QL STRIP.AUTO: NEGATIVE
NRBC # BLD: 0 K/UL (ref 0–0.01)
NRBC BLD-RTO: 0 PER 100 WBC
PH UR STRIP: 5 [PH] (ref 5–8)
PLATELET # BLD AUTO: 292 K/UL (ref 150–400)
PMV BLD AUTO: 9.6 FL (ref 8.9–12.9)
POTASSIUM SERPL-SCNC: 3.3 MMOL/L (ref 3.5–5.1)
PROT SERPL-MCNC: 7.9 G/DL (ref 6.4–8.2)
PROT UR STRIP-MCNC: 100 MG/DL
Q-T INTERVAL, ECG07: 430 MS
QRS DURATION, ECG06: 92 MS
QTC CALCULATION (BEZET), ECG08: 467 MS
RBC # BLD AUTO: 3.51 M/UL (ref 4.1–5.7)
RBC #/AREA URNS HPF: ABNORMAL /HPF (ref 0–5)
RBC MORPH BLD: ABNORMAL
SODIUM SERPL-SCNC: 138 MMOL/L (ref 136–145)
SP GR UR REFRACTOMETRY: 1.01 (ref 1–1.03)
UA: UC IF INDICATED,UAUC: ABNORMAL
UROBILINOGEN UR QL STRIP.AUTO: 1 EU/DL (ref 0.2–1)
VENTRICULAR RATE, ECG03: 71 BPM
WBC # BLD AUTO: 6.1 K/UL (ref 4.1–11.1)
WBC URNS QL MICRO: ABNORMAL /HPF (ref 0–4)

## 2020-06-30 PROCEDURE — 82550 ASSAY OF CK (CPK): CPT

## 2020-06-30 PROCEDURE — 74011250637 HC RX REV CODE- 250/637: Performed by: EMERGENCY MEDICINE

## 2020-06-30 PROCEDURE — 83880 ASSAY OF NATRIURETIC PEPTIDE: CPT

## 2020-06-30 PROCEDURE — 80053 COMPREHEN METABOLIC PANEL: CPT

## 2020-06-30 PROCEDURE — 99284 EMERGENCY DEPT VISIT MOD MDM: CPT

## 2020-06-30 RX ORDER — POTASSIUM CHLORIDE 750 MG/1
40 TABLET, FILM COATED, EXTENDED RELEASE ORAL
Status: COMPLETED | OUTPATIENT
Start: 2020-06-30 | End: 2020-06-30

## 2020-06-30 RX ORDER — POTASSIUM CHLORIDE 750 MG/1
TABLET, FILM COATED, EXTENDED RELEASE ORAL
Status: DISCONTINUED
Start: 2020-06-30 | End: 2020-06-30 | Stop reason: HOSPADM

## 2020-06-30 RX ADMIN — POTASSIUM CHLORIDE 40 MEQ: 750 TABLET, EXTENDED RELEASE ORAL at 01:20

## 2020-06-30 NOTE — ED PROVIDER NOTES
EMERGENCY DEPARTMENT HISTORY AND PHYSICAL EXAM      Please note that this dictation was completed with Cardoc, the computer voice recognition software. Quite often unanticipated grammatical, syntax, homophones, and other interpretive errors are inadvertently transcribed by the computer software. Please disregard these errors and any errors that have escaped final proofreading. Thank you. Date: 6/29/2020  Patient Name: Destiney Willis. Patient Age and Sex: 81 y.o. male    History of Presenting Illness     Chief Complaint   Patient presents with    Generalized Body Aches       History Provided By: Patient and EMS    HPI: Destiney Willis., 81 y.o. male with past medical history as documented below presents to the ED with c/o of generalized fatigue for several days. Pt reports vague complaints of feeling depressed for several months. He denies SI or HI. He is seeking for behavioral health resources. Pt also notes recent left elbow fracture and still has a splint on. Pt reports he has an Ortho appt coming up to address that. He denies extremity numbness or weakness. He does admit to a mechanical GLF several weeks ago and he did hit his head. He denies LOC. He reports hx of PAF and takes Eliquis daily. Pt states he does live home alone and is supposed to walk with a cane but sometimes forgets. Pt denies any other alleviating or exacerbating factors. Additionally, pt specifically denies any recent fever, chills, headache, nausea, vomiting, abdominal pain, CP, SOB, lightheadedness, dizziness, numbness, lower extremity swelling, heart palpitations, urinary sxs, diarrhea, constipation, melena, hematochezia, cough, or congestion. There are no other complaints, changes or physical findings at this time.      PCP: Gita Mitchell MD    Past History   Past Medical History:  Past Medical History:   Diagnosis Date    Adverse effect of anesthesia     nasal procedure didn't work-patient awake    Alcohol abuse     Aortic regurgitation     Chronic kidney disease     CKD (chronic kidney disease), stage III ([facility]     Depression     Episodic lightheadedness 2017    Episodic lightheadedness     Epistaxis 2016    Erectile dysfunction     Fall 2020    GI bleed     Gout     gout, osteoarthritis    Heart failure (HCC)     acute heart failure    Hemorrhoids     History of cardioversion 2017    Hypertension     Ill-defined condition     weakness    Multiple myeloma (HCC)     PAF (paroxysmal atrial fibrillation) ([facility] 2016    eliquis    [facility]     Right-sided epistaxis 2016    S/P AVR (aortic valve replacement) 2016    merrill gill md -     S/P cardiac cath 16    severe ai, normal coronaries     S/P colonoscopy 10-8-04    S/P colonoscopy with polypectomy 2016    Kar Louie MD    Skin lesion        Past Surgical History:  Past Surgical History:   Procedure Laterality Date    CARDIAC SURG PROCEDURE UNLIST      cardiac cath   [address] [place] [address] CARDIAC SURG PROCEDURE UNLIST  2016    AVR    HX HEENT      nasal procedure 20 years ago   [address] [place] [address] HX KNEE REPLACEMENT Bilateral     bilateral knee replacement    HX ORTHOPAEDIC Bilateral     wrist       Family History:  Family History   Problem Relation Age of Onset    Anemia Mother     No Known Problems Father     Other Brother         drug abuse       Social History:  Social History     Tobacco Use    Smoking status: Former Smoker     Years: 4.00     Last attempt to quit: 1960     Years since quittin.5    Smokeless tobacco: Never Used   Substance Use Topics    Alcohol use: Yes     Comment: ocassional    Drug use: No       Allergies: Allergies   Allergen Reactions    Amiodarone Other (comments)     Bradycardia -excessive       Current Medications:  No current facility-administered medications on file prior to encounter.       Current Outpatient Medications on File Prior to Encounter   Medication Sig Dispense Refill    traMADol-acetaminophen (ULTRACET) 37.5-325 mg per tablet Take 1 Tab by mouth every eight (8) hours as needed for Pain.  simvastatin (ZOCOR) 40 mg tablet TAKE 1 TABLET BY MOUTH EVERYDAY AT BEDTIME 90 Tab 3    Klor-Con M20 20 mEq tablet TAKE 1 TABLET BY MOUTH EVERY DAY WITH FOOD      metoprolol succinate (TOPROL-XL) 25 mg XL tablet Take 0.5 Tabs by mouth nightly. 90 Tab 3    bumetanide (BUMEX) 1 mg tablet TAKE 1 TABLET BY MOUTH EVERY DAY 90 Tab 3    escitalopram oxalate (LEXAPRO) 10 mg tablet TAKE 1 TABLET BY MOUTH EVERY DAY 90 Tab 1    ELIQUIS 2.5 mg tablet TAKE 1 TABLET BY MOUTH TWICE A  Tab 3    timolol (TIMOPTIC) 0.5 % ophthalmic solution INSTILL 1 DROP INTO RIGHT EYE EVERY MORNING  0    amLODIPine (NORVASC) 5 mg tablet TAKE 1 TABLET BY MOUTH DAILY      allopurinol (ZYLOPRIM) 100 mg tablet TAKE 1 TABLET BY MOUTH EVERY DAY 90 Tab 5    acyclovir (ZOVIRAX) 400 mg tablet TAKE 1 TABLET BY MOUTH TWO TIMES PER DAY UNTIL SEEN  3    propafenone (RYTHMOL) 150 mg tablet Take 1 Tab by mouth two (2) times a day. 90 Tab 5    sodium chloride (OCEAN) 0.65 % nasal spray 2 Sprays by Both Nostrils route four (4) times daily. 45 mL 11       Review of Systems   Review of Systems   Constitutional: Positive for fatigue. Negative for chills and fever. HENT: Negative. Negative for congestion, facial swelling, rhinorrhea, sore throat, trouble swallowing and voice change. Eyes: Negative. Respiratory: Negative. Negative for apnea, cough, chest tightness, shortness of breath and wheezing. Cardiovascular: Negative. Negative for chest pain, palpitations and leg swelling. Gastrointestinal: Negative. Negative for abdominal distention, abdominal pain, blood in stool, constipation, diarrhea, nausea and vomiting. Endocrine: Negative. Negative for cold intolerance, heat intolerance and polyuria. Genitourinary: Negative.   Negative for difficulty urinating, dysuria, flank pain, frequency, hematuria and urgency. Musculoskeletal: Negative. Negative for arthralgias, back pain, myalgias, neck pain and neck stiffness. Skin: Negative. Negative for color change and rash. Neurological: Positive for weakness. Negative for dizziness, syncope, facial asymmetry, speech difficulty, light-headedness, numbness and headaches. Hematological: Negative. Does not bruise/bleed easily. Psychiatric/Behavioral: Positive for dysphoric mood. Negative for confusion and self-injury. The patient is not nervous/anxious. Physical Exam   Physical Exam  Vitals signs and nursing note reviewed. Constitutional:       Appearance: He is well-developed. He is not toxic-appearing. HENT:      Head: Normocephalic and atraumatic. Mouth/Throat:      Pharynx: No posterior oropharyngeal erythema. Eyes:      Conjunctiva/sclera: Conjunctivae normal.      Pupils: Pupils are equal, round, and reactive to light. Neck:      Musculoskeletal: Normal range of motion. Cardiovascular:      Rate and Rhythm: Normal rate. Rhythm irregular. Heart sounds: Normal heart sounds. No murmur. No friction rub. No gallop. Pulmonary:      Effort: Pulmonary effort is normal. No respiratory distress. Breath sounds: Normal breath sounds. No wheezing or rales. Chest:      Chest wall: No tenderness. Abdominal:      General: Bowel sounds are normal. There is no distension. Palpations: Abdomen is soft. There is no mass. Tenderness: There is no abdominal tenderness. There is no guarding or rebound. Musculoskeletal: Normal range of motion. General: No tenderness or deformity. Comments: LUE splint in place, NVI distally, able to wiggle fingers, cap refill < 2 seconds   Skin:     General: Skin is warm. Findings: No rash. Neurological:      Mental Status: He is alert and oriented to person, place, and time. Cranial Nerves: No cranial nerve deficit. Motor: No abnormal muscle tone. Coordination: Coordination normal.      Deep Tendon Reflexes: Reflexes normal.      Comments: CN II - XII grossly intact with no focal deficits. Strength 5/5 and normal in biceps, triceps and hand  bilaterally. Strength 5/5 in plantar and dorsiflexion bilaterally. Normal sensation in arms and legs bilaterally to light touch. DTR's are 2+ all extremities and symmetric. Negative pronator drift. Finger to nose intact. Psychiatric:         Behavior: Behavior is cooperative. Diagnostic Study Results     Labs -  Recent Results (from the past 24 hour(s))   EKG, 12 LEAD, INITIAL    Collection Time: 06/29/20 11:17 PM   Result Value Ref Range    Ventricular Rate 71 BPM    Atrial Rate 208 BPM    QRS Duration 92 ms    Q-T Interval 430 ms    QTC Calculation (Bezet) 467 ms    Calculated P Axis -83 degrees    Calculated R Axis 108 degrees    Calculated T Axis 85 degrees    Diagnosis       Atrial flutter with variable AV block with premature ventricular or   aberrantly conducted complexes  Rightward axis  Anterior infarct (cited on or before 14-SEP-2019)  Abnormal ECG  When compared with ECG of 14-MAR-2020 17:44,  Atrial flutter has replaced Sinus rhythm  T wave inversion no longer evident in Inferior leads  Nonspecific T wave abnormality has replaced inverted T waves in Lateral leads     CBC WITH AUTOMATED DIFF    Collection Time: 06/29/20 11:43 PM   Result Value Ref Range    WBC 6.1 4.1 - 11.1 K/uL    RBC 3.51 (L) 4.10 - 5.70 M/uL    HGB 11.7 (L) 12.1 - 17.0 g/dL    HCT 35.7 (L) 36.6 - 50.3 %    .7 (H) 80.0 - 99.0 FL    MCH 33.3 26.0 - 34.0 PG    MCHC 32.8 30.0 - 36.5 g/dL    RDW 14.6 (H) 11.5 - 14.5 %    PLATELET 666 664 - 496 K/uL    MPV 9.6 8.9 - 12.9 FL    NRBC 0.0 0  WBC    ABSOLUTE NRBC 0.00 0.00 - 0.01 K/uL    NEUTROPHILS 78 (H) 32 - 75 %    LYMPHOCYTES 12 12 - 49 %    MONOCYTES 8 5 - 13 %    EOSINOPHILS 1 0 - 7 %    BASOPHILS 1 0 - 1 %    IMMATURE GRANULOCYTES 0 0.0 - 0.5 %    ABS. NEUTROPHILS 4.7 1.8 - 8.0 K/UL    ABS. LYMPHOCYTES 0.7 (L) 0.8 - 3.5 K/UL    ABS. MONOCYTES 0.5 0.0 - 1.0 K/UL    ABS. EOSINOPHILS 0.1 0.0 - 0.4 K/UL    ABS. BASOPHILS 0.1 0.0 - 0.1 K/UL    ABS. IMM. GRANS. 0.0 0.00 - 0.04 K/UL    DF SMEAR SCANNED      RBC COMMENTS NORMOCYTIC, NORMOCHROMIC     URINALYSIS W/ REFLEX CULTURE    Collection Time: 06/29/20 11:43 PM   Result Value Ref Range    Color YELLOW/STRAW      Appearance CLEAR CLEAR      Specific gravity 1.015 1.003 - 1.030      pH (UA) 5.0 5.0 - 8.0      Protein 100 (A) NEG mg/dL    Glucose Negative NEG mg/dL    Ketone TRACE (A) NEG mg/dL    Blood Negative NEG      Urobilinogen 1.0 0.2 - 1.0 EU/dL    Nitrites Negative NEG      Leukocyte Esterase Negative NEG      WBC 0-4 0 - 4 /hpf    RBC 0-5 0 - 5 /hpf    Epithelial cells FEW FEW /lpf    Bacteria Negative NEG /hpf    UA:UC IF INDICATED CULTURE NOT INDICATED BY UA RESULT CNI     BILIRUBIN, CONFIRM    Collection Time: 06/29/20 11:43 PM   Result Value Ref Range    Bilirubin UA, confirm Negative NEG     CK    Collection Time: 06/30/20 12:23 AM   Result Value Ref Range    CK 67 39 - 974 U/L   METABOLIC PANEL, COMPREHENSIVE    Collection Time: 06/30/20 12:23 AM   Result Value Ref Range    Sodium 138 136 - 145 mmol/L    Potassium 3.3 (L) 3.5 - 5.1 mmol/L    Chloride 101 97 - 108 mmol/L    CO2 28 21 - 32 mmol/L    Anion gap 9 5 - 15 mmol/L    Glucose 78 65 - 100 mg/dL    BUN 32 (H) 6 - 20 MG/DL    Creatinine 2.20 (H) 0.70 - 1.30 MG/DL    BUN/Creatinine ratio 15 12 - 20      GFR est AA 35 (L) >60 ml/min/1.73m2    GFR est non-AA 29 (L) >60 ml/min/1.73m2    Calcium 8.9 8.5 - 10.1 MG/DL    Bilirubin, total 0.8 0.2 - 1.0 MG/DL    ALT (SGPT) 13 12 - 78 U/L    AST (SGOT) 17 15 - 37 U/L    Alk.  phosphatase 69 45 - 117 U/L    Protein, total 7.9 6.4 - 8.2 g/dL    Albumin 2.3 (L) 3.5 - 5.0 g/dL    Globulin 5.6 (H) 2.0 - 4.0 g/dL    A-G Ratio 0.4 (L) 1.1 - 2.2     NT-PRO BNP    Collection Time: 06/30/20 12:23 AM   Result Value Ref Range    NT pro-BNP 3,868 (H) 0 - 450 PG/ML       Radiologic Studies -   CT HEAD WO CONT   Final Result   Impression:    1. No evidence of acute infarct or intracranial hemorrhage. 2. Mild periventricular white matter disease is likely secondary to chronic   small vessel ischemic changes. CT Results  (Last 48 hours)               06/29/20 2337  CT HEAD WO CONT Final result    Impression:  Impression:    1. No evidence of acute infarct or intracranial hemorrhage. 2. Mild periventricular white matter disease is likely secondary to chronic   small vessel ischemic changes. Narrative: Indication:  recurrent falls, fatigue        Comparison: CT 2/21/2020       Findings: 5 mm axial images were obtained from the skull base through the   vertex. CT dose reduction was achieved through the use of a standardized protocol   tailored for this examination and automatic exposure control for dose   modulation. The ventricles and cortical sulci are prominent, compatible with age related   volume loss. There is no evidence of intracranial hemorrhage, mass, mass effect,   or acute infarct. There is periventricular white matter disease. No extra-axial   fluid collections are seen. The visualized paranasal sinuses and mastoid air   cells are clear. The orbital structures are unremarkable. No osseous   abnormalities are seen. CXR Results  (Last 48 hours)    None          Medical Decision Making   I am the first provider for this patient. I reviewed the vital signs, available nursing notes, past medical history, past surgical history, family history and social history. Vital Signs-Reviewed the patient's vital signs.   Patient Vitals for the past 24 hrs:   Temp Pulse Resp BP SpO2   06/29/20 2305  80  (!) 124/93    06/29/20 2304  96  120/86    06/29/20 2300  (!) 104  119/87    06/29/20 2243 98 °F (36.7 °C) 66 16 (!) 124/93 100 %       Pulse Oximetry Analysis - 100% on RA    Cardiac Monitor:   Rate: 80 bpm  Rhythm: Normal Sinus Rhythm      ED EKG interpretation:  Rhythm: atrial flutter Rate (approx.): 71; Axis: right axis deviation; P wave: normal; QRS interval: normal ; ST/T wave: normal; Other findings: normal. This EKG was interpreted by Kulwinder Mandujano M.D. Records Reviewed: Nursing Notes, Old Medical Records, Previous electrocardiograms, Previous Radiology Studies and Previous Laboratory Studies    Provider Notes (Medical Decision Making):   Patient presenting with generalized fatigue/weakness. Stable vitals and nontoxic appearing. Pt did have a mechanical GLF recently and given Eliquis use, will check CT head to r/o intracranial bleed, fracture. DDx: infection, anemia, electrolyte anomoly (hypo or hyperkalemia, hypomagnesemia), hypothyroid, dehydration, depression, CA, ACS. Will obtain EKG, UA, labwork for any urgent/emergent pathology. Will reassess and monitor while in ED. Pt does c/o of depression, but no SI or HI. No other other psychiatric concerns. Will provide outpatient mental health resources at time of discharge. ED Course:   Initial assessment performed. The patients presenting problems have been discussed, and they are in agreement with the care plan formulated and outlined with them. I have encouraged them to ask questions as they arise throughout their visit. HYPERTENSION COUNSELING  For 10 minutes, education was provided to the patient today regarding their hypertension. Patient is made aware of their elevated blood pressure and is instructed to follow up this week with their Primary Care for a recheck. Patient is counseled regarding consequences of chronic, uncontrolled hypertension including kidney disease, heart disease, stroke or even death. Patient states their understanding and agrees to follow up this week.  Additionally, during their visit, I discussed sodium restriction, maintaining ideal body weight and regular exercise program as physiologic means to achieve blood pressure control. The patient will strive towards this. I reviewed our electronic medical record system for any past medical records that were available that may contribute to the patient's current condition, the nursing notes and vital signs from today's visit. Alfredito Becerra MD    ED Orders Placed :  Orders Placed This Encounter    CT HEAD WO CONT    CBC WITH AUTOMATED DIFF    URINALYSIS W/ REFLEX CULTURE    BILIRUBIN, CONFIRM    CK    COMPREHENSIVE METABOLIC PANEL    NT-PRO BNP    B/P LYING/SIT/STANDING    EKG 12 LEAD INITIAL    INSERT PERIPHERAL IV ONE TIME STAT    traMADol-acetaminophen (ULTRACET) 37.5-325 mg per tablet    potassium chloride SR (KLOR-CON 10) tablet 40 mEq    potassium chloride SR (KLOR-CON 10) 10 mEq tablet    IP CONSULT TO CASE MANAGEMENT     ED Medications Administered:  Medications   potassium chloride SR (KLOR-CON 10) tablet 40 mEq (40 mEq Oral Given 6/30/20 0120)        Progress Note:  Patient has been reassessed and reports feeling better and symptoms have improved significantly after ED treatment. Patient feels comfortable going home with close follow-up. Dylon Ibarra Jr.'s final labs and imaging have been reviewed with him and available family and/or caregiver. They have been counseled regarding his diagnosis. He verbally conveys understanding and agreement of the signs, symptoms, diagnosis, treatment and prognosis and additionally agrees to follow up as recommended with Dr. Paula Claros MD and/or specialist in 24 - 48 hours. He also agrees with the care-plan we created together and conveys that all of his questions have been answered.   I have also put together some discharge instructions for him that include: 1) educational information regarding their diagnosis, 2) how to care for their diagnosis at home, as well a 3) list of reasons why they would want to return to the ED prior to their follow-up appointment should the patient's condition change or symptoms worsen. I have answered all questions to the patient's satisfaction. Strict return precautions given. He both understood and agreed with plan as discussed. Vital signs stable for discharge. Pt very appreciative of care today. Disposition: Discharge  The pt is ready for discharge. The pt's signs, symptoms, diagnosis, and discharge instructions have been discussed and pt has conveyed their understanding. The pt is to follow up as recommended or return to ER should their symptoms worsen. Plan has been discussed and pt is in full agreement. Plan:  1. Return precautions as discussed. 2.   Current Discharge Medication List      No current facility-administered medications for this encounter.      Current Outpatient Medications:     traMADol-acetaminophen (ULTRACET) 37.5-325 mg per tablet, Take 1 Tab by mouth every eight (8) hours as needed for Pain., Disp: , Rfl:     simvastatin (ZOCOR) 40 mg tablet, TAKE 1 TABLET BY MOUTH EVERYDAY AT BEDTIME, Disp: 90 Tab, Rfl: 3    Klor-Con M20 20 mEq tablet, TAKE 1 TABLET BY MOUTH EVERY DAY WITH FOOD, Disp: , Rfl:     metoprolol succinate (TOPROL-XL) 25 mg XL tablet, Take 0.5 Tabs by mouth nightly., Disp: 90 Tab, Rfl: 3    bumetanide (BUMEX) 1 mg tablet, TAKE 1 TABLET BY MOUTH EVERY DAY, Disp: 90 Tab, Rfl: 3    escitalopram oxalate (LEXAPRO) 10 mg tablet, TAKE 1 TABLET BY MOUTH EVERY DAY, Disp: 90 Tab, Rfl: 1    ELIQUIS 2.5 mg tablet, TAKE 1 TABLET BY MOUTH TWICE A DAY, Disp: 180 Tab, Rfl: 3    timolol (TIMOPTIC) 0.5 % ophthalmic solution, INSTILL 1 DROP INTO RIGHT EYE EVERY MORNING, Disp: , Rfl: 0    amLODIPine (NORVASC) 5 mg tablet, TAKE 1 TABLET BY MOUTH DAILY, Disp: , Rfl:     allopurinol (ZYLOPRIM) 100 mg tablet, TAKE 1 TABLET BY MOUTH EVERY DAY, Disp: 90 Tab, Rfl: 5    acyclovir (ZOVIRAX) 400 mg tablet, TAKE 1 TABLET BY MOUTH TWO TIMES PER DAY UNTIL SEEN, Disp: , Rfl: 3    propafenone (RYTHMOL) 150 mg tablet, Take 1 Tab by mouth two (2) times a day., Disp: 90 Tab, Rfl: 5    sodium chloride (OCEAN) 0.65 % nasal spray, 2 Sprays by Both Nostrils route four (4) times daily. , Disp: 45 mL, Rfl: 11    3. Follow-up Information     Follow up With Specialties Details Why Contact Info    Arlinda Cabot, MD Internal Medicine   [address] [address]  [address]  [facility] [phone]      [facility] EMERGENCY DEPT Emergency Medicine  As needed, If symptoms worsen [address] [facility] [address]    [place]  Schedule an appointment as soon as possible for a visit in 1 day  [facility]  [address] [facility],[address]  [address] [facility]  [phone]          Instructed to return to ED if worse  Diagnosis     Clinical Impression:   1. Acute hypokalemia    2. Generalized weakness    3. Depression, unspecified depression type    4. Chronic anticoagulation    5. CKD (chronic kidney disease) stage 3, GFR 30-59 ml/min ([facility])      Attestation:  Erasmo Guzman MD, am the attending of record for this patient. I personally performed the services described in this documentation on this date, 6/29/2020 for patient, Navneet Agrawal. . I have reviewed the chart and verified that the record is accurate and complete. This note will not be viewable in [address]

## 2020-06-30 NOTE — TELEPHONE ENCOUNTER
CM attempt a 2nd call to patient unable to reach patient. Unable to leave VM. Box is full.     [address] [facility]  [phone]

## 2020-06-30 NOTE — ED TRIAGE NOTES
Pt via ems pt has not eaten in 3 days. No support system. Reports feeling fatigue. Pt has an old cast on left arm. Pt is unkempt.

## 2020-06-30 NOTE — TELEPHONE ENCOUNTER
CM receive after hour consult. Per consult patient  \"Pt has old splint on L arm from previous visit here earlier this month. Pt reports he has an increased number of falls this year, lives alone, is on many medications, has no familial help, and had to ask a neighbor to call [address] [place] [address] because his phone wasn't charged. Pt's medication bag had numerous pill bottles with loose pill at the bottom of the bag. Pt is on eliquis for afib which can make the falls more dangerous. Pt says he hasn't ate in 2 days\"    CM contact patient left VM to return call.     [address] [place] [address]  [phone]

## 2020-06-30 NOTE — DISCHARGE INSTRUCTIONS
Patient Education       [address] [facility],  [address] Providers    Accepts Insured Patients Only:  [facility]  [address]       [phone]   Near the corner of [facility] and [facility] [address] in the near [facility]. Accepts most insurance including Medicaid/Medicare. No psychiatry. On the [facility] bus line. [phone]  [address] [facility] ([address]  [address] [facility], [address] [place])     [phone]   Accepts most major insurances. Psychiatry available. Some DBT groups. [facility])    [phone]   Mixture of psychologists and psychiatrists. They do not accept Medicaid or Medicare. [facility]  [address] [place] [address]       [phone]   Mixture of clinical social workers and psychologists. Sliding Scale/Financial Aid/Differing Payment Options  [place] [id number] [facility])      [phone]   Our own Damion Burgess and Tess Franklin. Variety of treatment options, including DBT. [address]       [phone]   Provides a variety of group and individual counseling options. Insurance, Medicaid, Medicare and sliding scale      Medicaid/Medicare providers in the [address] area  [address] [place] [address]. [phone]    [facility]         [address]       [phone]    [place]  [address], [address]    [phone] ex.  [address] [facility]     [phone]    [facility]      [phone]      Services for patients without Medicaid, Medicare or Insurance  The [address]       [phone]   See handout in separate folder    [address]    Recovering From Depression: Care Instructions  Your Care Instructions     Taking good care of yourself is important as you recover from depression. In time, your symptoms will fade as your treatment takes hold. Do not give up. Instead, focus your energy on getting better. Your mood will improve. It just takes some time. Focus on things that can help you feel better, such as being with friends and family, eating well, and getting enough rest. But take things slowly. Do not do too much too soon. You will begin to feel better gradually. Follow-up care is a key part of your treatment and safety. Be sure to make and go to all appointments, and call your doctor if you are having problems. It's also a good idea to know your test results and keep a list of the medicines you take. How can you care for yourself at home? Be realistic  · If you have a large task to do, break it up into smaller steps you can handle, and just do what you can. · You may want to put off important decisions until your depression has lifted. If you have plans that will have a major impact on your life, such as marriage, divorce, or a job change, try to wait a bit. Talk it over with friends and loved ones who can help you look at the overall picture first.  · Reaching out to people for help is important. Do not isolate yourself. Let your family and friends help you. Find someone you can trust and confide in, and talk to that person. · Be patient, and be kind to yourself. Remember that depression is not your fault and is not something you can overcome with willpower alone. Treatment is necessary for depression, just like for any other illness. Feeling better takes time, and your mood will improve little by little. Stay active  · Stay busy and get outside. Take a walk, or try some other light exercise. · Talk with your doctor about an exercise program. Exercise can help with mild depression. · Go to a movie or concert.  Take part in a Sabianism activity or other social gathering. Go to a [employer] game. · Ask a friend to have dinner with you. Take care of yourself  · Eat a balanced diet with plenty of fresh fruits and vegetables, whole grains, and lean protein. If you have lost your appetite, eat small snacks rather than large meals. · Avoid drinking alcohol or using illegal drugs. Do not take medicines that have not been prescribed for you. They may interfere with medicines you may be taking for depression, or they may make your depression worse. · Take your medicines exactly as they are prescribed. You may start to feel better within 1 to 3 weeks of taking antidepressant medicine. But it can take as many as 6 to 8 weeks to see more improvement. If you have questions or concerns about your medicines, or if you do not notice any improvement by 3 weeks, talk to your doctor. · If you have any side effects from your medicine, tell your doctor. Antidepressants can make you feel tired, dizzy, or nervous. Some people have dry mouth, constipation, headaches, sexual problems, or diarrhea. Many of these side effects are mild and will go away on their own after you have been taking the medicine for a few weeks. Some may last longer. Talk to your doctor if side effects are bothering you too much. You might be able to try a different medicine. · Get enough sleep. If you have problems sleeping:  ? Go to bed at the same time every night, and get up at the same time every morning. ? Keep your bedroom dark and quiet. ? Do not exercise after 5:00 p.m.  ? Avoid drinks with caffeine after 5:00 p.m. · Avoid sleeping pills unless they are prescribed by the doctor treating your depression. Sleeping pills may make you groggy during the day, and they may interact with other medicine you are taking. · If you have any other illnesses, such as diabetes, heart disease, or high blood pressure, make sure to continue with your treatment.  Tell your doctor about all of the medicines you take, including those with or without a prescription. · Keep the numbers for these national suicide hotlines: 1-573-446-TALK (1-457.860.7564) and 6-505-JRYCXAJ (2-541.539.1929). If you or someone you know talks about suicide or feeling hopeless, get help right away. When should you call for help? [id number] anytime you think you may need emergency care. For example, call if:  · You feel like hurting yourself or someone else. · Someone you know has depression and is about to attempt or is attempting suicide. Call your doctor now or seek immediate medical care if:  · You hear voices. · Someone you know has depression and:  ? Starts to give away his or her possessions. ? Uses illegal drugs or drinks alcohol heavily. ? Talks or writes about death, including writing suicide notes or talking about guns, knives, or pills. ? Starts to spend a lot of time alone. ? Acts very aggressively or suddenly appears calm. Watch closely for changes in your health, and be sure to contact your doctor if:  · You do not get better as expected. Where can you learn more? Go to http://buck-anjelica.info/  Enter N529 in the search box to learn more about \"Recovering From Depression: Care Instructions. \"  Current as of: January 31, 2020               Content Version: 12.5  © 6666-3507 Health[employer], [employer]. Care instructions adapted under license by [employer] (which disclaims liability or warranty for this information). If you have questions about a medical condition or this instruction, always ask your healthcare professional. [place] [address] any warranty or liability for your use of this information.

## 2020-06-30 NOTE — PROGRESS NOTES
Patient resolved from Transition of Care episode on 6/30/2020. ACM/CTN was unsuccessful at contacting this patient today. Patient/family was provided the following resources and education related to COVID-19 during the initial call:                         Signs, symptoms and red flags related to COVID-19            CDC exposure and quarantine guidelines            [employer] exposure contact - [phone]            Contact for their local Department of Health                 Patient has not had any additional ED or hospital visits. No further outreach scheduled with this CTN/ACM. Episode of Care resolved. Patient has this CTN/ACM contact information if future needs arise.

## 2020-06-30 NOTE — ED NOTES
Pt presents ambulatory via EMS to ED complaining of fatigue. Pt has old splint on L arm from previous visit here earlier this month. Pt reports he has an increased number of falls this year, lives alone, is on many medications, has no familial help, and had to ask a neighbor to call 911 tonight because his phone wasn't charged. Pt's medication bag had numerous pill bottles with loose pill at the bottom of the bag. Pt is on eliquis for afib which can make the falls more dangerous. Pt says he hasn't ate in 2 days. Pt is alert and oriented x 4, RR even and unlabored, skin is warm and dry. Assesment completed and pt updated on plan of care. Emergency Department Nursing Plan of Care       The Nursing Plan of Care is developed from the Nursing assessment and Emergency Department Attending provider initial evaluation. The plan of care may be reviewed in the ED Provider note.     The Plan of Care was developed with the following considerations:   Patient / Family readiness to learn indicated by:verbalized understanding  Persons(s) to be included in education: patient  Barriers to Learning/Limitations:No    Signed     Pedro Molina RN    6/30/2020   12:44 AM

## 2020-06-30 NOTE — ED NOTES
Discharge instructions were given to the patient by Yuly Leung RN. The patient left the Emergency Department ambulatory, alert and oriented and in no acute distress with 0 prescriptions. The patient was encouraged to call or return to the ED for worsening issues or problems and was encouraged to schedule a follow up appointment for continuing care. The patient verbalized understanding of discharge instructions and prescriptions, all questions were answered. The patient has no further concerns at this time.       Pt to f/u with CM tomorrow

## 2020-07-01 ENCOUNTER — PATIENT OUTREACH (OUTPATIENT)
Dept: CASE MANAGEMENT | Age: 85
End: 2020-07-01

## 2020-07-01 VITALS
HEIGHT: 68 IN | OXYGEN SATURATION: 99 % | SYSTOLIC BLOOD PRESSURE: 136 MMHG | RESPIRATION RATE: 16 BRPM | TEMPERATURE: 98 F | WEIGHT: 145 LBS | DIASTOLIC BLOOD PRESSURE: 88 MMHG | HEART RATE: 64 BPM | BODY MASS INDEX: 21.98 KG/M2

## 2020-07-01 NOTE — ED NOTES
..Discharge summary and discharge medications reviewed with patient and appropriate educational materials and side effects teaching were provided. patient  Given 0 paper prescriptions and 0 electronic prescriptions sent to pt's listed pharmacy. Patient verbalized understanding of the importance of discussing medications with his or her physician or clinic they will be following up with. No si/s of acute distress prior to discharge. Patient offered wheelchair from treatment area to hospital entrance, patient used wheelchair to get out of ED.

## 2020-07-01 NOTE — ED TRIAGE NOTES
Pt to er with cc of dizziness and feeling hungry. Pt has not eaten today. Pt was seen in er yesterday and referred to case management. Pt cell phone is dead and he does not have a .

## 2020-07-01 NOTE — PROGRESS NOTES
Called pt to follow up on ED visit to [facility] on 2020. LVM stating my name, CTN calling on behalf of [facility], date and time of call and my return telephone number. Called pt's emergency contact and LVM. Pt returned my call. Patient contacted regarding recent discharge and COVID-19 risk. Discussed COVID-19 related testing which was not done at this time. Test results were not done. Patient informed of results, if available? no    Care Transition Nurse/ Ambulatory Care Manager contacted the patient by telephone to perform post discharge assessment. Verified name and  with patient as identifiers. Patient has following risk factors of: heart failure, chronic kidney disease and multiple ED visits to [facility]. CTN/ACM reviewed discharge instructions, medical action plan and red flags related to discharge diagnosis. Reviewed and educated them on any new and changed medications related to discharge diagnosis. Advised obtaining a 90-day supply of all daily and as-needed medications. Education provided regarding infection prevention, and signs and symptoms of COVID-19 and when to seek medical attention with patient who verbalized understanding. Discussed exposure protocols and quarantine from [address] you at higher risk for severe illness  and given an opportunity for questions and concerns. The patient agrees to contact the COVID-19 hotline 041-614-6379 or PCP office for questions related to their healthcare. CTN/ACM provided contact information for future reference. From CDC: Are you at higher risk for severe illness?  Wash your hands often.  Avoid close contact (6 feet, which is about two arm lengths) with people who are sick.  Put distance between yourself and other people if COVID-19 is spreading in your community.  Clean and disinfect frequently touched surfaces.  Avoid all cruise travel and non-essential air travel.    Call your healthcare professional if you have concerns about COVID-19 and your underlying condition or if you are sick. For more information on steps you can take to protect yourself, see CDC's How to Protect Yourself      Patient/family/caregiver given information for [address] and agrees to enroll no    Plan for follow-up call in 5-7 days based on severity of symptoms and risk factors. Pt confirmed that he has been to the ED multiple times. Pt reports that he has not taken maintenance medications for 2 days due to confusion. Pt advised that with his chronic conditions, he needs to take medications as prescribed. Verified that pt does have HF. Attempted to schedule ED follow up with PCP through [employer] and PCP office will need to call pt to schedule. Pt confirmed that Yancy Soler (former wife)s emergency contact and he wishes to add his son but does not know the telephone number. Pt was given [employer] telephone number and [employer] telephone number and pt has my contact information. Will call pt in one week to verify appt and health status.

## 2020-07-01 NOTE — ED PROVIDER NOTES
EMERGENCY DEPARTMENT HISTORY AND PHYSICAL EXAM      Date: 6/30/2020  Patient Name: Shama Horton. History of Presenting Illness     Chief Complaint   Patient presents with    Dizziness       History Provided By: Patient    HPI: Shama Horton., 81 y.o. male with PMHx significant for depression, CKD, alcohol abuse, multiple myeloma, atrial fibrillation on Eliquis who presents with a chief complaint of his phone not working. Patient was seen last night in the emergency department for generalized fatigue and poor p.o. intake as well as feeling depressed. He had a complete evaluation including basic lab work, CT imaging all of which was unremarkable. Consult was left for case management to call him today and based on the charting they did so, however patient states he came back tonight because his phone was not working and he was concerned that he had missed the call. He denies any other changes or new or worsening symptoms. PCP: Shahid Troncoso MD    There are no other complaints, changes, or physical findings at this time. Current Outpatient Medications   Medication Sig Dispense Refill    traMADol-acetaminophen (ULTRACET) 37.5-325 mg per tablet Take 1 Tab by mouth every eight (8) hours as needed for Pain.  simvastatin (ZOCOR) 40 mg tablet TAKE 1 TABLET BY MOUTH EVERYDAY AT BEDTIME 90 Tab 3    amLODIPine (NORVASC) 5 mg tablet TAKE 1 TABLET BY MOUTH DAILY      Klor-Con M20 20 mEq tablet TAKE 1 TABLET BY MOUTH EVERY DAY WITH FOOD      metoprolol succinate (TOPROL-XL) 25 mg XL tablet Take 0.5 Tabs by mouth nightly.  90 Tab 3    bumetanide (BUMEX) 1 mg tablet TAKE 1 TABLET BY MOUTH EVERY DAY 90 Tab 3    escitalopram oxalate (LEXAPRO) 10 mg tablet TAKE 1 TABLET BY MOUTH EVERY DAY 90 Tab 1    allopurinol (ZYLOPRIM) 100 mg tablet TAKE 1 TABLET BY MOUTH EVERY DAY 90 Tab 5    ELIQUIS 2.5 mg tablet TAKE 1 TABLET BY MOUTH TWICE A  Tab 3    acyclovir (ZOVIRAX) 400 mg tablet TAKE 1 TABLET BY MOUTH TWO TIMES PER DAY UNTIL SEEN  3    timolol (TIMOPTIC) 0.5 % ophthalmic solution INSTILL 1 DROP INTO RIGHT EYE EVERY MORNING  0    propafenone (RYTHMOL) 150 mg tablet Take 1 Tab by mouth two (2) times a day. 90 Tab 5    sodium chloride (OCEAN) 0.65 % nasal spray 2 Sprays by Both Nostrils route four (4) times daily. 45 mL 11     Past History     Past Medical History:  Past Medical History:   Diagnosis Date    Adverse effect of anesthesia     nasal procedure didn't work-patient awake    Alcohol abuse     Aortic regurgitation     Chronic kidney disease     CKD (chronic kidney disease), stage III ([facility] [address] 2011    Depression     Episodic lightheadedness 07/28/2017    Episodic lightheadedness     Epistaxis 11/27/2016    Erectile dysfunction     Fall 02/11/2020    GI bleed     Gout     gout, osteoarthritis    Heart failure (HCC)     acute heart failure    Hemorrhoids     History of cardioversion 02/17/2017    Hypertension     Ill-defined condition     weakness    Multiple myeloma (HCC)     PAF (paroxysmal atrial fibrillation) ([facility] [address] 02/17/2016    eliquis    Pulmonary HTN ([facility] [address]     Right-sided epistaxis 11/27/2016    S/P AVR (aortic valve replacement) 07/26/2016    merrill gill md -     S/P cardiac cath 7/6/16    severe ai, normal coronaries     S/P colonoscopy 10-8-04    S/P colonoscopy with polypectomy 05/09/2016    Kar Rand MD    Skin lesion      Past Surgical History:  Past Surgical History:   Procedure Laterality Date    CARDIAC SURG PROCEDURE UNLIST      cardiac cath   Caffie Jose CARDIAC SURG PROCEDURE UNLIST  07/26/2016    AVR    HX HEENT      nasal procedure 20 years ago    HX KNEE REPLACEMENT Bilateral     bilateral knee replacement    HX ORTHOPAEDIC Bilateral     wrist     Family History:  Family History   Problem Relation Age of Onset    Anemia Mother     No Known Problems Father     Other Brother         drug abuse     Social History:  Social History     Tobacco Use    Smoking status: Former Smoker     Years: 4.00     Last attempt to quit: 1960     Years since quittin.5    Smokeless tobacco: Never Used   Substance Use Topics    Alcohol use: Yes     Comment: ocassional    Drug use: No     Allergies: Allergies   Allergen Reactions    Amiodarone Other (comments)     Bradycardia -excessive     Review of Systems   Review of Systems   Constitutional: Positive for appetite change. Negative for chills and fever. HENT: Negative for congestion, rhinorrhea and sore throat. Respiratory: Negative for cough and shortness of breath. Cardiovascular: Negative for chest pain. Gastrointestinal: Negative for abdominal pain, nausea and vomiting. Genitourinary: Negative for dysuria and urgency. Skin: Negative for rash. Neurological: Negative for dizziness, light-headedness and headaches. Psychiatric/Behavioral: Positive for dysphoric mood. All other systems reviewed and are negative. Physical Exam   Physical Exam  Vitals signs and nursing note reviewed. Constitutional:       General: He is not in acute distress. Appearance: He is well-developed. HENT:      Head: Normocephalic and atraumatic. Eyes:      Conjunctiva/sclera: Conjunctivae normal.      Pupils: Pupils are equal, round, and reactive to light. Neck:      Musculoskeletal: Normal range of motion. Cardiovascular:      Rate and Rhythm: Normal rate and regular rhythm. Pulmonary:      Effort: Pulmonary effort is normal. No respiratory distress. Breath sounds: Normal breath sounds. No stridor. Abdominal:      General: There is no distension. Palpations: Abdomen is soft. Tenderness: There is no abdominal tenderness. Musculoskeletal: Normal range of motion. Comments: Splint on LUE   Skin:     General: Skin is warm and dry. Neurological:      Mental Status: He is alert and oriented to person, place, and time.        Diagnostic Study Results   Labs -   No results found for this or any previous visit (from the past 12 hour(s)). Radiologic Studies -   No orders to display     Ct Head Wo Cont    Result Date: 6/29/2020  Impression: 1. No evidence of acute infarct or intracranial hemorrhage. 2. Mild periventricular white matter disease is likely secondary to chronic small vessel ischemic changes. Medical Decision Making   I am the first provider for this patient. I reviewed the vital signs, available nursing notes, past medical history, past surgical history, family history and social history. Vital Signs-Reviewed the patient's vital signs. Patient Vitals for the past 12 hrs:   Temp Pulse Resp BP SpO2   06/30/20 2049 97.8 °F (36.6 °C) (!) 43 16 (!) 144/100 97 %       Pulse Oximetry Analysis - 99% on ra      Records Reviewed: Nursing Notes and Old Medical Records    Provider Notes (Medical Decision Making):   Patient presents with a chief complaint of being concerned he missed phone calls from case management. Given that he had a full work-up last night will not repeat any lab work or imaging today. Will make sure his phone is charged in the emergency department    ED Course:   Initial assessment performed. The patients presenting problems have been discussed, and they are in agreement with the care plan formulated and outlined with them. I have encouraged them to ask questions as they arise throughout their visit. Critical Care:  none    Disposition:  discharge    PLAN:  1. Discharge Medication List as of 7/1/2020  1:36 AM        2.    Follow-up Information     Follow up With Specialties Details Why Contact Info    Kelle Trammell MD Internal Medicine Schedule an appointment as soon as possible for a visit  [address] [phone]      [facility] EMERGENCY DEPT Emergency Medicine  As needed, If symptoms worsen [address] [facility]  [phone]        Return to ED if worse     Diagnosis     Clinical Impression:   1. Appetite loss    2. Depression, unspecified depression type        This note will not be viewable in [employer]. Please note that this dictation was completed with [employer], the computer voice recognition software. Quite often unanticipated grammatical, syntax, homophones, and other interpretive errors are inadvertently transcribed by the computer software. Please disregard these errors.   Please excuse any errors that have escaped final proofreading

## 2020-07-01 NOTE — DISCHARGE INSTRUCTIONS
Patient Education        Learning About Depression Screening  What is depression screening? Depression screening is a way to see if you have depression symptoms. It may be done by a doctor or counselor. This screening is often part of a routine checkup. That's because your mental health is just as important as your physical health. Depression is a medical illness that affects how you feel, think, and act. You may:  · Have less energy. · Lose interest in your daily activities. · Feel sad and grouchy for a long time. Depression is very common. It affects men and women of all ages. Many things can trigger depression. Some people become depressed after they have a stroke or find out they have a major illness like cancer or heart disease. The death of a loved one, a breakup, or changes in the natural brain chemicals may lead to depression. It can run in families. Most experts believe that a combination of family history (a person's genes) and stressful life events can cause depression. What happens during screening? Your doctor may ask about your feelings, any changes in eating habits, your energy level, and your interest in your daily tasks. He or she may ask other questions, such as how well you are sleeping and if you can focus on the things you do. This may be an informal talk between the two of you. Or your doctor may ask you to fill out a quick form and then talk about your answers. Some diseases or changes in your body can cause symptoms that look like depression. So your doctor may do blood tests to help rule out other problems, such as hormone changes, a low thyroid level, or anemia. What happens after screening? If you have signs of depression, your doctor will talk to you about your options. Doctors usually treat depression with medicines or counseling. Often, combining the two works best. Many people don't get help because they think that they'll get over the depression on their own.  But people with depression may not get better unless they get treatment. Many people feel embarrassed or ashamed about having depression. But it isn't a sign of personal weakness. It's not a character flaw. A person who is depressed is not \"crazy. \" Depression is caused by changes in the brain. A serious symptom of depression is thinking about death or suicide. If you or someone you care about talks about this or about feeling hopeless, get help right away. It's important to know that depression can be treated. The first step toward feeling better is often just seeing that the problem exists. Where can you learn more? Go to http://buckAzumioanjelica.info/  Enter T185 in the search box to learn more about \"Learning About Depression Screening. \"  Current as of: January 31, 2020               Content Version: 12.5  © 9213-3859 [employer]. Care instructions adapted under license by [employer] (which disclaims liability or warranty for this information). If you have questions about a medical condition or this instruction, always ask your healthcare professional. [address] any warranty or liability for your use of this information.

## 2020-07-01 NOTE — ED NOTES
Pt returns to the ED by EDUARDO with c/o needing help with his phone. Pt stated it is dead and he doesn't have a  at home to charge it. Pt stated he is having trouble contacting people. Case management was contactant yesterday for him and attempted to call him earlier today without getting an answer. Pt has no health complaints at this time. Provided patient with a sandwich, water, bia crackers and gold fish. Attempting to charge patients phone for him. Pt is alert, oriented and appropriate. Emergency Department Nursing Plan of Care       The Nursing Plan of Care is developed from the Nursing assessment and Emergency Department Attending provider initial evaluation. The plan of care may be reviewed in the ED Provider note.     The Plan of Care was developed with the following considerations:   Patient / Family readiness to learn indicated by:verbalized understanding  Persons(s) to be included in education: patient  Barriers to Learning/Limitations:No    Signed     Cleve Garrido    6/30/2020   9:19 PM

## 2020-07-01 NOTE — ED NOTES
Wrote case managements number on the front of the discharge paperwork. Instructed patient to delete some voicemail's so he had space for people to leave him messages. Showed patient how to do this. Provided patient with a  so he could keep his phone charged and wouldn't have to come to the ED to charge his phone. Demonstrated the importance of this and using the ED properly.

## 2020-07-03 ENCOUNTER — APPOINTMENT (OUTPATIENT)
Dept: GENERAL RADIOLOGY | Age: 85
End: 2020-07-03
Attending: EMERGENCY MEDICINE
Payer: MEDICARE

## 2020-07-03 ENCOUNTER — APPOINTMENT (OUTPATIENT)
Dept: CT IMAGING | Age: 85
End: 2020-07-03
Attending: EMERGENCY MEDICINE
Payer: MEDICARE

## 2020-07-03 ENCOUNTER — HOSPITAL ENCOUNTER (EMERGENCY)
Age: 85
Discharge: HOME | End: 2020-07-03
Attending: EMERGENCY MEDICINE
Payer: MEDICARE

## 2020-07-03 VITALS
SYSTOLIC BLOOD PRESSURE: 154 MMHG | BODY MASS INDEX: 21.93 KG/M2 | HEIGHT: 68 IN | TEMPERATURE: 98.2 F | RESPIRATION RATE: 19 BRPM | OXYGEN SATURATION: 99 % | HEART RATE: 68 BPM | WEIGHT: 144.7 LBS | DIASTOLIC BLOOD PRESSURE: 78 MMHG

## 2020-07-03 DIAGNOSIS — M25.332 SCAPHOLUNATE DISSOCIATION OF LEFT WRIST: ICD-10-CM

## 2020-07-03 DIAGNOSIS — R53.81 DEBILITY: Primary | ICD-10-CM

## 2020-07-03 DIAGNOSIS — S62.355D CLOSED NONDISPLACED FRACTURE OF SHAFT OF FOURTH METACARPAL BONE OF LEFT HAND WITH ROUTINE HEALING, SUBSEQUENT ENCOUNTER: ICD-10-CM

## 2020-07-03 LAB
ALBUMIN SERPL-MCNC: 2.2 G/DL (ref 3.5–5)
ALBUMIN/GLOB SERPL: 0.4 {RATIO} (ref 1.1–2.2)
ALP SERPL-CCNC: 65 U/L (ref 45–117)
ALT SERPL-CCNC: 15 U/L (ref 12–78)
ANION GAP SERPL CALC-SCNC: 8 MMOL/L (ref 5–15)
APPEARANCE UR: CLEAR
AST SERPL-CCNC: 25 U/L (ref 15–37)
BACTERIA URNS QL MICRO: NEGATIVE /HPF
BASOPHILS # BLD: 0 K/UL (ref 0–0.1)
BASOPHILS NFR BLD: 0 % (ref 0–1)
BILIRUB SERPL-MCNC: 0.4 MG/DL (ref 0.2–1)
BILIRUB UR QL: NEGATIVE
BUN SERPL-MCNC: 31 MG/DL (ref 6–20)
BUN/CREAT SERPL: 15 (ref 12–20)
CALCIUM SERPL-MCNC: 8.3 MG/DL (ref 8.5–10.1)
CHLORIDE SERPL-SCNC: 104 MMOL/L (ref 97–108)
CO2 SERPL-SCNC: 28 MMOL/L (ref 21–32)
COLOR UR: ABNORMAL
CREAT SERPL-MCNC: 2.12 MG/DL (ref 0.7–1.3)
DIFFERENTIAL METHOD BLD: ABNORMAL
EOSINOPHIL # BLD: 0.1 K/UL (ref 0–0.4)
EOSINOPHIL NFR BLD: 3 % (ref 0–7)
EPITH CASTS URNS QL MICRO: ABNORMAL /LPF
ERYTHROCYTE [DISTWIDTH] IN BLOOD BY AUTOMATED COUNT: 14 % (ref 11.5–14.5)
GLOBULIN SER CALC-MCNC: 5.1 G/DL (ref 2–4)
GLUCOSE SERPL-MCNC: 91 MG/DL (ref 65–100)
GLUCOSE UR STRIP.AUTO-MCNC: NEGATIVE MG/DL
HCT VFR BLD AUTO: 31.5 % (ref 36.6–50.3)
HGB BLD-MCNC: 10.5 G/DL (ref 12.1–17)
HGB UR QL STRIP: NEGATIVE
IMM GRANULOCYTES # BLD AUTO: 0 K/UL (ref 0–0.04)
IMM GRANULOCYTES NFR BLD AUTO: 0 % (ref 0–0.5)
INR PPP: 1.1 (ref 0.9–1.1)
KETONES UR QL STRIP.AUTO: NEGATIVE MG/DL
LEUKOCYTE ESTERASE UR QL STRIP.AUTO: NEGATIVE
LYMPHOCYTES # BLD: 0.7 K/UL (ref 0.8–3.5)
LYMPHOCYTES NFR BLD: 16 % (ref 12–49)
MAGNESIUM SERPL-MCNC: 1.3 MG/DL (ref 1.6–2.4)
MCH RBC QN AUTO: 33.2 PG (ref 26–34)
MCHC RBC AUTO-ENTMCNC: 33.3 G/DL (ref 30–36.5)
MCV RBC AUTO: 99.7 FL (ref 80–99)
MONOCYTES # BLD: 0.3 K/UL (ref 0–1)
MONOCYTES NFR BLD: 6 % (ref 5–13)
NEUTS SEG # BLD: 3.5 K/UL (ref 1.8–8)
NEUTS SEG NFR BLD: 75 % (ref 32–75)
NITRITE UR QL STRIP.AUTO: NEGATIVE
NRBC # BLD: 0 K/UL (ref 0–0.01)
NRBC BLD-RTO: 0 PER 100 WBC
PH UR STRIP: 5 [PH] (ref 5–8)
PLATELET # BLD AUTO: 292 K/UL (ref 150–400)
PMV BLD AUTO: 8.9 FL (ref 8.9–12.9)
POTASSIUM SERPL-SCNC: 3.3 MMOL/L (ref 3.5–5.1)
PROT SERPL-MCNC: 7.3 G/DL (ref 6.4–8.2)
PROT UR STRIP-MCNC: 100 MG/DL
PROTHROMBIN TIME: 10.8 SEC (ref 9–11.1)
RBC # BLD AUTO: 3.16 M/UL (ref 4.1–5.7)
RBC #/AREA URNS HPF: ABNORMAL /HPF (ref 0–5)
RBC MORPH BLD: ABNORMAL
SODIUM SERPL-SCNC: 140 MMOL/L (ref 136–145)
SP GR UR REFRACTOMETRY: 1.01 (ref 1–1.03)
UA: UC IF INDICATED,UAUC: ABNORMAL
UROBILINOGEN UR QL STRIP.AUTO: 1 EU/DL (ref 0.2–1)
WBC # BLD AUTO: 4.6 K/UL (ref 4.1–11.1)
WBC URNS QL MICRO: ABNORMAL /HPF (ref 0–4)

## 2020-07-03 PROCEDURE — 81001 URINALYSIS AUTO W/SCOPE: CPT

## 2020-07-03 PROCEDURE — 80053 COMPREHEN METABOLIC PANEL: CPT

## 2020-07-03 PROCEDURE — 71045 X-RAY EXAM CHEST 1 VIEW: CPT

## 2020-07-03 PROCEDURE — 99285 EMERGENCY DEPT VISIT HI MDM: CPT

## 2020-07-03 PROCEDURE — 73080 X-RAY EXAM OF ELBOW: CPT

## 2020-07-03 PROCEDURE — 70450 CT HEAD/BRAIN W/O DYE: CPT

## 2020-07-03 PROCEDURE — 83735 ASSAY OF MAGNESIUM: CPT

## 2020-07-03 PROCEDURE — 85025 COMPLETE CBC W/AUTO DIFF WBC: CPT

## 2020-07-03 PROCEDURE — 85610 PROTHROMBIN TIME: CPT

## 2020-07-03 PROCEDURE — 73110 X-RAY EXAM OF WRIST: CPT

## 2020-07-03 PROCEDURE — 36415 COLL VENOUS BLD VENIPUNCTURE: CPT

## 2020-07-03 NOTE — ED NOTES
Pt presents to ED ambulatory complaining he is unsure if he is taking his medication as he should. Patient fell a month ago and injured his left arm. Patients left arm is splinted. Patient has a home health nurse following up with home care who called ems to tx patient to er. Per reports patient was altered mental with a fever of 102. Patient has had several visits in the past month to the ER. Upon arrival patient is alert and oriented to person place  and confused on the year (time). Patient reports always having trouble with the year. Patients temp is 98.2. Patient denies pain and states he just needs help with his medicines. Dr. Leesa Yoon aware of patient concerns and is having case management follow up. Patient states since his arm has been splinted due to fx he has had trouble bathing as well. Dr. Leesa Yoon spoke with patient about possibly doing therapy/rehab to get him functioning better with his arm and ability to get his medicines correctly and bathing. Patient said he would be willing to try rehab. Patients toenails are also starting to curl, needs to see a podiatrist as well. Pt reports that he has not taken his medications in the past 2 days because he is afraid to take them incorrectly. Pt , RR even and unlabored, skin is warm and dry. Assessment completed and pt updated on plan of care. Call bell in reach. Emergency Department Nursing Plan of Care       The Nursing Plan of Care is developed from the Nursing assessment and Emergency Department Attending provider initial evaluation. The plan of care may be reviewed in the ED Provider note.     The Plan of Care was developed with the following considerations:   Patient / Family readiness to learn indicated by:verbalized understanding  Persons(s) to be included in education: patient  Barriers to Learning/Limitations:No    Signed     Sue Hui RN    7/3/2020   12:15 PM

## 2020-07-03 NOTE — ED NOTES
Charge nurse reports that  at home health Cathy Terry) contacted APS yesterday in regards to residence condition and patient's apparent inability to care for self at home.

## 2020-07-03 NOTE — ED NOTES
Per Dr. Blake Adkins instructions, patients splint on left arm was removed earlier so xrays could be repeated.

## 2020-07-03 NOTE — PROGRESS NOTES
COT-  Notified by ER that patient needs assessment. Daryl Castellano., 81 y.o. male, with a past medical history significant for multiple myeloma, chronic kidney disease, depression, alcohol abuse, atrial fibrillation on Eliquis presenting the emergency department after his home health nurse thought he had a fever. \"    Met with patient for assessment. Lives alone, . Son, Sofia Vo [phone] helps out at times. Called patient's son for assessment. Son has been trying to help but patient will not let him help with bills and help in finding an shelter for patient. Son stated that patient gets about $2000 a month income. Thus far patient has been unwilling to let son help with bills and make arrangements for patient to transition to [facility]. [address] [facility] [place] and talked with on call , Gwen Wilcox [phone] about case. She stated that nurses have been trying to visit with patient. MSW has tried to visit patient but patient has not let her in. Gwen Wilcox looked in notes from staff visits and stated that patient has been sleeping on couch, staff has observed rat feces on couch and around house. Gwen Wilcox stated that agency called APS on 7/1/2020 to report patient status and situation. CM ask Gwen Wilcox if [facility] [place] will be able to continue to work with patient. Gwen Wilcox stated yes that they will continue to visit patient. Talked with team about plans. Patient does not require in patient services at this time. ER MD talked with attending about situation and patient not requiring inpatient services. CM called patient's son to let him know that patient will be returning home today. Son stated understanding and that he will go by and check on his father tonight. CM also offered to send son list of assisted living facilities. Son would like list mailed to him at [address] [facility]. [facility]. CM talked to patient about discharge plans.   Patient stated that he was willing to moving into [facility] and CM encouraged him to work with son, home health and APS to make move a smooth transition. CM also encouraged patient not to drink alcohol. Patient stated that he would cut down on consumption and only drink \"wine or beer\". CM called [facility] APS for file a concern. [phone] was called and call was redirected to Alvino Horne due to this being a holiday. Report taken and CM was given [place] number of [id number]. Schedule an appointment with William Pollard MD as soon as possible for a visit    Specialty: Internal Medicine    [id number] [place] [address] [address]   [place] [address] [phone]      Follow up with [address] [place] [address] EMERGENCY DEPT    Specialty: Emergency Medicine    [facility]   [phone]    If symptoms worsen      Follow up with [facility] [address]    [address] [facility], [address] [place] [address]   [phone]    Please call them when you need assistance. [facility] will be visiting you every few days-- remember to let them in to help. [facility] [employer]    Next steps: Follow up    [address] [facility] [address]   [place], [address]   Phone  [phone]     They have been called to provide assistance for adult home placement.  The case number is [id number]. Alive WARM Line    Next steps: Follow up    [address] [facility] [address] to 12 midnight   7days/week    Talked to trained individuals with lived experience in addiction recovery.  Safe and confidential. [facility] La [facility]ega are here to help.          Jose Rodríguez, MAYITOW/NAZANIN  [phone]

## 2020-07-03 NOTE — ED NOTES
Patient aware that a urine sample is needed. Patient encouraged to drink the water he was provided so he can urinate.

## 2020-07-03 NOTE — ED NOTES
Patient given discharge instructions. In room awaiting ride to get here. Understands that people will be coming to his home to help him and he agrees to let them in as he wants the help. States son will be coming to see him this weekend and he will call son as soon has he gets home.

## 2020-07-03 NOTE — ED NOTES
Patient reports drinking 2 beers or occasional wine periodically throughout the week. Patient denies daily drinking.

## 2020-07-03 NOTE — ED PROVIDER NOTES
EMERGENCY DEPARTMENT HISTORY AND PHYSICAL EXAM      Date: 7/3/2020  Patient Name: Josseline Menezes. Please note that this dictation was completed with [employer], the computer voice recognition software. Quite often unanticipated grammatical, syntax, homophones, and other interpretive errors are inadvertently transcribed by the computer software. Please disregard these errors. Please excuse any errors that have escaped final proofreading. History of Presenting Illness     Chief Complaint   Patient presents with    Medication Check       History Provided By: Patient     HPI: Josseline Bell, 81 y.o. male, with a past medical history significant for multiple myeloma, chronic kidney disease, depression, alcohol abuse, atrial fibrillation on Eliquis presenting the emergency department after his home health nurse thought he had a fever. Report was that his temperature was 102 there. Patient has no complaints. He denies feeling feverish. Did not have a fever per EMS. Patient is chronic alcoholic and lives at home by himself. He has had multiple emergency department visits over the last month. Nursing reports that his home is filthy and that he has had multiple falls. They report that he cannot keep his medications straight. They are concerned today because of the temperature reading and they report that he seems more and more confused every day. Patient was noted to be febrile there, not febrile here, has no reports or complaints of any infectious symptoms. Of note the patient lives in an apartment that did not have air conditioning running and it is very hot here recently. PCP: Rc Singh MD    No current facility-administered medications on file prior to encounter. Current Outpatient Medications on File Prior to Encounter   Medication Sig Dispense Refill    traMADol-acetaminophen (ULTRACET) 37.5-325 mg per tablet Take 1 Tab by mouth every eight (8) hours as needed for Pain.       simvastatin (ZOCOR) 40 mg tablet TAKE 1 TABLET BY MOUTH EVERYDAY AT BEDTIME 90 Tab 3    Klor-Con M20 20 mEq tablet TAKE 1 TABLET BY MOUTH EVERY DAY WITH FOOD      metoprolol succinate (TOPROL-XL) 25 mg XL tablet Take 0.5 Tabs by mouth nightly. 90 Tab 3    bumetanide (BUMEX) 1 mg tablet TAKE 1 TABLET BY MOUTH EVERY DAY 90 Tab 3    escitalopram oxalate (LEXAPRO) 10 mg tablet TAKE 1 TABLET BY MOUTH EVERY DAY 90 Tab 1    allopurinol (ZYLOPRIM) 100 mg tablet TAKE 1 TABLET BY MOUTH EVERY DAY 90 Tab 5    ELIQUIS 2.5 mg tablet TAKE 1 TABLET BY MOUTH TWICE A  Tab 3    amLODIPine (NORVASC) 5 mg tablet TAKE 1 TABLET BY MOUTH DAILY      acyclovir (ZOVIRAX) 400 mg tablet TAKE 1 TABLET BY MOUTH TWO TIMES PER DAY UNTIL SEEN  3    timolol (TIMOPTIC) 0.5 % ophthalmic solution INSTILL 1 DROP INTO RIGHT EYE EVERY MORNING  0    propafenone (RYTHMOL) 150 mg tablet Take 1 Tab by mouth two (2) times a day. 90 Tab 5    sodium chloride (OCEAN) 0.65 % nasal spray 2 Sprays by Both Nostrils route four (4) times daily. 45 mL 11       Past History     Past Medical History:  Past Medical History:   Diagnosis Date    Adverse effect of anesthesia     nasal procedure didn't work-patient awake    Alcohol abuse     Aortic regurgitation     Chronic kidney disease     CKD (chronic kidney disease), stage III ([address] 2011    Depression     Episodic lightheadedness 07/28/2017    Episodic lightheadedness     Epistaxis 11/27/2016    Erectile dysfunction     Fall 02/11/2020    GI bleed     Gout     gout, osteoarthritis    Heart failure (HCC)     acute heart failure    Hemorrhoids     History of cardioversion 02/17/2017    Hypertension     Ill-defined condition     weakness    Multiple myeloma (HCC)     PAF (paroxysmal atrial fibrillation) ([address] 02/17/2016    eliquis    [facility] HTN [facility] [address] [facility]     Right-sided epistaxis 11/27/2016    S/P AVR (aortic valve replacement) 07/26/2016    merrill gill md -     S/P cardiac cath 7/6/16 severe ai, normal coronaries     S/P colonoscopy 10-8-04    S/P colonoscopy with polypectomy 2016    Kar Ugalde MD    Skin lesion        Past Surgical History:  Past Surgical History:   Procedure Laterality Date    CARDIAC SURG PROCEDURE UNLIST      cardiac cath   [facility] CARDIAC SURG PROCEDURE UNLIST  2016    AVR    HX HEENT      nasal procedure 20 years ago   [facility] HX KNEE REPLACEMENT Bilateral     bilateral knee replacement    HX ORTHOPAEDIC Bilateral     wrist       Family History:  Family History   Problem Relation Age of Onset    Anemia Mother     No Known Problems Father     Other Brother         drug abuse       Social History:  Social History     Tobacco Use    Smoking status: Former Smoker     Years: 4.00     Last attempt to quit: 1960     Years since quittin.5    Smokeless tobacco: Never Used   Substance Use Topics    Alcohol use: Yes     Comment: ocassional    Drug use: No       Allergies: Allergies   Allergen Reactions    Amiodarone Other (comments)     Bradycardia -excessive         Review of Systems   Review of Systems   Constitutional: Positive for fatigue. Negative for chills and fever. HENT: Negative for congestion and sore throat. Eyes: Negative for visual disturbance. Respiratory: Negative for cough and shortness of breath. Cardiovascular: Negative for chest pain and leg swelling. Gastrointestinal: Negative for abdominal pain, blood in stool, diarrhea and nausea. Endocrine: Negative for polyuria. Genitourinary: Negative for dysuria and testicular pain. Musculoskeletal: Negative for arthralgias, joint swelling and myalgias. Skin: Negative for rash. Allergic/Immunologic: Negative for immunocompromised state. Neurological: Positive for weakness (generalized). Negative for headaches. Hematological: Does not bruise/bleed easily. Psychiatric/Behavioral: Negative for confusion.        Physical Exam   Physical Exam  Vitals signs and nursing note reviewed. Constitutional:       Appearance: He is well-developed. Comments: Disheveled, unkempt male no acute distress   HENT:      Head: Normocephalic and atraumatic. Eyes:      General:         Right eye: No discharge. Left eye: No discharge. Conjunctiva/sclera: Conjunctivae normal.      Pupils: Pupils are equal, round, and reactive to light. Neck:      Musculoskeletal: Normal range of motion and neck supple. Trachea: No tracheal deviation. Cardiovascular:      Rate and Rhythm: Normal rate and regular rhythm. Heart sounds: Normal heart sounds. No murmur. Pulmonary:      Effort: Pulmonary effort is normal. No respiratory distress. Breath sounds: Normal breath sounds. No wheezing or rales. Abdominal:      General: Bowel sounds are normal.      Palpations: Abdomen is soft. Tenderness: There is no abdominal tenderness. There is no guarding or rebound. Musculoskeletal: Normal range of motion. General: Deformity (splint in place left arm: sugar tong. splint is dirty and falling off) present. No tenderness. Skin:     General: Skin is warm and dry. Findings: No erythema or rash. Neurological:      Mental Status: He is alert and oriented to person, place, and time. Comments: Patient is oriented to self, place, situation, month but not year. He thinks the year is 2005. Does have some difficulty with word recall intermittently, but there is no focal deficit. No facial droop, equal strength in the upper and lower extremities.    Psychiatric:         Behavior: Behavior normal.         Diagnostic Study Results     Labs -     Recent Results (from the past 12 hour(s))   CBC WITH AUTOMATED DIFF    Collection Time: 07/03/20 12:14 PM   Result Value Ref Range    WBC 4.6 4.1 - 11.1 K/uL    RBC 3.16 (L) 4.10 - 5.70 M/uL    HGB 10.5 (L) 12.1 - 17.0 g/dL    HCT 31.5 (L) 36.6 - 50.3 %    MCV 99.7 (H) 80.0 - 99.0 FL    MCH 33.2 26.0 - 34.0 PG    MCHC 33.3 30.0 - 36.5 g/dL    RDW 14.0 11.5 - 14.5 %    PLATELET 471 896 - 041 K/uL    MPV 8.9 8.9 - 12.9 FL    NRBC 0.0 0  WBC    ABSOLUTE NRBC 0.00 0.00 - 0.01 K/uL    NEUTROPHILS 75 32 - 75 %    LYMPHOCYTES 16 12 - 49 %    MONOCYTES 6 5 - 13 %    EOSINOPHILS 3 0 - 7 %    BASOPHILS 0 0 - 1 %    IMMATURE GRANULOCYTES 0 0.0 - 0.5 %    ABS. NEUTROPHILS 3.5 1.8 - 8.0 K/UL    ABS. LYMPHOCYTES 0.7 (L) 0.8 - 3.5 K/UL    ABS. MONOCYTES 0.3 0.0 - 1.0 K/UL    ABS. EOSINOPHILS 0.1 0.0 - 0.4 K/UL    ABS. BASOPHILS 0.0 0.0 - 0.1 K/UL    ABS. IMM. GRANS. 0.0 0.00 - 0.04 K/UL    DF SMEAR SCANNED      RBC COMMENTS NORMOCYTIC, NORMOCHROMIC     METABOLIC PANEL, COMPREHENSIVE    Collection Time: 07/03/20 12:14 PM   Result Value Ref Range    Sodium 140 136 - 145 mmol/L    Potassium 3.3 (L) 3.5 - 5.1 mmol/L    Chloride 104 97 - 108 mmol/L    CO2 28 21 - 32 mmol/L    Anion gap 8 5 - 15 mmol/L    Glucose 91 65 - 100 mg/dL    BUN 31 (H) 6 - 20 MG/DL    Creatinine 2.12 (H) 0.70 - 1.30 MG/DL    BUN/Creatinine ratio 15 12 - 20      GFR est AA 36 (L) >60 ml/min/1.73m2    GFR est non-AA 30 (L) >60 ml/min/1.73m2    Calcium 8.3 (L) 8.5 - 10.1 MG/DL    Bilirubin, total 0.4 0.2 - 1.0 MG/DL    ALT (SGPT) 15 12 - 78 U/L    AST (SGOT) 25 15 - 37 U/L    Alk.  phosphatase 65 45 - 117 U/L    Protein, total 7.3 6.4 - 8.2 g/dL    Albumin 2.2 (L) 3.5 - 5.0 g/dL    Globulin 5.1 (H) 2.0 - 4.0 g/dL    A-G Ratio 0.4 (L) 1.1 - 2.2     PROTHROMBIN TIME + INR    Collection Time: 07/03/20 12:14 PM   Result Value Ref Range    INR 1.1 0.9 - 1.1      Prothrombin time 10.8 9.0 - 11.1 sec   MAGNESIUM    Collection Time: 07/03/20 12:14 PM   Result Value Ref Range    Magnesium 1.3 (L) 1.6 - 2.4 mg/dL   URINALYSIS W/ REFLEX CULTURE    Collection Time: 07/03/20  1:48 PM   Result Value Ref Range    Color YELLOW/STRAW      Appearance CLEAR CLEAR      Specific gravity 1.015 1.003 - 1.030      pH (UA) 5.0 5.0 - 8.0      Protein 100 (A) NEG mg/dL    Glucose Negative NEG mg/dL    Ketone Negative NEG mg/dL    Bilirubin Negative NEG      Blood Negative NEG      Urobilinogen 1.0 0.2 - 1.0 EU/dL    Nitrites Negative NEG      Leukocyte Esterase Negative NEG      WBC 0-4 0 - 4 /hpf    RBC 0-5 0 - 5 /hpf    Epithelial cells FEW FEW /lpf    Bacteria Negative NEG /hpf    UA:UC IF INDICATED CULTURE NOT INDICATED BY UA RESULT CNI         Radiologic Studies -   XR ELBOW LT MIN 3 V   Final Result   IMPRESSION: Substantial persistent olecranon soft tissue swelling and small   joint effusion but no convincing healing fracture is identified. CT HEAD WO CONT   Final Result   IMPRESSION:    No acute intracranial process is identified. XR CHEST PORT   Final Result   IMPRESSION: No acute abnormality is identified. XR WRIST LT AP/LAT/OBL MIN 3V   Final Result   IMPRESSION: No significant change. . Again noted is a fracture of the left fourth metacarpal with callus formation   which appears subacute and the fracture line appears slightly less   well-defined. .      Widening of the scapholunate interval and narrowing of radiocarpal joint is   unchanged. Subluxation left first metacarpal carpal joint is unchanged. Soft tissue swelling is unchanged. .        CT Results  (Last 48 hours)               07/03/20 1251  CT HEAD WO CONT Final result    Impression:  IMPRESSION:    No acute intracranial process is identified. Narrative:  EXAM: CT HEAD WO CONT       INDICATION: multiple falls       COMPARISON: 6/29/2020. CONTRAST: None. TECHNIQUE: Unenhanced CT of the head was performed using 5 mm images. Brain and   bone windows were generated. Coronal and sagittal reformats. CT dose reduction   was achieved through use of a standardized protocol tailored for this   examination and automatic exposure control for dose modulation. FINDINGS:   There is mild prominence of the ventricles and sulci. . There are mild changes   small vessel disease periventricular white matter. . There is no intracranial   hemorrhage, extra-axial collection, or mass effect. The basilar cisterns are   open. No CT evidence of acute infarct. The bone windows demonstrate no abnormalities. The visualized portions of the   paranasal sinuses and mastoid air cells are clear. CXR Results  (Last 48 hours)               07/03/20 1226  XR CHEST PORT Final result    Impression:  IMPRESSION: No acute abnormality is identified. Narrative:  EXAM:  XR CHEST PORT       INDICATION:  weakness falls       COMPARISON:  2019       FINDINGS: A portable AP radiograph of the chest was obtained at 1203 hours. .    Lungs are clear of an acute process. [facility] Heart size is slightly enlarged and   stable. Aorta is ectatic. Mediastinal contours unchanged. Patient is status   post median sternotomy. Medical Decision Making   I am the first provider for this patient. I reviewed the vital signs, available nursing notes, past medical history, past surgical history, family history and social history. Vital Signs-Reviewed the patient's vital signs. Patient Vitals for the past 12 hrs:   Temp Pulse Resp BP SpO2   07/03/20 1321  (!) 59 16  100 %   07/03/20 1320    151/88    07/03/20 1157 98.2 °F (36.8 °C) 62 18 (!) 141/98 100 %         Records Reviewed:   Nursing notes, Prior visits     Provider Notes (Medical Decision Making):   Patient mainly here for social reasons. I doubt there is an acute process. He is not febrile here. Looks well and nontoxic. Will check labs to assess for worsening renal dysfunction. With his report of worsening mental status change in multiple falls will obtain a CT of the head although he is oriented here to person place month but not year. He is able to hold a conversation fluently. We have asked case management to get involved. We will asked them to contact Adult Protective Services.   I do not think the patient is safe to live at home with his multiple falls and poor living conditions. He is amendable to trying to find him safe housing. Will consider acute inpatient rehab, nursing home or social admission to the hospital until safe housing can be arranged    ED Course:   Initial assessment performed. The patients presenting problems have been discussed, and they are in agreement with the care plan formulated and outlined with them. I have encouraged them to ask questions as they arise throughout their visit. ED Course as of Jul 03 1529 Fri Jul 03, 2020   1430 Discussed with Hospitalist. Does not feel this patient warrants medical admission and hospitalizing him for social reasons would be counterproductive and put him at risk for alcohol withdrawal and other nosocomial infection. I agree with the hospitalist in this regard, I am just trying to exhaust options for the patient to find a safe disposition.     [AR]   1430 Discussed with the  who has been exhausting all the options for the patient. There is no acute rehab or other nursing home where the patient can be placed today. She did discuss the care with the patient's son who will try to come down and stay with his father or visit his father more this weekend. Case management also discussed the patient with the home health who report that they did call Adult Protective Services. [AR]   5278 Imaging interpretation. There is an effusion over the olecranon, but no evidence of fracture. I clinically doubt an occult fracture. I think the patient most likely had a hematoma secondary to his Eliquis use in the recent fall.   I think keeping the patient in a splint is likely counterproductive to prevent further falls, and the benefit of removing the splint to help the patient ambulate and to hold onto walls the skin to be more beneficial than keeping the splint on at this point.    [AR]      ED Course User Index  [AR] Lord Mariluz DO             Critical Care Time: none    Disposition:  DISCHARGE NOTE  Patients results have been reviewed with them. Patient and/or family have verbally conveyed their understanding and agreement of the patient's signs, symptoms, diagnosis, treatment and prognosis and additionally agree to follow up as recommended or return to the Emergency Room should their condition change or have any new concerns prior to their follow-up appointment. Patient verbally agrees with the care-plan and verbally conveys that all of their questions have been answered. Discharge instructions have also been provided to the patient with some educational information regarding their diagnosis as well a list of reasons why they would want to return to the ER prior to their follow-up appointment should their condition change. PLAN:  1. Current Discharge Medication List        2. Follow-up Information     Follow up With Specialties Details Why Contact Info    Corie Meeks MD Internal Medicine Schedule an appointment as soon as possible for a visit  [place]  [id number] [facility] [phone]      [facility] EMERGENCY DEPT Emergency Medicine  If symptoms worsen [facility]  [phone]          Return to ED if worse     Diagnosis     Clinical Impression:   1. Debility    2. Scapholunate dissociation of left wrist    3. Closed nondisplaced fracture of shaft of fourth metacarpal bone of left hand with routine healing, subsequent encounter        Attestations:   This note was completed by Mikey Suarez DO

## 2020-07-03 NOTE — ED NOTES
Assisted patient with self care. Placed on monitor and reassessed vital signs. Call bell in reach. Lights dimmed.

## 2020-07-03 NOTE — PROGRESS NOTES
NAZANIN called [employer] and a ride was set up for  at 1630 from the ED Entrance. [facility] when they have arrived. ED staff were also notified of this plan. NAZANIN met with pt and reported this information to him. Pt confirmed his home address as was provided to [employer]. Pt reported he needed no assistance to walk up the 5 stairs to enter his home and he was able to ambulate from the hospital to the [employer] vehicle. Pt did report he has a cane and walker at home but he does not use them and did not bring them to [facility] ED.  NAZANIN

## 2020-07-03 NOTE — ED NOTES
Patient prepared for discharge. Clean shirt obtained and assisted patient with dressing. No shoes available but was given clean pair of hospital socks.

## 2020-07-03 NOTE — ED TRIAGE NOTES
Patient doesn't feel like he is taking his prescriptions right and needs help sorting through those.

## 2020-07-04 ENCOUNTER — HOSPITAL ENCOUNTER (INPATIENT)
Age: 85
LOS: 1 days | Discharge: NURSING, REHAB OR LONG-TERM CARE | DRG: 897 | End: 2020-07-10
Attending: EMERGENCY MEDICINE | Admitting: STUDENT IN AN ORGANIZED HEALTH CARE EDUCATION/TRAINING PROGRAM
Payer: MEDICARE

## 2020-07-04 DIAGNOSIS — E87.6 HYPOKALEMIA: ICD-10-CM

## 2020-07-04 DIAGNOSIS — R29.6 FREQUENT FALLS: Primary | ICD-10-CM

## 2020-07-04 DIAGNOSIS — Z91.14 NON COMPLIANCE W MEDICATION REGIMEN: ICD-10-CM

## 2020-07-04 DIAGNOSIS — R62.51 FAILURE TO THRIVE (0-17): ICD-10-CM

## 2020-07-04 LAB
ALBUMIN SERPL-MCNC: 2.3 G/DL (ref 3.5–5)
ALBUMIN/GLOB SERPL: 0.5 {RATIO} (ref 1.1–2.2)
ALP SERPL-CCNC: 70 U/L (ref 45–117)
ALT SERPL-CCNC: 16 U/L (ref 12–78)
ANION GAP SERPL CALC-SCNC: 11 MMOL/L (ref 5–15)
AST SERPL-CCNC: 23 U/L (ref 15–37)
BASOPHILS # BLD: 0 K/UL (ref 0–0.1)
BASOPHILS NFR BLD: 0 % (ref 0–1)
BILIRUB SERPL-MCNC: 0.5 MG/DL (ref 0.2–1)
BNP SERPL-MCNC: 3755 PG/ML (ref 0–450)
BUN SERPL-MCNC: 24 MG/DL (ref 6–20)
BUN/CREAT SERPL: 14 (ref 12–20)
CALCIUM SERPL-MCNC: 8.2 MG/DL (ref 8.5–10.1)
CHLORIDE SERPL-SCNC: 101 MMOL/L (ref 97–108)
CO2 SERPL-SCNC: 26 MMOL/L (ref 21–32)
CREAT SERPL-MCNC: 1.72 MG/DL (ref 0.7–1.3)
DIFFERENTIAL METHOD BLD: ABNORMAL
EOSINOPHIL # BLD: 0.1 K/UL (ref 0–0.4)
EOSINOPHIL NFR BLD: 2 % (ref 0–7)
ERYTHROCYTE [DISTWIDTH] IN BLOOD BY AUTOMATED COUNT: 14.1 % (ref 11.5–14.5)
ETHANOL SERPL-MCNC: 29 MG/DL
GLOBULIN SER CALC-MCNC: 4.9 G/DL (ref 2–4)
GLUCOSE SERPL-MCNC: 73 MG/DL (ref 65–100)
HCT VFR BLD AUTO: 31.4 % (ref 36.6–50.3)
HGB BLD-MCNC: 10.6 G/DL (ref 12.1–17)
IMM GRANULOCYTES # BLD AUTO: 0 K/UL (ref 0–0.04)
IMM GRANULOCYTES NFR BLD AUTO: 0 % (ref 0–0.5)
LYMPHOCYTES # BLD: 0.7 K/UL (ref 0.8–3.5)
LYMPHOCYTES NFR BLD: 17 % (ref 12–49)
MCH RBC QN AUTO: 33.7 PG (ref 26–34)
MCHC RBC AUTO-ENTMCNC: 33.8 G/DL (ref 30–36.5)
MCV RBC AUTO: 99.7 FL (ref 80–99)
MONOCYTES # BLD: 0.3 K/UL (ref 0–1)
MONOCYTES NFR BLD: 7 % (ref 5–13)
NEUTS SEG # BLD: 3.3 K/UL (ref 1.8–8)
NEUTS SEG NFR BLD: 74 % (ref 32–75)
NRBC # BLD: 0 K/UL (ref 0–0.01)
NRBC BLD-RTO: 0 PER 100 WBC
PLATELET # BLD AUTO: 307 K/UL (ref 150–400)
PMV BLD AUTO: 9.7 FL (ref 8.9–12.9)
POTASSIUM SERPL-SCNC: 3.2 MMOL/L (ref 3.5–5.1)
PROT SERPL-MCNC: 7.2 G/DL (ref 6.4–8.2)
RBC # BLD AUTO: 3.15 M/UL (ref 4.1–5.7)
RBC MORPH BLD: ABNORMAL
SODIUM SERPL-SCNC: 138 MMOL/L (ref 136–145)
WBC # BLD AUTO: 4.4 K/UL (ref 4.1–11.1)

## 2020-07-04 PROCEDURE — 99218 HC RM OBSERVATION: CPT

## 2020-07-04 PROCEDURE — 74011250637 HC RX REV CODE- 250/637: Performed by: STUDENT IN AN ORGANIZED HEALTH CARE EDUCATION/TRAINING PROGRAM

## 2020-07-04 PROCEDURE — 80053 COMPREHEN METABOLIC PANEL: CPT

## 2020-07-04 PROCEDURE — 83880 ASSAY OF NATRIURETIC PEPTIDE: CPT

## 2020-07-04 PROCEDURE — 36415 COLL VENOUS BLD VENIPUNCTURE: CPT

## 2020-07-04 PROCEDURE — 99285 EMERGENCY DEPT VISIT HI MDM: CPT

## 2020-07-04 PROCEDURE — 80307 DRUG TEST PRSMV CHEM ANLYZR: CPT

## 2020-07-04 PROCEDURE — 85025 COMPLETE CBC W/AUTO DIFF WBC: CPT

## 2020-07-04 PROCEDURE — 87635 SARS-COV-2 COVID-19 AMP PRB: CPT

## 2020-07-04 PROCEDURE — 93005 ELECTROCARDIOGRAM TRACING: CPT

## 2020-07-04 RX ORDER — ESCITALOPRAM OXALATE 10 MG/1
10 TABLET ORAL DAILY
Status: DISCONTINUED | OUTPATIENT
Start: 2020-07-05 | End: 2020-07-11 | Stop reason: HOSPADM

## 2020-07-04 RX ORDER — LORAZEPAM 1 MG/1
1 TABLET ORAL
Status: DISCONTINUED | OUTPATIENT
Start: 2020-07-04 | End: 2020-07-07

## 2020-07-04 RX ORDER — BUMETANIDE 1 MG/1
1 TABLET ORAL DAILY
Status: DISCONTINUED | OUTPATIENT
Start: 2020-07-05 | End: 2020-07-11 | Stop reason: HOSPADM

## 2020-07-04 RX ORDER — LANOLIN ALCOHOL/MO/W.PET/CERES
100 CREAM (GRAM) TOPICAL DAILY
Status: DISCONTINUED | OUTPATIENT
Start: 2020-07-04 | End: 2020-07-11 | Stop reason: HOSPADM

## 2020-07-04 RX ORDER — FOLIC ACID 1 MG/1
1 TABLET ORAL DAILY
Status: DISCONTINUED | OUTPATIENT
Start: 2020-07-04 | End: 2020-07-11 | Stop reason: HOSPADM

## 2020-07-04 RX ORDER — SODIUM CHLORIDE 0.9 % (FLUSH) 0.9 %
5-40 SYRINGE (ML) INJECTION AS NEEDED
Status: DISCONTINUED | OUTPATIENT
Start: 2020-07-04 | End: 2020-07-05 | Stop reason: SDUPTHER

## 2020-07-04 RX ORDER — AMLODIPINE BESYLATE 5 MG/1
5 TABLET ORAL DAILY
Status: DISCONTINUED | OUTPATIENT
Start: 2020-07-05 | End: 2020-07-11 | Stop reason: HOSPADM

## 2020-07-04 RX ORDER — LORAZEPAM 0.5 MG/1
0.5 TABLET ORAL
Status: DISCONTINUED | OUTPATIENT
Start: 2020-07-04 | End: 2020-07-07

## 2020-07-04 RX ORDER — SODIUM CHLORIDE 0.9 % (FLUSH) 0.9 %
5-40 SYRINGE (ML) INJECTION EVERY 8 HOURS
Status: DISCONTINUED | OUTPATIENT
Start: 2020-07-04 | End: 2020-07-05 | Stop reason: SDUPTHER

## 2020-07-04 RX ORDER — ATORVASTATIN CALCIUM 10 MG/1
20 TABLET, FILM COATED ORAL
Status: DISCONTINUED | OUTPATIENT
Start: 2020-07-04 | End: 2020-07-11 | Stop reason: HOSPADM

## 2020-07-04 RX ORDER — SODIUM CHLORIDE 0.9 % (FLUSH) 0.9 %
5-40 SYRINGE (ML) INJECTION EVERY 8 HOURS
Status: DISCONTINUED | OUTPATIENT
Start: 2020-07-04 | End: 2020-07-11 | Stop reason: HOSPADM

## 2020-07-04 RX ORDER — SODIUM CHLORIDE 0.9 % (FLUSH) 0.9 %
5-40 SYRINGE (ML) INJECTION AS NEEDED
Status: DISCONTINUED | OUTPATIENT
Start: 2020-07-04 | End: 2020-07-11 | Stop reason: HOSPADM

## 2020-07-04 RX ADMIN — ATORVASTATIN CALCIUM 20 MG: 10 TABLET, FILM COATED ORAL at 21:22

## 2020-07-04 RX ADMIN — APIXABAN 2.5 MG: 2.5 TABLET, FILM COATED ORAL at 21:22

## 2020-07-04 RX ADMIN — Medication 100 MG: at 21:22

## 2020-07-04 RX ADMIN — FOLIC ACID 1 MG: 1 TABLET ORAL at 21:22

## 2020-07-04 RX ADMIN — SALINE NASAL SPRAY 2 SPRAY: 1.5 SOLUTION NASAL at 21:23

## 2020-07-04 RX ADMIN — Medication 10 ML: at 22:00

## 2020-07-04 NOTE — ED PROVIDER NOTES
EMERGENCY DEPARTMENT HISTORY AND PHYSICAL EXAM    Date: 7/4/2020  Patient Name: Destiney Wilils. History of Presenting Illness     Chief Complaint   Patient presents with    Fatigue         History Provided By: patient    Chief Complaint: unable to care for self frequent falls         HPI: Destiney Willis. is a 81 y.o. male with a PMH of Hypertension heart failure alcohol abuse who presents to the emergency department by ambulance. Patient was evaluated in this emergency department yesterday and discharged. Patient has had frequent falls at home, denies falling today. Patient states his house has been very hot .he states he lives in a house and his bedroom is upstairs. Patient states today he called the ambulance because he does not think he could care for himself any longer. Patient states he has not taken his medications today patient has not been compliant with medications. Patient lives alone. Patient has fracture of the fourth metacarpal which is subacute and scapholunate disassociation of the left wrist.  He has a history of alcohol abuse . She also has a history of atrial fibrillation and has been prescribed Eliquis. Unsure if patient is taking his medication   . Because there are no symptoms or complaints of pain, there is no reported quality, severity, modifying factors, or associated signs and symptoms reported. PCP: Gita Mitchell MD    Current Facility-Administered Medications   Medication Dose Route Frequency Provider Last Rate Last Dose    sodium chloride (NS) flush 5-40 mL  5-40 mL IntraVENous Q8H Lord Ceasar Cochran NP        sodium chloride (NS) flush 5-40 mL  5-40 mL IntraVENous PRN Jitendra Nicholson NP         Current Outpatient Medications   Medication Sig Dispense Refill    traMADol-acetaminophen (ULTRACET) 37.5-325 mg per tablet Take 1 Tab by mouth every eight (8) hours as needed for Pain.       simvastatin (ZOCOR) 40 mg tablet TAKE 1 TABLET BY MOUTH EVERYDAY AT BEDTIME 90 Tab 3    amLODIPine (NORVASC) 5 mg tablet TAKE 1 TABLET BY MOUTH DAILY      Klor-Con M20 20 mEq tablet TAKE 1 TABLET BY MOUTH EVERY DAY WITH FOOD      metoprolol succinate (TOPROL-XL) 25 mg XL tablet Take 0.5 Tabs by mouth nightly. 90 Tab 3    bumetanide (BUMEX) 1 mg tablet TAKE 1 TABLET BY MOUTH EVERY DAY 90 Tab 3    escitalopram oxalate (LEXAPRO) 10 mg tablet TAKE 1 TABLET BY MOUTH EVERY DAY 90 Tab 1    allopurinol (ZYLOPRIM) 100 mg tablet TAKE 1 TABLET BY MOUTH EVERY DAY 90 Tab 5    ELIQUIS 2.5 mg tablet TAKE 1 TABLET BY MOUTH TWICE A  Tab 3    acyclovir (ZOVIRAX) 400 mg tablet TAKE 1 TABLET BY MOUTH TWO TIMES PER DAY UNTIL SEEN  3    timolol (TIMOPTIC) 0.5 % ophthalmic solution INSTILL 1 DROP INTO RIGHT EYE EVERY MORNING  0    propafenone (RYTHMOL) 150 mg tablet Take 1 Tab by mouth two (2) times a day. 90 Tab 5    sodium chloride (OCEAN) 0.65 % nasal spray 2 Sprays by Both Nostrils route four (4) times daily. 45 mL 11       Past History     Past Medical History:  Past Medical History:   Diagnosis Date    Adverse effect of anesthesia     nasal procedure didn't work-patient awake    Alcohol abuse     Aortic regurgitation     Chronic kidney disease     CKD (chronic kidney disease), stage III ([facility] [address] 2011    Depression     Episodic lightheadedness 07/28/2017    Episodic lightheadedness     Epistaxis 11/27/2016    Erectile dysfunction     Fall 02/11/2020    GI bleed     Gout     gout, osteoarthritis    Heart failure (HCC)     acute heart failure    Hemorrhoids     History of cardioversion 02/17/2017    Hypertension     Ill-defined condition     weakness    Multiple myeloma (HCC)     PAF (paroxysmal atrial fibrillation) ([address] 02/17/2016    eliquis    Pulmonary HTN ([facility] [address]     Right-sided epistaxis 11/27/2016    S/P AVR (aortic valve replacement) 07/26/2016    merrill gill md -     S/P cardiac cath 7/6/16    severe ai, normal coronaries     S/P colonoscopy 10-8-04    S/P colonoscopy with polypectomy 2016    Kar Piper MD    Skin lesion        Past Surgical History:  Past Surgical History:   Procedure Laterality Date    CARDIAC SURG PROCEDURE UNLIST      cardiac cath   Carmelo Morris CARDIAC SURG PROCEDURE UNLIST  2016    AVR    HX HEENT      nasal procedure 20 years ago   Carmelo Morris HX KNEE REPLACEMENT Bilateral     bilateral knee replacement    HX ORTHOPAEDIC Bilateral     wrist       Family History:  Family History   Problem Relation Age of Onset    Anemia Mother     No Known Problems Father     Other Brother         drug abuse       Social History:  Social History     Tobacco Use    Smoking status: Former Smoker     Years: 4.00     Last attempt to quit: 1960     Years since quittin.5    Smokeless tobacco: Never Used   Substance Use Topics    Alcohol use: Yes     Comment: ocassional    Drug use: No       Allergies: Allergies   Allergen Reactions    Amiodarone Other (comments)     Bradycardia -excessive         Review of Systems   Review of Systems   Constitutional: Positive for appetite change and fatigue. Negative for chills and fever. HENT: Negative for congestion. Respiratory: Negative for cough. Cardiovascular: Negative for chest pain. Gastrointestinal: Negative for abdominal pain. Genitourinary: Negative for dysuria. Musculoskeletal: Positive for arthralgias. Negative for neck pain and neck stiffness. Skin: Negative for rash. Neurological: Positive for weakness. Negative for dizziness, light-headedness, numbness and headaches. All other systems reviewed and are negative. Physical Exam     Vitals:    20 1417 20 1500   BP: 114/75 (!) 141/97   Pulse: (!) 58 (!) 54   Resp: 18 22   Temp: 98.5 °F (36.9 °C)    SpO2: 100% 100%   Weight: 65.8 kg (145 lb)    Height: 5' 8\" (1.727 m)      Physical Exam  Vitals signs and nursing note reviewed. Constitutional:       Appearance: Normal appearance.  He is well-developed and normal weight. HENT:      Head: Normocephalic and atraumatic. Right Ear: External ear normal.   Eyes:      General:         Right eye: No discharge. Left eye: No discharge. Conjunctiva/sclera: Conjunctivae normal.   Neck:      Musculoskeletal: Normal range of motion and neck supple. Cardiovascular:      Rate and Rhythm: Normal rate. Heart sounds: Normal heart sounds. Comments: Atrial flutter rate 57  Pulmonary:      Effort: Pulmonary effort is normal. No respiratory distress. Breath sounds: Normal breath sounds. No wheezing. Abdominal:      General: Bowel sounds are normal.      Palpations: Abdomen is soft. Tenderness: There is no abdominal tenderness. Musculoskeletal: Normal range of motion. Comments: Significant swelling of left hand and wrist with weak grasp   Lymphadenopathy:      Cervical: No cervical adenopathy. Skin:     General: Skin is warm and dry. Neurological:      Mental Status: He is alert. Cranial Nerves: No cranial nerve deficit. Motor: Weakness (left grasp weak) present. Comments: Patient is disoriented to day he does know the year. Speech is clear comprehensible   Psychiatric:         Behavior: Behavior normal.         Thought Content:  Thought content normal.           Diagnostic Study Results     Labs -     Recent Results (from the past 12 hour(s))   CBC WITH AUTOMATED DIFF    Collection Time: 07/04/20  2:31 PM   Result Value Ref Range    WBC 4.4 4.1 - 11.1 K/uL    RBC 3.15 (L) 4.10 - 5.70 M/uL    HGB 10.6 (L) 12.1 - 17.0 g/dL    HCT 31.4 (L) 36.6 - 50.3 %    MCV 99.7 (H) 80.0 - 99.0 FL    MCH 33.7 26.0 - 34.0 PG    MCHC 33.8 30.0 - 36.5 g/dL    RDW 14.1 11.5 - 14.5 %    PLATELET 357 839 - 184 K/uL    MPV 9.7 8.9 - 12.9 FL    NRBC 0.0 0  WBC    ABSOLUTE NRBC 0.00 0.00 - 0.01 K/uL    NEUTROPHILS 74 32 - 75 %    LYMPHOCYTES 17 12 - 49 %    MONOCYTES 7 5 - 13 %    EOSINOPHILS 2 0 - 7 %    BASOPHILS 0 0 - 1 %    IMMATURE GRANULOCYTES 0 0.0 - 0.5 %    ABS. NEUTROPHILS 3.3 1.8 - 8.0 K/UL    ABS. LYMPHOCYTES 0.7 (L) 0.8 - 3.5 K/UL    ABS. MONOCYTES 0.3 0.0 - 1.0 K/UL    ABS. EOSINOPHILS 0.1 0.0 - 0.4 K/UL    ABS. BASOPHILS 0.0 0.0 - 0.1 K/UL    ABS. IMM. GRANS. 0.0 0.00 - 0.04 K/UL    DF SMEAR SCANNED      RBC COMMENTS NORMOCYTIC, NORMOCHROMIC     METABOLIC PANEL, COMPREHENSIVE    Collection Time: 07/04/20  2:31 PM   Result Value Ref Range    Sodium 138 136 - 145 mmol/L    Potassium 3.2 (L) 3.5 - 5.1 mmol/L    Chloride 101 97 - 108 mmol/L    CO2 26 21 - 32 mmol/L    Anion gap 11 5 - 15 mmol/L    Glucose 73 65 - 100 mg/dL    BUN 24 (H) 6 - 20 MG/DL    Creatinine 1.72 (H) 0.70 - 1.30 MG/DL    BUN/Creatinine ratio 14 12 - 20      GFR est AA 46 (L) >60 ml/min/1.73m2    GFR est non-AA 38 (L) >60 ml/min/1.73m2    Calcium 8.2 (L) 8.5 - 10.1 MG/DL    Bilirubin, total 0.5 0.2 - 1.0 MG/DL    ALT (SGPT) 16 12 - 78 U/L    AST (SGOT) 23 15 - 37 U/L    Alk.  phosphatase 70 45 - 117 U/L    Protein, total 7.2 6.4 - 8.2 g/dL    Albumin 2.3 (L) 3.5 - 5.0 g/dL    Globulin 4.9 (H) 2.0 - 4.0 g/dL    A-G Ratio 0.5 (L) 1.1 - 2.2     ETHYL ALCOHOL    Collection Time: 07/04/20  2:31 PM   Result Value Ref Range    ALCOHOL(ETHYL),SERUM 29 (H) <10 MG/DL   NT-PRO BNP    Collection Time: 07/04/20  2:31 PM   Result Value Ref Range    NT pro-BNP 3,755 (H) 0 - 450 PG/ML   EKG, 12 LEAD, INITIAL    Collection Time: 07/04/20  2:34 PM   Result Value Ref Range    Ventricular Rate 57 BPM    Atrial Rate 205 BPM    QRS Duration 90 ms    Q-T Interval 480 ms    QTC Calculation (Bezet) 467 ms    Calculated P Axis -88 degrees    Calculated R Axis 111 degrees    Calculated T Axis 98 degrees    Diagnosis       Atrial flutter with variable AV block with premature ventricular or   aberrantly conducted complexes  Left posterior fascicular block  Possible Anterior infarct (cited on or before 14-SEP-2019)  Abnormal ECG  When compared with ECG of 29-JUN-2020 23:17,  No significant change was found     SARS-COV-2    Collection Time: 07/04/20  3:57 PM   Result Value Ref Range    Specimen source Nasopharyngeal      SARS-CoV-2 PENDING     SARS-CoV-2 PENDING     Specimen source Nasopharyngeal      COVID-19 rapid test PENDING     COVID-19 PENDING NEG       Radiologic Studies -   No orders to display     CT Results  (Last 48 hours)               07/03/20 1251  CT HEAD WO CONT Final result    Impression:  IMPRESSION:    No acute intracranial process is identified. Narrative:  EXAM: CT HEAD WO CONT       INDICATION: multiple falls       COMPARISON: 6/29/2020. CONTRAST: None. TECHNIQUE: Unenhanced CT of the head was performed using 5 mm images. Brain and   bone windows were generated. Coronal and sagittal reformats. CT dose reduction   was achieved through use of a standardized protocol tailored for this   examination and automatic exposure control for dose modulation. FINDINGS:   There is mild prominence of the ventricles and sulci. . There are mild changes   small vessel disease periventricular white matter. . There is no intracranial   hemorrhage, extra-axial collection, or mass effect. The basilar cisterns are   open. No CT evidence of acute infarct. The bone windows demonstrate no abnormalities. The visualized portions of the   paranasal sinuses and mastoid air cells are clear. CXR Results  (Last 48 hours)               07/03/20 1226  XR CHEST PORT Final result    Impression:  IMPRESSION: No acute abnormality is identified. Narrative:  EXAM:  XR CHEST PORT       INDICATION:  weakness falls       COMPARISON:  2019       FINDINGS: A portable AP radiograph of the chest was obtained at 1203 hours. .    Lungs are clear of an acute process. Jesus Manuel Wes Heart size is slightly enlarged and   stable. Aorta is ectatic. Mediastinal contours unchanged. Patient is status   post median sternotomy.                     Medical Decision Making   I am the first provider for this patient. I reviewed the vital signs, available nursing notes, past medical history, past surgical history, family history and social history. Vital Signs-Reviewed the patient's vital signs. Records Reviewed: Nursing Notes, Old Medical Records, Previous Radiology Studies and Previous Laboratory Studies    ED Course as of Jul 04 1627   Sat Jul 04, 2020   1528 Discussed available test results with velvet case management. Will call hospitalist for admission    [AN]      ED Course User Index  [AN] [facility], NP        0852  Patient is being admitted to the hospital.  The results of their tests and reasons for their admission have been discussed with them and/or available family. They convey agreement and understanding for the need to be admitted and for their admission diagnosis. Consultation has been made with the inpatient physician specialist for hospitalization. LABORATORY TESTS:  Recent Results (from the past 12 hour(s))   CBC WITH AUTOMATED DIFF    Collection Time: 07/04/20  2:31 PM   Result Value Ref Range    WBC 4.4 4.1 - 11.1 K/uL    RBC 3.15 (L) 4.10 - 5.70 M/uL    HGB 10.6 (L) 12.1 - 17.0 g/dL    HCT 31.4 (L) 36.6 - 50.3 %    MCV 99.7 (H) 80.0 - 99.0 FL    MCH 33.7 26.0 - 34.0 PG    MCHC 33.8 30.0 - 36.5 g/dL    RDW 14.1 11.5 - 14.5 %    PLATELET 467 478 - 334 K/uL    MPV 9.7 8.9 - 12.9 FL    NRBC 0.0 0  WBC    ABSOLUTE NRBC 0.00 0.00 - 0.01 K/uL    NEUTROPHILS 74 32 - 75 %    LYMPHOCYTES 17 12 - 49 %    MONOCYTES 7 5 - 13 %    EOSINOPHILS 2 0 - 7 %    BASOPHILS 0 0 - 1 %    IMMATURE GRANULOCYTES 0 0.0 - 0.5 %    ABS. NEUTROPHILS 3.3 1.8 - 8.0 K/UL    ABS. LYMPHOCYTES 0.7 (L) 0.8 - 3.5 K/UL    ABS. MONOCYTES 0.3 0.0 - 1.0 K/UL    ABS. EOSINOPHILS 0.1 0.0 - 0.4 K/UL    ABS. BASOPHILS 0.0 0.0 - 0.1 K/UL    ABS. IMM.  GRANS. 0.0 0.00 - 0.04 K/UL    DF SMEAR SCANNED      RBC COMMENTS NORMOCYTIC, NORMOCHROMIC     METABOLIC PANEL, COMPREHENSIVE    Collection Time: 07/04/20  2:31 PM   Result Value Ref Range    Sodium 138 136 - 145 mmol/L    Potassium 3.2 (L) 3.5 - 5.1 mmol/L    Chloride 101 97 - 108 mmol/L    CO2 26 21 - 32 mmol/L    Anion gap 11 5 - 15 mmol/L    Glucose 73 65 - 100 mg/dL    BUN 24 (H) 6 - 20 MG/DL    Creatinine 1.72 (H) 0.70 - 1.30 MG/DL    BUN/Creatinine ratio 14 12 - 20      GFR est AA 46 (L) >60 ml/min/1.73m2    GFR est non-AA 38 (L) >60 ml/min/1.73m2    Calcium 8.2 (L) 8.5 - 10.1 MG/DL    Bilirubin, total 0.5 0.2 - 1.0 MG/DL    ALT (SGPT) 16 12 - 78 U/L    AST (SGOT) 23 15 - 37 U/L    Alk. phosphatase 70 45 - 117 U/L    Protein, total 7.2 6.4 - 8.2 g/dL    Albumin 2.3 (L) 3.5 - 5.0 g/dL    Globulin 4.9 (H) 2.0 - 4.0 g/dL    A-G Ratio 0.5 (L) 1.1 - 2.2     ETHYL ALCOHOL    Collection Time: 07/04/20  2:31 PM   Result Value Ref Range    ALCOHOL(ETHYL),SERUM 29 (H) <10 MG/DL   NT-PRO BNP    Collection Time: 07/04/20  2:31 PM   Result Value Ref Range    NT pro-BNP 3,755 (H) 0 - 450 PG/ML   EKG, 12 LEAD, INITIAL    Collection Time: 07/04/20  2:34 PM   Result Value Ref Range    Ventricular Rate 57 BPM    Atrial Rate 205 BPM    QRS Duration 90 ms    Q-T Interval 480 ms    QTC Calculation (Bezet) 467 ms    Calculated P Axis -88 degrees    Calculated R Axis 111 degrees    Calculated T Axis 98 degrees    Diagnosis       Atrial flutter with variable AV block with premature ventricular or   aberrantly conducted complexes  Left posterior fascicular block  Possible Anterior infarct (cited on or before 14-SEP-2019)  Abnormal ECG  When compared with ECG of 29-JUN-2020 23:17,  No significant change was found     SARS-COV-2    Collection Time: 07/04/20  3:57 PM   Result Value Ref Range    Specimen source Nasopharyngeal      SARS-CoV-2 PENDING     SARS-CoV-2 PENDING     Specimen source Nasopharyngeal      COVID-19 rapid test PENDING     COVID-19 PENDING NEG       IMAGING RESULTS:  No orders to display     No results found.     MEDICATIONS GIVEN:  Medications   sodium chloride (NS) flush 5-40 mL (has no administration in time range)   sodium chloride (NS) flush 5-40 mL (has no administration in time range)       IMPRESSION:  1. Frequent falls    2. Non compliance w medication regimen    3. Failure to thrive (0-17)    4. Hypokalemia        PLAN:  1. Admit to Dr Ailyn Khan              Current Discharge Medication List          Provider Notes (Medical Decision Making):   DDX atrial flutter failure to thrive dehydration hypokalemia frequent falls  Procedures:  Procedures    Please note that this dictation was completed with Dragon, computer voice recognition software. Quite often unanticipated grammatical, syntax, homophones, and other interpretive errors are inadvertently transcribed by the computer software. Please disregard these errors. Additionally, please excuse any errors that have escaped final proofreading. Diagnosis     Clinical Impression:   1. Frequent falls    2. Non compliance w medication regimen    3. Failure to thrive (0-17)    4.  Hypokalemia

## 2020-07-04 NOTE — PROGRESS NOTES
Transition of Care    Received Call from the ER from nursing patient is on his way to the hospital.     Patient was just in the ER yesterday. Called from NP regarding patient's work-up - recommend to talk to the hospitalist.  Asked for COVID-19 test to be done. This will be needed for Placement. Also a Chest Xray. Patient has failed outpatient treatment. He has failed  8 ER visits in the past  6 months. Talked to Dr. Munir Amos and she is admitting patient for additional work-up. Please see Jero Alex CM note from yesterday. Patient said he is now in agreement to accept placement. The son- learned today from son - patient is his step-father- he raised him. This is the only father he knows. Wants help out as much as possible if patient will allow him. He indicates his mother and step-father are legally . Asked if there is any other family members locally that patient know- he indicates no- there is a cousin -no information on this cousin. Son indicates  His father has a friend no contact information on her. He is willing to assist if patient is willing. He is leaving town Sunday but will be back in town on Wednesday. This is his routine since he also helps his mother out. He indicates the home is in very poor condition- indicates patient is a hoarder. Needs to verify patient's income. He should have gotten his Checks for the month or by Monday. Needs to find out if he is receiving an income from the reverse mortgage from the home. Due to Holiday weekend and being holiday - these facilities are closed. To ask MD for PT, OT consults to assess functional needs. He has Medicare Advantage plan if looking at SNF short-term will need to obtain Authorization. If Assisted Living and his income over the Medicaid Rate will need to have funds to pay privately. Jero Alex will follow-up.      [facility] DRE RN   [phone]

## 2020-07-04 NOTE — ED NOTES
TRANSFER - OUT REPORT:    Verbal report given to MIRLANDE Gonsalez(name) on [employer].  being transferred to medical(unit) for routine progression of care       Report consisted of patients Situation, Background, Assessment and   Recommendations(SBAR). Information from the following report(s) SBAR, MAR and Cardiac Rhythm Afib, Aflutter was reviewed with the receiving nurse. Lines:   Peripheral IV 07/04/20 Left Antecubital (Active)   Site Assessment Clean, dry, & intact 7/4/2020  2:49 PM   Phlebitis Assessment 0 7/4/2020  2:49 PM   Infiltration Assessment 0 7/4/2020  2:49 PM   Dressing Status Clean, dry, & intact 7/4/2020  2:49 PM   Dressing Type Transparent 7/4/2020  2:49 PM   Hub Color/Line Status Pink;Flushed 7/4/2020  2:49 PM   Action Taken Blood drawn 7/4/2020  2:49 PM   Alcohol Cap Used No 7/4/2020  2:49 PM        Opportunity for questions and clarification was provided.       Patient transported with:   Registered Nurse

## 2020-07-04 NOTE — ED NOTES
Pt presents to ED ambulatory complaining of fatigue. Pt has had several visits in the past month. Pt reports frequent \"dizzy spells lately\". Pt is unsteady when ambulating. Pt states he has not taken his medication in days because he doesn't think he takes them right. Pt also states he didn't eat today, because he feels weak. Pt states he frequently forgets to take his medicines as well. Pt is alert to person, place and unaware of the year. Assessment completed and pt updated on plan of care. Call bell in reach. Emergency Department Nursing Plan of Care       The Nursing Plan of Care is developed from the Nursing assessment and Emergency Department Attending provider initial evaluation. The plan of care may be reviewed in the ED Provider note.     The Plan of Care was developed with the following considerations:   Patient / Family readiness to learn indicated by:verbalized understanding  Persons(s) to be included in education: patient  Barriers to Learning/Limitations:No    Signed     Master Durand RN    7/4/2020   2:49 PM

## 2020-07-04 NOTE — ED NOTES
Pt eating in his room. This writer had to help patient open his drink, applesauce container and utensils. He stated its hard to open anything since his fx of left arm.

## 2020-07-04 NOTE — ED NOTES
Pt states he has been recently losing weight because he no longer cooks for himself. He states he just gets \"dizzy\". Pt states he only eats if he orders food, but he said sometimes he forgets to do that. Per ems pts house is in [address] [facility] Laya ORNELAS was contacted yesterday by Case Management during pts ER visit at that time.

## 2020-07-04 NOTE — ED NOTES
Nursing supervisor aware of admission decision for patient and stated she would call back to the ER when there was a bed assignment for the patient.

## 2020-07-04 NOTE — ED NOTES
After obtaining permission from patient, son, Seth Whitmore [phone], was contacted and advised that his father had returned today and was going to be admitted today. Stated that he had already spoken to two persons from facility today and was aware but was appreciative of the call.

## 2020-07-04 NOTE — H&P
Hospitalist Admission Note    NAME: Navneet Agrawal. :  1935   MRN:  [id number]   Room Number: 407/43  @ [facility]     Date/Time:  2020 7:24 PM    Patient PCP: Arlinda Cabot, MD  ______________________________________________________________________  Given the patient's current clinical presentation, I have a high level of concern for decompensation if discharged from the emergency department. Complex decision making was performed, which includes reviewing the patient's available past medical records, laboratory results, and x-ray films. My assessment of this patient's clinical condition and my plan of care is as follows. Assessment / Plan: Active Problems:    Falls frequently (2020)      Frequent falls POA  Due to physical deconditioning, alcohol intoxication. Could have underlying dementia.       - PT/OT consult  - CM consulted for placement. Alcohol abuse with intoxication POA:   ETOH level elevated 29. Admits to drinking beers daily. - Thiamine, folic acid. - CIWA monitoring, oral ativan if needed. Use judiciously. Chronic Diastolic Heart failure,not in exacerbation, EF 60%   S/p Aortic valve replacement  Paroxysmal atrial fibrillation   Slightly hypovolemic. Pro BNP elevated. - Resume home meds  - Will confirm with pharmacy if he is truly still on propafenone. CKD 3 POA  Likely due hypertension. Cr at baseline.     - Strict Is and Os, renally dose meds, avoid nephrotoxic medications. Normochromic normocytic anemia POA   H/H at baseline. Hx of hemorrhoids. Hx of colon polyps. - anemia work up. Hypokalemia POA  - replete orally. Fracture of hand, chronic POA   Hx of frequent falls, Fracture of left 4th metacarpal and scapholunate noted on imaging.  Patient has persistent swelling and inabilty to use extremity.     - OT consult  - Warm compresses  - Acetaminophen PRN    Body mass index is 22.12 kg/m².   At nutritional risk     Risk Factors: Hx Etoh abuse; CKD; Depression; HF; MM; HTN. ... C/o Falls     Clinical Indicators: Albumin: 2.3.... Giacomo Rein Giacomo Rein BMI: 22.12. ... Giacomo Rein Giacomo Rein physical deconditioning      Treatment: RD consult; Meals/Snacks: General/healthful diet:  Continue diet as tolerated; Supplements: Commercial supplement; RD will order ensure shakes po BID with meals.         Code Status: full   Surrogate Decision Maker:  SonParveen [phone]    DVT Prophylaxis: apixaban  GI Prophylaxis: not indicated  Baseline: ambulates         Subjective:   CHIEF COMPLAINT: falls     HISTORY OF PRESENT ILLNESS:     Mendel Kettle is a 81 y.o.male with PMH of CHF, alcohol abuse, paroxysmal AF, CKD3  who presents to ED with c/o falls. Patient has had frequent falls at home, fractured his hand and wrist 1 month ago. He called EMS today because he felt that he cannot care for himself anymore and wished to seek help. OF note he has had multiple ED presentations, 8 in the past month at [facility]. He has [facility] who have noted his house to be in a state of neglect and disorder, patient sleeping on the couch with rat feces around him. Unclear compliance with medication. Hx of alcohol abuse. Patient's step son who lives in   [place] and has tried to help him but patient continues to be resilient to accepting help. Patient is  from his wife. No hx of drug abuse. Patient is AO x 3 not to situation. . C/O left arm pain and swelling. We were asked to admit for work up and evaluation of the above problems.      Past Medical History:   Diagnosis Date    Adverse effect of anesthesia     nasal procedure didn't work-patient awake    Alcohol abuse     Aortic regurgitation     Chronic kidney disease     CKD (chronic [facility] disease), stage III ([facility] 2011    Depression     Episodic lightheadedness 07/28/2017    Episodic lightheadedness     Epistaxis 11/27/2016    Erectile dysfunction     Fall 02/11/2020    GI bleed     Gout     gout, osteoarthritis    Heart failure (HCC)     acute heart failure    Hemorrhoids     History of cardioversion 2017    Hypertension     Ill-defined condition     weakness    Multiple myeloma (HCC)     PAF [facility]oxysmal [facility]) [facility] 2016    eliquis    Pulmonary HTN ([facility]     Right-sided epistaxis 2016    S/P AVR (aortic valve replacement) 2016    merrill gill md -     S/P cardiac cath 16    severe ai, normal coronaries     S/P colonoscopy 10-8-04    S/P colonoscopy with polypectomy 2016    Kar Piper MD    Skin lesion         Past Surgical History:   Procedure Laterality Date    CARDIAC SURG PROCEDURE UNLIST      cardiac cath   [facility] CARDIAC SURG PROCEDURE UNLIST  2016    AVR    HX HEENT      nasal procedure 20 years ago    HX KNEE REPLACEMENT Bilateral     bilateral knee replacement    HX ORTHOPAEDIC Bilateral     wrist       Social History     Tobacco Use    Smoking status: Former Smoker     Years: 4.00     Last attempt to quit: 1960     Years since quittin.5    Smokeless tobacco: Never Used   Substance Use Topics    Alcohol use: Yes     Comment: ocassional        Family History   Problem Relation Age of Onset    Anemia Mother     No Known Problems Father     Other Brother         drug abuse     Allergies   Allergen Reactions    Amiodarone Other (comments)     Bradycardia -excessive        Prior to Admission medications    Medication Sig Start Date End Date Taking? Authorizing Provider   traMADol-acetaminophen (ULTRACET) 37.5-325 mg per tablet Take 1 Tab by mouth every eight (8) hours as needed for Pain.     Other, MD Saniya   simvastatin (ZOCOR) 40 mg tablet TAKE 1 TABLET BY MOUTH EVERYDAY AT BEDTIME 20   Randell Adorno MD   amLODIPine (NORVASC) 5 mg tablet TAKE 1 TABLET BY MOUTH DAILY 3/12/20   Other, Phys, MD   Klor-Con M20 20 mEq tablet TAKE 1 TABLET BY MOUTH EVERY DAY WITH FOOD 20   Other, MD Saniya   metoprolol succinate (TOPROL-XL) 25 mg XL tablet Take 0.5 Tabs by mouth nightly. 5/13/20   Kavitha Garcia MD   bumetanide (BUMEX) 1 mg tablet TAKE 1 TABLET BY MOUTH EVERY DAY 5/13/20   Rc Singh MD   escitalopram oxalate (LEXAPRO) 10 mg tablet TAKE 1 TABLET BY MOUTH EVERY DAY 2/12/20   Kavitha Garcia MD   allopurinol (ZYLOPRIM) 100 mg tablet TAKE 1 TABLET BY MOUTH EVERY DAY 12/22/19   Rc Singh MD   ELIQUIS 2.5 mg tablet TAKE 1 TABLET BY MOUTH TWICE A DAY 9/29/19   Margie Mcguire MD   acyclovir (ZOVIRAX) 400 mg tablet TAKE 1 TABLET BY MOUTH TWO TIMES PER DAY UNTIL SEEN 5/2/19   Provider, Historical   timolol (TIMOPTIC) 0.5 % ophthalmic solution INSTILL 1 DROP INTO RIGHT EYE EVERY MORNING 2/14/19   Provider, Historical   propafenone (RYTHMOL) 150 mg tablet Take 1 Tab by mouth two (2) times a day. 7/28/17   Rc Singh MD   sodium chloride (OCEAN) 0.65 % nasal spray 2 Sprays by Both Nostrils route four (4) times daily. 11/28/16   Kavitha Garcia MD       REVIEW OF SYSTEMS:     I am not able to complete the review of systems because:    The patient is intubated and sedated    The patient has altered mental status due to his acute medical problems    The patient has baseline aphasia from prior stroke(s)    The patient has baseline dementia and is not reliable historian    The patient is in acute medical distress and unable to provide information           Total of 12 systems reviewed as follows:       POSITIVE= underlined text  Negative = text not underlined  General:  fever, chills, sweats, generalized weakness, weight loss/gain,      loss of appetite   Eyes:    blurred vision, eye pain, loss of vision, double vision  ENT:    rhinorrhea, pharyngitis   Respiratory:   cough, sputum production, SOB, TENA, wheezing, pleuritic pain   Cardiology:   chest pain, palpitations, orthopnea, PND, edema, syncope   Gastrointestinal:  abdominal pain , N/V, diarrhea, dysphagia, constipation, bleeding   Genitourinary:  frequency, urgency, dysuria, hematuria, incontinence   Muskuloskeletal :  arthralgia, myalgia, back pain  Hematology:  easy bruising, nose or gum bleeding, lymphadenopathy   Dermatological: rash, ulceration, pruritis, color change / jaundice  Endocrine:   hot flashes or polydipsia   Neurological:  headache, dizziness, confusion, focal weakness, paresthesia,     Speech difficulties, memory loss, gait difficulty  Psychological: Feelings of anxiety, depression, agitation    Objective:   VITALS:    Visit Vitals  /72 (BP 1 Location: Right arm, BP Patient Position: At rest)   Pulse (!) 56   Temp 98.7 °F (37.1 °C)   Resp 20   Ht 5' 8\" (1.727 m)   Wt 66 kg (145 lb 8.1 oz)   SpO2 97%   BMI 22.12 kg/m²       PHYSICAL EXAM:    General:    Alert, cooperative, no distress, appears stated age. HEENT: Atraumatic, anicteric sclerae, pink conjunctivae     No oral ulcers, mucosa moist, throat clear, dentition fair  Neck:  Supple, symmetrical,  thyroid: non tender  Lungs:   Clear to auscultation bilaterally. No Wheezing or Rhonchi. No rales. Chest wall:  No tenderness  No Accessory muscle use. Heart:   Regular  rhythm,  No  murmur   No edema  Abdomen:   Soft, non-tender. Not distended. Bowel sounds normal  Extremities: Edema of left hand and wrist. No cyanosis. No clubbing,      Skin turgor normal, Capillary refill normal, Radial dial pulse 2+  Skin:     Not pale. Not Jaundiced  No rashes   Psych:  Good insight. Not depressed. Not anxious or agitated. Neurologic: EOMs intact. No facial asymmetry. No aphasia or slurred speech. Symmetrical strength, Sensation grossly intact.  Alert and oriented X 4.     ______________________________________________________________________    Care Plan discussed with:  Patient/Family, Nurse and     Expected  Disposition:  SNF/LTC  ________________________________________________________________________  TOTAL TIME:  40 Minutes    Critical Care Provided     Minutes non procedure based      Comments     Reviewed previous records   >50% of visit spent in counseling and coordination of care  Discussion with patient and/or family and questions answered       ________________________________________________________________________  Signed: Mook Hay MD    Procedures: see electronic medical records for all procedures/Xrays and details which were not copied into this note but were reviewed prior to creation of Plan. LAB DATA REVIEWED:    Recent Results (from the past 24 hour(s))   CBC WITH AUTOMATED DIFF    Collection Time: 07/04/20  2:31 PM   Result Value Ref Range    WBC 4.4 4.1 - 11.1 K/uL    RBC 3.15 (L) 4.10 - 5.70 M/uL    HGB 10.6 (L) 12.1 - 17.0 g/dL    HCT 31.4 (L) 36.6 - 50.3 %    MCV 99.7 (H) 80.0 - 99.0 FL    MCH 33.7 26.0 - 34.0 PG    MCHC 33.8 30.0 - 36.5 g/dL    RDW 14.1 11.5 - 14.5 %    PLATELET 524 420 - 059 K/uL    MPV 9.7 8.9 - 12.9 FL    NRBC 0.0 0  WBC    ABSOLUTE NRBC 0.00 0.00 - 0.01 K/uL    NEUTROPHILS 74 32 - 75 %    LYMPHOCYTES 17 12 - 49 %    MONOCYTES 7 5 - 13 %    EOSINOPHILS 2 0 - 7 %    BASOPHILS 0 0 - 1 %    IMMATURE GRANULOCYTES 0 0.0 - 0.5 %    ABS. NEUTROPHILS 3.3 1.8 - 8.0 K/UL    ABS. LYMPHOCYTES 0.7 (L) 0.8 - 3.5 K/UL    ABS. MONOCYTES 0.3 0.0 - 1.0 K/UL    ABS. EOSINOPHILS 0.1 0.0 - 0.4 K/UL    ABS. BASOPHILS 0.0 0.0 - 0.1 K/UL    ABS. IMM.  GRANS. 0.0 0.00 - 0.04 K/UL    DF SMEAR SCANNED      RBC COMMENTS NORMOCYTIC, NORMOCHROMIC     METABOLIC PANEL, COMPREHENSIVE    Collection Time: 07/04/20  2:31 PM   Result Value Ref Range    Sodium 138 136 - 145 mmol/L    Potassium 3.2 (L) 3.5 - 5.1 mmol/L    Chloride 101 97 - 108 mmol/L    CO2 26 21 - 32 mmol/L    Anion gap 11 5 - 15 mmol/L    Glucose 73 65 - 100 mg/dL    BUN 24 (H) 6 - 20 MG/DL    Creatinine 1.72 (H) 0.70 - 1.30 MG/DL    BUN/Creatinine ratio 14 12 - 20      GFR est AA 46 (L) >60 ml/min/1.73m2    GFR est non-AA 38 (L) >60 ml/min/1.73m2    Calcium 8.2 (L) 8.5 - 10.1 MG/DL    Bilirubin, total 0.5 0.2 - 1.0 MG/DL    ALT (SGPT) 16 12 - 78 U/L    AST (SGOT) 23 15 - 37 U/L    Alk.  phosphatase 70 45 - 117 U/L    Protein, total 7.2 6.4 - 8.2 g/dL    Albumin 2.3 (L) 3.5 - 5.0 g/dL    Globulin 4.9 (H) 2.0 - 4.0 g/dL    A-G Ratio 0.5 (L) 1.1 - 2.2     ETHYL ALCOHOL    Collection Time: 07/04/20  2:31 PM   Result Value Ref Range    ALCOHOL(ETHYL),SERUM 29 (H) <10 MG/DL   NT-PRO BNP    Collection Time: 07/04/20  2:31 PM   Result Value Ref Range    NT pro-BNP 3,755 (H) 0 - 450 PG/ML   EKG, 12 LEAD, INITIAL    Collection Time: 07/04/20  2:34 PM   Result Value Ref Range    Ventricular Rate 57 BPM    Atrial Rate 205 BPM    QRS Duration 90 ms    Q-T Interval 480 ms    QTC Calculation (Bezet) 467 ms    Calculated P Axis -88 degrees    Calculated R Axis 111 degrees    Calculated T Axis 98 degrees    Diagnosis       Atrial flutter with variable AV block with premature ventricular or   aberrantly conducted complexes  Left posterior fascicular block  Possible Anterior infarct (cited on or before 14-SEP-2019)  Abnormal ECG  When compared with ECG of 29-JUN-2020 23:17,  No significant change was found     SARS-COV-2    Collection Time: 07/04/20  3:57 PM   Result Value Ref Range    Specimen source Nasopharyngeal      SARS-CoV-2 PENDING     Specimen source Nasopharyngeal

## 2020-07-05 LAB
SARS-COV-2, COV2: NOT DETECTED
SOURCE, COVRS: NORMAL
SPECIMEN SOURCE, FCOV2M: NORMAL

## 2020-07-05 PROCEDURE — 74011250637 HC RX REV CODE- 250/637: Performed by: STUDENT IN AN ORGANIZED HEALTH CARE EDUCATION/TRAINING PROGRAM

## 2020-07-05 PROCEDURE — 99218 HC RM OBSERVATION: CPT

## 2020-07-05 RX ORDER — LANOLIN ALCOHOL/MO/W.PET/CERES
1 CREAM (GRAM) TOPICAL
Status: DISCONTINUED | OUTPATIENT
Start: 2020-07-05 | End: 2020-07-11 | Stop reason: HOSPADM

## 2020-07-05 RX ORDER — METOPROLOL SUCCINATE 25 MG/1
12.5 TABLET, EXTENDED RELEASE ORAL DAILY
Status: DISCONTINUED | OUTPATIENT
Start: 2020-07-05 | End: 2020-07-08

## 2020-07-05 RX ORDER — ACETAMINOPHEN 500 MG
1000 TABLET ORAL 3 TIMES DAILY
Status: DISCONTINUED | OUTPATIENT
Start: 2020-07-05 | End: 2020-07-11 | Stop reason: HOSPADM

## 2020-07-05 RX ORDER — MECLIZINE HYDROCHLORIDE 25 MG/1
25 TABLET ORAL
COMMUNITY
End: 2020-09-18 | Stop reason: SDUPTHER

## 2020-07-05 RX ADMIN — ESCITALOPRAM OXALATE 10 MG: 10 TABLET ORAL at 09:28

## 2020-07-05 RX ADMIN — ATORVASTATIN CALCIUM 20 MG: 10 TABLET, FILM COATED ORAL at 21:47

## 2020-07-05 RX ADMIN — BUMETANIDE 1 MG: 1 TABLET ORAL at 09:28

## 2020-07-05 RX ADMIN — Medication 10 ML: at 13:02

## 2020-07-05 RX ADMIN — ACETAMINOPHEN 1000 MG: 500 TABLET, FILM COATED ORAL at 21:47

## 2020-07-05 RX ADMIN — ACETAMINOPHEN 1000 MG: 500 TABLET, FILM COATED ORAL at 12:59

## 2020-07-05 RX ADMIN — AMLODIPINE BESYLATE 5 MG: 5 TABLET ORAL at 09:28

## 2020-07-05 RX ADMIN — SALINE NASAL SPRAY 2 SPRAY: 1.5 SOLUTION NASAL at 21:47

## 2020-07-05 RX ADMIN — ACETAMINOPHEN 1000 MG: 500 TABLET, FILM COATED ORAL at 15:28

## 2020-07-05 RX ADMIN — Medication 10 ML: at 21:47

## 2020-07-05 RX ADMIN — APIXABAN 2.5 MG: 2.5 TABLET, FILM COATED ORAL at 17:51

## 2020-07-05 RX ADMIN — FERROUS SULFATE TAB 325 MG (65 MG ELEMENTAL FE) 325 MG: 325 (65 FE) TAB at 12:59

## 2020-07-05 RX ADMIN — APIXABAN 2.5 MG: 2.5 TABLET, FILM COATED ORAL at 09:28

## 2020-07-05 NOTE — PROGRESS NOTES
Patient pressed the call marte and Raphael Mendoza went in room to see what he needed. He told her his right arm is hurting. She came and told me and Dr. Kwaku Silverio. I went in to see patient. He denies any known new injury. I told him the provider should be into see him a little while. Patient is alert and oriented his RR were even and unlabored and he is not in any acute distress.

## 2020-07-05 NOTE — PROGRESS NOTES
[facility] Admission Pharmacy Medication Reconciliation     Information obtained from:RxQuery & provider progress notes  RxQuery data available1:yes    Comments/recommendations:    1) Per notes & conversation with nursing patient does not know which medications he takes and feels overwhelmed by the number of medications he is supposed to take so then doesn't take them. However, per RxQuery he does appear to fill them. 2) Medication changes (since last review): Added  Meclizine  Removed  Acyclovir- per RxQuery, last filled a 90-day supply 2/14/20 & then on this admission told ED RN that he doesn't take the medication  Propafenone- no recent fills in RxQuery, prescription was written 7/28/17, & then on this admission told ED RN that he doesn't take the medication  Saline nasal spray- written 11/28/16, no recent fills in RxQuery, & then on this admission told ED RN that he doesn't take the medication  Ultracet- 5 day supply filled 6/9/20, & then on this admission told ED RN that he doesn't take the medication     3) Patient with a history of falls in the setting of EtOH abuse is high risk for bleeds with concomitant anticoagulation. 4) Drug-Drug Interaction with simvastatin and amlodipine wherein levels of simvastatin may increase. 5) On this admission told ED RN that he doesn't take bumetanide or potassium, however they were recently filled and he admits to general non-adherence. Therefore, they have not been removed from his PTA medication list.   1RxQuery pharmacy benefit data reflects medications filled and processed through the patient's insurance, however                this data does NOT capture whether the medication was picked up or is currently being taken by the patient.        Total Time Spent: 40 minutes    Past Medical History/Disease States:  Past Medical History:   Diagnosis Date    Adverse effect of anesthesia     nasal procedure didn't work-patient awake    Alcohol abuse     Aortic regurgitation Chronic kidney disease     CKD (chronic kidney disease), stage III ([facility] [address] 2011    Depression     Episodic lightheadedness 07/28/2017    Episodic lightheadedness     Epistaxis 11/27/2016    Erectile dysfunction     Fall 02/11/2020    GI bleed     Gout     gout, osteoarthritis    Heart failure (HCC)     acute heart failure    Hemorrhoids     History of cardioversion 02/17/2017    Hypertension     Ill-defined condition     weakness    Multiple myeloma (HCC)     PAF (paroxysmal atrial fibrillation) ([facility] 02/17/2016    eliquis    [facility]     Right-sided epistaxis 11/27/2016    S/P AVR (aortic valve replacement) 07/26/2016    merrill gill md -     S/P cardiac cath 7/6/16    severe ai, normal coronaries     S/P colonoscopy 10-8-04    S/P colonoscopy with polypectomy 05/09/2016    Kar Wynn MD    Skin lesion          Patient allergies: Allergies as of 07/04/2020 - Review Complete 07/04/2020   Allergen Reaction Noted    Amiodarone Other (comments) 10/09/2014         Prior to Admission Medications   Prescriptions Last Dose Informant Patient Reported? Taking? ELIQUIS 2.5 mg tablet Unknown at Unknown time Other No No   Sig: TAKE 1 TABLET BY MOUTH TWICE A DAY   Klor-Con M20 20 mEq tablet Unknown at Unknown time Other Yes No   Sig: TAKE 1 TABLET BY MOUTH EVERY DAY WITH FOOD   allopurinol (ZYLOPRIM) 100 mg tablet Unknown at Unknown time Other No No   Sig: TAKE 1 TABLET BY MOUTH EVERY DAY   amLODIPine (NORVASC) 5 mg tablet Unknown at Unknown time Other Yes No   Sig: TAKE 1 TABLET BY MOUTH DAILY   bumetanide (BUMEX) 1 mg tablet Unknown at Unknown time Other No No   Sig: TAKE 1 TABLET BY MOUTH EVERY DAY   escitalopram oxalate (LEXAPRO) 10 mg tablet Unknown at Unknown time Other No No   Sig: TAKE 1 TABLET BY MOUTH EVERY DAY   meclizine (ANTIVERT) 25 mg tablet Unknown at Unknown time Other Yes No   Sig: Take 25 mg by mouth three (3) times daily as needed for Dizziness.    metoprolol succinate (TOPROL-XL) 25 mg XL tablet Unknown at Unknown time Other No No   Sig: Take 0.5 Tabs by mouth nightly.    simvastatin (ZOCOR) 40 mg tablet Unknown at Unknown time Other No No   Sig: TAKE 1 TABLET BY MOUTH EVERYDAY AT BEDTIME   timolol (TIMOPTIC) 0.5 % ophthalmic solution Unknown at Unknown time Other Yes No   Sig: INSTILL 1 DROP INTO RIGHT EYE EVERY MORNING      Facility-Administered Medications: None            Thank you,    Espinoza Blandon, PHARMD, BCPS  Contact: [phone]

## 2020-07-05 NOTE — PROGRESS NOTES
Spiritual Care Assessment/Progress Note  [facility]      NAME: Joyce Hart. MRN: [id number]  AGE: 81 y.o.  SEX: male  Catholic Affiliation: Pentecostalism   Language: English     7/5/2020     Total Time (in minutes): 62     Spiritual Assessment begun in [address] through conversation with:         [x]Patient        [] Family    [] Friend(s)        Reason for Consult: Advance medical directive consult     Spiritual beliefs: (Please include comment if needed)     [x] Identifies with a chris tradition: Adela Lucero        [] Supported by a chris community:            [] Claims no spiritual orientation:           [] Seeking spiritual identity:                [] Adheres to an individual form of spirituality:           [] Not able to assess:                           Identified resources for coping:      [] Prayer                               [] Music                  [] Guided Imagery     [x] Family/friends                 [] Pet visits     [] Devotional reading                         [] Unknown     [] Other:                                              Interventions offered during this visit: (See comments for more details)    Patient Interventions: Advance medical directive consult, Advance medical directive completed, Initial/Spiritual assessment, patient floor, Prayer (assurance of)           Plan of Care:     [] Support spiritual and/or cultural needs    [x] Support AMD and/or advance care planning process      [] Support grieving process   [] Coordinate Rites and/or Rituals    [] Coordination with community clergy   [] No spiritual needs identified at this time   [] Detailed Plan of Care below (See Comments)  [] Make referral to Music Therapy  [] Make referral to Pet Therapy     [] Make referral to Addiction services  [] Make referral to [facility]  [] Make referral to Spiritual Care Partner  [] No future visits requested        [x] Follow up visits as needed     Comments: Provided support to this pt in [place] [address]. Consulted pt's RN. Assessed pt for orientation. Offered assistance to pt as a result of AMD consult. Explained document to patient. Assisted patient in completing the document. Made copy for patients chart and handed it to RN for inclusion in physical chart. Returned original document and copies to the patient. [address] engaged pt in life review and offered listening presence. [address] assured pt of prayer and concluded by affirming ongoing availability of support. Alicia Cam MDiv.  Staff   Request  Support/Spiritual Care Services via [facility]

## 2020-07-05 NOTE — PROGRESS NOTES
07/05/20 1525   Pain 1   Pain Scale 1 Numeric (0 - 10)   Pain Intensity 1 6   Patient Stated Pain Goal 0   Pain Reassessment 1 Yes   Pain Location 1 Elbow   Pain Orientation 1 Left;Right   Pain Description 1 Aching   Pain Intervention(s) 1 Medication (see MAR)     Patient says he has new pain to right elbow. Right elbow was assessed by Rn and has soft flesh around joint but no warm. Left elbow also has fleshy mound but is warm to the touch. Patient says at home he takes Tramadol and it works well for his pain. He would like to discuss adding tramadol to his PRN medications.

## 2020-07-05 NOTE — PROGRESS NOTES
Chaplain callahan and [employer] returned call. Paged to ask for Advanced directives to be completed on Mr. Jay Rounds. Patient verbalized he is interested in completing directives while her, not urgently so it may be completed on Monday.  says they will be in to complete.

## 2020-07-05 NOTE — PROGRESS NOTES
Hospitalist Progress Note    NAME: Jerica Billingsley. :  1935   MRN:  [id number]   Room Number:  244/61  @ [address] [facility]       Interim Hospital Summary: 81 y.o. male whom presented on 2020 with      Assessment / Plan:  Frequent falls POA  Due to physical deconditioning, alcohol intoxication. Could have underlying dementia.         - PT/OT consult  - CM consulted for placement.        Alcohol abuse  POA:   ETOH level elevated 29. Admits to drinking beers daily.      - Thiamine, folic acid. - CIWA monitoring, oral ativan if needed. Use judiciously.         Chronic Diastolic Heart failure,not in exacerbation, EF 60%   S/p Aortic valve replacement  Paroxysmal atrial fibrillation   Pro BNP elevated but clinically euvolemic     - Continue bumetanide, metoprolol, amlodipine, apixaban.           CKD 3 POA  Likely due hypertension. Cr at baseline.      - Strict Is and Os, renally dose meds, avoid nephrotoxic medications.         Normochromic normocytic anemia POA   H/H at baseline. Hx of hemorrhoids. Hx of colon polyps. Lab Results   Component Value Date/Time    Iron 36 2016 03:54 AM    TIBC 206 (L) 2016 03:54 AM    Iron % saturation 17 (L) 2016 03:54 AM     Lab Results   Component Value Date/Time    Vitamin B12 558 2018 11:11 AM    Folate 7.7 2018 11:11 AM     - replete iron.              Fracture of hand, chronic POA   Hx of frequent falls, Fracture of left 4th metacarpal and scapholunate noted on imaging. Patient has persistent swelling and inabilty to use extremity.      - OT consult  - Warm compresses  - Acetaminophen PRN     Body mass index is 22.12 kg/m².   At nutritional risk     Risk Factors: Hx Etoh abuse; CKD; Depression; HF; MM; HTN. ... C/o Falls     Clinical Indicators: Albumin: 2.3.... Gerldine Prost Gerldine Prost BMI: 22.12. ... Gerldine Prost Gerldine Prost physical deconditioning      Treatment: RD consult; Meals/Snacks: General/healthful diet:  Continue diet as tolerated;  Supplements: Commercial supplement; RD will order ensure shakes po BID with meals.             Code Status: full   Surrogate Decision Maker:  SonKimmie Loge [phone]     DVT Prophylaxis: apixaban  GI Prophylaxis: not indicated  Baseline: ambulates      Subjective:     Chief Complaint / Reason for Physician Visit  \"im doing ok, my hand hurts\". Discussed with RN events overnight. Review of Systems:  No fevers, chills, appetite change, cough, sputum production, shortness of breath, dyspnea on exertion, nausea, vomitting, diarrhea, constipation, chest pain, leg edema, abdominal pain, joint pain, rash, itching. Tolerating diet. Objective:     VITALS:   Last 24hrs VS reviewed since prior progress note. Most recent are:  Patient Vitals for the past 24 hrs:   Temp Pulse Resp BP SpO2   07/05/20 0745 98.1 °F (36.7 °C) 91 18 (!) 156/92 100 %   07/05/20 0230 98.2 °F (36.8 °C) (!) 57 20 (!) 143/93 100 %   07/04/20 1830 98.7 °F (37.1 °C) (!) 56 20 119/72 97 %   07/04/20 1722  75 22  98 %   07/04/20 1721  74 21 121/88 95 %   07/04/20 1719  78 17  100 %   07/04/20 1718  71 25  100 %   07/04/20 1700  83 24 (!) 127/95 100 %   07/04/20 1602  (!) 59 17 139/81    07/04/20 1500  (!) 54 22 (!) 141/97 100 %   07/04/20 1417 98.5 °F (36.9 °C) (!) 58 18 114/75 100 %       Intake/Output Summary (Last 24 hours) at 7/5/2020 1134  Last data filed at 7/5/2020 0406  Gross per 24 hour   Intake    Output 325 ml   Net -325 ml        PHYSICAL EXAM:  General: Alert, cooperative, no acute distress    EENT:  EOMI. Anicteric sclerae. MMM  Resp:  CTA bilaterally, no wheezing or rales. No accessory muscle use  CV:  Regular  rhythm,  normal S1/S2, no murmurs rubs gallops, No edema  GI:  Soft, Non distended, Non tender. +Bowel sounds  Neurologic:  Alert and oriented X 3, normal speech,   Psych:   Good insight. Not anxious nor agitated  Skin:  No rashes.   No jaundice  Ext : swollen left elbow, swollen tender left hand and wrist   Reviewed most current lab test results and cultures  YES  Reviewed most current radiology test results   YES  Review and summation of old records today    NO  Reviewed patient's current orders and MAR    YES  PMH/SH reviewed - no change compared to H&P  ________________________________________________________________________  Care Plan discussed with:    Comments   Patient x    Family      RN x    Care Manager x    Consultant                        Multidiciplinary team rounds were held today with , nursing, pharmacist and clinical coordinator. Patient's plan of care was discussed; medications were reviewed and discharge planning was addressed. ________________________________________________________________________  Total NON critical care TIME:  25   Minutes    Total CRITICAL CARE TIME Spent:   Minutes non procedure based      Comments   >50% of visit spent in counseling and coordination of care     ________________________________________________________________________  Mohamud Burk MD     Procedures: see electronic medical records for all procedures/Xrays and details which were not copied into this note but were reviewed prior to creation of Plan. LABS:  I reviewed today's most current labs and imaging studies.   Pertinent labs include:  Recent Labs     07/04/20  1431 07/03/20  1214   WBC 4.4 4.6   HGB 10.6* 10.5*   HCT 31.4* 31.5*    292     Recent Labs     07/04/20  1431 07/03/20  1214    140   K 3.2* 3.3*    104   CO2 26 28   GLU 73 91   BUN 24* 31*   CREA 1.72* 2.12*   CA 8.2* 8.3*   MG  --  1.3*   ALB 2.3* 2.2*   TBILI 0.5 0.4   ALT 16 15   INR  --  1.1       Signed: Mohamud Burk MD

## 2020-07-05 NOTE — PROGRESS NOTES
Physical therapy:     Physical therapy consult received and chart reviewed. Patient to be formally evaluated Monday 7/6/2020.      Celestia Buerger, PT

## 2020-07-05 NOTE — ACP (ADVANCE CARE PLANNING)
Advance Care Planning (ACP) Provider Note - Comprehensive     Date of ACP Conversation: 07/05/20  Persons included in Conversation:  patient  Length of ACP Conversation in minutes:  <16 minutes (Non-Billable)    Authorized Decision Maker (if patient is incapable of making informed decisions): This person is:  Next of Kin by law (only applies in absence of above)          General ACP for ALL Patients with Decision Making Capacity:   Importance of advance care planning, including choosing a healthcare agent to communicate patient's healthcare decisions if patient lost the ability to make decisions, such as after a sudden illness or accident  Understanding of the healthcare agent role was assessed and information provided  Exploration of values, goals, and preferences if recovery is not expected, even with continued medical treatment in the event of: Imminent death  Severe, permanent brain injury  \"In these circumstances, what matters most to you? \"  Care focused more on comfort or quality of life. Review of Existing Advance Directive:  What information were you given about medical decisions to consider before completing your advance directive? cpr outcomes  What is your understanding of your agent's willingness to honor your wishes, even if he/she may not agree with them? will fully honor    For Serious or Chronic Illness:  Understanding of medical condition    Understanding of CPR, goals and expected outcomes, benefits and burdens discussed. Information on CPR success rates provided (e.g. for CPR in hospital, survival to d/c at two weeks is 22%, for chronically ill or elderly/frail survival is less than 3%); Individual asked to communicate understanding of information in his/her own words.   Explored fears and concerns regarding CPR or possible outcomes    Interventions Provided:  Reviewed existing Advance Directive   Entered DNR order (If yes, complete Durable DNR form)

## 2020-07-05 NOTE — PROGRESS NOTES
Problem: Patient Education: Go to Patient Education Activity  Goal: Patient/Family Education  Outcome: Progressing Towards Goal     Problem: Falls - Risk of  Goal: *Absence of Falls  Description: Document Abena Hoffman Fall Risk and appropriate interventions in the flowsheet.   Outcome: Progressing Towards Goal  Note: Fall Risk Interventions:  Mobility Interventions: Bed/chair exit alarm, Patient to call before getting OOB, Strengthening exercises (ROM-active/passive), Utilize walker, cane, or other assistive device    Mentation Interventions: Bed/chair exit alarm, Increase mobility, More frequent rounding, Reorient patient, Toileting rounds    Medication Interventions: Teach patient to arise slowly, Patient to call before getting OOB         History of Falls Interventions: Consult care management for discharge planning, Door open when patient unattended, Investigate reason for fall

## 2020-07-05 NOTE — PROGRESS NOTES
Patient reports he would like to complete Advance Directive during this admission. This writer relayed patient's wishes to primary RN, Ariella Gonzales.

## 2020-07-05 NOTE — ACP (ADVANCE CARE PLANNING)
Provided support to this pt in [place] [address]. Consulted pt's RN. Assessed pt for orientation. Offered assistance to pt as a result of AMD consult. Explained document to patient. Assisted patient in completing the document. Made copy for patients chart and handed it to RN for inclusion in physical chart. Returned original document and copies to the patient. Alpesh Keith MDiv.  Staff   Request  Support/Spiritual Care Services via [facility]

## 2020-07-06 ENCOUNTER — APPOINTMENT (OUTPATIENT)
Dept: GENERAL RADIOLOGY | Age: 85
DRG: 897 | End: 2020-07-06
Attending: STUDENT IN AN ORGANIZED HEALTH CARE EDUCATION/TRAINING PROGRAM
Payer: MEDICARE

## 2020-07-06 LAB
ANION GAP SERPL CALC-SCNC: 8 MMOL/L (ref 5–15)
ATRIAL RATE: 205 BPM
BASOPHILS # BLD: 0 K/UL (ref 0–0.1)
BASOPHILS NFR BLD: 0 % (ref 0–1)
BUN SERPL-MCNC: 29 MG/DL (ref 6–20)
BUN/CREAT SERPL: 16 (ref 12–20)
CALCIUM SERPL-MCNC: 7.8 MG/DL (ref 8.5–10.1)
CALCULATED P AXIS, ECG09: -88 DEGREES
CALCULATED R AXIS, ECG10: 111 DEGREES
CALCULATED T AXIS, ECG11: 98 DEGREES
CHLORIDE SERPL-SCNC: 101 MMOL/L (ref 97–108)
CO2 SERPL-SCNC: 26 MMOL/L (ref 21–32)
CREAT SERPL-MCNC: 1.84 MG/DL (ref 0.7–1.3)
DIAGNOSIS, 93000: NORMAL
DIFFERENTIAL METHOD BLD: ABNORMAL
EOSINOPHIL # BLD: 0.1 K/UL (ref 0–0.4)
EOSINOPHIL NFR BLD: 3 % (ref 0–7)
ERYTHROCYTE [DISTWIDTH] IN BLOOD BY AUTOMATED COUNT: 13.8 % (ref 11.5–14.5)
GLUCOSE SERPL-MCNC: 92 MG/DL (ref 65–100)
HCT VFR BLD AUTO: 29.8 % (ref 36.6–50.3)
HGB BLD-MCNC: 10 G/DL (ref 12.1–17)
IMM GRANULOCYTES # BLD AUTO: 0 K/UL (ref 0–0.04)
IMM GRANULOCYTES NFR BLD AUTO: 0 % (ref 0–0.5)
LYMPHOCYTES # BLD: 0.8 K/UL (ref 0.8–3.5)
LYMPHOCYTES NFR BLD: 18 % (ref 12–49)
MAGNESIUM SERPL-MCNC: 1 MG/DL (ref 1.6–2.4)
MCH RBC QN AUTO: 32.9 PG (ref 26–34)
MCHC RBC AUTO-ENTMCNC: 33.6 G/DL (ref 30–36.5)
MCV RBC AUTO: 98 FL (ref 80–99)
MONOCYTES # BLD: 0.3 K/UL (ref 0–1)
MONOCYTES NFR BLD: 6 % (ref 5–13)
NEUTS SEG # BLD: 3.4 K/UL (ref 1.8–8)
NEUTS SEG NFR BLD: 73 % (ref 32–75)
NRBC # BLD: 0 K/UL (ref 0–0.01)
NRBC BLD-RTO: 0 PER 100 WBC
PHOSPHATE SERPL-MCNC: 3.3 MG/DL (ref 2.6–4.7)
PLATELET # BLD AUTO: 274 K/UL (ref 150–400)
PMV BLD AUTO: 9.2 FL (ref 8.9–12.9)
POTASSIUM SERPL-SCNC: 3.4 MMOL/L (ref 3.5–5.1)
Q-T INTERVAL, ECG07: 480 MS
QRS DURATION, ECG06: 90 MS
QTC CALCULATION (BEZET), ECG08: 467 MS
RBC # BLD AUTO: 3.04 M/UL (ref 4.1–5.7)
SODIUM SERPL-SCNC: 135 MMOL/L (ref 136–145)
VENTRICULAR RATE, ECG03: 57 BPM
WBC # BLD AUTO: 4.7 K/UL (ref 4.1–11.1)

## 2020-07-06 PROCEDURE — 73080 X-RAY EXAM OF ELBOW: CPT

## 2020-07-06 PROCEDURE — 85025 COMPLETE CBC W/AUTO DIFF WBC: CPT

## 2020-07-06 PROCEDURE — 74011250636 HC RX REV CODE- 250/636: Performed by: STUDENT IN AN ORGANIZED HEALTH CARE EDUCATION/TRAINING PROGRAM

## 2020-07-06 PROCEDURE — 96365 THER/PROPH/DIAG IV INF INIT: CPT

## 2020-07-06 PROCEDURE — 97535 SELF CARE MNGMENT TRAINING: CPT

## 2020-07-06 PROCEDURE — 74011250637 HC RX REV CODE- 250/637: Performed by: STUDENT IN AN ORGANIZED HEALTH CARE EDUCATION/TRAINING PROGRAM

## 2020-07-06 PROCEDURE — 97165 OT EVAL LOW COMPLEX 30 MIN: CPT

## 2020-07-06 PROCEDURE — 84100 ASSAY OF PHOSPHORUS: CPT

## 2020-07-06 PROCEDURE — 97116 GAIT TRAINING THERAPY: CPT | Performed by: PHYSICAL THERAPIST

## 2020-07-06 PROCEDURE — 97161 PT EVAL LOW COMPLEX 20 MIN: CPT | Performed by: PHYSICAL THERAPIST

## 2020-07-06 PROCEDURE — 99218 HC RM OBSERVATION: CPT

## 2020-07-06 PROCEDURE — 36415 COLL VENOUS BLD VENIPUNCTURE: CPT

## 2020-07-06 PROCEDURE — 94760 N-INVAS EAR/PLS OXIMETRY 1: CPT

## 2020-07-06 PROCEDURE — 83735 ASSAY OF MAGNESIUM: CPT

## 2020-07-06 PROCEDURE — 74011000250 HC RX REV CODE- 250: Performed by: STUDENT IN AN ORGANIZED HEALTH CARE EDUCATION/TRAINING PROGRAM

## 2020-07-06 PROCEDURE — 80048 BASIC METABOLIC PNL TOTAL CA: CPT

## 2020-07-06 RX ORDER — TRAMADOL HYDROCHLORIDE 50 MG/1
12.5 TABLET ORAL
Status: DISCONTINUED | OUTPATIENT
Start: 2020-07-06 | End: 2020-07-11 | Stop reason: HOSPADM

## 2020-07-06 RX ORDER — LANOLIN ALCOHOL/MO/W.PET/CERES
800 CREAM (GRAM) TOPICAL ONCE
Status: COMPLETED | OUTPATIENT
Start: 2020-07-06 | End: 2020-07-06

## 2020-07-06 RX ORDER — MAGNESIUM SULFATE HEPTAHYDRATE 40 MG/ML
2 INJECTION, SOLUTION INTRAVENOUS ONCE
Status: COMPLETED | OUTPATIENT
Start: 2020-07-06 | End: 2020-07-06

## 2020-07-06 RX ORDER — TIMOLOL MALEATE 5 MG/ML
1 SOLUTION/ DROPS OPHTHALMIC DAILY
Status: DISCONTINUED | OUTPATIENT
Start: 2020-07-06 | End: 2020-07-11 | Stop reason: HOSPADM

## 2020-07-06 RX ORDER — THERA TABS 400 MCG
1 TAB ORAL DAILY
Status: DISCONTINUED | OUTPATIENT
Start: 2020-07-06 | End: 2020-07-11 | Stop reason: HOSPADM

## 2020-07-06 RX ORDER — DICLOFENAC SODIUM 10 MG/G
2 GEL TOPICAL
Status: DISCONTINUED | OUTPATIENT
Start: 2020-07-06 | End: 2020-07-11 | Stop reason: HOSPADM

## 2020-07-06 RX ADMIN — Medication 10 ML: at 21:33

## 2020-07-06 RX ADMIN — SALINE NASAL SPRAY 2 SPRAY: 1.5 SOLUTION NASAL at 09:01

## 2020-07-06 RX ADMIN — SALINE NASAL SPRAY 2 SPRAY: 1.5 SOLUTION NASAL at 21:32

## 2020-07-06 RX ADMIN — FOLIC ACID 1 MG: 1 TABLET ORAL at 09:01

## 2020-07-06 RX ADMIN — BUMETANIDE 1 MG: 1 TABLET ORAL at 09:01

## 2020-07-06 RX ADMIN — FERROUS SULFATE TAB 325 MG (65 MG ELEMENTAL FE) 325 MG: 325 (65 FE) TAB at 09:00

## 2020-07-06 RX ADMIN — ACETAMINOPHEN 1000 MG: 500 TABLET, FILM COATED ORAL at 09:00

## 2020-07-06 RX ADMIN — Medication 800 MG: at 11:23

## 2020-07-06 RX ADMIN — MAGNESIUM SULFATE 2 G: 2 INJECTION INTRAVENOUS at 11:23

## 2020-07-06 RX ADMIN — POTASSIUM BICARBONATE 20 MEQ: 782 TABLET, EFFERVESCENT ORAL at 09:01

## 2020-07-06 RX ADMIN — APIXABAN 2.5 MG: 2.5 TABLET, FILM COATED ORAL at 09:01

## 2020-07-06 RX ADMIN — ACETAMINOPHEN 1000 MG: 500 TABLET, FILM COATED ORAL at 21:32

## 2020-07-06 RX ADMIN — ESCITALOPRAM OXALATE 10 MG: 10 TABLET ORAL at 09:01

## 2020-07-06 RX ADMIN — ACETAMINOPHEN 1000 MG: 500 TABLET, FILM COATED ORAL at 16:19

## 2020-07-06 RX ADMIN — TIMOLOL MALEATE 1 DROP: 5 SOLUTION/ DROPS OPHTHALMIC at 11:35

## 2020-07-06 RX ADMIN — SALINE NASAL SPRAY 2 SPRAY: 1.5 SOLUTION NASAL at 17:33

## 2020-07-06 RX ADMIN — Medication 100 MG: at 09:01

## 2020-07-06 RX ADMIN — AMLODIPINE BESYLATE 5 MG: 5 TABLET ORAL at 09:01

## 2020-07-06 RX ADMIN — POTASSIUM BICARBONATE 40 MEQ: 782 TABLET, EFFERVESCENT ORAL at 11:23

## 2020-07-06 RX ADMIN — SALINE NASAL SPRAY 2 SPRAY: 1.5 SOLUTION NASAL at 14:37

## 2020-07-06 RX ADMIN — ATORVASTATIN CALCIUM 20 MG: 10 TABLET, FILM COATED ORAL at 21:33

## 2020-07-06 RX ADMIN — Medication 10 ML: at 06:08

## 2020-07-06 RX ADMIN — Medication 10 ML: at 14:37

## 2020-07-06 RX ADMIN — THERA TABS 1 TABLET: TAB at 11:23

## 2020-07-06 RX ADMIN — APIXABAN 2.5 MG: 2.5 TABLET, FILM COATED ORAL at 17:33

## 2020-07-06 NOTE — PROGRESS NOTES
Interdisciplinary team rounds were held 7/6/2020 with the following team members:Care Management, Infection Control, Nursing, Pharmacy and Physician and the patient. Plan of care discussed. See clinical pathway and/or care plan for interventions and desired outcomes.

## 2020-07-06 NOTE — PROGRESS NOTES
0730 bedside shift report received from Hollie Chen.pt resting on his bed,call bell in reach,side rails up X 3.bed alarm on for safety. no discomfort noticed at this time. 0850 Pt  ambulated in the hallway with PT.    1020 pt sitting on chair comfortably. new order received. 1137 replaced potassium and mag. CM at bedside. 1400 pt resting comfortably on his bed.    1700 pt assisted to BRP,pt has soft BM.    1920 . Bedside and Verbal shift change report given to Hollie Chen (oncoming nurse) by Chemo Copeland (offgoing nurse). Report included the following information SBAR, Kardex, Intake/Output, MAR, Recent Results and Med Rec Status.

## 2020-07-06 NOTE — PROGRESS NOTES
Problem: Self Care Deficits Care Plan (Adult)  Goal: *Acute Goals and Plan of Care (Insert Text)  Description:   FUNCTIONAL STATUS PRIOR TO ADMISSION: Patient was independent and active without use of DME. Patient reports owning a cane and walker. Per patient, he typically only eats 1 full meal a day and prior to admission, had been driving to fast-food restaurants. Patient admits to frequent falls and having increasing difficulty with maintaining his home in a safe/hygienic manner. HOME SUPPORT: The patient lived alone with no local support. Patient reports he has a son who lives in [place], comes to visit fairly often to visit his mom (patient's ex-wife) and patient. Occupational Therapy Goals  Initiated 7/6/2020  1. Patient will perform standing grooming with supervision/set-up within 7 day(s) using most appropriate DME prn.  2.  Patient will perform lower body dressing with modified independence within 7 day(s). 3.  Patient will perform toilet transfers with supervision/set-up within 7 day(s). 4.  Patient will perform all aspects of toileting with supervision/set-up within 7 day(s). 5.  Patient will utilize fall prevention techniques during functional activities with verbal cues within 7 day(s). Outcome: Progressing Towards Goal     OCCUPATIONAL THERAPY EVALUATION  Patient: Jagjit Davis (81 y.o. male)  Date: 7/6/2020  Primary Diagnosis: Falls frequently [R29.6]        Precautions:  Fall    ASSESSMENT  Based on the objective data described below, the patient presents with impaired functional mobility and balance, memory deficits, difficulty with fine motor and bilateral tasks 2/2 L hand/wrist fracture, significant LUE wrist/hand edema, and decreased safety awareness and insight into deficits. Patient very pleasant, received in bed and receptive to therapy. This session, pt requiring up to min assist for balance during in-room ambulation and bathroom mobility.  Patient able to brush teeth standing at sink with instruction on use of RUE for fine motor aspects of task and using L hand as gross stabilizer - demonstrated good understanding. Overall, patient requires frequent cues for best transfer technique, including hand placement, body positioning, and awareness of environment. Patient was eager and receptive to all education provided, including safe transfer techniques, task adaptations for ADLs to maximize independence, and fall prevention strategies. Although pt following instructions and demonstrating good carryover with cues this session, extent and quality of carryover between sessions to be determined as pt with some memory deficits noted with pt asking questions multiple times with no awareness of previous conversation. Recommend SNF at discharge with likely need for transition to assisted living following. Patient is unsafe to return home alone 2/2 impaired safety awareness, high fall risk, cognitive deficits, and need for assistance with self-care. Current Level of Function Impacting Discharge (ADLs/self-care): min assist for transfers/balance; min assist for fine motor and BUE tasks    Functional Outcome Measure: The patient scored 50/100 on the Barthel Index. Other factors to consider for discharge: memory loss, decreased safety awareness/insight into deficits     Patient will benefit from skilled therapy intervention to address the above noted impairments. PLAN :  Recommendations and Planned Interventions: self care training, functional mobility training, therapeutic exercise, balance training, therapeutic activities, endurance activities, patient education, and home safety training    Frequency/Duration: Patient will be followed by occupational therapy 3 times a week to address goals.     Recommendation for discharge: (in order for the patient to meet his/her long term goals)  Therapy up to 5 days/week in SNF setting with likely transition to assisted living needed    This discharge recommendation:  Has not yet been discussed the attending provider and/or case management    IF patient discharges home will need the following DME: patient owns DME required for discharge       SUBJECTIVE:   Patient stated i'm 81 but I know I'm probably going to have to start doing some things differently.     OBJECTIVE DATA SUMMARY:   HISTORY:   Past Medical History:   Diagnosis Date    Adverse effect of anesthesia     nasal procedure didn't work-patient awake    Alcohol abuse     Aortic regurgitation     Chronic kidney disease     CKD (chronic kidney disease), stage III ([facility] [address] 2011    Depression     Episodic lightheadedness 07/28/2017    Episodic lightheadedness     Epistaxis 11/27/2016    Erectile dysfunction     Fall 02/11/2020    GI bleed     Gout     gout, osteoarthritis    Heart failure (HCC)     acute heart failure    Hemorrhoids     History of cardioversion 02/17/2017    Hypertension     Ill-defined condition     weakness    Multiple myeloma (HCC)     PAF (paroxysmal atrial fibrillation) ([facility] [address] 02/17/2016    eliquis    Pulmonary HTN ([facility] [address]     Right-sided epistaxis 11/27/2016    S/P AVR (aortic valve replacement) 07/26/2016    merrill gill md -     S/P cardiac cath 7/6/16    severe ai, normal coronaries     S/P colonoscopy 10-8-04    S/P colonoscopy with polypectomy 05/09/2016    Kar Sosa MD    Skin lesion      Past Surgical History:   Procedure Laterality Date    CARDIAC SURG PROCEDURE UNLIST      cardiac cath    CARDIAC SURG PROCEDURE UNLIST  07/26/2016    AVR    HX HEENT      nasal procedure 20 years ago    HX KNEE REPLACEMENT Bilateral     bilateral knee replacement    HX ORTHOPAEDIC Bilateral     wrist       Expanded or extensive additional review of patient history:   Home Situation  Home Environment: Private residence  # Steps to Enter: 5  One/Two Story Residence: Other (Comment)(3 story)  # of Interior Steps: 9(5,9)  Height of Each Step (in): (standard)  Interior Rails: Left  Lift Chair Available: No  Living Alone: Yes  Support Systems: None  Patient Expects to be Discharged to[de-identified] Private residence  Current DME Used/Available at Home: None(pt reports he has quad cane at home but does not use it)    Hand dominance: Right    EXAMINATION OF PERFORMANCE DEFICITS:  Cognitive/Behavioral Status:  Neurologic State: Alert;Confused  Orientation Level: Oriented to person;Oriented to place;Oriented to situation  Cognition: Follows commands; Appropriate for age attention/concentration;Memory loss; Impaired decision making  Perception: Verbal;Visual;Tactile  Perseveration: No perseveration noted  Safety/Judgement: Decreased insight into deficits; Decreased awareness of need for safety;Decreased awareness of environment; Fall prevention    Edema: Patient with significant LUE edema of wrist and hand - per patient, increased swelling for past 2 weeks. Hearing: Auditory  Auditory Impairment: None    Range of Motion:  AROM: Generally decreased, functional  PROM: Generally decreased, functional    Strength:  Strength: Generally decreased, functional     Coordination:  Fine Motor Skills-Upper: Left Intact; Right Intact    Gross Motor Skills-Upper: Left Intact; Right Intact    Balance:  Sitting: Intact  Standing: Impaired; Without support  Standing - Static: Fair  Standing - Dynamic : Fair;Poor    Functional Mobility and Transfers for ADLs:  Bed Mobility:  Supine to Sit: Stand-by assistance  Sit to Supine: Stand-by assistance  Scooting: Stand-by assistance    Transfers:  Sit to Stand: Minimum assistance  Stand to Sit: Contact guard assistance  Bed to Chair: Minimum assistance  Bathroom Mobility: Minimum assistance  Toilet Transfer : Minimum assistance    ADL Assessment:  Feeding: Minimum assistance;Setup    Oral Facial Hygiene/Grooming: Minimum assistance;Setup    Upper Body Dressing: Setup;Stand-by assistance    Lower Body Dressing: Minimum assistance    Toileting: Minimum assistance    ADL Intervention and task modifications:  Grooming  Grooming Assistance: Minimum assistance;Set-up  Position Performed: Standing  Brushing Teeth: Minimum assistance;Set-up  Cues: Verbal cues provided;Visual cues provided;Physical assistance    Lower Body Dressing Assistance  Dressing Assistance: Set-up; Minimum assistance  Underpants: Contact guard assistance  Socks: Minimum assistance    Toileting  Toileting Assistance: Minimum assistance  Bladder Hygiene: Contact guard assistance  Clothing Management: Minimum assistance  Cues: Verbal cues provided; Tactile cues provided    Cognitive Retraining  Orientation Retraining: Situation;Time;Awareness of environment  Problem Solving: Awareness of environment; Identifying the problem  Safety/Judgement: Decreased insight into deficits; Decreased awareness of need for safety;Decreased awareness of environment; Fall prevention     Functional Measure:  Barthel Index:    Bathin  Bladder: 10  Bowels: 10  Groomin  Dressin  Feedin  Mobility: 5  Stairs: 0  Toilet Use: 5  Transfer (Bed to Chair and Back): 10  Total: 50/100        The Barthel ADL Index: Guidelines  1. The index should be used as a record of what a patient does, not as a record of what a patient could do. 2. The main aim is to establish degree of independence from any help, physical or verbal, however minor and for whatever reason. 3. The need for supervision renders the patient not independent. 4. A patient's performance should be established using the best available evidence. Asking the patient, friends/relatives and nurses are the usual sources, but direct observation and common sense are also important. However direct testing is not needed. 5. Usually the patient's performance over the preceding 24-48 hours is important, but occasionally longer periods will be relevant. 6. Middle categories imply that the patient supplies over 50 per cent of the effort. 7. Use of aids to be independent is allowed.     Deandra Peterson., Barthel, LORENA (1965). Functional evaluation: the Barthel Index. [facility] (14)2. DONNA Trejo, Elige Heimlich.Aminta., Giselle, [address] (1999). Measuring the change indisability after inpatient rehabilitation; comparison of the responsiveness of the Barthel Index and Functional [place] Measure. Journal of Neurology, Neurosurgery, and Psychiatry, 66(4), 739-659. YOSHI Trujillo, ROSALINDA Arias, & Kiara Turner M.A. (2004.) Assessment of post-stroke quality of life in cost-effectiveness studies: The usefulness of the Barthel Index and the EuroQoL-5D. Quality of Life Research, 15, 846-80     Occupational Therapy Evaluation Charge Determination   History Examination Decision-Making   LOW Complexity : Brief history review  MEDIUM Complexity : 3-5 performance deficits relating to physical, cognitive , or psychosocial skils that result in activity limitations and / or participation restrictions MEDIUM Complexity : Patient may present with comorbidities that affect occupational performnce. Miniml to moderate modification of tasks or assistance (eg, physical or verbal ) with assesment(s) is necessary to enable patient to complete evaluation       Based on the above components, the patient evaluation is determined to be of the following complexity level: LOW   Pain Rating:  Patient reports mild-moderate pain in L hand. Activity Tolerance:   Fair and requires rest breaks  Please refer to the flowsheet for vital signs taken during this treatment. After treatment patient left in no apparent distress:    Supine in bed, Call bell within reach, Bed / chair alarm activated, and Side rails x 3    COMMUNICATION/EDUCATION:   The patients plan of care was discussed with: Physical therapist and Registered nurse. Home safety education was provided and the patient/caregiver indicated understanding., Patient/family have participated as able in goal setting and plan of care. , and Patient/family agree to work toward stated goals and plan of care.     Thank you for this referral.  Sol Xiong, OT  Time Calculation: 41 mins

## 2020-07-06 NOTE — PROGRESS NOTES
Hospitalist Progress Note    NAME: Dewayne Lr. :  1935   MRN:  [id number]   Room Number:  257/43  @ [facility]       Interim Hospital Summary: 81 y.o. male whom presented on 2020 with      Assessment / Plan:  Frequent falls POA  Due to physical deconditioning, alcohol intoxication. Could have underlying dementia.         - PT/OT consult  - CM consulted for placement.       Right elbow swelling POA :   Unclear if there was trauma to the area. - Xray right elbow. - OT consult       Hypomagnesemia POA   Hypokalemia POA  - replete. Alcohol abuse  POA:   ETOH level elevated 29. Admits to drinking beers daily.      - Thiamine, folic acid. - CIWA monitoring, oral ativan if needed. Use judiciously.         Chronic Diastolic Heart failure,not in exacerbation, EF 60%   S/p Aortic valve replacement  Paroxysmal atrial fibrillation   Pro BNP elevated but clinically euvolemic      - Continue bumetanide,amlodipine, apixaban. - Metoprolol held due to low HR.          CKD 3 POA  Likely due hypertension. Cr at baseline.      - Strict Is and Os, renally dose meds, avoid nephrotoxic medications.         Normochromic normocytic anemia POA   H/H at baseline. Hx of hemorrhoids. Hx of colon polyps. Lab Results   Component Value Date/Time     Iron 36 2016 03:54 AM     TIBC 206 (L) 2016 03:54 AM     Iron % saturation 17 (L) 2016 03:54 AM            Lab Results   Component Value Date/Time     Vitamin B12 558 2018 11:11 AM     Folate 7.7 2018 11:11 AM      - replete iron.           Fracture of hand, chronic POA   Hx of frequent falls, Fracture of left 4th metacarpal and scapholunate noted on imaging. Patient has persistent swelling and inabilty to use extremity.      - OT consult  - Warm compresses  - Acetaminophen PRN        Body mass index is 22.12 kg/m².   At nutritional risk     Risk Factors: Hx Etoh abuse; CKD; Depression; HF; MM; HTN. ...C/o Falls     Clinical Indicators: Albumin: 2.3.... Rosita Chess Rosita Chess BMI: 22.12. ... Rosita Chess Rosita Chess physical deconditioning      Treatment: RD consult; Meals/Snacks: General/healthful diet:  Continue diet as tolerated; Supplements: Commercial supplement; RD will order ensure shakes po BID with meals.             Code Status: full   Surrogate Decision Maker:  SonBertha [phone]     DVT Prophylaxis: apixaban  GI Prophylaxis: not indicated  Baseline: ambulates        Subjective:     Chief Complaint / Reason for Physician Visit  \"my right elbow is swollen but it doesn't hurt\". Discussed with RN events overnight. Review of Systems:  No fevers, chills, appetite change, cough, sputum production, shortness of breath, dyspnea on exertion, nausea, vomitting, diarrhea, constipation, chest pain, leg edema, abdominal pain, joint pain, rash, itching. Tolerating PT/OT. Tolerating diet. Objective:     VITALS:   Last 24hrs VS reviewed since prior progress note. Most recent are:  Patient Vitals for the past 24 hrs:   Temp Pulse Resp BP SpO2   07/06/20 0856 98.7 °F (37.1 °C) 65 16 (!) 147/91 100 %   07/05/20 2004 97.8 °F (36.6 °C) (!) 49 16 133/70 100 %   07/05/20 1257 98.1 °F (36.7 °C) (!) 50 16 146/67 100 %   07/05/20 1255  (!) 56      07/05/20 1250  (!) 45          Intake/Output Summary (Last 24 hours) at 7/6/2020 1009  Last data filed at 7/6/2020 0655  Gross per 24 hour   Intake 930 ml   Output 900 ml   Net 30 ml        PHYSICAL EXAM:  General: Alert, cooperative, no acute distress    EENT:  EOMI. Anicteric sclerae. MMM  Resp:  CTA bilaterally, no wheezing or rales. No accessory muscle use  CV:  Regular  rhythm,  normal S1/S2, no murmurs rubs gallops, No edema  GI:  Soft, Non distended, Non tender. +Bowel sounds  Neurologic:  Alert and oriented X 3, normal speech,   Psych:   Good insight. Not anxious nor agitated  Skin:  No rashes.   No jaundice    Reviewed most current lab test results and cultures  YES  Reviewed most current radiology test results   YES  Review and summation of old records today    NO  Reviewed patient's current orders and MAR    YES  PMH/SH reviewed - no change compared to H&P  ________________________________________________________________________  Care Plan discussed with:    Comments   Patient x    Family      RN x    Care Manager x    Consultant                        Multidiciplinary team rounds were held today with , nursing, pharmacist and clinical coordinator. Patient's plan of care was discussed; medications were reviewed and discharge planning was addressed. ________________________________________________________________________  Total NON critical care TIME:  25  Minutes    Total CRITICAL CARE TIME Spent:   Minutes non procedure based      Comments   >50% of visit spent in counseling and coordination of care     ________________________________________________________________________  Brittney Garcia MD     Procedures: see electronic medical records for all procedures/Xrays and details which were not copied into this note but were reviewed prior to creation of Plan. LABS:  I reviewed today's most current labs and imaging studies.   Pertinent labs include:  Recent Labs     07/06/20  0349 07/04/20  1431 07/03/20  1214   WBC 4.7 4.4 4.6   HGB 10.0* 10.6* 10.5*   HCT 29.8* 31.4* 31.5*    307 292     Recent Labs     07/06/20  0349 07/04/20  1431 07/03/20  1214   * 138 140   K 3.4* 3.2* 3.3*    101 104   CO2 26 26 28   GLU 92 73 91   BUN 29* 24* 31*   CREA 1.84* 1.72* 2.12*   CA 7.8* 8.2* 8.3*   MG 1.0*  --  1.3*   PHOS 3.3  --   --    ALB  --  2.3* 2.2*   TBILI  --  0.5 0.4   ALT  --  16 15   INR  --   --  1.1       Signed: Brittney Garcia MD

## 2020-07-06 NOTE — PROGRESS NOTES
Problem: Mobility Impaired (Adult and Pediatric)  Goal: *Acute Goals and Plan of Care (Insert Text)  Description: FUNCTIONAL STATUS PRIOR TO ADMISSION: Patient reports that he was independent without assistive device. He reports multiple falls. Patient reports that he has a cane and walker at home. HOME SUPPORT PRIOR TO ADMISSION: The patient lived alone with no local support. Physical Therapy Goals  Initiated 7/6/2020  1. Patient will move from supine to sit and sit to supine  in bed with independence within 7 day(s). 2.  Patient will transfer from bed to chair and chair to bed with supervision/set-up using the least restrictive device within 7 day(s). 3.  Patient will perform sit to stand with supervision/set-up within 7 day(s). 4.  Patient will ambulate with minimal assistance/contact guard assist for 150 feet with the least restrictive device within 7 day(s). Outcome: Progressing Towards Goal    PHYSICAL THERAPY EVALUATION  Patient: Martínez Williamson (81 y.o. male)  Date: 7/6/2020  Primary Diagnosis: Falls frequently [R29.6]        Precautions:          ASSESSMENT  Based on the objective data described below, the patient presents with decreased balance, impaired cognition, and overall limited functional mobility. Patient pleasant and agreeable to therapy. Patient lives alone, and per chart he has experienced multiple falls. Patient performed bed mobility with stand by assist, and sit to stand transfer with minimal assistance to steady upon standing. Patient ambulated 80 feet with no assistive device and minimal to moderate assistance for balance. Patient experienced five losses of balance during ambulation which all required therapist assist to prevent fall. Patient would benefit from SNF rehab at discharge with transition to assisted living. Functional Outcome Measure: The patient scored 22/56 on the JOHNSTON outcome measure which is indicative of high fall risk.       Other factors to consider for discharge: multiple falls at home; lives alone     Patient will benefit from skilled therapy intervention to address the above noted impairments. PLAN :  Recommendations and Planned Interventions: bed mobility training, transfer training, gait training, therapeutic exercises, neuromuscular re-education, patient and family training/education, and therapeutic activities      Frequency/Duration: Patient will be followed by physical therapy:  4 times a week to address goals. Recommendation for discharge: (in order for the patient to meet his/her long term goals)  Therapy up to 5 days/week in SNF setting     This discharge recommendation:  Has been made in collaboration with the attending provider and/or case management    IF patient discharges home will need the following DME: patient reports that he has a walker and cane at home         SUBJECTIVE:   Patient stated I feel unsteady.     OBJECTIVE DATA SUMMARY:   HISTORY:    Past Medical History:   Diagnosis Date    Adverse effect of anesthesia     nasal procedure didn't work-patient awake    Alcohol abuse     Aortic regurgitation     Chronic kidney disease     CKD (chronic kidney disease), stage III ([facility] 2011    Depression     Episodic lightheadedness 07/28/2017    Episodic lightheadedness     Epistaxis 11/27/2016    Erectile dysfunction     Fall 02/11/2020    GI bleed     Gout     gout, osteoarthritis    Heart failure (HCC)     acute heart failure    Hemorrhoids     History of cardioversion 02/17/2017    Hypertension     Ill-defined condition     weakness    Multiple myeloma (HCC)     PAF (paroxysmal atrial fibrillation) ([facility] 02/17/2016    eliquis    Pulmonary HTN ([facility]     Right-sided epistaxis 11/27/2016    S/P AVR (aortic valve replacement) 07/26/2016    merrill gill md -     S/P cardiac cath 7/6/16    severe ai, normal coronaries     S/P colonoscopy 10-8-04    S/P colonoscopy with polypectomy 05/09/2016    Kar Gordon MD    Skin lesion      Past Surgical History:   Procedure Laterality Date    CARDIAC SURG PROCEDURE UNLIST      cardiac cath    CARDIAC SURG PROCEDURE UNLIST  2016    AVR    HX HEENT      nasal procedure 20 years ago    HX KNEE REPLACEMENT Bilateral     bilateral knee replacement    HX ORTHOPAEDIC Bilateral     wrist         Home Situation  Home Environment: Private residence  # Steps to Enter: 5  One/Two Story Residence: Other (Comment)(3 story)  # of Interior Steps: 9(5,9)  Height of Each Step (in): (standard)  Interior Rails: Left  Lift Chair Available: No  Living Alone: Yes  Support Systems: None  Patient Expects to be Discharged to[de-identified] Private residence  Current DME Used/Available at Home: None(pt reports he has quad cane at home but does not use it)    EXAMINATION/PRESENTATION/DECISION MAKING:   Critical Behavior:   Alert and oriented x2     Hearing: Auditory  Auditory Impairment: None    Range Of Motion:  AROM: Generally decreased, functional    PROM: Generally decreased, functional    Strength:    Strength: Generally decreased, functional    Functional Mobility:  Bed Mobility:     Supine to Sit: Stand-by assistance  Sit to Supine: Stand-by assistance  Scooting: Stand-by assistance    Transfers:  Sit to Stand: Minimum assistance  Stand to Sit: Contact guard assistance    Balance:   Sitting: Intact  Standing: Impaired; Without support  Standing - Static: Fair  Standing - Dynamic : Fair;Poor    Ambulation/Gait Training:  Distance (ft): 80 Feet (ft)  Assistive Device: Gait belt  Ambulation - Level of Assistance: Minimal assistance; Moderate assistance;Assist x1;Other (comment)(multiple LOB)  Gait Description (WDL): Exceptions to WDL  Gait Abnormalities: Decreased step clearance; Path deviations;Trunk sway increased  Base of Support: Center of gravity altered  Speed/Nano: Pace decreased (<100 feet/min)  Step Length: Left shortened;Right shortened      Functional Measure:  Vee Balance Test:    Sitting to Standin  Standing Unsupported: 1  Sitting with Back Unsupported: 4  Standing to Sittin  Transfers: 1  Standing Unsupported with Eyes Closed: 1  Standing Unsupported with Feet Together: 1  Reach Forward with Outstretched Arm: 2   Object: 1  Turn to Look Over Shoulders: 2  Turn 360 Degrees: 2  Alternate Foot on Step/Stool: 1  Standing Unsupported One Foot in Front: 1  Stand on One Le  Total: 22/56         56=Maximum possible score;   0-20=High fall risk  21-40=Moderate fall risk   41-56=Low fall risk     Based on the above components, the patient evaluation is determined to be of the following complexity level: LOW     Pain Rating:  3/10 left hand/wrist    Activity Tolerance:   Fair  Please refer to the flowsheet for vital signs taken during this treatment. After treatment patient left in no apparent distress:   Sitting in chair, Call bell within reach, and Bed / chair alarm activated    COMMUNICATION/EDUCATION:   The patients plan of care was discussed with: Registered nurse. Fall prevention education was provided and the patient/caregiver indicated understanding., Patient/family have participated as able in goal setting and plan of care. , and Patient/family agree to work toward stated goals and plan of care.     Thank you for this referral.  Ana Ivy, PT   Time Calculation: 18 mins

## 2020-07-06 NOTE — PHYSICIAN ADVISORY
Letter of Status Determination: Current Status OBSERVATION is Appropriate Pt Name:  Richmond Hernandez MR#  [id number] [facility]#   [id number] [facility]  209/01  @ [facility] hospital  
Hospitalization date  7/4/2020  2:08 PM  
Current Attending Physician  Bart Gallego MD  
Principal diagnosis  Falls Daria Stahl is a 81 y.o.male with PMH of CHF, alcohol abuse, paroxysmal AF, CKD3  who presents to ED with c/o falls. Patient has had frequent falls at home, fractured his hand and wrist 1 month ago. He called EMS today because he felt that he cannot care for himself anymore and wished to seek help. OF note he has had multiple ED presentations, 8 in the past month at [address]. Pt w/ recurrent falls, alcohol abuse, creatinine trending up [place] MCG criteria Does  NOT apply STATUS DETERMINATION  On the basis of clinical data, available documentaion, we believe that the current status of this patient as OBSERVATION is Appropriate The final decision of the patient's hospitalization status depends on the attending physician's judgment Additional comments Insurance  Payor: Kenneth Francois / Plan: [address] [place] [address] HMO / Product Type: Managed Care Medicare / Insurance Information [address] [place] [address] Phone: [phone] Subscriber: Edi Santamaria Subscriber#: [id number] Group#: [id number] Precert#:   
  
 
  
 
 
 
 
Wendy Hardin MD 
Cell: [phone] Physician Advisor

## 2020-07-06 NOTE — PROGRESS NOTES
Bedside shift change report give to Hollie RN (oncoming nurse) by Murtaza Flores RN (off going nursing). Report included SBAR, kardex, intake and output, MAR, recent results, med rec status and fall history.

## 2020-07-06 NOTE — PROGRESS NOTES
Problem: Falls - Risk of  Goal: *Absence of Falls  Description: Document Ava Moya Fall Risk and appropriate interventions in the flowsheet.   Outcome: Progressing Towards Goal  Note: Fall Risk Interventions:  Mobility Interventions: PT Consult for mobility concerns, PT Consult for assist device competence    Mentation Interventions: Bed/chair exit alarm, Door open when patient unattended, Adequate sleep, hydration, pain control, More frequent rounding, Reorient patient, Room close to nurse's station, Update white board    Medication Interventions: Teach patient to arise slowly, Patient to call before getting OOB         History of Falls Interventions: Bed/chair exit alarm, Door open when patient unattended, Room close to nurse's station         Problem: Patient Education: Go to Patient Education Activity  Goal: Patient/Family Education  Outcome: Progressing Towards Goal

## 2020-07-06 NOTE — PROGRESS NOTES
Reason for Admission:   Nilsa Quintana is a 81 y.o.male with PMH of CHF, alcohol abuse, paroxysmal AF, CKD3  who presents to ED with c/o falls. Patient has had frequent falls at home, fractured his hand and wrist 1 month ago. He called EMS today because he felt that he cannot care for himself anymore and wished to seek help. \"    Patient is here under OBSERVATION status. Has [employer]. OBS and MOON letters have been signed and placed in chart. Noted on Documentation List.                RUR Score:     24    PCP: First and Last name: Nan Blake MD   Name of Practice: [facility]   Are you a current patient: Yes/No: Yes  Approximate date of last visit: 2 weeks ago   Can you do a virtual visit with your PCP:              Resources/supports as identified by patient/family:   Son-  Geri Randle [phone]                Top Challenges facing patient (as identified by patient/family and CM): Finances/Medication cost?    Patient receives $1200 a month- SSA and a small monthly pension of $200.  @ $1400 in monthly income. Transportation? Patient has his own car. Support system or lack thereof? Has a supportive son, Geri Randle, who lives in [place]. He visits several times a month to check in on patient. Obed Oppenheim is also providing assistance for his mother who also lives in [place]. Living arrangements? Lives alone in own home           Self-care/ADLs/Cognition? Alert and oriented with occasional confusion. Current Advanced Directive/Advance Care Plan:                            Plan for utilizing home health:    Patient currently has home health services through [facility]. Transition of Care Plan:  Met with patient for assessment and discharge planning. Mr. Margie Capps feels that he can no longer live alone and needs assistance with medication administration and some ADL's. Patient assessed by therapy. Note forthcoming and therapy stated to CM that patient would benefit from stay at a SNF to increase strength and functional mobility. CM talked to patient about possible need for SNF placement and he is in agreement with plan for SNF then [facility]. Assessment and discharge planning ongoing. [address] referrals to the following rehab and SNF for possible transition- [facility], [place], [address] of [facility], [address] of [facility], [facility], [facility] and [facility]. [address] [facility] the following TEO to see if they have availability- [facility]- no availablity  [facility]- waiting for return call  [facility]- they have a bed available and requested paperwork. The private pay fee is $1500/month.     CM called and spoke to patient's son, Jayden Bolivar [phone] about setting up a telephone conference with patient and son on Tuesday, 7/7 at [facility] [address], BSW/NAZANIN  [phone]

## 2020-07-07 LAB
ANION GAP SERPL CALC-SCNC: 3 MMOL/L (ref 5–15)
BUN SERPL-MCNC: 23 MG/DL (ref 6–20)
BUN/CREAT SERPL: 13 (ref 12–20)
CALCIUM SERPL-MCNC: 8.4 MG/DL (ref 8.5–10.1)
CHLORIDE SERPL-SCNC: 100 MMOL/L (ref 97–108)
CO2 SERPL-SCNC: 33 MMOL/L (ref 21–32)
CREAT SERPL-MCNC: 1.83 MG/DL (ref 0.7–1.3)
GLUCOSE SERPL-MCNC: 136 MG/DL (ref 65–100)
MAGNESIUM SERPL-MCNC: 1.4 MG/DL (ref 1.6–2.4)
PHOSPHATE SERPL-MCNC: 2.9 MG/DL (ref 2.6–4.7)
POTASSIUM SERPL-SCNC: 3.8 MMOL/L (ref 3.5–5.1)
SODIUM SERPL-SCNC: 136 MMOL/L (ref 136–145)

## 2020-07-07 PROCEDURE — 99218 HC RM OBSERVATION: CPT

## 2020-07-07 PROCEDURE — 74011250637 HC RX REV CODE- 250/637: Performed by: INTERNAL MEDICINE

## 2020-07-07 PROCEDURE — 97116 GAIT TRAINING THERAPY: CPT | Performed by: PHYSICAL THERAPIST

## 2020-07-07 PROCEDURE — 97112 NEUROMUSCULAR REEDUCATION: CPT | Performed by: PHYSICAL THERAPIST

## 2020-07-07 PROCEDURE — 36415 COLL VENOUS BLD VENIPUNCTURE: CPT

## 2020-07-07 PROCEDURE — 80048 BASIC METABOLIC PNL TOTAL CA: CPT

## 2020-07-07 PROCEDURE — 84100 ASSAY OF PHOSPHORUS: CPT

## 2020-07-07 PROCEDURE — 83735 ASSAY OF MAGNESIUM: CPT

## 2020-07-07 PROCEDURE — 94760 N-INVAS EAR/PLS OXIMETRY 1: CPT

## 2020-07-07 PROCEDURE — 74011250637 HC RX REV CODE- 250/637: Performed by: STUDENT IN AN ORGANIZED HEALTH CARE EDUCATION/TRAINING PROGRAM

## 2020-07-07 RX ORDER — LANOLIN ALCOHOL/MO/W.PET/CERES
3 CREAM (GRAM) TOPICAL
Status: DISCONTINUED | OUTPATIENT
Start: 2020-07-07 | End: 2020-07-08 | Stop reason: SDUPTHER

## 2020-07-07 RX ORDER — LANOLIN ALCOHOL/MO/W.PET/CERES
800 CREAM (GRAM) TOPICAL ONCE
Status: COMPLETED | OUTPATIENT
Start: 2020-07-07 | End: 2020-07-07

## 2020-07-07 RX ORDER — LANOLIN ALCOHOL/MO/W.PET/CERES
3 CREAM (GRAM) TOPICAL
Status: DISCONTINUED | OUTPATIENT
Start: 2020-07-07 | End: 2020-07-11 | Stop reason: HOSPADM

## 2020-07-07 RX ADMIN — ESCITALOPRAM OXALATE 10 MG: 10 TABLET ORAL at 08:51

## 2020-07-07 RX ADMIN — Medication 10 ML: at 21:36

## 2020-07-07 RX ADMIN — Medication 10 ML: at 06:38

## 2020-07-07 RX ADMIN — MELATONIN 3 MG: at 21:36

## 2020-07-07 RX ADMIN — ATORVASTATIN CALCIUM 20 MG: 10 TABLET, FILM COATED ORAL at 21:36

## 2020-07-07 RX ADMIN — POTASSIUM BICARBONATE 20 MEQ: 782 TABLET, EFFERVESCENT ORAL at 08:52

## 2020-07-07 RX ADMIN — Medication 100 MG: at 08:52

## 2020-07-07 RX ADMIN — FERROUS SULFATE TAB 325 MG (65 MG ELEMENTAL FE) 325 MG: 325 (65 FE) TAB at 08:52

## 2020-07-07 RX ADMIN — AMLODIPINE BESYLATE 5 MG: 5 TABLET ORAL at 08:52

## 2020-07-07 RX ADMIN — Medication 10 ML: at 13:32

## 2020-07-07 RX ADMIN — FOLIC ACID 1 MG: 1 TABLET ORAL at 08:51

## 2020-07-07 RX ADMIN — SALINE NASAL SPRAY 2 SPRAY: 1.5 SOLUTION NASAL at 21:35

## 2020-07-07 RX ADMIN — APIXABAN 2.5 MG: 2.5 TABLET, FILM COATED ORAL at 18:18

## 2020-07-07 RX ADMIN — SALINE NASAL SPRAY 2 SPRAY: 1.5 SOLUTION NASAL at 12:16

## 2020-07-07 RX ADMIN — BUMETANIDE 1 MG: 1 TABLET ORAL at 08:52

## 2020-07-07 RX ADMIN — ACETAMINOPHEN 1000 MG: 500 TABLET, FILM COATED ORAL at 21:36

## 2020-07-07 RX ADMIN — ACETAMINOPHEN 1000 MG: 500 TABLET, FILM COATED ORAL at 16:50

## 2020-07-07 RX ADMIN — SALINE NASAL SPRAY 2 SPRAY: 1.5 SOLUTION NASAL at 18:18

## 2020-07-07 RX ADMIN — SALINE NASAL SPRAY 2 SPRAY: 1.5 SOLUTION NASAL at 08:52

## 2020-07-07 RX ADMIN — Medication 800 MG: at 21:36

## 2020-07-07 RX ADMIN — THERA TABS 1 TABLET: TAB at 08:52

## 2020-07-07 RX ADMIN — APIXABAN 2.5 MG: 2.5 TABLET, FILM COATED ORAL at 08:52

## 2020-07-07 RX ADMIN — TIMOLOL MALEATE 1 DROP: 5 SOLUTION/ DROPS OPHTHALMIC at 08:52

## 2020-07-07 RX ADMIN — ACETAMINOPHEN 1000 MG: 500 TABLET, FILM COATED ORAL at 08:52

## 2020-07-07 NOTE — PROGRESS NOTES
Bedside and Verbal shift change report given to Hollie Chen (oncoming nurse) by Florin Cardoza (offgoing nurse). Report included the following information SBAR, Kardex, Intake/Output, MAR, Recent Results and Med Rec Status.

## 2020-07-07 NOTE — PROGRESS NOTES
Lab drawn at right wrists, patient tolerated. The patient is a difficult stick, veins roll. Blood taking to the lab.

## 2020-07-07 NOTE — PROGRESS NOTES
LOREE- IDT met today to discuss plan of care. OT/PT working with patient to increase strength and functional mobility. 1300 CM met with patient and had son, Lety Thompson [phone], on conference call to discuss discharge plans. Patient is now in agreement with transitioning to [facility]. Mr. Milan Service is also willing for son to become his POA and assist with with his finances. CM working on placement and has called TEO's in [facility]. [facility] has been completed and is in process of being submitted. [facility] to begin working with patient on applying for [address] [place] [address]. Son will be coming into [place] tomorrow. 1500 Paperwork has been sent to [facility] and [facility].            POWER Colón/NAZANIN  [phone]

## 2020-07-07 NOTE — PROGRESS NOTES
I BEDSIDE_VERBALshift change report given to Hollie RN (oncoming nurse) by   Matt Devlin RN (offgoing nurse).  Report included the following information SBAR, Kardex, Accordion, Recent Results and Med Rec Status, Standy by for using a urinal.

## 2020-07-07 NOTE — PROGRESS NOTES
Problem: Falls - Risk of  Goal: *Absence of Falls  Description: Document Nely Merchant Fall Risk and appropriate interventions in the flowsheet.   Outcome: Progressing Towards Goal  Note: Fall Risk Interventions:  Mobility Interventions: Bed/chair exit alarm, PT Consult for mobility concerns, PT Consult for assist device competence, Strengthening exercises (ROM-active/passive), Utilize walker, cane, or other assistive device    Mentation Interventions: Adequate sleep, hydration, pain control, Bed/chair exit alarm, Door open when patient unattended, Room close to nurse's station    Medication Interventions: Bed/chair exit alarm, Evaluate medications/consider consulting pharmacy, Patient to call before getting OOB, Teach patient to arise slowly         History of Falls Interventions: Bed/chair exit alarm, Door open when patient unattended         Problem: Patient Education: Go to Patient Education Activity  Goal: Patient/Family Education  Outcome: Progressing Towards Goal     Problem: Pain  Goal: *Control of Pain  Outcome: Progressing Towards Goal  Goal: *PALLIATIVE CARE:  Alleviation of Pain  Outcome: Progressing Towards Goal     Problem: Patient Education: Go to Patient Education Activity  Goal: Patient/Family Education  Outcome: Progressing Towards Goal     Problem: Patient Education: Go to Patient Education Activity  Goal: Patient/Family Education  Outcome: Progressing Towards Goal     Problem: Patient Education: Go to Patient Education Activity  Goal: Patient/Family Education  Outcome: Progressing Towards Goal

## 2020-07-07 NOTE — PROGRESS NOTES
Bedside shift report received from Hollie Chen. Pt assist with urinal.pt resting comfortably. call bell in reach,bed alarm on for safety. 9434 pt ambulated in the hallway with PT.     1120 unable to draw blood after two tries, will let supervisor know. [place] blood work done by [employer].

## 2020-07-07 NOTE — PROGRESS NOTES
Hospitalist Progress Note    NAME: Destiney Willis. :  1935   MRN:  [id number]   Room Number:  005/64  @ [facility]       Interim Hospital Summary: 81 y.o. male whom presented on 2020 with      Assessment / Plan:  Frequent falls POA  Due to physical deconditioning, alcohol intoxication. Could have underlying dementia.         - PT/OT following, recommend SNF with [facility]  - SNF to discuss with son and patient at 1 PM today.         Hypomagnesemia POA   Hypokalemia POA  - repleted yesterday. - Repeat levels today.             Alcohol abuse  POA:   ETOH level elevated 29 on admission. Admits to drinking beers daily.      - Thiamine, folic acid. - No s/s withdrawal, discontinue CIWA.         Chronic Diastolic Heart failure,not in exacerbation, EF 60%   S/p Aortic valve replacement  Paroxysmal atrial fibrillation   Pro BNP elevated but clinically euvolemic      - Continue bumetanide,amlodipine, apixaban.   - Metoprolol held due to low HR.          CKD 3 POA  Likely due hypertension. Cr at baseline.      - Strict Is and Os, renally dose meds, avoid nephrotoxic medications.         Normochromic normocytic anemia POA   H/H at baseline. Hx of hemorrhoids. Hx of colon polyps.             Lab Results   Component Value Date/Time     Iron 36 2016 03:54 AM     TIBC 206 (L) 2016 03:54 AM     Iron % saturation 17 (L) 2016 03:54 AM                Lab Results   Component Value Date/Time     Vitamin B12 558 2018 11:11 AM     Folate 7.7 2018 11:11 AM      - replete iron.            Fracture of hand, chronic POA   Olecranon bursitis, right   Hx of frequent falls, Fracture of left 4th metacarpal and scapholunate noted on imaging. Patient has persistent swelling and inabilty to use extremity.  Right elbow Exray suggestive of olecranon bursitis.      - OT consult  - Warm compresses  - Acetaminophen PRN        Body mass index is 22.12 kg/m².   At nutritional risk  Risk Factors: Hx Etoh abuse; CKD; Depression; HF; MM; HTN. ... C/o Falls     Clinical Indicators: Albumin: 2.3.... Giacomo Rein Giacomo Rein BMI: 22.12. ... Giacomo Rein Giacomo Rein physical deconditioning      Treatment: RD consult; Meals/Snacks: General/healthful diet:  Continue diet as tolerated; Supplements: Commercial supplement; RD will order ensure shakes po BID with meals.               Code Status: full   Surrogate Decision Maker:  SonParveen [phone]     DVT Prophylaxis: apixaban  GI Prophylaxis: not indicated  Baseline: ambulates           Subjective:     Chief Complaint / Reason for Physician Visit  \"im doing well\". Discussed with RN events overnight. Review of Systems:  No fevers, chills, appetite change, cough, sputum production, shortness of breath, dyspnea on exertion, nausea, vomitting, diarrhea, constipation, chest pain, leg edema, abdominal pain,  rash, itching. Tolerating PT/OT. Tolerating diet. Objective:     VITALS:   Last 24hrs VS reviewed since prior progress note. Most recent are:  Patient Vitals for the past 24 hrs:   Temp Pulse Resp BP SpO2   07/07/20 0848 97.4 °F (36.3 °C) 67 16 (!) 148/94 100 %   07/06/20 1953 98.2 °F (36.8 °C) 62 17 142/78 100 %   07/06/20 1616 98.2 °F (36.8 °C) (!) 46 16 129/79 100 %       Intake/Output Summary (Last 24 hours) at 7/7/2020 1416  Last data filed at 7/7/2020 0850  Gross per 24 hour   Intake 200 ml   Output 500 ml   Net -300 ml        PHYSICAL EXAM:  General: Alert, cooperative, no acute distress    EENT:  EOMI. Anicteric sclerae. MMM  Resp:  CTA bilaterally, no wheezing or rales. No accessory muscle use  CV:  Regular  rhythm,  normal S1/S2, no murmurs rubs gallops, No edema  GI:  Soft, Non distended, Non tender. +Bowel sounds  Neurologic:  Alert and oriented X 3, normal speech,   Psych:   Good insight. Not anxious nor agitated  Skin:  No rashes.   No jaundice    Reviewed most current lab test results and cultures  YES  Reviewed most current radiology test results YES  Review and summation of old records today    NO  Reviewed patient's current orders and MAR    YES  PMH/SH reviewed - no change compared to H&P  ________________________________________________________________________  Care Plan discussed with:    Comments   Patient x    Family      RN x    Care Manager x    Consultant                        Multidiciplinary team rounds were held today with , nursing, pharmacist and clinical coordinator. Patient's plan of care was discussed; medications were reviewed and discharge planning was addressed. ________________________________________________________________________  Total NON critical care TIME:  25 Minutes    Total CRITICAL CARE TIME Spent:   Minutes non procedure based      Comments   >50% of visit spent in counseling and coordination of care     ________________________________________________________________________  Randell Guerra MD     Procedures: see electronic medical records for all procedures/Xrays and details which were not copied into this note but were reviewed prior to creation of Plan. LABS:  I reviewed today's most current labs and imaging studies.   Pertinent labs include:  Recent Labs     07/06/20  0349 07/04/20  1431   WBC 4.7 4.4   HGB 10.0* 10.6*   HCT 29.8* 31.4*    307     Recent Labs     07/06/20  0349 07/04/20  1431   * 138   K 3.4* 3.2*    101   CO2 26 26   GLU 92 73   BUN 29* 24*   CREA 1.84* 1.72*   CA 7.8* 8.2*   MG 1.0*  --    PHOS 3.3  --    ALB  --  2.3*   TBILI  --  0.5   ALT  --  16       Signed: Randell Guerra MD

## 2020-07-07 NOTE — PROGRESS NOTES
[facility] shift change report given to Susan Ortiz RN (oncoming nurse) by Milind Jacinto (offgoing nurse). Report included the following information SBAR, Kardex, Intake/Output, MAR, Accordion, Recent Results, Med Rec Status and Alarm Parameters .

## 2020-07-07 NOTE — PROGRESS NOTES
Problem: Mobility Impaired (Adult and Pediatric)  Goal: *Acute Goals and Plan of Care (Insert Text)  Description: FUNCTIONAL STATUS PRIOR TO ADMISSION: Patient reports that he was independent without assistive device. He reports multiple falls. Patient reports that he has a cane and walker at home. HOME SUPPORT PRIOR TO ADMISSION: The patient lived alone with no local support. Physical Therapy Goals  Initiated 7/6/2020  1. Patient will move from supine to sit and sit to supine  in bed with independence within 7 day(s). 2.  Patient will transfer from bed to chair and chair to bed with supervision/set-up using the least restrictive device within 7 day(s). 3.  Patient will perform sit to stand with supervision/set-up within 7 day(s). 4.  Patient will ambulate with minimal assistance/contact guard assist for 150 feet with the least restrictive device within 7 day(s). Outcome: Progressing Towards Goal    PHYSICAL THERAPY TREATMENT  Patient: Virgilio Fleming (81 y.o. male)  Date: 7/7/2020  Diagnosis: Falls frequently [R29.6]   <principal problem not specified>       Precautions: Fall  Chart, physical therapy assessment, plan of care and goals were reviewed. ASSESSMENT  Patient continues with skilled PT services and is progressing towards goals. Patient presents with improving balance and mobility today. Patient stood with contact guard assistance. Patient ambulated 100 feet x3 with CGA-MIN A. No overt losses of balance today. Patient fatigued after walk, but reports feeling good. Patient tolerated numerous balance activities well with rest breaks provided as needed. Patient is very pleasant and demonstrates good participation. Patient will continue to benefit from SNF rehab at discharge with transition to [facility].      Current Level of Function Impacting Discharge (mobility/balance): CGA-MIN A    Other factors to consider for discharge: fall risk; impaired memory         PLAN :  Patient continues to benefit from skilled intervention to address the above impairments. Continue treatment per established plan of care. to address goals. Recommendation for discharge: (in order for the patient to meet his/her long term goals)  Therapy up to 5 days/week in SNF setting than transition to intermediate    This discharge recommendation:  Has been made in collaboration with the attending provider and/or case management    IF patient discharges home will need the following DME: none       SUBJECTIVE:   Patient stated I feel better today.     OBJECTIVE DATA SUMMARY:   Critical Behavior:  Neurologic State: Alert, Appropriate for age  Orientation Level: Oriented to person, Oriented to place, Oriented to situation, Disoriented to time  Cognition: Follows commands, Memory loss, Impaired decision making  Safety/Judgement: Decreased insight into deficits, Decreased awareness of need for safety, Decreased awareness of environment, Fall prevention  Functional Mobility Training:  Bed Mobility:     Supine to Sit: Stand-by assistance  Sit to Supine: Stand-by assistance  Scooting: Stand-by assistance        Transfers:  Sit to Stand: Contact guard assistance  Stand to Sit: Contact guard assistance    Balance:  Sitting: Intact  Standing: Impaired; Without support  Standing - Static: Good  Standing - Dynamic : Good;Fair    Ambulation/Gait Training:  Distance (ft): 100 Feet (ft)(X3)  Assistive Device: Gait belt  Ambulation - Level of Assistance: Contact guard assistance;Minimal assistance  Gait Abnormalities: Decreased step clearance  Speed/Nano: Pace decreased (<100 feet/min)  Step Length: Right shortened;Left shortened      Neuromuscular Reeducation:   Static standing with eyes open: 60 seconds  Static standing with eyes closed: 30 seconds  Semi tandem stance: 10 seconds (left LE behind) and 20 seconds (RLE behind)  Marches without UE support: 10 reps  SLS with unilateral two finger support: 20 seconds each side  Static standing with head turns (side to side and up/down): 10 reps each; no LOB    Pain Rating:  No pain    Activity Tolerance:   Good  Please refer to the flowsheet for vital signs taken during this treatment. After treatment patient left in no apparent distress:   Supine in bed, Call bell within reach, and Bed / chair alarm activated    COMMUNICATION/COLLABORATION:   The patients plan of care was discussed with: Registered nurse.      Major Click, PT   Time Calculation: 28 mins

## 2020-07-07 NOTE — PROGRESS NOTES
Initial Nutrition Assessment:    INTERVENTIONS/RECOMMENDATIONS:   · Meals/Snacks: General/healthful diet:  Continue diet as tolerated. · Supplements: Commercial supplement:   RD will order ensure shakes po BID with meals. ASSESSMENT:   7/7:  Chart reviewed; med noted for fall, fx. Hx of multiple myeloma. Diet Order: Cardiac  % Eaten:    Patient Vitals for the past 72 hrs:   % Diet Eaten   07/06/20 1320 75 %   07/06/20 1031 75 %   07/05/20 1600 50 %   07/05/20 1300 50 %   07/05/20 0800 50 %   07/04/20 2039 100 %      %Supplement Intake:    Pertinent Medications: [x]Reviewed []Other  Pertinent Labs: [x]Reviewed []Other  Food Allergies: [x]None []Other   Last BM:    [x]Active     []Hyperactive  []Hypoactive       [] Absent BS  Skin:    [x] Intact   [] Incision  [] Breakdown  [] Other:    Anthropometrics:   Height: 5' 8\" (172.7 cm) Weight: 66.7 kg (147 lb 1.6 oz)   IBW (%IBW):   ( ) UBW (%UBW):   (  %)   Last Weight Metrics:  Weight Loss Metrics 7/6/2020 7/3/2020 6/30/2020 6/29/2020 6/19/2020 6/16/2020 6/6/2020   Today's Wt 147 lb 1.6 oz 144 lb 11.2 oz 145 lb 150 lb 148 lb 14.4 oz 150 lb 150 lb   BMI 22.37 kg/m2 22 kg/m2 22.05 kg/m2 22.81 kg/m2 24.78 kg/m2 24.96 kg/m2 22.81 kg/m2       BMI: Body mass index is 22.37 kg/m². This BMI is indicative of:   []Underweight    [x]Normal    []Overweight    [] Obesity   [] Extreme Obesity (BMI>40)     Estimated Nutrition Needs (Based on):   1739 Kcals/day(BMR (1338) x 1. 3AF) , 67 g(1.0 g/kg bw) Protein  Carbohydrate:  At Least 130 g/day  Fluids: 1700 mL/day (1ml/kcal)    NUTRITION DIAGNOSES:   Problem:  No nutritional diagnosis at this time      Etiology: related to       Signs/Symptoms: as evidenced by        NUTRITION INTERVENTIONS:  Meals/Snacks: General/healthful diet   Supplements: Commercial supplement              GOAL:   PO intake at least 50% of meals next 3-5 days    LEARNING NEEDS (Diet, Food/Nutrient-Drug Interaction):    [x] None Identified   [] Identified and Education Provided/Documented   [] Identified and Pt declined/was not appropriate     Cultureal, Baptism, OR Ethnic Dietary Needs:    [x] None Identified   [] Identified and Addressed     [x] Interdisciplinary Care Plan Reviewed/Documented    [x] Discharge Planning:  Continue cardiac diet     MONITORING /EVALUATION:   Food/Nutrient Intake Outcomes:  Total energy intake  Physical Signs/Symptoms Outcomes: Weight/weight change    NUTRITION RISK:    [x] Patient At Nutritional Risk        [] Patient Not At Nutritional Risk    PT SEEN FOR:    [x]  MD Consult: []Calorie Count      []Diabetic Diet Education        []Diet Education     []Electrolyte Management     [x]General Nutrition Management and Supplements     []Management of Tube Feeding     []TPN Recommendations    []  RN Referral:  []MST score >=2     []Enteral/Parenteral Nutrition PTA     []Pregnant: Gestational DM or Multigestation     []Pressure Ulcer/Wound Care needs        []  Low BMI  []  [facility], [address] [place] [address]  Pager [phone]  Weekend Pager [phone]

## 2020-07-07 NOTE — PROGRESS NOTES
Physician messaged for sleeping pill. Epic still down from physician side and hospitalist stated that they would place order when access was available.

## 2020-07-08 ENCOUNTER — APPOINTMENT (OUTPATIENT)
Dept: GENERAL RADIOLOGY | Age: 85
DRG: 897 | End: 2020-07-08
Attending: FAMILY MEDICINE
Payer: MEDICARE

## 2020-07-08 LAB
ANION GAP SERPL CALC-SCNC: 6 MMOL/L (ref 5–15)
BUN SERPL-MCNC: 25 MG/DL (ref 6–20)
BUN/CREAT SERPL: 15 (ref 12–20)
CALCIUM SERPL-MCNC: 7.9 MG/DL (ref 8.5–10.1)
CHLORIDE SERPL-SCNC: 100 MMOL/L (ref 97–108)
CO2 SERPL-SCNC: 31 MMOL/L (ref 21–32)
CREAT SERPL-MCNC: 1.7 MG/DL (ref 0.7–1.3)
GLUCOSE SERPL-MCNC: 78 MG/DL (ref 65–100)
MAGNESIUM SERPL-MCNC: 1.4 MG/DL (ref 1.6–2.4)
PHOSPHATE SERPL-MCNC: 2.9 MG/DL (ref 2.6–4.7)
POTASSIUM SERPL-SCNC: 3.4 MMOL/L (ref 3.5–5.1)
SODIUM SERPL-SCNC: 137 MMOL/L (ref 136–145)

## 2020-07-08 PROCEDURE — 97530 THERAPEUTIC ACTIVITIES: CPT

## 2020-07-08 PROCEDURE — 73110 X-RAY EXAM OF WRIST: CPT

## 2020-07-08 PROCEDURE — 74011250637 HC RX REV CODE- 250/637: Performed by: STUDENT IN AN ORGANIZED HEALTH CARE EDUCATION/TRAINING PROGRAM

## 2020-07-08 PROCEDURE — 99218 HC RM OBSERVATION: CPT

## 2020-07-08 PROCEDURE — 36415 COLL VENOUS BLD VENIPUNCTURE: CPT

## 2020-07-08 PROCEDURE — 83735 ASSAY OF MAGNESIUM: CPT

## 2020-07-08 PROCEDURE — 74011250637 HC RX REV CODE- 250/637: Performed by: FAMILY MEDICINE

## 2020-07-08 PROCEDURE — 93005 ELECTROCARDIOGRAM TRACING: CPT

## 2020-07-08 PROCEDURE — 97112 NEUROMUSCULAR REEDUCATION: CPT | Performed by: PHYSICAL THERAPIST

## 2020-07-08 PROCEDURE — 80048 BASIC METABOLIC PNL TOTAL CA: CPT

## 2020-07-08 PROCEDURE — 97116 GAIT TRAINING THERAPY: CPT | Performed by: PHYSICAL THERAPIST

## 2020-07-08 PROCEDURE — 84100 ASSAY OF PHOSPHORUS: CPT

## 2020-07-08 RX ORDER — LANOLIN ALCOHOL/MO/W.PET/CERES
400 CREAM (GRAM) TOPICAL DAILY
Status: DISCONTINUED | OUTPATIENT
Start: 2020-07-08 | End: 2020-07-11 | Stop reason: HOSPADM

## 2020-07-08 RX ADMIN — SALINE NASAL SPRAY 2 SPRAY: 1.5 SOLUTION NASAL at 21:42

## 2020-07-08 RX ADMIN — TIMOLOL MALEATE 1 DROP: 5 SOLUTION/ DROPS OPHTHALMIC at 08:57

## 2020-07-08 RX ADMIN — SALINE NASAL SPRAY 2 SPRAY: 1.5 SOLUTION NASAL at 09:11

## 2020-07-08 RX ADMIN — Medication 400 MG: at 12:34

## 2020-07-08 RX ADMIN — Medication 100 MG: at 09:10

## 2020-07-08 RX ADMIN — THERA TABS 1 TABLET: TAB at 09:10

## 2020-07-08 RX ADMIN — ACETAMINOPHEN 1000 MG: 500 TABLET, FILM COATED ORAL at 17:59

## 2020-07-08 RX ADMIN — ACETAMINOPHEN 1000 MG: 500 TABLET, FILM COATED ORAL at 09:10

## 2020-07-08 RX ADMIN — ESCITALOPRAM OXALATE 10 MG: 10 TABLET ORAL at 09:10

## 2020-07-08 RX ADMIN — SALINE NASAL SPRAY 2 SPRAY: 1.5 SOLUTION NASAL at 18:00

## 2020-07-08 RX ADMIN — Medication 10 ML: at 06:14

## 2020-07-08 RX ADMIN — Medication 5 ML: at 14:00

## 2020-07-08 RX ADMIN — Medication 10 ML: at 21:37

## 2020-07-08 RX ADMIN — APIXABAN 2.5 MG: 2.5 TABLET, FILM COATED ORAL at 18:00

## 2020-07-08 RX ADMIN — BUMETANIDE 1 MG: 1 TABLET ORAL at 09:10

## 2020-07-08 RX ADMIN — ATORVASTATIN CALCIUM 20 MG: 10 TABLET, FILM COATED ORAL at 21:32

## 2020-07-08 RX ADMIN — ACETAMINOPHEN 1000 MG: 500 TABLET, FILM COATED ORAL at 21:32

## 2020-07-08 RX ADMIN — FERROUS SULFATE TAB 325 MG (65 MG ELEMENTAL FE) 325 MG: 325 (65 FE) TAB at 09:10

## 2020-07-08 RX ADMIN — POTASSIUM BICARBONATE 20 MEQ: 782 TABLET, EFFERVESCENT ORAL at 09:11

## 2020-07-08 RX ADMIN — APIXABAN 2.5 MG: 2.5 TABLET, FILM COATED ORAL at 09:11

## 2020-07-08 RX ADMIN — FOLIC ACID 1 MG: 1 TABLET ORAL at 09:10

## 2020-07-08 RX ADMIN — AMLODIPINE BESYLATE 5 MG: 5 TABLET ORAL at 09:10

## 2020-07-08 NOTE — PROGRESS NOTES
0800- Patient assessed, no pain at the moment, vitals show still roel with BP elevated, morning meds include BP medications  1000- Rounds completed on patient, MD will order cardiac consult for aflutter/roel and concern over beta blocker being held. Patient says he no longer is feeling dizzy when standing. MD also ordering XRay of left wrist- wrist still swollen and weak. MD to check on fracture healing and see if pt needs hand surgeon. 1130- Patient had large soft green BM, bath and linen change completed with PCT. Patient says he feels comfortable and no complaints. 1400- OT has seen patient, provided teaching and tools to assist patient with eating. OT agrees with assessment of left hand being issue requiring specialist such as hand surgeon. Xray completed on left hand and MD aware.

## 2020-07-08 NOTE — PROGRESS NOTES
Problem: Mobility Impaired (Adult and Pediatric)  Goal: *Acute Goals and Plan of Care (Insert Text)  Description: FUNCTIONAL STATUS PRIOR TO ADMISSION: Patient reports that he was independent without assistive device. He reports multiple falls. Patient reports that he has a cane and walker at home. HOME SUPPORT PRIOR TO ADMISSION: The patient lived alone with no local support. Physical Therapy Goals  Initiated 7/6/2020  1. Patient will move from supine to sit and sit to supine  in bed with independence within 7 day(s). 2.  Patient will transfer from bed to chair and chair to bed with supervision/set-up using the least restrictive device within 7 day(s). 3.  Patient will perform sit to stand with supervision/set-up within 7 day(s). 4.  Patient will ambulate with minimal assistance/contact guard assist for 150 feet with the least restrictive device within 7 day(s). Outcome: Progressing Towards Goal     PHYSICAL THERAPY TREATMENT  Patient: Dante Alex (81 y.o. male)  Date: 7/8/2020  Diagnosis: Falls frequently [R29.6]   <principal problem not specified>       Precautions: Fall  Chart, physical therapy assessment, plan of care and goals were reviewed. ASSESSMENT  Patient continues with skilled PT services and is progressing towards goals. Patient continues to demonstrate good participation and motivation to improve balance. Patient remains stand by assist with bed mobility, and CGA to stand. Patient ambulated 100 feet x3 with CGA to MIN A. Patient demonstrates improved balance today. Patient tolerated balance activities well this date. Current Level of Function Impacting Discharge (mobility/balance): CGA-MIN A    Other factors to consider for discharge: fall risk; memory issues         PLAN :  Patient continues to benefit from skilled intervention to address the above impairments. Continue treatment per established plan of care. to address goals.     Recommendation for discharge: (in order for the patient to meet his/her long term goals)  Therapy up to 5 days/week in SNF setting    This discharge recommendation:  Has been made in collaboration with the attending provider and/or case management    IF patient discharges home will need the following DME: none       SUBJECTIVE:   Patient stated I feel good.     OBJECTIVE DATA SUMMARY:   Critical Behavior:  Neurologic State: Alert, Appropriate for age  Orientation Level: Oriented X4  Cognition: Appropriate for age attention/concentration, Follows commands  Safety/Judgement: Decreased awareness of need for assistance, Decreased insight into deficits  Functional Mobility Training:  Bed Mobility:     Supine to Sit: Stand-by assistance  Sit to Supine: Stand-by assistance  Scooting: Stand-by assistance    Transfers:  Sit to Stand: Contact guard assistance  Stand to Sit: Contact guard assistance     Balance:  Sitting: Intact  Standing: Impaired; Without support  Standing - Static: Good  Standing - Dynamic : Good;Fair    Ambulation/Gait Training:  Distance (ft): 100 Feet (ft)(x3)  Assistive Device: Gait belt  Ambulation - Level of Assistance: Contact guard assistance;Minimal assistance    Gait Abnormalities: Decreased step clearance  Speed/Nano: Pace decreased (<100 feet/min)  Step Length: Right shortened;Left shortened    Neuromuscular Reeducation:   Static standing with eyes open: 60 seconds  Static standing with eyes closed: 30 seconds  Semi tandem stance: 30 seconds (left LE behind) and 30 seconds (RLE behind)  Marches without UE support: 10 reps  SLS with unilateral two finger support: 20 seconds each side    Pain Ratin/10    Activity Tolerance:   Good  Please refer to the flowsheet for vital signs taken during this treatment.     After treatment patient left in no apparent distress:   Sitting in chair, Call bell within reach, and Bed / chair alarm activated    COMMUNICATION/COLLABORATION:   The patients plan of care was discussed with: Registered nurse.     Luis Alcocer, PT   Time Calculation: 31 mins

## 2020-07-08 NOTE — PROGRESS NOTES
LOREE-  IDT met to discuss plan of care. MD is ordering a cardiac consult and x-ray of left wrist as it is swollen. May need to see hand surgeon. CM called son to check on when he will be in town today. Left message for return call. Received call from Melba Daily [phone] at [facility]. She has requested UAI be sent and T number for Medicaid application. Fax number is [phone]. 9419 UAI has been faxed to [facility]. SNF has received authorization for skilled stay. Discharge date is not determined as patient is being followed by cardiology. OT has seen patient regarding left hand mobility issues. 57  Talked with Lynsey Bhandari at [facility] [phone], an [facility] in [facility]. They have availability and would be willing to accept patient after his stay at SNF.         Radha Ling, POWER/NAZANIN  [phone]

## 2020-07-08 NOTE — PROGRESS NOTES
2000: Pt resting quietly in bed. Physician texted and sleeping medication again requested. 2200:  Resting quietly in bed. No complaints of pain or discomfort at this time. Will continue to monitor.

## 2020-07-08 NOTE — PROGRESS NOTES
[facility]: MED RECONCILIATION    Comments/Recommendations:   Med rec completed by pharmacist on 7/5/20. Please refer to this note for any questions regarding PTA medications.        Ellie Leon PHARMD   Contact: [phone]

## 2020-07-08 NOTE — ACP (ADVANCE CARE PLANNING)
Advance Care Planning (ACP) Provider Note - Comprehensive     Date of ACP Conversation: 07/07/20  Persons included in Conversation:  patient  Length of ACP Conversation in minutes:  20 minutes    Authorized Decision Maker (if patient is incapable of making informed decisions): This person is:  Named in Advance Directive or [address] for ALL Patients with Decision Making Capacity:   Importance of advance care planning, including choosing a healthcare agent to communicate patient's healthcare decisions if patient lost the ability to make decisions, such as after a sudden illness or accident  Understanding of the healthcare agent role was assessed and information provided  Exploration of values, goals, and preferences if recovery is not expected, even with continued medical treatment in the event of: Imminent death  Severe, permanent brain injury  \"In these circumstances, what matters most to you? \"  Other: Patient wishes for the goal to be on quality of life. He does wish that all possible medical treatment be tried for a period of NOT MORE THAN 1 MONTH. After that period if  there is no reasonable hope of recovery to a state where he is aware of himself, his surroundings, able to interact with others, he does not wish for life prolonging treatment and wishes to have comfort care  \"What, if any, treatments would you want to avoid? \" patient does not want to be dependent on machines such as dialysis, ventilator, feeding tube for a period of greater than 1 month   Opportunity offered to explore how cultural, Alevism, spiritual, or personal beliefs would affect decisions for future care     Review of Existing Advance Directive:  What information were you given about medical decisions to consider before completing your advance directive? prognosis, diffrent examples of clinical scenarios, opportunity to explore spiritual beliefs  What is your understanding of your agent's willingness to honor your wishes, even if he/she may not agree with them? will fully honor   Does this advance directive still reflect your preferences? Yes (Provide new form/Refer for assistance in updating)    For Serious or Chronic Illness:  Understanding of medical condition    Understanding of CPR, goals and expected outcomes, benefits and burdens discussed. Information on CPR success rates provided (e.g. for CPR in hospital, survival to d/c at two weeks is 22%, for chronically ill or elderly/frail survival is less than 3%); Individual asked to communicate understanding of information in his/her own words.   Explored fears and concerns regarding CPR or possible outcomes    Interventions Provided:  Reviewed existing Advance Directive

## 2020-07-08 NOTE — PROGRESS NOTES
Problem: Self Care Deficits Care Plan (Adult)  Goal: *Acute Goals and Plan of Care (Insert Text)  Description:   FUNCTIONAL STATUS PRIOR TO ADMISSION: Patient was independent and active without use of DME. Patient reports owning a cane and walker. Per patient, he typically only eats 1 full meal a day and prior to admission, had been driving to fast-food restaurants. Patient admits to frequent falls and having increasing difficulty with maintaining his home in a safe/hygienic manner. HOME SUPPORT: The patient lived alone with no local support. Patient reports he has a son who lives in [place], comes to visit fairly often to visit his mom (patient's ex-wife) and patient. Occupational Therapy Goals  Initiated 7/6/2020  1. Patient will perform standing grooming with supervision/set-up within 7 day(s) using most appropriate DME prn.  2.  Patient will perform lower body dressing with modified independence within 7 day(s). 3.  Patient will perform toilet transfers with supervision/set-up within 7 day(s). 4.  Patient will perform all aspects of toileting with supervision/set-up within 7 day(s). 5.  Patient will utilize fall prevention techniques during functional activities with verbal cues within 7 day(s). Outcome: Progressing Towards Goal    OCCUPATIONAL THERAPY TREATMENT  Patient: Leeann Donovan (81 y.o. male)  Date: 7/8/2020  Diagnosis: Falls frequently [R29.6]   <principal problem not specified>       Precautions: Fall  Chart, occupational therapy assessment, plan of care, and goals were reviewed. ASSESSMENT  Patient continues with skilled OT services and is progressing towards goals. Nursing reports pt's L hand with increased edema and she noted pt had difficulty with self-feeding; therefore, OT focused on L hand functionality this treatment.   Good understanding regarding positional, thermal modalities to assist with edema, with pt also demonstrating good participation with presented digital opposition and composite fist challenges (against red block resistance). Pt and nursing verbalize good understanding of uses of provided built-up handles to assist with self-feeding, as well as, using LUE as stabilizer during bilateral challenges. Pt reports pt has new orders for MD to evaluate pt's L hand for possible orthopedic treatment. Pt continues to benefit from skilled OT to address functional deficits in an overall attempt at maximizing pt's highest level of safe functional independence prior to discharge. Current Level of Function Impacting Discharge (ADLs): min assist for transfers/balance; min assist for fine motor and BUE tasks    Other factors to consider for discharge: memory loss, decreased safety awareness/insight into deficits         PLAN :  Patient continues to benefit from skilled intervention to address the above impairments. Continue treatment per established plan of care. to address goals. Recommend with staff: OOB meals, active ADL engagement, assist to/from bathroom    Recommend next OT session: POC progression    Recommendation for discharge: (in order for the patient to meet his/her long term goals)  Therapy up to 5 days/week in SNF setting    This discharge recommendation:  Has not yet been discussed the attending provider and/or case management    IF patient discharges home will need the following DME: TBD       SUBJECTIVE:   Patient stated my hand doesn't hurt too bad but I can feel it when I try and use it.     OBJECTIVE DATA SUMMARY:   Cognitive/Behavioral Status:  Neurologic State: Alert; Appropriate for age  Orientation Level: Oriented X4  Cognition: Appropriate for age attention/concentration; Follows commands     Perseveration: No perseveration noted  Safety/Judgement: Decreased awareness of need for assistance;Decreased insight into deficits    ADL Intervention:  Patient and nursing educated on provided built-up handles and technique to use LUE as stabilizer during self-feeding challenges requiring bilateral integration. Good understanding verbalized    Pt engaged in LUE FM challenges to increase hand functionality, with emphasis on digital opposition, as well as, digital flexion (against red foam block resistance) and digital extension; good understanding and participation noted. Pt educated on use of ice and elevation to assist with L hand edema; good understanding noted. Cognitive Retraining  Safety/Judgement: Decreased awareness of need for assistance;Decreased insight into deficits    Pain:  3/10 during attempted L composite fist; nursing aware and following, ice applied to L hand    Activity Tolerance:   Good, Fair, and requires rest breaks  Please refer to the flowsheet for vital signs taken during this treatment. After treatment patient left in no apparent distress:   Supine in bed, Call bell within reach, Bed / chair alarm activated, and Side rails x 3    COMMUNICATION/COLLABORATION:   The patients plan of care was discussed with: Physical therapist and Registered nurse.      Itzel Rai OT  Time Calculation: 25 mins

## 2020-07-08 NOTE — PROGRESS NOTES
Hospitalist Progress Note    NAME: Dante Brian. :  1935   MRN:  [id number]   Room Number:  997/98  @ [facility]       Interim Hospital Summary: 81 y.o. male whom presented on 2020 with      Assessment / Plan:  Frequent falls POA  Due to physical deconditioning, alcohol intoxication. Could have underlying dementia.         - PT/OT following, recommend SNF with transition to [facility]  - SNF to discuss with son and patient at 1 PM today.         Hypomagnesemia POA   Hypokalemia POA  - repleted yesterday. - Repeat levels today.             Alcohol abuse  POA:   ETOH level elevated 29 on admission. Admits to drinking beers daily.      - Thiamine, folic acid. - No s/s withdrawal, discontinue CIWA.         Chronic Diastolic Heart failure,not in exacerbation, EF 60%   S/p Aortic valve replacement  Paroxysmal atrial fibrillation   Pro BNP elevated but clinically euvolemic      - Continue bumetanide,amlodipine, apixaban.   - Metoprolol held due to low HR.          CKD 3 POA  Likely due hypertension. Cr at baseline.      - Strict Is and Os, renally dose meds, avoid nephrotoxic medications.         Normochromic normocytic anemia POA   H/H at baseline. Hx of hemorrhoids. Hx of colon polyps.             Lab Results   Component Value Date/Time     Iron 36 2016 03:54 AM     TIBC 206 (L) 2016 03:54 AM     Iron % saturation 17 (L) 2016 03:54 AM                Lab Results   Component Value Date/Time     Vitamin B12 558 2018 11:11 AM     Folate 7.7 2018 11:11 AM      - replete iron.            Fracture of hand, chronic POA   Olecranon bursitis, right   Hx of frequent falls, Fracture of left 4th metacarpal and scapholunate noted on imaging. Patient has persistent swelling and inabilty to use extremity.  Right elbow Exray suggestive of olecranon bursitis.      - OT consult  - Warm compresses  - Acetaminophen PRN        Body mass index is 22.12 kg/m².   Moderate protein calorie malnutrition      - RD consult         Code Status: full   Surrogate Decision Maker:  Sarah Santiago [phone]     DVT Prophylaxis: apixaban  GI Prophylaxis: not indicated  Baseline: ambulates           Subjective:     Chief Complaint / Reason for Physician Visit  \"feeling ok\". Discussed with RN events overnight. Review of Systems:  No fevers, chills, appetite change, cough, sputum production, shortness of breath, dyspnea on exertion, nausea, vomitting, diarrhea, constipation, chest pain, leg edema, abdominal pain,  rash, itching. Tolerating PT/OT. Tolerating diet. Objective:     VITALS:   Last 24hrs VS reviewed since prior progress note. Most recent are:  Patient Vitals for the past 24 hrs:   Temp Pulse Resp BP SpO2   07/08/20 0850  (!) 51      07/08/20 0800 97.8 °F (36.6 °C) (!) 46 16 (!) 146/97 100 %   07/08/20 0400 97.6 °F (36.4 °C) (!) 54 15 141/88 100 %   07/07/20 2000 97.9 °F (36.6 °C) (!) 51 16 132/79 100 %   07/07/20 1648 97.7 °F (36.5 °C) (!) 56 16 (!) 137/91 100 %       Intake/Output Summary (Last 24 hours) at 7/8/2020 0935  Last data filed at 7/8/2020 0800  Gross per 24 hour   Intake 560 ml   Output 800 ml   Net -240 ml        PHYSICAL EXAM:  General: Alert, cooperative, no acute distress    EENT:  EOMI. Anicteric sclerae. MMM  Resp:  CTA bilaterally, no wheezing or rales. No accessory muscle use  CV:  Regular  rhythm,  normal S1/S2, no murmurs rubs gallops, No edema  GI:  Soft, Non distended, Non tender. +Bowel sounds  Neurologic:  Alert and oriented X 3, normal speech,   Psych:   Good insight. Not anxious nor agitated  Skin:  No rashes.   No jaundice    Reviewed most current lab test results and cultures  YES  Reviewed most current radiology test results   YES  Review and summation of old records today    NO  Reviewed patient's current orders and MAR    YES  PMH/SH reviewed - no change compared to H&P  ________________________________________________________________________  Care Plan discussed with:    Comments   Patient x    Family      RN x    Care Manager x    Consultant                        Multidiciplinary team rounds were held today with , nursing, pharmacist and clinical coordinator. Patient's plan of care was discussed; medications were reviewed and discharge planning was addressed. ________________________________________________________________________  Total NON critical care TIME:  25 Minutes    Total CRITICAL CARE TIME Spent:   Minutes non procedure based      Comments   >50% of visit spent in counseling and coordination of care     ________________________________________________________________________  Minor Em MD     Procedures: see electronic medical records for all procedures/Xrays and details which were not copied into this note but were reviewed prior to creation of Plan. LABS:  I reviewed today's most current labs and imaging studies.   Pertinent labs include:  Recent Labs     07/06/20  0349   WBC 4.7   HGB 10.0*   HCT 29.8*        Recent Labs     07/08/20  0414 07/07/20  1413 07/06/20  0349    136 135*   K 3.4* 3.8 3.4*    100 101   CO2 31 33* 26   GLU 78 136* 92   BUN 25* 23* 29*   CREA 1.70* 1.83* 1.84*   CA 7.9* 8.4* 7.8*   MG 1.4* 1.4* 1.0*   PHOS 2.9 2.9 3.3       Signed: Minor Em MD

## 2020-07-08 NOTE — PROGRESS NOTES
X-ray reviewed by Ortho on-call, advised follow-up outpatient, continue PT OT, maximum weight 10 pound.

## 2020-07-08 NOTE — CONSULTS
[address], [address]   Office Phone [phone]      CARDIOLOGY CONSULTATION       Date of  Admission: 7/4/2020  2:08 PM     Admission type:Emergency   Primary Care Physician:Trice Garcia MD     Attending Provider: Lenora Shah MD  Cardiology Provider: Ailyn Killian DNP, ANP-BC    CC/REASON FOR CONSULT: Bradycardia      Subjective:     Meagan Cavazos is a 81 y.o. male admitted for Falls frequently [R29.6] ETOH and debility, pending LTR placement. Cardiology consulted for persistent bradycardia, betablocker has been held for 3 days. Pt has a HX of paroxysmal atrial fib and flutter since AVR surgery in [id number] Had CV by his primary Cardiologist Dr. Jerry Jimenez in 2016. Has been maintained on Low dose Eliquis. Pt at this times denies chest pain, dizziness or shortness of breath.   Recent EKGs have demonstrated a persistent atrial fib/flutter pattern    Patient Active Problem List    Diagnosis Date Noted    Falls frequently 07/04/2020    Debility 06/19/2020    Closed fracture of fourth metacarpal bone 06/04/2020    Fall 02/11/2020    Depression     MGUS (monoclonal gammopathy of unknown significance) 02/19/2019    Hemorrhoids     Episodic lightheadedness     History of cardioversion 02/17/2017    Epistaxis 11/27/2016    Right-sided epistaxis 11/27/2016    Postoperative anemia due to acute blood loss 07/28/2016    S/P AVR (aortic valve replacement) 07/26/2016    Acute congestive heart failure ([address] 07/01/2016    ACP (advance care planning) 06/06/2016    Skin lesion     S/P colonoscopy with polypectomy 05/09/2016    GI bleed     PAF (paroxysmal atrial fibrillation) ([address] 02/17/2016    Alcohol abuse     Erectile dysfunction     Multiple myeloma ([address]     Gout     Hypertension     Aortic regurgitation     CKD (chronic kidney disease), stage III (HCC)     Pulmonary HTN ([address] aDren Lopez MD  Past Medical History:   Diagnosis Date    Adverse effect of anesthesia     nasal procedure didn't work-patient awake    Alcohol abuse     Aortic regurgitation     Chronic kidney disease     CKD (chronic kidney disease), stage III ([address]     Depression     Episodic lightheadedness 2017    Episodic lightheadedness     Epistaxis 2016    Erectile dysfunction     Fall 2020    GI bleed     Gout     gout, osteoarthritis    Heart failure (HCC)     acute heart failure    Hemorrhoids     History of cardioversion 2017    Hypertension     Ill-defined condition     weakness    Multiple myeloma (HCC)     PA[facility]oxysmal [facility] fibrillation) ([facility] 2016    eliquis    Pulmonary HTN ([facility]     Right-sided epistaxis 2016    S/P AVR (aortic valve replacement) 2016    merrill gill md -     S/P cardiac cath 16    severe ai, normal coronaries     S/P colonoscopy 10-8-04    S/P colonoscopy with polypectomy 2016    Kar Lemon MD    Skin lesion       Social History     Socioeconomic History    Marital status:      Spouse name: Not on file    Number of children: Not on file    Years of education: Not on file    Highest education level: Not on file   Tobacco Use    Smoking status: Former Smoker     Years: 4.00     Last attempt to quit: 1960     Years since quittin.5    Smokeless tobacco: Never Used   Substance and Sexual Activity    Alcohol use: Yes     Comment: ocassional    Drug use: No    Sexual activity: Not Currently   Social History Narrative    Family History: Mother:  36 yrs, Chronic anemiaFather:  80 yrs, I think aneurysm hypertensionBrother(s):  79 yrs,    obese,dm,drug abuse,miSon(s): alive, adopted1 son(s) . Kevin Sumner [phone]    Social History: Alcohol Use Patient uses alcohol, Drinks per occasion: 2, Drinks per w [place]: 10. Smoking Status Patient is a never smoker. Marital Status: . Lives w ith: alone. Occupation/W ork: employed full time insurance. Education/School: has highschool diploma.      Allergies   Allergen Reactions    Amiodarone Other (comments)     Bradycardia -excessive      Family History   Problem Relation Age of Onset    Anemia Mother     No Known Problems Father     Other Brother         drug abuse      Current Facility-Administered Medications   Medication Dose Route Frequency    magnesium oxide (MAG-OX) tablet 400 mg  400 mg Oral DAILY    melatonin tablet 3 mg  3 mg Oral QHS PRN    melatonin tablet 3 mg  3 mg Oral QHS PRN    timolol (TIMOPTIC) 0.5 % ophthalmic solution 1 Drop  1 Drop Both Eyes DAILY    traMADoL (ULTRAM) tablet 12.5 mg  12.5 mg Oral Q6H PRN    therapeutic multivitamin (THERAGRAN) tablet 1 Tab  1 Tab Oral DAILY    diclofenac (VOLTAREN) 1 % topical gel 2 g  2 g Topical QID PRN    acetaminophen (TYLENOL) tablet 1,000 mg  1,000 mg Oral TID    [Held by provider] metoprolol succinate (TOPROL-XL) XL tablet 12.5 mg  12.5 mg Oral DAILY    ferrous sulfate tablet 325 mg  1 Tab Oral DAILY WITH BREAKFAST    potassium bicarb-citric acid (EFFER-K) tablet 20 mEq  20 mEq Oral DAILY    sodium chloride (NS) flush 5-40 mL  5-40 mL IntraVENous Q8H    sodium chloride (NS) flush 5-40 mL  5-40 mL IntraVENous PRN    amLODIPine (NORVASC) tablet 5 mg  5 mg Oral DAILY    apixaban (ELIQUIS) tablet 2.5 mg  2.5 mg Oral BID    atorvastatin (LIPITOR) tablet 20 mg  20 mg Oral QHS    escitalopram oxalate (LEXAPRO) tablet 10 mg  10 mg Oral DAILY    bumetanide (BUMEX) tablet 1 mg  1 mg Oral DAILY    sodium chloride (OCEAN) 0.65 % nasal squeeze bottle 2 Spray  2 Spray Both Nostrils QID    thiamine HCL (B-1) tablet 100 mg  100 mg Oral DAILY    folic acid (FOLVITE) tablet 1 mg  1 mg Oral DAILY        Review of Symptoms:   11 systems reviewed, negative other than as stated in the HPI        Objective:      Visit Vitals  BP (!) 146/97 (BP 1 Location: Right arm, BP Patient Position: Lying right side)   Pulse (!) 51   Temp 97.8 °F (36.6 °C)   Resp 16   Ht 5' 8\" (1.727 m)   Wt 146 lb 14.4 oz (66.6 kg)   SpO2 100%   BMI 22.34 kg/m²       Physical Exam     General: Well developed, in no acute distress, cooperative and alert  HEENT: No carotid bruits, no JVD, trach is midline. [place] [facility] Heart:  Irregularly irregularly, 1/6 systolic murmur   Respiratory: Clear bilaterally x 4, no wheezing or rales  Abdomen:   Soft, non-tender, no masses, bowel sounds are active. Extremities:  No edema, normal cap refill, no cyanosis, atraumatic. Neuro: A&Ox2, (reported year was 2000) speech clear, answering questions appropriately with some limitations on timing. .    Skin: Skin color is normal. No rashes or lesions. Non diaphoretic  Vascular: 2+ pulses symmetric in all extremities    Data Review:   Recent Labs     07/06/20  0349   WBC 4.7   HGB 10.0*   HCT 29.8*        Recent Labs     07/08/20  0414 07/07/20  1413 07/06/20  0349    136 135*   K 3.4* 3.8 3.4*    100 101   CO2 31 33* 26   GLU 78 136* 92   BUN 25* 23* 29*   CREA 1.70* 1.83* 1.84*   CA 7.9* 8.4* 7.8*   MG 1.4* 1.4* 1.0*   PHOS 2.9 2.9 3.3       No results for input(s): TROIQ, CPK, CKMB in the last 72 hours. Intake/Output Summary (Last 24 hours) at 7/8/2020 1211  Last data filed at 7/8/2020 1100  Gross per 24 hour   Intake 560 ml   Output 1200 ml   Net -640 ml        Cardiographics      ECG: Er EKG bradycardia 3:1 atrial flutter    Repeat EKG 7/8/2020: Atrial flutter HR 50          Assessment:       Active Problems:    Falls frequently (7/4/2020)         Plan:     Patient is a 80 y/o male with hx of AVR, post operative PAF/flutter has been maintained on Eliquis 2.5mg BID and betablocker. Betablocker held for 3 days now due to bradycardia. EKG today demonstrates HR 50bpm off betablocker remaining in atrial flutter .   I recommend to continue to hold betablocker and have patient placed on telemetry. Will need to follow for evolving sick sinus syndrome. He has been having falling episodes, this certainly can be heart rate related requiring pacemaker placement. Will continue to follow     Thank you for this consultation. Jerson Ewing DNP, ANP-BC  Chart and note reviewed, discussed with advanced practioner and agree with recommendations and treatment plan as outlined.   MD Damián Merchant MD

## 2020-07-08 NOTE — PROGRESS NOTES
0000: Resting quietly, able to turn self. No signs or symptoms of pain or discomfort. 0200: Resting quietly, no signs or symptoms of pain or discomfort. 0400: Labs drawn without difficulty. Pt resting comfortably. 0600: Easily waken, states that he slept better with sleeping pill. Denies any discomfort at this time.

## 2020-07-08 NOTE — PROGRESS NOTES
Problem: Falls - Risk of  Goal: *Absence of Falls  Description: Document Emeka Jamison Fall Risk and appropriate interventions in the flowsheet.   Outcome: Progressing Towards Goal  Note: Fall Risk Interventions:  Mobility Interventions: Bed/chair exit alarm, PT Consult for mobility concerns, PT Consult for assist device competence, Patient to call before getting OOB, Utilize walker, cane, or other assistive device    Mentation Interventions: Adequate sleep, hydration, pain control    Medication Interventions: Bed/chair exit alarm, Teach patient to arise slowly, Patient to call before getting OOB         History of Falls Interventions: Bed/chair exit alarm, Door open when patient unattended, Room close to nurse's station         Problem: Patient Education: Go to Patient Education Activity  Goal: Patient/Family Education  Outcome: Progressing Towards Goal     Problem: Pain  Goal: *Control of Pain  Outcome: Progressing Towards Goal  Goal: *PALLIATIVE CARE:  Alleviation of Pain  Outcome: Progressing Towards Goal     Problem: Patient Education: Go to Patient Education Activity  Goal: Patient/Family Education  Outcome: Progressing Towards Goal     Problem: Patient Education: Go to Patient Education Activity  Goal: Patient/Family Education  Outcome: Progressing Towards Goal     Problem: Patient Education: Go to Patient Education Activity  Goal: Patient/Family Education  Outcome: Progressing Towards Goal

## 2020-07-08 NOTE — PROGRESS NOTES
Denise Maynard {[facility] BEDSIDE_VERBAL shift change report given to Migdalia Arndt RN (oncoming nurse) by Kaitlin Canales RN (offgoing nurse). Report included the following information SBAR, Kardex, MAR, Accordion, Recent Results, Med Rec Status and Alarm Parameters .

## 2020-07-09 PROBLEM — F03.90 DEMENTIA (HCC): Status: ACTIVE | Noted: 2020-07-09

## 2020-07-09 PROBLEM — R29.6 FREQUENT FALLS: Status: ACTIVE | Noted: 2020-07-09

## 2020-07-09 LAB
ATRIAL RATE: 182 BPM
CALCULATED P AXIS, ECG09: -98 DEGREES
CALCULATED R AXIS, ECG10: 103 DEGREES
CALCULATED T AXIS, ECG11: 99 DEGREES
DIAGNOSIS, 93000: NORMAL
Q-T INTERVAL, ECG07: 496 MS
QRS DURATION, ECG06: 100 MS
QTC CALCULATION (BEZET), ECG08: 452 MS
VENTRICULAR RATE, ECG03: 50 BPM

## 2020-07-09 PROCEDURE — 65270000029 HC RM PRIVATE

## 2020-07-09 PROCEDURE — 97112 NEUROMUSCULAR REEDUCATION: CPT | Performed by: PHYSICAL THERAPIST

## 2020-07-09 PROCEDURE — 74011250637 HC RX REV CODE- 250/637: Performed by: INTERNAL MEDICINE

## 2020-07-09 PROCEDURE — 97116 GAIT TRAINING THERAPY: CPT | Performed by: PHYSICAL THERAPIST

## 2020-07-09 PROCEDURE — 74011250637 HC RX REV CODE- 250/637: Performed by: STUDENT IN AN ORGANIZED HEALTH CARE EDUCATION/TRAINING PROGRAM

## 2020-07-09 PROCEDURE — 74011250637 HC RX REV CODE- 250/637: Performed by: FAMILY MEDICINE

## 2020-07-09 PROCEDURE — 99218 HC RM OBSERVATION: CPT

## 2020-07-09 RX ADMIN — ACETAMINOPHEN 1000 MG: 500 TABLET, FILM COATED ORAL at 21:22

## 2020-07-09 RX ADMIN — SALINE NASAL SPRAY 2 SPRAY: 1.5 SOLUTION NASAL at 17:57

## 2020-07-09 RX ADMIN — Medication 100 MG: at 09:42

## 2020-07-09 RX ADMIN — SALINE NASAL SPRAY 2 SPRAY: 1.5 SOLUTION NASAL at 14:00

## 2020-07-09 RX ADMIN — Medication 10 ML: at 21:22

## 2020-07-09 RX ADMIN — SALINE NASAL SPRAY 2 SPRAY: 1.5 SOLUTION NASAL at 21:22

## 2020-07-09 RX ADMIN — TIMOLOL MALEATE 1 DROP: 5 SOLUTION/ DROPS OPHTHALMIC at 09:43

## 2020-07-09 RX ADMIN — POTASSIUM BICARBONATE 20 MEQ: 782 TABLET, EFFERVESCENT ORAL at 09:42

## 2020-07-09 RX ADMIN — AMLODIPINE BESYLATE 5 MG: 5 TABLET ORAL at 09:42

## 2020-07-09 RX ADMIN — Medication 400 MG: at 09:42

## 2020-07-09 RX ADMIN — Medication 10 ML: at 06:00

## 2020-07-09 RX ADMIN — SALINE NASAL SPRAY 2 SPRAY: 1.5 SOLUTION NASAL at 09:43

## 2020-07-09 RX ADMIN — Medication 10 ML: at 14:00

## 2020-07-09 RX ADMIN — ESCITALOPRAM OXALATE 10 MG: 10 TABLET ORAL at 09:42

## 2020-07-09 RX ADMIN — MELATONIN 3 MG: at 21:26

## 2020-07-09 RX ADMIN — ACETAMINOPHEN 1000 MG: 500 TABLET, FILM COATED ORAL at 09:41

## 2020-07-09 RX ADMIN — ACETAMINOPHEN 1000 MG: 500 TABLET, FILM COATED ORAL at 16:21

## 2020-07-09 RX ADMIN — FERROUS SULFATE TAB 325 MG (65 MG ELEMENTAL FE) 325 MG: 325 (65 FE) TAB at 09:42

## 2020-07-09 RX ADMIN — FOLIC ACID 1 MG: 1 TABLET ORAL at 09:42

## 2020-07-09 RX ADMIN — ATORVASTATIN CALCIUM 20 MG: 10 TABLET, FILM COATED ORAL at 21:22

## 2020-07-09 RX ADMIN — BUMETANIDE 1 MG: 1 TABLET ORAL at 09:42

## 2020-07-09 RX ADMIN — APIXABAN 2.5 MG: 2.5 TABLET, FILM COATED ORAL at 09:42

## 2020-07-09 RX ADMIN — THERA TABS 1 TABLET: TAB at 09:42

## 2020-07-09 RX ADMIN — APIXABAN 2.5 MG: 2.5 TABLET, FILM COATED ORAL at 17:57

## 2020-07-09 NOTE — PROGRESS NOTES
Follow Up Nutrition Assessment:     INTERVENTIONS/RECOMMENDATIONS:   · Meals/Snacks: General/healthful diet:  Continue diet as tolerated. · Supplements: Commercial supplement:   RD ordered ensure shakes po BID with meals.     ASSESSMENT:   7/9:  Chart reviewed; med noted for fall, fx. Hx of multiple myeloma.       Diet Order: Cardiac  % Eaten:    Patient Vitals for the past 72 hrs:    % Diet Eaten   07/08/20 1320 75 %   07/07/20 1031 75 %   07/06/20 1600 50 %   07/06/20 1300 50 %   07/06/20 0800 50 %   07/05/20 2039 100 %                 %Supplement Intake:    Pertinent Medications: [x]? Reviewed []? Other  Pertinent Labs: [x]? Reviewed []? Other  Food Allergies: [x]? None []? Other   Last BM:           [x]? Active     []? Hyperactive  []? Hypoactive       []? Absent BS  Skin:     [x]? Intact          []? Incision       []? Breakdown                        []? Other:     Anthropometrics:   Height: 5' 8\" (172.7 cm)         Weight: 66.7 kg (147 lb 1.6 oz)           IBW (%IBW):   ( )        UBW (%UBW):   (  %)   Last Weight Metrics:  Weight Loss Metrics 7/9/2020 7/3/2020 6/30/2020 6/29/2020 6/19/2020 6/16/2020 6/6/2020   Today's Wt 147 lb 1.6 oz 144 lb 11.2 oz 145 lb 150 lb 148 lb 14.4 oz 150 lb 150 lb   BMI 22.37 kg/m2 22 kg/m2 22.05 kg/m2 22.81 kg/m2 24.78 kg/m2 24.96 kg/m2 22.81 kg/m2        BMI: Body mass index is 22.37 kg/m². This BMI is indicative of:   []? Underweight    [x]? Normal    []? Overweight    []? Obesity   []? Extreme Obesity (BMI>40)      Estimated Nutrition Needs (Based on):   1739 Kcals/day(BMR (1338) x 1. 3AF) , 67 g(1.0 g/kg bw) Protein  Carbohydrate:  At Least 130 g/day  Fluids: 1700 mL/day (1ml/kcal)     NUTRITION DIAGNOSES:   Problem:  No nutritional diagnosis at this time      Etiology: related to       Signs/Symptoms: as evidenced by         NUTRITION INTERVENTIONS:  Meals/Snacks: General/healthful diet   Supplements: Commercial supplement              GOAL:   PO intake at least 50% of meals next 3-5 days. Previous goal met.     LEARNING NEEDS (Diet, Food/Nutrient-Drug Interaction):    [x]? None Identified   []? Identified and Education Provided/Documented   []? Identified and Pt declined/was not appropriate     Cultureal, Congregational, OR Ethnic Dietary Needs:    [x]? None Identified   []? Identified and Addressed      [x]? Interdisciplinary Care Plan Reviewed/Documented    [x]? Discharge Planning:  Continue cardiac diet      MONITORING /EVALUATION:   Food/Nutrient Intake Outcomes: Total energy intake  Physical Signs/Symptoms Outcomes: Weight/weight change     NUTRITION RISK:    [x]? Patient At Nutritional Risk        []? Patient Not At Nutritional Risk     PT SEEN FOR:    [x]? MD Consult:         []? Calorie Count                                       []?Diabetic Diet Education                                                                    []?Diet Education                                      []?Electrolyte Management                                      [x]? General Nutrition Management and Supplements                                      []?Management of Tube Feeding                                      []?TPN Recommendations    []?   RN Referral:         []?MST score >=2                                      []?Enteral/Parenteral Nutrition PTA                                      []?Pregnant: Gestational DM or Multigestation                                      []?Pressure Ulcer/Wound Care needs    Irena Aguirre, [address]

## 2020-07-09 NOTE — PROGRESS NOTES
Problem: Mobility Impaired (Adult and Pediatric)  Goal: *Acute Goals and Plan of Care (Insert Text)  Description: FUNCTIONAL STATUS PRIOR TO ADMISSION: Patient reports that he was independent without assistive device. He reports multiple falls. Patient reports that he has a cane and walker at home. HOME SUPPORT PRIOR TO ADMISSION: The patient lived alone with no local support. Physical Therapy Goals  Initiated 7/6/2020  1. Patient will move from supine to sit and sit to supine  in bed with independence within 7 day(s). 2.  Patient will transfer from bed to chair and chair to bed with supervision/set-up using the least restrictive device within 7 day(s). 3.  Patient will perform sit to stand with supervision/set-up within 7 day(s). 4.  Patient will ambulate with minimal assistance/contact guard assist for 150 feet with the least restrictive device within 7 day(s). Outcome: Progressing Towards Goal     PHYSICAL THERAPY TREATMENT  Patient: Dewayne Kaur (81 y.o. male)  Date: 7/9/2020  Diagnosis: Falls frequently [R29.6]  Frequent falls [R29.6]  Dementia ([facility] [address] [F03.90]   <principal problem not specified>       Precautions: Fall  Chart, physical therapy assessment, plan of care and goals were reviewed. ASSESSMENT  Patient continues with skilled PT services and is progressing towards goals. Patient received supine in bed but agreeable to therapy. Patient performed bed mobility with stand by assistance, and stood with contact guard assistance. Patient ambulated 300 feet with CGA to MIN A. Patient with mild unsteadiness but no major LOB. Patient fatigued after distance and required rest break. Patient tolerated balance activities well this date with improved performance noted. Overall good participation.      Current Level of Function Impacting Discharge (mobility/balance): CGA    Other factors to consider for discharge: fall risk; memory issues         PLAN :  Patient continues to benefit from skilled intervention to address the above impairments. Continue treatment per established plan of care. to address goals. Recommendation for discharge: (in order for the patient to meet his/her long term goals)  Therapy up to 5 days/week in SNF setting    This discharge recommendation:  Has been made in collaboration with the attending provider and/or case management    IF patient discharges home will need the following DME: none       SUBJECTIVE:   Patient stated I feel tired but good.     OBJECTIVE DATA SUMMARY:   Critical Behavior:  Neurologic State: Alert, Eyes open spontaneously  Orientation Level: Oriented X4  Cognition: Appropriate for age attention/concentration  Safety/Judgement: Decreased awareness of need for assistance, Decreased insight into deficits    Functional Mobility Training:  Bed Mobility:     Supine to Sit: Stand-by assistance  Sit to Supine: Stand-by assistance  Scooting: Stand-by assistance    Transfers:  Sit to Stand: Contact guard assistance  Stand to Sit: Contact guard assistance    Balance:  Sitting: Intact  Standing: Impaired; Without support  Standing - Static: Good  Standing - Dynamic : Good;Fair    Ambulation/Gait Training:  Distance (ft): 300 Feet (ft)  Assistive Device: Gait belt  Ambulation - Level of Assistance: Contact guard assistance/Minimal assistance  Gait Abnormalities: Decreased step clearance  Speed/Nano: Pace decreased (<100 feet/min)  Step Length: Right shortened;Left shortened      Neuromuscular Reeducation:   Narrow base of support with eyes open: 60 seconds  Narrow base of support with eyes closed: 30 seconds  Perturbations in all direction, eyes open: 10 reps; increased sway  Semi tandem stance: 30 seconds (left LE behind) and 25 seconds (RLE behind)  Marches without UE support: 10 reps  Squats without UE support: 2 sets of 10 reps  SLS with unilateral two finger support: 25 seconds each side    Pain Ratin/10    Activity Tolerance:   Fair  Please refer to the flowsheet for vital signs taken during this treatment. After treatment patient left in no apparent distress:   Supine in bed, Call bell within reach, and Bed / chair alarm activated    COMMUNICATION/COLLABORATION:   The patients plan of care was discussed with: Registered nurse.      Antonia Soliz, PT   Time Calculation: 25 mins

## 2020-07-09 NOTE — PROGRESS NOTES
Spoke with [employer], pt will not be discharged  Until am, narrative sent to her on directions for monitor usage. And will speak to receiving nurse at THE [facility] [place].

## 2020-07-09 NOTE — PROGRESS NOTES
Problem: Pain  Goal: *Control of Pain  Outcome: Progressing Towards Goal     Problem: Patient Education: Go to Patient Education Activity  Goal: Patient/Family Education  Outcome: Progressing Towards Goal     Problem: Falls - Risk of  Goal: *Absence of Falls  Description: Document Rafael Rg Fall Risk and appropriate interventions in the flowsheet.   Outcome: Progressing Towards Goal  Note: Fall Risk Interventions:  Mobility Interventions: Patient to call before getting OOB    Mentation Interventions: Adequate sleep, hydration, pain control, Bed/chair exit alarm, Door open when patient unattended, Increase mobility    Medication Interventions: Teach patient to arise slowly, Patient to call before getting OOB         History of Falls Interventions: Bed/chair exit alarm, Door open when patient unattended, Room close to nurse's station

## 2020-07-09 NOTE — PROGRESS NOTES
Please call Charity Castellanos in Cardiology at [phone] to place the 30 day event monitor before pt is discharged from [facility]. I have spoken to Cite Jairo Anderson and given her a directions page to follow for the [facility]. I plan to speak to the receiving nurse at the facillity once it has been determined.

## 2020-07-09 NOTE — PROGRESS NOTES
LOREE-  IDT met to discuss plan of care. Patient has been seen by cardiology and therapy. Clinically ready for discharge but will need cardiology follow-up. Received call from [facility]. They have received authorization for stay but require a new COVID test within 72 hours of discharge. [address] [facility] [address]  test was on 7/4. CM spoke with MD and requested test today. Son is supposed to be in town today and coming to hospital this afternoon around 2pm.      Discharge planning continuing. [phone] Talked with Melba Daily  [phone] at [facility]. Informed her that patient will need a 30 day event monitor and follow-up with cardiology. Patient had COVID test today and awaiting result. 1600 Met with son and patient to discuss plans. Patient will be going to [facility] SNF tomorrow for rehab and then to [facility]. Assisted patient/son in calling [facility] to arrange a meeting to further discuss transfer after SNF stay. Gave patient name and number for John Lucero who is owner of facility. [phone]. Son also gave CM copy of paperwork from [email or web address]. Will give to [employer] to aide in completion of Medicaid application. Patient stated that he has two checks at his house that he has not cashed yet. They are last month's and this month's SSA checks. Son is going by house to get checks and bring them here to make copies for Medicaid application.        POWER Rebolledo/NAZANIN  [phone]

## 2020-07-09 NOTE — PROGRESS NOTES
Hospitalist Progress Note    NAME: Jerica Billingsley. :  1935   MRN:  [id number]   Room Number:  771/34  @ [facility]       Interim Hospital Summary: 81 y.o. male whom presented on 2020 with      Assessment / Plan:  Frequent falls POA  Due to physical deconditioning, alcohol intoxication. Could have underlying dementia.         - PT/OT following, recommend SNF with transition to snf  - SNF to discuss with son and patient at 1 PM today.         Hypomagnesemia POA   Hypokalemia POA  - repleted yesterday. - Repeat levels today.             Alcohol abuse  POA:   ETOH level elevated 29 on admission. Admits to drinking beers daily.      - Thiamine, folic acid. - No s/s withdrawal, discontinue CIWA.         Chronic Diastolic Heart failure,not in exacerbation, EF 60%   S/p Aortic valve replacement  Paroxysmal atrial fibrillation: Bradycardia, hold beta-blocker, patient needs event monitor on discharge. Pro BNP elevated but clinically euvolemic      - Continue bumetanide,amlodipine, apixaban.   - Metoprolol held due to low HR.          CKD 3 POA  Likely due hypertension. Cr at baseline.      - Strict Is and Os, renally dose meds, avoid nephrotoxic medications.         Normochromic normocytic anemia POA   H/H at baseline. Hx of hemorrhoids. Hx of colon polyps.             Lab Results   Component Value Date/Time     Iron 36 2016 03:54 AM     TIBC 206 (L) 2016 03:54 AM     Iron % saturation 17 (L) 2016 03:54 AM                Lab Results   Component Value Date/Time     Vitamin B12 558 2018 11:11 AM     Folate 7.7 2018 11:11 AM      - replete iron.            Fracture of hand, chronic POA: Discussed with Ortho, advised PT OT, follow-up with Ortho outpatient  Olecranon bursitis, right   Hx of frequent falls, Fracture of left 4th metacarpal and scapholunate noted on imaging. Patient has persistent swelling and inabilty to use extremity.  Right elbow Exray suggestive of olecranon bursitis.      - OT consult  - Warm compresses  - Acetaminophen PRN        Body mass index is 22.12 kg/m².   Moderate protein calorie malnutrition      - RD consult         Code Status: full   Surrogate Decision Maker:  Chrystal Heard [phone]     DVT Prophylaxis: apixaban  GI Prophylaxis: not indicated  Baseline: ambulates   Discharge planning: Skilled nursing facility, need COVID           Subjective:     Chief Complaint / Reason for Physician Visit  \"Resting, no pain or discomfort\". Discussed with RN events overnight. Review of Systems:  No fevers, chills, appetite change, cough, sputum production, shortness of breath, dyspnea on exertion, nausea, vomitting, diarrhea, constipation, chest pain, leg edema, abdominal pain,  rash, itching. Tolerating PT/OT. Tolerating diet. Objective:     VITALS:   Last 24hrs VS reviewed since prior progress note. Most recent are:  Patient Vitals for the past 24 hrs:   Temp Pulse Resp BP SpO2   07/09/20 0814 97.9 °F (36.6 °C) 62 16 (!) 170/95 99 %   07/08/20 2005 98 °F (36.7 °C) (!) 52 18 119/69 99 %   07/08/20 1500 97.8 °F (36.6 °C) (!) 47 18 121/76 100 %       Intake/Output Summary (Last 24 hours) at 7/9/2020 1220  Last data filed at 7/9/2020 1217  Gross per 24 hour   Intake 755 ml   Output 1500 ml   Net -745 ml        PHYSICAL EXAM:  General: Alert, cooperative, no acute distress    EENT:  EOMI. Anicteric sclerae. MMM  Resp:  CTA bilaterally, no wheezing or rales. No accessory muscle use  CV:  Regular  rhythm,  normal S1/S2, no murmurs rubs gallops, No edema  GI:  Soft, Non distended, Non tender. +Bowel sounds  Neurologic:  Alert and oriented X 3, normal speech,   Psych:   Good insight. Not anxious nor agitated  Skin:  No rashes.   No jaundice    Reviewed most current lab test results and cultures  YES  Reviewed most current radiology test results   YES  Review and summation of old records today    NO  Reviewed patient's current orders and [facility] YES  PMH/SH reviewed - no change compared to H&P  ________________________________________________________________________  Care Plan discussed with:    Comments   Patient x    Family      RN x    Care Manager x    Consultant                        Multidiciplinary team rounds were held today with , nursing, pharmacist and clinical coordinator. Patient's plan of care was discussed; medications were reviewed and discharge planning was addressed. ________________________________________________________________________  Total NON critical care TIME:  25 Minutes    Total CRITICAL CARE TIME Spent:   Minutes non procedure based      Comments   >50% of visit spent in counseling and coordination of care     ________________________________________________________________________  Vicenta Santiago MD     Procedures: see electronic medical records for all procedures/Xrays and details which were not copied into this note but were reviewed prior to creation of Plan. LABS:  I reviewed today's most current labs and imaging studies. Pertinent labs include:  No results for input(s): WBC, HGB, HCT, PLT, HGBEXT, HCTEXT, PLTEXT, HGBEXT, HCTEXT, PLTEXT in the last 72 hours.   Recent Labs     07/08/20  0414 07/07/20  1413    136   K 3.4* 3.8    100   CO2 31 33*   GLU 78 136*   BUN 25* 23*   CREA 1.70* 1.83*   CA 7.9* 8.4*   MG 1.4* 1.4*   PHOS 2.9 2.9       Signed: Vicenta Santiago MD

## 2020-07-09 NOTE — PROGRESS NOTES
[facility] At [address], [address]  Office Phone [phone]    CARDIOLOGY PROGRESS NOTE    7/9/2020 10:18 AM    Admit Date: 7/4/2020    Admit Diagnosis:   Falls frequently [R29.6]        Subjective:     Martínez Husbands.   71-year-old male with a history of AVR, atrial fib/flutter admitted for frequent falls. Consulted yesterday. Has been maintained on telemetry lowest heart rate appears 48 bpm during hours of sleep. Presents sleeping this morning awoke to verbal stimuli. Denies any issues overnight, denies any lightheadedness dizziness shortness of breath or chest pain.   Visit Vitals  BP (!) 170/95 (BP 1 Location: Right arm, BP Patient Position: Sitting)   Pulse 62   Temp 97.9 °F (36.6 °C)   Resp 16   Ht 5' 8\" (1.727 m)   Wt 138 lb 10.7 oz (62.9 kg)   SpO2 99%   BMI 21.08 kg/m²       Current Facility-Administered Medications   Medication Dose Route Frequency    magnesium oxide (MAG-OX) tablet 400 mg  400 mg Oral DAILY    melatonin tablet 3 mg  3 mg Oral QHS PRN    timolol (TIMOPTIC) 0.5 % ophthalmic solution 1 Drop  1 Drop Both Eyes DAILY    traMADoL (ULTRAM) tablet 12.5 mg  12.5 mg Oral Q6H PRN    therapeutic multivitamin (THERAGRAN) tablet 1 Tab  1 Tab Oral DAILY    diclofenac (VOLTAREN) 1 % topical gel 2 g  2 g Topical QID PRN    acetaminophen (TYLENOL) tablet 1,000 mg  1,000 mg Oral TID    ferrous sulfate tablet 325 mg  1 Tab Oral DAILY WITH BREAKFAST    potassium bicarb-citric acid (EFFER-K) tablet 20 mEq  20 mEq Oral DAILY    sodium chloride (NS) flush 5-40 mL  5-40 mL IntraVENous Q8H    sodium chloride (NS) flush 5-40 mL  5-40 mL IntraVENous PRN    amLODIPine (NORVASC) tablet 5 mg  5 mg Oral DAILY    apixaban (ELIQUIS) tablet 2.5 mg  2.5 mg Oral BID    atorvastatin (LIPITOR) tablet 20 mg  20 mg Oral QHS    escitalopram oxalate (LEXAPRO) tablet 10 mg  10 mg Oral DAILY    bumetanide (BUMEX) tablet 1 mg  1 mg Oral DAILY    sodium chloride (OCEAN) 0.65 % nasal squeeze bottle 2 Spray  2 Spray Both Nostrils QID    thiamine HCL (B-1) tablet 100 mg  100 mg Oral DAILY    folic acid (FOLVITE) tablet 1 mg  1 mg Oral DAILY         Objective:      Physical Exam    General: Well developed, in no acute distress, cooperative and alert  HEENT: No carotid bruits, no JVD, trach is midline. Neck Supple,  Heart:   Regular regular no murmur  Respiratory: Clear bilaterally x 4, no wheezing or rales  Abdomen:   Soft, non-tender, no masses, bowel sounds are active. Extremities:  No edema, normal cap refill, no cyanosis, atraumatic. Neuro: A&Ox3, speech clear. Unsteadiness with gait, patient was ambulated by me approximately 100 feet. Denied any lightheadedness or dizziness, on telemetry chronotropic competence proven as heart rate increased to 85 bpm with activity  Skin: Skin color is normal. No rashes or lesions. Non diaphoretic      Data Review:   No results for input(s): WBC, HGB, HCT, PLT, HGBEXT, HCTEXT, PLTEXT in the last 72 hours. Recent Labs     07/08/20  0414 07/07/20  1413    136   K 3.4* 3.8    100   CO2 31 33*   GLU 78 136*   BUN 25* 23*   CREA 1.70* 1.83*   CA 7.9* 8.4*   MG 1.4* 1.4*   PHOS 2.9 2.9       No results for input(s): TROIQ, CPK, CKMB in the last 72 hours. Intake/Output Summary (Last 24 hours) at 7/9/2020 1018  Last data filed at 7/9/2020 0815  Gross per 24 hour   Intake 755 ml   Output 1700 ml   Net -945 ml        Telemetry:   EKG:  Cxray:    Assessment:     Active Problems:    Falls frequently (7/4/2020)        Plan:     27-year-old male admitted for frequent falls, history of atrial fibrillation/flutter: Intermittent bradycardia during periods of rest, on ambulation shows chronotropic competence. Continue telemetry for further evaluation of sick sinus syndrome. Continue to hold beta-blocker.     Will follow while admitted at [place] [facility]. Isabel Smoker DNP-ANP-BC  Chart and note reviewed, discussed with advanced practioner and agree with recommendations and treatment plan as outlined.   [address] [facility] [address], [place]

## 2020-07-09 NOTE — PROGRESS NOTES
Received pt at [facility] on 7/7/20, sitting up in bed w/ LUE elevated on pillow. L hand w/ noticeable edema and pt c/o pain but states he's been able to move it more over the past several hours. A&O x4. Very pleasant mood. Bed alarm on w/ bed in low and locked position. Telemetry box on and cardiac rhythym being monitored. Pt compliant w/ HS meds. 2200 - Pt requested to ambulate - staff escorted pt up and down the hallway. Pt did fairly well ambulating independently.

## 2020-07-10 ENCOUNTER — APPOINTMENT (OUTPATIENT)
Dept: NON INVASIVE DIAGNOSTICS | Age: 85
DRG: 897 | End: 2020-07-10
Attending: FAMILY MEDICINE
Payer: MEDICARE

## 2020-07-10 ENCOUNTER — APPOINTMENT (OUTPATIENT)
Dept: NON INVASIVE DIAGNOSTICS | Age: 85
DRG: 897 | End: 2020-07-10
Payer: MEDICARE

## 2020-07-10 VITALS
SYSTOLIC BLOOD PRESSURE: 120 MMHG | HEIGHT: 68 IN | OXYGEN SATURATION: 100 % | BODY MASS INDEX: 21.02 KG/M2 | TEMPERATURE: 98.1 F | DIASTOLIC BLOOD PRESSURE: 68 MMHG | RESPIRATION RATE: 16 BRPM | HEART RATE: 50 BPM | WEIGHT: 138.67 LBS

## 2020-07-10 LAB
COVID-19 RAPID TEST, COVR: NOT DETECTED
SARS-COV-2, COV2: NOT DETECTED
SOURCE, COVRS: NORMAL
SPECIMEN SOURCE, FCOV2M: NORMAL
SPECIMEN SOURCE, FCOV2M: NORMAL

## 2020-07-10 PROCEDURE — 93225 XTRNL ECG REC<48 HRS REC: CPT

## 2020-07-10 PROCEDURE — 87635 SARS-COV-2 COVID-19 AMP PRB: CPT

## 2020-07-10 PROCEDURE — 74011250637 HC RX REV CODE- 250/637: Performed by: STUDENT IN AN ORGANIZED HEALTH CARE EDUCATION/TRAINING PROGRAM

## 2020-07-10 PROCEDURE — 93271 ECG/MONITORING AND ANALYSIS: CPT

## 2020-07-10 PROCEDURE — 97116 GAIT TRAINING THERAPY: CPT | Performed by: PHYSICAL THERAPIST

## 2020-07-10 PROCEDURE — 74011250637 HC RX REV CODE- 250/637: Performed by: FAMILY MEDICINE

## 2020-07-10 RX ORDER — THERA TABS 400 MCG
1 TAB ORAL DAILY
Qty: 30 TAB | Refills: 0 | Status: SHIPPED
Start: 2020-07-11 | End: 2021-02-05 | Stop reason: ALTCHOICE

## 2020-07-10 RX ORDER — DICLOFENAC SODIUM 10 MG/G
2 GEL TOPICAL
Qty: 1 EACH | Refills: 0 | Status: SHIPPED
Start: 2020-07-10 | End: 2020-10-23

## 2020-07-10 RX ORDER — LANOLIN ALCOHOL/MO/W.PET/CERES
325 CREAM (GRAM) TOPICAL
Qty: 30 TAB | Refills: 0 | Status: SHIPPED
Start: 2020-07-11 | End: 2020-09-18 | Stop reason: SDUPTHER

## 2020-07-10 RX ORDER — LANOLIN ALCOHOL/MO/W.PET/CERES
400 CREAM (GRAM) TOPICAL DAILY
Qty: 30 TAB | Refills: 0 | Status: SHIPPED
Start: 2020-07-11 | End: 2020-08-28 | Stop reason: SDUPTHER

## 2020-07-10 RX ORDER — LANOLIN ALCOHOL/MO/W.PET/CERES
3 CREAM (GRAM) TOPICAL
Qty: 30 TAB | Refills: 0 | Status: SHIPPED
Start: 2020-07-10 | End: 2020-08-27 | Stop reason: DRUGHIGH

## 2020-07-10 RX ORDER — LANOLIN ALCOHOL/MO/W.PET/CERES
100 CREAM (GRAM) TOPICAL DAILY
Qty: 30 TAB | Refills: 0 | Status: SHIPPED
Start: 2020-07-11 | End: 2020-09-18 | Stop reason: SDUPTHER

## 2020-07-10 RX ORDER — FOLIC ACID 1 MG/1
1 TABLET ORAL DAILY
Qty: 30 TAB | Refills: 0 | Status: SHIPPED
Start: 2020-07-11 | End: 2020-09-18 | Stop reason: SDUPTHER

## 2020-07-10 RX ADMIN — Medication 400 MG: at 09:11

## 2020-07-10 RX ADMIN — SALINE NASAL SPRAY 2 SPRAY: 1.5 SOLUTION NASAL at 15:44

## 2020-07-10 RX ADMIN — Medication 10 ML: at 14:44

## 2020-07-10 RX ADMIN — FERROUS SULFATE TAB 325 MG (65 MG ELEMENTAL FE) 325 MG: 325 (65 FE) TAB at 09:11

## 2020-07-10 RX ADMIN — SALINE NASAL SPRAY 2 SPRAY: 1.5 SOLUTION NASAL at 09:11

## 2020-07-10 RX ADMIN — Medication 100 MG: at 09:11

## 2020-07-10 RX ADMIN — AMLODIPINE BESYLATE 5 MG: 5 TABLET ORAL at 09:11

## 2020-07-10 RX ADMIN — FOLIC ACID 1 MG: 1 TABLET ORAL at 09:11

## 2020-07-10 RX ADMIN — APIXABAN 2.5 MG: 2.5 TABLET, FILM COATED ORAL at 09:11

## 2020-07-10 RX ADMIN — ACETAMINOPHEN 1000 MG: 500 TABLET, FILM COATED ORAL at 09:11

## 2020-07-10 RX ADMIN — ESCITALOPRAM OXALATE 10 MG: 10 TABLET ORAL at 09:11

## 2020-07-10 RX ADMIN — POTASSIUM BICARBONATE 20 MEQ: 782 TABLET, EFFERVESCENT ORAL at 11:11

## 2020-07-10 RX ADMIN — THERA TABS 1 TABLET: TAB at 11:11

## 2020-07-10 RX ADMIN — ACETAMINOPHEN 1000 MG: 500 TABLET, FILM COATED ORAL at 17:10

## 2020-07-10 RX ADMIN — BUMETANIDE 1 MG: 1 TABLET ORAL at 09:11

## 2020-07-10 RX ADMIN — APIXABAN 2.5 MG: 2.5 TABLET, FILM COATED ORAL at 17:10

## 2020-07-10 RX ADMIN — TIMOLOL MALEATE 1 DROP: 5 SOLUTION/ DROPS OPHTHALMIC at 09:11

## 2020-07-10 NOTE — PROGRESS NOTES
Problem: Falls - Risk of  Goal: *Absence of Falls  Description: Document Connie Patches Fall Risk and appropriate interventions in the flowsheet.   Outcome: Progressing Towards Goal  Note: Fall Risk Interventions:  Mobility Interventions: Patient to call before getting OOB    Mentation Interventions: Adequate sleep, hydration, pain control    Medication Interventions: Patient to call before getting OOB, Teach patient to arise slowly    Elimination Interventions: Call light in reach, Patient to call for help with toileting needs    History of Falls Interventions: Bed/chair exit alarm, Door open when patient unattended, Room close to nurse's station         Problem: Patient Education: Go to Patient Education Activity  Goal: Patient/Family Education  Outcome: Progressing Towards Goal     Problem: Pain  Goal: *Control of Pain  Outcome: Progressing Towards Goal  Goal: *PALLIATIVE CARE:  Alleviation of Pain  Outcome: Progressing Towards Goal     Problem: Patient Education: Go to Patient Education Activity  Goal: Patient/Family Education  Outcome: Progressing Towards Goal     Problem: Patient Education: Go to Patient Education Activity  Goal: Patient/Family Education  Outcome: Progressing Towards Goal     Problem: Patient Education: Go to Patient Education Activity  Goal: Patient/Family Education  Outcome: Progressing Towards Goal

## 2020-07-10 NOTE — DISCHARGE SUMMARY
Hospitalist Discharge Summary     Patient ID:  Tc Lacey  [id number]  81 y.o.  1935 7/4/2020    PCP on record: Haresh Martinez MD    Admit date: 7/4/2020  Discharge date and time: 7/10/2020    DISCHARGE DIAGNOSIS:  Frequent falls  Abnormal electrolytes  Alcohol dependence  Congestive heart failure, atrial flutter, bradycardia  Anemia  CKD  CONSULTATIONS:  IP CONSULT TO CARDIOLOGY  IP CONSULT TO PODIATRY    Excerpted HPI from H&P of Ariella Kimball MD:  Patrick Li is a 81 y.o.male with PMH of CHF, alcohol abuse, paroxysmal AF, CKD3  who presents to ED with c/o falls. Patient has had frequent falls at home, fractured his hand and wrist 1 month ago. He called EMS today because he felt that he cannot care for himself anymore and wished to seek help. OF note he has had multiple ED presentations, 8 in the past month at [address] [place] [address]. He has [place] who have noted his house to be in a state of neglect and disorder, patient sleeping on the couch with rat feces around him. Unclear compliance with medication. Hx of alcohol abuse. Patient's step son who lives in   [place] and has tried to help him but patient continues to be resilient to accepting help. Patient is  from his wife. No hx of drug abuse.      Patient is AO x 3 not to situation. . C/O left arm pain and swelling.     ______________________________________________________________________  DISCHARGE SUMMARY/HOSPITAL COURSE:  for full details see H&P, daily progress notes, labs, consult notes. Frequent falls POA  Due to physical deconditioning, alcohol intoxication. Could have underlying dementia.         - PT/OT following, recommend SNF with transition to TEO          Hypomagnesemia POA   Hypokalemia POA  - repleted yesterday. - Repeat levels today.             Alcohol abuse  POA:   ETOH level elevated 29 on admission. Admits to drinking beers daily.      - Thiamine, folic acid.   - No s/s withdrawal, discontinue CIWA.         Chronic Diastolic Heart failure,not in exacerbation, EF 60%   S/p Aortic valve replacement  Paroxysmal atrial fibrillation: Bradycardia, hold beta-blocker, patient has Holter monitor on discharge. Pro BNP elevated but clinically euvolemic      - Continue bumetanide,amlodipine, apixaban.   - Metoprolol held due to low HR.             CKD 3 POA  Likely due hypertension. Cr at baseline.      - Strict Is and Os, renally dose meds, avoid nephrotoxic medications.         Normochromic normocytic anemia POA   H/H at baseline. Hx of hemorrhoids. Hx of colon polyps.             Lab Results   Component Value Date/Time     Iron 36 2016 03:54 AM     TIBC 206 (L) 2016 03:54 AM     Iron % saturation 17 (L) 2016 03:54 AM                Lab Results   Component Value Date/Time     Vitamin B12 558 2018 11:11 AM     Folate 7.7 2018 11:11 AM      - replete iron.            Fracture of hand, chronic POA: Discussed with Ortho, advised PT OT, follow-up with Ortho outpatient  Olecranon bursitis, right   Hx of frequent falls, Fracture of left 4th metacarpal and scapholunate noted on imaging. Patient has persistent swelling and inabilty to use extremity. Right elbow Exray suggestive of olecranon bursitis.      - OT consulted  - Warm compresses  - Acetaminophen PRN  Discussed with orthopedic on-call Dr. Mariposa Duran, he advised PT OT and follow-up with him after discharge  Body mass index is 22.12 kg/m².   Moderate protein calorie malnutrition      - RD consult         Code Status: full   Surrogate Decision Maker:  Joi Diaz [phone]     DVT Prophylaxis: apixaban  GI Prophylaxis: not indicated  Baseline: ambulates   Discharge planning: Skilled nursing facility, need COVID( negative)                   _______________________________________________________________________  Patient seen and examined by me on discharge day.   Pertinent Findings:  Gen:    Not in distress  Chest: Clear lungs  CVS: Regular rhythm. No edema  Abd:  Soft, not distended, not tender  Neuro: neurologically intact, occasional memory lapses   MSK; left wrost swelling, decreased ROM ________________________________________________________  DISCHARGE MEDICATIONS:   Current Discharge Medication List      START taking these medications    Details   thiamine HCL (B-1) 100 mg tablet Take 1 Tab by mouth daily. Qty: 30 Tab, Refills: 0      therapeutic multivitamin (THERAGRAN) tablet Take 1 Tab by mouth daily. Qty: 30 Tab, Refills: 0      melatonin 3 mg tablet Take 1 Tab by mouth nightly as needed for Insomnia. Qty: 30 Tab, Refills: 0      magnesium oxide (MAG-OX) 400 mg tablet Take 1 Tab by mouth daily. Qty: 30 Tab, Refills: 0      folic acid (FOLVITE) 1 mg tablet Take 1 Tab by mouth daily. Qty: 30 Tab, Refills: 0      ferrous sulfate 325 mg (65 mg iron) tablet Take 1 Tab by mouth daily (with breakfast). Qty: 30 Tab, Refills: 0      diclofenac (VOLTAREN) 1 % gel Apply 2 g to affected area four (4) times daily as needed for Pain (elbow pain). Qty: 1 Each, Refills: 0         CONTINUE these medications which have NOT CHANGED    Details   meclizine (ANTIVERT) 25 mg tablet Take 25 mg by mouth three (3) times daily as needed for Dizziness.       simvastatin (ZOCOR) 40 mg tablet TAKE 1 TABLET BY MOUTH EVERYDAY AT BEDTIME  Qty: 90 Tab, Refills: 3      amLODIPine (NORVASC) 5 mg tablet TAKE 1 TABLET BY MOUTH DAILY      Klor-Con M20 20 mEq tablet TAKE 1 TABLET BY MOUTH EVERY DAY WITH FOOD      bumetanide (BUMEX) 1 mg tablet TAKE 1 TABLET BY MOUTH EVERY DAY  Qty: 90 Tab, Refills: 3      escitalopram oxalate (LEXAPRO) 10 mg tablet TAKE 1 TABLET BY MOUTH EVERY DAY  Qty: 90 Tab, Refills: 1      allopurinol (ZYLOPRIM) 100 mg tablet TAKE 1 TABLET BY MOUTH EVERY DAY  Qty: 90 Tab, Refills: 5      ELIQUIS 2.5 mg tablet TAKE 1 TABLET BY MOUTH TWICE A DAY  Qty: 180 Tab, Refills: 3      timolol (TIMOPTIC) 0.5 % ophthalmic solution INSTILL 1 DROP INTO RIGHT EYE EVERY MORNING  Refills: 0         STOP taking these medications       metoprolol succinate (TOPROL-XL) 25 mg XL tablet Comments:   Reason for Stopping:         sodium chloride (OCEAN) 0.65 % nasal spray Comments:   Reason for Stopping:                 Patient Follow Up Instructions: Activity: PT/OT Eval and Treat  Diet: Cardiac Diet  Wound Care: None needed    Follow-up with orthopedic surgery, cardiology, primary care physician after discharge  Follow-up tests/labs no labs pending on discharge  Follow-up Information     Follow up With Specialties Details Why Contact Info    Nimco Fernandez MD Cardiology On 7/22/2020 Your appointment time is 2:45pm.  , Bring ins card, picture id, and discharge papers, Please keep this appointment, Please arrive 15 minutes early. Sydni Rosas MD Internal Medicine  Please make appointment for visit post stay at [address] On 7/10/2020 You will be staying at [facility] to recieve rehabilitation services before going to [facility]. Insurance has authorized your stay from 7/1/2020 through 7/14/2020 with updates due on 7/13/2020. The  is Radha Morejon [phone] [phone]  [phone]    [facility]   Patient will be [facility] after skilled services.          ________________________________________________________________    Risk of deterioration: Moderate    Condition at Discharge:  Stable  __________________________________________________________________    Disposition  SNF/LTC    ____________________________________________________________________    Code Status: Full Code  ___________________________________________________________________      Total time in minutes spent coordinating this discharge (includes going over instructions, follow-up, prescriptions, and preparing report for sign off to her PCP) : 35 minutes    Signed:  Mendy Brothers MD

## 2020-07-10 NOTE — PROGRESS NOTES
LOREE- Patient scheduled for discharge today. CM called and left message with Kate Kim at [phone] to discuss final arrangements. According to cardiology note, patient will be discharged with a Holter Monitor. Recommending that CM make appointment with S cardiology prior to discharge from the hospital.  CM called and made appointment for 7/22/2020 at 2:45pm.    Christiano Crowell for [facility] to transport at 2pm.    1000 IDT met to discuss plan of care. Patient did not receive COVID test yesterday and order has been put in for a rapid test today. Patient will be discharged and going to [facility] and [address] [place] [address] for continued care. 1053  CM talked with Roxann Ruffin at [facility]. [phone]. Patient is authorized for SNF services beginning 7- for 5 days with updates due on 7/14/2020.      1417  Rep from [facility]) and son are here to meet with patient and make plans when patient is ready to transition from SNF to AL. They are also meeting with rep from [address] [place] [address] to discuss Medicaid application. [phone] Met with Ms. Xavier- stated that meeting went well and she will be picking patient up from [facility] when he is discharged. Discharge Planning-     Follow up with Colletta Boga, MD    Specialty: Internal Medicine    [address] [address] [address] [phone]    Please make appointment for visit post stay at [address]    Next steps: Follow up    [address] [facility]. [place] [address]   Phone Number [phone]   Owner- Jaci Galvez    Patient will be going to [facility] after skilled services.  Mrs. Arroyo will be picking patient when he is discharged from [facility].       Follow up with [facility] on 7/10/2020    Specialty: [phone] [phone]    You will be staying at [facility] to recieve rehabilitation services before going to [facility]. Insurance has authorized your stay from 7/1/2020 through 7/14/2020 with updates due on 7/13/2020.  The  is Mary Vizcarra [phone]      Follow up with Shailesh Cramer MD on 7/22/2020    Specialty: Cardiology    [address]   [phone]    Your appointment time is 2:45pm.  , Bring ins card, picture id, and discharge papers, Please keep this appointment, Please arrive 15 minutes early.            POWER Oakes/NAZANIN  [phone]

## 2020-07-10 NOTE — PROGRESS NOTES
Transition of Care Plan to SNF/Rehab    SNF/Rehab Transition:  Patient has been accepted to [facility] and meets criteria for admission. Patient will transported by [facility] and expected to leave at 5pm..    Communication to Patient/Family:  Met with patient and son Juany Rocha(identified care giver) and they are agreeable to the transition plan. Communication to SNF/Rehab:  Bedside RN, Len Gudino, has been notified to update the transition plan to the facility and call report (phone [phone]). Discharge information has been updated on the AVS.     Discharge instructions to be fax'd to facility at [facility] # [phone]). Patient has been identified as part of the N/A BCPI-A Program.  For Care Coordination associated with that Bundle Program, please contact N/A  Bundle information has been communication to N/A. Nursing Please include all hard scripts for controlled substances, med rec and dc summary, and AVS in packet.      Reviewed and confirmed with facility, [facility], can manage the patient care needs for the following:     Favian Trotter with (X) only those applicable:    Medication:  [x]  Medications will be available at the facility  []  IV Antibiotics N?A  []  Controlled Substance - hard copy to be sent with patient   []  Weekly Labs   Documents:  [] Hard RX  [] MAR  [] Kardex  [] AVS  []Transfer Summary  []Discharge   Equipment:  []  CPAP/BiPAP  []  Wound Vacuum  []  Ye or Urinary Device  []  PICC/Central Line  []  Nebulizer  []  Ventilator   Treatment:  []Isolation (for MRSA, VRE, etc.)  []Surgical Drain Management  []Tracheostomy Care  []Dressing Changes  []Dialysis with transportation and chair time N/A  []PEG Care  []Oxygen  []Daily Weights for Heart Failure   Dietary:  []Any diet limitations  []Tube Feedings   []Total Parenteral Management (TPN)   Eligible for Medicaid Long Term Services and Supports  Yes:  [] Eligible for medical assistance or will become eligible within 180 days and UAI completed. [] Provider/Patient and/or support system has requested screening. [x] UAI copy provided to patient or responsible party, SNF [facility]. [] UAI unavailable at discharge will send once processed to SNF provider. [] UAI unavailable at discharged mailed to patient  No:   [] Private pay and is not financially eligible for Medicaid within the next 180 days. [] Reside out-of-state.   [] A residents of a state owned/operated facility that is licensed  by [place] [address] [employer] or [facility]  [] Enrollment in [facility] hospice services  [] [address]  [] Patient /Family declines to have screening completed or provide financial information for screening     Financial Resources:  Medicaid    [x] Initiated and application pending   [] Full coverage     Advanced Care Plan:  []Surrogate Decision Maker of Care  []POA  []Communicated Code Status FULL (DDNR\", \"Full\")    Other

## 2020-07-10 NOTE — PROGRESS NOTES
Order changed form 30 day event to five day holter. Event monitor was never placed. Doctor changed order.

## 2020-07-10 NOTE — PROGRESS NOTES
5 day holter monitor placed after explanation to pt, and nurse and    Monitor number [id number]. Instruction sheet placed with  Monitor for nursing home staff.  aware.

## 2020-07-10 NOTE — PROGRESS NOTES
[address], [address]  Office Phone [phone]    CARDIOLOGY PROGRESS NOTE    7/10/2020 8:51 AM    Admit Date: 7/4/2020    Admit Diagnosis:   Falls frequently [R29.6]  Frequent falls [R29.6]  Dementia ([address] [F03.90]    Subjective:     Minerva Aragon. presents sitting upright this morning eating breakfast, no overnight events. Denies chest pain shortness of breath lightheadedness or dizziness. In review of arrhythmia alarms he continues to be in 3-1 atrial flutter without significant pauses. Lowest heart rate 49 bpm while sleeping.     Visit Vitals  /78 (BP 1 Location: Left arm, BP Patient Position: Supine)   Pulse (!) 52   Temp 98.4 °F (36.9 °C)   Resp 18   Ht 5' 8\" (1.727 m)   Wt 138 lb 10.7 oz (62.9 kg)   SpO2 100%   BMI 21.08 kg/m²       Current Facility-Administered Medications   Medication Dose Route Frequency    magnesium oxide (MAG-OX) tablet 400 mg  400 mg Oral DAILY    melatonin tablet 3 mg  3 mg Oral QHS PRN    timolol (TIMOPTIC) 0.5 % ophthalmic solution 1 Drop  1 Drop Both Eyes DAILY    traMADoL (ULTRAM) tablet 12.5 mg  12.5 mg Oral Q6H PRN    therapeutic multivitamin (THERAGRAN) tablet 1 Tab  1 Tab Oral DAILY    diclofenac (VOLTAREN) 1 % topical gel 2 g  2 g Topical QID PRN    acetaminophen (TYLENOL) tablet 1,000 mg  1,000 mg Oral TID    ferrous sulfate tablet 325 mg  1 Tab Oral DAILY WITH BREAKFAST    potassium bicarb-citric acid (EFFER-K) tablet 20 mEq  20 mEq Oral DAILY    sodium chloride (NS) flush 5-40 mL  5-40 mL IntraVENous Q8H    sodium chloride (NS) flush 5-40 mL  5-40 mL IntraVENous PRN    amLODIPine (NORVASC) tablet 5 mg  5 mg Oral DAILY    apixaban (ELIQUIS) tablet 2.5 mg  2.5 mg Oral BID    atorvastatin (LIPITOR) tablet 20 mg  20 mg Oral QHS    escitalopram oxalate (LEXAPRO) tablet 10 mg  10 mg Oral DAILY    bumetanide (BUMEX) tablet 1 mg  1 mg Oral DAILY    sodium chloride (OCEAN) 0.65 % nasal squeeze bottle 2 Spray  2 Spray Both Nostrils QID    thiamine HCL (B-1) tablet 100 mg  100 mg Oral DAILY    folic acid (FOLVITE) tablet 1 mg  1 mg Oral DAILY         Objective:      Physical Exam    General: Well developed, in no acute distress, cooperative and alert  HEENT: No carotid bruits, no JVD, trach is midline. Neck Supple,   Heart:  Normal S1/S2 negative S3 or S4. Regular, 1/6 systolic murmur, gallop or rub. Respiratory: Clear bilaterally x 4, no wheezing or rales  Abdomen:   Soft, non-tender, no masses, bowel sounds are active. Extremities:  No edema, normal cap refill, no cyanosis, atraumatic. Neuro: A&Ox3, speech clear. (Patient able to recall the correct year today)  Skin: Skin color is normal.  Data Review:   No results for input(s): WBC, HGB, HCT, PLT, HGBEXT, HCTEXT, PLTEXT in the last 72 hours. Recent Labs     07/08/20  0414 07/07/20  1413    136   K 3.4* 3.8    100   CO2 31 33*   GLU 78 136*   BUN 25* 23*   CREA 1.70* 1.83*   CA 7.9* 8.4*   MG 1.4* 1.4*   PHOS 2.9 2.9       No results for input(s): TROIQ, CPK, CKMB in the last 72 hours. Intake/Output Summary (Last 24 hours) at 7/10/2020 0851  Last data filed at 7/10/2020 0615  Gross per 24 hour   Intake    Output 1650 ml   Net -1650 ml        Telemetry: Atrial flutter      Assessment:     Active Problems:    Falls frequently (7/4/2020)      Dementia ([address] (7/9/2020)      Frequent falls (7/9/2020)        Plan:     Patient is a 72-year-old male admitted for frequent falls, history of EtOH abuse, dementia, bioprosthetic AVR with postoperative atrial fib/flutter. No significant pauses while on telemetry. Understand patient being discharged today, a 5-day Holter monitor will be applied for remote monitoring. It is recommended that we continue to hold his beta-blocker. He is maintained on Eliquis 2.5 mg twice a day.   He will need to follow-up with his primary cardiologist [facility] Dr. Shaheed Pino to reconsider risk versus benefit of maintaining anticoagulation, evaluation if candidate for left atrial appendage watchman closure device along with arrhythmia ablation. Stable from cardiology standpoint for discharge today after Holter monitor placed. Would recommend  make pt  appointment with S cardiology prior to patient's discharge. Darell Rolle DNP-ANP-BC  Chart and note reviewed, discussed with advanced practioner and agree with recommendations and treatment plan as outlined.   Jamee Tipton MD

## 2020-07-10 NOTE — PROGRESS NOTES
TRANSFER - OUT REPORT:    Verbal report given to Darien Ballesteros (name) on Terrance Mckeon.  being transferred to [facility] (unit) for routine progression of care       Report consisted of patients Situation, Background, Assessment and   Recommendations(SBAR). Information from the following report(s) SBAR, Kardex, MAR, Accordion and Recent Results was reviewed with the receiving nurse. Lines:   Peripheral IV 07/04/20 Left Antecubital (Active)   Site Assessment Clean, dry, & intact 07/10/20 0437   Phlebitis Assessment 0 07/10/20 0437   Infiltration Assessment 0 07/10/20 0437   Dressing Status Clean, dry, & intact 07/09/20 2253   Dressing Type Transparent 07/09/20 2253   Hub Color/Line Status Flushed;Capped 07/09/20 2253   Action Taken Open ports on tubing capped 07/08/20 2005   Alcohol Cap Used Yes 07/09/20 2253        Opportunity for questions and clarification was provided.       Patient transported with:   Monitor Remote telemetry

## 2020-07-10 NOTE — PROGRESS NOTES
0700 - Report recv'd from Marilynn Baugh RN. Pt sitting at side of bed. No needs at this time. Denies pain. Call bell in reach. 0900 - Pt remains in A-Flutter w/ freq PVCs, asymptomatic. Denies pain. 1100 - Podiatry c/s placed for toenail trim. Dr. Latonia Winters will be here tomorrow sometime. 1300 - Son visiting pt. Spoke w/ hospitalist about plan of care while here. 1500 - Cardio c/s today, will be continuing tele monitoring. Pt has also been having intermittent bradycardic episodes. CARDIOLOGY NEEDS TO BE NOTIFIED BEFORE PT D/C (POSSIBLY ON Friday) FOR PLACEMENT OF HOLTER MONITOR    1700 - Pt resting quietly in bed, no pain, no needs.

## 2020-07-10 NOTE — PROGRESS NOTES
Problem: Mobility Impaired (Adult and Pediatric)  Goal: *Acute Goals and Plan of Care (Insert Text)  Description: FUNCTIONAL STATUS PRIOR TO ADMISSION: Patient reports that he was independent without assistive device. He reports multiple falls. Patient reports that he has a cane and walker at home. HOME SUPPORT PRIOR TO ADMISSION: The patient lived alone with no local support. Physical Therapy Goals  Initiated 7/6/2020  1. Patient will move from supine to sit and sit to supine  in bed with independence within 7 day(s). 2.  Patient will transfer from bed to chair and chair to bed with supervision/set-up using the least restrictive device within 7 day(s). 3.  Patient will perform sit to stand with supervision/set-up within 7 day(s). 4.  Patient will ambulate with minimal assistance/contact guard assist for 150 feet with the least restrictive device within 7 day(s). Outcome: Progressing Towards Goal     PHYSICAL THERAPY TREATMENT  Patient: Dannielle Conteh (81 y.o. male)  Date: 7/10/2020  Diagnosis: Falls frequently [R29.6]  Frequent falls [R29.6]  Dementia ([facility] [F03.90]   <principal problem not specified>       Precautions: Fall  Chart, physical therapy assessment, plan of care and goals were reviewed. ASSESSMENT  Patient continues with skilled PT services and is progressing towards goals. Patient pleasant and agreeable to therapy. Patient performs bed mobility with stand by assistance, and required CGA for sit to stand transfers. Patient ambulated 300 feet with CGA to MIN A. Mild unsteadiness but no overt loss of balance. Patient slightly fatigued after ambulation. Current Level of Function Impacting Discharge (mobility/balance): CGA-MIN A    Other factors to consider for discharge: fall risk; memory issues         PLAN :  Patient continues to benefit from skilled intervention to address the above impairments. Continue treatment per established plan of care.   to address goals. Recommendation for discharge: (in order for the patient to meet his/her long term goals)  Therapy up to 5 days/week in SNF setting with transition to TEO    This discharge recommendation:  Has been made in collaboration with the attending provider and/or case management    IF patient discharges home will need the following DME: none       SUBJECTIVE:   Patient stated I feel okay.     OBJECTIVE DATA SUMMARY:   Critical Behavior:  Neurologic State: Alert, Eyes open to stimulus  Orientation Level: Oriented X4  Cognition: Memory loss, Appropriate decision making, Appropriate safety awareness, Follows commands  Safety/Judgement: Decreased awareness of need for assistance, Decreased insight into deficits    Functional Mobility Training:  Bed Mobility:     Supine to Sit: Stand-by assistance  Sit to Supine: Stand-by assistance  Scooting: Stand-by assistance        Transfers:  Sit to Stand: Contact guard assistance  Stand to Sit: Contact guard assistance     Balance:  Sitting: Intact  Standing: Impaired; Without support  Standing - Static: Good  Standing - Dynamic : Good;Fair    Ambulation/Gait Training:  Distance (ft): 300 Feet (ft)  Assistive Device: Gait belt  Ambulation - Level of Assistance: Contact guard assistance  Gait Abnormalities: Decreased step clearance  Speed/Nano: Pace decreased (<100 feet/min)  Step Length: Right shortened;Left shortened    Pain Rating:  No pain    Activity Tolerance:   Fair  Please refer to the flowsheet for vital signs taken during this treatment. After treatment patient left in no apparent distress:   Supine in bed, Call bell within reach, and Bed / chair alarm activated    COMMUNICATION/COLLABORATION:   The patients plan of care was discussed with: Registered nurse.      Vick Duarte, PT   Time Calculation: 15 mins

## 2020-07-10 NOTE — PROGRESS NOTES
Problem: Falls - Risk of  Goal: *Absence of Falls  Description: Document Abram Giron Fall Risk and appropriate interventions in the flowsheet.   Outcome: Progressing Towards Goal  Note: Fall Risk Interventions:  Mobility Interventions: Patient to call before getting OOB    Mentation Interventions: Adequate sleep, hydration, pain control, Door open when patient unattended, More frequent rounding, Room close to nurse's station, Update white board    Medication Interventions: Teach patient to arise slowly, Patient to call before getting OOB    Elimination Interventions: Call light in reach, Patient to call for help with toileting needs    History of Falls Interventions: Door open when patient unattended, Room close to nurse's station         Problem: Patient Education: Go to Patient Education Activity  Goal: Patient/Family Education  Outcome: Progressing Towards Goal     Problem: Pain  Goal: *Control of Pain  Outcome: Progressing Towards Goal  Goal: *PALLIATIVE CARE:  Alleviation of Pain  Outcome: Progressing Towards Goal     Problem: Patient Education: Go to Patient Education Activity  Goal: Patient/Family Education  Outcome: Progressing Towards Goal     Problem: Patient Education: Go to Patient Education Activity  Goal: Patient/Family Education  Outcome: Progressing Towards Goal     Problem: Patient Education: Go to Patient Education Activity  Goal: Patient/Family Education  Outcome: Progressing Towards Goal

## 2020-07-10 NOTE — CDMP QUERY
Patient admitted with Falls. Noted documentation of \"Moderate protein calorie malnutrition\"  in H&P by IM on 7/4. Please provide clinical indicators/treatment to support this diagnosis. The medical record reflects the following:   
   Risk Factors: Hx Etoh abuse; CKD; Depression; HF; MM; HTN. ... C/o Falls Clinical Indicators: Albumin: 2.3.... Andreea Shadow Andreea Shadow BMI: 22.12. ... Andreea Shadow Andreea Shadow RD noted: No nutritional diagnosis at this time. ... noted: physical deconditioning Treatment: RD consult; Meals/Snacks: General/healthful diet:  Continue diet as tolerated; Supplements: Commercial supplement; RD will order ensure shakes po BID with meals. Thank you, Latonia Edward [address] [place] [address] 
[phone]

## 2020-07-14 NOTE — CDMP QUERY
Patient admitted with Falls. Noted documentation of \"Moderate protein calorie malnutrition\"  in H&P by IM on 7/4. Please provide clinical indicators/treatment to support this diagnosis. The medical record reflects the following:       Risk Factors: Hx Etoh abuse; CKD; Depression; HF; MM; HTN. ... C/o Falls     Clinical Indicators: Albumin: 2.3.... Rena Clarke BMI: 22.12. ... Rena Clarke RD noted: No nutritional diagnosis at this time. ... noted: physical deconditioning     Treatment: RD consult; Meals/Snacks: General/healthful diet:  Continue diet as tolerated; Supplements: Commercial supplement; RD will order ensure shakes po BID with meals.      Thank you,    Melida Babinski  [facility]  [phone]

## 2020-07-16 ENCOUNTER — PATIENT OUTREACH (OUTPATIENT)
Dept: CASE MANAGEMENT | Age: 85
End: 2020-07-16

## 2020-07-16 NOTE — PROGRESS NOTES
Patient resolved from Transition of Care episode on 7/16/2020. ACM/CTN was unsuccessful at contacting this patient today. Patient/family was provided the following resources and education related to COVID-19 during the initial call:                         Signs, symptoms and red flags related to COVID-19            CDC exposure and quarantine guidelines            [facility] exposure contact - [phone]            Contact for their local Department of Health                 Patient has had 2 hospital admissions for debility. No further outreach scheduled with this CTN/ACM. Episode of Care resolved. Patient has this CTN/ACM contact information if future needs arise.

## 2020-07-20 NOTE — ADT AUTH CERT NOTES
Patient Demographics     Patient Name   Jasmin Graves. [facility]   [id number]  Sex   Male     1935  Address   [address]  Phone   [phone] (Home)   [phone] (Mobile) *Preferred*    CSN:    [id number]    Admit Date:  Admit Time  Room  Bed    2020   2:08 PM  [id number]    Attending Providers     Provider  Pager  From  To    Luis Rodriguez MD   20    April Conroy MD   20    Abelardo Diaz MD   07/08/20  07/10/20    Emergency Contact(s)     Name  Relation  Home  Work  Mobile    Teresa  Other Relative  [phone]      Rafia Long  [phone]    Utilization Reviews         clinical Date 20 by Harrison Dawkins RN         Review Entered  Review Status    2020 12:17  In Primary        Criteria Review    20  VS: 98.4, 52, 18, 140/89, 100%  Meds: Tylenol 1000mg po TId, Norvasc 5mg po qd, Eliquis 2.5mg po bid, Lipitor 20mg po qhs, bumex 1mg po qd, lexpro 10mg po qd, ferrous 599OP po qd, folic 1mg po qd, magox 400mg po qd, melatonin 3mg po prn x 1, effer k 20mEq po qd, thiamine 100mg po qd,   Plan: holter monitor placement,   Cardiology: Minerva Aragon.   44-year-old male with a history of AVR, atrial fib/flutter admitted for frequent falls. Consulted yesterday. Has been maintained on telemetry lowest heart rate appears 48 bpm during hours of sleep. Presents sleeping this morning awoke to verbal stimuli. Denies any issues overnight, denies any lightheadedness dizziness shortness of breath or chest pain. General: Well developed, in no acute distress, cooperative and alert  HEENT: No carotid bruits, no JVD, trach is midline. Neck Supple,  Heart:   Regular regular no murmur  Respiratory: Clear bilaterally x 4, no wheezing or rales  Abdomen:   Soft, non-tender, no masses, bowel sounds are active.   Extremities:  No edema, normal cap refill, no cyanosis, atraumatic.    Neuro: A&Ox3, speech clear.  Unsteadiness with gait, patient was ambulated by me approximately 100 feet.  Denied any lightheadedness or dizziness, on telemetry chronotropic competence proven as heart rate increased to 85 bpm with activity  Skin: Skin color is normal. No rashes or lesions. Non diaphoretic     Assessment:      Active Problems:    Falls frequently (7/4/2020)           Plan:      27-year-old male admitted for frequent falls, history of atrial fibrillation/flutter: Intermittent bradycardia during periods of rest, on ambulation shows chronotropic competence.  Continue telemetry for further evaluation of sick sinus syndrome.  Continue to hold beta-blocker.     IM: Assessment / Plan:  Frequent falls POA  Due to physical deconditioning, alcohol intoxication. Could have underlying dementia.         - PT/OT following, recommend SNF with transition to group home  - SNF to discuss with son and patient at 1 PM today.         Hypomagnesemia POA   Hypokalemia POA  - repleted yesterday. - Repeat levels today.             Alcohol abuse  POA:   ETOH level elevated 29 on admission. Admits to drinking beers daily.      - Thiamine, folic acid. - No s/s withdrawal, discontinue CIWA.         Chronic Diastolic Heart failure,not in exacerbation, EF 60%   S/p Aortic valve replacement  Paroxysmal atrial fibrillation: Bradycardia, hold beta-blocker, patient needs event monitor on discharge. Pro BNP elevated but clinically euvolemic      - Continue bumetanide,amlodipine, apixaban.   - Metoprolol held due to low HR.             CKD 3 POA  Likely due hypertension. Cr at baseline.      - Strict Is and Os, renally dose meds, avoid nephrotoxic medications.         Normochromic normocytic anemia POA   H/H at baseline. Hx of hemorrhoids.  Hx of colon polyps.   - replete iron.            Fracture of hand, chronic POA: Discussed with Ortho, advised PT OT, follow-up with Ortho outpatient  Olecranon bursitis, right   Hx of frequent falls, Fracture of left 4th metacarpal and scapholunate noted on imaging. Patient has persistent swelling and inabilty to use extremity. Right elbow Exray suggestive of olecranon bursitis.      - OT consult  - Warm compresses  - Acetaminophen PRN        Body mass index is 22.12 kg/m².   Moderate protein calorie malnutrition      - RD consult         Code Status: full   Surrogate Decision Maker:  Regina Jeffers [phone]     DVT Prophylaxis: apixaban  GI Prophylaxis: not indicated  Baseline: ambulates   Discharge planning: Skilled nursing facility, need COVID     Chief Complaint / Reason for Physician Visit  \"Resting, no pain or discomfort\".  Discussed with RN events overnight.           Clinical Date 7/8/20 by Pablo Carter RN         Review Entered  Review Status    7/20/2020 12:10  In Primary        Criteria Review    7/8/20  Vs: 98.0, 52, 18, 119/69, 99%  Abnl labs: k 3.4, bun 25, creat 1.70, ca 7.9, mag 1.4,   XR Wrist LT: 1. Stable posttraumatic changes with incomplete healing of a fourth metacarpal  fracture. 2. Stable separation of the scapholunate joint and the first carpometacarpal  joint with diffuse degenerative change. 3. Stable soft tissue swelling. .  EKG: Atrial flutter   Rightward axis   ST & T wave abnormality, consider anterior ischemia   Abnormal ECG  Meds: Tylenol 1000mg po TID, Norvasc 5mg po qd, Eliquis 2.5mg po bid, Lipitor 20mg po qhs, bumex 1mg po qd, Lexapro 10mg po qd, ferrous sulfate 334EC po qd, folic acid 1mg po qd, magox 400mg po qd, effer k 20mEq po qd, thiamine 100mg po qd,   IM: Assessment / Plan:  Frequent falls POA  Due to physical deconditioning, alcohol intoxication. Could have underlying dementia.         - PT/OT following, recommend SNF with [facility]  - SNF to discuss with son and patient at 1 PM today.         Hypomagnesemia POA   Hypokalemia POA  - repleted yesterday. - Repeat levels today.             Alcohol abuse  POA:   ETOH level elevated 29 on admission.  Admits to drinking beers daily.      - Thiamine, folic acid. - No s/s withdrawal, discontinue CIWA.         Chronic Diastolic Heart failure,not in exacerbation, EF 60%   S/p Aortic valve replacement  Paroxysmal atrial fibrillation   Pro BNP elevated but clinically euvolemic      - Continue bumetanide,amlodipine, apixaban.   - Metoprolol held due to low HR.             CKD 3 POA  Likely due hypertension. Cr at baseline.      - Strict Is and Os, renally dose meds, avoid nephrotoxic medications.         Normochromic normocytic anemia POA   H/H at baseline. Hx of hemorrhoids. Hx of colon polyps.   - replete iron.            Fracture of hand, chronic POA   Olecranon bursitis, right   Hx of frequent falls, Fracture of left 4th metacarpal and scapholunate noted on imaging. Patient has persistent swelling and inabilty to use extremity. Right elbow Exray suggestive of olecranon bursitis.      - OT consult  - Warm compresses  - Acetaminophen PRN        Body mass index is 22.12 kg/m².   Moderate protein calorie malnutrition      - RD consult         Code Status: full   Surrogate Decision Maker:  Luis Angel Kilgore [phone]     DVT Prophylaxis: apixaban  GI Prophylaxis: not indicated  Baseline: ambulates             Subjective:      Chief Complaint / Reason for Physician Visit  \"feeling ok\".  Discussed with RN events overnight.      PT:   ASSESSMENT  Patient continues with skilled PT services and is progressing towards goals. Patient continues to demonstrate good participation and motivation to improve balance. Patient remains stand by assist with bed mobility, and CGA to stand. Patient ambulated 100 feet x3 with CGA to MIN A. Patient demonstrates improved balance today.  Patient tolerated balance activities well this date.       Current Level of Function Impacting Discharge (mobility/balance): CGA-MIN A     Other factors to consider for discharge: fall risk; memory issues          PLAN :  Patient continues to benefit from skilled intervention to address the above impairments.  Continue treatment per established plan of care. to address goals.     Recommendation for discharge: (in order for the patient to meet his/her long term goals)  Therapy up to 5 days/week in SNF setting      Cardiology: Erika Reeves Jr. is a 81 y. o. male admitted for Falls frequently [R29.6] ETOH and debility, pending LTR placement. Cardiology consulted for persistent bradycardia, betablocker has been held for 3 days. Pt has a HX of paroxysmal atrial fib and flutter since AVR surgery in [address]34 Had CV by his primary Cardiologist Dr. John Vega in 2016. Has been maintained on Low dose Eliquis. Pt at this times denies chest pain, dizziness or shortness of breath.  Recent EKGs have demonstrated a persistent atrial fib/flutter pattern  Assessment:       Active Problems:    Falls frequently (7/4/2020)            Plan:      Patient is a 80 y/o male with hx of AVR, post operative PAF/flutter has been maintained on Eliquis 2.5mg BID and betablocker. Betablocker held for 3 days now due to bradycardia. EKG today demonstrates HR 50bpm off betablocker remaining in atrial flutter .  I recommend to continue to hold betablocker and have patient placed on telemetry. Will need to follow for evolving sick sinus syndrome.  He has been having falling episodes, this certainly can be heart rate related requiring pacemaker placement.             Clinical Date 7/7/20 by Jerry Powers RN         Review Entered  Review Status    7/20/2020 12:04  In Primary        Criteria Review    7/7/20  VS: 97.7, 56, 16, 137/91, 100%  Abnl labs:CO2 33, agap 3, gluc 136, bun 23, creat 1.83, ca 8.4, mag 1.4,   Meds: magox 800mg po x 1, Tylenol 1000mg po TID, Norvasc 5mg po qd, Eliquis 2.5mg po bid, Lipitor 20mg po qhs, bumex 1mg po qd, Lexapro 10mg po qd, ferrous sulfate 031RL po qd, folic acid 1mg po qd, melatonin 3mg po prn x 1, effer k 20mEQ po qd, thiamine 100mg po qd,   PT:   ASSESSMENT  Patient continues with skilled PT services and is progressing towards goals. Patient presents with improving balance and mobility today. Patient stood with contact guard assistance. Patient ambulated 100 feet x3 with CGA-MIN A. No overt losses of balance today. Patient fatigued after walk, but reports feeling good. Patient tolerated numerous balance activities well with rest breaks provided as needed. Patient is very pleasant and demonstrates good participation. Patient will continue to benefit from SNF rehab at discharge with transition to [facility].       Current Level of Function Impacting Discharge (mobility/balance): CGA-MIN A     Other factors to consider for discharge: fall risk; impaired memory          PLAN :  Patient continues to benefit from skilled intervention to address the above impairments.  Continue treatment per established plan of care. to address goals.     Recommendation for discharge: (in order for the patient to meet his/her long term goals)  Therapy up to 5 days/week in SNF setting than transition to [facility]         IM: Interim Hospital Summary: 81 y.o. male whom presented on 7/4/2020 with      Assessment / Plan:  Frequent falls POA  Due to physical deconditioning, alcohol intoxication. Could have underlying dementia.         - PT/OT following, recommend SNF with transition to [facility]  - SNF to discuss with son and patient at 1 PM today.         Hypomagnesemia POA   Hypokalemia POA  - repleted yesterday. - Repeat levels today.             Alcohol abuse  POA:   ETOH level elevated 29 on admission. Admits to drinking beers daily.      - Thiamine, folic acid. - No s/s withdrawal, discontinue CIWA.         Chronic Diastolic Heart failure,not in exacerbation, EF 60%   S/p Aortic valve replacement  Paroxysmal atrial fibrillation   Pro BNP elevated but clinically euvolemic      - Continue bumetanide,amlodipine, apixaban.   - Metoprolol held due to low HR.             CKD 3 POA  Likely due hypertension. Cr at baseline.    - Strict Is and Os, renally dose meds, avoid nephrotoxic medications.         Normochromic normocytic anemia POA   H/H at baseline. Hx of hemorrhoids. Hx of colon polyps.   - replete iron.            Fracture of hand, chronic POA   Olecranon bursitis, right   Hx of frequent falls, Fracture of left 4th metacarpal and scapholunate noted on imaging. Patient has persistent swelling and inabilty to use extremity. Right elbow Exray suggestive of olecranon bursitis.      - OT consult  - Warm compresses  - Acetaminophen PRN        Body mass index is 22.12 kg/m².   At nutritional risk     Risk Factors: Hx Etoh abuse; CKD; Depression; HF; MM; HTN. ... C/o Falls     Clinical Indicators: Albumin: 2.3.... Rosita Arti Chi BMI: 22.12. ... Rosita Chi physical deconditioning      Treatment: RD consult; Meals/Snacks: General/healthful diet:  Continue diet as tolerated; Supplements: Commercial supplement; RD will order ensure shakes po BID with meals.              Code Status: full   Surrogate Decision Maker:  Bertha Saba [phone]     DVT Prophylaxis: apixaban  GI Prophylaxis: not indicated  Baseline: ambulates            Subjective:     Chief Complaint / Reason for Physician Visit  \"im doing well\".   Discussed with RN events overnight.             Clinical Date 7/6/20 by Jerry Powers RN         Review Entered  Review Status    7/20/2020 11:56  In Primary        Criteria Review    7/6/20  VS:  98.2, 46, 16, 129/79, 100%  Abnl labs: h/h 10.0/29.8, na 135, k 3.4, BUN 29, creat 1.84, ca 7.8, mag 1.0,   XR Elbow Rt: Large right elbow effusion and olecranon bursitis  Deformity of the right coracoid process suggests a prior trauma or erosive  Disease  Meds: magox 800mg po x 1, mag sulf 2g iv x 1, effer k 40mEq po x 1, Tylenol 1000mg po TID, Norvasc 5mg po qd, Eliquis 2.5mg po bid, Lipitor 20mg po qhs, bumex 1mg po qd, Lexapro 10mg po qd, ferrous sulfate 953MR po qd, folic acid 1mg po qd, effer K 20Mew po qd, thiamine 100mg po qd,   PT: ASSESSMENT  Based on the objective data described below, the patient presents with decreased balance, impaired cognition, and overall limited functional mobility. Patient pleasant and agreeable to therapy. Patient lives alone, and per chart he has experienced multiple falls. Patient performed bed mobility with stand by assist, and sit to stand transfer with minimal assistance to steady upon standing. Patient ambulated 80 feet with no assistive device and minimal to moderate assistance for balance. Patient experienced five losses of balance during ambulation which all required therapist assist to prevent fall. Patient would benefit from SNF rehab at discharge with transition to assisted living.      Functional Outcome Measure:  The patient scored 22/56 on the JOHNSTON outcome measure which is indicative of high fall risk.       Other factors to consider for discharge: multiple falls at home; lives alone  PLAN :  Recommendations and Planned Interventions: bed mobility training, transfer training, gait training, therapeutic exercises, neuromuscular re-education, patient and family training/education, and therapeutic activities       Frequency/Duration: Patient will be followed by physical therapy:  4 times a week to address goals.     Recommendation for discharge: (in order for the patient to meet his/her long term goals)  Therapy up to 5 days/week in SNF setting   IM: Assessment / Plan:  Frequent falls POA  Due to physical deconditioning, alcohol intoxication. Could have underlying dementia.         - PT/OT consult  - CM consulted for placement.        Right elbow swelling POA :   Unclear if there was trauma to the area.      - Xray right elbow. - OT consult         Hypomagnesemia POA   Hypokalemia POA  - replete.            Alcohol abuse  POA:   ETOH level elevated 29. Admits to drinking beers daily.      - Thiamine, folic acid. - CIWA monitoring, oral ativan if needed.  Use judiciously.         Chronic Diastolic Heart failure,not in exacerbation, EF 60%   S/p Aortic valve replacement  Paroxysmal atrial fibrillation   Pro BNP elevated but clinically euvolemic      - Continue bumetanide,amlodipine, apixaban.   - Metoprolol held due to low HR.             CKD 3 POA  Likely due hypertension. Cr at baseline.      - Strict Is and Os, renally dose meds, avoid nephrotoxic medications.         Normochromic normocytic anemia POA   H/H at baseline. Hx of hemorrhoids. Hx of colon polyps.                      - replete iron.            Fracture of hand, chronic POA   Hx of frequent falls, Fracture of left 4th metacarpal and scapholunate noted on imaging. Patient has persistent swelling and inabilty to use extremity.      - OT consult  - Warm compresses  - Acetaminophen PRN  Chief Complaint / Reason for Physician Visit  \"my right elbow is swollen but it doesn't hurt\".  Discussed with RN events overnight.

## 2020-07-21 NOTE — PROGRESS NOTES
Jeny Chandler, please call patient, Holter monitor continues to find atrial flutter, he needs to follow up with his primary cardiologist Dr. Zenon Martinez at [address], please fax him a copy of this report.  Thanks

## 2020-07-23 ENCOUNTER — TELEPHONE (OUTPATIENT)
Dept: CARDIOLOGY CLINIC | Age: 85
End: 2020-07-23

## 2020-07-23 NOTE — TELEPHONE ENCOUNTER
Spoke with patient. Verified patient with two patient identifiers. Advised EM shows a flutter. Advised to see Dr. Michael Shipman, his primary cardiologist.  States he saw him yesterday and he was aware of the a flutter. Faxing EM result to Dr. Deanna Burgess at fax # [phone]. Patient verbalized understanding.

## 2020-07-23 NOTE — TELEPHONE ENCOUNTER
----- Message from Renetta Hernández NP sent at 7/21/2020 11:28 AM EDT -----  [facility], please call patient, Holter monitor continues to find atrial flutter, he needs to follow up with his primary cardiologist Dr. Olman Landin at [address], please fax him a copy of this report.  Thanks

## 2020-08-03 ENCOUNTER — IP HISTORICAL/CONVERTED ENCOUNTER (OUTPATIENT)
Dept: OTHER | Age: 85
End: 2020-08-03

## 2020-08-08 ENCOUNTER — OP HISTORICAL/CONVERTED ENCOUNTER (OUTPATIENT)
Dept: OTHER | Age: 85
End: 2020-08-08

## 2020-08-14 RX ORDER — ESCITALOPRAM OXALATE 10 MG/1
TABLET ORAL
Qty: 90 TAB | Refills: 1 | Status: SHIPPED | OUTPATIENT
Start: 2020-08-14 | End: 2020-10-23

## 2020-08-19 ENCOUNTER — OFFICE VISIT (OUTPATIENT)
Dept: PODIATRY | Age: 85
End: 2020-08-19
Payer: MEDICARE

## 2020-08-19 VITALS
DIASTOLIC BLOOD PRESSURE: 111 MMHG | HEART RATE: 79 BPM | HEIGHT: 68 IN | TEMPERATURE: 97.3 F | WEIGHT: 158.4 LBS | BODY MASS INDEX: 24.01 KG/M2 | SYSTOLIC BLOOD PRESSURE: 162 MMHG | OXYGEN SATURATION: 98 %

## 2020-08-19 DIAGNOSIS — L85.3 XEROSIS CUTIS: ICD-10-CM

## 2020-08-19 DIAGNOSIS — I73.9 PERIPHERAL VASCULAR DISEASE (HCC): ICD-10-CM

## 2020-08-19 DIAGNOSIS — L98.8 MACERATION OF SKIN: ICD-10-CM

## 2020-08-19 DIAGNOSIS — B35.1 ONYCHOMYCOSIS: Primary | ICD-10-CM

## 2020-08-19 PROCEDURE — 99204 OFFICE O/P NEW MOD 45 MIN: CPT | Performed by: PODIATRIST

## 2020-08-19 PROCEDURE — 11755 BIOPSY NAIL UNIT: CPT | Performed by: PODIATRIST

## 2020-08-19 RX ORDER — POVIDONE-IODINE 10 %
SOLUTION, NON-ORAL TOPICAL
Status: DISCONTINUED | OUTPATIENT
Start: 2020-08-19 | End: 2020-08-20

## 2020-08-19 RX ORDER — AMMONIUM LACTATE 12 G/100G
CREAM TOPICAL 2 TIMES DAILY
Qty: 280 G | Refills: 5 | Status: SHIPPED | OUTPATIENT
Start: 2020-08-19

## 2020-08-25 ENCOUNTER — OFFICE VISIT (OUTPATIENT)
Dept: INTERNAL MEDICINE CLINIC | Age: 85
End: 2020-08-25
Payer: MEDICARE

## 2020-08-25 VITALS
SYSTOLIC BLOOD PRESSURE: 176 MMHG | HEIGHT: 68 IN | WEIGHT: 151.2 LBS | OXYGEN SATURATION: 99 % | TEMPERATURE: 99.8 F | BODY MASS INDEX: 22.91 KG/M2 | DIASTOLIC BLOOD PRESSURE: 94 MMHG | HEART RATE: 81 BPM

## 2020-08-25 DIAGNOSIS — I48.0 PAROXYSMAL ATRIAL FIBRILLATION (HCC): ICD-10-CM

## 2020-08-25 DIAGNOSIS — I10 ESSENTIAL HYPERTENSION: ICD-10-CM

## 2020-08-25 DIAGNOSIS — N18.30 CKD (CHRONIC KIDNEY DISEASE) STAGE 3, GFR 30-59 ML/MIN (HCC): ICD-10-CM

## 2020-08-25 DIAGNOSIS — K92.1 BLACK STOOL: ICD-10-CM

## 2020-08-25 DIAGNOSIS — S62.102G CLOSED FRACTURE OF LEFT WRIST WITH DELAYED HEALING, SUBSEQUENT ENCOUNTER: Primary | ICD-10-CM

## 2020-08-25 DIAGNOSIS — M1A.30X1 CHRONIC GOUT DUE TO RENAL IMPAIRMENT WITH TOPHUS, UNSPECIFIED SITE: ICD-10-CM

## 2020-08-25 DIAGNOSIS — E83.42 HYPOMAGNESEMIA: ICD-10-CM

## 2020-08-25 DIAGNOSIS — D63.8 ANEMIA OF CHRONIC DISEASE: ICD-10-CM

## 2020-08-25 PROCEDURE — 99215 OFFICE O/P EST HI 40 MIN: CPT | Performed by: INTERNAL MEDICINE

## 2020-08-25 RX ORDER — ASCORBIC ACID 500 MG
100 TABLET ORAL DAILY
COMMUNITY
End: 2020-09-18 | Stop reason: SDUPTHER

## 2020-08-25 RX ORDER — AMLODIPINE BESYLATE 2.5 MG/1
2.5 TABLET ORAL DAILY
Qty: 60 TAB | Refills: 0 | Status: SHIPPED | OUTPATIENT
Start: 2020-08-25 | End: 2020-10-20

## 2020-08-25 NOTE — PROGRESS NOTES
Zaira Guzmán is a 81 y.o. male and presents with New Patient; Fall; Valvular Heart Disease; Dementia; Alcohol intoxication; Kidney Stone; Irregular Heart Beat; and Hypertension    Patient is establishing care. He is here with nurse Ms. Onelia Dickey  from [facility], he was hospitalized at [facility] from 7/4 to 7/10/2020, I reviewed note in the chart, he has sh/o alcohol abuse, CHF diasstolic Ef 80%, s/p aortic valve replacement, PAF, CKD stage 3, he presented from home due to frequent falls, note mentiones he himself called EMS because he felt he was not able to take care of himself anymore, note mentions he has had 8 visits between [place] and July to the ER, mentions [place] noted his house to be in a state of neglect and disorder, him sleeping in the couch with \"rat feces\" around him. He is , has a son in [place] who has offered help, but he has refused for his help as per hospital's discharge summary. He had alcohol levels elevated at 29 in the [place] [id number], admitted to drink beers every day, was found to have hypomagnesemia and hypokalemia, d/c summary says he was discharged on a holter monitor annd bb was held because of bradycardia, he was continued on bumetanide, amlodipine and apixaban. He was founnd to have a fracture of the left 4th metacarpal and scapholunate, orthopedics Dr. Maryuri Bragg recommended follow up with him upon discharge. He's full code. Discharge medications reviewed and reconcilled, available in d/c summary.   While on assisted living he went again to [facility], was there from 8/8/2020 to 8/12/2020 for syncope while on the toilet, d/c summary jake guillory mentions additionally he has mild to moderate aortic stenosis,, found to have anemia pf chronic disease Hb 8.1, was given 1 dose of EPO, and started on oral ferrous sulfate, right arm duplex showed DVT of right wrist, started on Eiquis, but then stopped due to increased risk dur to h/o multiple falls, was determined to hahve syncope due to postural hypotension and bradycardia, for which his antihypertensives were held as well as BB. Was discharged with dfollow up with nephrology Dr. Noah Rosario, hematology Dr. Donna Cuello and Cardiology Dr. Matthew Loredo. He says he's not feeling quite as himself, but no chest pains, no  LE swelling, no sob. He has some mild pain in his joints, he says a little new swelling of his right ankle. He has h/o gout. Assisted living nurse spoke to me after our evlauation with the patient privately telling me the  for him and his son were going to [address] so that his son can assume POA for him and all his responsibilities, because he has been forgetful, not been able to take care of himself or his own bills and responsibilities. I will get in touch with his son. I did speak with Mr. HARMON [facility] about this, and he indeed says that he does not consider himself capable of taking care of his personal responsibilities including paying bills, he admits that he has been forgetful, and that when he was back home by himself he was not able to clean, cook by himself, and his house was in a state of neglect, and that is why he called the hospital in the first place, to ask for help. He has not been drinking he has been in assisted living and he is feeling fine. Ms. Ole Jarvis mentions she has not asked black tarry stools, he is on Eliquis, denies clotting, or bright red blood per rectum. Review of Systems  Review of Systems   Constitutional: Negative for chills, fatigue, fever and unexpected weight change. HENT: Negative for congestion, ear pain, sneezing and sore throat. Eyes: Negative for pain and discharge. Respiratory: Negative for cough, shortness of breath and wheezing. Cardiovascular: Negative for chest pain, palpitations and leg swelling. Gastrointestinal: Negative for abdominal pain, blood in stool, constipation and diarrhea.    Endocrine: Negative for polydipsia and polyuria. Genitourinary: Negative for difficulty urinating, dysuria, frequency, hematuria and urgency. Musculoskeletal: Negative for arthralgias, back pain and joint swelling. Skin: Negative for rash. Allergic/Immunologic: Negative for environmental allergies and food allergies. Neurological: Negative for dizziness, speech difficulty, weakness, light-headedness, numbness and headaches. Hematological: Negative for adenopathy. Psychiatric/Behavioral: Negative for behavioral problems (Depression), sleep disturbance and suicidal ideas. Past Medical History:   Diagnosis Date    Adverse effect of anesthesia     nasal procedure didn't work-patient awake    Alcohol abuse     Anemia     Aortic regurgitation     Arthritis     Chronic kidney disease     CKD (chronic kidney disease), stage III ([address] 2011    Dementia (HCC)     Depression     Episodic lightheadedness 07/28/2017    Episodic lightheadedness     Epistaxis 11/27/2016    Erectile dysfunction     Fall 02/11/2020    GERD (gastroesophageal reflux disease)     GI bleed     Gout     gout, osteoarthritis    Heart failure (HCC)     acute heart failure    Hemorrhoids     History of cardioversion 02/17/2017    Hypertension     Ill-defined condition     weakness    Multiple myeloma (HCC)     PAF (paroxysmal atrial fibrillation) ([facility] 02/17/2016    eliquis    Pulmonary HTN ([address]     Right-sided epistaxis 11/27/2016    S/P AVR (aortic valve replacement) 07/26/2016    merrill gill md -     S/P cardiac cath 7/6/16    severe ai, normal coronaries     S/P colonoscopy 10-8-04    S/P colonoscopy with polypectomy 05/09/2016    Kar Delcid MD    Skin lesion      Past Surgical History:   Procedure Laterality Date    CARDIAC SURG PROCEDURE UNLIST      cardiac cath   [facility] CARDIAC SURG PROCEDURE UNLIST  07/26/2016    AVR    HX HEENT      nasal procedure 20 years ago    HX KNEE REPLACEMENT Bilateral     bilateral knee replacement    HX ORTHOPAEDIC Bilateral     wrist     Social History     Socioeconomic History    Marital status:      Spouse name: Not on file    Number of children: Not on file    Years of education: Not on file    Highest education level: Not on file   Tobacco Use    Smoking status: Former Smoker     Packs/day: 1.00     Years: 4.00     Pack years: 4.00     Last attempt to quit: 1960     Years since quittin.6    Smokeless tobacco: Never Used   Substance and Sexual Activity    Alcohol use: Yes     Comment: heavy    Drug use: No    Sexual activity: Not Currently   Social History Narrative    Family History: Mother:  36 yrs, Chronic anemiaFather:  80 yrs, I think aneurysm hypertensionBrother(s):  79 yrs,    obese,dm,drug abuse,miSon(s): alive, adopted1 son(s) . Ava Eisenmenger [phone]    Social History: Alcohol Use Patient uses alcohol, Drinks per occasion: 2, Drinks per w Solomon: 10. Smoking Status Patient is a never smoker. Marital Status: . Lives w ith: alone. Occupation/W ork: employed full time insurance. Education/School: has highschool diploma. Family History   Problem Relation Age of Onset    Anemia Mother     No Known Problems Father     Other Brother         drug abuse     Current Outpatient Medications   Medication Sig Dispense Refill    ascorbic acid, vitamin C, (Vitamin C) 500 mg tablet Take 100 mg by mouth daily.  amLODIPine (NORVASC) 2.5 mg tablet Take 1 Tab by mouth daily for 60 days. 60 Tab 0    ammonium lactate (AMLACTIN) 12 % topical cream Apply  to affected area two (2) times a day. rub in to affected area well 280 g 5    escitalopram oxalate (LEXAPRO) 10 mg tablet TAKE 1 TABLET BY MOUTH EVERY DAY 90 Tab 1    thiamine HCL (B-1) 100 mg tablet Take 1 Tab by mouth daily. 30 Tab 0    therapeutic multivitamin (THERAGRAN) tablet Take 1 Tab by mouth daily. 30 Tab 0    melatonin 3 mg tablet Take 1 Tab by mouth nightly as needed for Insomnia.  27 Tab 0    magnesium oxide (MAG-OX) 400 mg tablet Take 1 Tab by mouth daily. 30 Tab 0    folic acid (FOLVITE) 1 mg tablet Take 1 Tab by mouth daily. 30 Tab 0    ferrous sulfate 325 mg (65 mg iron) tablet Take 1 Tab by mouth daily (with breakfast). 30 Tab 0    diclofenac (VOLTAREN) 1 % gel Apply 2 g to affected area four (4) times daily as needed for Pain (elbow pain). 1 Each 0    meclizine (ANTIVERT) 25 mg tablet Take 25 mg by mouth three (3) times daily as needed for Dizziness.  simvastatin (ZOCOR) 40 mg tablet TAKE 1 TABLET BY MOUTH EVERYDAY AT BEDTIME 90 Tab 3    Klor-Con M20 20 mEq tablet TAKE 1 TABLET BY MOUTH EVERY DAY WITH FOOD      allopurinol (ZYLOPRIM) 100 mg tablet TAKE 1 TABLET BY MOUTH EVERY DAY 90 Tab 5    bumetanide (BUMEX) 1 mg tablet TAKE 1 TABLET BY MOUTH EVERY DAY 90 Tab 3    ELIQUIS 2.5 mg tablet TAKE 1 TABLET BY MOUTH TWICE A  Tab 3    timolol (TIMOPTIC) 0.5 % ophthalmic solution INSTILL 1 DROP INTO RIGHT EYE EVERY MORNING  0     Allergies   Allergen Reactions    Amiodarone Other (comments)     Bradycardia -excessive       Objective:  Visit Vitals  BP (!) 176/94 (BP 1 Location: Left arm, BP Patient Position: Sitting)   Pulse 81   Temp 99.8 °F (37.7 °C) (Oral)   Ht 5' 8\" (1.727 m)   Wt 151 lb 3.2 oz (68.6 kg)   SpO2 99% Comment: RA   BMI 22.99 kg/m²     Physical Exam:   Physical Exam  Constitutional:       General: He is not in acute distress. Appearance: Normal appearance. HENT:      Head: Normocephalic and atraumatic. Mouth/Throat:      Mouth: Mucous membranes are moist.   Eyes:      Extraocular Movements: Extraocular movements intact. Conjunctiva/sclera: Conjunctivae normal.      Pupils: Pupils are equal, round, and reactive to light. Neck:      Musculoskeletal: Normal range of motion and neck supple. Cardiovascular:      Rate and Rhythm: Normal rate and regular rhythm. Pulses: Normal pulses. Heart sounds: Normal heart sounds.    Pulmonary: Effort: Pulmonary effort is normal.      Breath sounds: Normal breath sounds. Abdominal:      General: Abdomen is flat. Bowel sounds are normal. There is no distension. Palpations: Abdomen is soft. There is no mass. Tenderness: There is no abdominal tenderness. Musculoskeletal:         General: Swelling (Left wrist, with deformity), tenderness and deformity (As mentioned above, as well as gouty tophi in left elbow, left and right wrist, with no evidence of acute inflammation) present. Right lower leg: No edema. Left lower leg: No edema. Lymphadenopathy:      Cervical: No cervical adenopathy. Skin:     General: Skin is warm and dry. Capillary Refill: Capillary refill takes less than 2 seconds. Coloration: Skin is not jaundiced or pale. Findings: No erythema or rash. Neurological:      General: No focal deficit present. Mental Status: He is alert. Comments: Oriented to place and self, not to time, he thinks he is October, but knows the year and knows who the president is. Psychiatric:         Mood and Affect: Mood normal.         Behavior: Behavior normal.         Thought Content:  Thought content normal.         Judgment: Judgment normal.          Results for orders placed or performed during the hospital encounter of 07/04/20   SARS-COV-2, PCR    Specimen: Nasopharyngeal   Result Value Ref Range    Specimen source Nasopharyngeal      SARS-CoV-2 Not detected NOTD     CBC WITH AUTOMATED DIFF   Result Value Ref Range    WBC 4.4 4.1 - 11.1 K/uL    RBC 3.15 (L) 4.10 - 5.70 M/uL    HGB 10.6 (L) 12.1 - 17.0 g/dL    HCT 31.4 (L) 36.6 - 50.3 %    MCV 99.7 (H) 80.0 - 99.0 FL    MCH 33.7 26.0 - 34.0 PG    MCHC 33.8 30.0 - 36.5 g/dL    RDW 14.1 11.5 - 14.5 %    PLATELET 242 340 - 142 K/uL    MPV 9.7 8.9 - 12.9 FL    NRBC 0.0 0  WBC    ABSOLUTE NRBC 0.00 0.00 - 0.01 K/uL    NEUTROPHILS 74 32 - 75 %    LYMPHOCYTES 17 12 - 49 %    MONOCYTES 7 5 - 13 %    EOSINOPHILS 2 0 - 7 %    BASOPHILS 0 0 - 1 %    IMMATURE GRANULOCYTES 0 0.0 - 0.5 %    ABS. NEUTROPHILS 3.3 1.8 - 8.0 K/UL    ABS. LYMPHOCYTES 0.7 (L) 0.8 - 3.5 K/UL    ABS. MONOCYTES 0.3 0.0 - 1.0 K/UL    ABS. EOSINOPHILS 0.1 0.0 - 0.4 K/UL    ABS. BASOPHILS 0.0 0.0 - 0.1 K/UL    ABS. IMM. GRANS. 0.0 0.00 - 0.04 K/UL    DF SMEAR SCANNED      RBC COMMENTS NORMOCYTIC, NORMOCHROMIC     METABOLIC PANEL, COMPREHENSIVE   Result Value Ref Range    Sodium 138 136 - 145 mmol/L    Potassium 3.2 (L) 3.5 - 5.1 mmol/L    Chloride 101 97 - 108 mmol/L    CO2 26 21 - 32 mmol/L    Anion gap 11 5 - 15 mmol/L    Glucose 73 65 - 100 mg/dL    BUN 24 (H) 6 - 20 MG/DL    Creatinine 1.72 (H) 0.70 - 1.30 MG/DL    BUN/Creatinine ratio 14 12 - 20      GFR est AA 46 (L) >60 ml/min/1.73m2    GFR est non-AA 38 (L) >60 ml/min/1.73m2    Calcium 8.2 (L) 8.5 - 10.1 MG/DL    Bilirubin, total 0.5 0.2 - 1.0 MG/DL    ALT (SGPT) 16 12 - 78 U/L    AST (SGOT) 23 15 - 37 U/L    Alk.  phosphatase 70 45 - 117 U/L    Protein, total 7.2 6.4 - 8.2 g/dL    Albumin 2.3 (L) 3.5 - 5.0 g/dL    Globulin 4.9 (H) 2.0 - 4.0 g/dL    A-G Ratio 0.5 (L) 1.1 - 2.2     ETHYL ALCOHOL   Result Value Ref Range    ALCOHOL(ETHYL),SERUM 29 (H) <10 MG/DL   NT-PRO BNP   Result Value Ref Range    NT pro-BNP 3,755 (H) 0 - 450 PG/ML   SARS-COV-2   Result Value Ref Range    Specimen source Nasopharyngeal      SARS-CoV-2 Not detected NOTD      Specimen source Nasopharyngeal     CBC WITH AUTOMATED DIFF   Result Value Ref Range    WBC 4.7 4.1 - 11.1 K/uL    RBC 3.04 (L) 4.10 - 5.70 M/uL    HGB 10.0 (L) 12.1 - 17.0 g/dL    HCT 29.8 (L) 36.6 - 50.3 %    MCV 98.0 80.0 - 99.0 FL    MCH 32.9 26.0 - 34.0 PG    MCHC 33.6 30.0 - 36.5 g/dL    RDW 13.8 11.5 - 14.5 %    PLATELET 362 906 - 403 K/uL    MPV 9.2 8.9 - 12.9 FL    NRBC 0.0 0  WBC    ABSOLUTE NRBC 0.00 0.00 - 0.01 K/uL    NEUTROPHILS 73 32 - 75 %    LYMPHOCYTES 18 12 - 49 %    MONOCYTES 6 5 - 13 %    EOSINOPHILS 3 0 - 7 %    BASOPHILS 0 0 - 1 % IMMATURE GRANULOCYTES 0 0.0 - 0.5 %    ABS. NEUTROPHILS 3.4 1.8 - 8.0 K/UL    ABS. LYMPHOCYTES 0.8 0.8 - 3.5 K/UL    ABS. MONOCYTES 0.3 0.0 - 1.0 K/UL    ABS. EOSINOPHILS 0.1 0.0 - 0.4 K/UL    ABS. BASOPHILS 0.0 0.0 - 0.1 K/UL    ABS. IMM.  GRANS. 0.0 0.00 - 0.04 K/UL    DF AUTOMATED     METABOLIC PANEL, BASIC   Result Value Ref Range    Sodium 135 (L) 136 - 145 mmol/L    Potassium 3.4 (L) 3.5 - 5.1 mmol/L    Chloride 101 97 - 108 mmol/L    CO2 26 21 - 32 mmol/L    Anion gap 8 5 - 15 mmol/L    Glucose 92 65 - 100 mg/dL    BUN 29 (H) 6 - 20 MG/DL    Creatinine 1.84 (H) 0.70 - 1.30 MG/DL    BUN/Creatinine ratio 16 12 - 20      GFR est AA 43 (L) >60 ml/min/1.73m2    GFR est non-AA 35 (L) >60 ml/min/1.73m2    Calcium 7.8 (L) 8.5 - 10.1 MG/DL   MAGNESIUM   Result Value Ref Range    Magnesium 1.0 (L) 1.6 - 2.4 mg/dL   PHOSPHORUS   Result Value Ref Range    Phosphorus 3.3 2.6 - 4.7 MG/DL   METABOLIC PANEL, BASIC   Result Value Ref Range    Sodium 136 136 - 145 mmol/L    Potassium 3.8 3.5 - 5.1 mmol/L    Chloride 100 97 - 108 mmol/L    CO2 33 (H) 21 - 32 mmol/L    Anion gap 3 (L) 5 - 15 mmol/L    Glucose 136 (H) 65 - 100 mg/dL    BUN 23 (H) 6 - 20 MG/DL    Creatinine 1.83 (H) 0.70 - 1.30 MG/DL    BUN/Creatinine ratio 13 12 - 20      GFR est AA 43 (L) >60 ml/min/1.73m2    GFR est non-AA 36 (L) >60 ml/min/1.73m2    Calcium 8.4 (L) 8.5 - 10.1 MG/DL   MAGNESIUM   Result Value Ref Range    Magnesium 1.4 (L) 1.6 - 2.4 mg/dL   PHOSPHORUS   Result Value Ref Range    Phosphorus 2.9 2.6 - 4.7 MG/DL   METABOLIC PANEL, BASIC   Result Value Ref Range    Sodium 137 136 - 145 mmol/L    Potassium 3.4 (L) 3.5 - 5.1 mmol/L    Chloride 100 97 - 108 mmol/L    CO2 31 21 - 32 mmol/L    Anion gap 6 5 - 15 mmol/L    Glucose 78 65 - 100 mg/dL    BUN 25 (H) 6 - 20 MG/DL    Creatinine 1.70 (H) 0.70 - 1.30 MG/DL    BUN/Creatinine ratio 15 12 - 20      GFR est AA 47 (L) >60 ml/min/1.73m2    GFR est non-AA 39 (L) >60 ml/min/1.73m2    Calcium 7.9 (L) 8.5 - 10.1 MG/DL   MAGNESIUM   Result Value Ref Range    Magnesium 1.4 (L) 1.6 - 2.4 mg/dL   PHOSPHORUS   Result Value Ref Range    Phosphorus 2.9 2.6 - 4.7 MG/DL   SARS-COV-2   Result Value Ref Range    Specimen source Nasopharyngeal      Specimen source Nasopharyngeal      COVID-19 rapid test Not detected NOTD     EKG, 12 LEAD, INITIAL   Result Value Ref Range    Ventricular Rate 57 BPM    Atrial Rate 205 BPM    QRS Duration 90 ms    Q-T Interval 480 ms    QTC Calculation (Bezet) 467 ms    Calculated P Axis -88 degrees    Calculated R Axis 111 degrees    Calculated T Axis 98 degrees    Diagnosis       Atrial flutter with variable AV block with premature ventricular or   aberrantly conducted complexes  Left posterior fascicular block  Possible Anterior infarct (cited on or before 14-SEP-2019)  Abnormal ECG  When compared with ECG of 29-JUN-2020 23:17,  No significant change was found  Confirmed by Olive Dean M.D., Denita Sebastian ([id number]) on 7/6/2020 9:32:56 AM     EKG, 12 LEAD, SUBSEQUENT   Result Value Ref Range    Ventricular Rate 50 BPM    Atrial Rate 182 BPM    QRS Duration 100 ms    Q-T Interval 496 ms    QTC Calculation (Bezet) 452 ms    Calculated P Axis -98 degrees    Calculated R Axis 103 degrees    Calculated T Axis 99 degrees    Diagnosis       Atrial flutter  Rightward axis  ST & T wave abnormality, consider anterior ischemia  Abnormal ECG  When compared with ECG of 04-JUL-2020 14:34,    T wave inversion now evident in Anterior leads  Confirmed by Olive Dean M.D., Denita Sebastian ([id number]) on 7/9/2020 8:10:38 AM         Assessment/Plan:    Left wrist fracture as mentioned above, he has not followed up with orthopedics and he has significant inflammation of the right hand, found to have DVT of the left wrist at [facility] for which he was on Eliquis, but stopped due to high risk of falls. Will do x-ray of the left wrist, and refer to orthopedics.   His nurse mentions having noticed black tarry stools, but he taking iron supplementation so that might be a reason for the color of his stools, but, I will do occult blood test.  He has CKD stage III, has appointment with nephrology right after our appointment today. Order labs as below. Has anemia of chronic disease associated, ordered labs as below. Last hemoglobin reported in his discharge summaries was 8.1. Asymptomatic. For the paroxysmal ANNABELLA white, currently asymptomatic, he is off metoprolol due to episodes of significant bradycardia, he is also off antihypertensives because of postural hypotension found at [address] in the most recent hospitalization as mentioned, but his blood pressure is significantly high today, so we will start a small dose of amlodipine 2.5 mg, I asked Ms. Sebastian Iqbal to keep a diary of his blood pressure and his heart rate and bring to next visit. He also needs to follow-up with cardiology referred to Dr. Ana Cristina Jha who saw him at the hospital  Checking uric acid level for gout, currently on 100 mg of allopurinol  Once I reviewed the results of the occult blood test if it is negative I will recommend starting on aspirin for paroxysmal ANNABELLA white  Spent 60 minutes on this visit, 50% of which was spent in counseling and coordination of care and review of hospital records. ICD-10-CM ICD-9-CM    1. Closed fracture of left wrist with delayed healing, subsequent encounter  S62.102G V54.19 XR WRIST LT AP/LAT/OBL MIN 3V      REFERRAL TO ORTHOPEDICS   2. Essential hypertension  I10 401.9 amLODIPine (NORVASC) 2.5 mg tablet   3. CKD (chronic kidney disease) stage 3, GFR 30-59 ml/min ([facility])  R34.3 601.6 METABOLIC PANEL, COMPREHENSIVE      PHOSPHORUS      MAGNESIUM   4. 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  Anemia of chronic disease  D63.8 285.29 CBC WITH AUTOMATED DIFF      IRON PROFILE      FERRITIN      REFERRAL TO HEMATOLOGY   6. Paroxysmal atrial fibrillation (HCC)  I48.0 427.31 TSH 3RD GENERATION      T4, FREE REFERRAL TO CARDIOLOGY     Orders Placed This Encounter    XR WRIST LT AP/LAT/OBL MIN 3V     Standing Status:   Future     Standing Expiration Date:   9/25/2021    CBC WITH AUTOMATED DIFF    METABOLIC PANEL, COMPREHENSIVE    TSH 3RD GENERATION    T4, FREE    URIC ACID    IRON PROFILE    FERRITIN    PHOSPHORUS    MAGNESIUM    REFERRAL TO ORTHOPEDICS     Referral Priority:   Routine     Referral Type:   Consultation     Referral Reason:   Specialty Services Required     Referred to Provider:   Daniel Funes MD     Number of Visits Requested:   1    REFERRAL TO CARDIOLOGY     Referral Priority:   Routine     Referral Type:   Consultation     Referral Reason:   Specialty Services Required     Referred to Provider:   Trinidad Degroot MD     Number of Visits Requested:   1    REFERRAL TO HEMATOLOGY     Referral Priority:   Routine     Referral Type:   Consultation     Referral Reason:   Specialty Services Required     Referred to Provider:   Kenyetta Valle MD     Number of Visits Requested:   1    ascorbic acid, vitamin C, (Vitamin C) 500 mg tablet     Sig: Take 100 mg by mouth daily.  amLODIPine (NORVASC) 2.5 mg tablet     Sig: Take 1 Tab by mouth daily for 60 days. Dispense:  60 Tab     Refill:  0     call if any problems  There are no Patient Instructions on file for this visit. Follow-up and Dispositions    · Return in about 2 months (around 10/25/2020) for f/u 30 mins .

## 2020-08-25 NOTE — PROGRESS NOTES
Chief Complaint   Patient presents with    New Patient    Fall    Valvular Heart Disease    Dementia    Alcohol intoxication    Kidney Stone    Irregular Heart Beat    Hypertension   Pt was seen at [facility] two weeks ago. He was also seen at [facility] from 7/04/2020 to 07/10/2020. Pt is being taken care [facility]. Pt has an unsteady gait. His pervious PCP was Dr. Navdeep Mora in [place].

## 2020-08-26 ENCOUNTER — TELEPHONE (OUTPATIENT)
Dept: INTERNAL MEDICINE CLINIC | Age: 85
End: 2020-08-26

## 2020-08-26 ENCOUNTER — OP HISTORICAL/CONVERTED ENCOUNTER (OUTPATIENT)
Dept: OTHER | Age: 85
End: 2020-08-26

## 2020-08-26 DIAGNOSIS — F51.04 CHRONIC INSOMNIA: Primary | ICD-10-CM

## 2020-08-26 PROBLEM — I82.622 ACUTE DEEP VEIN THROMBOSIS (DVT) OF RADIAL VEIN OF LEFT UPPER EXTREMITY (HCC): Status: ACTIVE | Noted: 2020-08-26

## 2020-08-26 PROBLEM — D63.8 ANEMIA OF CHRONIC DISEASE: Status: ACTIVE | Noted: 2020-08-26

## 2020-08-26 PROBLEM — S62.102G: Status: ACTIVE | Noted: 2020-08-26

## 2020-08-26 NOTE — PROGRESS NOTES
Podiatry History and Physical    Subjective:         Patient is a 81 y.o. male who is being seen as a new pt for bilateral foot pain and swelling. Patient states he has been suffering from the issue for the past few weeks. He denies any history of trauma. He states the pain rises to the level of 7 out of 10. He states pressure exacerbates the pain. He denies any open wounds. He denies any local/systemic signs of infection. He denies any other lower extremity complaints    Past Medical History:   Diagnosis Date    Adverse effect of anesthesia     nasal procedure didn't work-patient awake    Alcohol abuse     Anemia     Aortic regurgitation     Arthritis     Chronic kidney disease     CKD (chronic kidney disease), stage III ([address] 2011    Dementia (HCC)     Depression     Episodic lightheadedness 07/28/2017    Episodic lightheadedness     Epistaxis 11/27/2016    Erectile dysfunction     Fall 02/11/2020    GERD (gastroesophageal reflux disease)     GI bleed     Gout     gout, osteoarthritis    Heart failure (HCC)     acute heart failure    Hemorrhoids     History of cardioversion 02/17/2017    Hypertension     Ill-defined condition     weakness    Multiple myeloma (HCC)     PAF ([facility]oxysmal [facility] fibrillation) ([facility] [address] 02/17/2016    eliquis    Pulmonary HTN ([facility] [facility]     Right-sided epistaxis 11/27/2016    S/P AVR (aortic valve replacement) 07/26/2016    merrill gill md -     S/P cardiac cath 7/6/16    severe ai, normal coronaries     S/P colonoscopy 10-8-04    S/P colonoscopy with polypectomy 05/09/2016    Kar Wynn MD    Skin lesion      Past Surgical History:   Procedure Laterality Date    CARDIAC SURG PROCEDURE UNLIST      cardiac cath   [facility] CARDIAC SURG PROCEDURE UNLIST  07/26/2016    AVR    HX HEENT      nasal procedure 20 years ago    HX KNEE REPLACEMENT Bilateral     bilateral knee replacement    HX ORTHOPAEDIC Bilateral     wrist       Family History   Problem Relation Age of Onset    Anemia Mother     No Known Problems Father     Other Brother         drug abuse      Social History     Tobacco Use    Smoking status: Former Smoker     Packs/day: 1.00     Years: 4.00     Pack years: 4.00     Last attempt to quit: 1960     Years since quittin.6    Smokeless tobacco: Never Used   Substance Use Topics    Alcohol use: Yes     Comment: heavy     Allergies   Allergen Reactions    Amiodarone Other (comments)     Bradycardia -excessive     Prior to Admission medications    Medication Sig Start Date End Date Taking? Authorizing Provider   ammonium lactate (AMLACTIN) 12 % topical cream Apply  to affected area two (2) times a day. rub in to affected area well 20  Yes Dann Pride DPM   escitalopram oxalate (LEXAPRO) 10 mg tablet TAKE 1 TABLET BY MOUTH EVERY DAY 20  Yes Kelle Trammell MD   thiamine HCL (B-1) 100 mg tablet Take 1 Tab by mouth daily. 20  Yes Alyson Verdin MD   therapeutic multivitamin [facility]) tablet Take 1 Tab by mouth daily. 20  Yes Alyson Verdin MD   melatonin 3 mg tablet Take 1 Tab by mouth nightly as needed for Insomnia. 7/10/20  Yes Alyson Verdin MD   magnesium oxide (MAG-OX) 400 mg tablet Take 1 Tab by mouth daily. 20  Yes Alyson Verdin MD   folic acid (FOLVITE) 1 mg tablet Take 1 Tab by mouth daily. 20  Yes Alyson Verdin MD   ferrous sulfate 325 mg (65 mg iron) tablet Take 1 Tab by mouth daily (with breakfast). 20  Yes Alyson Verdin MD   diclofenac (VOLTAREN) 1 % gel Apply 2 g to affected area four (4) times daily as needed for Pain (elbow pain). 7/10/20  Yes Alyson Verdin MD   meclizine (ANTIVERT) 25 mg tablet Take 25 mg by mouth three (3) times daily as needed for Dizziness.    Yes Provider, Historical   simvastatin (ZOCOR) 40 mg tablet TAKE 1 TABLET BY MOUTH EVERYDAY AT BEDTIME 20  Yes Kelle Trammell MD   Klor-Con M20 20 mEq tablet TAKE 1 TABLET BY MOUTH EVERY DAY WITH FOOD 20 Yes Other, MD Saniya   allopurinol (ZYLOPRIM) 100 mg tablet TAKE 1 TABLET BY MOUTH EVERY DAY 12/22/19  Yes Martha Bhatt MD   timolol (TIMOPTIC) 0.5 % ophthalmic solution INSTILL 1 DROP INTO RIGHT EYE EVERY MORNING 2/14/19  Yes Provider, Historical   ascorbic acid, vitamin C, (Vitamin C) 500 mg tablet Take 100 mg by mouth daily. Provider, Historical   amLODIPine (NORVASC) 2.5 mg tablet Take 1 Tab by mouth daily for 60 days. 8/25/20 10/24/20  Meet Haney MD   bumetanide (BUMEX) 1 mg tablet TAKE 1 TABLET BY MOUTH EVERY DAY 5/13/20   Martha Bhatt MD   ELIQUIS 2.5 mg tablet TAKE 1 TABLET BY MOUTH TWICE A DAY 9/29/19   Oretha Seip, MD       Review of Systems   Constitutional: Negative. HENT: Negative. Eyes: Negative. Respiratory: Negative. Cardiovascular: Negative. Gastrointestinal: Negative. Endocrine: Negative. Genitourinary: Negative. Musculoskeletal: Negative. Skin: Negative. Allergic/Immunologic: Positive for immunocompromised state. Neurological: Negative. Hematological: Bruises/bleeds easily. Psychiatric/Behavioral: Negative. All other systems reviewed and are negative. Objective:     Visit Vitals  BP (!) 162/111 (BP 1 Location: Right arm, BP Patient Position: Sitting)   Pulse 79   Temp 97.3 °F (36.3 °C) (Temporal)   Ht 5' 8\" (1.727 m)   Wt 158 lb 6.4 oz (71.8 kg)   SpO2 98%   BMI 24.08 kg/m²       Physical Exam  Vitals signs reviewed. Constitutional:       Appearance: He is normal weight. HENT:      Head:      Comments: Poor dentition  Cardiovascular:      Pulses:           Dorsalis pedis pulses are 1+ on the right side and 1+ on the left side. Posterior tibial pulses are 1+ on the right side and 1+ on the left side. Pulmonary:      Effort: Pulmonary effort is normal.   Musculoskeletal:      Right lower leg: No edema. Left lower leg: No edema. Right foot: Decreased range of motion. Deformity present. No bunion, Charcot foot or foot drop. Left foot: Decreased range of motion. Deformity present. No bunion, Charcot foot or foot drop. Feet:      Right foot:      Skin integrity: Skin breakdown and dry skin present. Toenail Condition: Right toenails are abnormally thick. Fungal disease present. Left foot:      Skin integrity: Skin breakdown and dry skin present. Toenail Condition: Left toenails are abnormally thick. Fungal disease present. Skin:     General: Skin is warm. Capillary Refill: Capillary refill takes 2 to 3 seconds. Coloration: Skin is ashen. Comments: Interdigital maceration noted   Neurological:      Mental Status: He is alert and oriented to person, place, and time. Psychiatric:         Mood and Affect: Mood and affect normal.         Behavior: Behavior is cooperative. Data Review: No results found for this or any previous visit (from the past 24 hour(s)). Impression:       ICD-10-CM ICD-9-CM    1. Onychomycosis  B35.1 110.1 BIOPSY, NAIL UNIT   2. Peripheral vascular disease (HCC)  I73.9 443.9    3. Xerosis cutis  L85.3 706.8 ammonium lactate (AMLACTIN) 12 % topical cream   4. Maceration of skin  L98.8 709.8 povidone-iodine (BETADINE) 10 % topical solution         Recommendation:     Patient seen and evaluated in the office  Discussed and educated patient regarding current medical status  A toenail plate biopsy was taken of the right hallux with a nail nipper without incident. Patient tolerated well and no dressings needed.   When pathology results return, we will discuss treatment options  Ammonium lactate 12% lotion prescription was given to be applied twice daily to affected skin  Instructed patient to apply Betadine topical solution to all webspaces for symptomatic relief of macerated skin  Patient and caretaker to monitor and call the office immediately if condition worsens        RTC in

## 2020-08-26 NOTE — TELEPHONE ENCOUNTER
Ms. Camron Sterling stated that the pt is not sleeping. Can you prescribe home something for sleep.  If you can please send it to [facility]

## 2020-08-27 LAB
ALBUMIN SERPL-MCNC: 3.1 G/DL (ref 3.6–4.6)
ALBUMIN/GLOB SERPL: 0.9 {RATIO} (ref 1.2–2.2)
ALP SERPL-CCNC: 61 IU/L (ref 39–117)
ALT SERPL-CCNC: 10 IU/L (ref 0–44)
AST SERPL-CCNC: 13 IU/L (ref 0–40)
BASOPHILS # BLD AUTO: 0 X10E3/UL (ref 0–0.2)
BASOPHILS NFR BLD AUTO: 0 %
BILIRUB SERPL-MCNC: 0.4 MG/DL (ref 0–1.2)
BUN SERPL-MCNC: 17 MG/DL (ref 8–27)
BUN/CREAT SERPL: 11 (ref 10–24)
CALCIUM SERPL-MCNC: 8.8 MG/DL (ref 8.6–10.2)
CHLORIDE SERPL-SCNC: 103 MMOL/L (ref 96–106)
CO2 SERPL-SCNC: 23 MMOL/L (ref 20–29)
CREAT SERPL-MCNC: 1.53 MG/DL (ref 0.76–1.27)
EOSINOPHIL # BLD AUTO: 0.1 X10E3/UL (ref 0–0.4)
EOSINOPHIL NFR BLD AUTO: 1 %
ERYTHROCYTE [DISTWIDTH] IN BLOOD BY AUTOMATED COUNT: 14 % (ref 11.6–15.4)
FERRITIN SERPL-MCNC: 479 NG/ML (ref 30–400)
GLOBULIN SER CALC-MCNC: 3.3 G/DL (ref 1.5–4.5)
GLUCOSE SERPL-MCNC: 126 MG/DL (ref 65–99)
HCT VFR BLD AUTO: 25.5 % (ref 37.5–51)
HGB BLD-MCNC: 8 G/DL (ref 13–17.7)
IMM GRANULOCYTES # BLD AUTO: 0 X10E3/UL (ref 0–0.1)
IMM GRANULOCYTES NFR BLD AUTO: 0 %
IRON SATN MFR SERPL: 9 % (ref 15–55)
IRON SERPL-MCNC: 17 UG/DL (ref 38–169)
LYMPHOCYTES # BLD AUTO: 0.6 X10E3/UL (ref 0.7–3.1)
LYMPHOCYTES NFR BLD AUTO: 13 %
MAGNESIUM SERPL-MCNC: 1.9 MG/DL (ref 1.6–2.3)
MCH RBC QN AUTO: 30.3 PG (ref 26.6–33)
MCHC RBC AUTO-ENTMCNC: 31.4 G/DL (ref 31.5–35.7)
MCV RBC AUTO: 97 FL (ref 79–97)
MONOCYTES # BLD AUTO: 0.4 X10E3/UL (ref 0.1–0.9)
MONOCYTES NFR BLD AUTO: 8 %
NEUTROPHILS # BLD AUTO: 3.7 X10E3/UL (ref 1.4–7)
NEUTROPHILS NFR BLD AUTO: 78 %
PHOSPHATE SERPL-MCNC: 3.2 MG/DL (ref 2.8–4.1)
PLATELET # BLD AUTO: 255 X10E3/UL (ref 150–450)
POTASSIUM SERPL-SCNC: 4 MMOL/L (ref 3.5–5.2)
PROT SERPL-MCNC: 6.4 G/DL (ref 6–8.5)
RBC # BLD AUTO: 2.64 X10E6/UL (ref 4.14–5.8)
SODIUM SERPL-SCNC: 145 MMOL/L (ref 134–144)
T4 FREE SERPL-MCNC: 1.06 NG/DL (ref 0.82–1.77)
TIBC SERPL-MCNC: 197 UG/DL (ref 250–450)
TSH SERPL DL<=0.005 MIU/L-ACNC: 1.39 UIU/ML (ref 0.45–4.5)
UIBC SERPL-MCNC: 180 UG/DL (ref 111–343)
URATE SERPL-MCNC: 6 MG/DL (ref 3.7–8.6)
WBC # BLD AUTO: 4.8 X10E3/UL (ref 3.4–10.8)

## 2020-08-27 RX ORDER — MELATONIN 10 MG
1 CAPSULE ORAL
Qty: 30 CAP | Refills: 1 | Status: SHIPPED | OUTPATIENT
Start: 2020-08-27 | End: 2020-10-26

## 2020-08-28 RX ORDER — LANOLIN ALCOHOL/MO/W.PET/CERES
400 CREAM (GRAM) TOPICAL DAILY
Qty: 30 TAB | Refills: 2 | Status: SHIPPED | OUTPATIENT
Start: 2020-08-28 | End: 2020-09-25 | Stop reason: SDUPTHER

## 2020-08-28 NOTE — PROGRESS NOTES
Yes please let him know that hemoglobin is 8, not significant change since hospitalization when it was 8.1, he needs to continue taking the iron, the iron levels are very low, very important to set appointment with hematology since I think that probably he will need IV iron. Also, very important to remind her to do the occult blood test to rule out any sources of gastrointestinal bleeding, this is very important. Please tell her we need to be holding off any aspirin until having these results available. If the occult blood test is negative for blood then we will start aspirin for the atrial fibrillation. His kidney function is stable compared to before, not worsening, uric acid is at goal (6.0), so to continue same dose of allopurinol without changes to 600 mg/day). Thyroid function is within normal limits magnesium is 1.9, should be above 2, so he should continue taking daily magnesium, I am sending refills to his pharmacy. Glucose is slightly elevated, can you please ask her if he did the labs fasting or not?,  Where I can know if I need to do A1c or not. Thank you. Thank you.

## 2020-08-31 ENCOUNTER — TELEPHONE (OUTPATIENT)
Dept: INTERNAL MEDICINE CLINIC | Age: 85
End: 2020-08-31

## 2020-08-31 NOTE — TELEPHONE ENCOUNTER
stated that the pt is c/o shoulder pain. She has been giving him tylenol for his pain but she wants to know if he can have something else prescribed for his shoulder pain.

## 2020-09-08 DIAGNOSIS — B35.1 ONYCHOMYCOSIS: Primary | ICD-10-CM

## 2020-09-08 RX ORDER — TERBINAFINE HYDROCHLORIDE 250 MG/1
250 TABLET ORAL DAILY
Qty: 90 TAB | Refills: 0 | Status: SHIPPED | OUTPATIENT
Start: 2020-09-08

## 2020-09-18 ENCOUNTER — TELEPHONE (OUTPATIENT)
Dept: INTERNAL MEDICINE CLINIC | Age: 85
End: 2020-09-18

## 2020-09-18 RX ORDER — ASCORBIC ACID 500 MG
100 TABLET ORAL DAILY
Qty: 90 TAB | Refills: 1 | Status: SHIPPED | OUTPATIENT
Start: 2020-09-18 | End: 2021-03-15

## 2020-09-18 RX ORDER — FOLIC ACID 1 MG/1
1 TABLET ORAL DAILY
Qty: 90 TAB | Refills: 1 | Status: ON HOLD | OUTPATIENT
Start: 2020-09-18 | End: 2021-03-15

## 2020-09-18 RX ORDER — LANOLIN ALCOHOL/MO/W.PET/CERES
100 CREAM (GRAM) TOPICAL DAILY
Qty: 90 TAB | Refills: 1 | Status: ON HOLD | OUTPATIENT
Start: 2020-09-18 | End: 2021-03-17

## 2020-09-18 RX ORDER — ALLOPURINOL 100 MG/1
TABLET ORAL
Qty: 90 TAB | Refills: 2 | Status: SHIPPED | OUTPATIENT
Start: 2020-09-18 | End: 2021-12-21 | Stop reason: SDUPTHER

## 2020-09-18 RX ORDER — LANOLIN ALCOHOL/MO/W.PET/CERES
325 CREAM (GRAM) TOPICAL
Qty: 30 TAB | Refills: 0 | Status: SHIPPED | OUTPATIENT
Start: 2020-09-18 | End: 2020-10-20

## 2020-09-18 RX ORDER — MECLIZINE HYDROCHLORIDE 25 MG/1
25 TABLET ORAL
Qty: 90 TAB | Refills: 1 | Status: SHIPPED | OUTPATIENT
Start: 2020-09-18 | End: 2020-10-18

## 2020-09-18 NOTE — TELEPHONE ENCOUNTER
Pt needs refills     Ferrous sulfate 325mg QD    Meclizine 87VG BID    Folic acid 1mg tab every day     Allopurinol 100mg every day    Vitamin C TR 500mg cap every day    Please send to [facility]

## 2020-09-25 ENCOUNTER — TELEPHONE (OUTPATIENT)
Dept: INTERNAL MEDICINE CLINIC | Age: 85
End: 2020-09-25

## 2020-09-25 DIAGNOSIS — E83.42 HYPOMAGNESEMIA: ICD-10-CM

## 2020-09-25 RX ORDER — LANOLIN ALCOHOL/MO/W.PET/CERES
400 CREAM (GRAM) TOPICAL DAILY
Qty: 30 TAB | Refills: 2 | Status: SHIPPED | OUTPATIENT
Start: 2020-09-25 | End: 2021-01-08

## 2020-10-19 DIAGNOSIS — I10 ESSENTIAL HYPERTENSION: ICD-10-CM

## 2020-10-20 RX ORDER — FERROUS SULFATE 325(65) MG
TABLET, DELAYED RELEASE (ENTERIC COATED) ORAL
Qty: 90 TAB | Refills: 1 | Status: SHIPPED | OUTPATIENT
Start: 2020-10-20 | End: 2020-12-11

## 2020-10-20 RX ORDER — AMLODIPINE BESYLATE 2.5 MG/1
TABLET ORAL
Qty: 90 TAB | Refills: 1 | Status: SHIPPED | OUTPATIENT
Start: 2020-10-20 | End: 2021-01-18

## 2020-10-23 RX ORDER — ACETAMINOPHEN 325 MG/1
TABLET ORAL
Qty: 30 TAB | Refills: 5 | Status: SHIPPED | OUTPATIENT
Start: 2020-10-23 | End: 2020-12-02 | Stop reason: ALTCHOICE

## 2020-10-23 RX ORDER — SIMVASTATIN 40 MG/1
TABLET, FILM COATED ORAL
Qty: 90 TAB | Refills: 1 | Status: SHIPPED | OUTPATIENT
Start: 2020-10-23 | End: 2020-12-11

## 2020-10-23 RX ORDER — TIMOLOL MALEATE 5 MG/ML
SOLUTION/ DROPS OPHTHALMIC
Qty: 5 ML | Refills: 5 | Status: SHIPPED | OUTPATIENT
Start: 2020-10-23

## 2020-10-23 RX ORDER — DICLOFENAC SODIUM 10 MG/G
GEL TOPICAL
Qty: 200 G | Refills: 3 | Status: SHIPPED | OUTPATIENT
Start: 2020-10-23

## 2020-10-23 RX ORDER — ESCITALOPRAM OXALATE 10 MG/1
TABLET ORAL
Qty: 30 TAB | Refills: 5 | Status: SHIPPED | OUTPATIENT
Start: 2020-10-23 | End: 2021-01-28 | Stop reason: SDUPTHER

## 2020-10-27 ENCOUNTER — HOSPITAL ENCOUNTER (EMERGENCY)
Age: 85
Discharge: HOME | End: 2020-10-27
Attending: EMERGENCY MEDICINE
Payer: MEDICARE

## 2020-10-27 ENCOUNTER — APPOINTMENT (OUTPATIENT)
Dept: GENERAL RADIOLOGY | Age: 85
End: 2020-10-27
Attending: EMERGENCY MEDICINE
Payer: MEDICARE

## 2020-10-27 VITALS
HEART RATE: 74 BPM | HEIGHT: 70 IN | SYSTOLIC BLOOD PRESSURE: 131 MMHG | OXYGEN SATURATION: 100 % | TEMPERATURE: 98.1 F | BODY MASS INDEX: 24.05 KG/M2 | WEIGHT: 168 LBS | DIASTOLIC BLOOD PRESSURE: 84 MMHG | RESPIRATION RATE: 18 BRPM

## 2020-10-27 DIAGNOSIS — R55 NEAR SYNCOPE: Primary | ICD-10-CM

## 2020-10-27 LAB
ALBUMIN SERPL-MCNC: 2.6 G/DL (ref 3.5–5)
ALBUMIN/GLOB SERPL: 0.5 {RATIO} (ref 1.1–2.2)
ALP SERPL-CCNC: 77 U/L (ref 45–117)
ALT SERPL-CCNC: 15 U/L (ref 12–78)
ANION GAP SERPL CALC-SCNC: 6 MMOL/L (ref 5–15)
APPEARANCE UR: ABNORMAL
AST SERPL W P-5'-P-CCNC: 24 U/L (ref 15–37)
BACTERIA URNS QL MICRO: NEGATIVE /HPF
BASOPHILS # BLD: 0 K/UL (ref 0–0.1)
BASOPHILS NFR BLD: 0 % (ref 0–1)
BILIRUB SERPL-MCNC: 0.5 MG/DL (ref 0.2–1)
BILIRUB UR QL: NEGATIVE
BUN SERPL-MCNC: 23 MG/DL (ref 6–20)
BUN/CREAT SERPL: 13 (ref 12–20)
CA-I BLD-MCNC: 8.8 MG/DL (ref 8.5–10.1)
CHLORIDE SERPL-SCNC: 104 MMOL/L (ref 97–108)
CO2 SERPL-SCNC: 27 MMOL/L (ref 21–32)
COLOR UR: ABNORMAL
CREAT SERPL-MCNC: 1.79 MG/DL (ref 0.7–1.3)
DIFFERENTIAL METHOD BLD: ABNORMAL
EOSINOPHIL # BLD: 0.1 K/UL (ref 0–0.4)
EOSINOPHIL NFR BLD: 2 % (ref 0–7)
ERYTHROCYTE [DISTWIDTH] IN BLOOD BY AUTOMATED COUNT: 16.3 % (ref 11.5–14.5)
GLOBULIN SER CALC-MCNC: 5.5 G/DL (ref 2–4)
GLUCOSE SERPL-MCNC: 87 MG/DL (ref 65–100)
GLUCOSE UR STRIP.AUTO-MCNC: NEGATIVE MG/DL
HCT VFR BLD AUTO: 32.1 % (ref 36.6–50.3)
HGB BLD-MCNC: 10.1 G/DL (ref 12.1–17)
HGB UR QL STRIP: ABNORMAL
IMM GRANULOCYTES # BLD AUTO: 0 K/UL (ref 0–0.04)
IMM GRANULOCYTES NFR BLD AUTO: 0 % (ref 0–0.5)
KETONES UR QL STRIP.AUTO: NEGATIVE MG/DL
LEUKOCYTE ESTERASE UR QL STRIP.AUTO: NEGATIVE
LYMPHOCYTES # BLD: 0.9 K/UL (ref 0.8–3.5)
LYMPHOCYTES NFR BLD: 19 % (ref 12–49)
MCH RBC QN AUTO: 29 PG (ref 26–34)
MCHC RBC AUTO-ENTMCNC: 31.5 G/DL (ref 30–36.5)
MCV RBC AUTO: 92.2 FL (ref 80–99)
MONOCYTES # BLD: 0.3 K/UL (ref 0–1)
MONOCYTES NFR BLD: 7 % (ref 5–13)
MUCOUS THREADS URNS QL MICRO: ABNORMAL /LPF
NEUTS SEG # BLD: 3.2 K/UL (ref 1.8–8)
NEUTS SEG NFR BLD: 72 % (ref 32–75)
NITRITE UR QL STRIP.AUTO: NEGATIVE
PH UR STRIP: 5 [PH] (ref 5–8)
PLATELET # BLD AUTO: 367 K/UL (ref 150–400)
PMV BLD AUTO: 9.3 FL (ref 8.9–12.9)
POTASSIUM SERPL-SCNC: 4.7 MMOL/L (ref 3.5–5.1)
PROT SERPL-MCNC: 8.1 G/DL (ref 6.4–8.2)
PROT UR STRIP-MCNC: 100 MG/DL
RBC # BLD AUTO: 3.48 M/UL (ref 4.1–5.7)
RBC #/AREA URNS HPF: ABNORMAL /HPF (ref 0–5)
SODIUM SERPL-SCNC: 137 MMOL/L (ref 136–145)
SP GR UR REFRACTOMETRY: 1.01 (ref 1–1.03)
TROPONIN I SERPL-MCNC: <0.05 NG/ML
UA: UC IF INDICATED,UAUC: ABNORMAL
UROBILINOGEN UR QL STRIP.AUTO: 0.1 EU/DL (ref 0.1–1)
WBC # BLD AUTO: 4.5 K/UL (ref 4.1–11.1)
WBC URNS QL MICRO: ABNORMAL /HPF (ref 0–4)
YEAST URNS QL MICRO: PRESENT

## 2020-10-27 PROCEDURE — 84484 ASSAY OF TROPONIN QUANT: CPT

## 2020-10-27 PROCEDURE — 85025 COMPLETE CBC W/AUTO DIFF WBC: CPT

## 2020-10-27 PROCEDURE — 71045 X-RAY EXAM CHEST 1 VIEW: CPT

## 2020-10-27 PROCEDURE — 80053 COMPREHEN METABOLIC PANEL: CPT

## 2020-10-27 PROCEDURE — 99285 EMERGENCY DEPT VISIT HI MDM: CPT

## 2020-10-27 PROCEDURE — 93005 ELECTROCARDIOGRAM TRACING: CPT

## 2020-10-27 PROCEDURE — 81001 URINALYSIS AUTO W/SCOPE: CPT

## 2020-10-27 PROCEDURE — 36415 COLL VENOUS BLD VENIPUNCTURE: CPT

## 2020-10-27 RX ORDER — ESCITALOPRAM OXALATE 10 MG/1
10 TABLET ORAL DAILY
Qty: 30 TAB | Refills: 0 | Status: CANCELLED | OUTPATIENT
Start: 2020-10-27 | End: 2020-11-26

## 2020-10-27 RX ORDER — POTASSIUM CHLORIDE 20 MEQ/1
20 TABLET, EXTENDED RELEASE ORAL 2 TIMES DAILY
Qty: 60 TAB | Refills: 0 | Status: CANCELLED | OUTPATIENT
Start: 2020-10-27 | End: 2020-11-26

## 2020-10-27 RX ORDER — SIMVASTATIN 40 MG/1
40 TABLET, FILM COATED ORAL
Qty: 30 TAB | Refills: 0 | Status: CANCELLED | OUTPATIENT
Start: 2020-10-27 | End: 2020-11-26

## 2020-10-27 NOTE — ED PROVIDER NOTES
EMERGENCY DEPARTMENT HISTORY AND PHYSICAL EXAM        Date: 10/27/2020  Patient Name: Colleen Sue. History of Presenting Illness     Chief Complaint   Patient presents with    Seizure       History Provided By: Patient    HPI: Colleen Sue., 81 y.o. male with past medical history of CKD, hypertension, and multiple myeloma who presents with syncopal-like episode. Patient does not remember exactly what happened but states he was on his way to an appointment. He was walking and suddenly felt as if he was going to pass out. He denies any dizziness. States that he did not lose consciousness but felt like he was going to and needed to be held up. He did not follow ground. Symptoms lasted for about 5 minutes and resolved on their own. PCP: Fernie Guajardo MD    Current Outpatient Medications   Medication Sig Dispense Refill    escitalopram oxalate (LEXAPRO) 10 mg tablet TAKE ONE TABLET BY MOUTH DAILY. 30 Tab 5    simvastatin (ZOCOR) 40 mg tablet TAKE ONE TABLET BY MOUTH AT BEDTIME. 90 Tab 1    timolol (TIMOPTIC) 0.5 % ophthalmic solution INSTILL 1 DROPS INTO RIGHT EYE ONCE DAILY 5 mL 5    acetaminophen (TYLENOL) 325 mg tablet TAKE 2 TABLETS BY MOUTH EVERY 4 HOURS AS NEEDED FOR PAIN OR FEVER. 30 Tab 5    diclofenac (VOLTAREN) 1 % gel APPLY TWO GRAMS FOUR TIMES DAILY AS NEEDED FOR ELBOW PAIN 200 g 3    ferrous sulfate (IRON) 325 mg (65 mg iron) EC tablet TAKE ONE TABLET BY MOUTH EACH DAY WITH BREAKFAST. 90 Tab 1    amLODIPine (NORVASC) 2.5 mg tablet TAKE ONE TABLET BY MOUTH DAILY. 90 Tab 1    magnesium oxide (MAG-OX) 400 mg tablet Take 1 Tab by mouth daily for 90 days. 30 Tab 2    thiamine HCL (B-1) 100 mg tablet Take 1 Tab by mouth daily for 180 days. 90 Tab 1    ascorbic acid, vitamin C, (Vitamin C) 500 mg tablet Take 0.5 Tabs by mouth daily for 90 days. 90 Tab 1    folic acid (FOLVITE) 1 mg tablet Take 1 Tab by mouth daily for 180 days.  90 Tab 1    allopurinoL (ZYLOPRIM) 100 mg tablet TAKE 1 TABLET BY MOUTH EVERY DAY 90 Tab 2    terbinafine HCL (LAMISIL) 250 mg tablet Take 1 Tab by mouth daily. 90 Tab 0    ammonium lactate (AMLACTIN) 12 % topical cream Apply  to affected area two (2) times a day. rub in to affected area well 280 g 5    therapeutic multivitamin (THERAGRAN) tablet Take 1 Tab by mouth daily. 30 Tab 0    Klor-Con M20 20 mEq tablet TAKE 1 TABLET BY MOUTH EVERY DAY WITH FOOD      bumetanide (BUMEX) 1 mg tablet TAKE 1 TABLET BY MOUTH EVERY DAY 90 Tab 3    ELIQUIS 2.5 mg tablet TAKE 1 TABLET BY MOUTH TWICE A  Tab 3       Past History     Past Medical History:  Past Medical History:   Diagnosis Date    Adverse effect of anesthesia     nasal procedure didn't work-patient awake    Alcohol abuse     Anemia     Aortic regurgitation     Arthritis     Chronic kidney disease     CKD (chronic kidney disease), stage III 2011    D[facility] ([facility]     Depression     Episodic lightheadedness 07/28/2017    Episodic lightheadedness     Epistaxis 11/27/2016    Erectile dysfunction     Fall 02/11/2020    GERD (gastroesophageal reflux disease)     GI bleed     Gout     gout, osteoarthritis    Heart failure (HCC)     acute heart failure    Hemorrhoids     History of cardioversion 02/17/2017    Hypertension     Ill-defined condition     weakness    Multiple myeloma (HCC)     PAF (paroxysmal [facility] fibrillation) ([facility] [address] 02/17/2016    eliquis    Pulmonary HTN ([facility] [address]     Right-sided epistaxis 11/27/2016    S/P AVR (aortic valve replacement) 07/26/2016    merrill gill md -     S/P cardiac cath 7/6/16    severe ai, normal coronaries     S/P colonoscopy 10-8-04    S/P colonoscopy with polypectomy 05/09/2016    Kar Ross MD    Skin lesion        Past Surgical History:  Past Surgical History:   Procedure Laterality Date    CARDIAC SURG PROCEDURE UNLIST      cardiac cath   [facility] CARDIAC SURG PROCEDURE UNLIST  07/26/2016    AVR    HX HEENT      nasal procedure 20 years ago    HX KNEE REPLACEMENT Bilateral     bilateral knee replacement    HX ORTHOPAEDIC Bilateral     wrist       Family History:  Family History   Problem Relation Age of Onset    Anemia Mother     No Known Problems Father     Other Brother         drug abuse       Social History:  Social History     Tobacco Use    Smoking status: Former Smoker     Packs/day: 1.00     Years: 4.00     Pack years: 4.00     Last attempt to quit: 1960     Years since quittin.8    Smokeless tobacco: Never Used   Substance Use Topics    Alcohol use: Yes     Comment: heavy    Drug use: No       Allergies: Allergies   Allergen Reactions    Amiodarone Other (comments)     Bradycardia -excessive       Review of Systems   Review of Systems   Constitutional: Negative for fever. HENT: Negative for congestion. Eyes: Negative for visual disturbance. Respiratory: Negative for shortness of breath. Cardiovascular: Negative for chest pain. Gastrointestinal: Negative for abdominal pain. Genitourinary: Negative for dysuria. Musculoskeletal: Negative for arthralgias. Skin: Negative for rash. Neurological: Negative for headaches. Physical Exam   General: No acute distressed. Well-nourished. Skin: No rash. Head: Normocephalic. Atraumatic. Eye: No proptosis or conjunctival injections. Respiratory: No apparent respiratory distress. Gastrointestinal: Nondistended. Musculoskeletal: No obvious bony deformities. Psychiatric: Cooperative. Appropriate mood and affect.     Diagnostic Study Results     Labs -     Recent Results (from the past 24 hour(s))   CBC WITH AUTOMATED DIFF    Collection Time: 10/27/20 10:00 AM   Result Value Ref Range    WBC 4.5 4.1 - 11.1 K/uL    RBC 3.48 (L) 4.10 - 5.70 M/uL    HGB 10.1 (L) 12.1 - 17.0 g/dL    HCT 32.1 (L) 36.6 - 50.3 %    MCV 92.2 80.0 - 99.0 FL    MCH 29.0 26.0 - 34.0 PG    MCHC 31.5 30.0 - 36.5 g/dL    RDW 16.3 (H) 11.5 - 14.5 %    PLATELET 221 211 - 290 K/uL    MPV 9.3 8.9 - 12.9 FL    NEUTROPHILS 72 32 - 75 %    LYMPHOCYTES 19 12 - 49 %    MONOCYTES 7 5 - 13 %    EOSINOPHILS 2 0 - 7 %    BASOPHILS 0 0 - 1 %    IMMATURE GRANULOCYTES 0 0.0 - 0.5 %    ABS. NEUTROPHILS 3.2 1.8 - 8.0 K/UL    ABS. LYMPHOCYTES 0.9 0.8 - 3.5 K/UL    ABS. MONOCYTES 0.3 0.0 - 1.0 K/UL    ABS. EOSINOPHILS 0.1 0.0 - 0.4 K/UL    ABS. BASOPHILS 0.0 0.0 - 0.1 K/UL    ABS. IMM. GRANS. 0.0 0.00 - 0.04 K/UL    DF AUTOMATED     URINALYSIS W/ REFLEX CULTURE    Collection Time: 10/27/20 10:00 AM    Specimen: Urine   Result Value Ref Range    Color Yellow/Straw      Appearance Turbid (A) Clear      Specific gravity 1.014 1.003 - 1.030      pH (UA) 5.0 5.0 - 8.0      Protein 100 (A) Negative mg/dL    Glucose Negative Negative mg/dL    Ketone Negative Negative mg/dL    Bilirubin Negative Negative      Blood Small (A) Negative      Urobilinogen 0.1 0.1 - 1.0 EU/dL    Nitrites Negative Negative      Leukocyte Esterase Negative Negative      UA:UC IF INDICATED Culture not indicated by UA result      WBC 0-4 0 - 4 /hpf    RBC 10-20 0 - 5 /hpf    Bacteria Negative Negative /hpf    Mucus Trace /lpf    PRESUMPTIVE YEAST Present     METABOLIC PANEL, COMPREHENSIVE    Collection Time: 10/27/20 12:20 PM   Result Value Ref Range    Sodium 137 136 - 145 mmol/L    Potassium 4.7 3.5 - 5.1 mmol/L    Chloride 104 97 - 108 mmol/L    CO2 27 21 - 32 mmol/L    Anion gap 6 5 - 15 mmol/L    Glucose 87 65 - 100 mg/dL    BUN 23 (H) 6 - 20 mg/dL    Creatinine 1.79 (H) 0.70 - 1.30 mg/dL    BUN/Creatinine ratio 13 12 - 20      GFR est AA 44 (L) >60 ml/min/1.73m2    GFR est non-AA 36 (L) >60 ml/min/1.73m2    Calcium 8.8 8.5 - 10.1 mg/dL    Bilirubin, total 0.5 0.2 - 1.0 mg/dL    AST (SGOT) 24 15 - 37 U/L    ALT (SGPT) 15 12 - 78 U/L    Alk.  phosphatase 77 45 - 117 U/L    Protein, total 8.1 6.4 - 8.2 g/dL    Albumin 2.6 (L) 3.5 - 5.0 g/dL    Globulin 5.5 (H) 2.0 - 4.0 g/dL    A-G Ratio 0.5 (L) 1.1 - 2.2     TROPONIN I    Collection Time: 10/27/20 12:20 PM   Result Value Ref Range    Troponin-I, Qt. <0.05 <0.05 ng/mL       Radiologic Studies -   XR CHEST SNGL V   Final Result   Impression:    No acute cardiopulmonary abnormality. CT Results  (Last 48 hours)    None        CXR Results  (Last 48 hours)               10/27/20 1045  XR CHEST SNGL V Final result    Impression:  Impression:    No acute cardiopulmonary abnormality. Narrative:  Clinical history: Syncope. Comparison: Chest radiograph 8/8/2020. Findings:    Single view chest. Cardiac monitoring leads and an electronic device projects   over the chest. Prior median sternotomy. The cardiac silhouette remains   enlarged. The aorta is tortuous and atherosclerotic. Lungs are adequately   expanded and clear. No pleural effusion or pneumothorax. No pulmonary edema. The   osseous structures are intact. Medical Decision Making and ED Course     I reviewed the available vital signs, nursing notes, past medical history, past surgical history, family history, and social history. Vital Signs - Reviewed the patient's vital signs. Patient Vitals for the past 12 hrs:   Temp Pulse Resp BP SpO2   10/27/20 1433  71 18 137/86 98 %   10/27/20 1230  74 18 133/80 98 %   10/27/20 0937 98.1 °F (36.7 °C) 68 18 (!) 141/86 98 %       EKG interpretation: Atrial fibrillation at 72 bpm.  MN interval indistinguishable. QRS duration 90 ms. QTc 512 ms. Rightward axis. No ST segment abnormalities. Medical Decision Making:   Presented with lightheadedness. The  differential diagnosis is presyncope, orthostatic hypotension, vasovagal episode, arrhythmia, seizure. Work-up negative. EKG shows no signs of arrhythmia. Patient stable in the ER and asymptomatic. He did not have a syncopal episode. It sounded like a near syncopal episode. Did not sound like a seizure clinically. Observed in the ER for multiple hours. Reassured and discharged in good condition.     Disposition Discharged    DISCHARGE PLAN:  1. Current Discharge Medication List      CONTINUE these medications which have NOT CHANGED    Details   escitalopram oxalate (LEXAPRO) 10 mg tablet TAKE ONE TABLET BY MOUTH DAILY. Qty: 30 Tab, Refills: 5      simvastatin (ZOCOR) 40 mg tablet TAKE ONE TABLET BY MOUTH AT BEDTIME. Qty: 90 Tab, Refills: 1      timolol (TIMOPTIC) 0.5 % ophthalmic solution INSTILL 1 DROPS INTO RIGHT EYE ONCE DAILY  Qty: 5 mL, Refills: 5      acetaminophen (TYLENOL) 325 mg tablet TAKE 2 TABLETS BY MOUTH EVERY 4 HOURS AS NEEDED FOR PAIN OR FEVER. Qty: 30 Tab, Refills: 5      diclofenac (VOLTAREN) 1 % gel APPLY TWO GRAMS FOUR TIMES DAILY AS NEEDED FOR ELBOW PAIN  Qty: 200 g, Refills: 3      ferrous sulfate (IRON) 325 mg (65 mg iron) EC tablet TAKE ONE TABLET BY MOUTH EACH DAY WITH BREAKFAST. Qty: 90 Tab, Refills: 1      amLODIPine (NORVASC) 2.5 mg tablet TAKE ONE TABLET BY MOUTH DAILY. Qty: 90 Tab, Refills: 1    Associated Diagnoses: Essential hypertension      magnesium oxide (MAG-OX) 400 mg tablet Take 1 Tab by mouth daily for 90 days. Qty: 30 Tab, Refills: 2    Associated Diagnoses: Hypomagnesemia      thiamine HCL (B-1) 100 mg tablet Take 1 Tab by mouth daily for 180 days. Qty: 90 Tab, Refills: 1      ascorbic acid, vitamin C, (Vitamin C) 500 mg tablet Take 0.5 Tabs by mouth daily for 90 days. Qty: 90 Tab, Refills: 1      folic acid (FOLVITE) 1 mg tablet Take 1 Tab by mouth daily for 180 days. Qty: 90 Tab, Refills: 1      allopurinoL (ZYLOPRIM) 100 mg tablet TAKE 1 TABLET BY MOUTH EVERY DAY  Qty: 90 Tab, Refills: 2      terbinafine HCL (LAMISIL) 250 mg tablet Take 1 Tab by mouth daily. Qty: 90 Tab, Refills: 0    Associated Diagnoses: Onychomycosis      ammonium lactate (AMLACTIN) 12 % topical cream Apply  to affected area two (2) times a day.  rub in to affected area well  Qty: 280 g, Refills: 5    Associated Diagnoses: Xerosis cutis      therapeutic multivitamin (THERAGRAN) tablet Take 1 Tab by mouth daily. Qty: 30 Tab, Refills: 0      Klor-Con M20 20 mEq tablet TAKE 1 TABLET BY MOUTH EVERY DAY WITH FOOD      bumetanide (BUMEX) 1 mg tablet TAKE 1 TABLET BY MOUTH EVERY DAY  Qty: 90 Tab, Refills: 3      ELIQUIS 2.5 mg tablet TAKE 1 TABLET BY MOUTH TWICE A DAY  Qty: 180 Tab, Refills: 3         STOP taking these medications       melatonin 10 mg cap Comments:   Reason for Stoppin.   Follow-up Information     Follow up With Specialties Details Why [address] EMERGENCY DEPT Emergency Medicine Go today As soon as possible if symptoms worsen [address] [facility] [place] [address] [phone]    Primary care doctor  Schedule an appointment as soon as possible for a visit in 3 days          3. Return to ED if worse     Diagnosis     Clinical impression:   1. Near syncope           Attestation:  Please note that this dictation was completed with [employer], the computer voice recognition software. Quite often unanticipated grammatical, syntax, homophones, and other interpretive errors are inadvertently transcribed by the computer software. Please disregard these errors. Please excuse any errors that have escaped final proofreading. Thank you.   Jozef Bolden, DO

## 2020-10-28 ENCOUNTER — OFFICE VISIT (OUTPATIENT)
Dept: INTERNAL MEDICINE CLINIC | Age: 85
End: 2020-10-28
Payer: MEDICARE

## 2020-10-28 VITALS
HEART RATE: 79 BPM | WEIGHT: 145.2 LBS | OXYGEN SATURATION: 100 % | SYSTOLIC BLOOD PRESSURE: 158 MMHG | BODY MASS INDEX: 22.01 KG/M2 | TEMPERATURE: 97.8 F | DIASTOLIC BLOOD PRESSURE: 100 MMHG | HEIGHT: 68 IN

## 2020-10-28 DIAGNOSIS — R32 URINARY INCONTINENCE, UNSPECIFIED TYPE: ICD-10-CM

## 2020-10-28 DIAGNOSIS — I10 ESSENTIAL HYPERTENSION: ICD-10-CM

## 2020-10-28 DIAGNOSIS — I48.0 PAF (PAROXYSMAL ATRIAL FIBRILLATION) (HCC): ICD-10-CM

## 2020-10-28 DIAGNOSIS — Z23 NEEDS FLU SHOT: ICD-10-CM

## 2020-10-28 DIAGNOSIS — N18.32 STAGE 3B CHRONIC KIDNEY DISEASE (HCC): ICD-10-CM

## 2020-10-28 DIAGNOSIS — D50.8 OTHER IRON DEFICIENCY ANEMIA: Primary | ICD-10-CM

## 2020-10-28 DIAGNOSIS — G47.00 INSOMNIA, UNSPECIFIED TYPE: ICD-10-CM

## 2020-10-28 LAB
ATRIAL RATE: 83 BPM
CALCULATED R AXIS, ECG10: 105 DEGREES
CALCULATED T AXIS, ECG11: 95 DEGREES
DIAGNOSIS, 93000: NORMAL
Q-T INTERVAL, ECG07: 468 MS
QRS DURATION, ECG06: 90 MS
QTC CALCULATION (BEZET), ECG08: 512 MS
VENTRICULAR RATE, ECG03: 72 BPM

## 2020-10-28 PROCEDURE — 99214 OFFICE O/P EST MOD 30 MIN: CPT | Performed by: INTERNAL MEDICINE

## 2020-10-28 PROCEDURE — 90756 CCIIV4 VACC ABX FREE IM: CPT | Performed by: INTERNAL MEDICINE

## 2020-10-28 RX ORDER — MAGNESIUM GLUCONATE 27 MG(500)
1 TABLET ORAL
COMMUNITY
Start: 2020-10-14 | End: 2020-11-19

## 2020-10-28 RX ORDER — TRAZODONE HYDROCHLORIDE 50 MG/1
50 TABLET ORAL
Qty: 30 TAB | Refills: 0 | Status: SHIPPED | OUTPATIENT
Start: 2020-10-28 | End: 2020-11-19

## 2020-10-28 RX ORDER — MECLIZINE HCL 25MG 25 MG/1
1 TABLET, CHEWABLE ORAL
COMMUNITY
Start: 2020-09-20 | End: 2021-02-05 | Stop reason: ALTCHOICE

## 2020-10-28 NOTE — PROGRESS NOTES
Meseret Garner. is a 81 y.o. male and presents with Hypertension; Irregular Heart Beat; Dementia; Chronic Kidney Disease; Chronic Liver Disease; and Hospital Follow Up    Ms. Kaya Lewis is here with his care giver, nurse from group home Ms. Ailyn Arita. He went yesterday to the ER for near syncopal episode, he says he stood up, and felt all of a sudden very lightheaded, cold, sweaty and a little nauseous, recovered after few minutes, denies having had CP, SOB or feeling palpitations. No syncope. Labs showed known CKD stage 3 and Hb 10 which is improved compared to before (8). Saw cardilogy Dr. Serena Abreu last month leanna an event monitor since, I did not receive note. He has paroxysmal Afib, denies current cardiovascular symptoms, he's currently on amlodipine 2.5 mg only due to previous orthostatic hypotension, off metoprolol for the same reason and bradycardia. BP is elevated today, tthey did not bring bp diary    Iron deficiency anemia: Appointment with hematology was changed to virtual and they were not able to connect for the virtual, appointment was rescheduled, ,Hb 10.1 now, improved from 8. Ms. Ailyn Arita his nurse tells me they did bring the stool for occult blood sample more than a month ago, but I never received such result. He has been taking iron daily. CKD stage 3: Went to nephrology, I did not receive note. Ms. Ailyn Arita tells me he has episodes of forgetfulness, he usually stays awake most part of the night and sleeps more during the day, sometimes, but he struggles in general to sleep, he says he has been having episodes of inadvertent incontinence, specially at night. Often does ot have desire to do activities outside like going to the [facility], he says he feels very frustrated due to not being able to do the things that he used to do before like working and being active, he says he went to college, worked his entire life and this frustrates him.  He does not seem to be fully aware of episodes of forgetfulness mentioned by Ms. Judge Pearl. Review of Systems  Review of Systems   Constitutional: Negative for chills, fatigue, fever and unexpected weight change. HENT: Negative for congestion, ear pain, sneezing and sore throat. Eyes: Negative for pain and discharge. Respiratory: Negative for cough, shortness of breath and wheezing. Cardiovascular: Negative for chest pain, palpitations and leg swelling. Gastrointestinal: Negative for abdominal pain, blood in stool, constipation and diarrhea. Endocrine: Negative for polydipsia and polyuria. Genitourinary: Negative for difficulty urinating, dysuria, frequency, hematuria and urgency. Musculoskeletal: Negative for arthralgias, back pain and joint swelling. Skin: Negative for rash. Allergic/Immunologic: Negative for environmental allergies and food allergies. Neurological: Negative for dizziness, speech difficulty, weakness, light-headedness, numbness and headaches. Hematological: Negative for adenopathy. Psychiatric/Behavioral: Negative for behavioral problems (Depression), sleep disturbance and suicidal ideas.           Past Medical History:   Diagnosis Date    Adverse effect of anesthesia     nasal procedure didn't work-patient awake    Alcohol abuse     Anemia     Aortic regurgitation     Arthritis     Chronic kidney disease     CKD (chronic kidney disease), stage III 2011    Dementia ([facility])     Depression     Episodic lightheadedness 07/28/2017    Episodic lightheadedness     Epistaxis 11/27/2016    Erectile dysfunction     Fall 02/11/2020    GERD (gastroesophageal reflux disease)     GI bleed     Gout     gout, osteoarthritis    Heart failure (HCC)     acute heart failure    Hemorrhoids     History of cardioversion 02/17/2017    Hypertension     Ill-defined condition     weakness    Multiple myeloma (HCC)     PAF (paroxysmal atrial fibrillation) ([facility] [address] 02/17/2016    eliquis    Pulmonary HTN (HCC)     Right-sided epistaxis 2016    S/P AVR (aortic valve replacement) 2016    merrill gill md -     S/P cardiac cath 16    severe ai, normal coronaries     S/P colonoscopy 10-8-04    S/P colonoscopy with polypectomy 2016    Kar Devlin MD    Skin lesion      Past Surgical History:   Procedure Laterality Date    CARDIAC SURG PROCEDURE UNLIST      cardiac cath   [place] [facility] CARDIAC SURG PROCEDURE UNLIST  2016    AVR    HX HEENT      nasal procedure 20 years ago    HX KNEE REPLACEMENT Bilateral     bilateral knee replacement    HX ORTHOPAEDIC Bilateral     wrist     Social History     Socioeconomic History    Marital status:      Spouse name: Not on file    Number of children: Not on file    Years of education: Not on file    Highest education level: Not on file   Tobacco Use    Smoking status: Former Smoker     Packs/day: 1.00     Years: 4.00     Pack years: 4.00     Last attempt to quit: 1960     Years since quittin.8    Smokeless tobacco: Never Used   Substance and Sexual Activity    Alcohol use: Yes     Comment: heavy    Drug use: No    Sexual activity: Not Currently   Social History Narrative    Family History: Mother:  36 yrs, Chronic anemiaFather:  80 yrs, I think aneurysm hypertensionBrother(s):  79 yrs,    obese,dm,drug abuse,miSon(s): alive, adopted1 son(s) . Carlos Ku [phone]    Social History: Alcohol Use Patient uses alcohol, Drinks per occasion: 2, Drinks per w [place]: 10. Smoking Status Patient is a never smoker. Marital Status: . Lives w ith: alone. Occupation/W ork: employed full time insurance. Education/School: has highschool diploma. Family History   Problem Relation Age of Onset    Anemia Mother     No Known Problems Father     Other Brother         drug abuse     Current Outpatient Medications   Medication Sig Dispense Refill    melatonin 10 mg TbER Take 1 Tab by mouth nightly.       meclizine (ANTIVERT) 25 mg chewable tablet Take 1 Tab by mouth daily as needed.  traZODone (DESYREL) 50 mg tablet Take 1 Tab by mouth nightly for 30 days. 30 Tab 0    escitalopram oxalate (LEXAPRO) 10 mg tablet TAKE ONE TABLET BY MOUTH DAILY. 30 Tab 5    simvastatin (ZOCOR) 40 mg tablet TAKE ONE TABLET BY MOUTH AT BEDTIME. 90 Tab 1    timolol (TIMOPTIC) 0.5 % ophthalmic solution INSTILL 1 DROPS INTO RIGHT EYE ONCE DAILY 5 mL 5    acetaminophen (TYLENOL) 325 mg tablet TAKE 2 TABLETS BY MOUTH EVERY 4 HOURS AS NEEDED FOR PAIN OR FEVER. 30 Tab 5    diclofenac (VOLTAREN) 1 % gel APPLY TWO GRAMS FOUR TIMES DAILY AS NEEDED FOR ELBOW PAIN 200 g 3    ferrous sulfate (IRON) 325 mg (65 mg iron) EC tablet TAKE ONE TABLET BY MOUTH EACH DAY WITH BREAKFAST. 90 Tab 1    amLODIPine (NORVASC) 2.5 mg tablet TAKE ONE TABLET BY MOUTH DAILY. 90 Tab 1    magnesium oxide (MAG-OX) 400 mg tablet Take 1 Tab by mouth daily for 90 days. 30 Tab 2    thiamine HCL (B-1) 100 mg tablet Take 1 Tab by mouth daily for 180 days. 90 Tab 1    ascorbic acid, vitamin C, (Vitamin C) 500 mg tablet Take 0.5 Tabs by mouth daily for 90 days. 90 Tab 1    folic acid (FOLVITE) 1 mg tablet Take 1 Tab by mouth daily for 180 days. 90 Tab 1    allopurinoL (ZYLOPRIM) 100 mg tablet TAKE 1 TABLET BY MOUTH EVERY DAY 90 Tab 2    terbinafine HCL (LAMISIL) 250 mg tablet Take 1 Tab by mouth daily. 90 Tab 0    ammonium lactate (AMLACTIN) 12 % topical cream Apply  to affected area two (2) times a day. rub in to affected area well 280 g 5    therapeutic multivitamin (THERAGRAN) tablet Take 1 Tab by mouth daily.  30 Tab 0    Klor-Con M20 20 mEq tablet TAKE 1 TABLET BY MOUTH EVERY DAY WITH FOOD       Allergies   Allergen Reactions    Amiodarone Other (comments)     Bradycardia -excessive       Objective:  Visit Vitals  BP (!) 158/100 (BP 1 Location: Right arm, BP Patient Position: Sitting)   Pulse 79   Temp 97.8 °F (36.6 °C) (Oral)   Ht 5' 8\" (1.727 m)   Wt 145 lb 3.2 oz (65.9 kg)   SpO2 100% Comment: RA   BMI 22.08 kg/m²     Physical Exam:   Physical Exam  Constitutional:       General: He is not in acute distress. Appearance: Normal appearance. HENT:      Head: Normocephalic and atraumatic. Mouth/Throat:      Mouth: Mucous membranes are moist.   Eyes:      Extraocular Movements: Extraocular movements intact. Conjunctiva/sclera: Conjunctivae normal.      Pupils: Pupils are equal, round, and reactive to light. Neck:      Musculoskeletal: Normal range of motion and neck supple. Cardiovascular:      Rate and Rhythm: Normal rate and regular rhythm. Pulses: Normal pulses. Heart sounds: Normal heart sounds. Pulmonary:      Effort: Pulmonary effort is normal.      Breath sounds: Normal breath sounds. Abdominal:      General: Abdomen is flat. Bowel sounds are normal. There is no distension. Palpations: Abdomen is soft. There is no mass. Tenderness: There is no abdominal tenderness. Musculoskeletal:         General: No swelling or deformity. Right lower leg: No edema. Left lower leg: No edema. Lymphadenopathy:      Cervical: No cervical adenopathy. Skin:     General: Skin is warm and dry. Capillary Refill: Capillary refill takes less than 2 seconds. Coloration: Skin is not jaundiced or pale. Findings: No erythema or rash. Neurological:      General: No focal deficit present. Mental Status: He is alert and oriented to person, place, and time. Psychiatric:         Mood and Affect: Mood normal.         Behavior: Behavior normal.         Thought Content:  Thought content normal.         Judgment: Judgment normal.          Results for orders placed or performed during the hospital encounter of 10/27/20   CBC WITH AUTOMATED DIFF   Result Value Ref Range    WBC 4.5 4.1 - 11.1 K/uL    RBC 3.48 (L) 4.10 - 5.70 M/uL    HGB 10.1 (L) 12.1 - 17.0 g/dL    HCT 32.1 (L) 36.6 - 50.3 %    MCV 92.2 80.0 - 99.0 FL    MCH 29.0 26.0 - 34.0 PG    MCHC 31.5 30.0 - 36.5 g/dL    RDW 16.3 (H) 11.5 - 14.5 %    PLATELET 988 878 - 713 K/uL    MPV 9.3 8.9 - 12.9 FL    NEUTROPHILS 72 32 - 75 %    LYMPHOCYTES 19 12 - 49 %    MONOCYTES 7 5 - 13 %    EOSINOPHILS 2 0 - 7 %    BASOPHILS 0 0 - 1 %    IMMATURE GRANULOCYTES 0 0.0 - 0.5 %    ABS. NEUTROPHILS 3.2 1.8 - 8.0 K/UL    ABS. LYMPHOCYTES 0.9 0.8 - 3.5 K/UL    ABS. MONOCYTES 0.3 0.0 - 1.0 K/UL    ABS. EOSINOPHILS 0.1 0.0 - 0.4 K/UL    ABS. BASOPHILS 0.0 0.0 - 0.1 K/UL    ABS. IMM. GRANS. 0.0 0.00 - 0.04 K/UL    DF AUTOMATED     URINALYSIS W/ REFLEX CULTURE    Specimen: Urine   Result Value Ref Range    Color Yellow/Straw      Appearance Turbid (A) Clear      Specific gravity 1.014 1.003 - 1.030      pH (UA) 5.0 5.0 - 8.0      Protein 100 (A) Negative mg/dL    Glucose Negative Negative mg/dL    Ketone Negative Negative mg/dL    Bilirubin Negative Negative      Blood Small (A) Negative      Urobilinogen 0.1 0.1 - 1.0 EU/dL    Nitrites Negative Negative      Leukocyte Esterase Negative Negative      UA:UC IF INDICATED Culture not indicated by UA result      WBC 0-4 0 - 4 /hpf    RBC 10-20 0 - 5 /hpf    Bacteria Negative Negative /hpf    Mucus Trace /lpf    PRESUMPTIVE YEAST Present     METABOLIC PANEL, COMPREHENSIVE   Result Value Ref Range    Sodium 137 136 - 145 mmol/L    Potassium 4.7 3.5 - 5.1 mmol/L    Chloride 104 97 - 108 mmol/L    CO2 27 21 - 32 mmol/L    Anion gap 6 5 - 15 mmol/L    Glucose 87 65 - 100 mg/dL    BUN 23 (H) 6 - 20 mg/dL    Creatinine 1.79 (H) 0.70 - 1.30 mg/dL    BUN/Creatinine ratio 13 12 - 20      GFR est AA 44 (L) >60 ml/min/1.73m2    GFR est non-AA 36 (L) >60 ml/min/1.73m2    Calcium 8.8 8.5 - 10.1 mg/dL    Bilirubin, total 0.5 0.2 - 1.0 mg/dL    AST (SGOT) 24 15 - 37 U/L    ALT (SGPT) 15 12 - 78 U/L    Alk.  phosphatase 77 45 - 117 U/L    Protein, total 8.1 6.4 - 8.2 g/dL    Albumin 2.6 (L) 3.5 - 5.0 g/dL    Globulin 5.5 (H) 2.0 - 4.0 g/dL    A-G Ratio 0.5 (L) 1.1 - 2.2     TROPONIN I   Result Value Ref Range    Troponin-I, Qt. <0.05 <0.05 ng/mL   EKG, 12 LEAD, INITIAL   Result Value Ref Range    Ventricular Rate 72 BPM    Atrial Rate 83 BPM    QRS Duration 90 ms    Q-T Interval 468 ms    QTC Calculation (Bezet) 512 ms    Calculated R Axis 105 degrees    Calculated T Axis 95 degrees    Diagnosis       Atrial fibrillation  Rightward axis  Prolonged QT  Abnormal ECG  When compared with ECG of 08-AUG-2020 12:50,  Atrial fibrillation has replaced Atrial flutter  ST no longer depressed in Inferior leads  T wave inversion no longer evident in Inferior leads  Confirmed by Jose Rafael Leon ([id number]) on 10/28/2020 12:07:31 PM         Assessment/Plan:    Near syncopal episode yesterday, this has happened before, attibutted before to orthostatic hypotension for which he's off metoprolol for afib and only on 2.5 mg amlodipine, currently wearing an event monitor, under the care of Cardio Dr. Nelli Nettles, will ask for notes. BP is high, but I won't change amlodipine for now considering previous events. MOLLY, Hb better, contiues iron, will order the Axial Exchange blood agin since I never got a result, pending appointment with hematology  Rest of plan outlined below       ICD-10-CM ICD-9-CM    1. Other iron deficiency anemia  D50.8 280.8 IRON PROFILE      FERRITIN      OCCULT BLOOD IMMUNOASSAY,DIAGNOSTIC   2. Urinary incontinence, unspecified type  R32 788.30 PSA W/ REFLX FREE PSA      REFERRAL TO UROLOGY   3. Insomnia, unspecified type  G47.00 780.52 traZODone (DESYREL) 50 mg tablet   4. Needs flu shot  Z23 V04.81 INFLUENZA VACCINE (CCIIV4 VACCINE ANTIBIO FREE 0.5 ML)   5. Essential hypertension  I10 401.9    6. PAF (paroxysmal atrial fibrillation) (HCC)  I48.0 427.31    7.  Stage 3b chronic kidney disease  N18.32 585.3      Orders Placed This Encounter    Influenza Vaccine, QUAD, Vial (Flucelvax VIAL 41560)    IRON PROFILE    FERRITIN    OCCULT BLOOD IMMUNOASSAY,DIAGNOSTIC     Order Specific Question:   QUEST SOURCE Answer:   Stool [1161]    PSA [address] [facility] Urology ref [facility]     Referral Priority:   Routine     Referral Type:   Consultation     Referral Reason:   Specialty Services Required     Referred to Provider:   Ximena Underwood MD     Number of Visits Requested:   1    melatonin 10 mg TbER     Sig: Take 1 Tab by mouth nightly.  meclizine (ANTIVERT) 25 mg chewable tablet     Sig: Take 1 Tab by mouth daily as needed.  traZODone (DESYREL) 50 mg tablet     Sig: Take 1 Tab by mouth nightly for 30 days. Dispense:  30 Tab     Refill:  0       There are no Patient Instructions on file for this visit. Follow-up and Dispositions    · Return in about 3 months (around 1/28/2021).

## 2020-10-30 RX ORDER — POTASSIUM CHLORIDE 20 MEQ/1
TABLET, EXTENDED RELEASE ORAL
Qty: 30 TAB | Refills: 0 | OUTPATIENT
Start: 2020-10-30

## 2020-10-31 PROBLEM — N18.32 STAGE 3B CHRONIC KIDNEY DISEASE (HCC): Status: ACTIVE | Noted: 2020-10-31

## 2020-11-04 LAB
FERRITIN SERPL-MCNC: 461 NG/ML (ref 30–400)
IRON SATN MFR SERPL: 10 % (ref 15–55)
IRON SERPL-MCNC: 18 UG/DL (ref 38–169)
PSA SERPL-MCNC: 0.5 NG/ML (ref 0–4)
REFLEX CRITERIA: NORMAL
TIBC SERPL-MCNC: 180 UG/DL (ref 250–450)
UIBC SERPL-MCNC: 162 UG/DL (ref 111–343)

## 2020-11-16 DIAGNOSIS — G47.00 INSOMNIA, UNSPECIFIED TYPE: ICD-10-CM

## 2020-11-19 RX ORDER — MAGNESIUM GLUCONATE 27 MG(500)
TABLET ORAL
Qty: 90 TAB | Refills: 1 | Status: SHIPPED | OUTPATIENT
Start: 2020-11-19 | End: 2021-01-08

## 2020-11-19 RX ORDER — TRAZODONE HYDROCHLORIDE 50 MG/1
TABLET ORAL
Qty: 90 TAB | Refills: 1 | Status: SHIPPED | OUTPATIENT
Start: 2020-11-19 | End: 2021-01-08

## 2020-11-25 NOTE — MR AVS SNAPSHOT
[address] [place] [address] [facility] [id number] 
[phone] Patient: Елена Whitney. MRN: [id number] :1935 Visit Information Date & Time Provider Department Dept. Phone Encounter #  
 2018  9:15 AM Nicolle Adams [facility] [phone] [id number] Follow-up Instructions Return in about 3 months (around 2018). Follow-up and Disposition History Upcoming Health Maintenance Date Due  
 MEDICARE YEARLY EXAM 2018 GLAUCOMA SCREENING Q2Y 3/8/2020 DTaP/Tdap/Td series (2 - Td) 2026 Allergies as of 2018  Review Complete On: 2018 By: Julia Lerner LPN Severity Noted Reaction Type Reactions Amiodarone  10/09/2014    Other (comments) Bradycardia -excessive Current Immunizations  Reviewed on 2016 No immunizations on file. Not reviewed this visit You Were Diagnosed With   
  
 Codes Comments CKD (chronic kidney disease), stage III    -  Primary ICD-10-CM: N18.3 ICD-9-CM: 585.3 PAF (paroxysmal atrial fibrillation) (HCC)     ICD-10-CM: I48.0 ICD-9-CM: 427.31   
 Multiple myeloma, remission status unspecified (HCC)     ICD-10-CM: C90.00 ICD-9-CM: 203.00 History of cardioversion     ICD-10-CM: Z98.890 ICD-9-CM: V15.1 Essential hypertension     ICD-10-CM: I10 
ICD-9-CM: 401.9 Alcohol abuse     ICD-10-CM: F10.10 ICD-9-CM: 305.00 Vitals BP Pulse Temp Resp Height(growth percentile) Weight(growth percentile) (!) 144/94 70 97.8 °F (36.6 °C) (Oral) 18 5' 8\" (1.727 m) 181 lb (82.1 kg) SpO2 BMI Smoking Status 98% 27.52 kg/m2 Former Smoker Vitals History BMI and BSA Data Body Mass Index Body Surface Area  
 27.52 kg/m 2 1.98 m 2 Preferred Pharmacy Pharmacy Name Phone [id number] [address] [phone] Your Updated Medication List  
  
   
This list is accurate as of 4/25/18 11:17 AM.  Always use your most recent med list.  
  
  
  
  
 albuterol 90 mcg/actuation inhaler Commonly known as:  PROVENTIL HFA, VENTOLIN HFA, PROAIR HFA Take 1 Puff by inhalation every four (4) hours as needed for Wheezing. allopurinol 100 mg tablet Commonly known as:  ZYLOPRIM  
TAKE 1 TAB BY MOUTH DAILY. amLODIPine 5 mg tablet Commonly known as:  [place] Acron Take 1 Tab by mouth daily. bumetanide 0.5 mg tablet Commonly known as:  [place] Take 2 Tabs by mouth two (2) times a day. COMBIGAN 0.2-0.5 % Drop ophthalmic solution Generic drug:  brimonidine-timolol INSTILL 1 DROP INTO RIGHT EYE TWICE A DAY  
  
 diazePAM 2 mg tablet Commonly known as:  VALIUM Take 1 Tab by mouth every eight (8) hours as needed for Anxiety. Max Daily Amount: 6 mg. ELIQUIS 2.5 mg tablet Generic drug:  apixaban TAKE 1 TAB BY MOUTH TWO (2) TIMES A DAY. erythromycin ophthalmic ointment Commonly known as:  ILOTYCIN  
APPLY (1CM) BY OPHTHALMIC ROUTE 2 TIMES EVERY DAY RIBBON INTO THE CANTHUS ARE IN THE RIGHT EYE  
  
 folic acid 1 mg tablet Commonly known as:  FOLVITE  
  
 hydrALAZINE 50 mg tablet Commonly known as:  APRESOLINE  
TAKE 1 TAB BY MOUTH THREE (3) TIMES DAILY. LOTEMAX 0.5 % ophthalmic suspension Generic drug:  loteprednol etabonate INSTILL 1 DROP 2 TIMES DAILY INTO RIGHT EYE  
  
 omeprazole 20 mg capsule Commonly known as:  PRILOSEC Take 20 mg by mouth daily. PARoxetine 10 mg tablet Commonly known as:  PAXIL TAKE 1 TAB BY MOUTH DAILY. * potassium chloride 20 mEq tablet Commonly known as:  K-DUR, KLOR-CON Take 20 mEq by mouth daily. * potassium chloride SR 20 mEq tablet Commonly known as:  K-TAB  
TAKE 1 TABLET BY ORAL ROUTE EVERY DAY WITH FOOD  
  
 prednisoLONE acetate 1 % ophthalmic suspension Commonly known as:  PRED FORTE [] : Fellow USE 1 DROP INTO RIGHT EYE EVERY DAY UNTIL SEEN  
  
 propafenone 150 mg tablet Commonly known as:  RYTHMOL Take 1 Tab by mouth two (2) times a day. simvastatin 40 mg tablet Commonly known as:  ZOCOR  
TAKE 1 TABLET BY MOUTH AT BEDTIME  
  
 sodium chloride 0.65 % nasal squeeze bottle Commonly known as:  OCEAN  
2 Sprays by Both Nostrils route four (4) times daily. * Notice: This list has 2 medication(s) that are the same as other medications prescribed for you. Read the directions carefully, and ask your doctor or other care provider to review them with you. We Performed the Following COLLECTION VENOUS BLOOD,VENIPUNCTURE [id number] CPT(R)] RENAL FUNCTION PANEL [36106 CPT(R)] VITAMIN B12 & FOLATE [89913 CPT(R)] VITAMIN D, 1, 25 DIHYDROXY [05368 CPT(R)] Follow-up Instructions Return in about 3 months (around 7/25/2018). Introducing [facility] SERVICES! Khari Sullivan introduces [employer] patient portal. Now you can access parts of your medical record, email your doctor's office, and request medication refills online. 1. In your internet browser, go to [email or web address] 2. Click on the First Time User? Click Here link in the Sign In box. You will see the New Member Sign Up page. 3. Enter your [employer] Access Code exactly as it appears below. You will not need to use this code after youve completed the sign-up process. If you do not sign up before the expiration date, you must request a new code. · [employer] Access Code: [email or web address] Expires: 7/24/2018 11:17 AM 
 
4. Enter the last four digits of your Social Security Number (xxxx) and Date of Birth (mm/dd/yyyy) as indicated and click Submit. You will be taken to the next sign-up page. 5. Create a [employer] ID. This will be your [employer] login ID and cannot be changed, so think of one that is secure and easy to remember. [FreeTextEntry3] : Discussed seen and examined with the fellow. average CRC screening no alarm symptoms or any upper GI symptoms. due for CRC screening as per pt \par (no records) 6. Create a [employer] password. You can change your password at any time. 7. Enter your Password Reset Question and Answer. This can be used at a later time if you forget your password. 8. Enter your e-mail address. You will receive e-mail notification when new information is available in [address] 9. Click Sign Up. You can now view and download portions of your medical record. 10. Click the Download Summary menu link to download a portable copy of your medical information. If you have questions, please visit the Frequently Asked Questions section of the [employer] website. Remember, [employer] is NOT to be used for urgent needs. For medical emergencies, dial 911. Now available from your iPhone and Android! Please provide this summary of care documentation to your next provider. Your primary care clinician is listed as Trev Pollock. If you have any questions after today's visit, please call [phone]. [Time Spent: ___ minutes] : I have spent [unfilled] minutes of time on the encounter. [>50% of the face to face encounter time was spent on counseling and/or coordination of care for ___] : Greater than 50% of the face to face encounter time was spent on counseling and/or coordination of care for [unfilled]

## 2020-12-02 ENCOUNTER — OFFICE VISIT (OUTPATIENT)
Dept: UROLOGY | Age: 85
End: 2020-12-02
Payer: MEDICARE

## 2020-12-02 VITALS — TEMPERATURE: 97.3 F | BODY MASS INDEX: 23.19 KG/M2 | WEIGHT: 153 LBS | HEIGHT: 68 IN

## 2020-12-02 DIAGNOSIS — R35.0 FREQUENCY OF URINATION: ICD-10-CM

## 2020-12-02 DIAGNOSIS — N39.44 NOCTURNAL ENURESIS: Primary | ICD-10-CM

## 2020-12-02 LAB
BILIRUB UR QL STRIP: NEGATIVE
GLUCOSE UR-MCNC: NEGATIVE MG/DL
KETONES P FAST UR STRIP-MCNC: NEGATIVE MG/DL
PH UR STRIP: 7 [PH] (ref 4.6–8)
PROT UR QL STRIP: NORMAL
PVR POC: 40 CC
SP GR UR STRIP: 1.02 (ref 1–1.03)
UA UROBILINOGEN AMB POC: NORMAL (ref 0.2–1)
URINALYSIS CLARITY POC: CLEAR
URINALYSIS COLOR POC: YELLOW
URINE BLOOD POC: NEGATIVE
URINE LEUKOCYTES POC: NEGATIVE
URINE NITRITES POC: NEGATIVE

## 2020-12-02 PROCEDURE — 51798 US URINE CAPACITY MEASURE: CPT | Performed by: UROLOGY

## 2020-12-02 PROCEDURE — 81003 URINALYSIS AUTO W/O SCOPE: CPT | Performed by: UROLOGY

## 2020-12-02 PROCEDURE — 99204 OFFICE O/P NEW MOD 45 MIN: CPT | Performed by: UROLOGY

## 2020-12-02 NOTE — PROGRESS NOTES
HISTORY OF PRESENT ILLNESS  Jack Seals. is a 81 y.o. male. Chief Complaint   Patient presents with    New Patient    Urinary Frequency     Patient is here with complaint of urinary frequency and enuresis. He is here with a caregiver from his group home, Ms. [facility]. He was admitted to her home July 2020 with incontinence of bowel and bladder. He has multiple medical problems including dementia, multiple myeloma, alcohol abuse, liver disease, episodic lightheadedness, debility and frequent falls, anemia of chronic disease, chronic kidney disease, history of GI bleed, history of epistaxis, congestive heart failure, peripheral vascular disease, pulmonary hypertension, atrial fibrillation, aortic regurgitation, history of DVT in his upper extremity and depression. He was in the ER with near syncope on 10/27/2020. He saw Dr. Akash Ashby 10/31/2020 in follow-up. PSA on 11/3/2020 was 0.5. Creatinine was 1.79. He uses depends at night. He up every 45 min to 1 hr at night with an urge. He goes through 3-4 depends at night. During the day he uses a urinal and voids a small amount. During the day he voids the same frequency. He denies dysuria or hematuria. He feels like he empties his bladder. He denies swelling in his legs. He feels he drinks a normal amount of liquid. He does not think he does not make as much urine as he drinks. Chronic Conditions Addressed Today     1. Nocturnal enuresis - Primary     Current Assessment & Plan      Functional incontinence. It is unclear if he has overflow leakage. Relevant Orders     AMB POC URINALYSIS DIP STICK AUTO W/O MICRO (Completed)    2. Frequency of urination     Current Assessment & Plan      We discussed further evaluation with a 48-hour intake and voiding diary. We will assess with cystoscopy and CMG with uroflow.           Relevant Orders     AMB POC URINALYSIS DIP STICK AUTO W/O MICRO (Completed)     AMB POC PVR, DIVINA,POST-VOID RES,US,NON-IMAGING (Completed)            Review of Systems   All other systems reviewed and are negative. Past Medical History:   Diagnosis Date    Adverse effect of anesthesia     nasal procedure didn't work-patient awake    Alcohol abuse     Anemia     Aortic regurgitation     Arthritis     Chronic kidney disease     CKD (chronic kidney disease), stage III 2011    Dementia (HCC)     Depression     Episodic lightheadedness 07/28/2017    Episodic lightheadedness     Epistaxis 11/27/2016    Erectile dysfunction     Fall 02/11/2020    GERD (gastroesophageal reflux disease)     GI bleed     Gout     gout, osteoarthritis    Heart failure (HCC)     acute heart failure    Hemorrhoids     History of cardioversion 02/17/2017    Hypertension     Ill-defined condition     weakness    Multiple myeloma (HCC)     PAF (paroxysmal atrial fibrillation) ([facility] [address] 02/17/2016    eliquis    Pulmonary HTN ([facility] [address]     Right-sided epistaxis 11/27/2016    S/P AVR (aortic valve replacement) 07/26/2016    merrill gill md -     S/P cardiac cath 7/6/16    severe ai, normal coronaries     S/P colonoscopy 10-8-04    S/P colonoscopy with polypectomy 05/09/2016    Kar Rebollar MD    Skin lesion       Past Surgical History:   Procedure Laterality Date    CARDIAC SURG PROCEDURE UNLIST      cardiac cath   [facility] CARDIAC SURG PROCEDURE UNLIST  07/26/2016    AVR    HX HEENT      nasal procedure 20 years ago    HX KNEE REPLACEMENT Bilateral     bilateral knee replacement    HX ORTHOPAEDIC Bilateral     wrist     Family History   Problem Relation Age of Onset    Anemia Mother     No Known Problems Father     Other Brother         drug abuse        Physical Exam  Constitutional:       General: He is not in acute distress. Appearance: Normal appearance. HENT:      Head: Normocephalic and atraumatic. Eyes:      Extraocular Movements: Extraocular movements intact.       Pupils: Pupils are equal, round, and reactive to light. Cardiovascular:      Rate and Rhythm: Normal rate. Rhythm irregularly irregular. Heart sounds: Murmur present. Systolic murmur present. Pulmonary:      Effort: Pulmonary effort is normal. No respiratory distress. Breath sounds: Normal breath sounds. No wheezing or rhonchi. Genitourinary:     Penis: Normal. No phimosis, hypospadias or tenderness. Scrotum/Testes: Normal.         Right: Mass, tenderness or swelling not present. Left: Mass, tenderness or swelling not present. Epididymis:      Right: Normal. No mass or tenderness. Left: Normal. No mass or tenderness. Prostate: Enlarged (1+). Not tender and no nodules present. Rectum: Normal.   Musculoskeletal: Normal range of motion. Lymphadenopathy:      Cervical: No cervical adenopathy. Upper Body:      Right upper body: No supraclavicular adenopathy. Left upper body: No supraclavicular adenopathy. Skin:     General: Skin is warm and dry. Neurological:      General: No focal deficit present. Mental Status: He is alert and oriented to person, place, and time. Psychiatric:         Mood and Affect: Mood normal.         Behavior: Behavior normal.                     ASSESSMENT and PLAN  Diagnoses and all orders for this visit:    1. Nocturnal enuresis  Assessment & Plan:  Functional incontinence. It is unclear if he has overflow leakage. Orders:  -     AMB POC URINALYSIS DIP STICK AUTO W/O MICRO    2. Frequency of urination  Assessment & Plan:  We discussed further evaluation with a 48-hour intake and voiding diary. We will assess with cystoscopy and CMG with uroflow. Orders:  -     AMB POC URINALYSIS DIP STICK AUTO W/O MICRO  -     AMB POC PVR, DIVINA,POST-VOID RES,US,NON-IMAGING         Follow-up and Dispositions    · Return for cystoscopy/ CMG.          Vilma Sandhu MD

## 2020-12-11 RX ORDER — SIMVASTATIN 40 MG/1
TABLET, FILM COATED ORAL
Qty: 90 TAB | Refills: 1 | Status: SHIPPED | OUTPATIENT
Start: 2020-12-11 | End: 2021-02-10

## 2020-12-11 RX ORDER — FERROUS SULFATE 325(65) MG
TABLET, DELAYED RELEASE (ENTERIC COATED) ORAL
Qty: 90 TAB | Refills: 1 | Status: SHIPPED | OUTPATIENT
Start: 2020-12-11 | End: 2021-04-30 | Stop reason: SDUPTHER

## 2020-12-11 RX ORDER — POTASSIUM CHLORIDE 20 MEQ/1
TABLET, EXTENDED RELEASE ORAL
Qty: 90 TAB | Refills: 1 | Status: SHIPPED | OUTPATIENT
Start: 2020-12-11 | End: 2021-03-26 | Stop reason: SDUPTHER

## 2020-12-15 ENCOUNTER — APPOINTMENT (OUTPATIENT)
Dept: CT IMAGING | Age: 85
End: 2020-12-15
Attending: STUDENT IN AN ORGANIZED HEALTH CARE EDUCATION/TRAINING PROGRAM
Payer: MEDICARE

## 2020-12-15 ENCOUNTER — HOSPITAL ENCOUNTER (EMERGENCY)
Age: 85
Discharge: HOME | End: 2020-12-16
Attending: STUDENT IN AN ORGANIZED HEALTH CARE EDUCATION/TRAINING PROGRAM
Payer: MEDICARE

## 2020-12-15 ENCOUNTER — APPOINTMENT (OUTPATIENT)
Dept: GENERAL RADIOLOGY | Age: 85
End: 2020-12-15
Attending: EMERGENCY MEDICINE
Payer: MEDICARE

## 2020-12-15 DIAGNOSIS — I10 ESSENTIAL HYPERTENSION: ICD-10-CM

## 2020-12-15 DIAGNOSIS — R53.1 GENERALIZED WEAKNESS: Primary | ICD-10-CM

## 2020-12-15 LAB
ALBUMIN SERPL-MCNC: 3.1 G/DL (ref 3.5–5)
ALBUMIN/GLOB SERPL: 0.6 {RATIO} (ref 1.1–2.2)
ALP SERPL-CCNC: 99 U/L (ref 45–117)
ALT SERPL-CCNC: 20 U/L (ref 12–78)
ANION GAP SERPL CALC-SCNC: 5 MMOL/L (ref 5–15)
APPEARANCE UR: CLEAR
AST SERPL W P-5'-P-CCNC: 19 U/L (ref 15–37)
BACTERIA URNS QL MICRO: NEGATIVE /HPF
BASOPHILS # BLD: 0 K/UL (ref 0–0.1)
BASOPHILS NFR BLD: 0 % (ref 0–1)
BILIRUB SERPL-MCNC: 0.5 MG/DL (ref 0.2–1)
BILIRUB UR QL: NEGATIVE
BNP SERPL-MCNC: 4481 PG/ML
BUN SERPL-MCNC: 20 MG/DL (ref 6–20)
BUN/CREAT SERPL: 12 (ref 12–20)
CA-I BLD-MCNC: 9.2 MG/DL (ref 8.5–10.1)
CHLORIDE SERPL-SCNC: 110 MMOL/L (ref 97–108)
CK SERPL-CCNC: 184 NG/ML (ref 39–308)
CO2 SERPL-SCNC: 30 MMOL/L (ref 21–32)
COLOR UR: ABNORMAL
CREAT SERPL-MCNC: 1.63 MG/DL (ref 0.7–1.3)
DIFFERENTIAL METHOD BLD: ABNORMAL
EOSINOPHIL # BLD: 0.1 K/UL (ref 0–0.4)
EOSINOPHIL NFR BLD: 2 % (ref 0–7)
ERYTHROCYTE [DISTWIDTH] IN BLOOD BY AUTOMATED COUNT: 18.2 % (ref 11.5–14.5)
GLOBULIN SER CALC-MCNC: 5.2 G/DL (ref 2–4)
GLUCOSE SERPL-MCNC: 88 MG/DL (ref 65–100)
GLUCOSE UR STRIP.AUTO-MCNC: NEGATIVE MG/DL
HCT VFR BLD AUTO: 28.9 % (ref 36.6–50.3)
HGB BLD-MCNC: 8.8 G/DL (ref 12.1–17)
HGB UR QL STRIP: NEGATIVE
IMM GRANULOCYTES # BLD AUTO: 0 K/UL (ref 0–0.04)
IMM GRANULOCYTES NFR BLD AUTO: 0 % (ref 0–0.5)
KETONES UR QL STRIP.AUTO: NEGATIVE MG/DL
LEUKOCYTE ESTERASE UR QL STRIP.AUTO: NEGATIVE
LYMPHOCYTES # BLD: 0.4 K/UL (ref 0.8–3.5)
LYMPHOCYTES NFR BLD: 7 % (ref 12–49)
MCH RBC QN AUTO: 28.9 PG (ref 26–34)
MCHC RBC AUTO-ENTMCNC: 30.4 G/DL (ref 30–36.5)
MCV RBC AUTO: 95.1 FL (ref 80–99)
MONOCYTES # BLD: 0.3 K/UL (ref 0–1)
MONOCYTES NFR BLD: 5 % (ref 5–13)
NEUTS SEG # BLD: 5 K/UL (ref 1.8–8)
NEUTS SEG NFR BLD: 86 % (ref 32–75)
NITRITE UR QL STRIP.AUTO: NEGATIVE
PH UR STRIP: 7 [PH] (ref 5–8)
PLATELET # BLD AUTO: 244 K/UL (ref 150–400)
PMV BLD AUTO: 9.7 FL (ref 8.9–12.9)
POTASSIUM SERPL-SCNC: 4 MMOL/L (ref 3.5–5.1)
PROT SERPL-MCNC: 8.3 G/DL (ref 6.4–8.2)
PROT UR STRIP-MCNC: 100 MG/DL
RBC # BLD AUTO: 3.04 M/UL (ref 4.1–5.7)
RBC #/AREA URNS HPF: ABNORMAL /HPF (ref 0–5)
SODIUM SERPL-SCNC: 145 MMOL/L (ref 136–145)
SP GR UR REFRACTOMETRY: 1.01 (ref 1–1.03)
TROPONIN I SERPL-MCNC: <0.05 NG/ML
UA: UC IF INDICATED,UAUC: ABNORMAL
UROBILINOGEN UR QL STRIP.AUTO: 0.1 EU/DL (ref 0.1–1)
WBC # BLD AUTO: 5.9 K/UL (ref 4.1–11.1)
WBC URNS QL MICRO: ABNORMAL /HPF (ref 0–4)

## 2020-12-15 PROCEDURE — 74011250636 HC RX REV CODE- 250/636: Performed by: STUDENT IN AN ORGANIZED HEALTH CARE EDUCATION/TRAINING PROGRAM

## 2020-12-15 PROCEDURE — 74011250637 HC RX REV CODE- 250/637: Performed by: STUDENT IN AN ORGANIZED HEALTH CARE EDUCATION/TRAINING PROGRAM

## 2020-12-15 PROCEDURE — 84484 ASSAY OF TROPONIN QUANT: CPT

## 2020-12-15 PROCEDURE — 96374 THER/PROPH/DIAG INJ IV PUSH: CPT

## 2020-12-15 PROCEDURE — 93005 ELECTROCARDIOGRAM TRACING: CPT

## 2020-12-15 PROCEDURE — 85025 COMPLETE CBC W/AUTO DIFF WBC: CPT

## 2020-12-15 PROCEDURE — 99285 EMERGENCY DEPT VISIT HI MDM: CPT

## 2020-12-15 PROCEDURE — 36415 COLL VENOUS BLD VENIPUNCTURE: CPT

## 2020-12-15 PROCEDURE — 71045 X-RAY EXAM CHEST 1 VIEW: CPT

## 2020-12-15 PROCEDURE — 70450 CT HEAD/BRAIN W/O DYE: CPT

## 2020-12-15 PROCEDURE — 81001 URINALYSIS AUTO W/SCOPE: CPT

## 2020-12-15 PROCEDURE — 82550 ASSAY OF CK (CPK): CPT

## 2020-12-15 PROCEDURE — 80053 COMPREHEN METABOLIC PANEL: CPT

## 2020-12-15 PROCEDURE — 83880 ASSAY OF NATRIURETIC PEPTIDE: CPT

## 2020-12-15 RX ORDER — AMLODIPINE BESYLATE 5 MG/1
5 TABLET ORAL ONCE
Status: COMPLETED | OUTPATIENT
Start: 2020-12-15 | End: 2020-12-15

## 2020-12-15 RX ORDER — HYDRALAZINE HYDROCHLORIDE 20 MG/ML
10 INJECTION INTRAMUSCULAR; INTRAVENOUS ONCE
Status: COMPLETED | OUTPATIENT
Start: 2020-12-15 | End: 2020-12-15

## 2020-12-15 RX ADMIN — HYDRALAZINE HYDROCHLORIDE 10 MG: 20 INJECTION INTRAMUSCULAR; INTRAVENOUS at 20:37

## 2020-12-15 RX ADMIN — AMLODIPINE BESYLATE 5 MG: 5 TABLET ORAL at 18:38

## 2020-12-15 NOTE — ED PROVIDER NOTES
EMERGENCY DEPARTMENT HISTORY AND PHYSICAL EXAM      Date: 12/15/2020  Patient Name: María Falcon. History of Presenting Illness     Chief Complaint   Patient presents with    Altered mental status    Extremity Weakness       History Provided By: Patient and Caregiver    HPI: María Loomis, 81 y.o. male with a past medical history significant hypertension, hyperlipidemia, renal insufficiency and Prior history of alcohol abuse presents to the ED with cc of weakness, gait instability, reported intermittent slurred speech, for the last 3 to 4 days. Patient is a resident at assisted living facility, as per caregiver, patient noted to have worsening mental status over the last week, and the last 3 to 4 days she has noticed more difficulty ambulating describes \"shuffling gait\", additionally has noted that patient has had some dysarthria intermittently for the last 2 to 3 days. Denies any fevers chills, no chest pain, no shortness of breath. Patient only complains of generalized weakness. No nausea or vomiting, reports appetite is well. There are no other complaints, changes, or physical findings at this time. PCP: Dannielle Montelongo MD    Current Outpatient Medications   Medication Sig Dispense Refill    simvastatin (ZOCOR) 40 mg tablet TAKE ONE TABLET BY MOUTH AT BEDTIME. 90 Tab 1    potassium chloride (K-DUR, KLOR-CON) 20 mEq tablet TAKE 1 TABLET BY MOUTH EACH MORNING WITH FOOD. 90 Tab 1    ferrous sulfate (IRON) 325 mg (65 mg iron) EC tablet TAKE ONE TABLET BY MOUTH EACH DAY WITH BREAKFAST. 90 Tab 1    traZODone (DESYREL) 50 mg tablet TAKE ONE TABLET BY MOUTH AT BEDTIME. 90 Tab 1    melatonin 10 mg TbER TAKE ONE TABLET BY MOUTH AT BEDTIME. 90 Tab 1    meclizine (ANTIVERT) 25 mg chewable tablet Take 1 Tab by mouth daily as needed.  escitalopram oxalate (LEXAPRO) 10 mg tablet TAKE ONE TABLET BY MOUTH DAILY.  30 Tab 5    timolol (TIMOPTIC) 0.5 % ophthalmic solution INSTILL 1 DROPS INTO RIGHT EYE ONCE DAILY 5 mL 5    diclofenac (VOLTAREN) 1 % gel APPLY TWO GRAMS FOUR TIMES DAILY AS NEEDED FOR ELBOW PAIN 200 g 3    amLODIPine (NORVASC) 2.5 mg tablet TAKE ONE TABLET BY MOUTH DAILY. 90 Tab 1    magnesium oxide (MAG-OX) 400 mg tablet Take 1 Tab by mouth daily for 90 days. 30 Tab 2    thiamine HCL (B-1) 100 mg tablet Take 1 Tab by mouth daily for 180 days. 90 Tab 1    ascorbic acid, vitamin C, (Vitamin C) 500 mg tablet Take 0.5 Tabs by mouth daily for 90 days. 90 Tab 1    folic acid (FOLVITE) 1 mg tablet Take 1 Tab by mouth daily for 180 days. 90 Tab 1    allopurinoL (ZYLOPRIM) 100 mg tablet TAKE 1 TABLET BY MOUTH EVERY DAY 90 Tab 2    terbinafine HCL (LAMISIL) 250 mg tablet Take 1 Tab by mouth daily. 90 Tab 0    ammonium lactate (AMLACTIN) 12 % topical cream Apply  to affected area two (2) times a day. rub in to affected area well 280 g 5    therapeutic multivitamin (THERAGRAN) tablet Take 1 Tab by mouth daily. 30 Tab 0       Past History     Past Medical History:  Past Medical History:   Diagnosis Date    Adverse effect of anesthesia     nasal procedure didn't work-patient awake    Alcohol abuse     Anemia     Aortic regurgitation     Arthritis     Chronic kidney disease     CKD (chronic kidney disease), stage III 2011    Dementia (HCC)     Depression     Episodic lightheadedness 07/28/2017    Episodic lightheadedness     Epistaxis 11/27/2016    Erectile dysfunction     Fall 02/11/2020    GERD (gastroesophageal reflux disease)     GI bleed     Gout     gout, osteoarthritis    Heart failure (HCC)     acute heart failure    Hemorrhoids     History of cardioversion 02/17/2017    Hypertension     Ill-defined condition     weakness    Multiple myeloma (HCC)     PAF (paroxysmal atrial fibrillation) ([facility] [address] 02/17/2016    eliquis    Pulmonary HTN ([facility] [address]     Right-sided epistaxis 11/27/2016    S/P AVR (aortic valve replacement) 07/26/2016    merrill gill md -     S/P cardiac cath 16    severe ai, normal coronaries     S/P colonoscopy 10-8-04    S/P colonoscopy with polypectomy 2016    Kar Yarbrough MD    Skin lesion        Past Surgical History:  Past Surgical History:   Procedure Laterality Date    CARDIAC SURG PROCEDURE UNLIST      cardiac cath   [facility] CARDIAC SURG PROCEDURE UNLIST  2016    AVR    HX HEENT      nasal procedure 20 years ago   [facility] HX KNEE REPLACEMENT Bilateral     bilateral knee replacement    HX ORTHOPAEDIC Bilateral     wrist       Family History:  Family History   Problem Relation Age of Onset    Anemia Mother     No Known Problems Father     Other Brother         drug abuse       Social History:  Social History     Tobacco Use    Smoking status: Former Smoker     Packs/day: 1.00     Years: 4.00     Pack years: 4.00     Last attempt to quit: 1960     Years since quittin.9    Smokeless tobacco: Never Used   Substance Use Topics    Alcohol use: Not Currently     Comment: prior heavy drinker     Drug use: Not Currently     Types: Marijuana       Allergies: Allergies   Allergen Reactions    Amiodarone Other (comments)     Bradycardia -excessive         Review of Systems     Review of Systems   Constitutional: Positive for activity change and fatigue. Negative for appetite change, chills and fever. HENT: Negative for congestion and sore throat. Eyes: Negative for photophobia and visual disturbance. Respiratory: Negative for cough, chest tightness and shortness of breath. Cardiovascular: Negative for chest pain and palpitations. Gastrointestinal: Negative for abdominal pain and nausea. Genitourinary: Negative for dysuria. Musculoskeletal: Positive for gait problem. Negative for arthralgias and back pain. Skin: Negative for color change and pallor. Neurological: Positive for tremors and weakness. Negative for dizziness, syncope, light-headedness, numbness and headaches.        Physical Exam     Physical Exam  Vitals signs reviewed. Constitutional:       Comments: In wheelchair, frail appearing   HENT:      Head: Normocephalic and atraumatic. Mouth/Throat:      Mouth: Mucous membranes are moist.      Pharynx: Oropharynx is clear. Eyes:      General: No scleral icterus. Extraocular Movements: Extraocular movements intact. Pupils: Pupils are equal, round, and reactive to light. Neck:      Musculoskeletal: Normal range of motion and neck supple. No neck rigidity. Cardiovascular:      Rate and Rhythm: Normal rate and regular rhythm. Heart sounds: Normal heart sounds. Pulmonary:      Effort: Pulmonary effort is normal.      Breath sounds: Normal breath sounds. Abdominal:      General: Bowel sounds are normal. There is no distension. Palpations: Abdomen is soft. Tenderness: There is no abdominal tenderness. Musculoskeletal: Normal range of motion. General: Swelling present. No tenderness. Lymphadenopathy:      Cervical: No cervical adenopathy. Skin:     General: Skin is warm and dry. Capillary Refill: Capillary refill takes less than 2 seconds. Neurological:      Mental Status: He is alert and oriented to person, place, and time. GCS: GCS eye subscore is 4. GCS verbal subscore is 5. GCS motor subscore is 6. Cranial Nerves: No cranial nerve deficit, dysarthria or facial asymmetry. Sensory: No sensory deficit. Motor: No weakness.    Psychiatric:         Mood and Affect: Mood normal.         Behavior: Behavior normal.         Diagnostic Study Results     Labs -     Recent Results (from the past 12 hour(s))   CBC WITH AUTOMATED DIFF    Collection Time: 12/15/20  5:17 PM   Result Value Ref Range    WBC 5.9 4.1 - 11.1 K/uL    RBC 3.04 (L) 4.10 - 5.70 M/uL    HGB 8.8 (L) 12.1 - 17.0 g/dL    HCT 28.9 (L) 36.6 - 50.3 %    MCV 95.1 80.0 - 99.0 FL    MCH 28.9 26.0 - 34.0 PG    MCHC 30.4 30.0 - 36.5 g/dL    RDW 18.2 (H) 11.5 - 14.5 %    PLATELET 033 093 - 844 K/uL    MPV 9.7 8.9 - 12.9 FL    NEUTROPHILS 86 (H) 32 - 75 %    LYMPHOCYTES 7 (L) 12 - 49 %    MONOCYTES 5 5 - 13 %    EOSINOPHILS 2 0 - 7 %    BASOPHILS 0 0 - 1 %    IMMATURE GRANULOCYTES 0 0.0 - 0.5 %    ABS. NEUTROPHILS 5.0 1.8 - 8.0 K/UL    ABS. LYMPHOCYTES 0.4 (L) 0.8 - 3.5 K/UL    ABS. MONOCYTES 0.3 0.0 - 1.0 K/UL    ABS. EOSINOPHILS 0.1 0.0 - 0.4 K/UL    ABS. BASOPHILS 0.0 0.0 - 0.1 K/UL    ABS. IMM. GRANS. 0.0 0.00 - 0.04 K/UL    DF AUTOMATED     METABOLIC PANEL, COMPREHENSIVE    Collection Time: 12/15/20  5:17 PM   Result Value Ref Range    Sodium 145 136 - 145 mmol/L    Potassium 4.0 3.5 - 5.1 mmol/L    Chloride 110 (H) 97 - 108 mmol/L    CO2 30 21 - 32 mmol/L    Anion gap 5 5 - 15 mmol/L    Glucose 88 65 - 100 mg/dL    BUN 20 6 - 20 mg/dL    Creatinine 1.63 (H) 0.70 - 1.30 mg/dL    BUN/Creatinine ratio 12 12 - 20      GFR est AA 49 (L) >60 ml/min/1.73m2    GFR est non-AA 41 (L) >60 ml/min/1.73m2    Calcium 9.2 8.5 - 10.1 mg/dL    Bilirubin, total 0.5 0.2 - 1.0 mg/dL    AST (SGOT) 19 15 - 37 U/L    ALT (SGPT) 20 12 - 78 U/L    Alk.  phosphatase 99 45 - 117 U/L    Protein, total 8.3 (H) 6.4 - 8.2 g/dL    Albumin 3.1 (L) 3.5 - 5.0 g/dL    Globulin 5.2 (H) 2.0 - 4.0 g/dL    A-G Ratio 0.6 (L) 1.1 - 2.2     TROPONIN I    Collection Time: 12/15/20  5:17 PM   Result Value Ref Range    Troponin-I, Qt. <0.05 <0.05 ng/mL   CK W/ REFLX CKMB    Collection Time: 12/15/20  5:17 PM   Result Value Ref Range    .0 39 - 308 ng/mL   BNP    Collection Time: 12/15/20  5:17 PM   Result Value Ref Range    NT pro-BNP 4,481 (H) <450 pg/mL   URINALYSIS W/ REFLEX CULTURE    Collection Time: 12/15/20  5:40 PM    Specimen: Urine   Result Value Ref Range    Color Yellow/Straw      Appearance Clear Clear      Specific gravity 1.010 1.003 - 1.030      pH (UA) 7.0 5.0 - 8.0      Protein 100 (A) Negative mg/dL    Glucose Negative Negative mg/dL    Ketone Negative Negative mg/dL    Bilirubin Negative Negative      Blood Negative Negative Urobilinogen 0.1 0.1 - 1.0 EU/dL    Nitrites Negative Negative      Leukocyte Esterase Negative Negative      UA:UC IF INDICATED Culture not indicated by UA result Culture not indicated by UA result      WBC 0-4 0 - 4 /hpf    RBC 0-5 0 - 5 /hpf    Bacteria Negative Negative /hpf       Radiologic Studies -   [unfilled]  CT Results  (Last 48 hours)               12/15/20 1729  CT HEAD WO CONT Final result    Impression:  Impression: Age-appropriate atrophy. No acute findings. Extensive chronic pansinusitis noted       Narrative:  CT dose reduction was achieved through use of a standardized protocol tailored   for this examination and automatic exposure control for dose modulation. CT Head       History:        The sulci are prominent but symmetric. Periventricular and deep white matter   hypodensity is not unexpected for age. No acute abnormality seen gray or white   matter. Ventricles are symmetric and appropriate for atrophy. There is no   midline shift or mass effect, or evidence of hemorrhage. Bone windows show no   fracture. CXR Results  (Last 48 hours)               12/15/20 1624  XR CHEST SNGL V Final result    Narrative:  Clinical data comparison October 27       Borderline cardiomegaly without congestion. Clear lungs. There may be tiny right   pleural effusion. Stable widened mediastinum             Medical Decision Making and ED Course   I am the first provider for this patient. I reviewed the vital signs, available nursing notes, past medical history, past surgical history, family history and social history. Vital Signs-Reviewed the patient's vital signs.   Patient Vitals for the past 12 hrs:   Temp Pulse Resp BP SpO2   12/15/20 2331  78 18 (!) 190/133 100 %   12/15/20 2122    (!) 173/100    12/15/20 2037  62  (!) 212/122    12/15/20 1957    (!) 203/121    12/15/20 1920  70 22 (!) 196/133 100 %   12/15/20 1553 98.1 °F (36.7 °C) 65 18 (!) 197/131 95 %       EKG interpretation: (Preliminary)  Normal sinus rhythm 67 no ST elevations, normal axis, T wave inversions in lateral leads    Records Reviewed: Nursing Notes and Old Medical Records    The patient presents with generalized weakness, gait instability, reported dysarthria for several days with a differential diagnosis of anemia, electrolyte abnormality, CVA, intracranial hemorrhage, sepsis, UTI      Provider Notes (Medical Decision Making):     MDM   58-year-old male, past medical history of hypertension, renal insufficiency, prior alcohol abuse, resident of assisted living facility, presents to emergency department for caregivers concern of increased gait instability, intermittent dysarthria, generalized weakness for the last 3 to 4 days. Patient in no distress, vital signs show hypertension otherwise stable, physical exam shows generalized weakness, no focal weakness, additional significant for bilateral lower extremity swelling, which caregiver states is new. We will draw basic lab work including cardiac enzymes, BNP  Urinalysis, CT head,   reassess    ED Course:   Initial assessment performed. The patients presenting problems have been discussed, and they are in agreement with the care plan formulated and outlined with them. I have encouraged them to ask questions as they arise throughout their visit. ED Course as of Dec 15 2346   Tue Dec 15, 2020   1827 Lab work reviewed, CBC shows mild anemia 0.8, metabolic panel grossly normal, creatinine elevated 1.63, normal LFTs, troponins negative, BNP elevated at 4000, UA unremarkable. CT head does not show any acute stroke or bleed, chest x-ray reviewed by radiology did not show any acute processes. Noted to be hypertensive 230/120, will give patient 5 mg of amlodipine. Patient currently on 2.5 daily    [PZ]   1937 Patient blood pressure trending down after amlodipine.   Patient reassessed, states he feels at baseline, no better or worse than before, at this time nose clear evidence of infection, CVA, electrolyte abnormality. CT head unremarkable, BNP elevated at 4400, however patient is chronic elevation of BNP, chest x-ray does not show fluid overload. At this time patient stable to discharge back to assisted living. Will discharge patient. [PZ]      ED Course User Index  [PZ] Doe Thomas MD         Procedures       Bubba Ibarra MD  Procedures               Disposition       Discharged    Remove if not discharged  DISCHARGE PLAN:  1. Current Discharge Medication List      CONTINUE these medications which have NOT CHANGED    Details   simvastatin (ZOCOR) 40 mg tablet TAKE ONE TABLET BY MOUTH AT BEDTIME. Qty: 90 Tab, Refills: 1      potassium chloride (K-DUR, KLOR-CON) 20 mEq tablet TAKE 1 TABLET BY MOUTH EACH MORNING WITH FOOD. Qty: 90 Tab, Refills: 1      ferrous sulfate (IRON) 325 mg (65 mg iron) EC tablet TAKE ONE TABLET BY MOUTH EACH DAY WITH BREAKFAST. Qty: 90 Tab, Refills: 1      traZODone (DESYREL) 50 mg tablet TAKE ONE TABLET BY MOUTH AT BEDTIME. Qty: 90 Tab, Refills: 1    Associated Diagnoses: Insomnia, unspecified type      melatonin 10 mg TbER TAKE ONE TABLET BY MOUTH AT BEDTIME. Qty: 90 Tab, Refills: 1      meclizine (ANTIVERT) 25 mg chewable tablet Take 1 Tab by mouth daily as needed. escitalopram oxalate (LEXAPRO) 10 mg tablet TAKE ONE TABLET BY MOUTH DAILY. Qty: 30 Tab, Refills: 5      timolol (TIMOPTIC) 0.5 % ophthalmic solution INSTILL 1 DROPS INTO RIGHT EYE ONCE DAILY  Qty: 5 mL, Refills: 5      diclofenac (VOLTAREN) 1 % gel APPLY TWO GRAMS FOUR TIMES DAILY AS NEEDED FOR ELBOW PAIN  Qty: 200 g, Refills: 3      amLODIPine (NORVASC) 2.5 mg tablet TAKE ONE TABLET BY MOUTH DAILY. Qty: 90 Tab, Refills: 1    Associated Diagnoses: Essential hypertension      magnesium oxide (MAG-OX) 400 mg tablet Take 1 Tab by mouth daily for 90 days.   Qty: 30 Tab, Refills: 2    Associated Diagnoses: Hypomagnesemia      thiamine HCL (B-1) 100 mg tablet Take 1 Tab by mouth daily for 180 days. Qty: 90 Tab, Refills: 1      ascorbic acid, vitamin C, (Vitamin C) 500 mg tablet Take 0.5 Tabs by mouth daily for 90 days. Qty: 90 Tab, Refills: 1      folic acid (FOLVITE) 1 mg tablet Take 1 Tab by mouth daily for 180 days. Qty: 90 Tab, Refills: 1      allopurinoL (ZYLOPRIM) 100 mg tablet TAKE 1 TABLET BY MOUTH EVERY DAY  Qty: 90 Tab, Refills: 2      terbinafine HCL (LAMISIL) 250 mg tablet Take 1 Tab by mouth daily. Qty: 90 Tab, Refills: 0    Associated Diagnoses: Onychomycosis      ammonium lactate (AMLACTIN) 12 % topical cream Apply  to affected area two (2) times a day. rub in to affected area well  Qty: 280 g, Refills: 5    Associated Diagnoses: Xerosis cutis      therapeutic multivitamin (THERAGRAN) tablet Take 1 Tab by mouth daily. Qty: 30 Tab, Refills: 0           2. Follow-up Information     Follow up With Specialties Details Why Contact Info    Skyler Ramos MD Internal Medicine In 3 days As needed [place] [address]  [phone]      [address] [place] [address] DEPT Emergency Medicine  If symptoms worsen [facility]  [phone]        3. Return to ED if worse     Diagnosis     Clinical Impression:   1. Generalized weakness    2. Essential hypertension        Attestations:    Leni Blake MD    Please note that this dictation was completed with [employer], the computer voice recognition software. Quite often unanticipated grammatical, syntax, homophones, and other interpretive errors are inadvertently transcribed by the computer software. Please disregard these errors. Please excuse any errors that have escaped final proofreading. Thank you.

## 2020-12-15 NOTE — ED TRIAGE NOTES
GCS 15 pt has been feeling very weak, slurred speech, unsteady gait and unable to feed himself; pt lives in an assisted living facility; these changes started occurring approx 3 days ago.

## 2020-12-16 VITALS
BODY MASS INDEX: 26.82 KG/M2 | HEIGHT: 65 IN | HEART RATE: 78 BPM | TEMPERATURE: 98.8 F | WEIGHT: 161 LBS | SYSTOLIC BLOOD PRESSURE: 179 MMHG | OXYGEN SATURATION: 99 % | RESPIRATION RATE: 20 BRPM | DIASTOLIC BLOOD PRESSURE: 106 MMHG

## 2020-12-16 LAB
ATRIAL RATE: 67 BPM
CALCULATED R AXIS, ECG10: 95 DEGREES
CALCULATED T AXIS, ECG11: 120 DEGREES
DIAGNOSIS, 93000: NORMAL
Q-T INTERVAL, ECG07: 460 MS
QRS DURATION, ECG06: 90 MS
QTC CALCULATION (BEZET), ECG08: 486 MS
VENTRICULAR RATE, ECG03: 67 BPM

## 2021-01-05 ENCOUNTER — OFFICE VISIT (OUTPATIENT)
Dept: UROLOGY | Age: 86
End: 2021-01-05
Payer: MEDICARE

## 2021-01-05 DIAGNOSIS — R35.0 FREQUENT URINATION: Primary | ICD-10-CM

## 2021-01-05 DIAGNOSIS — R35.0 FREQUENCY OF URINATION: Primary | ICD-10-CM

## 2021-01-05 DIAGNOSIS — N40.1 BPH WITH OBSTRUCTION/LOWER URINARY TRACT SYMPTOMS: ICD-10-CM

## 2021-01-05 DIAGNOSIS — N13.8 BPH WITH OBSTRUCTION/LOWER URINARY TRACT SYMPTOMS: ICD-10-CM

## 2021-01-05 DIAGNOSIS — N39.44 NOCTURNAL ENURESIS: ICD-10-CM

## 2021-01-05 LAB
AVG FLOW RATE POC: 11 ML/SECONDS
BILIRUB UR QL STRIP: NEGATIVE
FLOW TIME POC: 23 SECONDS
GLUCOSE UR-MCNC: NEGATIVE MG/DL
KETONES P FAST UR STRIP-MCNC: NEGATIVE MG/DL
MAX FLOW RATE POC: 11 ML/SECONDS
PH UR STRIP: 7 [PH] (ref 4.6–8)
PROT UR QL STRIP: NORMAL
PVR POC: NORMAL CC
SP GR UR STRIP: 1.02 (ref 1–1.03)
TIME TO MAX, POC: 8 SECONDS
UA UROBILINOGEN AMB POC: NORMAL (ref 0.2–1)
URINALYSIS CLARITY POC: CLEAR
URINALYSIS COLOR POC: YELLOW
URINE BLOOD POC: NEGATIVE
URINE LEUKOCYTES POC: NEGATIVE
URINE NITRITES POC: NEGATIVE
VOIDED VOLUME POC: 300 ML
VOIDING TIME POC: 23 SECONDS

## 2021-01-05 PROCEDURE — 81003 URINALYSIS AUTO W/O SCOPE: CPT | Performed by: UROLOGY

## 2021-01-05 PROCEDURE — 51725 SIMPLE CYSTOMETROGRAM: CPT | Performed by: UROLOGY

## 2021-01-05 PROCEDURE — 52000 CYSTOURETHROSCOPY: CPT | Performed by: UROLOGY

## 2021-01-05 PROCEDURE — 51798 US URINE CAPACITY MEASURE: CPT | Performed by: UROLOGY

## 2021-01-05 PROCEDURE — 51741 ELECTRO-UROFLOWMETRY FIRST: CPT | Performed by: UROLOGY

## 2021-01-05 RX ORDER — TAMSULOSIN HYDROCHLORIDE 0.4 MG/1
0.4 CAPSULE ORAL DAILY
Qty: 90 CAP | Refills: 3 | Status: SHIPPED | OUTPATIENT
Start: 2021-01-05 | End: 2021-12-21 | Stop reason: SDUPTHER

## 2021-01-05 NOTE — PROGRESS NOTES
HISTORY OF PRESENT ILLNESS  Ethan Thomas. is a 81 y.o. male. Chief Complaint   Patient presents with    Follow-up    Cystoscopy    Nocturia    Urinary Frequency     He has enuresis and urinary frequency. He is here with his caregiver. She states that they did a voiding diary but she does not have the results with her. He has had no change in his urinary function. He presents for further evaluation and management. See the problem list     He is here today for evaluation of his voiding symptoms. He is up every 45 minutes-1 hour overnight with an urge. He uses Depends (3-4/night). During the day he voids a small amount, but with some feelings of frequency. Chronic Conditions Addressed Today     1. Nocturnal enuresis     Overview      He uses depends at night. He up every 45 min to 1 hr at night with an urge. He goes through 3-4 depends at night. During the day he uses a urinal and voids a small amount. During the day he voids the same frequency.       He denies dysuria or hematuria. He feels like he empties his bladder. He denies swelling in his legs.               Current Assessment & Plan      On cystoscopy today he was found to have a relatively small bladder. He has BPH with trilobar hypertrophy. His frequency is likely related to a small functional bladder capacity. With a degree of bladder outlet obstruction, bladder relaxants have a slightly increased risk of urinary retention or incomplete emptying. 2. Frequency of urination - Primary     Overview      He uses depends at night. He up every 45 min to 1 hr at night with an urge. He goes through 3-4 depends at night. During the day he uses a urinal and voids a small amount. During the day he voids the same frequency.       He denies dysuria or hematuria. He feels like he empties his bladder. He denies swelling in his legs.               Current Assessment & Plan      He has a relatively small bladder capacity. His urinary frequency is related to his bladder capacity. Relevant Orders     AMB POC URINALYSIS DIP STICK AUTO W/O MICRO (Completed)    3. BPH with obstruction/lower urinary tract symptoms     Current Assessment & Plan      Uroflow with moderate to severe slowing of the urinary stream.  Cystoscopy with trilobar hypertrophy and a mild intravesical median lobe. He appears to empty his bladder sufficiently but with some obstructive flow. His bladder capacity is diminished at about 175 cc. His urinary frequency may be related to his fluid intake and output. An alpha-blocker may improve his flow but not necessarily his frequency. We can start with this and consider adding a bladder relaxant afterwards. Review of Systems   All other systems reviewed and are negative. Past Medical History:   Diagnosis Date    Adverse effect of anesthesia     nasal procedure didn't work-patient awake    Alcohol abuse     Anemia     Aortic regurgitation     Arthritis     Chronic kidney disease     CKD (chronic kidney disease), stage III 2011    Dementia (HCC)     Depression     Episodic lightheadedness 07/28/2017    Episodic lightheadedness     Epistaxis 11/27/2016    Erectile dysfunction     Fall 02/11/2020    GERD (gastroesophageal reflux disease)     GI bleed     Gout     gout, osteoarthritis    Heart failure (HCC)     acute heart failure    Hemorrhoids     History of cardioversion 02/17/2017    Hypertension     Ill-defined condition     weakness    Multiple myeloma (HCC)     PAF (paroxysmal atrial fibrillation) ([facility] [address] 02/17/2016    eliquis    Pulmonary HTN ([facility] [address]     Right-sided epistaxis 11/27/2016    S/P AVR (aortic valve replacement) 07/26/2016    merrill gill md -     S/P cardiac cath 7/6/16    severe ai, normal coronaries     S/P colonoscopy 10-8-04    S/P colonoscopy with polypectomy 05/09/2016    Kar Montalvo MD    Skin lesion       Past Surgical History: Procedure Laterality Date    HX HEENT      nasal procedure 20 years ago   [address] [facility] HX KNEE REPLACEMENT Bilateral     bilateral knee replacement    HX ORTHOPAEDIC Bilateral     wrist    OR CARDIAC SURG PROCEDURE UNLIST      cardiac cath    OR CARDIAC SURG PROCEDURE UNLIST  07/26/2016    AVR     Family History   Problem Relation Age of Onset    Anemia Mother     No Known Problems Father     Other Brother         drug abuse        Physical Exam  Constitutional:       Appearance: Normal appearance. HENT:      Head: Normocephalic and atraumatic. Eyes:      Extraocular Movements: Extraocular movements intact. Conjunctiva/sclera: Conjunctivae normal.   Pulmonary:      Effort: Pulmonary effort is normal. No respiratory distress. Abdominal:      General: Abdomen is flat. There is no distension. Palpations: Abdomen is soft. There is no mass. Tenderness: There is no abdominal tenderness. There is no guarding or rebound. Genitourinary:     Penis: Normal. No phimosis, hypospadias or tenderness. Testes: Normal.         Right: Mass, tenderness or swelling not present. Left: Mass, tenderness or swelling not present. Epididymis:      Right: Normal. No mass or tenderness. Left: Normal. No mass or tenderness. Neurological:      Mental Status: He is alert and oriented to person, place, and time. Psychiatric:         Mood and Affect: Mood normal.         Behavior: Behavior normal.       Cystoscopy - office    Patient presented for cystoscopy. He was placed supine on the procedure table and his genitals were prepped and with Betadine. Using 16 Urdu flexible cystoscope cystourethroscopy was performed. The urethra was patent without stricturing. The prostatic urethra had coapting lateral lobes. There was a mild intravesical median lobe. Anterior prostate was patent like a TUR defect. The bladder mucosa with mild trabeculation, no tumors or concerning erythema.     The ureteral orifices effluxing clear urine bilaterally. Summary: Benign bladder without concerns. Mild median lobe and lateral lobes of the prostate. CMG    Initial urge at (cc): 100    Strong urge at (cc):150  Findings: No uninhibited contractions noted. No urge related incontinence noted    Uroflow/ PVR    Max Flow: 11 ml/sec    Avg flow: 8 ml/sec    Voided Volume:  174ml    Residual Volume:0ml    Shape of the curve: flattened curve              ASSESSMENT and PLAN  Diagnoses and all orders for this visit:    1. Frequency of urination  Assessment & Plan:  He has a relatively small bladder capacity. His urinary frequency is related to his bladder capacity. Orders:  -     AMB POC URINALYSIS DIP STICK AUTO W/O MICRO    2. Nocturnal enuresis  Assessment & Plan: On cystoscopy today he was found to have a relatively small bladder. He has BPH with trilobar hypertrophy. His frequency is likely related to a small functional bladder capacity. With a degree of bladder outlet obstruction, bladder relaxants have a slightly increased risk of urinary retention or incomplete emptying. 3. BPH with obstruction/lower urinary tract symptoms  Assessment & Plan:  Uroflow with moderate to severe slowing of the urinary stream.  Cystoscopy with trilobar hypertrophy and a mild intravesical median lobe. He appears to empty his bladder sufficiently but with some obstructive flow. His bladder capacity is diminished at about 175 cc. His urinary frequency may be related to his fluid intake and output. An alpha-blocker may improve his flow but not necessarily his frequency. We can start with this and consider adding a bladder relaxant afterwards.                Daniel Gray MD

## 2021-01-05 NOTE — ASSESSMENT & PLAN NOTE
On cystoscopy today he was found to have a relatively small bladder. He has BPH with trilobar hypertrophy. His frequency is likely related to a small functional bladder capacity. With a degree of bladder outlet obstruction, bladder relaxants have a slightly increased risk of urinary retention or incomplete emptying.

## 2021-01-05 NOTE — ASSESSMENT & PLAN NOTE
Uroflow with moderate to severe slowing of the urinary stream.  Cystoscopy with trilobar hypertrophy and a mild intravesical median lobe. He appears to empty his bladder sufficiently but with some obstructive flow. His bladder capacity is diminished at about 175 cc. His urinary frequency may be related to his fluid intake and output. An alpha-blocker may improve his flow but not necessarily his frequency. We can start with this and consider adding a bladder relaxant afterwards.

## 2021-01-05 NOTE — ASSESSMENT & PLAN NOTE
He has a relatively small bladder capacity. His urinary frequency is related to his bladder capacity.

## 2021-01-05 NOTE — LETTER
Consent for Procedure 1. Edilia Brown authorize Dr. Shakila Liu and/or such assistants as may be selected by him/her, to perform the following procedure: 
                                                        Consent for Procedure 2. Edilia rBown authorize Dr. Shakila Liu and/or such assistants as may be selected by him/her, to perform the following procedure: 
                                                         
 CYSTOSCOPY 3. This procedure has been explained to my satisfaction and included in the explanation was: 
A) The nature and extent of the procedure to be performed; 
B) The risks involved, including those which may be unlikely to occur, may involve serious consequences; 
C) Alternative procedures or other methods of treatment that may be considered; 
D) The dangers and probable consequences of such alternatives, to include not performing this procedure or other treatment; 
E) The expected consequences of this procedure in relation to my future health. I understand that there are risks of infection and other serious complications that can occur before, during and after the procedure stages of my care. These risks of the procedure include: 
 
CYSTOSCOPY I have asked all questions I thought were important in deciding whether or not to undergo treatment or diagnosis. These questions were answered to my satisfaction. I understand that no assurance can be given that the procedure will be a success, and no guarantee or warranty of success has been given to me. 4. It has been explained to me that during the course of the procedure, unforeseen conditions may be revealed that necessitate extension of the original procedure(s) or different procedure(s) than those described in number 1 above. I authorize and request that above name physician or provider, his/her assistants or his/her designees, perform procedures as necessary and desirable that they have decided to be in my best interest. 
 
5. I consent to the administration of local anesthesia and whatever medications are needed with exception of:  ____________________ 6. I consent to the disposal of the removal tissue, or parts in accordance with Urology policy. 7. I  [] Do  [] Do Not  consent to other healthcare personnel observing this procedure for the purpose of medical education or other specified purposes as may be necessary or requested by my physician. 8. I [] Do  [] Do Not   consent to photography or video taping that may be part of the  procedure for medical and /or education purposes. My identity will not be revealed by any pictures taken, or the descriptions of them. 9. My signature below verifies I have read and fully understand the above consent for procedure and that all blanks were completed before I signed the consent. Date:               Time:   
                                                                                                                                                                                     
______________________________    (Signature of patient or legal representative)   
 
______________________________  (Witness to patient signature) (If patient is unable to sign, or is a minor, complete the following:) 
 
[]  If Patient is a minor, 81y.o. years of age, or unable to sign because: ___________ I have explained the nature, purpose and anticipated benefits as well as any possible alternative methods of treatment, the risks are involved, and the possibility of complications of the proposed procedure to the patient or legal representative and have provided an opportunity to ask any questions about the procedure. 
 
______________________________________________________         _________________________________ 
Remus Glatter of provider)                                                                                                           (Date) CYSTOSCOPY 10. This procedure has been explained to my satisfaction and included in the explanation was: 
F) The nature and extent of the procedure to be performed; 
G) The risks involved, including those which may be unlikely to occur, may involve serious consequences; H) Alternative procedures or other methods of treatment that may be considered; 
I) The dangers and probable consequences of such alternatives, to include not performing this procedure or other treatment; 
J) The expected consequences of this procedure in relation to my future health. I understand that there are risks of infection and other serious complications that can occur before, during and after the procedure stages of my care. These risks of the procedure include: 
 
CYSTOSCOPY I have asked all questions I thought were important in deciding whether or not to undergo treatment or diagnosis. These questions were answered to my satisfaction. I understand that no assurance can be given that the procedure will be a success, and no guarantee or warranty of success has been given to me. 11. It has been explained to me that during the course of the procedure, unforeseen conditions may be revealed that necessitate extension of the original procedure(s) or different procedure(s) than those described in number 1 above. I authorize and request that above name physician or provider, his/her assistants or his/her designees, perform procedures as necessary and desirable that they have decided to be in my best interest. 
 
12. I consent to the administration of local anesthesia and whatever medications are needed with exception of:  ____________________ 13. I consent to the disposal of the removal tissue, or parts in accordance with Urology policy. 14. I  [] Do  [] Do Not  consent to other healthcare personnel observing this procedure for the purpose of medical education or other specified purposes as may be necessary or requested by my physician. 15. I [] Do  [] Do Not   consent to photography or video taping that may be part of the  procedure for medical and /or education purposes. My identity will not be revealed by any pictures taken, or the descriptions of them. 12. My signature below verifies I have read and fully understand the above consent for procedure and that all blanks were completed before I signed the consent. Date:               Time:   
                                                                                                                                                                                     
______________________________    (Signature of patient or legal representative)   
 
______________________________  (Witness to patient signature) (If patient is unable to sign, or is a minor, complete the following:) 
 
[]  If Patient is a minor, 81y.o. years of age, or unable to sign because: ___________ I have explained the nature, purpose and anticipated benefits as well as any possible alternative methods of treatment, the risks are involved, and the possibility of complications of the proposed procedure to the patient or legal representative and have provided an opportunity to ask any questions about the procedure. 
 
______________________________________________________         _________________________________ 
Devika Pillow of provider)                                                                                                           (Date)

## 2021-01-05 NOTE — LETTER
1/5/2021 Patient: Leo Bass. YOB: 1935 Date of Visit: 1/5/2021 Ry Gaston MD 
[facility] [address] [facility] [place] [phone] [address] [employer] Dear Ry Gaston MD, Thank you for referring Mr. Carlitos Thornton to [place] [address] for evaluation. My notes for this consultation are attached. If you have questions, please do not hesitate to call me. I look forward to following your patient along with you.  
 
 
Sincerely, 
 
Mary Dueñas MD

## 2021-01-07 DIAGNOSIS — E83.42 HYPOMAGNESEMIA: ICD-10-CM

## 2021-01-07 DIAGNOSIS — G47.00 INSOMNIA, UNSPECIFIED TYPE: ICD-10-CM

## 2021-01-08 RX ORDER — MAGNESIUM GLUCONATE 27 MG(500)
TABLET ORAL
Qty: 90 TAB | Refills: 1 | Status: ON HOLD | OUTPATIENT
Start: 2021-01-08 | End: 2021-03-15

## 2021-01-08 RX ORDER — TRAZODONE HYDROCHLORIDE 50 MG/1
TABLET ORAL
Qty: 90 TAB | Refills: 1 | Status: ON HOLD | OUTPATIENT
Start: 2021-01-08 | End: 2021-03-15

## 2021-01-08 RX ORDER — LANOLIN ALCOHOL/MO/W.PET/CERES
CREAM (GRAM) TOPICAL
Qty: 90 TAB | Refills: 1 | Status: ON HOLD | OUTPATIENT
Start: 2021-01-08 | End: 2021-03-15

## 2021-01-28 ENCOUNTER — OFFICE VISIT (OUTPATIENT)
Dept: INTERNAL MEDICINE CLINIC | Age: 86
End: 2021-01-28
Payer: MEDICARE

## 2021-01-28 VITALS
HEIGHT: 65 IN | SYSTOLIC BLOOD PRESSURE: 170 MMHG | WEIGHT: 156.6 LBS | BODY MASS INDEX: 26.09 KG/M2 | TEMPERATURE: 97.6 F | HEART RATE: 51 BPM | DIASTOLIC BLOOD PRESSURE: 94 MMHG

## 2021-01-28 DIAGNOSIS — I10 ESSENTIAL HYPERTENSION: ICD-10-CM

## 2021-01-28 DIAGNOSIS — Z95.2 H/O AORTIC VALVE REPLACEMENT: ICD-10-CM

## 2021-01-28 DIAGNOSIS — F10.27 DEMENTIA ASSOCIATED WITH ALCOHOLISM WITH BEHAVIORAL DISTURBANCE (HCC): Primary | ICD-10-CM

## 2021-01-28 DIAGNOSIS — I48.0 PAF (PAROXYSMAL ATRIAL FIBRILLATION) (HCC): ICD-10-CM

## 2021-01-28 PROCEDURE — G8536 NO DOC ELDER MAL SCRN: HCPCS | Performed by: INTERNAL MEDICINE

## 2021-01-28 PROCEDURE — 3288F FALL RISK ASSESSMENT DOCD: CPT | Performed by: INTERNAL MEDICINE

## 2021-01-28 PROCEDURE — G8755 DIAS BP > OR = 90: HCPCS | Performed by: INTERNAL MEDICINE

## 2021-01-28 PROCEDURE — 1100F PTFALLS ASSESS-DOCD GE2>/YR: CPT | Performed by: INTERNAL MEDICINE

## 2021-01-28 PROCEDURE — G8753 SYS BP > OR = 140: HCPCS | Performed by: INTERNAL MEDICINE

## 2021-01-28 PROCEDURE — 99214 OFFICE O/P EST MOD 30 MIN: CPT | Performed by: INTERNAL MEDICINE

## 2021-01-28 PROCEDURE — G8427 DOCREV CUR MEDS BY ELIG CLIN: HCPCS | Performed by: INTERNAL MEDICINE

## 2021-01-28 PROCEDURE — G8419 CALC BMI OUT NRM PARAM NOF/U: HCPCS | Performed by: INTERNAL MEDICINE

## 2021-01-28 PROCEDURE — G9717 DOC PT DX DEP/BP F/U NT REQ: HCPCS | Performed by: INTERNAL MEDICINE

## 2021-01-28 RX ORDER — ESCITALOPRAM OXALATE 20 MG/1
20 TABLET ORAL DAILY
Qty: 30 TAB | Refills: 2 | Status: SHIPPED | OUTPATIENT
Start: 2021-01-28 | End: 2021-05-01

## 2021-01-28 RX ORDER — AMLODIPINE BESYLATE 5 MG/1
5 TABLET ORAL DAILY
Qty: 30 TAB | Refills: 1 | Status: SHIPPED | OUTPATIENT
Start: 2021-01-28 | End: 2021-05-01

## 2021-01-28 NOTE — PROGRESS NOTES
Barbi Ontiveros. is a 81 y.o. male and presents with Follow-up, Hypertension, Hemorrhoids, Dementia, and Chronic Kidney Disease    Mr. Annalisa Sorot is here with Merly Yusuf, nurse and his caregiver from [facility]. In our last visit he had had syncopal episode was evaluated at the hospital and then by his cardiologist who stopped metoprolol and decreased amlodipine dose. He has HTn and h/o aortic valve replacement in 2018, PAF, CKD stage 3. Nephrologist is Dr. Shalom Robles. Since our last visit he has gone to ER once and has been seen by Urology for incontinence  I reviewed ER note from Dec 15th, he was taken for weakness, unstable gait, intermittent slurred speech for 4 days, CT didn't show acute cva. He was very hypertensive 230/120, was given 5 mg amlodipine, bp improved, clinically he was fine, he was sent home. They went to Cardiology, he had a heart monitor which stopped working around 3 months ago and they are waiting for a phone call from them. Tonie Dela Cruz says dementia is worse, he has episodes of tantrums, theathens to punch others, gets down in the floor and screams because he doesn't want to go out, when he's asked to sit down and calm down he wouldn't. He doesn't want to walk and if he does not want to walk he screams and tries to look for the police. Mr. Ananlisa Sorto says he's 82 y/o. Chermichael Nguyenner and he does not want to walk that he played football and restled enough already. He doesn't want to do much. He says he's 82 y/o. And he's not going to walk in the [facility] grocery store at 84. He says he's passt the stage in his life to go grocery shopping. He needs to go out with Tonie Dela Cruz because he can not stay alone in the house due to safety concerns. He has also put himself in the floor in the bathroom because he does not want to take a shower, he paces back and forth at night, she says several times a night he screams saying that he urinated in the diaper.      Needs diapers      Review of Systems  Review of Systems Constitutional: Negative for chills, fatigue, fever and unexpected weight change. HENT: Negative for congestion, ear pain, sneezing and sore throat. Eyes: Negative for pain and discharge. Respiratory: Negative for cough, shortness of breath and wheezing. Cardiovascular: Negative for chest pain, palpitations and leg swelling. Gastrointestinal: Negative for abdominal pain, blood in stool, constipation and diarrhea. Endocrine: Negative for polydipsia and polyuria. Genitourinary: Negative for difficulty urinating, dysuria, frequency, hematuria and urgency. Musculoskeletal: Negative for arthralgias, back pain and joint swelling. Skin: Negative for rash. Allergic/Immunologic: Negative for environmental allergies and food allergies. Neurological: Negative for dizziness, speech difficulty, weakness, light-headedness, numbness and headaches. Hematological: Negative for adenopathy. Psychiatric/Behavioral: Positive for behavioral problems (Depression), dysphoric mood and sleep disturbance. Negative for suicidal ideas.           Past Medical History:   Diagnosis Date    Adverse effect of anesthesia     nasal procedure didn't work-patient awake    Alcohol abuse     Anemia     Aortic regurgitation     Arthritis     Chronic kidney disease     CKD (chronic kidney disease), stage III 2011    D[facility] ([facility])     Depression     Episodic lightheadedness 07/28/2017    Episodic lightheadedness     Epistaxis 11/27/2016    Erectile dysfunction     Fall 02/11/2020    GERD (gastroesophageal reflux disease)     GI bleed     Gout     gout, osteoarthritis    Heart failure (HCC)     acute heart failure    Hemorrhoids     History of cardioversion 02/17/2017    Hypertension     Ill-defined condition     weakness    Multiple myeloma (HCC)     PAF (paroxysmal atrial fibrillation) ([facility] [address] 02/17/2016    eliquis    Pulmonary HTN ([facility] [address]     Right-sided epistaxis 11/27/2016    S/P AVR (aortic valve replacement) 2016    merrill gill md -     S/P cardiac cath 16    severe ai, normal coronaries     S/P colonoscopy 10-8-04    S/P colonoscopy with polypectomy 2016    Kar Monsivais MD    Skin lesion      Past Surgical History:   Procedure Laterality Date    HX HEENT      nasal procedure 20 years ago   24 [facility] HX KNEE REPLACEMENT Bilateral     bilateral knee replacement    HX ORTHOPAEDIC Bilateral     wrist    HX UROLOGICAL  2020    cystoscopy     SC CARDIAC SURG PROCEDURE UNLIST      cardiac cath    SC CARDIAC SURG PROCEDURE UNLIST  2016    AVR     Social History     Socioeconomic History    Marital status:      Spouse name: Not on file    Number of children: Not on file    Years of education: Not on file    Highest education level: Not on file   Tobacco Use    Smoking status: Former Smoker     Packs/day: 1.00     Years: 4.00     Pack years: 4.00     Quit date: 1960     Years since quittin.1    Smokeless tobacco: Never Used   Substance and Sexual Activity    Alcohol use: Not Currently     Frequency: Never     Binge frequency: Never     Comment: prior heavy drinker     Drug use: Not Currently     Types: Marijuana    Sexual activity: Not Currently   Social History Narrative    Family History: Mother:  36 yrs, Chronic anemiaFather:  80 yrs, I think aneurysm hypertensionBrother(s):  79 yrs,    obese,dm,drug abuse,miSon(s): alive, adopted1 son(s) . Mabelene Fleischer [phone]    Social History: Alcohol Use Patient uses alcohol, Drinks per occasion: 2, Drinks per w [place]: 10. Smoking Status Patient is a never smoker. Marital Status: . Lives w ith: alone. Occupation/W ork: employed full time insurance. Education/School: has highschool diploma.      Family History   Problem Relation Age of Onset    Anemia Mother     No Known Problems Father     Other Brother         drug abuse     Current Outpatient Medications   Medication Sig Dispense Refill    amLODIPine (NORVASC) 5 mg tablet Take 1 Tab by mouth daily for 60 days. 30 Tab 1    escitalopram oxalate (LEXAPRO) 20 mg tablet Take 1 Tab by mouth daily for 90 days. 30 Tab 2    traZODone (DESYREL) 50 mg tablet TAKE ONE TABLET BY MOUTH AT BEDTIME. 90 Tab 1    melatonin 10 mg TbER TAKE ONE TABLET BY MOUTH AT BEDTIME. 90 Tab 1    magnesium oxide (MAG-OX) 400 mg tablet TAKE ONE TABLET BY MOUTH DAILY 90 Tab 1    tamsulosin (FLOMAX) 0.4 mg capsule Take 1 Cap by mouth daily. 90 Cap 3    simvastatin (ZOCOR) 40 mg tablet TAKE ONE TABLET BY MOUTH AT BEDTIME. 90 Tab 1    potassium chloride (K-DUR, KLOR-CON) 20 mEq tablet TAKE 1 TABLET BY MOUTH EACH MORNING WITH FOOD. 90 Tab 1    ferrous sulfate (IRON) 325 mg (65 mg iron) EC tablet TAKE ONE TABLET BY MOUTH EACH DAY WITH BREAKFAST. 90 Tab 1    meclizine (ANTIVERT) 25 mg chewable tablet Take 1 Tab by mouth daily as needed.  timolol (TIMOPTIC) 0.5 % ophthalmic solution INSTILL 1 DROPS INTO RIGHT EYE ONCE DAILY 5 mL 5    diclofenac (VOLTAREN) 1 % gel APPLY TWO GRAMS FOUR TIMES DAILY AS NEEDED FOR ELBOW PAIN 200 g 3    thiamine HCL (B-1) 100 mg tablet Take 1 Tab by mouth daily for 180 days. 90 Tab 1    folic acid (FOLVITE) 1 mg tablet Take 1 Tab by mouth daily for 180 days. 90 Tab 1    allopurinoL (ZYLOPRIM) 100 mg tablet TAKE 1 TABLET BY MOUTH EVERY DAY 90 Tab 2    terbinafine HCL (LAMISIL) 250 mg tablet Take 1 Tab by mouth daily. 90 Tab 0    ammonium lactate (AMLACTIN) 12 % topical cream Apply  to affected area two (2) times a day. rub in to affected area well 280 g 5    therapeutic multivitamin (THERAGRAN) tablet Take 1 Tab by mouth daily.  30 Tab 0     Allergies   Allergen Reactions    Amiodarone Other (comments)     Bradycardia -excessive       Objective:  Visit Vitals  BP (!) 170/94 (BP 1 Location: Left arm, BP Patient Position: Sitting)   Pulse (!) 51   Temp 97.6 °F (36.4 °C) (Oral)   Ht 5' 5\" (1.651 m)   Wt 156 lb 9.6 oz (71 kg) BMI 26.06 kg/m²     Physical Exam:   Physical Exam  Constitutional:       General: He is not in acute distress. Appearance: Normal appearance. HENT:      Head: Normocephalic and atraumatic. Mouth/Throat:      Mouth: Mucous membranes are moist.   Eyes:      Extraocular Movements: Extraocular movements intact. Conjunctiva/sclera: Conjunctivae normal.      Pupils: Pupils are equal, round, and reactive to light. Neck:      Musculoskeletal: Normal range of motion and neck supple. Cardiovascular:      Rate and Rhythm: Normal rate and regular rhythm. Pulses: Normal pulses. Heart sounds: Normal heart sounds. Pulmonary:      Effort: Pulmonary effort is normal.      Breath sounds: Normal breath sounds. Abdominal:      General: Abdomen is flat. Bowel sounds are normal. There is no distension. Palpations: Abdomen is soft. There is no mass. Tenderness: There is no abdominal tenderness. Musculoskeletal:         General: No swelling or deformity. Right lower leg: No edema. Left lower leg: No edema. Lymphadenopathy:      Cervical: No cervical adenopathy. Skin:     General: Skin is warm and dry. Capillary Refill: Capillary refill takes less than 2 seconds. Coloration: Skin is not jaundiced or pale. Findings: No erythema or rash. Neurological:      General: No focal deficit present. Mental Status: He is alert and oriented to person, place, and time. Psychiatric:         Mood and Affect: Mood normal. Affect is angry. Speech: Speech is tangential.         Behavior: Behavior normal.         Thought Content: Thought content normal.         Judgment: Judgment is inappropriate.           Results for orders placed or performed in visit on 01/05/21   [facility] POC PVR, DIVINA,POST-VOID RES,US,NON-IMAGING   Result Value Ref Range    PVR 18ml cc   AMB POC UROFLOWMETRY   Result Value Ref Range    VOIDING TIME 23 Seconds    FLOW TIME 23 Seconds    TIME TO MAX 8 Seconds    MAX FLOW RATE 11 ml/Seconds    AVG FLOW RATE 11 ml/Seconds    VOIDED VOLUME 300 ml       Assessment/Plan:    Increase lexapro to 20 mg, he needs to see psychiatry   HTN is uncontrolled, he has not had any further episode of near syncope, he has not seen Cardiology since lat visit, told Valencia Dumas it's important to follow up and appointment has to be rescheduled. He has h/o aortic valve replacement. Increase amolodipine to 5 mg. ICD-10-CM ICD-9-CM    1. 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 Essential hypertension  I10 401.9 amLODIPine (NORVASC) 5 mg tablet   3. PAF (paroxysmal atrial fibrillation) ([facility])  I48.0 427.31    4. H/O aortic valve replacement  Z95.2 V43.3      Orders Placed This Encounter    REFERRAL TO PSYCHIATRY     Referral Priority:   Routine     Referral Type:   Behavioral Health     Referral Reason:   Specialty Services Required     Number of Visits Requested:   1    amLODIPine (NORVASC) 5 mg tablet     Sig: Take 1 Tab by mouth daily for 60 days. Dispense:  30 Tab     Refill:  1    escitalopram oxalate (LEXAPRO) 20 mg tablet     Sig: Take 1 Tab by mouth daily for 90 days. Dispense:  30 Tab     Refill:  2     call if any problems  There are no Patient Instructions on file for this visit. Follow-up and Dispositions    · Return in about 1 month (around 2/28/2021).

## 2021-02-05 ENCOUNTER — OFFICE VISIT (OUTPATIENT)
Dept: UROLOGY | Age: 86
End: 2021-02-05
Payer: MEDICARE

## 2021-02-05 VITALS
BODY MASS INDEX: 24.64 KG/M2 | SYSTOLIC BLOOD PRESSURE: 133 MMHG | WEIGHT: 157 LBS | DIASTOLIC BLOOD PRESSURE: 72 MMHG | HEIGHT: 67 IN | TEMPERATURE: 97.1 F

## 2021-02-05 DIAGNOSIS — R35.0 FREQUENCY OF URINATION: ICD-10-CM

## 2021-02-05 DIAGNOSIS — R31.29 MICROHEMATURIA: ICD-10-CM

## 2021-02-05 DIAGNOSIS — N39.44 NOCTURNAL ENURESIS: ICD-10-CM

## 2021-02-05 DIAGNOSIS — N13.8 BPH WITH OBSTRUCTION/LOWER URINARY TRACT SYMPTOMS: Primary | ICD-10-CM

## 2021-02-05 DIAGNOSIS — R32 URINARY INCONTINENCE, UNSPECIFIED TYPE: ICD-10-CM

## 2021-02-05 DIAGNOSIS — F03.91 DEMENTIA WITH BEHAVIORAL DISTURBANCE, UNSPECIFIED DEMENTIA TYPE: ICD-10-CM

## 2021-02-05 DIAGNOSIS — N40.1 BPH WITH OBSTRUCTION/LOWER URINARY TRACT SYMPTOMS: Primary | ICD-10-CM

## 2021-02-05 LAB
BILIRUB UR QL STRIP: NEGATIVE
GLUCOSE UR-MCNC: NEGATIVE MG/DL
KETONES P FAST UR STRIP-MCNC: NEGATIVE MG/DL
PH UR STRIP: 7 [PH] (ref 4.6–8)
PROT UR QL STRIP: NORMAL
PVR POC: NORMAL CC
SP GR UR STRIP: 1.02 (ref 1–1.03)
UA UROBILINOGEN AMB POC: NORMAL (ref 0.2–1)
URINALYSIS CLARITY POC: CLEAR
URINALYSIS COLOR POC: YELLOW
URINE BLOOD POC: NORMAL
URINE LEUKOCYTES POC: NEGATIVE
URINE NITRITES POC: NEGATIVE

## 2021-02-05 PROCEDURE — G9717 DOC PT DX DEP/BP F/U NT REQ: HCPCS | Performed by: UROLOGY

## 2021-02-05 PROCEDURE — 99214 OFFICE O/P EST MOD 30 MIN: CPT | Performed by: UROLOGY

## 2021-02-05 PROCEDURE — 1100F PTFALLS ASSESS-DOCD GE2>/YR: CPT | Performed by: UROLOGY

## 2021-02-05 PROCEDURE — G8420 CALC BMI NORM PARAMETERS: HCPCS | Performed by: UROLOGY

## 2021-02-05 PROCEDURE — 81003 URINALYSIS AUTO W/O SCOPE: CPT | Performed by: UROLOGY

## 2021-02-05 PROCEDURE — G8754 DIAS BP LESS 90: HCPCS | Performed by: UROLOGY

## 2021-02-05 PROCEDURE — 51798 US URINE CAPACITY MEASURE: CPT | Performed by: UROLOGY

## 2021-02-05 PROCEDURE — G8752 SYS BP LESS 140: HCPCS | Performed by: UROLOGY

## 2021-02-05 PROCEDURE — G8427 DOCREV CUR MEDS BY ELIG CLIN: HCPCS | Performed by: UROLOGY

## 2021-02-05 PROCEDURE — G8536 NO DOC ELDER MAL SCRN: HCPCS | Performed by: UROLOGY

## 2021-02-05 RX ORDER — TROSPIUM CHLORIDE ER 60 MG/1
60 CAPSULE ORAL
Qty: 30 CAP | Refills: 3 | Status: SHIPPED | OUTPATIENT
Start: 2021-02-05 | End: 2021-02-11 | Stop reason: CLARIF

## 2021-02-05 NOTE — PROGRESS NOTES
HISTORY OF PRESENT ILLNESS  Adriane Garcia. is a 81 y.o. male. Chief Complaint   Patient presents with    Follow-up    Urinary Frequency    Urinary Incontinence    Microscopic Hematuria     Mr. Mahesh rivers is here with his caregiver. He has dementia. 1/5/2021: Uroflow with moderate to severe slowing of the urinary stream.  Cystoscopy with trilobar hypertrophy and a mild intravesical median lobe.     He appears to empty his bladder sufficiently but with some obstructive flow. His bladder capacity is diminished at about 175 cc. His urinary frequency may be related to his fluid intake and output. An alpha-blocker may improve his flow but not necessarily his frequency. We can start with this and consider adding a bladder relaxant afterwards. He was started on tamsulosin. His UA today had trace blood.     His caregiver reports that he goes to the bathroom more frequently, every 5-10 minutes. He disputes that. He has used the bathroom 3 times in the last hour. He voids but also wets himself. His caregiver changes his diapers about every 3 hours also. She reports that when he has leakage it is large volume. He denies dysuria. He denies hematuria. She notes no difference since starting the tamsulosin. He is a recovering alcoholic. Caregiver states he drinks all day. He drinks cola, tea, and water. He may drink 60 oz per day. Chronic Conditions Addressed Today     1. Dementia ([address]     Overview      He has dementia. Worsening per LVN with PCP. He is usually accompanied by his caregiver. Current Assessment & Plan      He has dementia. Worsening per LVN with PCP. He is usually accompanied by his caregiver. 2. Nocturnal enuresis     Overview      He uses depends at night. He up every 45 min to 1 hr at night with an urge. He goes through 3-4 depends at night. During the day he uses a urinal and voids a small amount. During the day he voids the same frequency.    He denies dysuria or hematuria. He feels like he empties his bladder. He denies swelling in his legs. On cystoscopy 1/5/2021 he was found to have a relatively small bladder. He has BPH with trilobar hypertrophy. His frequency is likely related to a small functional bladder capacity. With a degree of bladder outlet obstruction, bladder relaxants have a slightly increased risk of urinary retention or incomplete emptying.           Current Assessment & Plan      He has frequent urination at night. He should restrict fluids 4 hrs before bedtime. He is in bed all day and tries to stay up at night. 3. Frequency of urination     Overview      He uses depends at night. He up every 45 min to 1 hr at night with an urge. He goes through 3-4 depends at night. During the day he uses a urinal and voids a small amount. During the day he voids the same frequency.       He denies dysuria or hematuria. He feels like he empties his bladder. He denies swelling in his legs. He has a relatively small bladder capacity. His urinary frequency is related to his bladder capacity. Cystoscopy done 1/5/2021.           Current Assessment & Plan      I advised he go down to 8 oz of fluid each beverage and limit caffeinated beverages to one per day. We discussed starting Tropsium, or Toviaz if not covered. The patient was instructed on the dosing of the medication and reason for taking it. We discussed the common and serious risks. The patient is advised to be cautious of side effects with a new medication. Rx given. Relevant Orders     URINALYSIS W/MICROSCOPIC     AMB POC URINALYSIS DIP STICK AUTO W/O MICRO    4.  BPH with obstruction/lower urinary tract symptoms - Primary     Overview      1/5/2021: Uroflow with moderate to severe slowing of the urinary stream.  Cystoscopy with trilobar hypertrophy and a mild intravesical median lobe.     He appears to empty his bladder sufficiently but with some obstructive flow. His bladder capacity is diminished at about 175 cc. His urinary frequency may be related to his fluid intake and output. An alpha-blocker may improve his flow but not necessarily his frequency. We can start with this and consider adding a bladder relaxant afterwards. He was started on tamsulosin.             Current Assessment & Plan      Continue tamsulosin for now. 5. Urinary incontinence     Overview      He has a small bladder capacity; likely overflow urinary incontinence. He manages with Depends. Current Assessment & Plan      Now it seems more urge related. Observe with fluid restriction and bladder relaxants. 6. Microhematuria     Overview      UA dip on 2/5/2021 with trace blood. Current Assessment & Plan      UA today with trace blood. Will send for microscopic analysis to determine significance. Review of Systems   Constitutional: Negative for chills and fever. Gastrointestinal: Negative for diarrhea, nausea and vomiting. Genitourinary: Positive for frequency. Negative for dysuria and hematuria.        Past Medical History:   Diagnosis Date    Adverse effect of anesthesia     nasal procedure didn't work-patient awake    Alcohol abuse     Anemia     Aortic regurgitation     Arthritis     Chronic kidney disease     CKD (chronic kidney disease), stage III 2011    Dementia (HCC)     Depression     Episodic lightheadedness 07/28/2017    Episodic lightheadedness     Epistaxis 11/27/2016    Erectile dysfunction     Fall 02/11/2020    GERD (gastroesophageal reflux disease)     GI bleed     Gout     gout, osteoarthritis    Heart failure (HCC)     acute heart failure    Hemorrhoids     History of cardioversion 02/17/2017    Hypertension     Ill-defined condition     weakness    Multiple myeloma (HCC)     PAF (paroxysmal atrial fibrillation) ([facility] [address] 02/17/2016    eliquis    Pulmonary HTN ([facility] [address]     Right-sided epistaxis 11/27/2016    S/P AVR (aortic valve replacement) 07/26/2016    merrill gill md -     S/P cardiac cath 7/6/16    severe ai, normal coronaries     S/P colonoscopy 10-8-04    S/P colonoscopy with polypectomy 05/09/2016    Kar Arroyo MD    Skin lesion     Stroke [facility])       Past Surgical History:   Procedure Laterality Date    HX HEENT      nasal procedure 20 years ago   [facility] HX KNEE REPLACEMENT Bilateral     bilateral knee replacement    HX ORTHOPAEDIC Bilateral     wrist    HX UROLOGICAL  01/05/2020    cystoscopy     [place] CARDIAC SURG PROCEDURE UNLIST      cardiac cath    [place] CARDIAC SURG PROCEDURE UNLIST  07/26/2016    AVR     Family History   Problem Relation Age of Onset    Anemia Mother     No Known Problems Father     Other Brother         drug abuse        Physical Exam  Constitutional:       General: He is not in acute distress. Neurological:      Mental Status: He is alert. ASSESSMENT and PLAN  Diagnoses and all orders for this visit:    1. BPH with obstruction/lower urinary tract symptoms  Assessment & Plan:  Continue tamsulosin for now. 2. Frequency of urination  Assessment & Plan:  I advised he go down to 8 oz of fluid each beverage and limit caffeinated beverages to one per day. We discussed starting Tropsium, or Toviaz if not covered. The patient was instructed on the dosing of the medication and reason for taking it. We discussed the common and serious risks. The patient is advised to be cautious of side effects with a new medication. Rx given. Orders:  -     URINALYSIS W/MICROSCOPIC  -     AMB POC URINALYSIS DIP STICK AUTO W/O MICRO    3. Nocturnal enuresis  Assessment & Plan:  He has frequent urination at night. He should restrict fluids 4 hrs before bedtime. He is in bed all day and tries to stay up at night. 4. Urinary incontinence, unspecified type  Assessment & Plan:  Now it seems more urge related.   Observe with fluid restriction and bladder relaxants. 5. Microhematuria  Assessment & Plan:  UA today with trace blood. Will send for microscopic analysis to determine significance. 6. Dementia with behavioral disturbance, unspecified dementia type [facility])  Assessment & Plan:  He has dementia. Worsening per LVN with PCP. He is usually accompanied by his caregiver. Other orders  -     trospium (SANCTURA XL) 60 mg capsule; Take 1 Cap by mouth Daily (before breakfast).              Meghann Baltazar MD

## 2021-02-05 NOTE — ASSESSMENT & PLAN NOTE
He has frequent urination at night. He should restrict fluids 4 hrs before bedtime. He is in bed all day and tries to stay up at night.

## 2021-02-05 NOTE — ASSESSMENT & PLAN NOTE
I advised he go down to 8 oz of fluid each beverage and limit caffeinated beverages to one per day. We discussed starting Tropsium, or Toviaz if not covered. The patient was instructed on the dosing of the medication and reason for taking it. We discussed the common and serious risks. The patient is advised to be cautious of side effects with a new medication. Rx given.

## 2021-02-06 LAB
APPEARANCE UR: CLEAR
BACTERIA #/AREA URNS HPF: NORMAL /[HPF]
BILIRUB UR QL STRIP: NEGATIVE
CASTS URNS QL MICRO: NORMAL /LPF
COLOR UR: YELLOW
EPI CELLS #/AREA URNS HPF: NORMAL /HPF (ref 0–10)
GLUCOSE UR QL: NEGATIVE
HGB UR QL STRIP: NEGATIVE
KETONES UR QL STRIP: NEGATIVE
LEUKOCYTE ESTERASE UR QL STRIP: NEGATIVE
MICRO URNS: ABNORMAL
NITRITE UR QL STRIP: NEGATIVE
PH UR STRIP: 7.5 [PH] (ref 5–7.5)
PROT UR QL STRIP: ABNORMAL
RBC #/AREA URNS HPF: NORMAL /HPF (ref 0–2)
SP GR UR: 1.01 (ref 1–1.03)
UROBILINOGEN UR STRIP-MCNC: 0.2 MG/DL (ref 0.2–1)
WBC #/AREA URNS HPF: NORMAL /HPF (ref 0–5)

## 2021-02-10 RX ORDER — SIMVASTATIN 40 MG/1
TABLET, FILM COATED ORAL
Qty: 90 TAB | Refills: 1 | Status: SHIPPED | OUTPATIENT
Start: 2021-02-10 | End: 2021-04-30 | Stop reason: SDUPTHER

## 2021-02-10 RX ORDER — POTASSIUM CHLORIDE 20 MEQ/1
TABLET, EXTENDED RELEASE ORAL
Qty: 30 TAB | Refills: 0 | OUTPATIENT
Start: 2021-02-10 | End: 2021-08-09

## 2021-02-10 RX ORDER — LANOLIN ALCOHOL/MO/W.PET/CERES
CREAM (GRAM) TOPICAL
Qty: 30 TAB | Refills: 2 | Status: SHIPPED | OUTPATIENT
Start: 2021-02-10 | End: 2021-03-14 | Stop reason: SDUPTHER

## 2021-02-11 ENCOUNTER — TELEPHONE (OUTPATIENT)
Dept: UROLOGY | Age: 86
End: 2021-02-11

## 2021-02-11 DIAGNOSIS — N39.44 NOCTURNAL ENURESIS: Primary | ICD-10-CM

## 2021-02-11 RX ORDER — FESOTERODINE FUMARATE 4 MG/1
4 TABLET, FILM COATED, EXTENDED RELEASE ORAL DAILY
Qty: 30 TAB | Refills: 0 | Status: ON HOLD | OUTPATIENT
Start: 2021-02-11 | End: 2021-03-21

## 2021-02-23 ENCOUNTER — APPOINTMENT (OUTPATIENT)
Dept: CT IMAGING | Age: 86
End: 2021-02-23
Attending: EMERGENCY MEDICINE
Payer: MEDICARE

## 2021-02-23 ENCOUNTER — HOSPITAL ENCOUNTER (EMERGENCY)
Age: 86
Discharge: HOME | End: 2021-02-24
Attending: EMERGENCY MEDICINE
Payer: MEDICARE

## 2021-02-23 ENCOUNTER — APPOINTMENT (OUTPATIENT)
Dept: GENERAL RADIOLOGY | Age: 86
End: 2021-02-23
Attending: EMERGENCY MEDICINE
Payer: MEDICARE

## 2021-02-23 VITALS
SYSTOLIC BLOOD PRESSURE: 177 MMHG | HEIGHT: 66 IN | WEIGHT: 205 LBS | DIASTOLIC BLOOD PRESSURE: 92 MMHG | OXYGEN SATURATION: 100 % | BODY MASS INDEX: 32.95 KG/M2 | TEMPERATURE: 97.4 F | HEART RATE: 55 BPM | RESPIRATION RATE: 16 BRPM

## 2021-02-23 DIAGNOSIS — S62.663A CLOSED NONDISPLACED FRACTURE OF DISTAL PHALANX OF LEFT MIDDLE FINGER, INITIAL ENCOUNTER: Primary | ICD-10-CM

## 2021-02-23 DIAGNOSIS — J90 CHRONIC PLEURAL EFFUSION: ICD-10-CM

## 2021-02-23 DIAGNOSIS — S80.01XA CONTUSION OF RIGHT KNEE, INITIAL ENCOUNTER: ICD-10-CM

## 2021-02-23 DIAGNOSIS — I48.11 LONGSTANDING PERSISTENT ATRIAL FIBRILLATION (HCC): ICD-10-CM

## 2021-02-23 DIAGNOSIS — N18.4 CKD (CHRONIC KIDNEY DISEASE) STAGE 4, GFR 15-29 ML/MIN (HCC): ICD-10-CM

## 2021-02-23 LAB
APPEARANCE UR: ABNORMAL
BACTERIA URNS QL MICRO: ABNORMAL /HPF
BILIRUB UR QL: NEGATIVE
COLOR UR: YELLOW
EPITH CASTS URNS QL MICRO: ABNORMAL /LPF
GLUCOSE UR STRIP.AUTO-MCNC: NEGATIVE MG/DL
HGB UR QL STRIP: NEGATIVE
KETONES UR QL STRIP.AUTO: NEGATIVE MG/DL
LEUKOCYTE ESTERASE UR QL STRIP.AUTO: NEGATIVE
NITRITE UR QL STRIP.AUTO: NEGATIVE
PH UR STRIP: 5 [PH] (ref 5–8)
PROT UR STRIP-MCNC: 30 MG/DL
RBC #/AREA URNS HPF: ABNORMAL /HPF (ref 0–5)
SP GR UR REFRACTOMETRY: 1.01 (ref 1–1.03)
UROBILINOGEN UR QL STRIP.AUTO: 0.1 EU/DL (ref 0.2–1)
WBC URNS QL MICRO: ABNORMAL /HPF (ref 0–4)

## 2021-02-23 PROCEDURE — 81001 URINALYSIS AUTO W/SCOPE: CPT

## 2021-02-23 PROCEDURE — 71101 X-RAY EXAM UNILAT RIBS/CHEST: CPT

## 2021-02-23 PROCEDURE — 99284 EMERGENCY DEPT VISIT MOD MDM: CPT

## 2021-02-23 PROCEDURE — 70450 CT HEAD/BRAIN W/O DYE: CPT

## 2021-02-23 PROCEDURE — 73562 X-RAY EXAM OF KNEE 3: CPT

## 2021-02-23 PROCEDURE — 72125 CT NECK SPINE W/O DYE: CPT

## 2021-02-23 PROCEDURE — 74011250637 HC RX REV CODE- 250/637: Performed by: EMERGENCY MEDICINE

## 2021-02-23 PROCEDURE — 85025 COMPLETE CBC W/AUTO DIFF WBC: CPT

## 2021-02-23 PROCEDURE — 80048 BASIC METABOLIC PNL TOTAL CA: CPT

## 2021-02-23 PROCEDURE — 73130 X-RAY EXAM OF HAND: CPT

## 2021-02-23 RX ORDER — ACETAMINOPHEN 325 MG/1
325 TABLET ORAL ONCE
Status: COMPLETED | OUTPATIENT
Start: 2021-02-23 | End: 2021-02-23

## 2021-02-23 RX ADMIN — ACETAMINOPHEN 325 MG: 325 TABLET, FILM COATED ORAL at 23:29

## 2021-02-24 VITALS
HEIGHT: 69 IN | WEIGHT: 153 LBS | TEMPERATURE: 98.9 F | RESPIRATION RATE: 18 BRPM | SYSTOLIC BLOOD PRESSURE: 170 MMHG | DIASTOLIC BLOOD PRESSURE: 93 MMHG | HEART RATE: 67 BPM | OXYGEN SATURATION: 100 % | BODY MASS INDEX: 22.66 KG/M2

## 2021-02-24 DIAGNOSIS — R53.83 FATIGUE, UNSPECIFIED TYPE: Primary | ICD-10-CM

## 2021-02-24 DIAGNOSIS — N28.9 KIDNEY DISEASE: ICD-10-CM

## 2021-02-24 DIAGNOSIS — I10 HYPERTENSION, UNSPECIFIED TYPE: ICD-10-CM

## 2021-02-24 LAB
ALBUMIN SERPL-MCNC: 3.6 G/DL (ref 3.5–5)
ALBUMIN/GLOB SERPL: 0.7 {RATIO} (ref 1.1–2.2)
ALP SERPL-CCNC: 133 U/L (ref 45–117)
ALT SERPL-CCNC: 41 U/L (ref 12–78)
ANION GAP SERPL CALC-SCNC: 2 MMOL/L (ref 5–15)
ANION GAP SERPL CALC-SCNC: 9 MMOL/L (ref 5–15)
AST SERPL W P-5'-P-CCNC: 27 U/L (ref 15–37)
ATRIAL RATE: 59 BPM
BASOPHILS # BLD: 0 K/UL (ref 0–0.1)
BASOPHILS # BLD: 0 K/UL (ref 0–0.1)
BASOPHILS NFR BLD: 0 % (ref 0–1)
BASOPHILS NFR BLD: 0 % (ref 0–1)
BILIRUB SERPL-MCNC: 0.6 MG/DL (ref 0.2–1)
BNP SERPL-MCNC: 8353 PG/ML
BUN SERPL-MCNC: 37 MG/DL (ref 6–20)
BUN SERPL-MCNC: 41 MG/DL (ref 6–20)
BUN/CREAT SERPL: 16 (ref 12–20)
BUN/CREAT SERPL: 16 (ref 12–20)
CA-I BLD-MCNC: 8.7 MG/DL (ref 8.5–10.1)
CA-I BLD-MCNC: 8.9 MG/DL (ref 8.5–10.1)
CALCULATED R AXIS, ECG10: 113 DEGREES
CALCULATED T AXIS, ECG11: 61 DEGREES
CHLORIDE SERPL-SCNC: 107 MMOL/L (ref 97–108)
CHLORIDE SERPL-SCNC: 109 MMOL/L (ref 97–108)
CO2 SERPL-SCNC: 28 MMOL/L (ref 21–32)
CO2 SERPL-SCNC: 32 MMOL/L (ref 21–32)
CREAT SERPL-MCNC: 2.36 MG/DL (ref 0.7–1.3)
CREAT SERPL-MCNC: 2.63 MG/DL (ref 0.7–1.3)
DIAGNOSIS, 93000: NORMAL
DIFFERENTIAL METHOD BLD: ABNORMAL
DIFFERENTIAL METHOD BLD: ABNORMAL
EOSINOPHIL # BLD: 0 K/UL (ref 0–0.4)
EOSINOPHIL # BLD: 0.1 K/UL (ref 0–0.4)
EOSINOPHIL NFR BLD: 1 % (ref 0–7)
EOSINOPHIL NFR BLD: 2 % (ref 0–7)
ERYTHROCYTE [DISTWIDTH] IN BLOOD BY AUTOMATED COUNT: 17.9 % (ref 11.5–14.5)
ERYTHROCYTE [DISTWIDTH] IN BLOOD BY AUTOMATED COUNT: 18.4 % (ref 11.5–14.5)
GLOBULIN SER CALC-MCNC: 5 G/DL (ref 2–4)
GLUCOSE SERPL-MCNC: 94 MG/DL (ref 65–100)
GLUCOSE SERPL-MCNC: 96 MG/DL (ref 65–100)
HCT VFR BLD AUTO: 30.5 % (ref 36.6–50.3)
HCT VFR BLD AUTO: 34.5 % (ref 36.6–50.3)
HGB BLD-MCNC: 10.5 G/DL (ref 12.1–17)
HGB BLD-MCNC: 9.5 G/DL (ref 12.1–17)
IMM GRANULOCYTES # BLD AUTO: 0 K/UL (ref 0–0.04)
IMM GRANULOCYTES # BLD AUTO: 0 K/UL (ref 0–0.04)
IMM GRANULOCYTES NFR BLD AUTO: 0 % (ref 0–0.5)
IMM GRANULOCYTES NFR BLD AUTO: 0 % (ref 0–0.5)
LYMPHOCYTES # BLD: 0.3 K/UL (ref 0.8–3.5)
LYMPHOCYTES # BLD: 0.4 K/UL (ref 0.8–3.5)
LYMPHOCYTES NFR BLD: 7 % (ref 12–49)
LYMPHOCYTES NFR BLD: 8 % (ref 12–49)
MAGNESIUM SERPL-MCNC: 2.1 MG/DL (ref 1.6–2.4)
MAGNESIUM SERPL-MCNC: 2.2 MG/DL (ref 1.6–2.4)
MCH RBC QN AUTO: 29.3 PG (ref 26–34)
MCH RBC QN AUTO: 30.6 PG (ref 26–34)
MCHC RBC AUTO-ENTMCNC: 30.4 G/DL (ref 30–36.5)
MCHC RBC AUTO-ENTMCNC: 31.1 G/DL (ref 30–36.5)
MCV RBC AUTO: 96.4 FL (ref 80–99)
MCV RBC AUTO: 98.4 FL (ref 80–99)
MONOCYTES # BLD: 0.3 K/UL (ref 0–1)
MONOCYTES # BLD: 0.4 K/UL (ref 0–1)
MONOCYTES NFR BLD: 7 % (ref 5–13)
MONOCYTES NFR BLD: 9 % (ref 5–13)
NEUTS SEG # BLD: 3.6 K/UL (ref 1.8–8)
NEUTS SEG # BLD: 3.7 K/UL (ref 1.8–8)
NEUTS SEG NFR BLD: 81 % (ref 32–75)
NEUTS SEG NFR BLD: 85 % (ref 32–75)
NRBC # BLD: 0 K/UL (ref 0–0.01)
NRBC BLD-RTO: 0 PER 100 WBC
PLATELET # BLD AUTO: 107 K/UL (ref 150–400)
PLATELET # BLD AUTO: 138 K/UL (ref 150–400)
PMV BLD AUTO: 10.3 FL (ref 8.9–12.9)
PMV BLD AUTO: 11.2 FL (ref 8.9–12.9)
POTASSIUM SERPL-SCNC: 4.1 MMOL/L (ref 3.5–5.1)
POTASSIUM SERPL-SCNC: 4.4 MMOL/L (ref 3.5–5.1)
PROT SERPL-MCNC: 8.6 G/DL (ref 6.4–8.2)
Q-T INTERVAL, ECG07: 494 MS
QRS DURATION, ECG06: 92 MS
QTC CALCULATION (BEZET), ECG08: 489 MS
RBC # BLD AUTO: 3.1 M/UL (ref 4.1–5.7)
RBC # BLD AUTO: 3.58 M/UL (ref 4.1–5.7)
SODIUM SERPL-SCNC: 143 MMOL/L (ref 136–145)
SODIUM SERPL-SCNC: 144 MMOL/L (ref 136–145)
TROPONIN I SERPL-MCNC: <0.05 NG/ML
TSH SERPL DL<=0.05 MIU/L-ACNC: 1.04 UIU/ML (ref 0.36–3.74)
VENTRICULAR RATE, ECG03: 59 BPM
WBC # BLD AUTO: 4.4 K/UL (ref 4.1–11.1)
WBC # BLD AUTO: 4.5 K/UL (ref 4.1–11.1)

## 2021-02-24 PROCEDURE — 74011250636 HC RX REV CODE- 250/636: Performed by: EMERGENCY MEDICINE

## 2021-02-24 PROCEDURE — 83735 ASSAY OF MAGNESIUM: CPT

## 2021-02-24 PROCEDURE — 36415 COLL VENOUS BLD VENIPUNCTURE: CPT

## 2021-02-24 PROCEDURE — 83880 ASSAY OF NATRIURETIC PEPTIDE: CPT

## 2021-02-24 PROCEDURE — 80053 COMPREHEN METABOLIC PANEL: CPT

## 2021-02-24 PROCEDURE — 99283 EMERGENCY DEPT VISIT LOW MDM: CPT

## 2021-02-24 PROCEDURE — 93005 ELECTROCARDIOGRAM TRACING: CPT

## 2021-02-24 PROCEDURE — 84443 ASSAY THYROID STIM HORMONE: CPT

## 2021-02-24 PROCEDURE — 84484 ASSAY OF TROPONIN QUANT: CPT

## 2021-02-24 RX ADMIN — SODIUM CHLORIDE 1000 ML: 9 INJECTION, SOLUTION INTRAVENOUS at 21:19

## 2021-02-24 NOTE — ED TRIAGE NOTES
Pt from group home with c/o increasing weakness, fatigue. Was seen at [facility] last night for same.   Has appt with cardiologist tomorrow but was suggested to come in today for eval.

## 2021-02-24 NOTE — ED TRIAGE NOTES
Pt caregiver states the patient fell on Sunday and has been complaining he does not feel well. Pt has dementia and cannot describe Benny Rand is not feeling well\".   Pt left hand swollen and caregiver noted bruising on ribs after fall

## 2021-02-27 ENCOUNTER — APPOINTMENT (OUTPATIENT)
Dept: GENERAL RADIOLOGY | Age: 86
End: 2021-02-27
Attending: STUDENT IN AN ORGANIZED HEALTH CARE EDUCATION/TRAINING PROGRAM
Payer: MEDICARE

## 2021-02-27 ENCOUNTER — HOSPITAL ENCOUNTER (EMERGENCY)
Age: 86
Discharge: NURSING, REHAB OR LONG-TERM CARE | End: 2021-02-28
Payer: MEDICARE

## 2021-02-27 VITALS
HEART RATE: 70 BPM | RESPIRATION RATE: 23 BRPM | BODY MASS INDEX: 22.66 KG/M2 | WEIGHT: 153 LBS | DIASTOLIC BLOOD PRESSURE: 108 MMHG | SYSTOLIC BLOOD PRESSURE: 142 MMHG | HEIGHT: 69 IN | TEMPERATURE: 100.8 F | OXYGEN SATURATION: 100 %

## 2021-02-27 DIAGNOSIS — U07.1 COVID-19 VIRUS INFECTION: Primary | ICD-10-CM

## 2021-02-27 LAB
ALBUMIN SERPL-MCNC: 2.9 G/DL (ref 3.5–5)
ALBUMIN/GLOB SERPL: 0.6 {RATIO} (ref 1.1–2.2)
ALP SERPL-CCNC: 98 U/L (ref 45–117)
ALT SERPL-CCNC: 26 U/L (ref 12–78)
ANION GAP SERPL CALC-SCNC: 1 MMOL/L (ref 5–15)
AST SERPL W P-5'-P-CCNC: 28 U/L (ref 15–37)
BASOPHILS # BLD: 0 K/UL (ref 0–0.1)
BASOPHILS NFR BLD: 0 % (ref 0–1)
BILIRUB SERPL-MCNC: 0.7 MG/DL (ref 0.2–1)
BNP SERPL-MCNC: 7187 PG/ML
BUN SERPL-MCNC: 30 MG/DL (ref 6–20)
BUN/CREAT SERPL: 14 (ref 12–20)
CA-I BLD-MCNC: 8.6 MG/DL (ref 8.5–10.1)
CHLORIDE SERPL-SCNC: 105 MMOL/L (ref 97–108)
CO2 SERPL-SCNC: 36 MMOL/L (ref 21–32)
COVID-19 RAPID TEST, COVR: DETECTED
CREAT SERPL-MCNC: 2.11 MG/DL (ref 0.7–1.3)
DIFFERENTIAL METHOD BLD: ABNORMAL
EOSINOPHIL # BLD: 0 K/UL (ref 0–0.4)
EOSINOPHIL NFR BLD: 0 % (ref 0–7)
ERYTHROCYTE [DISTWIDTH] IN BLOOD BY AUTOMATED COUNT: 18.3 % (ref 11.5–14.5)
GLOBULIN SER CALC-MCNC: 5 G/DL (ref 2–4)
GLUCOSE SERPL-MCNC: 113 MG/DL (ref 65–100)
HCT VFR BLD AUTO: 30.9 % (ref 36.6–50.3)
HGB BLD-MCNC: 9.5 G/DL (ref 12.1–17)
IMM GRANULOCYTES # BLD AUTO: 0 K/UL (ref 0–0.04)
IMM GRANULOCYTES NFR BLD AUTO: 0 % (ref 0–0.5)
LACTATE SERPL-SCNC: 1.4 MMOL/L (ref 0.4–2)
LYMPHOCYTES # BLD: 0.4 K/UL (ref 0.8–3.5)
LYMPHOCYTES NFR BLD: 12 % (ref 12–49)
MCH RBC QN AUTO: 29.1 PG (ref 26–34)
MCHC RBC AUTO-ENTMCNC: 30.7 G/DL (ref 30–36.5)
MCV RBC AUTO: 94.8 FL (ref 80–99)
MONOCYTES # BLD: 0.2 K/UL (ref 0–1)
MONOCYTES NFR BLD: 4 % (ref 5–13)
NEUTS SEG # BLD: 3.1 K/UL (ref 1.8–8)
NEUTS SEG NFR BLD: 84 % (ref 32–75)
NRBC # BLD: 0 K/UL (ref 0–0.01)
NRBC BLD-RTO: 0 PER 100 WBC
PLATELET # BLD AUTO: 157 K/UL (ref 150–400)
PMV BLD AUTO: 10.5 FL (ref 8.9–12.9)
POTASSIUM SERPL-SCNC: 3.6 MMOL/L (ref 3.5–5.1)
PROT SERPL-MCNC: 7.9 G/DL (ref 6.4–8.2)
RBC # BLD AUTO: 3.26 M/UL (ref 4.1–5.7)
SARS-COV-2, COV2: NORMAL
SODIUM SERPL-SCNC: 142 MMOL/L (ref 136–145)
SPECIMEN SOURCE: ABNORMAL
TROPONIN I SERPL-MCNC: 0.05 NG/ML
WBC # BLD AUTO: 3.7 K/UL (ref 4.1–11.1)

## 2021-02-27 PROCEDURE — 84484 ASSAY OF TROPONIN QUANT: CPT

## 2021-02-27 PROCEDURE — 71045 X-RAY EXAM CHEST 1 VIEW: CPT

## 2021-02-27 PROCEDURE — 93005 ELECTROCARDIOGRAM TRACING: CPT

## 2021-02-27 PROCEDURE — 87635 SARS-COV-2 COVID-19 AMP PRB: CPT

## 2021-02-27 PROCEDURE — 83880 ASSAY OF NATRIURETIC PEPTIDE: CPT

## 2021-02-27 PROCEDURE — 80053 COMPREHEN METABOLIC PANEL: CPT

## 2021-02-27 PROCEDURE — 74011250636 HC RX REV CODE- 250/636: Performed by: NURSE PRACTITIONER

## 2021-02-27 PROCEDURE — 99284 EMERGENCY DEPT VISIT MOD MDM: CPT

## 2021-02-27 PROCEDURE — 96374 THER/PROPH/DIAG INJ IV PUSH: CPT

## 2021-02-27 PROCEDURE — 87040 BLOOD CULTURE FOR BACTERIA: CPT

## 2021-02-27 PROCEDURE — 74011250637 HC RX REV CODE- 250/637: Performed by: NURSE PRACTITIONER

## 2021-02-27 PROCEDURE — 36415 COLL VENOUS BLD VENIPUNCTURE: CPT

## 2021-02-27 PROCEDURE — 83605 ASSAY OF LACTIC ACID: CPT

## 2021-02-27 PROCEDURE — 85025 COMPLETE CBC W/AUTO DIFF WBC: CPT

## 2021-02-27 RX ORDER — DEXAMETHASONE SODIUM PHOSPHATE 10 MG/ML
6 INJECTION INTRAMUSCULAR; INTRAVENOUS ONCE
Status: COMPLETED | OUTPATIENT
Start: 2021-02-27 | End: 2021-02-27

## 2021-02-27 RX ORDER — ACETAMINOPHEN 500 MG
500 TABLET ORAL
Status: COMPLETED | OUTPATIENT
Start: 2021-02-27 | End: 2021-02-27

## 2021-02-27 RX ORDER — FUROSEMIDE 40 MG/1
40 TABLET ORAL DAILY
COMMUNITY
End: 2021-12-21 | Stop reason: SDUPTHER

## 2021-02-27 RX ADMIN — DEXAMETHASONE SODIUM PHOSPHATE 6 MG: 10 INJECTION INTRAMUSCULAR; INTRAVENOUS at 19:31

## 2021-02-27 RX ADMIN — ACETAMINOPHEN 500 MG: 500 TABLET ORAL at 19:31

## 2021-02-27 NOTE — ED PROVIDER NOTES
EMERGENCY DEPARTMENT HISTORY AND PHYSICAL EXAM      Date: 2/27/2021  Patient Name: Mari Mulligan. History of Presenting Illness     Chief Complaint   Patient presents with    Shortness of Breath       History Provided By: Patient and Nursing Home/SNF/Rehab Center    HPI: Mari Mulligan., 81 y.o. male with a past medical history as noted below presents the emergency department for evaluation of weakness and shortness of breath. History is limited as the patient is a poor historian and has dementia. Per supervisor at his group home patient has continued with generalized weakness and shortness of breath despite taking Lasix 40 mg daily which was started 3 days ago. He was seen at the ER twice this past week and by cardiology, Dr Johanna Pendleton for the same. She denies any knowledge of recent fevers. She states the household all tested negative for Covid approximately 10 days ago. When asked, patient has no complaints.  He specifically denies any recent fever, chills, headache, nausea, vomiting, abdominal pain, CP, SOB, wheezing, dizziness or weakness. He states \" I feel fine, I do not know why I am here. \"      PCP: Lisa Livingston MD    Current Outpatient Medications   Medication Sig Dispense Refill    furosemide (Lasix) 40 mg tablet Take 40 mg by mouth daily.  fesoterodine (Toviaz) 4 mg SR tablet Take 1 Tab by mouth daily for 30 days. 30 Tab 0    ferrous sulfate 325 mg (65 mg iron) tablet TAKE ONE TABLET BY MOUTH EACH DAY WITH BREAKFAST. 30 Tab 2    simvastatin (ZOCOR) 40 mg tablet TAKE ONE TABLET BY MOUTH AT BEDTIME. 90 Tab 1    amLODIPine (NORVASC) 5 mg tablet Take 1 Tab by mouth daily for 60 days. 30 Tab 1    escitalopram oxalate (LEXAPRO) 20 mg tablet Take 1 Tab by mouth daily for 90 days. 30 Tab 2    traZODone (DESYREL) 50 mg tablet TAKE ONE TABLET BY MOUTH AT BEDTIME. 90 Tab 1    melatonin 10 mg TbER TAKE ONE TABLET BY MOUTH AT BEDTIME.  90 Tab 1    magnesium oxide (MAG-OX) 400 mg tablet TAKE ONE TABLET BY MOUTH DAILY 90 Tab 1    tamsulosin (FLOMAX) 0.4 mg capsule Take 1 Cap by mouth daily. 90 Cap 3    potassium chloride (K-DUR, KLOR-CON) 20 mEq tablet TAKE 1 TABLET BY MOUTH EACH MORNING WITH FOOD. 90 Tab 1    ferrous sulfate (IRON) 325 mg (65 mg iron) EC tablet TAKE ONE TABLET BY MOUTH EACH DAY WITH BREAKFAST. 90 Tab 1    timolol (TIMOPTIC) 0.5 % ophthalmic solution INSTILL 1 DROPS INTO RIGHT EYE ONCE DAILY 5 mL 5    diclofenac (VOLTAREN) 1 % gel APPLY TWO GRAMS FOUR TIMES DAILY AS NEEDED FOR ELBOW PAIN 200 g 3    thiamine HCL (B-1) 100 mg tablet Take 1 Tab by mouth daily for 180 days. 90 Tab 1    folic acid (FOLVITE) 1 mg tablet Take 1 Tab by mouth daily for 180 days. 90 Tab 1    allopurinoL (ZYLOPRIM) 100 mg tablet TAKE 1 TABLET BY MOUTH EVERY DAY 90 Tab 2    terbinafine HCL (LAMISIL) 250 mg tablet Take 1 Tab by mouth daily. 90 Tab 0    ammonium lactate (AMLACTIN) 12 % topical cream Apply  to affected area two (2) times a day.  rub in to affected area well 280 g 5       Past History     Past Medical History:  Past Medical History:   Diagnosis Date    Adverse effect of anesthesia     nasal procedure didn't work-patient awake    Alcohol abuse     Anemia     Aortic regurgitation     Arthritis     Chronic kidney disease     CKD (chronic kidney disease), stage III 2011    Dementia (HCC)     Depression     Episodic lightheadedness 07/28/2017    Episodic lightheadedness     Epistaxis 11/27/2016    Erectile dysfunction     Fall 02/11/2020    GERD (gastroesophageal reflux disease)     GI bleed     Gout     gout, osteoarthritis    Heart failure (HCC)     acute heart failure    Hemorrhoids     History of cardioversion 02/17/2017    Hypertension     Ill-defined condition     weakness    Multiple myeloma (HCC)     PAF (paroxysmal atrial fibrillation) ([facility] [address] 02/17/2016    eliquis    Pulmonary HTN ([facility] [address]     Right-sided epistaxis 11/27/2016    S/P AVR (aortic valve replacement) 07/26/2016    merrill gill md -     S/P cardiac cath 7/6/16    severe ai, normal coronaries     S/P colonoscopy 10-8-04    S/P colonoscopy with polypectomy 05/09/2016    Kar Arroyo MD    Skin lesion     Stroke [facility])        Past Surgical History:  Past Surgical History:   Procedure Laterality Date    HX HEENT      nasal procedure 20 years ago   Ainsley Corea HX KNEE REPLACEMENT Bilateral     bilateral knee replacement    HX ORTHOPAEDIC Bilateral     wrist    HX UROLOGICAL  01/05/2020    cystoscopy     AK CARDIAC SURG PROCEDURE UNLIST      cardiac cath    AK CARDIAC SURG PROCEDURE UNLIST  07/26/2016    AVR       Family History:  Family History   Problem Relation Age of Onset    Anemia Mother     No Known Problems Father     Other Brother         drug abuse       Social History:  Social History     Tobacco Use    Smoking status: Former Smoker     Years: 20.00    Smokeless tobacco: Never Used   Substance Use Topics    Alcohol use: Not Currently     Frequency: Never     Binge frequency: Never    Drug use: Never       Allergies: Allergies   Allergen Reactions    Amiodarone Other (comments)     Bradycardia -excessive         Review of Systems     Review of Systems   Unable to perform ROS: Dementia       Physical Exam     Physical Exam  Vitals signs and nursing note reviewed. Constitutional:       General: He is not in acute distress. Appearance: Normal appearance. Eyes:      Extraocular Movements: Extraocular movements intact. Conjunctiva/sclera: Conjunctivae normal.   Cardiovascular:      Rate and Rhythm: Normal rate. Rhythm irregular. Heart sounds: Normal heart sounds. Comments: Bilateral trace lower extremity edema  Pulmonary:      Effort: Pulmonary effort is normal. No tachypnea or accessory muscle usage. Breath sounds: Examination of the right-lower field reveals rales. Examination of the left-lower field reveals rales. Decreased breath sounds and rales present.  No wheezing. Musculoskeletal: Normal range of motion. Skin:     General: Skin is warm and dry. Neurological:      General: No focal deficit present. Mental Status: He is alert. Mental status is at baseline. Psychiatric:         Mood and Affect: Mood normal.         Behavior: Behavior normal. Behavior is cooperative. Lab and Diagnostic Study Results     Labs -  Admission on 02/27/2021, Discharged on 02/28/2021   Component Date Value    WBC 02/27/2021 3.7*    RBC 02/27/2021 3.26*    HGB 02/27/2021 9.5*    HCT 02/27/2021 30.9*    MCV 02/27/2021 94.8     MCH 02/27/2021 29.1     MCHC 02/27/2021 30.7     RDW 02/27/2021 18.3*    PLATELET 85/51/8846 396     MPV 02/27/2021 10.5     NRBC 02/27/2021 0.0     ABSOLUTE NRBC 02/27/2021 0.00     NEUTROPHILS 02/27/2021 84*    LYMPHOCYTES 02/27/2021 12     MONOCYTES 02/27/2021 4*    EOSINOPHILS 02/27/2021 0     BASOPHILS 02/27/2021 0     IMMATURE GRANULOCYTES 02/27/2021 0     ABS. NEUTROPHILS 02/27/2021 3.1     ABS. LYMPHOCYTES 02/27/2021 0.4*    ABS. MONOCYTES 02/27/2021 0.2     ABS. EOSINOPHILS 02/27/2021 0.0     ABS. BASOPHILS 02/27/2021 0.0     ABS. IMM. GRANS. 02/27/2021 0.0     DF 02/27/2021 AUTOMATED     Sodium 02/27/2021 142     Potassium 02/27/2021 3.6     Chloride 02/27/2021 105     CO2 02/27/2021 36*    Anion gap 02/27/2021 1*    Glucose 02/27/2021 113*    BUN 02/27/2021 30*    Creatinine 02/27/2021 2.11*    BUN/Creatinine ratio 02/27/2021 14     GFR est AA 02/27/2021 36*    GFR est non-AA 02/27/2021 30*    Calcium 02/27/2021 8.6     Bilirubin, total 02/27/2021 0.7     AST (SGOT) 02/27/2021 28     ALT (SGPT) 02/27/2021 26     Alk.  phosphatase 02/27/2021 98     Protein, total 02/27/2021 7.9     Albumin 02/27/2021 2.9*    Globulin 02/27/2021 5.0*    A-G Ratio 02/27/2021 0.6*    Lactic acid 02/27/2021 1.4     Special Requests: 02/27/2021 No Special Requests     Culture result: 02/27/2021 No growth 3 days     Troponin-I, Qt. 02/27/2021 0.05*    NT pro-BNP 02/27/2021 7,187*    SARS-CoV-2 02/27/2021 Please find results under separate order     Specimen source 02/27/2021 Nasopharyngeal     COVID-19 rapid test 02/27/2021 DETECTED*    Ventricular Rate 02/27/2021 67     Atrial Rate 02/27/2021 56     QRS Duration 02/27/2021 98     Q-T Interval 02/27/2021 400     QTC Calculation (Bezet) 02/27/2021 422     Calculated R Axis 02/27/2021 106     Calculated T Axis 02/27/2021 56     Diagnosis 02/27/2021                      Value:Atrial fibrillation with a competing junctional pacemaker  Rightward axis  Anterior infarct , age undetermined  Abnormal ECG  When compared with ECG of 24-FEB-2021 00:19,  Atrial fibrillation has replaced Sinus rhythm  Non-specific change in ST segment in Inferior leads  Nonspecific T wave abnormality, worse in Inferior leads  Nonspecific T wave abnormality, worse in Lateral leads  QT has shortened  Confirmed by Erika Baig ([id number]) on 3/2/2021 7:53:10 AM         Radiologic Studies -   [unfilled]  CT Results  (Last 48 hours)    None        XR Results (most recent):  Results from Hospital Encounter encounter on 02/27/21   XR CHEST SNGL V    Narrative Exam: Frontal chest    Comparison: Prior exams dating from 7/3/2020 to 12/15/2020    Number of views: 1    Findings: Remote median sternotomy. There is calcific atherosclerotic disease of  the thoracic aorta. The configuration of the cardiopericardial silhouette  suggests enlarged cardiac chambers. There is pulmonary venous cephalization,  compatible with chronic pulmonary venous hypertension. There is, however, no  demonstrated pulmonary edema at this time. Minimal hazy opacity lateral mid  right lung may represent atelectasis or mere confluent soft tissue and bone  shadows though cannot exclude anterior lateral right upper lobe pneumonia. There  is no otherwise significant pulmonary parenchymal lesion by plain film  technique.  There is no evidence of pleural disease. Impression 1. Possible right upper lobe pneumonia. 2. Evidence of chronic left-sided heart disease, without active edema  demonstrated at this time. 3. Atherosclerosis. Medical Decision Making and ED Course   - I am the first and primary provider for this patient AND AM THE PRIMARY PROVIDER OF RECORD. - I reviewed the vital signs, available nursing notes, past medical history, past surgical history, family history and social history. - Initial assessment performed. The patients presenting problems have been discussed, and the staff are in agreement with the care plan formulated and outlined with them. I have encouraged them to ask questions as they arise throughout their visit. Vital Signs-Reviewed the patient's vital signs. See chart    EKG interpretation: (Preliminary): Performed at [id number], and read at 1759  Rhythm: atrial fib; and irregular. Rate (approx.): 67; Other findings: abnormal ekg. Records Reviewed: Nursing Notes, Old Medical Records, Previous Radiology Studies and Previous Laboratory Studies    The patient presents with SOB with a differential diagnosis of CHF, PNA, PE, COVID, viral illness, arrhythmia    ED Course:   VS: low grade fever 100.8 upon arrival, treated with tylenol, RA sats %  Labs: No leukocytosis, lactic 1.4, H/H stable, BUN/creatinine 2.11/30  improved from 2.36/37 on 2/24/21, BNP 7187 improved from 8353 on 2/24/2021, all other labs unremarkable. Radiology:CXR unchanged  EKG: afib, rate 67  Dispo: Patient sent from group home with ongoing SOB. He was evaluated for CHF this past week and started on Lasix. Labs and x-ray show improvement in heart failure. No tachypnea or hypoxia, patient maintained room air sats 98 to 100% throughout stay. Low-grade fever noted upon arrival, positive Covid today. Patient treated with Decadron 6 mg IV and Tylenol. Lactic acid normal, no leukocytosis.   Discussed results with home supervisor, advised of positive Covid test and need for isolation. She advises she will be able to isolate patient in his own room. Discussed symptomatic treatment. Discussed worrisome reasons to return patient to the department including worsening symptoms, dyspnea or hypoxia. ED Course as of Mar 04 2046   Sat Feb 27, 2021   2012 Spoke with owner/supervisor, Alicia Gonzalez of [facility], \"[facility]\" at [phone] she advises [facility] is a private residence with 4 other residents    [LP]      ED Course User Index  [LP] Unk Lynnette, NP         Provider Notes (Medical Decision Making):     MDM  Number of Diagnoses or Management Options  COVID-19 virus infection: new, no workup     Amount and/or Complexity of Data Reviewed  Clinical lab tests: ordered and reviewed  Tests in the radiology section of CPT®: ordered and reviewed  Review and summarize past medical records: yes  Independent visualization of images, tracings, or specimens: yes    Risk of Complications, Morbidity, and/or Mortality  Presenting problems: moderate  Diagnostic procedures: low  Management options: low                   Disposition     Disposition: Condition stable  DC-The caretaker/home supervisor was given verbal fever treatment  and follow-up instructions  DC- Pain Control DC Home plan: Nonsteroidals, Tylenol and Referral Family Medicine/PCP    Discharged    Remove if not discharged  DISCHARGE PLAN:  1. Current Discharge Medication List      CONTINUE these medications which have NOT CHANGED    Details   furosemide (Lasix) 40 mg tablet Take 40 mg by mouth daily. fesoterodine (Toviaz) 4 mg SR tablet Take 1 Tab by mouth daily for 30 days. Qty: 30 Tab, Refills: 0    Associated Diagnoses: Nocturnal enuresis      ferrous sulfate 325 mg (65 mg iron) tablet TAKE ONE TABLET BY MOUTH EACH DAY WITH BREAKFAST. Qty: 30 Tab, Refills: 2      simvastatin (ZOCOR) 40 mg tablet TAKE ONE TABLET BY MOUTH AT BEDTIME.   Qty: 90 Tab, Refills: 1 amLODIPine (NORVASC) 5 mg tablet Take 1 Tab by mouth daily for 60 days. Qty: 30 Tab, Refills: 1    Associated Diagnoses: Essential hypertension      escitalopram oxalate (LEXAPRO) 20 mg tablet Take 1 Tab by mouth daily for 90 days. 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 Qty: 90 Tab, Refills: 1    Associated Diagnoses: Insomnia, unspecified type      melatonin 10 mg TbER TAKE ONE TABLET BY MOUTH AT BEDTIME. Qty: 90 Tab, Refills: 1      magnesium oxide (MAG-OX) 400 mg tablet TAKE ONE TABLET BY MOUTH DAILY  Qty: 90 Tab, Refills: 1    Associated Diagnoses: Hypomagnesemia      tamsulosin (FLOMAX) 0.4 mg capsule Take 1 Cap by mouth daily. Qty: 90 Cap, Refills: 3      potassium chloride (K-DUR, KLOR-CON) 20 mEq tablet TAKE 1 TABLET BY MOUTH EACH MORNING WITH FOOD. Qty: 90 Tab, Refills: 1      ferrous sulfate (IRON) 325 mg (65 mg iron) EC tablet TAKE ONE TABLET BY MOUTH EACH DAY WITH BREAKFAST. Qty: 90 Tab, Refills: 1      timolol (TIMOPTIC) 0.5 % ophthalmic solution INSTILL 1 DROPS INTO RIGHT EYE ONCE DAILY  Qty: 5 mL, Refills: 5      diclofenac (VOLTAREN) 1 % gel APPLY TWO GRAMS FOUR TIMES DAILY AS NEEDED FOR ELBOW PAIN  Qty: 200 g, Refills: 3      thiamine HCL (B-1) 100 mg tablet Take 1 Tab by mouth daily for 180 days. Qty: 90 Tab, Refills: 1      folic acid (FOLVITE) 1 mg tablet Take 1 Tab by mouth daily for 180 days. Qty: 90 Tab, Refills: 1      allopurinoL (ZYLOPRIM) 100 mg tablet TAKE 1 TABLET BY MOUTH EVERY DAY  Qty: 90 Tab, Refills: 2      terbinafine HCL (LAMISIL) 250 mg tablet Take 1 Tab by mouth daily. Qty: 90 Tab, Refills: 0    Associated Diagnoses: Onychomycosis      ammonium lactate (AMLACTIN) 12 % topical cream Apply  to affected area two (2) times a day.  rub in to affected area well  Qty: 280 g, Refills: 5    Associated Diagnoses: Xerosis claire           2. Follow-up Information     Follow up With Specialties Details Why Contact Info    Francis Miller MD Internal Medicine Schedule an appointment as soon as possible for a visit  [place] up [id number] [address] [phone]      [address] [place] [address] DEPT Emergency Medicine  If symptoms worsen [address]  [phone]        3. Return to ED if worse   4. Discharge Medication List as of 2/28/2021 12:35 AM          Diagnosis     Clinical Impression:   1. COVID-19 virus infection        Attestations:    Yue Parker NP    Please note that this dictation was completed with [employer], the computer voice recognition software. Quite often unanticipated grammatical, syntax, homophones, and other interpretive errors are inadvertently transcribed by the computer software. Please disregard these errors. Please excuse any errors that have escaped final proofreading. Thank you.

## 2021-02-27 NOTE — ED PROVIDER NOTES
EMERGENCY DEPARTMENT HISTORY AND PHYSICAL EXAM      Date: 2/24/2021  Patient Name: Nino Napier. History of Presenting Illness     Chief Complaint   Patient presents with    Fatigue       History Provided By: Patient    HPI: Nino Napier., 81 y.o. male with PMHx as noted below presents the emergency department for evaluation of weakness and fatigue. History is limited as the patient is a poor historian and has dementia. Per supervisor at his group home patient has seemed to be less active than usual and is noted to have less interest in eating or drinking than usual.  Has been going on for several days. When asked, patient has no complaints. Patient does have an appointment with his cardiologist tomorrow. Pt denies any other alleviating or exacerbating factors. Additionally, pt specifically denies any recent fever, chills, headache, nausea, vomiting, abdominal pain, CP, SOB, lightheadedness, dizziness, numbness, weakness, BLE swelling, heart palpitations, urinary sxs, diarrhea, constipation, melena, hematochezia, cough, or congestion. PCP: Ilan Landis MD    Current Outpatient Medications   Medication Sig Dispense Refill    fesoterodine (Toviaz) 4 mg SR tablet Take 1 Tab by mouth daily for 30 days. 30 Tab 0    ferrous sulfate 325 mg (65 mg iron) tablet TAKE ONE TABLET BY MOUTH EACH DAY WITH BREAKFAST. 30 Tab 2    simvastatin (ZOCOR) 40 mg tablet TAKE ONE TABLET BY MOUTH AT BEDTIME. 90 Tab 1    amLODIPine (NORVASC) 5 mg tablet Take 1 Tab by mouth daily for 60 days. 30 Tab 1    escitalopram oxalate (LEXAPRO) 20 mg tablet Take 1 Tab by mouth daily for 90 days. 30 Tab 2    traZODone (DESYREL) 50 mg tablet TAKE ONE TABLET BY MOUTH AT BEDTIME. 90 Tab 1    melatonin 10 mg TbER TAKE ONE TABLET BY MOUTH AT BEDTIME. 90 Tab 1    magnesium oxide (MAG-OX) 400 mg tablet TAKE ONE TABLET BY MOUTH DAILY 90 Tab 1    tamsulosin (FLOMAX) 0.4 mg capsule Take 1 Cap by mouth daily.  90 Cap 3    potassium chloride (K-DUR, KLOR-CON) 20 mEq tablet TAKE 1 TABLET BY MOUTH EACH MORNING WITH FOOD. 90 Tab 1    ferrous sulfate (IRON) 325 mg (65 mg iron) EC tablet TAKE ONE TABLET BY MOUTH EACH DAY WITH BREAKFAST. 90 Tab 1    timolol (TIMOPTIC) 0.5 % ophthalmic solution INSTILL 1 DROPS INTO RIGHT EYE ONCE DAILY 5 mL 5    diclofenac (VOLTAREN) 1 % gel APPLY TWO GRAMS FOUR TIMES DAILY AS NEEDED FOR ELBOW PAIN 200 g 3    thiamine HCL (B-1) 100 mg tablet Take 1 Tab by mouth daily for 180 days. 90 Tab 1    folic acid (FOLVITE) 1 mg tablet Take 1 Tab by mouth daily for 180 days. 90 Tab 1    allopurinoL (ZYLOPRIM) 100 mg tablet TAKE 1 TABLET BY MOUTH EVERY DAY 90 Tab 2    terbinafine HCL (LAMISIL) 250 mg tablet Take 1 Tab by mouth daily. 90 Tab 0    ammonium lactate (AMLACTIN) 12 % topical cream Apply  to affected area two (2) times a day. rub in to affected area well 280 g 5       Past History     Past Medical History:  Past Medical History:   Diagnosis Date    Adverse effect of anesthesia     nasal procedure didn't work-patient awake    Alcohol abuse     Anemia     Aortic regurgitation     Arthritis     Chronic kidney disease     CKD (chronic kidney disease), stage III 2011    Dementia (HCC)     Depression     Episodic lightheadedness 07/28/2017    Episodic lightheadedness     Epistaxis 11/27/2016    Erectile dysfunction     Fall 02/11/2020    GERD (gastroesophageal reflux disease)     GI bleed     Gout     gout, osteoarthritis    Heart failure (HCC)     acute heart failure    Hemorrhoids     History of cardioversion 02/17/2017    Hypertension     Ill-defined condition     weakness    Multiple myeloma (HCC)     PAF (paroxysmal atrial fibrillation) ([facility] [address] 02/17/2016    eliquis    Pulmonary HTN ([facility] [address]     Right-sided epistaxis 11/27/2016    S/P AVR (aortic valve replacement) 07/26/2016    merrill gill md -     S/P cardiac cath 7/6/16    severe ai, normal coronaries     S/P colonoscopy 10-8-04  S/P colonoscopy with polypectomy 05/09/2016    Kar Luu MD    Skin lesion     Stroke [facility])        Past Surgical History:  Past Surgical History:   Procedure Laterality Date    HX HEENT      nasal procedure 20 years ago   Jamesetta Medal HX KNEE REPLACEMENT Bilateral     bilateral knee replacement    HX ORTHOPAEDIC Bilateral     wrist    HX UROLOGICAL  01/05/2020    cystoscopy     [place] CARDIAC SURG PROCEDURE UNLIST      cardiac cath    [place] CARDIAC SURG PROCEDURE UNLIST  07/26/2016    AVR       Family History:  Family History   Problem Relation Age of Onset    Anemia Mother     No Known Problems Father     Other Brother         drug abuse       Social History:  Social History     Tobacco Use    Smoking status: Former Smoker     Years: 20.00    Smokeless tobacco: Never Used   Substance Use Topics    Alcohol use: Not Currently     Frequency: Never     Binge frequency: Never    Drug use: Never       Allergies: Allergies   Allergen Reactions    Amiodarone Other (comments)     Bradycardia -excessive         Review of Systems   Review of Systems   Unable to perform ROS: Dementia         Physical Exam   Physical Exam    GENERAL: alert, no acute distress  EYES: PEERL, No injection, discharge or icterus. ENT: Mucous membranes pink and moist.  NECK: Supple  LUNGS: Airway patent. Non-labored respirations. Breath sounds clear with good air entry bilaterally. HEART: Regular rate and rhythm. No peripheral edema  ABDOMEN: Non-distended and non-tender, without guarding or rebound.   SKIN:  warm, dry  MSK/EXTREMITIES: Without swelling, tenderness or deformity, symmetric with normal ROM  NEUROLOGICAL: Alert, moves all 4 extremities, follows commands      Diagnostic Study Results     Labs -  Reviewed    Radiologic Studies -   No orders to display     CT Results  (Last 48 hours)    None        CXR Results  (Last 48 hours)    None            Medical Decision Making     IMolina MD am the first provider for this patient and am the attending of record for this patient encounter. I reviewed the vital signs, available nursing notes, past medical history, past surgical history, family history and social history. Vital Signs-Reviewed the patient's vital signs. No data found. Records Reviewed: Nursing Notes and Old Medical Records    Provider Notes (Medical Decision Making): On presentation, the patient is well appearing, in no acute distress, hypertensive on arrival.  Based on my history and exam the differential diagnosis for this patient includes worsening dementia, infectious process, dehydration, electrolyte abnormality, metabolic abnormality, CNS process. Reviewed work-up from the emergency department yesterday, notable for elevated serum creatinine but otherwise reassuring. Repeat labs here today noted that his creatinine is actually improved. He was able to tolerate p.o. in the department. Remainder of his exam was reassuring. I had a an extensive conversation with the patient and supervisor at his group home about the diagnostic uncertainty surrounding the patient's presenting symptoms. I explained to them in depth that while the workup here is reassuring we have not established a definitive diagnosis in the emergency department . While the history, exam and testing today would make life-threatening pathology less likely the patient is aware that this risk still exists and will need close follow-up for continued testing. They are also aware that they may  return to the emergency department at any time with a new, worsening or worrisome symptoms. Patient is been provided with specific follow-up instructions and  they have expressed full understanding of this are in agreement with this plan so will discharge at this time. ED Course:   Initial assessment performed.  The patients presenting problems have been discussed, and they are in agreement with the care plan formulated and outlined with them.  I have encouraged them to ask questions as they arise throughout their visit. PROGRESS  Sharri Avina Jr.'s  results have been reviewed with him. He has been counseled regarding his diagnosis. He verbally conveys understanding and agreement of the signs, symptoms, diagnosis, treatment and prognosis and additionally agrees to follow up as recommended with Dr. Sharri Storm MD in 24 - 48 hours. He also agrees with the care-plan and conveys that all of his questions have been answered. I have also put together some discharge instructions for him that include: 1) educational information regarding their diagnosis, 2) how to care for their diagnosis at home, as well a 3) list of reasons why they would want to return to the ED prior to their follow-up appointment, should their condition change. Disposition:  home    PLAN:  1. Discharge Medication List as of 2/24/2021 10:24 PM        2. Follow-up Information     Follow up With Specialties Details Why Contact Info    Sharri Storm MD Internal Medicine Schedule an appointment as soon as possible for a visit in 2 days  [facility]  [id number] [place] [address] [address] DEPT Emergency Medicine  If symptoms worsen [address] [facility] [address] [phone]        Return to ED if worse     Diagnosis     Clinical Impression:   1. Fatigue, unspecified type    2. Kidney disease    3. Hypertension, unspecified type        Please note that this dictation was completed with Dragon, computer voice recognition software. Quite often unanticipated grammatical, syntax, homophones, and other interpretive errors are inadvertently transcribed by the computer software. Please disregard these errors. Additionally, please excuse any errors that have escaped final proofreading.

## 2021-02-27 NOTE — ED TRIAGE NOTES
Seen here within the past few days and was told he had fluid around heart, placed on lasix 40mg, pt sent back to group home. Pt symptoms have not improved, told to return to ED. EMS called for SOB. Pt spo2 98%.

## 2021-02-28 NOTE — DISCHARGE INSTRUCTIONS
Thank you! Thank you for allowing me to care for you in the emergency department. I sincerely hope that you are satisfied with your visit today. It is my goal to provide you with excellent care. Below you will find a list of your labs and imaging from your visit today. Should you have any questions regarding these results please do not hesitate to call the emergency department. Labs -     Recent Results (from the past 12 hour(s))   CBC WITH AUTOMATED DIFF    Collection Time: 02/27/21  6:00 PM   Result Value Ref Range    WBC 3.7 (L) 4.1 - 11.1 K/uL    RBC 3.26 (L) 4.10 - 5.70 M/uL    HGB 9.5 (L) 12.1 - 17.0 g/dL    HCT 30.9 (L) 36.6 - 50.3 %    MCV 94.8 80.0 - 99.0 FL    MCH 29.1 26.0 - 34.0 PG    MCHC 30.7 30.0 - 36.5 g/dL    RDW 18.3 (H) 11.5 - 14.5 %    PLATELET 449 740 - 756 K/uL    MPV 10.5 8.9 - 12.9 FL    NRBC 0.0 0  WBC    ABSOLUTE NRBC 0.00 0.00 - 0.01 K/uL    NEUTROPHILS 84 (H) 32 - 75 %    LYMPHOCYTES 12 12 - 49 %    MONOCYTES 4 (L) 5 - 13 %    EOSINOPHILS 0 0 - 7 %    BASOPHILS 0 0 - 1 %    IMMATURE GRANULOCYTES 0 0.0 - 0.5 %    ABS. NEUTROPHILS 3.1 1.8 - 8.0 K/UL    ABS. LYMPHOCYTES 0.4 (L) 0.8 - 3.5 K/UL    ABS. MONOCYTES 0.2 0.0 - 1.0 K/UL    ABS. EOSINOPHILS 0.0 0.0 - 0.4 K/UL    ABS. BASOPHILS 0.0 0.0 - 0.1 K/UL    ABS. IMM. GRANS. 0.0 0.00 - 0.04 K/UL    DF AUTOMATED     METABOLIC PANEL, COMPREHENSIVE    Collection Time: 02/27/21  6:00 PM   Result Value Ref Range    Sodium 142 136 - 145 mmol/L    Potassium 3.6 3.5 - 5.1 mmol/L    Chloride 105 97 - 108 mmol/L    CO2 36 (H) 21 - 32 mmol/L    Anion gap 1 (L) 5 - 15 mmol/L    Glucose 113 (H) 65 - 100 mg/dL    BUN 30 (H) 6 - 20 mg/dL    Creatinine 2.11 (H) 0.70 - 1.30 mg/dL    BUN/Creatinine ratio 14 12 - 20      GFR est AA 36 (L) >60 ml/min/1.73m2    GFR est non-AA 30 (L) >60 ml/min/1.73m2    Calcium 8.6 8.5 - 10.1 mg/dL    Bilirubin, total 0.7 0.2 - 1.0 mg/dL    AST (SGOT) 28 15 - 37 U/L    ALT (SGPT) 26 12 - 78 U/L    Alk. phosphatase 98 45 - 117 U/L    Protein, total 7.9 6.4 - 8.2 g/dL    Albumin 2.9 (L) 3.5 - 5.0 g/dL    Globulin 5.0 (H) 2.0 - 4.0 g/dL    A-G Ratio 0.6 (L) 1.1 - 2.2     LACTIC ACID    Collection Time: 02/27/21  6:00 PM   Result Value Ref Range    Lactic acid 1.4 0.4 - 2.0 mmol/L   TROPONIN I    Collection Time: 02/27/21  6:00 PM   Result Value Ref Range    Troponin-I, Qt. 0.05 (H) <0.05 ng/mL   BNP    Collection Time: 02/27/21  6:00 PM   Result Value Ref Range    NT pro-BNP 7,187 (H) <450 pg/mL   SARS-COV-2    Collection Time: 02/27/21  7:30 PM   Result Value Ref Range    SARS-CoV-2 Please find results under separate order     COVID-19 RAPID TEST    Collection Time: 02/27/21  7:30 PM   Result Value Ref Range    Specimen source Nasopharyngeal      COVID-19 rapid test DETECTED (A) Not Detected         Radiologic Studies -   XR CHEST SNGL V   Final Result   1. Possible right upper lobe pneumonia. 2. Evidence of chronic left-sided heart disease, without active edema   demonstrated at this time. 3. Atherosclerosis. CT Results  (Last 48 hours)      None          CXR Results  (Last 48 hours)                 02/27/21 1830  XR CHEST SNGL V Final result    Impression:  1. Possible right upper lobe pneumonia. 2. Evidence of chronic left-sided heart disease, without active edema   demonstrated at this time. 3. Atherosclerosis. Narrative:  Exam: Frontal chest       Comparison: Prior exams dating from 7/3/2020 to 12/15/2020       Number of views: 1       Findings: Remote median sternotomy. There is calcific atherosclerotic disease of   the thoracic aorta. The configuration of the cardiopericardial silhouette   suggests enlarged cardiac chambers. There is pulmonary venous cephalization,   compatible with chronic pulmonary venous hypertension. There is, however, no   demonstrated pulmonary edema at this time.  Minimal hazy opacity lateral mid   right lung may represent atelectasis or mere confluent soft tissue and bone   shadows though cannot exclude anterior lateral right upper lobe pneumonia. There   is no otherwise significant pulmonary parenchymal lesion by plain film   technique. There is no evidence of pleural disease. If you feel that you have not received excellent quality care or timely care, please ask to speak to the nurse manager. Please choose us in the future for your continued health care needs. ------------------------------------------------------------------------------------------------------------  The exam and treatment you received in the Emergency Department were for an urgent problem and are not intended as complete care. It is important that you follow-up with a doctor, nurse practitioner, or physician assistant to:  (1) confirm your diagnosis,  (2) re-evaluation of changes in your illness and treatment, and  (3) for ongoing care. If your symptoms become worse or you do not improve as expected and you are unable to reach your usual health care provider, you should return to the Emergency Department. We are available 24 hours a day. Please take your discharge instructions with you when you go to your follow-up appointment. If you have any problem arranging a follow-up appointment, contact the Emergency Department immediately. If a prescription has been provided, please have it filled as soon as possible to prevent a delay in treatment. Read the entire medication instruction sheet provided to you by the pharmacy. If you have any questions or reservations about taking the medication due to side effects or interactions with other medications, please call your primary care physician or contact the ER to speak with the charge nurse. Make an appointment with your family doctor or the physician you were referred to for follow-up of this visit as instructed on your discharge paperwork, as this is a mandatory follow-up.  Return to the ER if you are unable to be seen or if you are unable to be seen in a timely manner. If you have any problem arranging the follow-up visit, contact the Emergency Department immediately.

## 2021-03-02 LAB
ATRIAL RATE: 56 BPM
CALCULATED R AXIS, ECG10: 106 DEGREES
CALCULATED T AXIS, ECG11: 56 DEGREES
DIAGNOSIS, 93000: NORMAL
Q-T INTERVAL, ECG07: 400 MS
QRS DURATION, ECG06: 98 MS
QTC CALCULATION (BEZET), ECG08: 422 MS
VENTRICULAR RATE, ECG03: 67 BPM

## 2021-03-06 LAB
BACTERIA SPEC CULT: NORMAL
SPECIAL REQUESTS,SREQ: NORMAL

## 2021-03-12 DIAGNOSIS — G47.00 INSOMNIA, UNSPECIFIED TYPE: ICD-10-CM

## 2021-03-12 DIAGNOSIS — E83.42 HYPOMAGNESEMIA: ICD-10-CM

## 2021-03-13 ENCOUNTER — HOSPITAL ENCOUNTER (INPATIENT)
Age: 86
LOS: 7 days | Discharge: NURSING, REHAB OR LONG-TERM CARE | DRG: 155 | End: 2021-03-21
Attending: EMERGENCY MEDICINE | Admitting: FAMILY MEDICINE
Payer: MEDICARE

## 2021-03-13 ENCOUNTER — APPOINTMENT (OUTPATIENT)
Dept: CT IMAGING | Age: 86
DRG: 155 | End: 2021-03-13
Attending: EMERGENCY MEDICINE
Payer: MEDICARE

## 2021-03-13 DIAGNOSIS — R78.81 MRSA BACTEREMIA: ICD-10-CM

## 2021-03-13 DIAGNOSIS — R22.1 MASS OF RIGHT SIDE OF NECK: Primary | ICD-10-CM

## 2021-03-13 DIAGNOSIS — L03.90 CELLULITIS, UNSPECIFIED CELLULITIS SITE: ICD-10-CM

## 2021-03-13 DIAGNOSIS — N18.32 STAGE 3B CHRONIC KIDNEY DISEASE (HCC): ICD-10-CM

## 2021-03-13 DIAGNOSIS — K11.20 PAROTIDITIS: ICD-10-CM

## 2021-03-13 DIAGNOSIS — B95.62 MRSA BACTEREMIA: ICD-10-CM

## 2021-03-13 DIAGNOSIS — R78.81 GRAM-POSITIVE COCCI BACTEREMIA: ICD-10-CM

## 2021-03-13 DIAGNOSIS — L03.211 CELLULITIS OF FACE: ICD-10-CM

## 2021-03-13 DIAGNOSIS — N39.44 NOCTURNAL ENURESIS: ICD-10-CM

## 2021-03-13 LAB
ANION GAP SERPL CALC-SCNC: 11 MMOL/L (ref 5–15)
BASOPHILS # BLD: 0 K/UL (ref 0–0.1)
BASOPHILS NFR BLD: 0 % (ref 0–1)
BUN SERPL-MCNC: 42 MG/DL (ref 6–20)
BUN/CREAT SERPL: 15 (ref 12–20)
CA-I BLD-MCNC: 9.4 MG/DL (ref 8.5–10.1)
CHLORIDE SERPL-SCNC: 109 MMOL/L (ref 97–108)
CO2 SERPL-SCNC: 26 MMOL/L (ref 21–32)
CREAT SERPL-MCNC: 2.71 MG/DL (ref 0.7–1.3)
DIFFERENTIAL METHOD BLD: ABNORMAL
EOSINOPHIL # BLD: 0 K/UL (ref 0–0.4)
EOSINOPHIL NFR BLD: 0 % (ref 0–7)
ERYTHROCYTE [DISTWIDTH] IN BLOOD BY AUTOMATED COUNT: 16.9 % (ref 11.5–14.5)
GLUCOSE SERPL-MCNC: 116 MG/DL (ref 65–100)
HCT VFR BLD AUTO: 31.4 % (ref 36.6–50.3)
HGB BLD-MCNC: 9.6 G/DL (ref 12.1–17)
IMM GRANULOCYTES # BLD AUTO: 0 K/UL (ref 0–0.04)
IMM GRANULOCYTES NFR BLD AUTO: 0 % (ref 0–0.5)
LYMPHOCYTES # BLD: 0.6 K/UL (ref 0.8–3.5)
LYMPHOCYTES NFR BLD: 4 % (ref 12–49)
MCH RBC QN AUTO: 29.5 PG (ref 26–34)
MCHC RBC AUTO-ENTMCNC: 30.6 G/DL (ref 30–36.5)
MCV RBC AUTO: 96.6 FL (ref 80–99)
MONOCYTES # BLD: 0.7 K/UL (ref 0–1)
MONOCYTES NFR BLD: 5 % (ref 5–13)
NEUTS SEG # BLD: 11.6 K/UL (ref 1.8–8)
NEUTS SEG NFR BLD: 91 % (ref 32–75)
PLATELET # BLD AUTO: 258 K/UL (ref 150–400)
PMV BLD AUTO: 11.1 FL (ref 8.9–12.9)
POTASSIUM SERPL-SCNC: 4.2 MMOL/L (ref 3.5–5.1)
RBC # BLD AUTO: 3.25 M/UL (ref 4.1–5.7)
SODIUM SERPL-SCNC: 146 MMOL/L (ref 136–145)
WBC # BLD AUTO: 12.9 K/UL (ref 4.1–11.1)

## 2021-03-13 PROCEDURE — 85025 COMPLETE CBC W/AUTO DIFF WBC: CPT

## 2021-03-13 PROCEDURE — 70490 CT SOFT TISSUE NECK W/O DYE: CPT

## 2021-03-13 PROCEDURE — 36415 COLL VENOUS BLD VENIPUNCTURE: CPT

## 2021-03-13 PROCEDURE — 99285 EMERGENCY DEPT VISIT HI MDM: CPT

## 2021-03-13 PROCEDURE — 80048 BASIC METABOLIC PNL TOTAL CA: CPT

## 2021-03-13 NOTE — Clinical Note
Status[de-identified] INPATIENT [101]   Type of Bed: Remote Telemetry [29]   Inpatient Hospitalization Certified Necessary for the Following Reasons: 3.  Patient receiving treatment that can only be provided in an inpatient setting (further clarification in H&P documentation)   Admitting Diagnosis: Parotiditis [413868]   Admitting Diagnosis: Cellulitis [984035]   Admitting Physician: Virginia Munoz [[id number]]   Attending Physician: Virginia Munoz [[id number]]   Estimated Length of Stay: 2 Midnights   Discharge Plan[de-identified] Home with Office Follow-up

## 2021-03-14 ENCOUNTER — APPOINTMENT (OUTPATIENT)
Dept: GENERAL RADIOLOGY | Age: 86
DRG: 155 | End: 2021-03-14
Attending: EMERGENCY MEDICINE
Payer: MEDICARE

## 2021-03-14 PROBLEM — L03.90 CELLULITIS: Status: ACTIVE | Noted: 2021-03-14

## 2021-03-14 PROBLEM — K11.20 PAROTIDITIS: Status: ACTIVE | Noted: 2021-03-14

## 2021-03-14 LAB — LACTATE SERPL-SCNC: 1 MMOL/L (ref 0.4–2)

## 2021-03-14 PROCEDURE — 74011250636 HC RX REV CODE- 250/636: Performed by: EMERGENCY MEDICINE

## 2021-03-14 PROCEDURE — 74011250637 HC RX REV CODE- 250/637: Performed by: HOSPITALIST

## 2021-03-14 PROCEDURE — 74011000258 HC RX REV CODE- 258: Performed by: EMERGENCY MEDICINE

## 2021-03-14 PROCEDURE — 83605 ASSAY OF LACTIC ACID: CPT

## 2021-03-14 PROCEDURE — 87147 CULTURE TYPE IMMUNOLOGIC: CPT

## 2021-03-14 PROCEDURE — 99221 1ST HOSP IP/OBS SF/LOW 40: CPT | Performed by: OTOLARYNGOLOGY

## 2021-03-14 PROCEDURE — 74011000258 HC RX REV CODE- 258: Performed by: FAMILY MEDICINE

## 2021-03-14 PROCEDURE — 71045 X-RAY EXAM CHEST 1 VIEW: CPT

## 2021-03-14 PROCEDURE — 74011250636 HC RX REV CODE- 250/636: Performed by: FAMILY MEDICINE

## 2021-03-14 PROCEDURE — 87186 SC STD MICRODIL/AGAR DIL: CPT

## 2021-03-14 PROCEDURE — 74011250636 HC RX REV CODE- 250/636: Performed by: HOSPITALIST

## 2021-03-14 PROCEDURE — 87077 CULTURE AEROBIC IDENTIFY: CPT

## 2021-03-14 PROCEDURE — 65270000029 HC RM PRIVATE

## 2021-03-14 PROCEDURE — 87040 BLOOD CULTURE FOR BACTERIA: CPT

## 2021-03-14 PROCEDURE — 87205 SMEAR GRAM STAIN: CPT

## 2021-03-14 PROCEDURE — 74011000250 HC RX REV CODE- 250: Performed by: HOSPITALIST

## 2021-03-14 RX ORDER — ATORVASTATIN CALCIUM 20 MG/1
20 TABLET, FILM COATED ORAL
Status: DISCONTINUED | OUTPATIENT
Start: 2021-03-14 | End: 2021-03-22 | Stop reason: HOSPADM

## 2021-03-14 RX ORDER — LANOLIN ALCOHOL/MO/W.PET/CERES
400 CREAM (GRAM) TOPICAL DAILY
Status: DISCONTINUED | OUTPATIENT
Start: 2021-03-14 | End: 2021-03-22 | Stop reason: HOSPADM

## 2021-03-14 RX ORDER — SODIUM CHLORIDE 9 MG/ML
75 INJECTION, SOLUTION INTRAVENOUS CONTINUOUS
Status: DISCONTINUED | OUTPATIENT
Start: 2021-03-14 | End: 2021-03-14

## 2021-03-14 RX ORDER — ESCITALOPRAM OXALATE 10 MG/1
20 TABLET ORAL DAILY
Status: DISCONTINUED | OUTPATIENT
Start: 2021-03-14 | End: 2021-03-22 | Stop reason: HOSPADM

## 2021-03-14 RX ORDER — ONDANSETRON 2 MG/ML
4 INJECTION INTRAMUSCULAR; INTRAVENOUS
Status: DISCONTINUED | OUTPATIENT
Start: 2021-03-14 | End: 2021-03-22 | Stop reason: HOSPADM

## 2021-03-14 RX ORDER — MORPHINE SULFATE 2 MG/ML
2 INJECTION, SOLUTION INTRAMUSCULAR; INTRAVENOUS
Status: ACTIVE | OUTPATIENT
Start: 2021-03-14 | End: 2021-03-16

## 2021-03-14 RX ORDER — ACETAMINOPHEN 650 MG/1
650 SUPPOSITORY RECTAL
Status: DISCONTINUED | OUTPATIENT
Start: 2021-03-14 | End: 2021-03-22 | Stop reason: HOSPADM

## 2021-03-14 RX ORDER — DEXTROSE MONOHYDRATE AND SODIUM CHLORIDE 5; .45 G/100ML; G/100ML
75 INJECTION, SOLUTION INTRAVENOUS CONTINUOUS
Status: DISCONTINUED | OUTPATIENT
Start: 2021-03-14 | End: 2021-03-22 | Stop reason: HOSPADM

## 2021-03-14 RX ORDER — HEPARIN SODIUM 10000 [USP'U]/ML
5000 INJECTION, SOLUTION INTRAVENOUS; SUBCUTANEOUS EVERY 8 HOURS
Status: DISCONTINUED | OUTPATIENT
Start: 2021-03-14 | End: 2021-03-14

## 2021-03-14 RX ORDER — FOLIC ACID 1 MG/1
1 TABLET ORAL DAILY
Status: DISCONTINUED | OUTPATIENT
Start: 2021-03-14 | End: 2021-03-22 | Stop reason: HOSPADM

## 2021-03-14 RX ORDER — HYDRALAZINE HYDROCHLORIDE 20 MG/ML
10 INJECTION INTRAMUSCULAR; INTRAVENOUS
Status: DISCONTINUED | OUTPATIENT
Start: 2021-03-14 | End: 2021-03-22 | Stop reason: HOSPADM

## 2021-03-14 RX ORDER — HEPARIN SODIUM 5000 [USP'U]/ML
5000 INJECTION, SOLUTION INTRAVENOUS; SUBCUTANEOUS EVERY 8 HOURS
Status: DISCONTINUED | OUTPATIENT
Start: 2021-03-14 | End: 2021-03-22 | Stop reason: HOSPADM

## 2021-03-14 RX ORDER — DICLOFENAC SODIUM 10 MG/G
2 GEL TOPICAL EVERY 6 HOURS
Status: DISCONTINUED | OUTPATIENT
Start: 2021-03-14 | End: 2021-03-22 | Stop reason: HOSPADM

## 2021-03-14 RX ORDER — ACETAMINOPHEN 325 MG/1
650 TABLET ORAL
Status: DISCONTINUED | OUTPATIENT
Start: 2021-03-14 | End: 2021-03-22 | Stop reason: HOSPADM

## 2021-03-14 RX ORDER — POLYETHYLENE GLYCOL 3350 17 G/17G
17 POWDER, FOR SOLUTION ORAL DAILY PRN
Status: DISCONTINUED | OUTPATIENT
Start: 2021-03-14 | End: 2021-03-22 | Stop reason: HOSPADM

## 2021-03-14 RX ORDER — PROMETHAZINE HYDROCHLORIDE 25 MG/1
12.5 TABLET ORAL
Status: DISCONTINUED | OUTPATIENT
Start: 2021-03-14 | End: 2021-03-22 | Stop reason: HOSPADM

## 2021-03-14 RX ORDER — AMLODIPINE BESYLATE 5 MG/1
5 TABLET ORAL DAILY
Status: DISCONTINUED | OUTPATIENT
Start: 2021-03-14 | End: 2021-03-22 | Stop reason: HOSPADM

## 2021-03-14 RX ADMIN — PIPERACILLIN AND TAZOBACTAM 3.38 G: 3; .375 INJECTION, POWDER, LYOPHILIZED, FOR SOLUTION INTRAVENOUS at 09:24

## 2021-03-14 RX ADMIN — AMLODIPINE BESYLATE 5 MG: 5 TABLET ORAL at 12:14

## 2021-03-14 RX ADMIN — ATORVASTATIN CALCIUM 20 MG: 20 TABLET, FILM COATED ORAL at 22:09

## 2021-03-14 RX ADMIN — FOLIC ACID 1 MG: 1 TABLET ORAL at 12:14

## 2021-03-14 RX ADMIN — PIPERACILLIN AND TAZOBACTAM 3.38 G: 3; .375 INJECTION, POWDER, LYOPHILIZED, FOR SOLUTION INTRAVENOUS at 22:09

## 2021-03-14 RX ADMIN — ESCITALOPRAM OXALATE 20 MG: 10 TABLET ORAL at 12:14

## 2021-03-14 RX ADMIN — DEXTROSE AND SODIUM CHLORIDE 75 ML/HR: 5; 450 INJECTION, SOLUTION INTRAVENOUS at 12:16

## 2021-03-14 RX ADMIN — HEPARIN SODIUM 5000 UNITS: 5000 INJECTION INTRAVENOUS; SUBCUTANEOUS at 12:14

## 2021-03-14 RX ADMIN — HEPARIN SODIUM 5000 UNITS: 5000 INJECTION INTRAVENOUS; SUBCUTANEOUS at 22:09

## 2021-03-14 RX ADMIN — VANCOMYCIN HYDROCHLORIDE 1000 MG: 1 INJECTION, POWDER, LYOPHILIZED, FOR SOLUTION INTRAVENOUS at 03:21

## 2021-03-14 RX ADMIN — SODIUM CHLORIDE 75 ML/HR: 9 INJECTION, SOLUTION INTRAVENOUS at 05:26

## 2021-03-14 RX ADMIN — PIPERACILLIN AND TAZOBACTAM 3.38 G: 3; .375 INJECTION, POWDER, FOR SOLUTION INTRAVENOUS at 01:37

## 2021-03-14 RX ADMIN — Medication 400 MG: at 12:14

## 2021-03-14 NOTE — PROGRESS NOTES
Problem: Falls - Risk of  Goal: *Absence of Falls  Description: Document Maurizio Murry Fall Risk and appropriate interventions in the flowsheet. Outcome: Progressing Towards Goal  Note: Fall Risk Interventions:  Mobility Interventions: Bed/chair exit alarm    Mentation Interventions: Bed/chair exit alarm         Elimination Interventions: Bed/chair exit alarm              Problem: Patient Education: Go to Patient Education Activity  Goal: Patient/Family Education  Outcome: Progressing Towards Goal     Problem: Pressure Injury - Risk of  Goal: *Prevention of pressure injury  Description: Document Joey Scale and appropriate interventions in the flowsheet.   Outcome: Progressing Towards Goal  Note: Pressure Injury Interventions:  Sensory Interventions: Assess changes in LOC    Moisture Interventions: Absorbent underpads    Activity Interventions: PT/OT evaluation    Mobility Interventions: HOB 30 degrees or less    Nutrition Interventions: Document food/fluid/supplement intake    Friction and Shear Interventions: Apply protective barrier, creams and emollients, HOB 30 degrees or less                Problem: Patient Education: Go to Patient Education Activity  Goal: Patient/Family Education  Outcome: Progressing Towards Goal

## 2021-03-14 NOTE — PROGRESS NOTES
Consult for Vancomycin Dosing by Pharmacy by Dr. Millicent Kimball provided for this 81y.o. year old male , for indication of cellulitis of right face/neck. Day of Therapy: 1    Goal of Level(s): 10 - 15 mcg/dL    Other Current Antibiotics: Zosyn      Serum Creatinine Creatinine   Date Value Ref Range Status   03/13/2021 2.71 (H) 0.70 - 1.30 mg/dL Final   02/27/2021 2.11 (H) 0.70 - 1.30 mg/dL Final   02/24/2021 2.36 (H) 0.70 - 1.30 mg/dL Final      Creatinine Clearance Estimated Creatinine Clearance: 19.6 mL/min (A) (based on SCr of 2.71 mg/dL (H)). BUN Lab Results   Component Value Date/Time    BUN 42 (H) 03/13/2021 10:45 PM      WBC Lab Results   Component Value Date/Time    WBC 12.9 (H) 03/13/2021 10:45 PM      Temp 99.8 °F (37.7 °C)     Last Level: No results found for: VANCT No results found for: VANCR    Ht Readings from Last 1 Encounters:   03/13/21 172.7 cm (68\")        Wt Readings from Last 1 Encounters:   03/13/21 70.3 kg (155 lb)     Ideal body weight: 68.4 kg (150 lb 12.7 oz)  Adjusted ideal body weight: 69.2 kg (152 lb 7.6 oz)     Previous Regimen: New Start    New Regimen:     Pt received Vancomycin 1000 mg iv x 1 dose from the free standing ED. Since pt's CRCL is ~20 ml/min will dose per random levels. Will order a random level for tomorrow with AM labs    Pharmacy to follow daily and will make changes to dose and/or frequency based on clinical status.   _________________________________     Pharmacist [address] [place] [address] PHARMD

## 2021-03-14 NOTE — PROGRESS NOTES
Problem: Falls - Risk of  Goal: *Absence of Falls  Description: Document Sophy Gilbert Fall Risk and appropriate interventions in the flowsheet. Outcome: Progressing Towards Goal  Note: Fall Risk Interventions:       Mentation Interventions: Bed/chair exit alarm, Door open when patient unattended         Elimination Interventions: Call light in reach, Bed/chair exit alarm              Problem: Patient Education: Go to Patient Education Activity  Goal: Patient/Family Education  Outcome: Progressing Towards Goal     Problem: Pressure Injury - Risk of  Goal: *Prevention of pressure injury  Description: Document Joey Scale and appropriate interventions in the flowsheet.   Outcome: Progressing Towards Goal  Note: Pressure Injury Interventions:  Sensory Interventions: Assess changes in LOC    Moisture Interventions: Absorbent underpads, Apply protective barrier, creams and emollients    Activity Interventions: Assess need for specialty bed    Mobility Interventions: Assess need for specialty bed    Nutrition Interventions: Document food/fluid/supplement intake    Friction and Shear Interventions: Apply protective barrier, creams and emollients, HOB 30 degrees or less                Problem: Patient Education: Go to Patient Education Activity  Goal: Patient/Family Education  Outcome: Progressing Towards Goal

## 2021-03-14 NOTE — PROGRESS NOTES
Admission skin assessment completed with second nurse - Ermelinda NOGUEIRA. Patient seen by Dr. Leo Jha. IV fluids started per orders. Patient alert to self. Patient given small amount of ice which was okay'd by Dr Leo Jha but is NPO otherwise as ordered.

## 2021-03-14 NOTE — ED TRIAGE NOTES
Pt with right neck mass/swelling onset today per caretakers, pt states \"didn't notice it\" hx of dementia.  No airway occlusion, pt denies pain or itching, no stridor noted

## 2021-03-14 NOTE — PROGRESS NOTES
Progress Note    Patient: Luis Perez. MRN: [id number]  SSN: [id number]    YOB: 1935  Age: 81 y.o. Sex: male      Admit Date: 3/13/2021    LOS: 0 days     Subjective:     85M, H/o dementia, CHF with unknown EF, BPH with right parotitis with cellulitis on vanc and zosyn. Dehydration increased the risk of parotitis. ENT following. SUBJECTIVE  Patient cannot get a meaningful history  Does not complain of any pain    We will continue broadapectrum Abx, Follow ENT  Await cultures taken. He would once stable will return to group home      Review of Systems:  The patient has dementia but able to answer basic questions  Constitutional:  denies weight loss, no fever, no chills, no night sweats  Eye: No recent visual disturbances, no discharge, no double vision  Ear/nose/mouth/throat : No hearing disturbance, n++ ear pain, no nasal congestion, ++ sore throat, ++ trouble swallowing. Respiratory : No trouble breathing, no cough, no shortness of breath, no hemoptysis, no wheezing  Cardiovascular : No chest pain, no palpitation, no racing of heart, no orthopnea, no paroxysmal nocturnal dyspnea, no peripheral edema  Gastrointestinal : No nausea, no vomiting, no diarrhea, constipation, heartburn, abdominal pain  Genitourinary : No dysuria, no hematuria, no increased frequency, incontinence,  Lymphatics : No swollen glands -Neck, axillary, inguinal  Endocrine : No excessive thirst, no polyuria no cold intolerance, no heat intolerance. Immunologic : No hives, urticaria, no seasonal allergies,   Musculoskeletal : Left upper back pain.   No joint swelling, pain, effusion,  no neck pain,   Integumentary : No rash, no pruritus, no ecchymosis  Hematology : No petechiae, No easy bruising,  No tendency to bleed easy      Objective:     Vitals:    03/14/21 0150 03/14/21 0152 03/14/21 0400 03/14/21 0441   BP:    122/81   Pulse:  91 (!) 54 88   Resp: 24 22  20   Temp:    99.4 °F (37.4 °C)   SpO2: (!) 83% 97% 95%   Weight:       Height:            Intake and Output:  Current Shift: No intake/output data recorded. Last three shifts: 03/12 1901 - 03/14 0700  In: 100 [I.V.:100]  Out: -       Physical Exam:   General: Alert and Oriented to name. Cooperative and friendly. No acute distress. Nourished and well developed. Requesting something to drink at the time of admission. Head/Eyes: Normocephalic, atraumatic, EOMI, PERRLA. Nose/Mouth: Turbinates within normal limits, No drainage. Mucous membranes are dry. Throat and Neck:  Right parotid mass appreciated. Size is approximately 4 x 4 inches (measuring tape unavailable). Anterior cervical lymphadenopathy appreciated. There is mild erythema of mass and patient reports some pain with palpation. Mass is firm to the tough without appreciated fluctuance. No JVD or thyromegaly appreciated. Cervical spine has good range of motion without pain. Lungs: Clear to auscultation bilaterally without wheezes, rhonchi or crackles. Good air movement bilaterally. Symmetric chest rise with respirations. Heart: Regular rate and rhythm. Normal S1/S2. No appreciated murmurs, rubs or gallops. No lower extremity edema. Abdomen: Soft, non-tender, non-distended. Bowel sounds present in all four quadrants. No masses or hepatosplenomegaly appreciated. Extremities:  Atraumatic. Able to move all extremities symmetrically. No abnormal bony protuberances appreciated. Joints without swelling. Back: No pain with palpation over spinous processes or paraspinal musculature. No CVA tenderness. Skin: Clean, dry and intact without appreciated lesions. Neurologic: Alert to name only. Cranial nerves 2-12 are grossly intact. Facial features are symmetric. Speech is fluent and clear. No focal deficits appreciated. Psychiatric: Normal affect, normal thought process, good eye contact.        Lab/Data Review:  Recent Results (from the past 24 hour(s))   CBC WITH AUTOMATED DIFF    Collection Time: 03/13/21 10:45 PM   Result Value Ref Range    WBC 12.9 (H) 4.1 - 11.1 K/uL    RBC 3.25 (L) 4.10 - 5.70 M/uL    HGB 9.6 (L) 12.1 - 17.0 g/dL    HCT 31.4 (L) 36.6 - 50.3 %    MCV 96.6 80.0 - 99.0 FL    MCH 29.5 26.0 - 34.0 PG    MCHC 30.6 30.0 - 36.5 g/dL    RDW 16.9 (H) 11.5 - 14.5 %    PLATELET 747 225 - 335 K/uL    MPV 11.1 8.9 - 12.9 FL    NEUTROPHILS 91 (H) 32 - 75 %    LYMPHOCYTES 4 (L) 12 - 49 %    MONOCYTES 5 5 - 13 %    EOSINOPHILS 0 0 - 7 %    BASOPHILS 0 0 - 1 %    IMMATURE GRANULOCYTES 0 0.0 - 0.5 %    ABS. NEUTROPHILS 11.6 (H) 1.8 - 8.0 K/UL    ABS. LYMPHOCYTES 0.6 (L) 0.8 - 3.5 K/UL    ABS. MONOCYTES 0.7 0.0 - 1.0 K/UL    ABS. EOSINOPHILS 0.0 0.0 - 0.4 K/UL    ABS. BASOPHILS 0.0 0.0 - 0.1 K/UL    ABS. IMM. GRANS. 0.0 0.00 - 0.04 K/UL    DF AUTOMATED     METABOLIC PANEL, BASIC    Collection Time: 03/13/21 10:45 PM   Result Value Ref Range    Sodium 146 (H) 136 - 145 mmol/L    Potassium 4.2 3.5 - 5.1 mmol/L    Chloride 109 (H) 97 - 108 mmol/L    CO2 26 21 - 32 mmol/L    Anion gap 11 5 - 15 mmol/L    Glucose 116 (H) 65 - 100 mg/dL    BUN 42 (H) 6 - 20 mg/dL    Creatinine 2.71 (H) 0.70 - 1.30 mg/dL    BUN/Creatinine ratio 15 12 - 20      GFR est AA 27 (L) >60 ml/min/1.73m2    GFR est non-AA 23 (L) >60 ml/min/1.73m2    Calcium 9.4 8.5 - 10.1 mg/dL   LACTIC ACID    Collection Time: 03/14/21  1:15 AM   Result Value Ref Range    Lactic acid 1.0 0.4 - 2.0 mmol/L         Assessment and plan:        (1) right facial mass: parotitis with cellulitis D/D: malignancy. On IV abx, ENT following, pending stensons duct cultures,     (2) hypernatremia : 0.45% saline, allow diet    (3) KO: prerenal. Hold lasix, 0.45% saline    (4) HTN: resume amlodipine    (2) dementia: complicates all aspects of care    (3) multiple myeloma: unknown    (4) CHF: hold lasix.  EF unknwon    DVT ppx: heparin subQ    DISPO: group home when ok with ENT    Signed By: Jace London MD     March 14, 2021

## 2021-03-14 NOTE — H&P
History and Physical    Patient: Ale Zhu MRN: [id number]  SSN: [id number]    YOB: 1935  Age: 81 y.o. Sex: male      Subjective:      Chief Complaint: Right side facial swelling    HPI: Ale Zhu is an 81 y.o. male with past medical history of dementia, hypertension, benign prostatic hypertrophy and chronic kidney disease presenting to the ER from local [facility] for evaluation of right facial swelling. Of note, Mr. Yonathan Kilgore is a poor historian (has dementia) and could not provide any meaningful history at the time of admission. The majority of information was obtained from ER physician. Caregivers from local [facility] sent patient to the ER for evaluation of right facial swelling. It is unclear how long right side of face has been swollen - caregivers reported to EMS that swelling has been there for approximately 1 to 2 days. There has been no recent fever or chills. Mr. Yonathan Kilgore reports some right facial pain at the time of admission. He denies any difficulty swallowing. Patient presents to the ER with a temperature 98.8 °F, blood pressure 147/99, pulse 61, respirations 18 and oxygen saturation 92% on room air. Abnormal laboratory work includes white blood cell count 12.9, hemoglobin 9.6 and creatinine 2.71. Last creatinine value was 2.11 on February 27, 2021. CT of the neck/soft tissue reports asymmetric enlargement of right parotid gland with subcutaneous stranding and skin thickening in the right neck. Right cervical lymphadenopathy is present on CT scan. ER physician discussed patient with ENT who recommended admission and IV antibiotics. Hospital service was asked to admit for further treatment and evaluation. Past medical history was obtained from medical records (PCP office note). Mr. Yonathan Kilgore is a resident at a local group home (unknown name of home). I was unable to verify family history, home medication list or surgical history.   Medical records indicate that patient's son, Ej Da Silva, can be reached at [phone]. Medical records indicate allergies to amiodarone and carvedilol. Past Medical History:   Diagnosis Date    Anemia     Arthritis     CKD (chronic kidney disease), stage III 2011    Dementia ([facility] [address]     Depression     Erectile dysfunction     GERD (gastroesophageal reflux disease)     GI bleed     Gout     gout, osteoarthritis    Heart failure (HCC)     acute heart failure    Hemorrhoids     Hypertension     Multiple myeloma (HCC)     PAF (paroxysmal atrial fibrillation) ([facility] [address] 02/17/2016    s/p cardioversion    [facility]     S/P AVR (aortic valve replacement) 07/26/2016    merrill gill md -     S/P cardiac cath 7/6/16    severe ai, normal coronaries     S/P colonoscopy with polypectomy 05/09/2016    Kar Luu MD    Stroke [facility])      Past Surgical History:   Procedure Laterality Date    HX HEENT      nasal procedure 20 years ago   [facility] HX KNEE REPLACEMENT Bilateral     bilateral knee replacement    HX ORTHOPAEDIC Bilateral     wrist    HX UROLOGICAL  01/05/2020    cystoscopy     SD CARDIAC SURG PROCEDURE UNLIST      cardiac cath    SD CARDIAC SURG PROCEDURE UNLIST  07/26/2016    AVR      Family History   Problem Relation Age of Onset    Anemia Mother     No Known Problems Father     Other Brother         drug abuse     Social History     Tobacco Use    Smoking status: Former Smoker     Years: 20.00    Smokeless tobacco: Never Used   Substance Use Topics    Alcohol use: Not Currently     Frequency: Never     Binge frequency: Never      Prior to Admission medications    Medication Sig Start Date End Date Taking? Authorizing Provider   furosemide (Lasix) 40 mg tablet Take 40 mg by mouth daily. Other, MD Saniya   fesoterodine (Toviaz) 4 mg SR tablet Take 1 Tab by mouth daily for 30 days.  2/11/21 3/13/21  Angie Garcia NP   simvastatin (ZOCOR) 40 mg tablet TAKE ONE TABLET BY MOUTH AT BEDTIME. 2/10/21 8/9/21  Meet Pizarro MD   amLODIPine (NORVASC) 5 mg tablet Take 1 Tab by mouth daily for 60 days. 1/28/21 3/29/21  Meet Pizarro MD   escitalopram oxalate (LEXAPRO) 20 mg tablet Take 1 Tab by mouth daily for 90 days. 1/28/21 4/28/21  Meet Pizarro MD   traZODone (DESYREL) 50 mg tablet TAKE ONE TABLET BY MOUTH AT BEDTIME. 1/8/21 7/7/21  Meet Pizarro MD   melatonin 10 mg TbER TAKE ONE TABLET BY MOUTH AT BEDTIME. 1/8/21 7/7/21  Elroy Palumbo MD   magnesium oxide (MAG-OX) 400 mg tablet TAKE ONE TABLET BY MOUTH DAILY 1/8/21 7/7/21  Elroy Palumbo MD   tamsulosin (FLOMAX) 0.4 mg capsule Take 1 Cap by mouth daily. 1/5/21   Cheri Erazo MD   potassium chloride (K-DUR, KLOR-CON) 20 mEq tablet TAKE 1 TABLET BY MOUTH EACH MORNING WITH FOOD. 12/11/20 6/9/21  Elroy Palumbo MD   ferrous sulfate (IRON) 325 mg (65 mg iron) EC tablet TAKE ONE TABLET BY MOUTH EACH DAY WITH BREAKFAST. 12/11/20 6/9/21  Meet Pizarro MD   timolol (TIMOPTIC) 0.5 % ophthalmic solution INSTILL 1 DROPS INTO RIGHT EYE ONCE DAILY 10/23/20   Senia Bautista MD   diclofenac (VOLTAREN) 1 % gel APPLY TWO GRAMS FOUR TIMES DAILY AS NEEDED FOR ELBOW PAIN 10/23/20   Elroy Palumbo MD   thiamine HCL (B-1) 100 mg tablet Take 1 Tab by mouth daily for 180 days. 9/18/20 3/17/21  Meet Pizarro MD   folic acid (FOLVITE) 1 mg tablet Take 1 Tab by mouth daily for 180 days. 9/18/20 3/17/21  Meet Pizarro MD   allopurinoL (ZYLOPRIM) 100 mg tablet TAKE 1 TABLET BY MOUTH EVERY DAY 9/18/20   Elroy Palumbo MD   terbinafine HCL (LAMISIL) 250 mg tablet Take 1 Tab by mouth daily. 9/8/20   Rajiv Nunez, DEBRA   ammonium lactate (AMLACTIN) 12 % topical cream Apply  to affected area two (2) times a day.  rub in to affected area well 8/19/20 Garcia Nunez, DEBRA        Allergies   Allergen Reactions    Amiodarone Other (comments)     Bradycardia -excessive    Carvedilol Unknown (comments)       Review of Systems:  Unable to obtain secondary to dementia. Objective:     Vitals:    03/13/21 2218 03/14/21 0136 03/14/21 0150 03/14/21 0152   BP: (!) 147/99 133/83     Pulse: 61 91  91   Resp: 18 22 24 22   Temp: 98.8 °F (37.1 °C) 99.8 °F (37.7 °C)     SpO2: 92% 100% (!) 83% 97%   Weight: 70.3 kg (155 lb)      Height: 5' 8\" (1.727 m)           Physical Exam:  General: Alert and Oriented to name. Cooperative and friendly. No acute distress. Nourished and well developed. Requesting something to drink at the time of admission. Head/Eyes: Normocephalic, atraumatic, EOMI, PERRLA. Nose/Mouth: Turbinates within normal limits, No drainage. Mucous membranes are dry. Throat and Neck:  Right parotid mass appreciated. Size is approximately 4 x 4 inches (measuring tape unavailable). Anterior cervical lymphadenopathy appreciated. There is mild erythema of mass and patient reports some pain with palpation. Mass is firm to the tough without appreciated fluctuance. No JVD or thyromegaly appreciated. Cervical spine has good range of motion without pain. Lungs: Clear to auscultation bilaterally without wheezes, rhonchi or crackles. Good air movement bilaterally. Symmetric chest rise with respirations. Heart: Regular rate and rhythm. Normal S1/S2. No appreciated murmurs, rubs or gallops. No lower extremity edema. Abdomen: Soft, non-tender, non-distended. Bowel sounds present in all four quadrants. No masses or hepatosplenomegaly appreciated. Extremities:  Atraumatic. Able to move all extremities symmetrically. No abnormal bony protuberances appreciated. Joints without swelling. Back: No pain with palpation over spinous processes or paraspinal musculature. No CVA tenderness. Skin: Clean, dry and intact without appreciated lesions.    Neurologic: Alert to name only. Cranial nerves 2-12 are grossly intact. Facial features are symmetric. Speech is fluent and clear. No focal deficits appreciated. Psychiatric: Normal affect, normal thought process, good eye contact. Recent Results (from the past 24 hour(s))   CBC WITH AUTOMATED DIFF    Collection Time: 03/13/21 10:45 PM   Result Value Ref Range    WBC 12.9 (H) 4.1 - 11.1 K/uL    RBC 3.25 (L) 4.10 - 5.70 M/uL    HGB 9.6 (L) 12.1 - 17.0 g/dL    HCT 31.4 (L) 36.6 - 50.3 %    MCV 96.6 80.0 - 99.0 FL    MCH 29.5 26.0 - 34.0 PG    MCHC 30.6 30.0 - 36.5 g/dL    RDW 16.9 (H) 11.5 - 14.5 %    PLATELET 944 834 - 592 K/uL    MPV 11.1 8.9 - 12.9 FL    NEUTROPHILS 91 (H) 32 - 75 %    LYMPHOCYTES 4 (L) 12 - 49 %    MONOCYTES 5 5 - 13 %    EOSINOPHILS 0 0 - 7 %    BASOPHILS 0 0 - 1 %    IMMATURE GRANULOCYTES 0 0.0 - 0.5 %    ABS. NEUTROPHILS 11.6 (H) 1.8 - 8.0 K/UL    ABS. LYMPHOCYTES 0.6 (L) 0.8 - 3.5 K/UL    ABS. MONOCYTES 0.7 0.0 - 1.0 K/UL    ABS. EOSINOPHILS 0.0 0.0 - 0.4 K/UL    ABS. BASOPHILS 0.0 0.0 - 0.1 K/UL    ABS. IMM. GRANS. 0.0 0.00 - 0.04 K/UL    DF AUTOMATED     METABOLIC PANEL, BASIC    Collection Time: 03/13/21 10:45 PM   Result Value Ref Range    Sodium 146 (H) 136 - 145 mmol/L    Potassium 4.2 3.5 - 5.1 mmol/L    Chloride 109 (H) 97 - 108 mmol/L    CO2 26 21 - 32 mmol/L    Anion gap 11 5 - 15 mmol/L    Glucose 116 (H) 65 - 100 mg/dL    BUN 42 (H) 6 - 20 mg/dL    Creatinine 2.71 (H) 0.70 - 1.30 mg/dL    BUN/Creatinine ratio 15 12 - 20      GFR est AA 27 (L) >60 ml/min/1.73m2    GFR est non-AA 23 (L) >60 ml/min/1.73m2    Calcium 9.4 8.5 - 10.1 mg/dL   LACTIC ACID    Collection Time: 03/14/21  1:15 AM   Result Value Ref Range    Lactic acid 1.0 0.4 - 2.0 mmol/L       XR Results (maximum last 3): Results from Hospital Encounter encounter on 03/13/21   XR CHEST PORT    Narrative Study: Chest radiograph(s), 1 view    Clinical Indication: Cough. Comparison: Chest radiographs dated 220 7/11/2021. Findings:     The cardiac silhouette is unchanged in size. Post median sternotomy changes. Small right pleural effusion. No focal consolidation, overt pulmonary edema, or  discernible pneumothorax. Impression Small right pleural effusion. Results from Hospital Encounter encounter on 02/27/21   XR CHEST SNGL V    Narrative Exam: Frontal chest    Comparison: Prior exams dating from 7/3/2020 to 12/15/2020    Number of views: 1    Findings: Remote median sternotomy. There is calcific atherosclerotic disease of  the thoracic aorta. The configuration of the cardiopericardial silhouette  suggests enlarged cardiac chambers. There is pulmonary venous cephalization,  compatible with chronic pulmonary venous hypertension. There is, however, no  demonstrated pulmonary edema at this time. Minimal hazy opacity lateral mid  right lung may represent atelectasis or mere confluent soft tissue and bone  shadows though cannot exclude anterior lateral right upper lobe pneumonia. There  is no otherwise significant pulmonary parenchymal lesion by plain film  technique. There is no evidence of pleural disease. Impression 1. Possible right upper lobe pneumonia. 2. Evidence of chronic left-sided heart disease, without active edema  demonstrated at this time. 3. Atherosclerosis. Results from [place] encounter on 02/23/21   XR KNEE RT 3 V    Narrative Fall. Impression FINDINGS: IMPRESSION: 3 views of the right knee. Tricompartmental knee  replacement. Gross anatomic alignment. No acute fracture or dislocation. Soft  tissue calcifications appear corticated suggesting chronicity. Posterior  calcific atherosclerosis. Anterior soft tissue swelling. Small suprapatellar  effusion. CT Results (maximum last 3): Results from [place] encounter on 03/13/21   CT NECK SOFT TISSUE WO CONT    Narrative Study: Neck CT without contrast.    Clinical Indication: Right-sided neck mass .     Comparison: Cervical spine CT dated 2/23/2021. Technique: Routine volume acquisition of the was without IV contrast. Coronal  and sagittal reconstructions were generated and reviewed. Dose reduction: All CT  scans at this facility are performed using dose reduction optimization  techniques as appropriate to a performed exam including the following-automated  exposure control, adjustments of mA and/or Kv according to patient size, or use  of iterative reconstructive technique. Findings: The soft tissues of the neck are suboptimally evaluated without IV contrast.  Within this limitation there is asymmetric enlargement of the right parotid  gland with subcutaneous stranding and skin thickening of the right neck. No  definitive focal mass or drainable fluid collection is identified. Inflammatory  change also extends to the right submandibular region. A punctate locule of gas  in the right neck (series 201, image 40) is nonspecific and may be related to  venous access. Scattered prominent right cervical lymph nodes are nonspecific. The left parotid gland, left submandibular gland, and thyroid gland are  unremarkable. Scattered calcified atherosclerosis. Medialization of the bilateral common and  internal carotid arteries. At least partial opacification of the frontal sinuses. Opacification of the  ethmoid air cells and partial opacification of the nasal passages. Complete  opacification of the sphenoid sinus. Complete opacification of the left  maxillary sinus with opacification expansion of the left ostiomeatal complex. Right maxillary sinus mucosal thickening. Absent native lenses. The included intracranial contents are unremarkable. Multilevel cervical spondylosis. Partially visualized median sternotomy changes. The included lung apices are clear. Impression 1.   Study limited due to the lack of IV contrast.  2.  Asymmetric enlargement of the right parotid gland with trans-spatial  inflammatory change of the right neck compatible with cellulitis and parotiditis  in the appropriate clinical setting. No definitive evidence of a focal mass or  drainable fluid collection. 3.  Pansinus disease. Results from [place] encounter on 02/23/21   CT SPINE CERV WO CONT    Narrative CT cervical spine without contrast    Dose reduction: All CT scans at this facility are performed using dose reduction  optimization techniques as appropriate to a performed exam including the  following: Automated exposure control, adjustments of the mA and/or kV according  to patient size, or use of iterative reconstruction technique. Indication: Fall    COMPARISON: 8/1/2020    Findings:    No acute fracture is identified. Degenerative changes with central canal and  neural foraminal narrowing at multiple levels. Minimal retrolisthesis of C4 on  C5 again seen, probably degenerative. There is some bony demineralization. Atherosclerosis. Apparent deviation of the internal carotid medially to  posterior aspect of the pharynx again noted. Right pleural effusion. Periapical  infection/abscess involving left maxillary tooth. There may be additional dental  disease. There is some opacification of the visualized paranasal sinuses. Please  note that this is not the optimal study for evaluation of soft tissues or  contents of the spinal canal, including spinal cord. MRI is more sensitive in  this regard. Impression No acute fracture. Dental and sinus disease. Follow-up as clinically indicated. CT HEAD WO CONT    Narrative Fall. Comparison head CT 12/15/2020. Technique: Axial images head without IV contrast, with multiplanar reformatting. Multiplanar reformatting.   Dose reduction: All CT scans at this facility are performed using dose reduction  optimization techniques as appropriate to a performed exam including the  following: Automated exposure control, adjustments of the mA and/or kV according  to patient's size, or use of iterative reconstruction technique. Findings: Bilateral periventricular white matter hypodensity. . No mass effect,  extra-axial fluid collection or hemorrhage. Normal position craniocervical  junction. Atrophy. Included facial sinuses are opacified. Mastoid air cells are  unopacified. Calvarium intact. Impression 1. No acute intracranial findings. 2. Atrophy and small vessel ischemic disease. Unchanged compared to previous. 3. Chronic pansinusitis. Assessment:     Rosmery De Dios is an 81 y.o. male who presents for evaluation of right facial mass. Admit for parotitis and cellulitis of right face/neck. Plan:     1. Admit to remote telemetry bed. 2.  ENT was consulted from the ER. Keep n.p.o. until evaluated by ENT. Order IV fluid hydration while NPO on physical examination, mucous membranes are dry and patient appears dehydrated. 3.  Order blood cultures. Continue IV vancomycin and Zosyn. Provide conservative dose of IV narcotics as needed for pain control. 4.  For history of hypertension, order IV hydralazine as needed for systolic blood pressure greater than 150.  5.  Medication list was not provided at the time of admission. GI PPX: IV famotidine while NPO. DVT PPX: Lovenox SQ.     Signed By: Jesus Mccarty MD     March 14, 2021

## 2021-03-14 NOTE — ED PROVIDER NOTES
EMERGENCY DEPARTMENT HISTORY AND PHYSICAL EXAM      Date: 3/13/2021  Patient Name: Tangela Lamb. History of Presenting Illness       History Provided By: Patient and EMS    HPI: Tangela Perez, 81 y.o. male presents to the ED with cc of right neck mass. Patient was sent from the group home for noticing a right-sided neck mass today by her caretaker. Patient is extremely poor historian and is not able to elaborate on the history of the mass. Patient has multiple medical history including dementia. Patient denies shortness of breath, fever chills, or difficulty swallowing. Past History     Past Medical History:  Past Medical History:   Diagnosis Date    Anemia     Arthritis     CKD (chronic kidney disease), stage III 2011    Dementia ([facility] [address]     Depression     Erectile dysfunction     GERD (gastroesophageal reflux disease)     GI bleed     Gout     gout, osteoarthritis    Heart failure (HCC)     acute heart failure    Hemorrhoids     Hypertension     Multiple myeloma (HCC)     PAF (paroxysmal atrial fibrillation) ([facility] [address] 02/17/2016    s/p cardioversion    Pulmonary HTN ([facility] [address]     S/P AVR (aortic valve replacement) 07/26/2016    merrill gill md -     S/P cardiac cath 7/6/16    severe ai, normal coronaries     S/P colonoscopy with polypectomy 05/09/2016    Kar Leon MD    Stroke [facility])        Past Surgical History:  Past Surgical History:   Procedure Laterality Date    HX HEENT      nasal procedure 20 years ago   [employer] HX KNEE REPLACEMENT Bilateral     bilateral knee replacement    HX ORTHOPAEDIC Bilateral     wrist    HX UROLOGICAL  01/05/2020    cystoscopy     [place] CARDIAC SURG PROCEDURE UNLIST      cardiac cath    IA CARDIAC SURG PROCEDURE UNLIST  07/26/2016    AVR       Family History:  Family History   Problem Relation Age of Onset    Anemia Mother     No Known Problems Father     Other Brother         drug abuse       Social History:  Social History     Tobacco Use    Smoking status: Former Smoker     Years: 20.00    Smokeless tobacco: Never Used   Substance Use Topics    Alcohol use: Not Currently     Frequency: Never     Binge frequency: Never    Drug use: Never       Allergies: Allergies   Allergen Reactions    Amiodarone Other (comments)     Bradycardia -excessive    Carvedilol Unknown (comments)         Review of Systems   Review of Systems   Unable to perform ROS: Dementia   Constitutional: Negative for chills and fever. HENT: Negative for sore throat. Eyes: Negative for discharge. Respiratory: Negative for cough. Cardiovascular: Negative for chest pain. Gastrointestinal: Negative for abdominal pain. Genitourinary: Negative for difficulty urinating. Musculoskeletal: Negative for arthralgias. Skin: Negative for rash. Neurological: Negative for headaches. Physical Exam   Physical Exam  Vitals signs and nursing note reviewed. Constitutional:       Appearance: Normal appearance. HENT:      Head: Normocephalic and atraumatic. Nose: No congestion. Mouth/Throat:      Mouth: Mucous membranes are moist.      Comments: Poor dentition without obvious gum abscess or drainage. No swelling of the lip or tongue. No stridor. Eyes:      General: No scleral icterus. Conjunctiva/sclera: Conjunctivae normal.   Neck:      Musculoskeletal: Neck supple. Comments: A large right-sided neck mass is tender to touch. No crepitus. Mild warmth. No drainage. Cardiovascular:      Rate and Rhythm: Normal rate and regular rhythm. Heart sounds: Normal heart sounds. Pulmonary:      Effort: Pulmonary effort is normal.      Breath sounds: Normal breath sounds. Abdominal:      General: Abdomen is flat. Bowel sounds are normal.      Palpations: Abdomen is soft. Musculoskeletal:         General: No swelling. Right lower leg: No edema. Left lower leg: No edema. Skin:     General: Skin is warm and dry.    Neurological:      General: No focal deficit present. Mental Status: He is alert. Psychiatric:         Mood and Affect: Mood normal.         Diagnostic Study Results     Labs -     Recent Results (from the past 12 hour(s))   CBC WITH AUTOMATED DIFF    Collection Time: 03/13/21 10:45 PM   Result Value Ref Range    WBC 12.9 (H) 4.1 - 11.1 K/uL    RBC 3.25 (L) 4.10 - 5.70 M/uL    HGB 9.6 (L) 12.1 - 17.0 g/dL    HCT 31.4 (L) 36.6 - 50.3 %    MCV 96.6 80.0 - 99.0 FL    MCH 29.5 26.0 - 34.0 PG    MCHC 30.6 30.0 - 36.5 g/dL    RDW 16.9 (H) 11.5 - 14.5 %    PLATELET 226 174 - 996 K/uL    MPV 11.1 8.9 - 12.9 FL    NEUTROPHILS 91 (H) 32 - 75 %    LYMPHOCYTES 4 (L) 12 - 49 %    MONOCYTES 5 5 - 13 %    EOSINOPHILS 0 0 - 7 %    BASOPHILS 0 0 - 1 %    IMMATURE GRANULOCYTES 0 0.0 - 0.5 %    ABS. NEUTROPHILS 11.6 (H) 1.8 - 8.0 K/UL    ABS. LYMPHOCYTES 0.6 (L) 0.8 - 3.5 K/UL    ABS. MONOCYTES 0.7 0.0 - 1.0 K/UL    ABS. EOSINOPHILS 0.0 0.0 - 0.4 K/UL    ABS. BASOPHILS 0.0 0.0 - 0.1 K/UL    ABS. IMM. GRANS. 0.0 0.00 - 0.04 K/UL    DF AUTOMATED     METABOLIC PANEL, BASIC    Collection Time: 03/13/21 10:45 PM   Result Value Ref Range    Sodium 146 (H) 136 - 145 mmol/L    Potassium 4.2 3.5 - 5.1 mmol/L    Chloride 109 (H) 97 - 108 mmol/L    CO2 26 21 - 32 mmol/L    Anion gap 11 5 - 15 mmol/L    Glucose 116 (H) 65 - 100 mg/dL    BUN 42 (H) 6 - 20 mg/dL    Creatinine 2.71 (H) 0.70 - 1.30 mg/dL    BUN/Creatinine ratio 15 12 - 20      GFR est AA 27 (L) >60 ml/min/1.73m2    GFR est non-AA 23 (L) >60 ml/min/1.73m2    Calcium 9.4 8.5 - 10.1 mg/dL       Radiologic Studies -   [unfilled]  CT Results  (Last 48 hours)               03/14/21 0012  CT NECK SOFT TISSUE WO CONT Final result    Impression:  1. Study limited due to the lack of IV contrast.   2.  Asymmetric enlargement of the right parotid gland with trans-spatial   inflammatory change of the right neck compatible with cellulitis and parotiditis   in the appropriate clinical setting.  No definitive evidence of a focal mass or   drainable fluid collection. 3.  Pansinus disease. Narrative:  Study: Neck CT without contrast.       Clinical Indication: Right-sided neck mass . Comparison: Cervical spine CT dated 2/23/2021. Technique: Routine volume acquisition of the was without IV contrast. Coronal   and sagittal reconstructions were generated and reviewed. Dose reduction: All CT   scans at this facility are performed using dose reduction optimization   techniques as appropriate to a performed exam including the following-automated   exposure control, adjustments of mA and/or Kv according to patient size, or use   of iterative reconstructive technique. Findings: The soft tissues of the neck are suboptimally evaluated without IV contrast.   Within this limitation there is asymmetric enlargement of the right parotid   gland with subcutaneous stranding and skin thickening of the right neck. No   definitive focal mass or drainable fluid collection is identified. Inflammatory   change also extends to the right submandibular region. A punctate locule of gas   in the right neck (series 201, image 40) is nonspecific and may be related to   venous access. Scattered prominent right cervical lymph nodes are nonspecific. The left parotid gland, left submandibular gland, and thyroid gland are   unremarkable. Scattered calcified atherosclerosis. Medialization of the bilateral common and   internal carotid arteries. At least partial opacification of the frontal sinuses. Opacification of the   ethmoid air cells and partial opacification of the nasal passages. Complete   opacification of the sphenoid sinus. Complete opacification of the left   maxillary sinus with opacification expansion of the left ostiomeatal complex. Right maxillary sinus mucosal thickening. Absent native lenses. The included intracranial contents are unremarkable. Multilevel cervical spondylosis.  Partially visualized median sternotomy changes. The included lung apices are clear. CXR Results  (Last 48 hours)    None          Medical Decision Making and ED Course   I am the first provider for this patient. I reviewed the vital signs, available nursing notes, past medical history, past surgical history, family history and social history. Vital Signs-Reviewed the patient's vital signs. Records Reviewed: Nursing note reviewed      ED Course:   Initial assessment performed. The patients presenting problems have been discussed, and they are in agreement with the care plan formulated and outlined with them. I have encouraged them to ask questions as they arise throughout their visit. I discussed with ENT on-call (Dr Danii Malone) -recommends admission with IV antibiotic and will consult. I discussed with hospitalist admission. Procedures     Michi Stiles MD                Disposition     Admitted      DISCHARGE PLAN:  1. Current Discharge Medication List      CONTINUE these medications which have NOT CHANGED    Details   furosemide (Lasix) 40 mg tablet Take 40 mg by mouth daily. ferrous sulfate 325 mg (65 mg iron) tablet TAKE ONE TABLET BY MOUTH EACH DAY WITH BREAKFAST. Qty: 30 Tab, Refills: 2      simvastatin (ZOCOR) 40 mg tablet TAKE ONE TABLET BY MOUTH AT BEDTIME. Qty: 90 Tab, Refills: 1      amLODIPine (NORVASC) 5 mg tablet Take 1 Tab by mouth daily for 60 days. Qty: 30 Tab, Refills: 1    Associated Diagnoses: Essential hypertension      escitalopram oxalate (LEXAPRO) 20 mg tablet Take 1 Tab by mouth daily for 90 days. 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 Qty: 90 Tab, Refills: 1    Associated Diagnoses: Insomnia, unspecified type      melatonin 10 mg TbER TAKE ONE TABLET BY MOUTH AT BEDTIME.   Qty: 90 Tab, Refills: 1 magnesium oxide (MAG-OX) 400 mg tablet TAKE ONE TABLET BY MOUTH DAILY  Qty: 90 Tab, Refills: 1    Associated Diagnoses: Hypomagnesemia      tamsulosin (FLOMAX) 0.4 mg capsule Take 1 Cap by mouth daily. Qty: 90 Cap, Refills: 3      potassium chloride (K-DUR, KLOR-CON) 20 mEq tablet TAKE 1 TABLET BY MOUTH EACH MORNING WITH FOOD. Qty: 90 Tab, Refills: 1      ferrous sulfate (IRON) 325 mg (65 mg iron) EC tablet TAKE ONE TABLET BY MOUTH EACH DAY WITH BREAKFAST. Qty: 90 Tab, Refills: 1      timolol (TIMOPTIC) 0.5 % ophthalmic solution INSTILL 1 DROPS INTO RIGHT EYE ONCE DAILY  Qty: 5 mL, Refills: 5      diclofenac (VOLTAREN) 1 % gel APPLY TWO GRAMS FOUR TIMES DAILY AS NEEDED FOR ELBOW PAIN  Qty: 200 g, Refills: 3      thiamine HCL (B-1) 100 mg tablet Take 1 Tab by mouth daily for 180 days. Qty: 90 Tab, Refills: 1      folic acid (FOLVITE) 1 mg tablet Take 1 Tab by mouth daily for 180 days. Qty: 90 Tab, Refills: 1      allopurinoL (ZYLOPRIM) 100 mg tablet TAKE 1 TABLET BY MOUTH EVERY DAY  Qty: 90 Tab, Refills: 2      terbinafine HCL (LAMISIL) 250 mg tablet Take 1 Tab by mouth daily. Qty: 90 Tab, Refills: 0    Associated Diagnoses: Onychomycosis      ammonium lactate (AMLACTIN) 12 % topical cream Apply  to affected area two (2) times a day. rub in to affected area well  Qty: 280 g, Refills: 5    Associated Diagnoses: Xerosis cutis         STOP taking these medications       fesoterodine (Toviaz) 4 mg SR tablet Comments:   Reason for Stoppin.   Follow-up Information    None       3. Return to ED if worse     Diagnosis     Clinical Impression:   1. Mass of right side of neck    2. Parotiditis    3. Cellulitis, unspecified cellulitis site        Attestations:    Lenny Nielson MD    Please note that this dictation was completed with [employer], the [employer] voice recognition software.   Quite often unanticipated grammatical, syntax, homophones, and other interpretive errors are inadvertently transcribed by the computer software. Please disregard these errors. Please excuse any errors that have escaped final proofreading. Thank you.

## 2021-03-14 NOTE — CONSULTS
Otolaryngology-Head and Neck Surgery  Inpatient Consultation    Patient: Julian Tinoco YOB: 1935  MRN: [id number]  Date of Service:  3/14/2021    Reason for Consultation:  Neck mass   Requesting Physician:  EVANGELISTA Hays    History of Present Illness: Julian Tinoco is a 81y.o. year old male with PMH dementia, CHF, CKD, multiple myeloma admitted with R facial swelling. History is largely from discussion with ER, as the patient is confused and a poor historian. He does admit to R facial pain. Thinks the swelling started 2 weeks ago, though discussion per the ER with the caregiver suggests facial swelling has been more recent      Multiple recent ER visits for falls, altered mental status     Past Medical History:  Past Medical History:   Diagnosis Date    Anemia     Arthritis     CKD (chronic kidney disease), stage III 2011    Dementia ([address]     Depression     Erectile dysfunction     GERD (gastroesophageal reflux disease)     GI bleed     Gout     gout, osteoarthritis    Heart failure ([address]     acute heart failure    Hemorrhoids     Hypertension     Multiple myeloma ([address]     PAF (paroxysmal atrial fibrillation) ([address] 02/17/2016    s/p cardioversion    Pulmonary HTN ([address]     S/P AVR (aortic valve replacement) 07/26/2016    merrill gill md -     S/P cardiac cath 7/6/16    severe ai, normal coronaries     S/P colonoscopy with polypectomy 05/09/2016    Kar Dennison MD    Stroke [facility])        Past Surgical History:   Past Surgical History:   Procedure Laterality Date    HX HEENT      nasal procedure 20 years ago    HX KNEE REPLACEMENT Bilateral     bilateral knee replacement    HX ORTHOPAEDIC Bilateral     wrist    HX UROLOGICAL  01/05/2020    cystoscopy     CT CARDIAC SURG PROCEDURE UNLIST      cardiac cath   Connye Sole CT CARDIAC SURG PROCEDURE UNLIST  07/26/2016    AVR       Medications:   Current Outpatient Medications   Medication Instructions    allopurinoL (ZYLOPRIM) 100 mg tablet TAKE 1 TABLET BY MOUTH EVERY DAY   • amLODIPine (NORVASC) 5 mg, Oral, DAILY   • ammonium lactate (AMLACTIN) 12 % topical cream Topical, 2 TIMES DAILY, rub in to affected area well   • diclofenac (VOLTAREN) 1 % gel APPLY TWO GRAMS FOUR TIMES DAILY AS NEEDED FOR ELBOW PAIN   • escitalopram oxalate (LEXAPRO) 20 mg, Oral, DAILY   • ferrous sulfate (IRON) 325 mg (65 mg iron) EC tablet TAKE ONE TABLET BY MOUTH EACH DAY WITH BREAKFAST.   • folic acid (FOLVITE) 1 mg, Oral, DAILY   • furosemide (LASIX) 40 mg, Oral, DAILY   • magnesium oxide (MAG-OX) 400 mg tablet TAKE ONE TABLET BY MOUTH DAILY   • melatonin 10 mg TbER TAKE ONE TABLET BY MOUTH AT BEDTIME.   • potassium chloride (K-DUR, KLOR-CON) 20 mEq tablet TAKE 1 TABLET BY MOUTH EACH MORNING WITH FOOD.   • simvastatin (ZOCOR) 40 mg tablet TAKE ONE TABLET BY MOUTH AT BEDTIME.   • tamsulosin (FLOMAX) 0.4 mg, Oral, DAILY   • terbinafine HCL (LAMISIL) 250 mg, Oral, DAILY   • thiamine HCL (B-1) 100 mg, Oral, DAILY   • timolol (TIMOPTIC) 0.5 % ophthalmic solution INSTILL 1 DROPS INTO RIGHT EYE ONCE DAILY   • traZODone (DESYREL) 50 mg tablet TAKE ONE TABLET BY MOUTH AT BEDTIME.       Allergies:   Allergies   Allergen Reactions   • Amiodarone Other (comments)     Bradycardia -excessive   • Carvedilol Unknown (comments)       Social History:   Social History     Socioeconomic History   • Marital status:    Tobacco Use   • Smoking status: Former Smoker     Years: 20.00   • Smokeless tobacco: Never Used   Substance and Sexual Activity   • Alcohol use: Not Currently     Frequency: Never     Binge frequency: Never   • Drug use: Never   • Sexual activity: Not Currently       Family History:  Family History   Problem Relation Age of Onset   • Anemia Mother    • No Known Problems Father    • Other Brother         drug abuse       Review of Systems:  Not able to obtain due to pt mental status  Consitutional: denies fever, excessive weight gain or loss.  Eyes: denies  diplopia, eye pain. Integumentary: denies new concerning skin lesions. Ears, Nose, Mouth, Throat: denies except as per HPI. Endocrine: denies hot or cold intolerance, increased thirst.  Respiratory: denies cough, hemoptysis, wheezing  Gastrointestinal: denies trouble swallowing, nausea, emesis, regurgitation  Musculoskeletal: denies muscle weakness or wasting  Cardiovascular: denies chest pain, shortness of breath  Neurologic: denies seizures, numbness or tingling, syncope  Hematologic: denies easy bleeding or bruising    Physical Examination:   Visit Vitals  /83   Pulse 91   Temp 99.8 °F (37.7 °C)   Resp 22   Ht 5' 8\" (1.727 m)   Wt 155 lb (70.3 kg)   SpO2 97%   BMI 23.57 kg/m²       General: Comfortable, confused. Alert, not oriented to time or place  Follows commands   Voice: Strong, speaking in full sentences, no stridor    Face: Significant right facial edema   Ears: External ears unremarkable. Nose: External nose unremarkable. Oral Cavity / Oropharynx:  Edentulous. No trismus. Thick purulence coating entire oral cavity. Very dry oral mucosa. Likely purulence from R stensons duct. Uvula midline. Tonsils unremarkable. Neck:  Significant R parotid swelling, minimal overlying erythema. Appears to be tender on palpation. Palpable laryngeal landmarks. Full neck range of motion       CT ST neck without contrast  Images reviewed     IMPRESSION  1. Study limited due to the lack of IV contrast.  2.  Asymmetric enlargement of the right parotid gland with trans-spatial  inflammatory change of the right neck compatible with cellulitis and parotiditis  in the appropriate clinical setting. No definitive evidence of a focal mass or  drainable fluid collection. 3.  Pansinus disease. Compared with CT c spine from 2/23 at which time R facial edema not visualized  Sinus disease noted on prior images    WBC 12.9  Creatinine 2.71  Lactic acid 1.0    Assessment and Plan:   1. R parotitis  2.  R facial swelling - Exam and CT consistent with parotitis.  CT limited due to non contrast status (CKD)   - Very dry oral mucosa suggesting dehydration contributing to development of parotitis  - Add warm compresses at least 2x daily if not more  - Massage by RN if patient tolerates BID   - Culture obtained of purulence from Cecilia duct  - Would Rx with MRSA coverage given nursing home status   - Continue IV antibiotics, hydration as medically tolerates  - Sialogogues - discussed with RN to obtain lemon wedges from cafeteria - unclear if patient will tolerate this but will be helpful if he's agreeable  - Ok for diet from my end  - Following with you, call with any questions      MD Dania Miguel [address] [facility]  [address] [facility] [address]  [facility]  Office Phone: [phone]

## 2021-03-15 LAB
DATE LAST DOSE: 0
REPORTED DOSE,DOSE: 0 UNITS
VANCOMYCIN SERPL-MCNC: 8.1 UG/ML

## 2021-03-15 PROCEDURE — 74011250637 HC RX REV CODE- 250/637: Performed by: HOSPITALIST

## 2021-03-15 PROCEDURE — 99232 SBSQ HOSP IP/OBS MODERATE 35: CPT | Performed by: OTOLARYNGOLOGY

## 2021-03-15 PROCEDURE — 99222 1ST HOSP IP/OBS MODERATE 55: CPT | Performed by: INTERNAL MEDICINE

## 2021-03-15 PROCEDURE — 65270000029 HC RM PRIVATE

## 2021-03-15 PROCEDURE — 74011250636 HC RX REV CODE- 250/636: Performed by: HOSPITALIST

## 2021-03-15 PROCEDURE — 74011000258 HC RX REV CODE- 258: Performed by: FAMILY MEDICINE

## 2021-03-15 PROCEDURE — 74011250636 HC RX REV CODE- 250/636: Performed by: FAMILY MEDICINE

## 2021-03-15 PROCEDURE — 74011250637 HC RX REV CODE- 250/637: Performed by: INTERNAL MEDICINE

## 2021-03-15 PROCEDURE — 36415 COLL VENOUS BLD VENIPUNCTURE: CPT

## 2021-03-15 PROCEDURE — 97530 THERAPEUTIC ACTIVITIES: CPT

## 2021-03-15 PROCEDURE — 80202 ASSAY OF VANCOMYCIN: CPT

## 2021-03-15 PROCEDURE — 97161 PT EVAL LOW COMPLEX 20 MIN: CPT

## 2021-03-15 RX ORDER — FOLIC ACID 1 MG/1
TABLET ORAL
Qty: 90 TAB | Refills: 1 | Status: SHIPPED | OUTPATIENT
Start: 2021-03-15 | End: 2021-04-30 | Stop reason: SDUPTHER

## 2021-03-15 RX ORDER — MAGNESIUM GLUCONATE 27 MG(500)
TABLET ORAL
Qty: 90 TAB | Refills: 1 | Status: SHIPPED | OUTPATIENT
Start: 2021-03-15 | End: 2021-09-11

## 2021-03-15 RX ORDER — TRAZODONE HYDROCHLORIDE 50 MG/1
TABLET ORAL
Qty: 90 TAB | Refills: 0 | Status: SHIPPED | OUTPATIENT
Start: 2021-03-15 | End: 2021-04-30 | Stop reason: SDUPTHER

## 2021-03-15 RX ORDER — LANOLIN ALCOHOL/MO/W.PET/CERES
CREAM (GRAM) TOPICAL
Qty: 90 TAB | Refills: 1 | Status: SHIPPED | OUTPATIENT
Start: 2021-03-15 | End: 2021-04-30 | Stop reason: SDUPTHER

## 2021-03-15 RX ORDER — NYSTATIN 100000 [USP'U]/ML
500000 SUSPENSION ORAL 4 TIMES DAILY
Status: DISCONTINUED | OUTPATIENT
Start: 2021-03-15 | End: 2021-03-22 | Stop reason: HOSPADM

## 2021-03-15 RX ORDER — ASCORBIC ACID 250 MG
TABLET ORAL
Qty: 90 TAB | Refills: 1 | Status: SHIPPED | OUTPATIENT
Start: 2021-03-15 | End: 2021-04-30 | Stop reason: SDUPTHER

## 2021-03-15 RX ADMIN — Medication 400 MG: at 11:30

## 2021-03-15 RX ADMIN — VANCOMYCIN HYDROCHLORIDE 1000 MG: 1 INJECTION, POWDER, LYOPHILIZED, FOR SOLUTION INTRAVENOUS at 06:23

## 2021-03-15 RX ADMIN — DICLOFENAC SODIUM 2 G: 10 GEL TOPICAL at 00:08

## 2021-03-15 RX ADMIN — ATORVASTATIN CALCIUM 20 MG: 20 TABLET, FILM COATED ORAL at 20:59

## 2021-03-15 RX ADMIN — NYSTATIN 500000 UNITS: 100000 SUSPENSION ORAL at 20:59

## 2021-03-15 RX ADMIN — NYSTATIN 500000 UNITS: 100000 SUSPENSION ORAL at 17:28

## 2021-03-15 RX ADMIN — PIPERACILLIN AND TAZOBACTAM 3.38 G: 3; .375 INJECTION, POWDER, LYOPHILIZED, FOR SOLUTION INTRAVENOUS at 20:59

## 2021-03-15 RX ADMIN — DICLOFENAC SODIUM 2 G: 10 GEL TOPICAL at 11:32

## 2021-03-15 RX ADMIN — FOLIC ACID 1 MG: 1 TABLET ORAL at 11:30

## 2021-03-15 RX ADMIN — PIPERACILLIN AND TAZOBACTAM 3.38 G: 3; .375 INJECTION, POWDER, LYOPHILIZED, FOR SOLUTION INTRAVENOUS at 11:29

## 2021-03-15 RX ADMIN — ESCITALOPRAM OXALATE 20 MG: 10 TABLET ORAL at 11:29

## 2021-03-15 RX ADMIN — DICLOFENAC SODIUM 2 G: 10 GEL TOPICAL at 17:29

## 2021-03-15 RX ADMIN — HEPARIN SODIUM 5000 UNITS: 5000 INJECTION INTRAVENOUS; SUBCUTANEOUS at 19:56

## 2021-03-15 RX ADMIN — HEPARIN SODIUM 5000 UNITS: 5000 INJECTION INTRAVENOUS; SUBCUTANEOUS at 11:31

## 2021-03-15 RX ADMIN — HEPARIN SODIUM 5000 UNITS: 5000 INJECTION INTRAVENOUS; SUBCUTANEOUS at 04:48

## 2021-03-15 RX ADMIN — AMLODIPINE BESYLATE 5 MG: 5 TABLET ORAL at 11:29

## 2021-03-15 RX ADMIN — DICLOFENAC SODIUM 2 G: 10 GEL TOPICAL at 06:23

## 2021-03-15 NOTE — PROGRESS NOTES
Progress Note    Patient: Franny Naranjo MRN: [id number]  SSN: [id number]    YOB: 1935  Age: 81 y.o. Sex: male      Admit Date: 3/13/2021    LOS: 1 day     Subjective:     85M, H/o dementia, CHF with unknown EF, BPH with right parotitis with cellulitis on vanc and zosyn. Dehydration increased the risk of parotitis. ENT following. SUBJECTIVE  Patient cannot get a meaningful history  Does not complain of any pain    We will continue broadapectrum Abx, Follow ENT  Gram positve cocci in blood, and GNR in edgard duct  He would once stable will return to group home      Review of Systems:  The patient has dementia but able to answer basic questions  Constitutional:  denies weight loss, no fever, no chills, no night sweats  Eye: No recent visual disturbances, no discharge, no double vision  Ear/nose/mouth/throat : No hearing disturbance, n++ ear pain, no nasal congestion, ++ sore throat, ++ trouble swallowing. Respiratory : No trouble breathing, no cough, no shortness of breath, no hemoptysis, no wheezing  Cardiovascular : No chest pain, no palpitation, no racing of heart, no orthopnea, no paroxysmal nocturnal dyspnea, no peripheral edema  Gastrointestinal : No nausea, no vomiting, no diarrhea, constipation, heartburn, abdominal pain  Genitourinary : No dysuria, no hematuria, no increased frequency, incontinence,  Lymphatics : No swollen glands -Neck, axillary, inguinal  Endocrine : No excessive thirst, no polyuria no cold intolerance, no heat intolerance. Immunologic : No hives, urticaria, no seasonal allergies,   Musculoskeletal : Left upper back pain.   No joint swelling, pain, effusion,  no neck pain,   Integumentary : No rash, no pruritus, no ecchymosis  Hematology : No petechiae, No easy bruising,  No tendency to bleed easy      Objective:     Vitals:    03/14/21 2030 03/15/21 0000 03/15/21 0148 03/15/21 0400   BP: 126/86  132/86    Pulse: 100 (!) 101 (!) 101 90   Resp: 20  18    Temp: 99.3 °F (37.4 °C)  98.8 °F (37.1 °C)    SpO2: 99%  97%    Weight:       Height:            Intake and Output:  Current Shift: No intake/output data recorded. Last three shifts: 03/13 1901 - 03/15 0700  In: 100 [I.V.:100]  Out: -       Physical Exam:   General: Alert and Oriented to name. Cooperative and friendly. No acute distress. Nourished and well developed. Requesting something to drink at the time of admission. Head/Eyes: Normocephalic, atraumatic, EOMI, PERRLA. Nose/Mouth: Turbinates within normal limits, No drainage. Mucous membranes are dry. Throat and Neck:  Right parotid mass appreciated. Size is approximately 4 x 4 inches (measuring tape unavailable). Anterior cervical lymphadenopathy appreciated. There is mild erythema of mass and patient reports some pain with palpation. Mass is firm to the tough without appreciated fluctuance. No JVD or thyromegaly appreciated. Cervical spine has good range of motion without pain. Lungs: Clear to auscultation bilaterally without wheezes, rhonchi or crackles. Good air movement bilaterally. Symmetric chest rise with respirations. Heart: Regular rate and rhythm. Normal S1/S2. No appreciated murmurs, rubs or gallops. No lower extremity edema. Abdomen: Soft, non-tender, non-distended. Bowel sounds present in all four quadrants. No masses or hepatosplenomegaly appreciated. Extremities:  Atraumatic. Able to move all extremities symmetrically. No abnormal bony protuberances appreciated. Joints without swelling. Back: No pain with palpation over spinous processes or paraspinal musculature. No CVA tenderness. Skin: Clean, dry and intact without appreciated lesions. Neurologic: Alert to name only. Cranial nerves 2-12 are grossly intact. Facial features are symmetric. Speech is fluent and clear. No focal deficits appreciated. Psychiatric: Normal affect, normal thought process, good eye contact.        Lab/Data Review:  Recent Results (from the past 24 hour(s)) VANCOMYCIN, RANDOM    Collection Time: 03/15/21  3:15 AM   Result Value Ref Range    Vancomycin, random 8.1 ug/mL    Reported dose date 0      Reported dose: 0 Units         Assessment and plan:        (1) right facial mass: parotitis with cellulitis D/D: malignancy. On IV abx, ENT following, pending stensons duct cultures which grew GNR    (2) bacteremia: Shaheen Sheridan. On vancomycin. (2) hypernatremia : 0.45% saline, allow diet    (3) KO: prerenal. Hold lasix, 0.45% saline    (4) HTN: resume amlodipine    (2) dementia: complicates all aspects of care    (3) multiple myeloma: unknown    (4) CHF: hold lasix.  EF unknwon    DVT ppx: heparin subQ    DISPO: group home when ok with ENT    Signed By: Anjelica Hale MD     March 15, 2021

## 2021-03-15 NOTE — CONSULTS
Infectious Disease Consult Note    Reason for Consult: Right facial cellulitis/parotitis   Date of Consultation: March 15, 2021  Date of Admission: 3/13/2021  Referring Physician: Hospitalist        HPI: 85-y.o BM, admitted on 3/14 w right parotitis/facial cellulitis after presenting with right-sided facial swelling. ID consultation was requested today due to isolation of GPC in clusters from Bcx and GNR from oral secretions. Pt is a very very limited historian, history obtained from chart. His medical history is significant for CKD, dementia, depression, GERD, gouty arthritis, HTN, multiple myeloma, HTN, aortic valve replacement and CVA. Per records he is a long-term resident of [address]. Head and neck CT on 3/14 revealed right/cellulitis with no drainable collection. He is afebrile w a a moderate leukocytosis of 12.9 on routine labs. GPC in clusters were isolated from 2 sets of Bcxs collected on 03/14 and GNR/Yeast from mouth cavity. He is on Vanc and Zosyn, has no docutmented allergies. Pt admitted to right facial pain during my assessment. Review of Systems: Limited, only reported right sided facial pain/discomfort. Past Medical History:  Past Medical History:   Diagnosis Date    Anemia     Arthritis     CKD (chronic kidney disease), stage III 2011    [facility] [address]     Depression     Erectile dysfunction     GERD (gastroesophageal reflux disease)     GI bleed     Gout     gout, osteoarthritis    Heart failure (HCC)     acute heart failure    Hemorrhoids     Hypertension     Multiple myeloma (HCC)     PAF (paroxysmal atrial fibrillation) ([facility] [address] 02/17/2016    s/p cardioversion    Pulmonary HTN ([facility] [address]     S/P AVR (aortic valve replacement) 07/26/2016    merrill gill md -     S/P cardiac cath 7/6/16    severe ai, normal coronaries     S/P colonoscopy with polypectomy 05/09/2016    Jayant P. Colletta Felts, MD    [facility])        Past surgical history   Past Surgical History:   Procedure Laterality Date    HX HEENT      nasal procedure 20 years ago    HX KNEE REPLACEMENT Bilateral     bilateral knee replacement    HX ORTHOPAEDIC Bilateral     wrist    HX UROLOGICAL  01/05/2020    cystoscopy     [place] CARDIAC SURG PROCEDURE UNLIST      cardiac cath    [place] CARDIAC SURG PROCEDURE UNLIST  07/26/2016    AVR        Social History   Social History     Tobacco Use    Smoking status: Former Smoker     Years: 20.00    Smokeless tobacco: Never Used   Substance Use Topics    Alcohol use: Not Currently     Frequency: Never     Binge frequency: Never    Drug use: Never        Family history   Family History   Problem Relation Age of Onset    Anemia Mother     No Known Problems Father     Other Brother         drug abuse          Allergies: Allergies   Allergen Reactions    Amiodarone Other (comments)     Bradycardia -excessive    Carvedilol Unknown (comments)         Medications:  No current facility-administered medications on file prior to encounter. Current Outpatient Medications on File Prior to Encounter   Medication Sig Dispense Refill    Vitamin C 250 mg tablet TAKE ONE TABLET BY MOUTH DAILY 90 Tab 1    traZODone (DESYREL) 50 mg tablet TAKE ONE TABLET BY MOUTH AT BEDTIME. 90 Tab 0    melatonin 10 mg TbER TAKE ONE TABLET BY MOUTH AT BEDTIME. 90 Tab 1    magnesium oxide (MAG-OX) 400 mg tablet TAKE ONE TABLET BY MOUTH DAILY 90 Tab 1    folic acid (FOLVITE) 1 mg tablet TAKE ONE TABLET BY MOUTH DAILY 90 Tab 1    furosemide (Lasix) 40 mg tablet Take 40 mg by mouth daily.  simvastatin (ZOCOR) 40 mg tablet TAKE ONE TABLET BY MOUTH AT BEDTIME. 90 Tab 1    amLODIPine (NORVASC) 5 mg tablet Take 1 Tab by mouth daily for 60 days. 30 Tab 1    escitalopram oxalate (LEXAPRO) 20 mg tablet Take 1 Tab by mouth daily for 90 days. 30 Tab 2    tamsulosin (FLOMAX) 0.4 mg capsule Take 1 Cap by mouth daily.  90 Cap 3    potassium chloride (K-DUR, KLOR-CON) 20 mEq tablet TAKE 1 TABLET BY MOUTH EACH MORNING WITH FOOD. 90 Tab 1    ferrous sulfate (IRON) 325 mg (65 mg iron) EC tablet TAKE ONE TABLET BY MOUTH EACH DAY WITH BREAKFAST. 90 Tab 1    timolol (TIMOPTIC) 0.5 % ophthalmic solution INSTILL 1 DROPS INTO RIGHT EYE ONCE DAILY 5 mL 5    diclofenac (VOLTAREN) 1 % gel APPLY TWO GRAMS FOUR TIMES DAILY AS NEEDED FOR ELBOW PAIN 200 g 3    thiamine HCL (B-1) 100 mg tablet Take 1 Tab by mouth daily for 180 days. 90 Tab 1    allopurinoL (ZYLOPRIM) 100 mg tablet TAKE 1 TABLET BY MOUTH EVERY DAY 90 Tab 2    terbinafine HCL (LAMISIL) 250 mg tablet Take 1 Tab by mouth daily. 90 Tab 0    ammonium lactate (AMLACTIN) 12 % topical cream Apply  to affected area two (2) times a day.  rub in to affected area well 280 g 5       Physical Exam:    Vitals:   Patient Vitals for the past 24 hrs:   Temp Pulse Resp BP SpO2   03/15/21 0400  90      03/15/21 0148 98.8 °F (37.1 °C) (!) 101 18 132/86 97 %   03/15/21 0000  (!) 101      03/14/21 2030 99.3 °F (37.4 °C) 100 20 126/86 99 %   03/14/21 2000  83      ·   · GEN: NAD, alert, but not following commands   · HEENT: NCAT, PERRLA, right facial swelling/induration, + erythema, dry mucosa   · HEART: S1, S2+, RRR, No murmur   · Lungs: CTA B/l decreased at the bases, no wheeze/rhonchi   · Abdomen: soft, ND, NT, +BS   · Genitourinary: no genital discharge, no cuba  · Extremities: Trace edema  · Skin: no rash    Labs:   Recent Labs     03/13/21  2245   WBC 12.9*   HGB 9.6*   HCT 31.4*        Recent Labs     03/13/21  2245   BUN 42*   CREA 2.71*     Lab Results   Component Value Date/Time    C-Reactive protein 16.40 (H) 03/20/2011 02:10 PM      Lab Results   Component Value Date/Time    Sed rate (ESR) >130 (H) 03/20/2011 02:10 PM          Microbiology Data:  - Blood Cx 03/14: GPC in clusters   - Wound Cx (Mouth) 03/14: GNR and Yeast     Imaging:   CT neck soft tissue 03/14:  Asymmetric enlargement of the right parotid gland with trans-spatial inflammatory change of the right neck compatible with cellulitis and parotiditis in the appropriate clinical setting. No definitive evidence of a focal mass or  drainable fluid collection. Pansinus disease. Assessment / Plan:     1. Right parotitis/facial cellulitis: Sig swelling, induration/erythema, + Halitosis       No fluctuance of drainable collection on CT. Afebrile moderate leukocytosis on routine labs       GNR and Yeast isolated from Cx of mouth      Continue on Vanc and Zosyn pending final Cx results. Yeast likely normal floral, nystatin ordered       Routine labs in the morning. Agree w warm compress per ENT     2. GPC in clusters bacteremia: Unclear source       Repeat Bcx, continue on Vanc     3. Dementia w baseline confusion, poor insight     4. Acute on chronic CKD: Will renally dose all antibiotics    5.  H/o aortic Valve replacement and CKD          Ellie Ruiz MD     3/15/2021

## 2021-03-15 NOTE — PROGRESS NOTES
Consult for Vancomycin Dosing by Pharmacy by Dr. Stone Ken provided for this 81y.o. year old male , for indication of cellulitis of right face/neck. Day of Therapy: 2    Goal of Level(s): 10 - 15 mcg/dL    Other Current Antibiotics: Zosyn      Serum Creatinine Creatinine   Date Value Ref Range Status   03/13/2021 2.71 (H) 0.70 - 1.30 mg/dL Final   02/27/2021 2.11 (H) 0.70 - 1.30 mg/dL Final   02/24/2021 2.36 (H) 0.70 - 1.30 mg/dL Final      Creatinine Clearance Estimated Creatinine Clearance: 19.6 mL/min (A) (based on SCr of 2.71 mg/dL (H)). BUN Lab Results   Component Value Date/Time    BUN 42 (H) 03/13/2021 10:45 PM      WBC Lab Results   Component Value Date/Time    WBC 12.9 (H) 03/13/2021 10:45 PM      Temp 99.4 °F (37.4 °C)     Last Level: No results found for: VANCT   Vancomycin, random   Date Value Ref Range Status   03/15/2021 8.1 ug/mL Final     Comment:     No reference range has been established. Ht Readings from Last 1 Encounters:   03/13/21 172.7 cm (68\")        Wt Readings from Last 1 Encounters:   03/13/21 70.3 kg (155 lb)     Ideal body weight: 68.4 kg (150 lb 12.7 oz)  Adjusted ideal body weight: 69.2 kg (152 lb 7.6 oz)     Previous Regimen: Vancomycin 1000 mg iv x 1 dose yesterday @ 0321    New Regimen:     Vancomycin random level drawn this AM is low at 8.1 mcg/ml. Give 1000 mg iv x 1 dose today. Will order another random level for tomorrow with AM labs. Pharmacy to follow daily and will make changes to dose and/or frequency based on clinical status.   _________________________________     Pharmacist [address] [place] [address], PHARMD

## 2021-03-15 NOTE — PROGRESS NOTES
Reason for Admission:                    RUR Score:           PCP: First and Last name:  Constanza Cedillo   Name of Practice: [facility]    Are you a current patient: Yes/No: Yes   Approximate date of last visit: Feb 2021   Can you do a virtual visit with your PCP: Yes             Resources/supports as identified by patient/family:   [facility] and family support                Top Challenges facing patient (as identified by patient/family and CM): Finances/Medication cost?  [facility] for medications. Transportation? Patient doesn't drive. Owner of [facility] transports to medical appointment's. Support system or lack thereof? Staff at [facility]. Family support                       Living arrangements? Lives in [place]           Self-care/ADLs/Cognition? Independent w/all ADL's & IADL's           Current Advanced Directive/Advance Care Plan:  Full Code      Healthcare Decision Maker:   Click here to complete [address] including selection of the Healthcare Decision Maker Relationship (ie \"Primary\")                                Plan for utilizing home health:    No prior [facility] in the past.                       Transition of Care Plan:                  Patient is currently living at an [facility] in [place]. Owner is Sydnee Vargas RN ([facility] can be reached @ [phone]. Per [facility], patient is independent w/all ADL's & IADL's and requires no assistance. Mrs. Eaton, informed writer \"the listed son isn't his biological son-it's his step son by marriage. Mr. Clark Koenig, doesn't have any biological children or siblings. \"   Has been at the [facility] since June 2020. PCP is Dr. Constanza Cedillo is the new assigned PCP. Last seen Feb 2021. Upcoming scheduled PCP is April 1st, 2021. [facility]. Informed d/c recommendation is for SNF. Mrs. Rebollar, voiced this isn't an issue at time of discharge, and once ready for d/c from SNF will return to her [facility]. Spoke to listed son Romina Reyna @ [phone]. Identified self, role, and nature of the call. Son voiced he's been trying to get POA, and his father has been resistant. Son didn't inform writer he's not the biological son. Will follow up. Will continue to follow re: discharge needs and disposition. Care Management Interventions  PCP Verified by CM: Yes(Feb 2021)  Mode of Transport at Discharge:  Other (see comment)(Transport)  Transition of Care Consult (CM Consult): Discharge Planning  Discharge Durable Medical Equipment: (No home O2 or DME)  Current Support Network: Assisted Living(Two story home )  Confirm Follow Up Transport: Other (see comment)(Transport )  Discharge Location  Discharge Placement: Skilled nursing facility      DRE Shea

## 2021-03-15 NOTE — PROGRESS NOTES
Notified by PT that pt demonstrates s/s dysphagia at bedside. Please order SLP consult if medically indicated.

## 2021-03-15 NOTE — PROGRESS NOTES
PHYSICAL THERAPY EVALUATION  Patient: Daly Gill (81 y.o. male)  Date: 3/15/2021  Primary Diagnosis: Parotiditis [K11.20]  Cellulitis [L03.90]        Precautions: Fall Risk       ASSESSMENT  Pt is an 81 y.o. male with past medical history of dementia, hypertension, benign prostatic hypertrophy, chronic kidney disease, gout, admitted on 3/13/21 from local group home for evaluation of right facial swelling. Caregivers from local retirement sent patient to the ER for evaluation of right facial swelling. It is unclear how long right side of face has been swollen - caregivers reported to EMS that swelling has been there for approximately 1 to 2 days. There has been no recent fever or chills. Based on the objective data described below, the patient presents with generalized weakness, impaired functional mobility, impaired balance, and increased pain reported in vinicius LEs. Pt A&O to person only. Pt unable to provide home information due to confusion, however per chart review pt resides in an [place] at baseline. Pt's PLOF is unknown. Pt stated that he was ambulating with a RW within his care facility. Pt semi-supine in bed with RN present giving meds upon PT arrival, agreeable to evaluation. Pt appeared to have difficulty swallowing pills and required increased time as well as increased consistency to swallow - SLP notified post session. PT assisted RN with pt rolling to allow for pericare. Pt also educated on importance of keeping pt's knee extended while in bed to prevent contracture as pt's LEs were difficult to move through full PROM initially. Pt required mod A for bed mobility, min A supine <> sit, mod A sit <> stand transfers. Pt unable to complete ambulation today secondary to reported p! in vinicius feet. Pt experienced difficulty cleaning EOB with first sit<>stand attempt, was able to clear with second attempt with raised bed height to decrease knee flexion and verbal cuing to extend trunk.  Pt presented with significant swelling in vinicius knees and feet during session with pain limiting his participation. Pt returned to supine in bed with max A. Pt did well with session today with good command following and motivation to participate. Pt will benefit from continued skilled PT to address above deficits and return to PLOF. Current PT DC recommendation SNF due to increased pt motivation to improve mobility and increased skilled services required. Current Level of Function Impacting Discharge (mobility/balance): pain in vinicius feet limiting mobility, mod A for bed mobility, increased time for mobility    Other factors to consider for discharge: PLOF, LOS, severity of deficits      PLAN :  Recommendations and Planned Interventions: bed mobility training, transfer training, gait training, therapeutic exercises, patient and family training/education, and therapeutic activities      Frequency/Duration: Patient will be followed by physical therapy:  3 times a week to address goals. Recommendation for discharge: (in order for the patient to meet his/her long term goals)  SNF    This discharge recommendation:  Has been made in collaboration with the attending provider and/or case management    IF patient discharges home will need the following DME: to be determined (TBD)         SUBJECTIVE:   Patient stated I want to speed along this healing.     OBJECTIVE DATA SUMMARY:   HISTORY:    Past Medical History:   Diagnosis Date    Anemia     Arthritis     CKD (chronic kidney disease), stage III 2011    Dementia ([facility] [address]     Depression     Erectile dysfunction     GERD (gastroesophageal reflux disease)     GI bleed     Gout     gout, osteoarthritis    Heart failure (HCC)     acute heart failure    Hemorrhoids     Hypertension     Multiple myeloma (HCC)     PAF (paroxysmal atrial fibrillation) ([facility] [address] 02/17/2016    s/p cardioversion    Pulmonary HTN (HCC)     S/P AVR (aortic valve replacement) 07/26/2016    merrill gill md -     S/P cardiac cath 7/6/16    severe ai, normal coronaries     S/P colonoscopy with polypectomy 05/09/2016    Kar Rice MD    Stroke [facility])      Past Surgical History:   Procedure Laterality Date    HX HEENT      nasal procedure 20 years ago    HX KNEE REPLACEMENT Bilateral     bilateral knee replacement    HX ORTHOPAEDIC Bilateral     wrist    HX UROLOGICAL  01/05/2020    cystoscopy     [place] CARDIAC SURG PROCEDURE UNLIST      cardiac cath    [place] CARDIAC SURG PROCEDURE UNLIST  07/26/2016    AVR       Personal factors and/or comorbidities impacting plan of care:     Home Situation  Home Environment: Long term care  One/Two Story Residence: One story  Living Alone: No  Support Systems: Assisted living  Patient Expects to be Discharged to[de-identified] Group home  Current DME Used/Available at Home: Walker, rolling    EXAMINATION/PRESENTATION/DECISION MAKING:   Critical Behavior:  Neurologic State: Alert, Confused  Orientation Level: Oriented to person  Cognition: Follows commands     Hearing: Auditory  Auditory Impairment: None  Skin:  scabs present on R elbow and bruising present on vinicius LEs, RN aware  Edema: pitting edema in vinicius feet   Range Of Motion:  AROM: Generally decreased, functional    PROM: Generally decreased, functional           Strength:    Strength: Generally decreased, functional         Coordination:  Coordination: Generally decreased, functional  Vision:      Functional Mobility:  Bed Mobility:  Rolling: Moderate assistance  Supine to Sit: Minimum assistance  Sit to Supine: Moderate assistance  Scooting: Stand-by assistance  Transfers:  Sit to Stand: Moderate assistance  Stand to Sit: Minimum assistance                       Balance:   Sitting: Impaired  Sitting - Static: Fair (occasional); Supported sitting  Sitting - Dynamic: Poor (constant support)  Standing: Impaired  Standing - Static: Poor    Functional Measure:  [facility] [address] [address] AM-PAC 6 Clicks         Basic Mobility Inpatient Short Form  How much difficulty does the patient currently have. .. Unable A Lot A Little None   1. Turning over in bed (including adjusting bedclothes, sheets and blankets)? [] 1   [x] 2   [] 3   [] 4   2. Sitting down on and standing up from a chair with arms ( e.g., wheelchair, bedside commode, etc.)   [] 1   [x] 2   [] 3   [] 4   3. Moving from lying on back to sitting on the side of the bed? [] 1   [] 2   [x] 3   [] 4          How much help from another person does the patient currently need. .. Total A Lot A Little None   4. Moving to and from a bed to a chair (including a wheelchair)? [] 1   [x] 2   [] 3   [] 4   5. Need to walk in hospital room? [] 1   [x] 2   [] 3   [] 4   6. Climbing 3-5 steps with a railing? [x] 1   [] 2   [] 3   [] 4   © , Trustees of [address] [place] [address], under license to [employer]. All rights reserved     Score:  Initial:  Most Recent: X (Date: 3/15/2021 )   Interpretation of Tool:  Represents activities that are increasingly more difficult (i.e. Bed mobility, Transfers, Gait).   Score 24 23 22-20 19-15 14-10 9-7 6   Modifier CH CI CJ CK CL CM CN         Physical Therapy Evaluation Charge Determination   History Examination Presentation Decision-Making   MEDIUM  Complexity : 1-2 comorbidities / personal factors will impact the outcome/ POC  LOW Complexity : 1-2 Standardized tests and measures addressing body structure, function, activity limitation and / or participation in recreation  LOW Complexity : Stable, uncomplicated  Other outcome measures [facility] 6  LOW      Based on the above components, the patient evaluation is determined to be of the following complexity level: LOW     Pain Ratin/10 via FACES pain scale with LE movement and vinicius foot palpation, pt unable to verbalize pain rating    Activity Tolerance:   Fair    After treatment patient left in no apparent distress:   Supine in bed, Heels elevated for pressure relief, Patient positioned in left sidelying for pressure relief, Call bell within reach, Bed / chair alarm activated, and Side rails x 3 and RN updated. GOALS:    Problem: Mobility Impaired (Adult and Pediatric)  Goal: *Acute Goals and Plan of Care (Insert Text)  Description: Pt will be I with LE HEP in 7 days. Pt will perform bed mobility with min A in 7 days. Pt will perform transfers with min A in 7 days. Pt will amb 5 feet with LRAD safely with CGA in 7 days. Pt will demonstrate improvement in standing/seated static/dynamic balance from poor to fair in 7 days. Outcome: Not Met       COMMUNICATION/EDUCATION:   The patients plan of care was discussed with: Speech therapist and Registered nurse. Fall prevention education was provided and the patient/caregiver indicated understanding., Patient/family have participated as able in goal setting and plan of care. , and Patient/family agree to work toward stated goals and plan of care.        Thank you for this referral.  Flori Wynn, PT, DPT   Time Calculation: 52 mins

## 2021-03-15 NOTE — PROGRESS NOTES
Problem: Falls - Risk of  Goal: *Absence of Falls  Description: Document Franky Hummel Fall Risk and appropriate interventions in the flowsheet. Outcome: Progressing Towards Goal  Note: Fall Risk Interventions:  Mobility Interventions: Bed/chair exit alarm    Mentation Interventions: Bed/chair exit alarm         Elimination Interventions: Bed/chair exit alarm              Problem: Pressure Injury - Risk of  Goal: *Prevention of pressure injury  Description: Document Joey Scale and appropriate interventions in the flowsheet.   Outcome: Progressing Towards Goal  Note: Pressure Injury Interventions:  Sensory Interventions: Assess changes in LOC    Moisture Interventions: Absorbent underpads    Activity Interventions: PT/OT evaluation    Mobility Interventions: HOB 30 degrees or less    Nutrition Interventions: Document food/fluid/supplement intake    Friction and Shear Interventions: Apply protective barrier, creams and emollients, HOB 30 degrees or less

## 2021-03-15 NOTE — PROGRESS NOTES
[facility]  HD 2     No issues overnight  Per RN - not sure if he's getting the warm compresses or sialogogues     Visit Vitals  /86 (BP 1 Location: Right upper arm, BP Patient Position: At rest;Supine)   Pulse 90   Temp 98.8 °F (37.1 °C)   Resp 18   Ht 5' 8\" (1.727 m)   Wt 155 lb (70.3 kg)   SpO2 97%   BMI 23.57 kg/m²       Comfortable   Folllows commands and answers simple questions  Some groaning  Denies pain   Continued facial edema, probably unchanged from yesterday. Slightly softer on palpation   Minimal erythema involving skin  Continued purulence coating edgard's duct, some more purulence on massage  Not as cooperative with massage  Oral cavity mucosa remains dry though improved from yesterday    Cultures   Blood culture -   Gram pos cocci in clusters One of four bottles has been flagged positive by instrument.  Bottle has been sent to [address] [place] [address] laboratory to assess for possible growth. CALLED TO AND READ BACK BY Heri Rogers. ALONDRA AT 0841 BY SARAY          Oral culture -   Gram stain of oral stensons duct culture showing GPC, GNR and yeast      A/P Parotitis  - Cont broad spectrum abx  - Cont hydration as medically tolerates  - Continue warm compresses - discussed with RN.  Anish granados he may or may not cooperate with   - Follow culture data  - Labs per primary team   - Following with you, call with any questions     Jimi Elias MD   [facility]  [address]  [facility]  Office Phone: [phone]

## 2021-03-15 NOTE — PROGRESS NOTES
No acute events. Patient remained alert to self. Patient update given to Ms Austin Fields - patient's contact. Ms. Austin Fields questioned patient's allergy to Carvedilol and said the patient has never had an allergy to it and asked how/why it is on his allergies.

## 2021-03-16 LAB
DATE LAST DOSE: 0
REPORTED DOSE,DOSE: 0 UNITS
VANCOMYCIN SERPL-MCNC: 13.3 UG/ML

## 2021-03-16 PROCEDURE — 74011000250 HC RX REV CODE- 250: Performed by: HOSPITALIST

## 2021-03-16 PROCEDURE — 99232 SBSQ HOSP IP/OBS MODERATE 35: CPT | Performed by: INTERNAL MEDICINE

## 2021-03-16 PROCEDURE — 99231 SBSQ HOSP IP/OBS SF/LOW 25: CPT | Performed by: OTOLARYNGOLOGY

## 2021-03-16 PROCEDURE — 74011250636 HC RX REV CODE- 250/636: Performed by: HOSPITALIST

## 2021-03-16 PROCEDURE — 87040 BLOOD CULTURE FOR BACTERIA: CPT

## 2021-03-16 PROCEDURE — 80202 ASSAY OF VANCOMYCIN: CPT

## 2021-03-16 PROCEDURE — 36415 COLL VENOUS BLD VENIPUNCTURE: CPT

## 2021-03-16 PROCEDURE — 65270000029 HC RM PRIVATE

## 2021-03-16 PROCEDURE — 74011000258 HC RX REV CODE- 258: Performed by: FAMILY MEDICINE

## 2021-03-16 PROCEDURE — 74011250636 HC RX REV CODE- 250/636: Performed by: FAMILY MEDICINE

## 2021-03-16 PROCEDURE — 74011250637 HC RX REV CODE- 250/637: Performed by: HOSPITALIST

## 2021-03-16 PROCEDURE — 74011250637 HC RX REV CODE- 250/637: Performed by: INTERNAL MEDICINE

## 2021-03-16 RX ADMIN — DICLOFENAC SODIUM 2 G: 10 GEL TOPICAL at 12:41

## 2021-03-16 RX ADMIN — ATORVASTATIN CALCIUM 20 MG: 20 TABLET, FILM COATED ORAL at 20:25

## 2021-03-16 RX ADMIN — DEXTROSE AND SODIUM CHLORIDE 75 ML/HR: 5; 450 INJECTION, SOLUTION INTRAVENOUS at 05:58

## 2021-03-16 RX ADMIN — FOLIC ACID 1 MG: 1 TABLET ORAL at 09:44

## 2021-03-16 RX ADMIN — NYSTATIN 500000 UNITS: 100000 SUSPENSION ORAL at 12:40

## 2021-03-16 RX ADMIN — VANCOMYCIN HYDROCHLORIDE 1000 MG: 1 INJECTION, POWDER, LYOPHILIZED, FOR SOLUTION INTRAVENOUS at 15:07

## 2021-03-16 RX ADMIN — NYSTATIN 500000 UNITS: 100000 SUSPENSION ORAL at 22:52

## 2021-03-16 RX ADMIN — ESCITALOPRAM OXALATE 20 MG: 10 TABLET ORAL at 09:43

## 2021-03-16 RX ADMIN — PIPERACILLIN AND TAZOBACTAM 3.38 G: 3; .375 INJECTION, POWDER, LYOPHILIZED, FOR SOLUTION INTRAVENOUS at 20:25

## 2021-03-16 RX ADMIN — HEPARIN SODIUM 5000 UNITS: 5000 INJECTION INTRAVENOUS; SUBCUTANEOUS at 03:38

## 2021-03-16 RX ADMIN — PIPERACILLIN AND TAZOBACTAM 3.38 G: 3; .375 INJECTION, POWDER, LYOPHILIZED, FOR SOLUTION INTRAVENOUS at 09:44

## 2021-03-16 RX ADMIN — DICLOFENAC SODIUM 2 G: 10 GEL TOPICAL at 00:06

## 2021-03-16 RX ADMIN — NYSTATIN 500000 UNITS: 100000 SUSPENSION ORAL at 18:09

## 2021-03-16 RX ADMIN — HEPARIN SODIUM 5000 UNITS: 5000 INJECTION INTRAVENOUS; SUBCUTANEOUS at 12:40

## 2021-03-16 RX ADMIN — Medication 400 MG: at 09:44

## 2021-03-16 RX ADMIN — DICLOFENAC SODIUM 2 G: 10 GEL TOPICAL at 05:02

## 2021-03-16 RX ADMIN — HEPARIN SODIUM 5000 UNITS: 5000 INJECTION INTRAVENOUS; SUBCUTANEOUS at 20:20

## 2021-03-16 RX ADMIN — AMLODIPINE BESYLATE 5 MG: 5 TABLET ORAL at 09:44

## 2021-03-16 RX ADMIN — DEXTROSE AND SODIUM CHLORIDE 75 ML/HR: 5; 450 INJECTION, SOLUTION INTRAVENOUS at 20:35

## 2021-03-16 RX ADMIN — DICLOFENAC SODIUM 2 G: 10 GEL TOPICAL at 18:09

## 2021-03-16 RX ADMIN — NYSTATIN 500000 UNITS: 100000 SUSPENSION ORAL at 09:47

## 2021-03-16 NOTE — PROGRESS NOTES
Infectious Disease Progress Note           Subjective:   Pt seen and examined at bedside. Reported feeling hungry. Still w sig right facial swelling, denies new complaints   Objective:   Physical Exam:     Visit Vitals  /73 (BP Patient Position: At rest)   Pulse (!) 101   Temp 97.5 °F (36.4 °C)   Resp 20   Ht 5' 7.99\" (1.727 m)   Wt 145 lb 8.1 oz (66 kg)   SpO2 98%   BMI 22.13 kg/m²    O2 Flow Rate (L/min): 2 l/min O2 Device: Room air    Temp (24hrs), Av.9 °F (36.6 °C), Min:97.3 °F (36.3 °C), Max:98.8 °F (37.1 °C)    No intake/output data recorded. No intake/output data recorded. General: NAD, alert, AAO x 4  HEENT: MARIETTA, Moist mucosa, right sided facial swelling   Lungs: CTA b/l, decreased at the bases   Heart: S1S2+, RRR, no murmur  Abdo: Soft, NT, ND, +BS   Exts: No edema, + pulses b/l   Skin: Right facial swelling, sig induration and tenderness       Data Review:       Recent Days:  Recent Labs     21  2245   WBC 12.9*   HGB 9.6*   HCT 31.4*        Recent Labs     21  2245   BUN 42*   CREA 2.71*       Lab Results   Component Value Date/Time    C-Reactive protein 16.40 (H) 2011 02:10 PM      Microbiology   - Blood Cx : MRSA  - Oral wound Cx : MRSA and E. aerogenes    Diagnostics   CXR Results  (Last 48 hours)    None          Assessment/Plan     1. Right parotitis/facial cellulitis: Sig swelling, induration/erythema, no fluctuance       MRSA and E. aerogenes and Yeast isolated from Cx of mouth      Continue on Zosyn for GNR coverage and Vanc for MRSA      Afebrile, routine labs not done today. Routine labs ordered for AM       Continue warm compress per ENT      2. MRSA bacteremia: BCx positive on ,  Repeat on  is pending       H/o aortic valve replacement. On Vanc for MRSA coverage      2 D echo to eval for valvular veg     3. Dementia w baseline confusion, poor insight       4.  Acute on CKD: Repeat renal panel w morning labs Sebastian Astudillo MD    3/16/2021

## 2021-03-16 NOTE — PROGRESS NOTES
[facility]  HD 3   Vanc, zosyn    No issues overnight    Visit Vitals  /77 (BP Patient Position: At rest)   Pulse 85   Temp 97.3 °F (36.3 °C)   Resp 18   Ht 5' 7.99\" (1.727 m)   Wt 145 lb 8.1 oz (66 kg)   SpO2 99%   BMI 22.13 kg/m²       Comfortable - he looks more alert and is more responsive today. Definitely looks improved globally  Folllows commands and answers simple questions  Notes pain only with manipulation of his cheek, otherwise denies   Improved facial edema, still with edema centered around R inferior parotid   Oral cavity mucosa more moist and less purulent discharge. Still with purulence from stensons duct, but only with massage    Cultures     Staphylococcus aureus Methcillin Resistant Enterobacter aerogenes     MARIANELA (Preliminary) MARIANELA (Preliminary)    Amikacin ($)   <=2 ug/mL S    Cefazolin ($)   >=64 ug/mL R    Cefepime ($$)   <=1 ug/mL S    Cefoxitin   >=64 ug/mL R    Ceftazidime ($)   <=1 ug/mL S    Ceftriaxone ($)   <=1 ug/mL S    Ciprofloxacin ($) >=8 ug/mL R <=0.25 ug/mL S    Daptomycin ($$$$$) 0.25 ug/mL S      Doxycycline ($$) <=0.5 ug/mL S      Erythromycin ($$$$) <=0.25 ug/mL S      Gentamicin ($) <=0.5 ug/mL S <=1 ug/mL S    Levofloxacin ($) >=8 ug/mL R <=0.12 ug/mL S    Linezolid ($$$$$) 2 ug/mL S      Meropenem ($$)   <=0.25 ug/mL S    Oxacillin >=4 ug/mL R      Piperacillin/Tazobac ($)   <=4 ug/mL S    Rifampin ($$$$) <=0.5 ug/mL S1      Tetracycline <=1 ug/mL S      Tobramycin ($)   <=1 ug/mL S    Trimeth/Sulfa <=10 ug/mL S <=20 ug/mL S    Vancomycin ($) 1 ug/mL S          Culture result:    Preliminary   Gram pos cocci in clusters One of four bottles has been flagged positive by instrument.  Bottle has been sent to [facility] laboratory to assess for possible growth. CALLED TO AND READ BACK BY Herber Neal. Devonte Gonsales AT 0841 BY SARAY           A/P Parotitis  - Improved today  - Cultures from Cecilia's duct showing MRSA and Enterobacter.  ID following; defer antibiotic Rx accordingly  - Cont hydration as medically tolerates - oral cavity suggests improved hydration   - Continue warm compresses  - Follow culture data  - Labs per primary team - consider recheck CBC   - Following with you, call with any questions     Colton Burton MD   [facility]  [address] [place] [address] 6  [facility]  Office Phone: [phone]

## 2021-03-16 NOTE — PROGRESS NOTES
Progress Note    Patient: Franny Naranjo MRN: [id number]  SSN: [id number]    YOB: 1935  Age: 81 y.o. Sex: male      Admit Date: 3/13/2021    LOS: 2 days     Subjective:     85M, H/o dementia, CHF with unknown EF, BPH with right parotitis with cellulitis on vanc and zosyn. Dehydration increased the risk of parotitis. ENT following. SUBJECTIVE  Patient has swelling of Rt parotid area. Does not complain of any pain    We will continue broad spectrum Abx, Follow ENT  Gram positve cocci in blood, and GNR in edgard duct  He would once stable will return to group home  Review of Systems:    Review of systems is -ve other then as stated in [place]      Objective:     Vitals:    03/15/21 1951 03/16/21 0844 03/16/21 1007 03/16/21 1412   BP: 121/75 120/77  101/73   Pulse: 98 85  (!) 101   Resp: 18 18  20   Temp: 98.8 °F (37.1 °C) 97.3 °F (36.3 °C)  97.5 °F (36.4 °C)   SpO2: 99% 99%  98%   Weight:       Height:   5' 7.99\" (1.727 m)         Intake and Output:  Current Shift: No intake/output data recorded. Last three shifts: No intake/output data recorded. Physical Exam:   General: Alert and Oriented to name. Cooperative and friendly. No acute distress. Nourished and well developed. Requesting something to drink at the time of admission. Head/Eyes: Normocephalic, atraumatic, EOMI, PERRLA. Nose/Mouth: Turbinates within normal limits, No drainage. Mucous membranes are dry. Throat and Neck:  Right parotid mass appreciated. Size is approximately 4 x 4 inches (measuring tape unavailable). Anterior cervical lymphadenopathy appreciated. There is mild erythema of mass and patient reports some pain with palpation. Mass is firm to the touch without fluctuance. No JVD or thyromegaly appreciated. Cervical spine has good range of motion without pain. Lungs: Clear to auscultation bilaterally without wheezes, rhonchi or crackles. Good air movement bilaterally.  Symmetric chest rise with respirations. Heart: Regular rate and rhythm. Normal S1/S2. No appreciated murmurs, rubs or gallops. No lower extremity edema. Abdomen: Soft, non-tender, non-distended. Bowel sounds present in all four quadrants. No masses or hepatosplenomegaly appreciated. Extremities:  Atraumatic. Able to move all extremities symmetrically. No abnormal bony protuberances appreciated. Joints without swelling. Back: No pain with palpation over spinous processes or paraspinal musculature. No CVA tenderness. Skin: Clean, dry and intact without appreciated lesions. Neurologic: Alert to name only. Cranial nerves 2-12 are grossly intact. Facial features are symmetric. Speech is fluent and clear. No focal deficits appreciated. Psychiatric: Normal affect, normal thought process, good eye contact. Lab/Data Review:  Recent Results (from the past 24 hour(s))   VANCOMYCIN, RANDOM    Collection Time: 03/16/21 11:15 AM   Result Value Ref Range    Vancomycin, random 13.3 ug/mL    Reported dose date 0      Reported dose: 0 Units         Assessment and plan:        (1) right facial mass: parotitis with cellulitis D/D: malignancy. On IV abx, ENT following, pending stensons duct cultures which grew GNR    (2) bacteremia: Beth Nightingale. On vancomycin. (2) hypernatremia : 0.45% saline, allow diet    (3) KO: prerenal. Hold lasix, 0.45% saline    (4) HTN: resume amlodipine    (2) dementia: complicates all aspects of care    (3) multiple myeloma: unknown    (4) CHF: hold lasix.  EF unknwon    DVT ppx: heparin subQ    DISPO: group home when ok with ENT    Signed By: Evert Severe, MD     March 16, 2021

## 2021-03-16 NOTE — PROGRESS NOTES
Consult for Vancomycin Dosing by Pharmacy by Dr. Tasneem Jurado provided for this 81y.o. year old male , for indication of cellulitis of right face/neck, MRSA bacteremia. Day of Therapy: 3    Goal of Level(s): 15-20 mcg/dL    Other Current Antibiotics: Zosyn    Results       Procedure Component Value Units Date/Time    CULTURE, BLOOD [[id number]] Collected: 03/16/21 0707    Order Status: Completed Specimen: Blood Updated: 03/16/21 0733    CULTURE, Edie Ally STAIN [[id number]]  (Susceptibility) Collected: 03/14/21 0630    Order Status: Completed Specimen: Wound from Mouth Updated: 03/16/21 1218     Special Requests: No Special Requests        GRAM STAIN Few WBCs seen         2+ Gram Negative Rods               1+ Gram Positive Cocci in pairs AND CLUSTERS            Occasional Budding yeast        Culture result:       Heavy * methicillin resistant staphylococcus aureus * CALLED TO AND READ BACK BY FAINA HERNANDEZ AT 1218 BY SARAY    Susceptibility        Staphylococcus aureus Methcillin Resistant     AMRIANELA (Preliminary)     Ciprofloxacin ($) Resistant     Daptomycin ($$$$$) Susceptible     Doxycycline ($$) Susceptible     Erythromycin ($$$$) Susceptible     Gentamicin ($) Susceptible     Levofloxacin ($) Resistant     Linezolid ($$$$$) Susceptible     Oxacillin Resistant     Rifampin ($$$$) Susceptible [1]      Tetracycline Susceptible     Trimeth/Sulfa Susceptible     Vancomycin ($) Susceptible              [1]   Rifampin is not to be used for mono-therapy.                Susceptibility        Enterobacter aerogenes     MARIANELA (Preliminary)     Amikacin ($) Susceptible     Cefazolin ($) Resistant     Cefepime ($$) Susceptible     Cefoxitin Resistant     Ceftazidime ($) Susceptible     Ceftriaxone ($) Susceptible     Ciprofloxacin ($) Susceptible     Gentamicin ($) Susceptible     Levofloxacin ($) Susceptible     Meropenem ($$) Susceptible     Piperacillin/Tazobac ($) Susceptible     Tobramycin ($) Susceptible Trimeth/Sulfa Susceptible                      CULTURE, BLOOD, PAIRED [[id number]]  (Abnormal) Collected: 03/14/21 0215    Order Status: Completed Specimen: Blood Updated: 03/16/21 1242     Special Requests: No Special Requests        Culture result:       Preliminary report of Methicillin Resistant Staphylococcus aureus by antigen detection. Confirmation and Sensitivities to follow. One of four bottles has been flagged positive by instrument. Bottle has been sent to [facility] laboratory to assess for possible growth. CALLED TO AND READ BACK BY Shashank Gould. [facility] AT [facility] AT 0841 BY SARAY                  Serum Creatinine Creatinine   Date Value Ref Range Status   03/13/2021 2.71 (H) 0.70 - 1.30 mg/dL Final   02/27/2021 2.11 (H) 0.70 - 1.30 mg/dL Final   02/24/2021 2.36 (H) 0.70 - 1.30 mg/dL Final      Creatinine Clearance Estimated Creatinine Clearance: 18.9 mL/min (A) (by C-G formula based on SCr of 2.71 mg/dL (H)). BUN Lab Results   Component Value Date/Time    BUN 42 (H) 03/13/2021 10:45 PM      WBC Lab Results   Component Value Date/Time    WBC 12.9 (H) 03/13/2021 10:45 PM      Temp 97.3 °F (36.3 °C)     Last Level: No results found for: VANCT   Vancomycin, random   Date Value Ref Range Status   03/16/2021 13.3 ug/mL Final     Comment:     No reference range has been established. Ht Readings from Last 1 Encounters:   03/16/21 172.7 cm (67.99\")        Wt Readings from Last 1 Encounters:   03/15/21 66 kg (145 lb 8.1 oz)     Ideal body weight: 68.4 kg (150 lb 12.1 oz)     Previous Regimen: Vancomycin 1000 mg iv x 1 dose yesterday @ 7832    New Regimen:     Vancomycin random level drawn this AM 13.3 mcg/ml. Will order another dose of 1000 mg x 1 and a random level for tomorrow. Pharmacy to follow daily and will make changes to dose and/or frequency based on clinical status.   _________________________________     Pharmacist Kaci Hansen, PHARMD

## 2021-03-16 NOTE — PROGRESS NOTES
Consult for Vancomycin Dosing by Pharmacy by Dr. Ana Lemon provided for this 81y.o. year old male , for indication of cellulitis of right face/neck, MRSA bacteremia. Day of Therapy: 3    Goal of Level(s): 15-20 mcg/dL    Other Current Antibiotics: Zosyn    Results       Procedure Component Value Units Date/Time    CULTURE, BLOOD [[id number]] Collected: 03/16/21 0707    Order Status: Completed Specimen: Blood Updated: 03/16/21 0733    CULTURE, Natty Coral STAIN [[id number]]  (Susceptibility) Collected: 03/14/21 0630    Order Status: Completed Specimen: Wound from Mouth Updated: 03/16/21 1218     Special Requests: No Special Requests        GRAM STAIN Few WBCs seen         2+ Gram Negative Rods               1+ Gram Positive Cocci in pairs AND CLUSTERS            Occasional Budding yeast        Culture result:       Heavy * methicillin resistant staphylococcus aureus * CALLED TO AND READ BACK BY FAINA HERNANDEZ AT 1218 BY JF    Susceptibility        Staphylococcus aureus Methcillin Resistant     MARIANELA (Preliminary)     Ciprofloxacin ($) Resistant     Daptomycin ($$$$$) Susceptible     Doxycycline ($$) Susceptible     Erythromycin ($$$$) Susceptible     Gentamicin ($) Susceptible     Levofloxacin ($) Resistant     Linezolid ($$$$$) Susceptible     Oxacillin Resistant     Rifampin ($$$$) Susceptible [1]      Tetracycline Susceptible     Trimeth/Sulfa Susceptible     Vancomycin ($) Susceptible              [1]   Rifampin is not to be used for mono-therapy.                Susceptibility        Enterobacter aerogenes     MARIANELA (Preliminary)     Amikacin ($) Susceptible     Cefazolin ($) Resistant     Cefepime ($$) Susceptible     Cefoxitin Resistant     Ceftazidime ($) Susceptible     Ceftriaxone ($) Susceptible     Ciprofloxacin ($) Susceptible     Gentamicin ($) Susceptible     Levofloxacin ($) Susceptible     Meropenem ($$) Susceptible     Piperacillin/Tazobac ($) Susceptible     Tobramycin ($) Susceptible Trimeth/Sulfa Susceptible                      CULTURE, BLOOD, PAIRED [[id number]]  (Abnormal) Collected: 03/14/21 0215    Order Status: Completed Specimen: Blood Updated: 03/16/21 1242     Special Requests: No Special Requests        Culture result:       Preliminary report of Methicillin Resistant Staphylococcus aureus by antigen detection. Confirmation and Sensitivities to follow. One of four bottles has been flagged positive by instrument. Bottle has been sent to [facility] laboratory to assess for possible growth. CALLED TO AND READ BACK BY Ronaldo Vargas. [facility] AT [facility] AT 0841 BY SARAY                  Serum Creatinine Creatinine   Date Value Ref Range Status   03/13/2021 2.71 (H) 0.70 - 1.30 mg/dL Final   02/27/2021 2.11 (H) 0.70 - 1.30 mg/dL Final   02/24/2021 2.36 (H) 0.70 - 1.30 mg/dL Final      Creatinine Clearance Estimated Creatinine Clearance: 18.9 mL/min (A) (by C-G formula based on SCr of 2.71 mg/dL (H)). BUN Lab Results   Component Value Date/Time    BUN 42 (H) 03/13/2021 10:45 PM      WBC Lab Results   Component Value Date/Time    WBC 12.9 (H) 03/13/2021 10:45 PM      Temp 97.3 °F (36.3 °C)     Last Level: No results found for: VANCT   Vancomycin, random   Date Value Ref Range Status   03/16/2021 13.3 ug/mL Final     Comment:     No reference range has been established. Ht Readings from Last 1 Encounters:   03/16/21 172.7 cm (67.99\")        Wt Readings from Last 1 Encounters:   03/15/21 66 kg (145 lb 8.1 oz)     Ideal body weight: 68.4 kg (150 lb 12.1 oz)     Previous Regimen: Vancomycin 1000 mg iv x 1 dose yesterday @ 5326    New Regimen:     Vancomycin random level drawn this AM 13.3 mcg/ml. Will order another dose of 1000 mg x 1 and a random level for tomorrow. Pharmacy to follow daily and will make changes to dose and/or frequency based on clinical status.   _________________________________     Pharmacist Emperatriz Nguyen PHARMD

## 2021-03-16 NOTE — PROGRESS NOTES
Comprehensive Nutrition Assessment    Type and Reason for Visit: Initial(dysphagia)    Nutrition Recommendations/Plan:   Continue current Cardiac, Mech Soft diet  Add Ensure Enlive TID (1050kcals, 60g pro)  Allow snacks as desired    If no improvements to swelling or PO in 2-3 days, place NGT for TF   Initiate TF of Jevity 1.5 at 100ml bolus,   Advance by 50ml/feed to goal of 220ml bolus q4hrs (6 feed/day)  Flush 160ml after each feed  Providing 1980kcals, 84g pro, and 1963ml fluid (Meets 100% EENs, 115% protein, and  100% fluid needs)    Please document % of all meal/snack consumed, BMs  Weekly measured body weight    Nutrition Assessment:  Admitted for R facial swelling, CT neck showing enlarged R parotid gland, ?malignancy, +Parotiditis. ENT following, on abx and warm compresses. RD unable to interview d/t dementia, RN endorses 0% B or L today, likely d/t pain, noted pt needs to be fed. Adding Ensure Enlive TID, if no improvements to swelling or PO in 2-3 days rec'd NGT for feeding. Will f/u for tolerance and need for adjustments to interventions. No recent labs to assess. Meds: Statin, D5 at 75ml/hr, B9, MagOX, oral nystatin, PRN antiemetics, Zosyn, PRN miralax. Malnutrition Assessment:  Malnutrition Status:  Severe malnutrition    Context:  Acute illness     Findings of the 6 clinical characteristics of malnutrition:   Energy Intake:  7 - 50% or less of est energy requirements for 5 or more days  Weight Loss:  7.0 - Greater than 5% over 1 month(5.2kg x1 month (7%, severe))     Body Fat Loss:  No significant body fat loss,     Muscle Mass Loss:  7 - Moderate muscle mass loss, Calf, Temples (temporalis), Scapula (trapezius), Thigh (quadraceps)  Fluid Accumulation:  No significant fluid accumulation,      Estimated Daily Nutrient Needs:  Energy (kcal): 1980kcals (30kcals/kg); Weight Used for Energy Requirements: Current  Protein (g): 73g (1.1g/kg);  Weight Used for Protein Requirements: Current(CKD-3)  Fluid (ml/day): 1980ml; Method Used for Fluid Requirements: 1 ml/kcal    Nutrition Related Findings:  NFPE finding moderate muscle wasting. No hx dysphagia, now on [facility] soft diet however no SLP eval yet. No N/V/D/C, last BM 3/14, loose. No edema. Wounds:    None       Current Nutrition Therapies:  DIET CARDIAC Mechanical Soft  Current Tube Feeding (TF) Orders:   · Goal TF & Flush Orders Provides: Rec'd TF of Jevity 1.5 at goal of 220ml bolus 6x/day, flush 160ml after each feed; Providing 1980kcals, 84g pro, and 1963ml fluid      Anthropometric Measures:  · Height:  5' 7.99\" (172.7 cm)  · Current Body Wt:  66 kg (145 lb 8.1 oz)(3/15, RD obtained)   · Admission Body Wt:  145 lb 8.1 oz(3/15)    · Usual Body Wt:  (toby)     · Ideal Body Wt:  154 lbs:  94.5 %   · BMI Category:  Normal weight (BMI 22.0-24.9) age over 72     Wt loss of 5.2kg x1 month (7%, severe), ? If dehydration at admit plays into wt loss.     Wt Readings from Last 6 Encounters:   03/15/21 66 kg (145 lb 8.1 oz)   02/27/21 69.4 kg (153 lb)   02/24/21 69.4 kg (153 lb)   02/23/21 93 kg (205 lb)   02/05/21 71.2 kg (157 lb)   01/28/21 71 kg (156 lb 9.6 oz)       Nutrition Diagnosis:   · Inadequate oral intake related to cognitive or neurological impairment(Dementia exacerbated by ?infection, ?swallowing difficulty) as evidenced by intake 0-25%      Nutrition Interventions:   Food and/or Nutrient Delivery: Continue current diet, Start oral nutrition supplement(TF if no improvements in 2-3 days)  Nutrition Education and Counseling: No recommendations at this time, Education not appropriate  Coordination of Nutrition Care: Continue to monitor while inpatient, Feeding assistance/environmental change    Goals:  Intakes >/=50% of EENs in 5 days, wt maintenance within +/-0.5kg in 5 days       Nutrition Monitoring and Evaluation:   Behavioral-Environmental Outcomes: None identified  Food/Nutrient Intake Outcomes: Food and nutrient intake, Supplement intake  Physical Signs/Symptoms Outcomes: Chewing or swallowing, Meal time behavior, Nutrition focused physical findings, Weight    Discharge Planning:     Too soon to determine     Electronically signed by Todd Armendariz RD on 3/16/2021 at 9:56 AM    Contact: Ext [phone], or via [employer]

## 2021-03-17 ENCOUNTER — APPOINTMENT (OUTPATIENT)
Dept: NON INVASIVE DIAGNOSTICS | Age: 86
DRG: 155 | End: 2021-03-17
Attending: INTERNAL MEDICINE
Payer: MEDICARE

## 2021-03-17 LAB
ALBUMIN SERPL-MCNC: 1.2 G/DL (ref 3.5–5)
ANION GAP SERPL CALC-SCNC: 7 MMOL/L (ref 5–15)
BACTERIA SPEC CULT: NORMAL
BASOPHILS # BLD: 0 K/UL (ref 0–0.1)
BASOPHILS NFR BLD: 0 % (ref 0–1)
BUN SERPL-MCNC: 32 MG/DL (ref 6–20)
BUN/CREAT SERPL: 14 (ref 12–20)
CA-I BLD-MCNC: 9 MG/DL (ref 8.5–10.1)
CHLORIDE SERPL-SCNC: 114 MMOL/L (ref 97–108)
CO2 SERPL-SCNC: 23 MMOL/L (ref 21–32)
CREAT SERPL-MCNC: 2.27 MG/DL (ref 0.7–1.3)
CRP SERPL-MCNC: 24.2 MG/DL (ref 0–0.6)
DATE LAST DOSE: NORMAL
DIFFERENTIAL METHOD BLD: ABNORMAL
ECHO AO ROOT DIAM: 3.6 CM
ECHO AR MAX VEL PISA: 425 CM/S
ECHO AV AREA PEAK VELOCITY: 0.82 CM2
ECHO AV AREA VTI: 0.89 CM2
ECHO AV AREA/BSA PEAK VELOCITY: 0.5 CM2/M2
ECHO AV AREA/BSA VTI: 0.5 CM2/M2
ECHO AV MEAN GRADIENT: 24 MMHG
ECHO AV MEAN VELOCITY: 219 CM/S
ECHO AV PEAK GRADIENT: 50 MMHG
ECHO AV REGURGITANT PHT: 805 MS
ECHO AV VTI: 65.7 CM
ECHO EST RA PRESSURE: 15 MMHG
ECHO LA AREA 4C: 23.62 CM2
ECHO LA MAJOR AXIS: 4.6 CM
ECHO LA MINOR AXIS: 2.58 CM
ECHO LA TO AORTIC ROOT RATIO: 1.28
ECHO LV E' SEPTAL VELOCITY: 6.14 CM/S
ECHO LV EJECTION FRACTION BIPLANE: 54.9 % (ref 55–100)
ECHO LV ESV A2C: 28.8 CM3
ECHO LV INTERNAL DIMENSION DIASTOLIC MMODE: 3.85 CM
ECHO LV INTERNAL DIMENSION SYSTOLIC MMODE: 2.77 CM
ECHO LV IVSD MMODE: 1.81 CM
ECHO LV POSTERIOR WALL DIASTOLIC MMODE: 1.81 CM
ECHO LVOT DIAM: 2 CM
ECHO LVOT PEAK GRADIENT: 3 MMHG
ECHO LVOT SV: 35.8 CM3
ECHO LVOT SV: 58 CM3
ECHO LVOT VTI: 15.8 CM
ECHO LVOT VTI: 17.5 CM
ECHO LVOT VTI: 18.6 CM
ECHO LVOT VTI: 22.6 CM
ECHO MV A VELOCITY: 76.5 CM/S
ECHO MV AREA PHT: 2.68 CM2
ECHO MV E DECELERATION TIME (DT): 109 MS
ECHO MV E VELOCITY: 152 CM/S
ECHO MV E/A RATIO: 1.99
ECHO MV E/E' SEPTAL: 24.76
ECHO MV PRESSURE HALF TIME (PHT): 82 MS
ECHO PV PEAK INSTANTANEOUS GRADIENT SYSTOLIC: 4 MMHG
ECHO PV PEAK INSTANTANEOUS GRADIENT SYSTOLIC: 6 MMHG
ECHO PV REGURGITANT MAX VELOCITY: 105 CM/S
ECHO PV REGURGITANT MAX VELOCITY: 125 CM/S
ECHO PV REGURGITANT MAX VELOCITY: 352 CM/S
ECHO PV REGURGITANT MAX VELOCITY: 506 CM/S
ECHO PV REGURGITANT MAX VELOCITY: 92 CM/S
ECHO RA AREA 4C: 28.3 CM2
ECHO RIGHT VENTRICULAR SYSTOLIC PRESSURE (RVSP): 45 MMHG
ECHO RV INTERNAL DIMENSION: 3.09 CM
ECHO TV MAX VELOCITY: 272 CM/S
ECHO TV REGURGITANT PEAK GRADIENT: 30 MMHG
EOSINOPHIL # BLD: 0.2 K/UL (ref 0–0.4)
EOSINOPHIL NFR BLD: 1 % (ref 0–7)
ERYTHROCYTE [DISTWIDTH] IN BLOOD BY AUTOMATED COUNT: 17.5 % (ref 11.5–14.5)
GLUCOSE SERPL-MCNC: 96 MG/DL (ref 65–100)
GRAM STN SPEC: NORMAL
HCT VFR BLD AUTO: 26.3 % (ref 36.6–50.3)
HGB BLD-MCNC: 8 G/DL (ref 12.1–17)
IMM GRANULOCYTES # BLD AUTO: 0.1 K/UL (ref 0–0.04)
IMM GRANULOCYTES NFR BLD AUTO: 1 % (ref 0–0.5)
LVOT MG: 2 MMHG
LYMPHOCYTES # BLD: 0.9 K/UL (ref 0.8–3.5)
LYMPHOCYTES NFR BLD: 7 % (ref 12–49)
MCH RBC QN AUTO: 28.2 PG (ref 26–34)
MCHC RBC AUTO-ENTMCNC: 30.4 G/DL (ref 30–36.5)
MCV RBC AUTO: 92.6 FL (ref 80–99)
MONOCYTES # BLD: 0.5 K/UL (ref 0–1)
MONOCYTES NFR BLD: 4 % (ref 5–13)
MV DEC SLOPE: 4340 MM/S2
MV DEC SLOPE: 4340 MM/S2
NEUTS SEG # BLD: 10.3 K/UL (ref 1.8–8)
NEUTS SEG NFR BLD: 87 % (ref 32–75)
NRBC # BLD: 0 K/UL (ref 0–0.01)
NRBC BLD-RTO: 0 PER 100 WBC
PHOSPHATE SERPL-MCNC: 2.4 MG/DL (ref 2.6–4.7)
PLATELET # BLD AUTO: 271 K/UL (ref 150–400)
PMV BLD AUTO: 9.3 FL (ref 8.9–12.9)
POTASSIUM SERPL-SCNC: 3.9 MMOL/L (ref 3.5–5.1)
PROCALCITONIN SERPL-MCNC: 0.55 NG/ML
RBC # BLD AUTO: 2.84 M/UL (ref 4.1–5.7)
REPORTED DOSE,DOSE: NORMAL UNITS
SODIUM SERPL-SCNC: 144 MMOL/L (ref 136–145)
SPECIAL REQUESTS,SREQ: NORMAL
VANCOMYCIN SERPL-MCNC: 16.6 UG/ML
WBC # BLD AUTO: 11.9 K/UL (ref 4.1–11.1)

## 2021-03-17 PROCEDURE — 85025 COMPLETE CBC W/AUTO DIFF WBC: CPT

## 2021-03-17 PROCEDURE — 99232 SBSQ HOSP IP/OBS MODERATE 35: CPT | Performed by: INTERNAL MEDICINE

## 2021-03-17 PROCEDURE — 86140 C-REACTIVE PROTEIN: CPT

## 2021-03-17 PROCEDURE — 74011250636 HC RX REV CODE- 250/636: Performed by: HOSPITALIST

## 2021-03-17 PROCEDURE — 80202 ASSAY OF VANCOMYCIN: CPT

## 2021-03-17 PROCEDURE — 74011250637 HC RX REV CODE- 250/637: Performed by: HOSPITALIST

## 2021-03-17 PROCEDURE — 74011000258 HC RX REV CODE- 258: Performed by: FAMILY MEDICINE

## 2021-03-17 PROCEDURE — 97530 THERAPEUTIC ACTIVITIES: CPT

## 2021-03-17 PROCEDURE — 80069 RENAL FUNCTION PANEL: CPT

## 2021-03-17 PROCEDURE — 74011250636 HC RX REV CODE- 250/636: Performed by: FAMILY MEDICINE

## 2021-03-17 PROCEDURE — 94760 N-INVAS EAR/PLS OXIMETRY 1: CPT

## 2021-03-17 PROCEDURE — 65270000029 HC RM PRIVATE

## 2021-03-17 PROCEDURE — 36415 COLL VENOUS BLD VENIPUNCTURE: CPT

## 2021-03-17 PROCEDURE — 93306 TTE W/DOPPLER COMPLETE: CPT

## 2021-03-17 PROCEDURE — 84145 PROCALCITONIN (PCT): CPT

## 2021-03-17 PROCEDURE — 74011250637 HC RX REV CODE- 250/637: Performed by: INTERNAL MEDICINE

## 2021-03-17 RX ORDER — LANOLIN ALCOHOL/MO/W.PET/CERES
CREAM (GRAM) TOPICAL
Qty: 90 TAB | Refills: 1 | Status: SHIPPED | OUTPATIENT
Start: 2021-03-17 | End: 2021-04-30 | Stop reason: SDUPTHER

## 2021-03-17 RX ORDER — RIFAMPIN 300 MG/1
300 CAPSULE ORAL EVERY 12 HOURS
Status: DISCONTINUED | OUTPATIENT
Start: 2021-03-17 | End: 2021-03-22 | Stop reason: HOSPADM

## 2021-03-17 RX ADMIN — HEPARIN SODIUM 5000 UNITS: 5000 INJECTION INTRAVENOUS; SUBCUTANEOUS at 21:13

## 2021-03-17 RX ADMIN — ATORVASTATIN CALCIUM 20 MG: 20 TABLET, FILM COATED ORAL at 21:42

## 2021-03-17 RX ADMIN — DICLOFENAC SODIUM 2 G: 10 GEL TOPICAL at 17:57

## 2021-03-17 RX ADMIN — DICLOFENAC SODIUM 2 G: 10 GEL TOPICAL at 00:47

## 2021-03-17 RX ADMIN — PIPERACILLIN AND TAZOBACTAM 3.38 G: 3; .375 INJECTION, POWDER, LYOPHILIZED, FOR SOLUTION INTRAVENOUS at 09:31

## 2021-03-17 RX ADMIN — PIPERACILLIN AND TAZOBACTAM 3.38 G: 3; .375 INJECTION, POWDER, LYOPHILIZED, FOR SOLUTION INTRAVENOUS at 21:42

## 2021-03-17 RX ADMIN — ESCITALOPRAM OXALATE 20 MG: 10 TABLET ORAL at 09:32

## 2021-03-17 RX ADMIN — NYSTATIN 500000 UNITS: 100000 SUSPENSION ORAL at 21:51

## 2021-03-17 RX ADMIN — DICLOFENAC SODIUM 2 G: 10 GEL TOPICAL at 05:43

## 2021-03-17 RX ADMIN — HEPARIN SODIUM 5000 UNITS: 5000 INJECTION INTRAVENOUS; SUBCUTANEOUS at 12:31

## 2021-03-17 RX ADMIN — NYSTATIN 500000 UNITS: 100000 SUSPENSION ORAL at 17:55

## 2021-03-17 RX ADMIN — Medication 400 MG: at 09:32

## 2021-03-17 RX ADMIN — HEPARIN SODIUM 5000 UNITS: 5000 INJECTION INTRAVENOUS; SUBCUTANEOUS at 03:56

## 2021-03-17 RX ADMIN — VANCOMYCIN HYDROCHLORIDE 1000 MG: 1 INJECTION, POWDER, LYOPHILIZED, FOR SOLUTION INTRAVENOUS at 17:55

## 2021-03-17 RX ADMIN — AMLODIPINE BESYLATE 5 MG: 5 TABLET ORAL at 09:32

## 2021-03-17 RX ADMIN — DICLOFENAC SODIUM 2 G: 10 GEL TOPICAL at 12:00

## 2021-03-17 RX ADMIN — FOLIC ACID 1 MG: 1 TABLET ORAL at 09:32

## 2021-03-17 RX ADMIN — NYSTATIN 500000 UNITS: 100000 SUSPENSION ORAL at 12:32

## 2021-03-17 RX ADMIN — NYSTATIN 500000 UNITS: 100000 SUSPENSION ORAL at 09:32

## 2021-03-17 RX ADMIN — RIFAMPIN 300 MG: 300 CAPSULE ORAL at 22:37

## 2021-03-17 NOTE — PROGRESS NOTES
PHYSICAL THERAPY TREATMENT  Patient: Sarah Valdez (81 y.o. male)  Date: 3/17/2021  Diagnosis: Parotiditis [K11.20]  Cellulitis [L03.90] <principal problem not specified>       Precautions:    Chart, physical therapy assessment, plan of care and goals were reviewed. ASSESSMENT  Patient continues with skilled PT services and is progressing towards goals. Pt. Semi supine in bed upon arrival and agreeable to therapy session. Oriented to self, and reoriented to location, place, time. Performed supine, and seated LE TE well with great balance. Performed 10 sit to stand with 5/10 R knee pain. Able to bridge in supine. Able to perform bed mobility and transfer with supervision. Recommend DC to SNF. Current Level of Function Impacting Discharge (mobility/balance): Cognition, pain, endurance    Other factors to consider for discharge: PMH         PLAN :  Patient continues to benefit from skilled intervention to address the above impairments. Continue treatment per established plan of care. to address goals. Recommendation for discharge: (in order for the patient to meet his/her long term goals)  Therapy up to 5 days/week in SNF setting    This discharge recommendation:  Has been made in collaboration with the attending provider and/or case management    IF patient discharges home will need the following DME: rolling walker       SUBJECTIVE:   Patient stated im okay ill try what you want.     OBJECTIVE DATA SUMMARY:   Critical Behavior:  Neurologic State: Alert  Orientation Level: Oriented to person  Cognition: Follows commands     Functional Mobility Training:  Bed Mobility:  Rolling: Supervision  Supine to Sit: Supervision  Sit to Supine: Supervision  Scooting: Supervision        Transfers:  Sit to Stand:  Moderate assistance  Stand to Sit: Moderate assistance                             Balance:  Sitting: Intact  Sitting - Static: Good (unsupported)  Sitting - Dynamic: Good (unsupported)  Standing: Impaired;Pull to stand; With support  Standing - Static: Fair;Constant support  Standing - Dynamic : Poor;Constant support  Ambulation/Gait Training:                                                        Stairs: Therapeutic Exercises:   Therapeutic Exercises:       EXERCISE   Sets   Reps   Active Active Assist   Passive Self ROM   Comments   Ankle Pumps  10 [x] [] [] []    Quad Sets/Glut Sets   [] [] [] []    Hamstring Sets   [] [] [] []    Short Arc Quads   [] [] [] []    Heel Slides  10 [x] [] [] []    Straight Leg Raises  10 [x] [] [] []    Hip abd/add  10 [x] [] [] []    Long Arc Quads  10 [x] [] [] []    Marching  10 [x] [] [] []    bridge  10 [x] [] [] []      Sit to stand x10  Pain Ratin    Activity Tolerance:   Fair  Please refer to the flowsheet for vital signs taken during this treatment. After treatment patient left in no apparent distress:   Supine in bed    COMMUNICATION/COLLABORATION:   The patients plan of care was discussed with: Physical therapy assistant.      Clemencia Ford PTA   Time Calculation: 35 mins

## 2021-03-17 NOTE — PROGRESS NOTES
Progress Note    Patient: Nino Napier. MRN: [id number]  SSN: [id number]    YOB: 1935  Age: 81 y.o. Sex: male      Admit Date: 3/13/2021    LOS: 3 days     Subjective:     85M, H/o dementia, CHF with unknown EF, BPH with right parotitis with cellulitis on vanc and zosyn. Dehydration increased the risk of parotitis. ENT following. SUBJECTIVE  Patient has swelling of Rt parotid area. Does not complain of any pain    We will continue broad spectrum Abx, Follow ENT  Blood culture is MRSA and MRSAand enterobacter from mouth wound  He would once stable will return to group home    Review of Systems:    Review of systems is -ve other then as stated in [place]      Objective:     Vitals:    03/16/21 2114 03/17/21 0725 03/17/21 1356 03/17/21 1555   BP:  (!) 141/79 120/85 119/76   Pulse:  65 88 87   Resp:  16 18 18   Temp:  98.5 °F (36.9 °C) 98.2 °F (36.8 °C) 98.2 °F (36.8 °C)   SpO2: 98% 100% 100% 100%   Weight:       Height:            Intake and Output:  Current Shift: No intake/output data recorded. Last three shifts: No intake/output data recorded. Physical Exam:   General: Alert and Oriented to name. Cooperative and friendly. No acute distress. Nourished and well developed. Requesting something to drink at the time of admission. Head/Eyes: Normocephalic, atraumatic, EOMI, PERRLA. Nose/Mouth: Turbinates within normal limits, No drainage. Mucous membranes are dry. Throat and Neck:  Right parotid mass appreciated. Size is approximately 4 x 4 inches (measuring tape unavailable), swelling has gone down. Anterior cervical lymphadenopathy appreciated. There is mild erythema of mass and patient reports some pain with palpation. Mass is firm to the touch without fluctuance. No JVD or thyromegaly appreciated. Cervical spine has good range of motion without pain. Lungs: Clear to auscultation bilaterally without wheezes, rhonchi or crackles. Good air movement bilaterally.  Symmetric chest rise with respirations. Heart: Regular rate and rhythm. Normal S1/S2. No appreciated murmurs, rubs or gallops. No lower extremity edema. Abdomen: Soft, non-tender, non-distended. Bowel sounds present in all four quadrants. No masses or hepatosplenomegaly appreciated. Extremities:  Atraumatic. Able to move all extremities symmetrically. No abnormal bony protuberances appreciated. Joints without swelling. Back: No pain with palpation over spinous processes or paraspinal musculature. No CVA tenderness. Skin: Clean, dry and intact without appreciated lesions. Neurologic: Alert to name only. Cranial nerves 2-12 are grossly intact. Facial features are symmetric. Speech is fluent and clear. No focal deficits appreciated. Psychiatric: Normal affect, normal thought process, good eye contact. Lab/Data Review:  Recent Results (from the past 24 hour(s))   CBC WITH AUTOMATED DIFF    Collection Time: 03/17/21  8:55 AM   Result Value Ref Range    WBC 11.9 (H) 4.1 - 11.1 K/uL    RBC 2.84 (L) 4.10 - 5.70 M/uL    HGB 8.0 (L) 12.1 - 17.0 g/dL    HCT 26.3 (L) 36.6 - 50.3 %    MCV 92.6 80.0 - 99.0 FL    MCH 28.2 26.0 - 34.0 PG    MCHC 30.4 30.0 - 36.5 g/dL    RDW 17.5 (H) 11.5 - 14.5 %    PLATELET 620 431 - 459 K/uL    MPV 9.3 8.9 - 12.9 FL    NRBC 0.0 0  WBC    ABSOLUTE NRBC 0.00 0.00 - 0.01 K/uL    NEUTROPHILS 87 (H) 32 - 75 %    LYMPHOCYTES 7 (L) 12 - 49 %    MONOCYTES 4 (L) 5 - 13 %    EOSINOPHILS 1 0 - 7 %    BASOPHILS 0 0 - 1 %    IMMATURE GRANULOCYTES 1 (H) 0.0 - 0.5 %    ABS. NEUTROPHILS 10.3 (H) 1.8 - 8.0 K/UL    ABS. LYMPHOCYTES 0.9 0.8 - 3.5 K/UL    ABS. MONOCYTES 0.5 0.0 - 1.0 K/UL    ABS. EOSINOPHILS 0.2 0.0 - 0.4 K/UL    ABS. BASOPHILS 0.0 0.0 - 0.1 K/UL    ABS. IMM.  GRANS. 0.1 (H) 0.00 - 0.04 K/UL    DF AUTOMATED     RENAL FUNCTION PANEL    Collection Time: 03/17/21  8:55 AM   Result Value Ref Range    Sodium 144 136 - 145 mmol/L    Potassium 3.9 3.5 - 5.1 mmol/L    Chloride 114 (H) 97 - 108 mmol/L CO2 23 21 - 32 mmol/L    Anion gap 7 5 - 15 mmol/L    Glucose 96 65 - 100 mg/dL    BUN 32 (H) 6 - 20 mg/dL    Creatinine 2.27 (H) 0.70 - 1.30 mg/dL    BUN/Creatinine ratio 14 12 - 20      GFR est AA 34 (L) >60 ml/min/1.73m2    GFR est non-AA 28 (L) >60 ml/min/1.73m2    Calcium 9.0 8.5 - 10.1 mg/dL    Phosphorus 2.4 (L) 2.6 - 4.7 mg/dL    Albumin 1.2 (L) 3.5 - 5.0 g/dL   C REACTIVE PROTEIN, QT    Collection Time: 03/17/21  8:55 AM   Result Value Ref Range    C-Reactive protein 24.20 (H) 0.00 - 0.60 mg/dL   PROCALCITONIN    Collection Time: 03/17/21  8:55 AM   Result Value Ref Range    Procalcitonin 0.55 (H) 0 ng/mL   ECHO ADULT COMPLETE    Collection Time: 03/17/21 10:13 AM   Result Value Ref Range    Aortic Regurgitant Pressure Half-time 805.00 ms    AR Max Oscar 425.00 cm/s    Pulmonic Regurgitant End Max Velocity 352.00 cm/s    AoV PG 50.00 mmHg    Aortic Valve Systolic Mean Gradient 56.04 mmHg    AoV VTI 65.70 cm    Aortic valve mean velocity 219.00 cm/s    Aortic Valve Area by Continuity of VTI 0.89 cm2    Aortic Valve Area by Continuity of Peak Velocity 0.82 cm2    Ao Root D 3.60 cm    IVSd (M-mode) 1.81 (A) cm    LVIDd (M-mode) 3.85 (A) cm    LVIDs (M-mode) 2.77 cm    LVOT d 2.00 cm    Left Ventricular Outflow Tract Mean Gradient 2.00 mmHg    LVOT VTI 18.60 cm    LVOT VTI 17.50 cm    LVOT VTI 22.60 cm    LVOT VTI 15.80 cm    Pulmonic Regurgitant End Max Velocity 92.00 cm/s    LVOT Peak Gradient 3.00 mmHg    LVPWd (M-mode) 1.81 (A) cm    LV E' Septal Velocity 6.14 cm/s    BP EF 54.9 (A) 55.0 - 100.0 %    LV ES Vol A2C 28.80 cm3    E/E' septal 24.76     LVOT SV 35.8 cm3    LVOT SV 58.0 cm3    Left Atrium Major Axis 4.60 cm    Left Atrium to Aortic Root Ratio 1.28     Pulmonic Regurgitant End Max Velocity 506.00 cm/s    Mitral Valve Deceleration Kendall 4,340.00 mm/s2    Mitral Valve Deceleration Kendall 4,340.00 mm/s2    Mitral Valve E Wave Deceleration Time 109.00 ms    Mitral Valve Pressure Half-time 82.00 ms MV A Oscar 76.50 cm/s    MV E Oscar 152.00 cm/s    MVA (PHT) 2.68 cm2    MV E/A 1.99     Pulmonic Regurgitant End Max Velocity 105.00 cm/s    Pulmonic Valve Systolic Peak Instantaneous Gradient 4.00 mmHg    Pulmonic Regurgitant End Max Velocity 125.00 cm/s    Pulmonic Valve Systolic Peak Instantaneous Gradient 6.00 mmHg    Est. RA Pressure 15.00 mmHg    RVIDd 3.09 cm    RVSP 45.00 mmHg    Tricuspid Valve Max Velocity 272.00 cm/s    Triscuspid Valve Regurgitation Peak Gradient 30.00 mmHg    Right Atrial Area 4C 28.30 cm2    LA Area 4C 23.62 cm2    Left Atrium Minor Axis 2.58 cm    MASON/BSA Pk Oscar 0.5 cm2/m2    MASON/BSA VTI 0.5 cm2/m2   VANCOMYCIN, RANDOM    Collection Time: 03/17/21 11:05 AM   Result Value Ref Range    Vancomycin, random 16.6 ug/mL    Reported dose date Not provided      Reported dose: Not provided Units         Assessment and plan:        (1) right facial mass: parotitis with cellulitis D/D: malignancy. On IV abx, ENT following    (2) bacteremia: Gaurav Likes. On vancomycin. (2) hypernatremia : D50.45% saline, allow diet    (3) KO: prerenal. Hold lasix, 0.45% saline    (4) HTN: resume amlodipine    (2) dementia: complicates all aspects of care    (3) multiple myeloma: unknown    (4) CHF: hold lasix. EF unknwon    DVT ppx: heparin subQ    DISPO: group home when ok with ENT    Gomez Or, ACNP- Student    Patient was evaluated and examined by me independently.   Supervised NP student     Signed By: Kori Brothers MD      March 17, 2021

## 2021-03-17 NOTE — PROGRESS NOTES
Infectious Disease Progress Note           Subjective:   Stable, right facial swelling is better. Denies fever/chills, improved oral intake. No acute events since last seen   Objective:   Physical Exam:     Visit Vitals  /76   Pulse 87   Temp 98.2 °F (36.8 °C)   Resp 18   Ht 5' 7.99\" (1.727 m)   Wt 145 lb 8.1 oz (66 kg)   SpO2 100%   BMI 22.13 kg/m²    O2 Flow Rate (L/min): 2 l/min O2 Device: Room air    Temp (24hrs), Av.2 °F (36.8 °C), Min:97.8 °F (36.6 °C), Max:98.5 °F (36.9 °C)    No intake/output data recorded. No intake/output data recorded. General: NAD, alert, AAO x 4  HEENT: [place], Moist mucosa, decreased right sided facial swelling   Lungs: CTA b/l, decreased at the bases   Heart: S1S2+, RRR, no murmur  Abdo: Soft, NT, ND, +BS   Exts: No edema, + pulses b/l   Skin: Right facial swelling, sig induration and tenderness     Data Review:       Recent Days:  Recent Labs     21  0855   WBC 11.9*   HGB 8.0*   HCT 26.3*        Recent Labs     21  0855   BUN 32*   CREA 2.27*       Lab Results   Component Value Date/Time    C-Reactive protein 24.20 (H) 2021 08:55 AM      Microbiology   - Blood Cx : MRSA  - Oral wound Cx : MRSA and E. aerogenes    Diagnostics   CXR Results  (Last 48 hours)    None          Assessment/Plan     1. Right parotitis/facial cellulitis:Decreased right facial swelling/tenderness       MRSA and E. aerogenes and Yeast isolated from Cx of mouth wound       Afebrile, mild decrease in WBC on todays labs       Yeast likely normal johnson. Continue on vanc and Zosyn for now. CBC w AM labs      2. MRSA bacteremia: BCx positive on ,  Repeat on  is pending       Underlying prosthetic aortic Valve. No reports of Valvular veg on 2 D echo       Continue on Vanc pending repeat BCx , add rifampin       If BCx from  remains neg, will recommend 3 wks if IV Vanc given underlying prosthetic aortic Valve         3.  Dementia w baseline confusion, mentation some what improved today       4.  Acute on CKD: Cr trending down on todays labs     Overall clinically improving, will continue to monitor closely     Marilynn Beckett MD    3/17/2021

## 2021-03-17 NOTE — PROGRESS NOTES
Patient's two nurse skin assessment was completed by this nurse and Fabiano Hammond RN, skin is warm, dry, and intact with no reddened or opened areas.

## 2021-03-17 NOTE — PROGRESS NOTES
[facility]    Pt off floor this AM    CBC, CRP pending  Continuing on Zosyn and Vanc per ID     Oral cx - MRSA, E. Aerogenes  Blood cx 3/14 - MRSA     A/P Parotitis - MRSA, E Aerogenes ; as of yesterday, improving   Will follow labs  Cont abx per ID   2D echo pending per ID     Daja Roy MD   [facility]  [address]  Office Phone: [phone]

## 2021-03-17 NOTE — PROGRESS NOTES
Consult for Vancomycin by Dr. Lorenzo Taylor  Indication cellulitis face /neck MRSA Bacteremia  Day of Therapy:4  Goal  15-20 mcg/dl  Other Antibiotics:Zosyn   Random level today resulted at 16.6   Pharmacy ordered Vancomycin 1 gram   iv  today at 1800 with next random level  for  1600  on 3/18/2021

## 2021-03-17 NOTE — PROGRESS NOTES
Physician Progress Note      Vamsi Rahman  [facility] #:                  [id number]  :                       1935  ADMIT DATE:       3/13/2021 10:16 PM  [address] [place] DATE:  RESPONDING  PROVIDER #:        Linda Rae MD          QUERY TEXT:    Pt admitted with FACIAL CELLULITIS and has malnutrition documented by RD on 3/16. Please further specify type of malnutrition with documentation in the medical record. The medical record reflects the following:  Risk Factors: DEMENTIA, FACIAL SWELLING, FACIAL PAIN, INFECTION  Clinical Indicators: RD PN 3/16: \"Malnutrition Status:  Severe malnutrition. \"-Inadequate oral intake related to cognitive or neurological impairment(Dementia exacerbated by ?infection, ?swallowing difficulty) as evidenced by intake 0-25%. \" ALB 2.9, BMI 22.1. Treatment: RD CONSULT, ABX IV, CARDIAC MECHANICAL SOFT DIET, Start oral nutrition supplement, LAB MONITORING    Thank you,  TARA Blankenship, RN, CDI Specialist  [email or web address] or [phone]  Options provided:  -- Severe Malnutrition  -- Mild Malnutrition  -- Moderate Malnutrition  -- Other - I will add my own diagnosis  -- Disagree - Not applicable / Not valid  -- Disagree - Clinically unable to determine / Unknown  -- Refer to Clinical Documentation Reviewer    PROVIDER RESPONSE TEXT:    This patient has mild malnutrition.     Query created by: Alisson Ortiz on 3/16/2021 4:04 PM      Electronically signed by:  Linda Rae MD 3/17/2021 5:37 PM

## 2021-03-18 ENCOUNTER — APPOINTMENT (OUTPATIENT)
Dept: GENERAL RADIOLOGY | Age: 86
DRG: 155 | End: 2021-03-18
Attending: HOSPITALIST
Payer: MEDICARE

## 2021-03-18 LAB
ALBUMIN SERPL-MCNC: 1.6 G/DL (ref 3.5–5)
ANION GAP SERPL CALC-SCNC: 4 MMOL/L (ref 5–15)
BACTERIA SPEC CULT: ABNORMAL
BACTERIA SPEC CULT: ABNORMAL
BASOPHILS # BLD: 0 K/UL (ref 0–0.1)
BASOPHILS NFR BLD: 0 % (ref 0–1)
BUN SERPL-MCNC: 29 MG/DL (ref 6–20)
BUN/CREAT SERPL: 14 (ref 12–20)
CA-I BLD-MCNC: 8.8 MG/DL (ref 8.5–10.1)
CHLORIDE SERPL-SCNC: 110 MMOL/L (ref 97–108)
CO2 SERPL-SCNC: 29 MMOL/L (ref 21–32)
CREAT SERPL-MCNC: 2.14 MG/DL (ref 0.7–1.3)
DATE LAST DOSE: NORMAL
DIFFERENTIAL METHOD BLD: ABNORMAL
EOSINOPHIL # BLD: 0.2 K/UL (ref 0–0.4)
EOSINOPHIL NFR BLD: 2 % (ref 0–7)
ERYTHROCYTE [DISTWIDTH] IN BLOOD BY AUTOMATED COUNT: 17.4 % (ref 11.5–14.5)
GLUCOSE SERPL-MCNC: 145 MG/DL (ref 65–100)
HCT VFR BLD AUTO: 23.7 % (ref 36.6–50.3)
HGB BLD-MCNC: 7.3 G/DL (ref 12.1–17)
IMM GRANULOCYTES # BLD AUTO: 0.1 K/UL (ref 0–0.04)
IMM GRANULOCYTES NFR BLD AUTO: 1 % (ref 0–0.5)
LYMPHOCYTES # BLD: 0.6 K/UL (ref 0.8–3.5)
LYMPHOCYTES NFR BLD: 8 % (ref 12–49)
MCH RBC QN AUTO: 28.2 PG (ref 26–34)
MCHC RBC AUTO-ENTMCNC: 30.8 G/DL (ref 30–36.5)
MCV RBC AUTO: 91.5 FL (ref 80–99)
MONOCYTES # BLD: 0.5 K/UL (ref 0–1)
MONOCYTES NFR BLD: 7 % (ref 5–13)
NEUTS SEG # BLD: 6 K/UL (ref 1.8–8)
NEUTS SEG NFR BLD: 82 % (ref 32–75)
PHOSPHATE SERPL-MCNC: 2.5 MG/DL (ref 2.6–4.7)
PLATELET # BLD AUTO: 267 K/UL (ref 150–400)
PMV BLD AUTO: 9.3 FL (ref 8.9–12.9)
POTASSIUM SERPL-SCNC: 3.9 MMOL/L (ref 3.5–5.1)
RBC # BLD AUTO: 2.59 M/UL (ref 4.1–5.7)
REPORTED DOSE,DOSE: NORMAL UNITS
SODIUM SERPL-SCNC: 143 MMOL/L (ref 136–145)
SPECIAL REQUESTS,SREQ: ABNORMAL
VANCOMYCIN SERPL-MCNC: 16.6 UG/ML
WBC # BLD AUTO: 7.2 K/UL (ref 4.1–11.1)

## 2021-03-18 PROCEDURE — 74011250637 HC RX REV CODE- 250/637: Performed by: INTERNAL MEDICINE

## 2021-03-18 PROCEDURE — 74011000258 HC RX REV CODE- 258: Performed by: FAMILY MEDICINE

## 2021-03-18 PROCEDURE — 85025 COMPLETE CBC W/AUTO DIFF WBC: CPT

## 2021-03-18 PROCEDURE — 73070 X-RAY EXAM OF ELBOW: CPT

## 2021-03-18 PROCEDURE — 80069 RENAL FUNCTION PANEL: CPT

## 2021-03-18 PROCEDURE — 65270000029 HC RM PRIVATE

## 2021-03-18 PROCEDURE — 74011250637 HC RX REV CODE- 250/637: Performed by: HOSPITALIST

## 2021-03-18 PROCEDURE — 99232 SBSQ HOSP IP/OBS MODERATE 35: CPT | Performed by: INTERNAL MEDICINE

## 2021-03-18 PROCEDURE — 80202 ASSAY OF VANCOMYCIN: CPT

## 2021-03-18 PROCEDURE — 74011250637 HC RX REV CODE- 250/637: Performed by: FAMILY MEDICINE

## 2021-03-18 PROCEDURE — 74011250636 HC RX REV CODE- 250/636: Performed by: FAMILY MEDICINE

## 2021-03-18 PROCEDURE — 36415 COLL VENOUS BLD VENIPUNCTURE: CPT

## 2021-03-18 PROCEDURE — 74011250636 HC RX REV CODE- 250/636: Performed by: HOSPITALIST

## 2021-03-18 RX ORDER — LEVOFLOXACIN 250 MG/1
250 TABLET ORAL EVERY 24 HOURS
Status: DISCONTINUED | OUTPATIENT
Start: 2021-03-18 | End: 2021-03-22 | Stop reason: HOSPADM

## 2021-03-18 RX ADMIN — ACETAMINOPHEN 650 MG: 325 TABLET ORAL at 12:47

## 2021-03-18 RX ADMIN — NYSTATIN 500000 UNITS: 100000 SUSPENSION ORAL at 18:21

## 2021-03-18 RX ADMIN — AMLODIPINE BESYLATE 5 MG: 5 TABLET ORAL at 10:12

## 2021-03-18 RX ADMIN — FOLIC ACID 1 MG: 1 TABLET ORAL at 10:12

## 2021-03-18 RX ADMIN — HEPARIN SODIUM 5000 UNITS: 5000 INJECTION INTRAVENOUS; SUBCUTANEOUS at 20:05

## 2021-03-18 RX ADMIN — RIFAMPIN 300 MG: 300 CAPSULE ORAL at 10:12

## 2021-03-18 RX ADMIN — DICLOFENAC SODIUM 2 G: 10 GEL TOPICAL at 18:21

## 2021-03-18 RX ADMIN — DICLOFENAC SODIUM 2 G: 10 GEL TOPICAL at 04:39

## 2021-03-18 RX ADMIN — LEVOFLOXACIN 250 MG: 250 TABLET, FILM COATED ORAL at 12:47

## 2021-03-18 RX ADMIN — NYSTATIN 500000 UNITS: 100000 SUSPENSION ORAL at 21:22

## 2021-03-18 RX ADMIN — VANCOMYCIN HYDROCHLORIDE 1000 MG: 1 INJECTION, POWDER, LYOPHILIZED, FOR SOLUTION INTRAVENOUS at 19:32

## 2021-03-18 RX ADMIN — HEPARIN SODIUM 5000 UNITS: 5000 INJECTION INTRAVENOUS; SUBCUTANEOUS at 03:33

## 2021-03-18 RX ADMIN — DICLOFENAC SODIUM 2 G: 10 GEL TOPICAL at 01:27

## 2021-03-18 RX ADMIN — HEPARIN SODIUM 5000 UNITS: 5000 INJECTION INTRAVENOUS; SUBCUTANEOUS at 12:47

## 2021-03-18 RX ADMIN — ATORVASTATIN CALCIUM 20 MG: 20 TABLET, FILM COATED ORAL at 20:05

## 2021-03-18 RX ADMIN — ESCITALOPRAM OXALATE 20 MG: 10 TABLET ORAL at 10:12

## 2021-03-18 RX ADMIN — Medication 400 MG: at 10:12

## 2021-03-18 RX ADMIN — RIFAMPIN 300 MG: 300 CAPSULE ORAL at 20:06

## 2021-03-18 RX ADMIN — PIPERACILLIN AND TAZOBACTAM 3.38 G: 3; .375 INJECTION, POWDER, LYOPHILIZED, FOR SOLUTION INTRAVENOUS at 10:12

## 2021-03-18 RX ADMIN — NYSTATIN 500000 UNITS: 100000 SUSPENSION ORAL at 10:12

## 2021-03-18 RX ADMIN — NYSTATIN 500000 UNITS: 100000 SUSPENSION ORAL at 12:47

## 2021-03-18 RX ADMIN — DICLOFENAC SODIUM 2 G: 10 GEL TOPICAL at 12:47

## 2021-03-18 NOTE — PROGRESS NOTES
Consult for Vancomycin by Dr. Lorenzo Taylor  Indication cellulitis face /neck MRSA Bacteremia    Day of Therapy:5    Goal  15-20 mcg/dl    Other Antibiotics:Levaquin    Random level today for Vancomycin was 16.6    Pharmacy ordered Vancomycin 1 gram   iv  today at 1900 with  next random level  for 1700 on 3/19/2021

## 2021-03-18 NOTE — CONSULTS
ORTHOPEDIC CONSULT    Patient: Betsy Dorantes MRN: [id number]  SSN: [id number]    YOB: 1935  Age: 81 y.o. Sex: male      Subjective:      Betsy Dorantes is a 81 y.o. male who is being seen in orthopedic consultation for swelling of his right elbow. The patient is a poor historian he has a history of dementia. History obtained from chart and patient. Patient was admitted to the hospital for facial swelling and a finding of parotitis. We were asked to see patient because of swelling of his right elbow. At my arrival, the patient has no complaints of pain of his right elbow. He states he does have a history of gout which has affected his elbow before. He denies any numbness or tingling of his right upper extremity. Patient is right-hand dominant. He denies any other musculoskeletal complaints at this time. Past Medical History:   Diagnosis Date    Anemia     Arthritis     CKD (chronic kidney disease), stage III 2011    Dementia ([address]     Depression     Erectile dysfunction     GERD (gastroesophageal reflux disease)     GI bleed     Gout     gout, osteoarthritis    Heart failure (HCC)     acute heart failure    Hemorrhoids     Hypertension     Multiple myeloma (HCC)     PAF (paroxysmal atrial fibrillation) ([address] 02/17/2016    s/p cardioversion    Pulmonary HTN ([address]     S/P AVR (aortic valve replacement) 07/26/2016    merrill gill md -     S/P cardiac cath 7/6/16    severe ai, normal coronaries     S/P colonoscopy with polypectomy 05/09/2016    Kar Villagomez MD    Stroke [facility])      Past Surgical History:   Procedure Laterality Date    HX HEENT      nasal procedure 20 years ago    HX KNEE REPLACEMENT Bilateral     bilateral knee replacement    HX ORTHOPAEDIC Bilateral     wrist    HX UROLOGICAL  01/05/2020    cystoscopy     [place] CARDIAC SUR[place] PROCEDURE [place]      cardiac [facility] [place] [facility]  07/26/2016    AVR      Family History   Problem Relation Age of Onset    Anemia Mother     No Known Problems Father     Other Brother         drug abuse     Social History     Tobacco Use    Smoking status: Former Smoker     Years: 20.00    Smokeless tobacco: Never Used   Substance Use Topics    Alcohol use: Not Currently     Frequency: Never     Binge frequency: Never      Prior to Admission medications    Medication Sig Start Date End Date Taking? Authorizing Provider   thiamine HCL (B-1) 100 mg tablet TAKE ONE TABLET BY MOUTH DAILY 3/17/21 9/13/21  Meet Landers MD   Vitamin C 250 mg tablet TAKE ONE TABLET BY MOUTH DAILY 3/15/21 9/11/21  Best Lindquist MD   traZODone (DESYREL) 50 mg tablet TAKE ONE TABLET BY MOUTH AT BEDTIME. 3/15/21 6/13/21  Meet Landers MD   melatonin 10 mg TbER TAKE ONE TABLET BY MOUTH AT BEDTIME. 3/15/21 9/11/21  Meet Landers MD   magnesium oxide (MAG-OX) 400 mg tablet TAKE ONE TABLET BY MOUTH DAILY 3/15/21 9/11/21  Meet Landers MD   folic acid (FOLVITE) 1 mg tablet TAKE ONE TABLET BY MOUTH DAILY 3/15/21 9/11/21  Best Lindquist MD   furosemide (Lasix) 40 mg tablet Take 40 mg by mouth daily. Other, MD Saniya   simvastatin (ZOCOR) 40 mg tablet TAKE ONE TABLET BY MOUTH AT BEDTIME. 2/10/21 8/9/21  Best Lindquist MD   amLODIPine (NORVASC) 5 mg tablet Take 1 Tab by mouth daily for 60 days. 1/28/21 3/29/21  Meet Landers MD   escitalopram oxalate (LEXAPRO) 20 mg tablet Take 1 Tab by mouth daily for 90 days. 1/28/21 4/28/21  Meet Landers MD   tamsulosin (FLOMAX) 0.4 mg capsule Take 1 Cap by mouth daily.  1/5/21   Beatriz Chong MD   potassium chloride (K-DUR, KLOR-CON) 20 mEq tablet TAKE 1 TABLET BY MOUTH EACH MORNING WITH FOOD. 12/11/20 6/9/21  Best Lindquist MD   ferrous sulfate (IRON) 325 mg (65 mg iron) EC tablet TAKE ONE TABLET BY MOUTH EACH DAY WITH BREAKFAST. 12/11/20 6/9/21  Meet Benton MD   timolol (TIMOPTIC) 0.5 % ophthalmic solution INSTILL 1 DROPS INTO RIGHT EYE ONCE DAILY 10/23/20   Taina Bales MD   diclofenac (VOLTAREN) 1 % gel APPLY TWO GRAMS FOUR TIMES DAILY AS NEEDED FOR ELBOW PAIN 10/23/20   Wil Charles MD   allopurinoL (ZYLOPRIM) 100 mg tablet TAKE 1 TABLET BY MOUTH EVERY DAY 9/18/20   Wil Charles MD   terbinafine HCL (LAMISIL) 250 mg tablet Take 1 Tab by mouth daily. 9/8/20   Pratik Nunez DPM   ammonium lactate (AMLACTIN) 12 % topical cream Apply  to affected area two (2) times a day. rub in to affected area well 8/19/20   Pratik Nunez DPM       Allergies   Allergen Reactions    Amiodarone Other (comments)     Bradycardia -excessive    Carvedilol Unknown (comments)       Review of Systems:  Review of Systems   Constitutional: Negative. HENT: Negative. Eyes: Negative. Respiratory: Negative. Cardiovascular: Negative. Gastrointestinal: Negative. Genitourinary: Negative. Musculoskeletal: Positive for joint pain. Right elbow   Skin: Negative. Neurological: Negative. Endo/Heme/Allergies: Negative. Psychiatric/Behavioral: Negative.           Objective:     Current Facility-Administered Medications   Medication Dose Route Frequency    levoFLOXacin (LEVAQUIN) tablet 250 mg  250 mg Oral Q24H    Vancomycin random level scheduled for 1600 on 3/18/21   Other ONCE    rifAMPin (RIFADIN) capsule 300 mg  300 mg Oral Q12H    nystatin (MYCOSTATIN) 100,000 unit/mL oral suspension 500,000 Units  500,000 Units Oral QID    acetaminophen (TYLENOL) tablet 650 mg  650 mg Oral Q6H PRN    Or    acetaminophen (TYLENOL) suppository 650 mg  650 mg Rectal Q6H PRN    polyethylene glycol (MIRALAX) packet 17 g  17 g Oral DAILY PRN    promethazine (PHENERGAN) tablet 12.5 mg  12.5 mg Oral Q6H PRN    Or    ondansetron (ZOFRAN) injection 4 mg  4 mg IntraVENous Q6H PRN    hydrALAZINE (APRESOLINE) 20 mg/mL injection 10 mg  10 mg IntraVENous Q6H PRN    VANCOMYCIN INFORMATION NOTE 1 Each  1 Each Other Rx Dosing/Monitoring    dextrose 5 % - 0.45% NaCl infusion  75 mL/hr IntraVENous CONTINUOUS    amLODIPine (NORVASC) tablet 5 mg  5 mg Oral DAILY    diclofenac (VOLTAREN) 1 % topical gel 2 g  2 g Topical Q6H    escitalopram oxalate (LEXAPRO) tablet 20 mg  20 mg Oral DAILY    folic acid (FOLVITE) tablet 1 mg  1 mg Oral DAILY    magnesium oxide (MAG-OX) tablet 400 mg  400 mg Oral DAILY    atorvastatin (LIPITOR) tablet 20 mg  20 mg Oral QHS    heparin (porcine) injection 5,000 Units  5,000 Units SubCUTAneous Q8H      Vitals:    03/17/21 1356 03/17/21 1555 03/17/21 1944 03/18/21 1010   BP: 120/85 119/76 130/82 128/78   Pulse: 88 87 79 84   Resp: 18 18 18 18   Temp: 98.2 °F (36.8 °C) 98.2 °F (36.8 °C) 97.7 °F (36.5 °C) 98.3 °F (36.8 °C)   SpO2: 100% 100% 100% 100%   Weight:       Height:            Alert and oriented x3, No apparent distress  Physical Exam:  Right upper extremity: There is moderate swelling seen of the olecranon region of his right elbow. There is no induration. No increased warmth. No erythema. This area is nontender to touch. He had full passive range of motion of his right elbow without tenderness. He did have some limited active range of motion of his right elbow secondary to weakness. There was no tenderness or instability to varus valgus stressing of his elbow. Full range of motion of his right shoulder without tenderness. There is mild swelling seen of his right hand.  strength was 3 out of 5. EPL intact. Radial pulse palpable. Cap refill is 2 seconds. Right extremity neurovascular intact.   Labs:  CBC:  Recent Labs     03/17/21  0855   WBC 11.9*   RBC 2.84*   HGB 8.0*   HCT 26.3*   MCV 92.6   RDW 17.5*        CHEMISTRIES:  Recent Labs     03/18/21  1227 03/17/21  0855    144   K 3.9 3.9   * 114* CO2 29 23   BUN 29* 32*   CREA 2.14* 2.27*   CA 8.8 9.0   PHOS 2.5* 2.4*   PT/INR:No results for input(s): INR, INREXT in the last 72 hours. No lab exists for component: PROTIME  APTT:No results for input(s): APTT in the last 72 hours. LIVER PROFILE:No results for input(s): AST, ALT in the last 72 hours. No lab exists for component: BILIDIR, BILITOT, ALKPHOS    IMAGING:  X-rays taken of his right elbow showed no acute fractures. [place] seen along the medial epicondyle. Assessment/Plan:     Hospital Problems  Date Reviewed: 2/5/2021          Codes Class Noted POA    Cellulitis ICD-10-CM: L03.90  ICD-9-CM: 682.9  3/14/2021 Unknown        Parotiditis ICD-10-CM: K11.20  ICD-9-CM: 527.2  3/14/2021 Unknown          Probable gout right elbow. I discussed treatment options with the patient consisting of observation, oral antigout medication as well as possible aspiration of his right elbow. The patient declined aspiration at this time. He would like this to monitor his condition and see how he is doing. I did encourage him to keep his right upper extremity elevated on 2 pillows as much as possible. If he begins having pain of his right elbow or reconsiders having aspiration done orthopedics can be recalled to see this patient. We will check uric acid level. This patient was examined direct consult with Dr. Kyle Enciso      Thank you for the courtesy of this consult.   Signed By: Mehdi Perez PA-C     March 18, 2021

## 2021-03-18 NOTE — PROGRESS NOTES
During morning assessment this nurse noted right elbow swelling that was not prominent on yesterday's assessment. Patient reports high level of pain at location of swelling and is unable to move the limb due to pain. MD notified, STAT x-ray ordered. Right are is elevated on pillow and patient is resting comfortably at this time.

## 2021-03-18 NOTE — PROGRESS NOTES
Chart reviewed. PT and OT are recommending SNF. Patient was from an [address] and will need rehab prior to returning. CM reached out to the patient's son, Drew Gunter, at [phone]. CM confirmed Mr. Dieter Malcolm was adopted by the patient and is legally his son. We discussed discharge planning and he is agreeable for SNF referrals with no preference. Referrals have been sent.

## 2021-03-18 NOTE — PROGRESS NOTES
Problem: Falls - Risk of  Goal: *Absence of Falls  Description: Document [place] Flow Fall Risk and appropriate interventions in the flowsheet. Outcome: Progressing Towards Goal  Note: Fall Risk Interventions:  Mobility Interventions: PT Consult for mobility concerns    Mentation Interventions: Evaluate medications/consider consulting pharmacy    Medication Interventions: Evaluate medications/consider consulting pharmacy    Elimination Interventions:  Toileting schedule/hourly rounds

## 2021-03-18 NOTE — PROGRESS NOTES
Comprehensive Nutrition Assessment    Type and Reason for Visit: Reassess(interim)    Nutrition Recommendations/Plan:   Adjust to Full liquid diet  Continue Ensure Enlive TID (1050kcals, 60g pro)  Add Ensure pdg TID (510kcals, 12g pro)  Allow snacks as desired  *with 100% intakes, ONS meeting 1560kcals, 72g pro (79% EENs, 99% pro needs)    Please document % of all meal/snack consumed, BMs  Weekly measured body weight    Nutrition Assessment:  Admitted for R facial swelling, CT neck showing enlarged R parotid gland, ?malignancy, +Parotiditis. ENT following, on abx and warm compresses. RD unable to interview d/t dementia, minimal intakes at initial assessment, ONS added. Today, RD endorses pt takes bites of food but drinks Ensures well, loves pdg, RD to add Ensure pdg TID. Intakes may be poor d/t pain with chewing, RD to adjust to full liquids. RN endorsed swelling improving, RD will monitor for ability to advance diet. Labs: H/H 8.0/26.3, , Bun 32, Cr 2.27, Phos 9.4. Meds: Statin, D5 at 75ml/hr, B9, heparin, levofloxacin, MagOX, oral nystatin, PRN miralax, PRN antiemetics, Rifampin. Malnutrition Assessment:  Malnutrition Status:  Severe malnutrition    Context:  Acute illness       Estimated Daily Nutrient Needs:  Energy (kcal): 1980kcals (30kcals/kg); Weight Used for Energy Requirements: Current  Protein (g): 73g (1.1g/kg); Weight Used for Protein Requirements: Current(CKD-3)  Fluid (ml/day): 1980ml; Method Used for Fluid Requirements: 1 ml/kcal    Nutrition Related Findings:  NFPE finding moderate muscle wasting. No hx dysphagia, on [facility] soft diet however no SLP eval yet, discussed with RN previously. No N/V/D/C, last BM 3/18 per RN. No edema.      Wounds:    None       Current Nutrition Therapies:  DIET NUTRITIONAL SUPPLEMENTS Breakfast, Lunch, Dinner; Ensure [employer]  DIET FULL LIQUID  DIET NUTRITIONAL SUPPLEMENTS Breakfast, Lunch, Dinner; Ensure Pudding    Anthropometric Measures:  · Height:  5' 7.99\" (172.7 cm)  · Current Body Wt:  66 kg (145 lb 8.1 oz)(3/15, RD obtained)   · Admission Body Wt:  145 lb 8.1 oz(3/15)    · Usual Body Wt:  (toby)     · Ideal Body Wt:  154 lbs:  94.5 %   · BMI Category:  Normal weight (BMI 22.0-24.9) age over 72       Nutrition Diagnosis:   · Inadequate oral intake related to cognitive or neurological impairment(Dementia exacerbated by ?infection, ?swallowing difficulty) as evidenced by intake 0-25%    Nutrition Interventions:   Food and/or Nutrient Delivery: Modify current diet, Modify oral nutrition supplement  Nutrition Education and Counseling: Education not indicated, No recommendations at this time  Coordination of Nutrition Care: Continue to monitor while inpatient, Swallow evaluation, Feeding assistance/environmental change    Goals:  Intakes >/=50% of EENs in 5 days, wt maintenance within +/-0.5kg in 5 days       Nutrition Monitoring and Evaluation:   Behavioral-Environmental Outcomes: None identified  Food/Nutrient Intake Outcomes: Diet advancement/tolerance, Food and nutrient intake, Supplement intake  Physical Signs/Symptoms Outcomes: Chewing or swallowing, Meal time behavior, Nutrition focused physical findings, Weight    Discharge Planning:     Too soon to determine     Electronically signed by Tyrell Carey RD on 3/18/2021 at 1:00 PM    Contact: Ext [phone], or via [employer]

## 2021-03-18 NOTE — PROGRESS NOTES
Progress Note    Patient: Gillian Rebollar MRN: [id number]  SSN: [id number]    YOB: 1935  Age: 81 y.o. Sex: male      Admit Date: 3/13/2021    LOS: 4 days     Subjective:     85M, H/o dementia, CHF with unknown EF, BPH with right parotitis with cellulitis on vanc and zosyn. Dehydration increased the risk of parotitis. ENT following. SUBJECTIVE  Patient has swelling of Rt parotid area. Does not complain of any pain    We will continue broad spectrum Abx, Follow ENT  Blood culture is MRSA and MRSAand enterobacter from mouth wound  He would once stable will return to group home  Patient was noticed to have Rt elbow swelling and tenderness. Review of Systems:    Review of systems is -ve other then as stated in [place]      Objective:     Vitals:    03/17/21 1356 03/17/21 1555 03/17/21 1944 03/18/21 1010   BP: 120/85 119/76 130/82 128/78   Pulse: 88 87 79 84   Resp: 18 18 18 18   Temp: 98.2 °F (36.8 °C) 98.2 °F (36.8 °C) 97.7 °F (36.5 °C) 98.3 °F (36.8 °C)   SpO2: 100% 100% 100% 100%   Weight:       Height:            Intake and Output:  Current Shift: No intake/output data recorded. Last three shifts: 03/16 1901 - 03/18 0700  In: -   Out: 600 [Urine:600]      Physical Exam:   General: Alert and Oriented to name. Cooperative and friendly. No acute distress. Nourished and well developed. Requesting something to drink at the time of admission. Head/Eyes: Normocephalic, atraumatic, EOMI, PERRLA. Nose/Mouth: Turbinates within normal limits, No drainage. Mucous membranes are dry. Throat and Neck:  Right parotid mass appreciated. Size is approximately 4 x 4 inches (measuring tape unavailable), swelling has gone down. Anterior cervical lymphadenopathy appreciated. There is mild erythema of mass and patient reports some pain with palpation. Mass is firm to the touch without fluctuance. No JVD or thyromegaly appreciated. Cervical spine has good range of motion without pain.   Lungs: Clear to auscultation bilaterally without wheezes, rhonchi or crackles. Good air movement bilaterally. Symmetric chest rise with respirations. Heart: Regular rate and rhythm. Normal S1/S2. No appreciated murmurs, rubs or gallops. No lower extremity edema. Abdomen: Soft, non-tender, non-distended. Bowel sounds present in all four quadrants. No masses or hepatosplenomegaly appreciated. Extremities:  Atraumatic. Able to move all extremities symmetrically. No abnormal bony protuberances appreciated. Joints without swelling. Right elbow swelling and tenderness. Back: No pain with palpation over spinous processes or paraspinal musculature. No CVA tenderness. Skin: Clean, dry and intact without appreciated lesions. Neurologic: Alert to name only. Cranial nerves 2-12 are grossly intact. Facial features are symmetric. Speech is fluent and clear. No focal deficits appreciated. Psychiatric: Normal affect, normal thought process, good eye contact. Lab/Data Review:  No results found for this or any previous visit (from the past 24 hour(s)). Assessment and plan:        (1) right facial mass: parotitis with cellulitis D/D: malignancy. On IV abx, ENT following    (2) bacteremia: Adilene Narayan. On vancomycin. Zosyn deescalated to levaquin as per ID    (2) hypernatremia : D50.45% saline, allow diet    (3) KO: prerenal. Hold lasix, 0.45% saline    (4) HTN: resume amlodipine    (5) dementia: complicates all aspects of care    (6) multiple myeloma: unknown    (7) CHF: hold lasix.  EF unknown    (8) Right elbow swelling, will get ortho eval.     DVT ppx: heparin subQ    DISPO: group home when ok with ENT     Signed By: Bk Dominguez MD      March 18, 2021

## 2021-03-18 NOTE — PROGRESS NOTES
[facility]  HD 4   Vanc, levaquin    Some reports of elbow swelling, x ray pending    Visit Vitals  /78   Pulse 84   Temp 98.3 °F (36.8 °C)   Resp 18   Ht 5' 7.99\" (1.727 m)   Wt 145 lb 8.1 oz (66 kg)   SpO2 100%   BMI 22.13 kg/m²       Comfortable   Mentation remains improved   Continued facial edema, continues to improve  Improved hydration of oral mucosa.  Scant purulence from duct on massage of salivary gland    Cultures  Oral cx - MRSA, E. Aerogenes  Blood cx 3/14 - MRSA       A/P Parotitis  - Cont antibiotics per ID - Zosyn de-escalated to levaquin   - Cont hydration as medically tolerates  - Continue warm compresses  - Noted plans for prolonged IV vancomycin   - As parotitis continues to improve, ENT will follow peripherally, but call with any questions     MD Dania Camacho [facility]  [address] [place] [address]  Office Phone: [phone]

## 2021-03-18 NOTE — PROGRESS NOTES
Infectious Disease Progress Note           Subjective:   Feels better more alert and following commands appropriately. Staff noted right elbow swelling today. Stools have been loose but no reports of increased BM frequency. Objective:   Physical Exam:     Visit Vitals  /82   Pulse 79   Temp 97.7 °F (36.5 °C)   Resp 18   Ht 5' 7.99\" (1.727 m)   Wt 145 lb 8.1 oz (66 kg)   SpO2 100%   BMI 22.13 kg/m²    O2 Flow Rate (L/min): 2 l/min O2 Device: Room air    Temp (24hrs), Av °F (36.7 °C), Min:97.7 °F (36.5 °C), Max:98.2 °F (36.8 °C)    No intake/output data recorded.  1901 -  0700  In: -   Out: 600 [Urine:600]    General: NAD, alert, AAO x 4  HEENT: [place], Moist mucosa, decreased right sided facial swelling   Lungs: CTA b/l, decreased at the bases   Heart: S1S2+, RRR, no murmur  Abdo: Soft, NT, ND, +BS   Exts: No edema, + pulses b/l   Skin: Right facial swelling, sig induration and tenderness     Data Review:       Recent Days:  Recent Labs     21  0855   WBC 11.9*   HGB 8.0*   HCT 26.3*        Recent Labs     21  0855   BUN 32*   CREA 2.27*       Lab Results   Component Value Date/Time    C-Reactive protein 24.20 (H) 2021 08:55 AM      Microbiology   - Blood Cx : MRSA  - Oral wound Cx : MRSA and E. aerogenes    Diagnostics   CXR Results  (Last 48 hours)    None          Assessment/Plan     1. Right parotitis/facial cellulitis:Continued improvement in right facial swelling       MRSA and E. aerogenes and Yeast isolated from Cx of mouth wound       Afebrile, todays labs re pending       Continue on Vanc for MRSA coverage, de-escalate Zosyn to levaquin for GNR coverage       Routine labs in the morning      2. MRSA bacteremia: BCx positive on . Underlying prosthetic aortic Valve      No reports of Valvular veg on 2 D echo       Repeat Bcx from  is staying neg, continue on Van/Rifampin for MRSA coverage        3.  AMS on admission: baseline dementia, improved mentation       4. Acute on CKD: Cr trending down on most recent labs 03/17    5. Right elbow swelling, appears chronic, suspected lipoma, similar swelling on left but not as prominent       - No tenderness, warmth or erythema.  Agree w X-ray to further alexei Hare MD    3/18/2021

## 2021-03-19 LAB
APPEARANCE FLD: ABNORMAL
BODY FLD TYPE: NORMAL
COLOR FLD: ABNORMAL
CRYSTALS FLD MICRO: NORMAL
NUC CELL # FLD: ABNORMAL /CU MM (ref 0–5)
RBC # FLD: ABNORMAL /CU MM
SPECIMEN SOURCE FLD: ABNORMAL
URATE SERPL-MCNC: 6.1 MG/DL (ref 3.5–7.2)
VANCOMYCIN SERPL-MCNC: 16.2 UG/ML

## 2021-03-19 PROCEDURE — 74011250637 HC RX REV CODE- 250/637: Performed by: INTERNAL MEDICINE

## 2021-03-19 PROCEDURE — 84550 ASSAY OF BLOOD/URIC ACID: CPT

## 2021-03-19 PROCEDURE — 74011250636 HC RX REV CODE- 250/636: Performed by: HOSPITALIST

## 2021-03-19 PROCEDURE — 74011000250 HC RX REV CODE- 250: Performed by: HOSPITALIST

## 2021-03-19 PROCEDURE — 87205 SMEAR GRAM STAIN: CPT

## 2021-03-19 PROCEDURE — 89050 BODY FLUID CELL COUNT: CPT

## 2021-03-19 PROCEDURE — 89060 EXAM SYNOVIAL FLUID CRYSTALS: CPT

## 2021-03-19 PROCEDURE — 36415 COLL VENOUS BLD VENIPUNCTURE: CPT

## 2021-03-19 PROCEDURE — 74011250637 HC RX REV CODE- 250/637: Performed by: HOSPITALIST

## 2021-03-19 PROCEDURE — 99232 SBSQ HOSP IP/OBS MODERATE 35: CPT | Performed by: INTERNAL MEDICINE

## 2021-03-19 PROCEDURE — 87015 SPECIMEN INFECT AGNT CONCNTJ: CPT

## 2021-03-19 PROCEDURE — 80202 ASSAY OF VANCOMYCIN: CPT

## 2021-03-19 PROCEDURE — 65270000029 HC RM PRIVATE

## 2021-03-19 RX ORDER — COLCHICINE 0.6 MG/1
0.6 TABLET ORAL DAILY
Status: DISCONTINUED | OUTPATIENT
Start: 2021-03-19 | End: 2021-03-22 | Stop reason: HOSPADM

## 2021-03-19 RX ADMIN — RIFAMPIN 300 MG: 300 CAPSULE ORAL at 08:52

## 2021-03-19 RX ADMIN — DICLOFENAC SODIUM 2 G: 10 GEL TOPICAL at 05:10

## 2021-03-19 RX ADMIN — HEPARIN SODIUM 5000 UNITS: 5000 INJECTION INTRAVENOUS; SUBCUTANEOUS at 21:27

## 2021-03-19 RX ADMIN — DEXTROSE AND SODIUM CHLORIDE 75 ML/HR: 5; 450 INJECTION, SOLUTION INTRAVENOUS at 04:40

## 2021-03-19 RX ADMIN — ESCITALOPRAM OXALATE 20 MG: 10 TABLET ORAL at 08:52

## 2021-03-19 RX ADMIN — AMLODIPINE BESYLATE 5 MG: 5 TABLET ORAL at 08:52

## 2021-03-19 RX ADMIN — NYSTATIN 500000 UNITS: 100000 SUSPENSION ORAL at 08:52

## 2021-03-19 RX ADMIN — HEPARIN SODIUM 5000 UNITS: 5000 INJECTION INTRAVENOUS; SUBCUTANEOUS at 13:10

## 2021-03-19 RX ADMIN — DICLOFENAC SODIUM 2 G: 10 GEL TOPICAL at 13:16

## 2021-03-19 RX ADMIN — LEVOFLOXACIN 250 MG: 250 TABLET, FILM COATED ORAL at 13:10

## 2021-03-19 RX ADMIN — ATORVASTATIN CALCIUM 20 MG: 20 TABLET, FILM COATED ORAL at 21:26

## 2021-03-19 RX ADMIN — NYSTATIN 500000 UNITS: 100000 SUSPENSION ORAL at 13:10

## 2021-03-19 RX ADMIN — Medication 400 MG: at 08:52

## 2021-03-19 RX ADMIN — RIFAMPIN 300 MG: 300 CAPSULE ORAL at 21:25

## 2021-03-19 RX ADMIN — DICLOFENAC SODIUM 2 G: 10 GEL TOPICAL at 18:42

## 2021-03-19 RX ADMIN — NYSTATIN 500000 UNITS: 100000 SUSPENSION ORAL at 18:43

## 2021-03-19 RX ADMIN — COLCHICINE 0.6 MG: 0.6 TABLET, FILM COATED ORAL at 16:39

## 2021-03-19 RX ADMIN — FOLIC ACID 1 MG: 1 TABLET ORAL at 08:52

## 2021-03-19 RX ADMIN — HEPARIN SODIUM 5000 UNITS: 5000 INJECTION INTRAVENOUS; SUBCUTANEOUS at 04:08

## 2021-03-19 RX ADMIN — NYSTATIN 500000 UNITS: 100000 SUSPENSION ORAL at 21:25

## 2021-03-19 RX ADMIN — DICLOFENAC SODIUM 2 G: 10 GEL TOPICAL at 00:40

## 2021-03-19 NOTE — PROGRESS NOTES
Orthopedic progress note    Date:3/19/2021       Room:[facility]  Patient Name:Barbi Colon. YOB: 1935     Age:85 y.o. Subjective    Follow up for 81year old male with swelling of right elbow. The patient is complaining of moderate pain of his right elbow today. Discussed with patient about possible aspiration versus oral medication. Patient elected to proceed with aspiration.   Objective           Vitals Last 24 Hours:  TEMPERATURE:  Temp  Av.4 °F (36.3 °C)  Min: 96.6 °F (35.9 °C)  Max: 97.8 °F (36.6 °C)  RESPIRATIONS RANGE: Resp  Av  Min: 18  Max: 18  PULSE OXIMETRY RANGE: SpO2  Av %  Min: 96 %  Max: 99 %  PULSE RANGE: Pulse  Av  Min: 66  Max: 79  BLOOD PRESSURE RANGE: Systolic (09ASF), ONW:815 , Min:126 , XSO:915   ; Diastolic (85LRX), IDC:56, Min:70, Max:79    Current Facility-Administered Medications   Medication Dose Route Frequency    colchicine tablet 0.6 mg  0.6 mg Oral DAILY    levoFLOXacin (LEVAQUIN) tablet 250 mg  250 mg Oral Q24H    VANCOMYCIN RANDOM LEVEL SCHEDULED FOR 3/19/2021 at 1700   Other ONCE    rifAMPin (RIFADIN) capsule 300 mg  300 mg Oral Q12H    nystatin (MYCOSTATIN) 100,000 unit/mL oral suspension 500,000 Units  500,000 Units Oral QID    acetaminophen (TYLENOL) tablet 650 mg  650 mg Oral Q6H PRN    Or    acetaminophen (TYLENOL) suppository 650 mg  650 mg Rectal Q6H PRN    polyethylene glycol (MIRALAX) packet 17 g  17 g Oral DAILY PRN    promethazine (PHENERGAN) tablet 12.5 mg  12.5 mg Oral Q6H PRN    Or    ondansetron (ZOFRAN) injection 4 mg  4 mg IntraVENous Q6H PRN    hydrALAZINE (APRESOLINE) 20 mg/mL injection 10 mg  10 mg IntraVENous Q6H PRN    VANCOMYCIN INFORMATION NOTE 1 Each  1 Each Other Rx Dosing/Monitoring    dextrose 5 % - 0.45% NaCl infusion  75 mL/hr IntraVENous CONTINUOUS    amLODIPine (NORVASC) tablet 5 mg  5 mg Oral DAILY    diclofenac (VOLTAREN) 1 % topical gel 2 g  2 g Topical Q6H    escitalopram oxalate (LEXAPRO) tablet 20 mg  20 mg Oral DAILY    folic acid (FOLVITE) tablet 1 mg  1 mg Oral DAILY    magnesium oxide (MAG-OX) tablet 400 mg  400 mg Oral DAILY    atorvastatin (LIPITOR) tablet 20 mg  20 mg Oral QHS    heparin (porcine) injection 5,000 Units  5,000 Units SubCUTAneous Q8H          I/O (24Hr): Intake/Output Summary (Last 24 hours) at 3/19/2021 1448  Last data filed at 3/19/2021 0907  Gross per 24 hour   Intake    Output 2100 ml   Net -2100 ml     Objective  Labs/Imaging/Diagnostics    Labs:  CBC:  Recent Labs     03/18/21  1617 03/17/21  0855   WBC 7.2 11.9*   RBC 2.59* 2.84*   HGB 7.3* 8.0*   HCT 23.7* 26.3*   MCV 91.5 92.6   RDW 17.4* 17.5*    271     CHEMISTRIES:  Recent Labs     03/18/21  1227 03/17/21  0855    144   K 3.9 3.9   * 114*   CO2 29 23   BUN 29* 32*   CREA 2.14* 2.27*   CA 8.8 9.0   PHOS 2.5* 2.4*   PT/INR:No results for input(s): INR, INREXT in the last 72 hours. No lab exists for component: PROTIME  APTT:No results for input(s): APTT in the last 72 hours. LIVER PROFILE:No results for input(s): AST, ALT in the last 72 hours. No lab exists for component: ALBERTO Vann  Lab Results   Component Value Date/Time    ALT (SGPT) 26 02/27/2021 06:00 PM    AST (SGOT) 28 02/27/2021 06:00 PM    Alk. phosphatase 98 02/27/2021 06:00 PM    Bilirubin, total 0.7 02/27/2021 06:00 PM     Uric acid level 6.1    Physical Exam:  Right Elbow: Moderate swelling of Olecranon bursa. No erythema, induration or fluctuance. Mild tenderness to palpation. +Tenderness to flexion/extension of elbow. No streaking. Mild swelling of right hand. EPL intact. Cap. Refill less than 2 secs. .  strength 4 out of 5. Right upper extremity neurovascularly intact.     Assessment//Plan           Patient Active Problem List    Diagnosis Date Noted    Cellulitis 03/14/2021    Parotiditis 03/14/2021    Urinary incontinence 02/05/2021    Microhematuria 02/05/2021    BPH with obstruction/lower urinary tract symptoms 01/05/2021    Nocturnal enuresis 12/02/2020    Frequency of urination 12/02/2020    Stage 3b chronic kidney disease 10/31/2020    Acute deep vein thrombosis (DVT) of radial vein of left upper extremity ([facility] 08/26/2020    Closed fracture of left wrist with delayed healing, subsequent encounter 08/26/2020    Anemia of chronic disease 08/26/2020    Onychomycosis 08/19/2020    Peripheral vascular disease ([facility] 08/19/2020    Xerosis cutis 08/19/2020    Maceration of skin 08/19/2020    Dementia ([facility] 07/09/2020    Frequent falls 07/09/2020    Falls frequently 07/04/2020    Debility 06/19/2020    Closed fracture of fourth metacarpal bone 06/04/2020    Fall 02/11/2020    Depression     MGUS (monoclonal gammopathy of unknown significance) 02/19/2019    Hemorrhoids     Episodic lightheadedness     History of cardioversion 02/17/2017    Epistaxis 11/27/2016    Right-sided epistaxis 11/27/2016    Postoperative anemia due to acute blood loss 07/28/2016    S/P AVR (aortic valve replacement) 07/26/2016    Acute congestive heart failure ([facility] 07/01/2016    ACP (advance care planning) 06/06/2016    Skin lesion     S/P colonoscopy with polypectomy 05/09/2016    GI bleed     Paroxysmal atrial fibrillation ([facility] 02/17/2016    Alcohol abuse     Erectile dysfunction     Multiple myeloma (HCC)     Gout     Hypertension     Aortic regurgitation     CKD (chronic kidney disease) stage 3, GFR 30-59 ml/min (HCC)     Pulmonary HTN (HCC)      Right elbow swelling  Over the patient does have a normal uric acid level this appears to be a gout flareup. Based on clinical exam does not appear to be septic in nature. After verbal consent was obtained, the right elbow was sterilely prepped with chlorhexidine and alcohol. An 18-gauge 1/2 inch needle use to infiltrate the olecranon bursa. Approximately 40 cc of thick chalky fluid was aspirated.   This fluid was sent for culture and Gram stain, cell count, crystal analysis. A compressive wrap was applied to his right upper extremity with a window opening to monitor and observe his IV site in the antecubital region. We will try to keep this dressing on for the next 48 hours to help with swelling particular of his elbow and right hand. There was still some remaining swelling of his right elbow after the aspirate. Nursing made aware. Orthopedics will follow fluid samples.       Electronically signed by Darren Jones PA-C on 3/19/2021 at 2:48 PM

## 2021-03-19 NOTE — PROGRESS NOTES
Patient was accepted to [facility] pending insurance auth. Auth was initiated today with [employer]. Patient will likely not have auth until Monday.

## 2021-03-19 NOTE — PROGRESS NOTES
Nutrition Note    RD adjusted pt to full liquid diet yesterday as pt with minimal intakes of solids d/t ?pain with swallowing per RN, same report at initial assessment 3/16. Today RN endorses pt asking for solid foods, able to consume adequate intakes of solid foods for her today. RD to adjust back to [facility] soft diet, continue all ONS, please monitor and documented intakes of solids, RD will f/u to determine need to further adjust diet texture.      Electronically signed by Nena Fleming RD on 3/19/2021 at 12:02 PM    Contact: Ext [phone], or via [employer]

## 2021-03-19 NOTE — PROGRESS NOTES
CM received call back from [facility] indicating they do not have a bed for the patient since he is covid negative and asked if a referral could be sent to [facility]. Patient's son agreeable to this plan. Referral sent to [facility]. They will need updated PT notes and an OT eval to initiate authorization. Chart reviewed and OT was not ordered. CM notified Dr. Amina Canada via Viky Crawford of OT order needed. DC plan: [facility] pending Marylu Hylton.

## 2021-03-19 NOTE — PROGRESS NOTES
Progress Note    Patient: Edna Alfredo. MRN: [id number]  SSN: [id number]    YOB: 1935  Age: 81 y.o. Sex: male      Admit Date: 3/13/2021    LOS: 5 days     Subjective:     85M, H/o dementia, CHF with unknown EF, BPH with right parotitis with cellulitis on vanc and zosyn. Dehydration increased the risk of parotitis. ENT following. SUBJECTIVE  Patient has swelling of Rt parotid area. Does not complain of any pain    We will continue broad spectrum Abx, Follow ENT  Blood culture is MRSA and MRSAand enterobacter from mouth wound  He would once stable will return to group home. Patient was noticed to have Rt elbow swelling and tenderness. It is more painful today    Review of Systems:    Review of systems is -ve other then as stated in [place]      Objective:     Vitals:    03/18/21 1649 03/18/21 1937 03/19/21 0044 03/19/21 0907   BP: 131/77 126/72 128/70 (!) 159/79   Pulse: 79 72 75 66   Resp: 18 18 18   Temp: 97.8 °F (36.6 °C) 97.5 °F (36.4 °C) 97.8 °F (36.6 °C) (!) 96.6 °F (35.9 °C)   SpO2: 99% 98% 96% 99%   Weight:       Height:            Intake and Output:  Current Shift: 03/19 0701 - 03/19 1900  In: -   Out: 1000 [Urine:1000]  Last three shifts: 03/17 1901 - 03/19 0700  In: -   Out: 1100 [Urine:1100]      Physical Exam:   General: Alert and Oriented to name. Cooperative and friendly. No acute distress. Nourished and well developed. Requesting something to drink at the time of admission. Head/Eyes: Normocephalic, atraumatic, EOMI, PERRLA. Nose/Mouth: Turbinates within normal limits, No drainage. Mucous membranes are dry. Throat and Neck:  Right parotid mass appreciated. swelling has gone down. Still palpable. Anterior cervical lymphadenopathy appreciated. There is mild erythema of mass and patient reports some pain with palpation. Mass is firm to the touch without fluctuance. No JVD or thyromegaly appreciated. Cervical spine has good range of motion without pain.   Lungs: Clear to auscultation bilaterally without wheezes, rhonchi or crackles. Good air movement bilaterally. Symmetric chest rise with respirations. Heart: Regular rate and rhythm. Normal S1/S2. No appreciated murmurs, rubs or gallops. No lower extremity edema. Abdomen: Soft, non-tender, non-distended. Bowel sounds present in all four quadrants. No masses or hepatosplenomegaly appreciated. Extremities:  Atraumatic. Able to move all extremities symmetrically. No abnormal bony protuberances appreciated. Joints without swelling. Right elbow swelling and tenderness- worse then yesterday. Back: No pain with palpation over spinous processes or paraspinal musculature. No CVA tenderness. Skin: Clean, dry and intact without appreciated lesions. Neurologic: Alert to name only. Cranial nerves 2-12 are grossly intact. Facial features are symmetric. Speech is fluent and clear. No focal deficits appreciated. Psychiatric: Normal affect, normal thought process, good eye contact. Lab/Data Review:  Recent Results (from the past 24 hour(s))   VANCOMYCIN, RANDOM    Collection Time: 03/18/21  4:17 PM   Result Value Ref Range    Vancomycin, random 16.6 ug/mL    Reported dose date Dose Dependent      Reported dose: Dose Dependent Units   CBC WITH AUTOMATED DIFF    Collection Time: 03/18/21  4:17 PM   Result Value Ref Range    WBC 7.2 4.1 - 11.1 K/uL    RBC 2.59 (L) 4.10 - 5.70 M/uL    HGB 7.3 (L) 12.1 - 17.0 g/dL    HCT 23.7 (L) 36.6 - 50.3 %    MCV 91.5 80.0 - 99.0 FL    MCH 28.2 26.0 - 34.0 PG    MCHC 30.8 30.0 - 36.5 g/dL    RDW 17.4 (H) 11.5 - 14.5 %    PLATELET 461 546 - 744 K/uL    MPV 9.3 8.9 - 12.9 FL    NEUTROPHILS 82 (H) 32 - 75 %    LYMPHOCYTES 8 (L) 12 - 49 %    MONOCYTES 7 5 - 13 %    EOSINOPHILS 2 0 - 7 %    BASOPHILS 0 0 - 1 %    IMMATURE GRANULOCYTES 1 (H) 0.0 - 0.5 %    ABS. NEUTROPHILS 6.0 1.8 - 8.0 K/UL    ABS. LYMPHOCYTES 0.6 (L) 0.8 - 3.5 K/UL    ABS. MONOCYTES 0.5 0.0 - 1.0 K/UL    ABS.  EOSINOPHILS 0.2 0.0 - 0.4 K/UL    ABS. BASOPHILS 0.0 0.0 - 0.1 K/UL    ABS. IMM. GRANS. 0.1 (H) 0.00 - 0.04 K/UL    DF AUTOMATED     URIC ACID    Collection Time: 03/19/21  6:30 AM   Result Value Ref Range    Uric acid 6.1 3.5 - 7.2 mg/dL         Assessment and plan:        (1) right facial mass: parotitis with cellulitis D/D: malignancy. On IV abx, ENT and ID following    (2) bacteremia: Jose Angel Ledezma. On vancomycin. Zosyn deescalated to levaquin as per ID    (2) hypernatremia : resolved    (3) KO: prerenal. Hold lasix, 0.45% saline    (4) HTN: resume amlodipine    (5) dementia: complicates all aspects of care    (6) multiple myeloma: unknown    (7) CHF: hold lasix. EF unknown    (8) Right elbow swelling,ortho eval. Start on colchicine. Will likely need aspiration of elbow.     DVT ppx: heparin subQ    DISPO: group home     Signed By: Davion London MD      March 19, 2021

## 2021-03-19 NOTE — PROGRESS NOTES
Infectious Disease Progress Note           Subjective:   Stable, denies new complaints, no acute events since last seen. Decreasing right facial swelling. Right right elbow pain and discomfort   Objective:   Physical Exam:     Visit Vitals  BP (!) 159/79 (BP 1 Location: Left upper arm)   Pulse 66   Temp (!) 96.6 °F (35.9 °C)   Resp 18   Ht 5' 7.99\" (1.727 m)   Wt 145 lb 8.1 oz (66 kg)   SpO2 99%   BMI 22.13 kg/m²    O2 Flow Rate (L/min): 2 l/min O2 Device: Room air    Temp (24hrs), Av.4 °F (36.3 °C), Min:96.6 °F (35.9 °C), Max:97.8 °F (36.6 °C)    701 - 1900  In: -   Out: 1000 [Urine:1000]   1901 -  0700  In: -   Out: 1100 [Urine:1100]    General: NAD, alert, AAO x 4  HEENT: MARIETTA, Moist mucosa, decreased right sided facial swelling   Lungs: CTA b/l, decreased at the bases   Heart: S1S2+, RRR, no murmur  Abdo: Soft, NT, ND, +BS   Exts: Right elbow swelling and discomfort   Skin: Right facial swelling, sig induration and tenderness     Data Review:       Recent Days:  Recent Labs     21  1617 21  0855   WBC 7.2 11.9*   HGB 7.3* 8.0*   HCT 23.7* 26.3*    271     Recent Labs     21  1227 21  0855   BUN 29* 32*   CREA 2.14* 2.27*       Lab Results   Component Value Date/Time    C-Reactive protein 24.20 (H) 2021 08:55 AM      Microbiology   - Blood Cx : MRSA  - Oral wound Cx : MRSA and E. aerogenes    Diagnostics   CXR Results  (Last 48 hours)    None          Assessment/Plan     1. Right parotitis/facial cellulitis: Marked decrease in right facial swelling and tenderness       MRSA and E. aerogenes and Yeast isolated from Cx of mouth wound       Afebrile, WBC normalized on todays labs       On day # 6 of Vanc and Day 6 of GNR coverage, currently levaquin       Routine labs in the morning     2.  MRSA bacteremia: Underlying prosthetic aortic Valve      No reports of Valvular veg on 2 D echo       Blood Cx positive on , repeat Bcx on 03/16 is staying neg      Continue Vanc/Rifampin x 3 wks from date of neg Bcx     4. Acute on CKD: Monitor renal function while on Vanc     5. 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  Started on Colchicine     Julian Douglass MD    3/19/2021

## 2021-03-20 LAB
CREAT SERPL-MCNC: 1.6 MG/DL (ref 0.7–1.3)
DATE LAST DOSE: NORMAL
REPORTED DOSE,DOSE: NORMAL UNITS
VANCOMYCIN SERPL-MCNC: 15.3 UG/ML

## 2021-03-20 PROCEDURE — 74011250636 HC RX REV CODE- 250/636: Performed by: HOSPITALIST

## 2021-03-20 PROCEDURE — 74011250637 HC RX REV CODE- 250/637: Performed by: FAMILY MEDICINE

## 2021-03-20 PROCEDURE — 65270000029 HC RM PRIVATE

## 2021-03-20 PROCEDURE — 80202 ASSAY OF VANCOMYCIN: CPT

## 2021-03-20 PROCEDURE — 74011000250 HC RX REV CODE- 250: Performed by: HOSPITALIST

## 2021-03-20 PROCEDURE — 82565 ASSAY OF CREATININE: CPT

## 2021-03-20 PROCEDURE — 99232 SBSQ HOSP IP/OBS MODERATE 35: CPT | Performed by: INTERNAL MEDICINE

## 2021-03-20 PROCEDURE — 74011250637 HC RX REV CODE- 250/637: Performed by: HOSPITALIST

## 2021-03-20 PROCEDURE — 74011250636 HC RX REV CODE- 250/636: Performed by: FAMILY MEDICINE

## 2021-03-20 PROCEDURE — 74011000258 HC RX REV CODE- 258: Performed by: FAMILY MEDICINE

## 2021-03-20 PROCEDURE — 74011250636 HC RX REV CODE- 250/636: Performed by: INTERNAL MEDICINE

## 2021-03-20 PROCEDURE — 97165 OT EVAL LOW COMPLEX 30 MIN: CPT

## 2021-03-20 PROCEDURE — 36573 INSJ PICC RS&I 5 YR+: CPT | Performed by: INTERNAL MEDICINE

## 2021-03-20 PROCEDURE — 97535 SELF CARE MNGMENT TRAINING: CPT

## 2021-03-20 PROCEDURE — 74011250637 HC RX REV CODE- 250/637: Performed by: INTERNAL MEDICINE

## 2021-03-20 PROCEDURE — 36415 COLL VENOUS BLD VENIPUNCTURE: CPT

## 2021-03-20 RX ORDER — LANOLIN ALCOHOL/MO/W.PET/CERES
3 CREAM (GRAM) TOPICAL
Status: DISCONTINUED | OUTPATIENT
Start: 2021-03-20 | End: 2021-03-22 | Stop reason: HOSPADM

## 2021-03-20 RX ORDER — DIPHENOXYLATE HYDROCHLORIDE AND ATROPINE SULFATE 2.5; .025 MG/1; MG/1
2 TABLET ORAL
Status: DISCONTINUED | OUTPATIENT
Start: 2021-03-20 | End: 2021-03-22 | Stop reason: HOSPADM

## 2021-03-20 RX ADMIN — HEPARIN SODIUM 5000 UNITS: 5000 INJECTION INTRAVENOUS; SUBCUTANEOUS at 20:41

## 2021-03-20 RX ADMIN — DICLOFENAC SODIUM 2 G: 10 GEL TOPICAL at 17:52

## 2021-03-20 RX ADMIN — NYSTATIN 500000 UNITS: 100000 SUSPENSION ORAL at 08:25

## 2021-03-20 RX ADMIN — ESCITALOPRAM OXALATE 20 MG: 10 TABLET ORAL at 08:25

## 2021-03-20 RX ADMIN — NYSTATIN 500000 UNITS: 100000 SUSPENSION ORAL at 12:27

## 2021-03-20 RX ADMIN — NYSTATIN 500000 UNITS: 100000 SUSPENSION ORAL at 17:53

## 2021-03-20 RX ADMIN — ATORVASTATIN CALCIUM 20 MG: 20 TABLET, FILM COATED ORAL at 20:41

## 2021-03-20 RX ADMIN — Medication 400 MG: at 08:25

## 2021-03-20 RX ADMIN — LEVOFLOXACIN 250 MG: 250 TABLET, FILM COATED ORAL at 12:28

## 2021-03-20 RX ADMIN — SODIUM CHLORIDE 750 MG: 9 INJECTION, SOLUTION INTRAVENOUS at 20:41

## 2021-03-20 RX ADMIN — RIFAMPIN 300 MG: 300 CAPSULE ORAL at 08:25

## 2021-03-20 RX ADMIN — DICLOFENAC SODIUM 2 G: 10 GEL TOPICAL at 00:00

## 2021-03-20 RX ADMIN — DEXTROSE AND SODIUM CHLORIDE 75 ML/HR: 5; 450 INJECTION, SOLUTION INTRAVENOUS at 17:53

## 2021-03-20 RX ADMIN — RIFAMPIN 300 MG: 300 CAPSULE ORAL at 20:41

## 2021-03-20 RX ADMIN — VANCOMYCIN HYDROCHLORIDE 500 MG: 500 INJECTION, POWDER, LYOPHILIZED, FOR SOLUTION INTRAVENOUS at 02:17

## 2021-03-20 RX ADMIN — AMLODIPINE BESYLATE 5 MG: 5 TABLET ORAL at 08:25

## 2021-03-20 RX ADMIN — HEPARIN SODIUM 5000 UNITS: 5000 INJECTION INTRAVENOUS; SUBCUTANEOUS at 12:27

## 2021-03-20 RX ADMIN — FOLIC ACID 1 MG: 1 TABLET ORAL at 08:25

## 2021-03-20 RX ADMIN — MELATONIN TAB 3 MG 3 MG: 3 TAB at 21:17

## 2021-03-20 RX ADMIN — DICLOFENAC SODIUM 2 G: 10 GEL TOPICAL at 06:00

## 2021-03-20 RX ADMIN — COLCHICINE 0.6 MG: 0.6 TABLET, FILM COATED ORAL at 08:25

## 2021-03-20 RX ADMIN — DIPHENOXYLATE HYDROCHLORIDE AND ATROPINE SULFATE 2 TABLET: 2.5; .025 TABLET ORAL at 21:17

## 2021-03-20 RX ADMIN — DEXTROSE AND SODIUM CHLORIDE 75 ML/HR: 5; 450 INJECTION, SOLUTION INTRAVENOUS at 02:18

## 2021-03-20 RX ADMIN — NYSTATIN 500000 UNITS: 100000 SUSPENSION ORAL at 20:42

## 2021-03-20 RX ADMIN — HEPARIN SODIUM 5000 UNITS: 5000 INJECTION INTRAVENOUS; SUBCUTANEOUS at 04:49

## 2021-03-20 NOTE — PROGRESS NOTES
Problem: Self Care Deficits Care Plan (Adult)  Goal: *Acute Goals and Plan of Care (Insert Text)  Description: 1. Patient will perform self-feeding with independence within 7 day(s). 2.  Patient will perform upper body dressing with independence within 7 day(s). 3.  Patient will perform lower body dressing with supervision within 7 day(s). 4.  Patient will perform toilet transfers with supervision within 7 day(s). 5.  Patient will perform all aspects of toileting with supervision within 7 day(s). Outcome: Not Met     Problem: Patient Education: Go to Patient Education Activity  Goal: Patient/Family Education  Outcome: Not Met    OCCUPATIONAL THERAPY EVALUATION  Patient: Rosmery De Dios (81 y.o. male)  Date: 3/20/2021  Primary Diagnosis: Parotiditis [K11.20]  Cellulitis [L03.90]        Precautions: N/A       ASSESSMENT  85M, H/o dementia, CHF with unknown EF, BPH with right parotitis with cellulitis on vanc and zosyn. Dehydration increased the risk of parotitis. ENT following. Based on the objective data described below, the patient presents with decreased activity tolerance, strength, balance, and cognition (due to dementia). Current Level of Function Impacting Discharge (ADLs/self-care): Supervision to maximal assist with self-care tasks. Other factors to consider for discharge: [facility]. Patient will benefit from skilled therapy intervention to address the above noted impairments. PLAN :  Recommendations and Planned Interventions: self care training, functional mobility training, balance training, therapeutic activities, cognitive retraining, endurance activities, and patient education    Frequency/Duration: Patient will be followed by occupational therapy 3 times a week to address goals.     Recommendation for discharge: (in order for the patient to meet his/her long term goals)  Discharge to SNF vs return to [facility] depending on progress    This discharge recommendation:  Has not yet been discussed the attending provider and/or case management    IF patient discharges home will need the following DME: TBD at next facility       SUBJECTIVE:   Patient stated i've never been in a hospital before.     OBJECTIVE DATA SUMMARY:   HISTORY:   Past Medical History:   Diagnosis Date    Anemia     Arthritis     CKD (chronic kidney disease), stage III 2011    Dementia ([facility]     Depression     Erectile dysfunction     GERD (gastroesophageal reflux disease)     GI bleed     Gout     gout, osteoarthritis    Heart failure (HCC)     acute heart failure    Hemorrhoids     Hypertension     Multiple myeloma (HCC)     PAF (paroxysmal atrial fibrillation) ([facility] 02/17/2016    s/p cardioversion    Pulmonary HTN (HCC)     S/P AVR (aortic valve replacement) 07/26/2016    merrill gill md -     S/P cardiac cath 7/6/16    severe ai, normal coronaries     S/P colonoscopy with polypectomy 05/09/2016    Kar De La Paz MD    Stroke [facility])      Past Surgical History:   Procedure Laterality Date    HX HEENT      nasal procedure 20 years ago    HX KNEE REPLACEMENT Bilateral     bilateral knee replacement    HX ORTHOPAEDIC Bilateral     wrist    HX UROLOGICAL  01/05/2020    cystoscopy     [place] CARDIAC SURG PROCEDURE UNLIST      cardiac cath    [place] CARDIAC SURG PROCEDURE UNLIST  07/26/2016    AVR       Expanded or extensive additional review of patient history:     Home Situation  Home Environment: Assisted living  One/Two Story Residence: One story  Living Alone: No  Support Systems: Assisted living  Patient Expects to be Discharged to[de-identified] Assisted living  Current DME Used/Available at Home: Walker, rolling    PLOF: Pt independent to maximal assist for ADLS/IADLS, supervision to minimal assist with mobility prior to admission.      Hand dominance: Right    EXAMINATION OF PERFORMANCE DEFICITS:  Cognitive/Behavioral Status:  Neurologic State: Confused  Orientation Level: Disoriented to time;Disoriented to situation  Cognition: Follows commands; Impaired decision making        Safety/Judgement: Decreased insight into deficits    Skin: All visible areas intact    Edema: None noted    Hearing: Auditory  Auditory Impairment: None    Vision/Perceptual:                                     Range of Motion:  BUE AROM WFL    Strength:  BUE WFL    Coordination:     Fine Motor Skills-Upper: Left Intact; Right Intact    Gross Motor Skills-Upper: Left Intact; Right Intact    Tone & Sensation:  None noted    Balance:  Sitting: Intact  Standing: Pull to stand; With support    Functional Mobility and Transfers for ADLs:  Bed Mobility:  Rolling: Supervision  Supine to Sit: Supervision  Sit to Supine: Supervision  Scooting: Supervision    Transfers:  Sit to Stand: Minimum assistance  Stand to Sit: Supervision    ADL Assessment:  Feeding: Setup    Oral Facial Hygiene/Grooming: Setup    Bathing: Maximum assistance    Upper Body Dressing: Setup    Lower Body Dressing: Maximum assistance    Toileting: Minimum assistance                ADL Intervention and task modifications:  Feeding  Feeding Assistance: Set-up    Grooming  Grooming Assistance: Set-up    Upper Body Bathing  Bathing Assistance: Minimum assistance    Lower Body Bathing  Bathing Assistance: Maximum assistance    Upper Body Dressing Assistance  Dressing Assistance: Minimum assistance    Lower Body Dressing Assistance  Dressing Assistance: Maximum assistance    Toileting  Toileting Assistance: Moderate assistance    Cognitive Retraining  Safety/Judgement: Decreased insight into deficits    Functional Measure:  [address] [facility] [address] [address] AM-PACTM \"6 Clicks\"                                                       Daily Activity Inpatient Short Form  How much help from another person does the patient currently need. .. Total; A Lot A Little None   1. Putting on and taking off regular lower body clothing? []  1 [x]  2 []  3 []  4   2. Bathing (including washing, rinsing, drying)?  []  1 [x]  2 []  3 []  4   3. Toileting, which includes using toilet, bedpan or urinal? [] 1 [x]  2 []  3 []  4   4. Putting on and taking off regular upper body clothing? []  1 []  2 [x]  3 []  4   5. Taking care of personal grooming such as brushing teeth? []  1 []  2 [x]  3 []  4   6. Eating meals? []  1 []  2 [x]  3 []  4   © , Trustees of [facility] [employer], under license to [employer] All rights reserved     Score: 15/24     Interpretation of Tool:  Represents clinically-significant functional categories (i.e. Activities of daily living). Percentage of Impairment CH    0%   CI    1-19% CJ    20-39% CK    40-59% CL    60-79% CM    80-99% CN     100%   AMPA  Score 6-24 24 23 20-22 15-19 10-14 7-9 6        Occupational Therapy Evaluation Charge Determination   History Examination Decision-Making   LOW Complexity : Brief history review  LOW Complexity : 1-3 performance deficits relating to physical, cognitive , or psychosocial skils that result in activity limitations and / or participation restrictions  LOW Complexity : No comorbidities that affect functional and no verbal or physical assistance needed to complete eval tasks       Based on the above components, the patient evaluation is determined to be of the following complexity level: LOW   Pain Ratin/10    Activity Tolerance:   Good  Please refer to the flowsheet for vital signs taken during this treatment. After treatment patient left in no apparent distress:    Supine in bed, Heels elevated for pressure relief, Call bell within reach, Bed / chair alarm activated, and Side rails x 3    COMMUNICATION/EDUCATION:   The patients plan of care was discussed with: Registered nurse. Patient/family agree to work toward stated goals and plan of care. This patients plan of care is appropriate for delegation to [facility].     Thank you for this referral.  Seymour Will  Time Calculation: 23 mins

## 2021-03-20 NOTE — PROGRESS NOTES
Pharmacy Vancomycin Dosing Service  Per  for Cellulitis of face/neck MRSA Bacteremia    Treatment Day: Six    Vancomycin level 03/19/21 at 19:25: 16.2 mg/L    Administer Vancomycin 500mg IV tonight. Obtain Vancomycin level with 03/20/21 am labwork. Pharmacy will continue to follow in accordance with policy,  Laurel AminD.

## 2021-03-20 NOTE — PROGRESS NOTES
Infectious Disease Progress Note           Subjective:   Continues to do well clinically, denies new complaints. No acute events since last seen. Denies new complaints. No fever/chills, appetite is improving   Objective:   Physical Exam:     Visit Vitals  BP (!) 161/92 (BP 1 Location: Left upper arm)   Pulse 76   Temp 98.5 °F (36.9 °C)   Resp 18   Ht 5' 7.99\" (1.727 m)   Wt 145 lb 8.1 oz (66 kg)   SpO2 96%   BMI 22.13 kg/m²    O2 Flow Rate (L/min): 2 l/min O2 Device: Room air    Temp (24hrs), Av.2 °F (36.8 °C), Min:97 °F (36.1 °C), Max:99.1 °F (37.3 °C)    No intake/output data recorded.  1901 -  0700  In: -   Out: 2600 [Urine:2600]    General: NAD, alert, AAO x 4  HEENT: MARIETTA, Moist mucosa, decreased right facial swelling   Lungs: CTA b/l, decreased at the bases   Heart: S1S2+, RRR, no murmur  Abdo: Soft, NT, ND, +BS   Exts: Right elbow swelling and discomfort   Skin: Right facial swelling, sig induration and tenderness     Data Review:       Recent Days:  Recent Labs     21  1617   WBC 7.2   HGB 7.3*   HCT 23.7*        Recent Labs     21  1227   BUN 29*   CREA 2.14*       Lab Results   Component Value Date/Time    C-Reactive protein 24.20 (H) 2021 08:55 AM      Microbiology   - Blood Cx : MRSA  - Oral wound Cx : MRSA and E. aerogenes    Diagnostics   CXR Results  (Last 48 hours)    None          Assessment/Plan     1. Right parotitis/facial cellulitis: Clinically improved, decreased swelling and erythema       MRSA and E. aerogenes and Yeast isolated from Cx of mouth wound        On day # 7 of Vanc and Day 7 of GNR coverage, currently levaquin       Routine labs in AM     2. MRSA bacteremia: Underlying prosthetic aortic Valve. No reports of Valvular veg on 2 D echo, will hold off on JONATHAN since not persistent bacteremia       Blood Cx positive on , repeat Bcx on 03/16 is staying neg      PICC line placement for out pt antibiotics. Continue on Vanc and Rifampin     4. Acute on CKD: Monitor renal function while on Vanc     5. 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 Also reports foot pain today       Elbow aspirated by ortho on 03/19, fluid positive for uric acid Crystals.  Continue on Colchicine     Abigail Verdin MD    3/20/2021

## 2021-03-20 NOTE — PROGRESS NOTES
Progress Note    Patient: Anna Thomason MRN: [id number]  SSN: [id number]    YOB: 1935  Age: 81 y.o. Sex: male      Admit Date: 3/13/2021    LOS: 6 days     Subjective:     85M, H/o dementia, CHF with unknown EF, BPH with right parotitis with cellulitis on vanc and zosyn. Dehydration increased the risk of parotitis. ENT following. SUBJECTIVE  Patient has swelling of Rt parotid area. Does not complain of any pain    We will continue broad spectrum Abx, Follow ENT  Blood culture is MRSA and MRSAand enterobacter from mouth wound  He would once stable will return to group home. Patient was noticed to have Rt elbow swelling and tenderness that is better today. Ortho aspirated elbow yesterday and sent fluid for analysis. Review of Systems:    Review of systems is -ve other then as stated in [place]      Objective:     Vitals:    03/19/21 0907 03/19/21 1700 03/19/21 2004 03/20/21 0737   BP: (!) 159/79 (!) 165/103 (!) 166/98 (!) 161/92   Pulse: 66 83 73 76   Resp: 18 20 18 18   Temp: (!) 96.6 °F (35.9 °C) 97 °F (36.1 °C) 99.1 °F (37.3 °C) 98.5 °F (36.9 °C)   SpO2: 99% 99% 96%    Weight:       Height:            Intake and Output:  Current Shift: No intake/output data recorded. Last three shifts: 03/18 1901 - 03/20 0700  In: -   Out: 2600 [Urine:2600]      Physical Exam:   General: Alert and Oriented to name. Cooperative and friendly. No acute distress. Nourished and well developed. Requesting something to drink at the time of admission. Head/Eyes: Normocephalic, atraumatic, EOMI, PERRLA. Nose/Mouth: Turbinates within normal limits, No drainage. Mucous membranes are dry. Throat and Neck:  Right parotid mass appreciated. swelling has gone down area area measures ~ 2cm x 4cm. Still palpable. Anterior cervical lymphadenopathy appreciated. No erythema of mass and patient has no pain with palpation. Mass is soft to the touch. No JVD or thyromegaly appreciated.  Cervical spine has good range of motion without pain. Lungs: Clear to auscultation bilaterally without wheezes, rhonchi or crackles. Good air movement bilaterally. Symmetric chest rise with respirations. Heart: Regular rate and rhythm. Normal S1/S2. No appreciated murmurs, rubs or gallops. No lower extremity edema. Abdomen: Soft, non-tender, non-distended. Bowel sounds present in all four quadrants. No masses or hepatosplenomegaly appreciated. Extremities:  Atraumatic. Able to move all extremities symmetrically. No abnormal bony protuberances appreciated. Joints without swelling. Right elbow with mild swelling. Right arm wrapped with Ace bandage. Back: No pain with palpation over spinous processes or paraspinal musculature. No CVA tenderness. Skin: Clean, dry and intact without appreciated lesions. Neurologic: Alert to name only. Cranial nerves 2-12 are grossly intact. Facial features are symmetric. Speech is fluent and clear. No focal deficits appreciated. Psychiatric: Normal affect, normal thought process, good eye contact. Lab/Data Review:  Recent Results (from the past 24 hour(s))   CELL COUNT, BODY FLUID    Collection Time: 03/19/21  2:01 PM   Result Value Ref Range    BODY FLUID TYPE Bursal fluid      FLUID COLOR white      FLUID APPEARANCE TURBID     FLUID RBC CT. 40,000 (H) 0 /cu mm    FLUID WBC COUNT 34,520 (H) 0 - 5 /cu mm   CRYSTALS, SYNOVIAL FLUID    Collection Time: 03/19/21  2:01 PM   Result Value Ref Range    FLUID TYPE(7) Bursal fluid      Crystals, body fluid       POSITIVE. Presumptive screen for Uric Acid Crystals. Pathologist will issue Final Report.    VANCOMYCIN, RANDOM    Collection Time: 03/19/21  7:25 PM   Result Value Ref Range    Vancomycin, random 16.2 ug/mL     Lab Results   Component Value Date/Time    WBC 7.2 03/18/2021 04:17 PM    HGB 7.3 (L) 03/18/2021 04:17 PM    HCT 23.7 (L) 03/18/2021 04:17 PM    PLATELET 828 02/12/4062 04:17 PM    MCV 91.5 03/18/2021 04:17 PM     Lab Results   Component Value Date/Time    Sodium 143 03/18/2021 12:27 PM    Potassium 3.9 03/18/2021 12:27 PM    Chloride 110 (H) 03/18/2021 12:27 PM    CO2 29 03/18/2021 12:27 PM    Anion gap 4 (L) 03/18/2021 12:27 PM    Glucose 145 (H) 03/18/2021 12:27 PM    BUN 29 (H) 03/18/2021 12:27 PM    Creatinine 2.14 (H) 03/18/2021 12:27 PM    BUN/Creatinine ratio 14 03/18/2021 12:27 PM    GFR est AA 36 (L) 03/18/2021 12:27 PM    GFR est non-AA 30 (L) 03/18/2021 12:27 PM    Calcium 8.8 03/18/2021 12:27 PM     Echo Interpretation Summary    Result status: Final result   · LV: Calculated LVEF is 55%. Normal cavity size and systolic function (ejection fraction normal). Mild concentric hypertrophy. Abnormal left ventricular septal motion consistent with post-operative state. Severe (grade 3) left ventricular diastolic dysfunction. · TV: Severe tricuspid valve regurgitation is present. · AV: Prosthetic aortic valve. Moderate aortic valve stenosis is present. · PV: Moderate pulmonic valve regurgitation is present. · RA: Mildly dilated right atrium. · LA: Mildly dilated left atrium. · IVC: Severely elevated central venous pressure (15 mmHg); IVC diameter is larger than 21 mm and collapses less than 50% with respiration. · PA: Pulmonary arterial systolic pressure is 45 mmHg. · MV: Mitral valve non-specific thickening. Mild mitral annular calcification. Moderate mitral valve regurgitation is present. · No definitive vegetation noted. Anyhow on the mitral valve at the base of the leaflet on few views it was noted that was echodensity that might represent mitral annular calcification but cannot rule out endocarditis. · If continue to suspect endocarditis clinically then consider JONATHAN. Assessment and plan:        (1) Right facial mass: parotitis with cellulitis D/D: malignancy. On IV abx, ENT and ID following    (2) Bacteremia: Ginny Mitchell. On vancomycin.  Zosyn deescalated to levaquin as per ID, repeat cultures are -ve    (2) hypernatremia : Resolved    (3) KO: prerenal. Hold lasix, 0.45% saline    (4) HTN: resume amlodipine    (5) dementia: complicates all aspects of care    (6) multiple myeloma: unknown    (7) CHF: hold lasix. EF unknown    (8) Right elbow swelling,ortho eval appreciated. Start on colchicine. Elbow aspirated on 03/19/21- shows uric acid crystal confirming Gout. (9) S/P Aortic valve replacement: Echo reviewed. DVT ppx: heparin subQ    DISPO: group home       CARINE Carmona- Student    Patient was evaluated and examined by me independently.   Supervised NP student     Signed By: Evert Severe, MD      March 20, 2021

## 2021-03-20 NOTE — PROGRESS NOTES
CM notified that patient has been approved by insurance to go to SNF. CM notified attending, attending reported that patient will require a PICC prior to d/c.  CM attempted to contact PICC Team, no answer at this time. Once patient receives PICC and can d/c, he will go to [facility], room 131B, nurse report can be called to [phone].

## 2021-03-20 NOTE — ED NOTES
Contacted by primary nurse regarding IV access. 20g IV established left forearm. Flushed with 10ML N/S and secured per protocol.

## 2021-03-20 NOTE — ROUTINE PROCESS
I sent msg earlier to pharmacy about iv and just called supervisor again but they just finished with their situation and will be right up to floor. Ann Ponce

## 2021-03-20 NOTE — ROUTINE PROCESS
Patients right arm iv was not flushing earlier. I called [facility] to use vein finder and he could not obtain an iv. I then called the nursing supervisor and told her patient was due a one time dose of vanc and that we could not obtain an iv. She said they would come look as soon as possible.

## 2021-03-20 NOTE — PROGRESS NOTES
Spoke with son, Alissa Cooper, via telephone at this time. Telephone consent obtained for PICC placement tomorrow with PICC team RN Kesha Baird.

## 2021-03-20 NOTE — PROGRESS NOTES
Pt confused, redirected multiple times. Requests to notify his family of his admission to hospital, notified that family is aware he is here. OOB with 1-2 assist to [facility],  today. Pt with c/o that his \"feet don't work\". LLE edematous, unknown if this is a new occurrence. Bed alarms in place for safety. Will monitor.

## 2021-03-21 ENCOUNTER — APPOINTMENT (OUTPATIENT)
Dept: GENERAL RADIOLOGY | Age: 86
DRG: 155 | End: 2021-03-21
Attending: HOSPITALIST
Payer: MEDICARE

## 2021-03-21 VITALS
HEIGHT: 68 IN | SYSTOLIC BLOOD PRESSURE: 139 MMHG | RESPIRATION RATE: 18 BRPM | OXYGEN SATURATION: 100 % | DIASTOLIC BLOOD PRESSURE: 81 MMHG | TEMPERATURE: 98.4 F | BODY MASS INDEX: 22.05 KG/M2 | HEART RATE: 78 BPM | WEIGHT: 145.5 LBS

## 2021-03-21 LAB
ANION GAP SERPL CALC-SCNC: 4 MMOL/L (ref 5–15)
BUN SERPL-MCNC: 20 MG/DL (ref 6–20)
BUN/CREAT SERPL: 12 (ref 12–20)
CA-I BLD-MCNC: 8.4 MG/DL (ref 8.5–10.1)
CHLORIDE SERPL-SCNC: 104 MMOL/L (ref 97–108)
CO2 SERPL-SCNC: 29 MMOL/L (ref 21–32)
CREAT SERPL-MCNC: 1.63 MG/DL (ref 0.7–1.3)
DATE LAST DOSE: NORMAL
ERYTHROCYTE [DISTWIDTH] IN BLOOD BY AUTOMATED COUNT: 16.9 % (ref 11.5–14.5)
GLUCOSE SERPL-MCNC: 78 MG/DL (ref 65–100)
HCT VFR BLD AUTO: 23.9 % (ref 36.6–50.3)
HGB BLD-MCNC: 7.2 G/DL (ref 12.1–17)
MCH RBC QN AUTO: 27.6 PG (ref 26–34)
MCHC RBC AUTO-ENTMCNC: 30.1 G/DL (ref 30–36.5)
MCV RBC AUTO: 91.6 FL (ref 80–99)
PLATELET # BLD AUTO: 296 K/UL (ref 150–400)
PMV BLD AUTO: 9.1 FL (ref 8.9–12.9)
POTASSIUM SERPL-SCNC: 3.4 MMOL/L (ref 3.5–5.1)
RBC # BLD AUTO: 2.61 M/UL (ref 4.1–5.7)
REPORTED DOSE,DOSE: NORMAL UNITS
SODIUM SERPL-SCNC: 137 MMOL/L (ref 136–145)
VANCOMYCIN SERPL-MCNC: 18.9 UG/ML
WBC # BLD AUTO: 4.4 K/UL (ref 4.1–11.1)

## 2021-03-21 PROCEDURE — 71045 X-RAY EXAM CHEST 1 VIEW: CPT

## 2021-03-21 PROCEDURE — 74011250637 HC RX REV CODE- 250/637: Performed by: INTERNAL MEDICINE

## 2021-03-21 PROCEDURE — 74011250636 HC RX REV CODE- 250/636: Performed by: HOSPITALIST

## 2021-03-21 PROCEDURE — 74011000250 HC RX REV CODE- 250: Performed by: HOSPITALIST

## 2021-03-21 PROCEDURE — 85027 COMPLETE CBC AUTOMATED: CPT

## 2021-03-21 PROCEDURE — 80202 ASSAY OF VANCOMYCIN: CPT

## 2021-03-21 PROCEDURE — 80048 BASIC METABOLIC PNL TOTAL CA: CPT

## 2021-03-21 PROCEDURE — 02HV33Z INSERTION OF INFUSION DEVICE INTO SUPERIOR VENA CAVA, PERCUTANEOUS APPROACH: ICD-10-PCS | Performed by: HOSPITALIST

## 2021-03-21 PROCEDURE — 74011250637 HC RX REV CODE- 250/637: Performed by: FAMILY MEDICINE

## 2021-03-21 PROCEDURE — 36415 COLL VENOUS BLD VENIPUNCTURE: CPT

## 2021-03-21 PROCEDURE — 74011250637 HC RX REV CODE- 250/637: Performed by: HOSPITALIST

## 2021-03-21 PROCEDURE — 36569 INSJ PICC 5 YR+ W/O IMAGING: CPT

## 2021-03-21 PROCEDURE — 74011250636 HC RX REV CODE- 250/636: Performed by: INTERNAL MEDICINE

## 2021-03-21 PROCEDURE — 99232 SBSQ HOSP IP/OBS MODERATE 35: CPT | Performed by: INTERNAL MEDICINE

## 2021-03-21 RX ORDER — COLCHICINE 0.6 MG/1
0.6 TABLET ORAL DAILY
Qty: 10 TAB | Refills: 0 | Status: SHIPPED | OUTPATIENT
Start: 2021-03-22

## 2021-03-21 RX ORDER — FESOTERODINE FUMARATE 4 MG/1
4 TABLET, FILM COATED, EXTENDED RELEASE ORAL DAILY
Qty: 30 TAB | Refills: 0 | Status: SHIPPED | OUTPATIENT
Start: 2021-03-21 | End: 2021-04-20

## 2021-03-21 RX ORDER — RIFAMPIN 300 MG/1
300 CAPSULE ORAL EVERY 12 HOURS
Qty: 28 CAP | Refills: 0 | Status: SHIPPED | OUTPATIENT
Start: 2021-03-21

## 2021-03-21 RX ADMIN — ESCITALOPRAM OXALATE 20 MG: 10 TABLET ORAL at 09:14

## 2021-03-21 RX ADMIN — COLCHICINE 0.6 MG: 0.6 TABLET, FILM COATED ORAL at 09:14

## 2021-03-21 RX ADMIN — HEPARIN SODIUM 5000 UNITS: 5000 INJECTION INTRAVENOUS; SUBCUTANEOUS at 05:21

## 2021-03-21 RX ADMIN — NYSTATIN 500000 UNITS: 100000 SUSPENSION ORAL at 17:55

## 2021-03-21 RX ADMIN — DIPHENOXYLATE HYDROCHLORIDE AND ATROPINE SULFATE 2 TABLET: 2.5; .025 TABLET ORAL at 09:14

## 2021-03-21 RX ADMIN — RIFAMPIN 300 MG: 300 CAPSULE ORAL at 09:14

## 2021-03-21 RX ADMIN — AMLODIPINE BESYLATE 5 MG: 5 TABLET ORAL at 09:14

## 2021-03-21 RX ADMIN — DEXTROSE AND SODIUM CHLORIDE 75 ML/HR: 5; 450 INJECTION, SOLUTION INTRAVENOUS at 09:17

## 2021-03-21 RX ADMIN — FOLIC ACID 1 MG: 1 TABLET ORAL at 09:14

## 2021-03-21 RX ADMIN — NYSTATIN 500000 UNITS: 100000 SUSPENSION ORAL at 14:20

## 2021-03-21 RX ADMIN — DICLOFENAC SODIUM 2 G: 10 GEL TOPICAL at 18:00

## 2021-03-21 RX ADMIN — Medication 400 MG: at 09:14

## 2021-03-21 RX ADMIN — LEVOFLOXACIN 250 MG: 250 TABLET, FILM COATED ORAL at 14:21

## 2021-03-21 RX ADMIN — DICLOFENAC SODIUM 2 G: 10 GEL TOPICAL at 14:25

## 2021-03-21 RX ADMIN — NYSTATIN 500000 UNITS: 100000 SUSPENSION ORAL at 09:14

## 2021-03-21 RX ADMIN — HEPARIN SODIUM 5000 UNITS: 5000 INJECTION INTRAVENOUS; SUBCUTANEOUS at 14:21

## 2021-03-21 RX ADMIN — VANCOMYCIN HYDROCHLORIDE 750 MG: 750 INJECTION, POWDER, LYOPHILIZED, FOR SOLUTION INTRAVENOUS at 14:21

## 2021-03-21 NOTE — PROGRESS NOTES
PICC Placement Note    PRE-PROCEDURE VERIFICATION  Correct Procedure: yes  Correct Site:  yes  Temperature: Temp: 98.4 °F (36.9 °C), Temperature Source: Temp Source: Oral  Recent Labs     03/21/21  0705   BUN 20   CREA 1.63*      WBC 4.4     Allergies: Amiodarone and Carvedilol  Education materials for PICC Care given to family: yes. See Patient Education activity for further details. PICC Booklet placed on chart: yes    PROCEDURE DETAIL  A single lumen PICC line was started for antibiotic therapy and Home IV Therapy. The following documentation is in addition to the PICC properties in the lines/airways flowsheet :  Lot #: [id number]  xylocaine used: yes  Mid-Arm Circumference: 26 (cm)  Internal Catheter Length: 42 (cm)  Internal Catheter Total Length: 42 (cm)  Vein Selection for PICC:right basilic  Central Line Bundle followed yes  Complication Related to Insertion: none    The placement was verified by X-ray: pending, do not use. 1315- PICC line pulled back 2 cm and DSG completed, okay to use per Dr. Kym Andrews.   Pedrito Gunter RN

## 2021-03-21 NOTE — PROGRESS NOTES
Patients daughter, Paulo Maciel, notified of patients discharge to [facility], no further questions at this time. Report called to [facility], nurse Chinyere Foote RN. No further questions at this time. [facility] transport on unit to take patient to SNF. No signs or symptoms of distress noted.

## 2021-03-21 NOTE — DISCHARGE SUMMARY
HOSPITALIST DISCHARGE SUMMARY    NAME: Daly Flores. :  1935   MRN:  [id number]     Date/Time:  3/21/2021 12:33 PM    DISCHARGE DIAGNOSIS:  Active Problems:    Gout ()      Overview: gout, osteoarthritis      S/P AVR (aortic valve replacement) (2016)      Overview: AORTIC VALVE REPLACEMENT with 23mm tissue valve      Left femoral cannulation      Intraaortic balloon pump placement      Stage 3b chronic kidney disease (10/31/2020)      Cellulitis (3/14/2021)      Parotiditis (3/14/2021)        Admission Diagnosis:  Parotitis    CONSULTATIONS: ID and ENT    Procedures: see electronic medical records for all procedures/Xrays and details which were not copied into this note but were reviewed prior to creation of Plan. Please follow-up tests/labs that are still pendin. None     DISCHARGE SUMMARY/HOSPITAL COURSE: for full details see H&P, daily progress notes, labs, consult notes. Briefly As Per HPI:  55-year-old male with history of dementia, congestive heart failure and BPH was admitted to the hospital with right parotitis and cellulitis of face. Patient was started on IV antibiotics. Patient's parotitis was significantly improved during this hospital stay. Patient was noticed to have blood cultures positive for MRSA and MRSA and Enterobacter was found on the mouth wound. He was evaluated by infectious disease physician and ENT physician during the stay. Patient's infection has a significantly improved however considering patient's cardiac issues infectious disease has decided to continue IV antibiotic for 2 more weeks along with rifampicin. PICC line was placed to continue patient on IV antibiotics in the skilled nursing facility. Levaquin was discontinued today. Patient was also noticed to have gout flareup of his right elbow. He was seen by orthopedic and underwent aspiration of the joint.   Patient was given colchicine following which pain in the elbow is improved. Patient is stable to be discharged today.  _______________________________________________________________________   Patient seen and examined by me on day of discharge. Pertinent findings are:  Vitals:  Visit Vitals  BP (!) 152/74   Pulse 75   Temp 98.4 °F (36.9 °C)   Resp 16   Ht 5' 7.99\" (1.727 m)   Wt 66 kg (145 lb 8.1 oz)   SpO2 94%   BMI 22.13 kg/m²     Temp (24hrs), Av.3 °F (36.8 °C), Min:97.5 °F (36.4 °C), Max:98.6 °F (37 °C)      Last 24hr Input/Output:    Intake/Output Summary (Last 24 hours) at 3/21/2021 1233  Last data filed at 3/20/2021 2000  Gross per 24 hour   Intake    Output 500 ml   Net -500 ml        PHYSICAL EXAM:   General: Alert and awake  Skin: Extremities and face reveal no rashes. No contreras erythema. No telangiectasias on the chest wall. HEENT: Sclerae anicteric. Extra-occular muscles are intact. No oral ulcers. No ENT discharge. The neck is supple. Right-sided parotitis has not significantly improved. Very small amount of swelling left on the right side of the face. Nontender. Cardiovascular: Regular rate and rhythm. No murmurs, gallops, or rubs. PMI nondisplaced. Carotids without bruits. Respiratory: Comfortable breathing with no accessory muscle use. Clear breath sounds with no wheezes, rales, or rhonchi. GI: Abdomen nondistended, soft, and nontender. Normal active bowel sounds. No enlargement of the liver or spleen. No masses palpable. Rectal: Deferred   Musculoskeletal: No pitting edema of the lower legs. Extremities have good range of motion. No costovertebral tenderness. Neurological: Gross memory appears intact. Patient is alert and oriented. Power 5/5  Psychiatric: Mood appears appropriate with judgement intact. Lymphatic: No cervical or supraclavicular adenopathy. See Discharge Instructions for further details.   _______________________________________________________________________  DISPOSITION:    Home with Family:    Home with HH/PT/OT/RN: SNF/LTC: x   MARY:    OTHER:    ________________________________________________________________________  Medications Reviewed:  Current Discharge Medication List      START taking these medications    Details   colchicine 0.6 mg tablet Take 1 Tab by mouth daily. Indications: acute inflammation of the joints due to gout attack  Qty: 10 Tab, Refills: 0      rifAMPin (RIFADIN) 300 mg capsule Take 1 Cap by mouth every twelve (12) hours. Qty: 28 Cap, Refills: 0      vancomycin 750 mg 750 mg, vial-mate 1 Device IVPB 750 mg by IntraVENous route every twenty-four (24) hours. Qty: 14 Dose, Refills: 0    Comments: Check vanco trough in 7 days and contact ID Dr. Santo Ahumada with results         CONTINUE these medications which have CHANGED    Details   fesoterodine (Toviaz) 4 mg SR tablet Take 1 Tab by mouth daily for 30 days. Qty: 30 Tab, Refills: 0    Associated Diagnoses: Nocturnal enuresis         CONTINUE these medications which have NOT CHANGED    Details   thiamine HCL (B-1) 100 mg tablet TAKE ONE TABLET BY MOUTH DAILY  Qty: 90 Tab, Refills: 1      Vitamin C 250 mg tablet TAKE ONE TABLET BY MOUTH DAILY  Qty: 90 Tab, Refills: 1      traZODone (DESYREL) 50 mg tablet TAKE ONE TABLET BY MOUTH AT BEDTIME. Qty: 90 Tab, Refills: 0    Associated Diagnoses: Insomnia, unspecified type      melatonin 10 mg TbER TAKE ONE TABLET BY MOUTH AT BEDTIME. Qty: 90 Tab, Refills: 1      magnesium oxide (MAG-OX) 400 mg tablet TAKE ONE TABLET BY MOUTH DAILY  Qty: 90 Tab, Refills: 1    Associated Diagnoses: Hypomagnesemia      folic acid (FOLVITE) 1 mg tablet TAKE ONE TABLET BY MOUTH DAILY  Qty: 90 Tab, Refills: 1      furosemide (Lasix) 40 mg tablet Take 40 mg by mouth daily. simvastatin (ZOCOR) 40 mg tablet TAKE ONE TABLET BY MOUTH AT BEDTIME. Qty: 90 Tab, Refills: 1      amLODIPine (NORVASC) 5 mg tablet Take 1 Tab by mouth daily for 60 days.   Qty: 30 Tab, Refills: 1    Associated Diagnoses: Essential hypertension      escitalopram oxalate (LEXAPRO) 20 mg tablet Take 1 Tab by mouth daily for 90 days. 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 Qty: 90 Cap, Refills: 3      potassium chloride (K-DUR, KLOR-CON) 20 mEq tablet TAKE 1 TABLET BY MOUTH EACH MORNING WITH FOOD. Qty: 90 Tab, Refills: 1      ferrous sulfate (IRON) 325 mg (65 mg iron) EC tablet TAKE ONE TABLET BY MOUTH EACH DAY WITH BREAKFAST. Qty: 90 Tab, Refills: 1      timolol (TIMOPTIC) 0.5 % ophthalmic solution INSTILL 1 DROPS INTO RIGHT EYE ONCE DAILY  Qty: 5 mL, Refills: 5      diclofenac (VOLTAREN) 1 % gel APPLY TWO GRAMS FOUR TIMES DAILY AS NEEDED FOR ELBOW PAIN  Qty: 200 g, Refills: 3      allopurinoL (ZYLOPRIM) 100 mg tablet TAKE 1 TABLET BY MOUTH EVERY DAY  Qty: 90 Tab, Refills: 2      terbinafine HCL (LAMISIL) 250 mg tablet Take 1 Tab by mouth daily. Qty: 90 Tab, Refills: 0    Associated Diagnoses: Onychomycosis      ammonium lactate (AMLACTIN) 12 % topical cream Apply  to affected area two (2) times a day. rub in to affected area well  Qty: 280 g, Refills: 5    Associated Diagnoses: Xerosis cutis         STOP taking these medications       ferrous sulfate 325 mg (65 mg iron) tablet Comments:   Reason for Stopping:               PMH/ reviewed - no change compared to H&P  ________________________________________________________________________    Recommended diet:  DIET NUTRITIONAL SUPPLEMENTS  DIET NUTRITIONAL SUPPLEMENTS  DIET DYSPHAGIA MECH ALTERED (NDD2)    Recommended activity:Activity as tolerated       Follow Up:   Follow-up Information     Follow up With Specialties Details Why Contact Info    Osmar Tovar MD Internal Medicine In 1 week  [facility] [address] [place] [address]      Halley Daniels MD Infectious Disease In 2 weeks  [address] [place] [address] [address]      Rosaura Beat, MD Otolaryngology In 2 weeks  [address] [place] [facility]  [phone]             ______________________________________________________________________  Total time spent in discharge (min): >35 min   ________________________________________________________________________    Care Plan discussed with:    Comments   Patient x    Family      RN x    Care Manager x                   Consultant:  rachelle Shahid   ________________________________________________________________________  Attending Physician:  Dominga Marshall MD  ________________________________________________________________________  **Lab Data Reviewed:  Recent Results (from the past 24 hour(s))   VANCOMYCIN, RANDOM    Collection Time: 03/20/21  4:18 PM   Result Value Ref Range    Vancomycin, random 15.3 ug/mL    Reported dose date Not available      Reported dose: Not available Units   CREATININE    Collection Time: 03/20/21  4:18 PM   Result Value Ref Range    Creatinine 1.60 (H) 0.70 - 1.30 mg/dL   CBC W/O DIFF    Collection Time: 03/21/21  7:05 AM   Result Value Ref Range    WBC 4.4 4.1 - 11.1 K/uL    RBC 2.61 (L) 4.10 - 5.70 M/uL    HGB 7.2 (L) 12.1 - 17.0 g/dL    HCT 23.9 (L) 36.6 - 50.3 %    MCV 91.6 80.0 - 99.0 FL    MCH 27.6 26.0 - 34.0 PG    MCHC 30.1 30.0 - 36.5 g/dL    RDW 16.9 (H) 11.5 - 14.5 %    PLATELET 873 386 - 982 K/uL    MPV 9.1 8.9 - 79.3 FL   METABOLIC PANEL, BASIC    Collection Time: 03/21/21  7:05 AM   Result Value Ref Range    Sodium 137 136 - 145 mmol/L    Potassium 3.4 (L) 3.5 - 5.1 mmol/L    Chloride 104 97 - 108 mmol/L    CO2 29 21 - 32 mmol/L    Anion gap 4 (L) 5 - 15 mmol/L    Glucose 78 65 - 100 mg/dL    BUN 20 6 - 20 mg/dL    Creatinine 1.63 (H) 0.70 - 1.30 mg/dL    BUN/Creatinine ratio 12 12 - 20      GFR est AA 49 (L) >60 ml/min/1.73m2    GFR est non-AA 41 (L) >60 ml/min/1.73m2    Calcium 8.4 (L) 8.5 - 10.1 mg/dL   VANCOMYCIN, RANDOM Collection Time: 03/21/21  7:05 AM   Result Value Ref Range    Vancomycin, random 18.9 ug/mL    Reported dose date Not provided      Reported dose: Not provided Units     XR ELBOW RT AP/LAT   Final Result      1. Progressed soft tissue swelling focally at the extensor aspect of the elbow. Location is suggestive of olecranon bursitis. 2. Osseous structures appearing similar to prior without findings of acute   abnormality. 3. Likely elbow joint effusion, also unchanged. XR CHEST PORT   Final Result   Small right pleural effusion. CT NECK SOFT TISSUE WO CONT   Final Result   1. Study limited due to the lack of IV contrast.   2.  Asymmetric enlargement of the right parotid gland with trans-spatial   inflammatory change of the right neck compatible with cellulitis and parotiditis   in the appropriate clinical setting. No definitive evidence of a focal mass or   drainable fluid collection. 3.  Pansinus disease.       XR CHEST PORT    (Results Pending)

## 2021-03-21 NOTE — PROGRESS NOTES
Infectious Disease Progress Note           Subjective:   Continues to do well clinically, denies new complaints. No acute events since last seen. Plans for d/c today. PICC line placed for out pt antibiotics. Objective:   Physical Exam:     Visit Vitals  BP (!) 152/74   Pulse 75   Temp 98.4 °F (36.9 °C)   Resp 16   Ht 5' 7.99\" (1.727 m)   Wt 145 lb 8.1 oz (66 kg)   SpO2 94%   BMI 22.13 kg/m²    O2 Flow Rate (L/min): 2 l/min O2 Device: Room air    Temp (24hrs), Av.3 °F (36.8 °C), Min:97.5 °F (36.4 °C), Max:98.6 °F (37 °C)    No intake/output data recorded.  1901 -  0700  In: 480 [P.O.:480]  Out: 1000 [Urine:800]    General: NAD, alert, AAO x 4  HEENT: MARIETTA, Moist mucosa, right facial swelling resolved   Lungs: CTA b/l, decreased at the bases   Heart: S1S2+, RRR, no murmur  Abdo: Soft, NT, ND, +BS   Exts: Right elbow swelling, decreased tenderness   Skin: Resolving right facial swelling     Data Review:       Recent Days:  Recent Labs     21  0705 21  1617   WBC 4.4 7.2   HGB 7.2* 7.3*   HCT 23.9* 23.7*    267     Recent Labs     21  0705 21  1618   BUN 20  --    CREA 1.63* 1.60*     Lab Results   Component Value Date/Time    C-Reactive protein 24.20 (H) 2021 08:55 AM      Microbiology   - Blood Cx : MRSA  - Oral wound Cx : MRSA and E. aerogenes    Diagnostics   CXR Results  (Last 48 hours)    None          Assessment/Plan     1. Right parotitis/facial cellulitis: Clinically improved, decreased swelling and erythema       MRSA and E. aerogenes and Yeast isolated from Cx of mouth wound        Remains afebrile w a normal WBC on routine labs       On day # 8 of Vanc and Day 8 of GNR coverage, currently levaquin       Continue on Vanc for 2 more wks given bacteremia and underlying prosthetic aortic valve, d/c levaquin upon discharge      2. MRSA bacteremia: Underlying prosthetic aortic Valve. . No reports of Valvular veg on 2 D echo Blood Cx positive on 03/14, repeat Bcx on 03/16 is staying neg      PICC line placed for out pt antibiotics, will continue Vanc/Rifampin x 2 more wks     4. Acute on CKD: Cr trending down on todays labs     5.  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

## 2021-03-22 LAB
BACTERIA SPEC CULT: NORMAL
BACTERIA SPEC CULT: NORMAL
GRAM STN SPEC: NORMAL
GRAM STN SPEC: NORMAL
SPECIAL REQUESTS,SREQ: NORMAL

## 2021-03-22 NOTE — PROGRESS NOTES
Orthopedic progress note    Date:3/21/2021       Room:[facility]  Patient Name:Barbi Head. YOB: 1935     Age:85 y.o. Subjective    Follow up for 81year old male with swelling of right elbow.   He currently reports that elbow pain has resolved   Objective           Vitals Last 24 Hours:  TEMPERATURE:  Temp  Av.5 °F (36.9 °C)  Min: 98.4 °F (36.9 °C)  Max: 98.6 °F (37 °C)  RESPIRATIONS RANGE: Resp  Av  Min: 16  Max: 20  PULSE OXIMETRY RANGE: SpO2  Av %  Min: 94 %  Max: 100 %  PULSE RANGE: Pulse  Av.7  Min: 75  Max: 92  BLOOD PRESSURE RANGE: Systolic (80BKT), IEY:396 , Min:138 , SJJ:687   ; Diastolic (00IGF), FNC:22, Min:74, Max:81    Current Facility-Administered Medications   Medication Dose Route Frequency    [START ON 3/22/2021] VANCOMYCIN random at 0400 on 21   Other ONCE    diphenoxylate-atropine (LOMOTIL) tablet 2 Tab  2 Tab Oral QID PRN    melatonin tablet 3 mg  3 mg Oral QHS    colchicine tablet 0.6 mg  0.6 mg Oral DAILY    levoFLOXacin (LEVAQUIN) tablet 250 mg  250 mg Oral Q24H    rifAMPin (RIFADIN) capsule 300 mg  300 mg Oral Q12H    nystatin (MYCOSTATIN) 100,000 unit/mL oral suspension 500,000 Units  500,000 Units Oral QID    acetaminophen (TYLENOL) tablet 650 mg  650 mg Oral Q6H PRN    Or    acetaminophen (TYLENOL) suppository 650 mg  650 mg Rectal Q6H PRN    polyethylene glycol (MIRALAX) packet 17 g  17 g Oral DAILY PRN    promethazine (PHENERGAN) tablet 12.5 mg  12.5 mg Oral Q6H PRN    Or    ondansetron (ZOFRAN) injection 4 mg  4 mg IntraVENous Q6H PRN    hydrALAZINE (APRESOLINE) 20 mg/mL injection 10 mg  10 mg IntraVENous Q6H PRN    VANCOMYCIN INFORMATION NOTE 1 Each  1 Each Other Rx Dosing/Monitoring    dextrose 5 % - 0.45% NaCl infusion  75 mL/hr IntraVENous CONTINUOUS    amLODIPine (NORVASC) tablet 5 mg  5 mg Oral DAILY    diclofenac (VOLTAREN) 1 % topical gel 2 g  2 g Topical Q6H    escitalopram oxalate (LEXAPRO) tablet 20 mg  20 mg Oral DAILY    folic acid (FOLVITE) tablet 1 mg  1 mg Oral DAILY    magnesium oxide (MAG-OX) tablet 400 mg  400 mg Oral DAILY    atorvastatin (LIPITOR) tablet 20 mg  20 mg Oral QHS    heparin (porcine) injection 5,000 Units  5,000 Units SubCUTAneous Q8H          I/O (24Hr): Intake/Output Summary (Last 24 hours) at 3/21/2021 2147  Last data filed at 3/21/2021 1500  Gross per 24 hour   Intake    Output 500 ml   Net -500 ml     Objective  Labs/Imaging/Diagnostics    Labs:  CBC:  Recent Labs     03/21/21  0705   WBC 4.4   RBC 2.61*   HGB 7.2*   HCT 23.9*   MCV 91.6   RDW 16.9*        CHEMISTRIES:  Recent Labs     03/21/21  0705 03/20/21  1618     --    K 3.4*  --      --    CO2 29  --    BUN 20  --    CREA 1.63* 1.60*   CA 8.4*  --    PT/INR:No results for input(s): INR, INREXT, INREXT in the last 72 hours. No lab exists for component: PROTIME  APTT:No results for input(s): APTT in the last 72 hours. LIVER PROFILE:No results for input(s): AST, ALT in the last 72 hours. No lab exists for component: ALBERTO Winston  Lab Results   Component Value Date/Time    ALT (SGPT) 26 02/27/2021 06:00 PM    AST (SGOT) 28 02/27/2021 06:00 PM    Alk. phosphatase 98 02/27/2021 06:00 PM    Bilirubin, total 0.7 02/27/2021 06:00 PM     Uric acid level 6.1    Physical Exam:  Right Elbow: mild swelling of Olecranon bursa. No erythema, induration or fluctuance. Mild tenderness to palpation. No streaking. Mild swelling of right hand. EPL intact. Cap. Refill less than 2 secs. .  strength 4 out of 5. Right upper extremity neurovascularly intact.     Assessment//Plan           Patient Active Problem List    Diagnosis Date Noted    Cellulitis 03/14/2021    Parotiditis 03/14/2021    Urinary incontinence 02/05/2021    Microhematuria 02/05/2021    BPH with obstruction/lower urinary tract symptoms 01/05/2021    Nocturnal enuresis 12/02/2020    Frequency of urination 12/02/2020    Stage 3b chronic kidney disease 10/31/2020    Acute deep vein thrombosis (DVT) of radial vein of left upper extremity ([facility] 08/26/2020    Closed fracture of left wrist with delayed healing, subsequent encounter 08/26/2020    Anemia of chronic disease 08/26/2020    Onychomycosis 08/19/2020    Peripheral vascular disease ([facility] 08/19/2020    Xerosis cutis 08/19/2020    Maceration of skin 08/19/2020    [facility] 07/09/2020    Frequent falls 07/09/2020    Falls frequently 07/04/2020    Debility 06/19/2020    Closed fracture of fourth metacarpal bone 06/04/2020    Fall 02/11/2020    Depression     MGUS (monoclonal gammopathy of unknown significance) 02/19/2019    Hemorrhoids     Episodic lightheadedness     History of cardioversion 02/17/2017    Epistaxis 11/27/2016    Right-sided epistaxis 11/27/2016    Postoperative anemia due to acute blood loss 07/28/2016    S/P AVR (aortic valve replacement) 07/26/2016    Acute congestive heart failure ([facility] [facility] 07/01/2016    ACP (advance care planning) 06/06/2016    Skin lesion     S/P colonoscopy with polypectomy 05/09/2016    GI bleed     Paroxysmal atrial fibrillation ([facility] 02/17/2016    Alcohol abuse     Erectile dysfunction     Multiple myeloma (HCC)     Gout     Hypertension     Aortic regurgitation     CKD (chronic kidney disease) stage 3, GFR 30-59 ml/min (HCC)     Pulmonary HTN (HCC)      Right elbow swelling olecranon bursitis secondary to gout  Medical management of gout  Outpatient orthopedic follow-up for olecranon bursitis if needed    Electronically signed by Richelle Reynolds MD on 3/21/2021 at 2:48 PM

## 2021-03-23 LAB
BACTERIA SPEC CULT: NORMAL
SPECIAL REQUESTS,SREQ: NORMAL

## 2021-03-24 LAB
BACTERIA SPEC CULT: NORMAL
GRAM STN SPEC: NORMAL
GRAM STN SPEC: NORMAL
SPECIAL REQUESTS,SREQ: NORMAL

## 2021-03-25 ENCOUNTER — TELEPHONE (OUTPATIENT)
Dept: INTERNAL MEDICINE CLINIC | Age: 86
End: 2021-03-25

## 2021-03-25 DIAGNOSIS — I48.0 PAROXYSMAL ATRIAL FIBRILLATION (HCC): Primary | ICD-10-CM

## 2021-03-25 NOTE — TELEPHONE ENCOUNTER
[address] [place] faxed refill request for    Potassium 20 MEQ tab  Take 1 tab by mouth each morning with food.   Last dispensed 1/6/2021    LOV 1/28/2021  NOV 4/7/2021

## 2021-03-26 RX ORDER — POTASSIUM CHLORIDE 20 MEQ/1
TABLET, EXTENDED RELEASE ORAL
Qty: 90 TAB | Refills: 1 | Status: SHIPPED | OUTPATIENT
Start: 2021-03-26 | End: 2021-12-21 | Stop reason: SDUPTHER

## 2021-04-19 ENCOUNTER — OFFICE VISIT (OUTPATIENT)
Dept: INFECTIOUS DISEASES | Age: 86
End: 2021-04-19
Payer: MEDICARE

## 2021-04-19 VITALS
HEIGHT: 68 IN | BODY MASS INDEX: 22.12 KG/M2 | RESPIRATION RATE: 14 BRPM | DIASTOLIC BLOOD PRESSURE: 85 MMHG | OXYGEN SATURATION: 97 % | SYSTOLIC BLOOD PRESSURE: 160 MMHG | TEMPERATURE: 97.6 F | HEART RATE: 48 BPM

## 2021-04-19 DIAGNOSIS — K11.20 SUPPURATIVE PAROTITIS: ICD-10-CM

## 2021-04-19 DIAGNOSIS — L03.211 FACIAL CELLULITIS: ICD-10-CM

## 2021-04-19 DIAGNOSIS — R78.81 MRSA BACTEREMIA: Primary | ICD-10-CM

## 2021-04-19 DIAGNOSIS — B95.62 MRSA BACTEREMIA: Primary | ICD-10-CM

## 2021-04-19 PROCEDURE — 1100F PTFALLS ASSESS-DOCD GE2>/YR: CPT | Performed by: INTERNAL MEDICINE

## 2021-04-19 PROCEDURE — 1111F DSCHRG MED/CURRENT MED MERGE: CPT | Performed by: INTERNAL MEDICINE

## 2021-04-19 PROCEDURE — 3288F FALL RISK ASSESSMENT DOCD: CPT | Performed by: INTERNAL MEDICINE

## 2021-04-19 PROCEDURE — G9717 DOC PT DX DEP/BP F/U NT REQ: HCPCS | Performed by: INTERNAL MEDICINE

## 2021-04-19 PROCEDURE — G8753 SYS BP > OR = 140: HCPCS | Performed by: INTERNAL MEDICINE

## 2021-04-19 PROCEDURE — G8427 DOCREV CUR MEDS BY ELIG CLIN: HCPCS | Performed by: INTERNAL MEDICINE

## 2021-04-19 PROCEDURE — G8536 NO DOC ELDER MAL SCRN: HCPCS | Performed by: INTERNAL MEDICINE

## 2021-04-19 PROCEDURE — G8754 DIAS BP LESS 90: HCPCS | Performed by: INTERNAL MEDICINE

## 2021-04-19 PROCEDURE — 99214 OFFICE O/P EST MOD 30 MIN: CPT | Performed by: INTERNAL MEDICINE

## 2021-04-19 PROCEDURE — G8420 CALC BMI NORM PARAMETERS: HCPCS | Performed by: INTERNAL MEDICINE

## 2021-04-19 NOTE — PROGRESS NOTES
Keith Farrar. is a 81 y.o. male. HPI   Patient followed by Dr. Pricila Paez at [facility] last month for right parotitis/facial cellulitis secondary to MRSA and Enterobacter, complicated by MRSA bacteremia in with reported history of prosthetic aortic valve. TTE was negative but JONATHAN was apparently not performed. He was treated with Vancomycin and Levaquin then discharged to [facility] for another two weeks of Vancomycin and Rifampin, which he has apparently completed and PICC line pulled. He was supposed to follow-up with Dr. Pricila Paez but was assigned to my clinic. He arrived in wheelchair with attendant. He has no complaints except swelling both elbows which was diagnosed as tophaceous gout and treated with Colchicine and Allopuriol. No pain reported today. Review of Systems   Constitutional: Negative for chills, diaphoresis and fever. Cardiovascular: Negative for chest pain, palpitations, orthopnea and leg swelling. Musculoskeletal: Negative for joint pain (both elbows with painless swelling, R>L). Skin: Negative for itching and rash. Past Medical History:   Diagnosis Date    Anemia     Arthritis     CKD (chronic kidney disease), stage III ([address] 2011    Dementia (HCC)     Depression     Erectile dysfunction     GERD (gastroesophageal reflux disease)     GI bleed     Gout     gout, osteoarthritis    Heart failure (HCC)     acute heart failure    Hemorrhoids     Hypertension     Multiple myeloma (HCC)     PAF (paroxysmal atrial fibrillation) ([facility] [address] 02/17/2016    s/p cardioversion    Pulmonary HTN ([facility] [facility]     S/P AVR (aortic valve replacement) 07/26/2016    merrill gill md -     S/P cardiac cath 7/6/16    severe ai, normal coronaries     S/P colonoscopy with polypectomy 05/09/2016    Kar Fuentes MD    Stroke [facility])      Past Surgical History:   Procedure Laterality Date    HX HEENT      nasal procedure 20 years ago   Elvis Cortez HX KNEE REPLACEMENT Bilateral     bilateral knee replacement    HX ORTHOPAEDIC Bilateral     wrist    HX UROLOGICAL  01/05/2020    cystoscopy     HI CARDIAC SURG PROCEDURE UNLIST      cardiac cath   Kathie Kramer HI CARDIAC SURG PROCEDURE UNLIST  07/26/2016    AVR     Objective  Physical Exam  Vitals signs and nursing note reviewed. Constitutional:       Appearance: Normal appearance. He is not ill-appearing. HENT:      Head: Normocephalic and atraumatic. Comments: No facial or parotid swelling or erythema either side     Nose: Nose normal.      Mouth/Throat:      Pharynx: Oropharynx is clear. Eyes:      Pupils: Pupils are equal, round, and reactive to light. Neck:      Musculoskeletal: Neck supple. Cardiovascular:      Rate and Rhythm: Normal rate and regular rhythm. Heart sounds: Murmur (Gr 2/6 MITCHELL ) present. Pulmonary:      Effort: Pulmonary effort is normal.      Breath sounds: Normal breath sounds. Abdominal:      General: Abdomen is flat. Bowel sounds are normal.      Palpations: Abdomen is soft. Genitourinary:     Comments: No Ye  Musculoskeletal:         General: Swelling (both elbows) present. No tenderness. Skin:     General: Skin is warm and dry. Findings: No rash. Comments: No splinter hemorrhages   Neurological:      General: No focal deficit present. Mental Status: He is alert and oriented to person, place, and time. Psychiatric:         Mood and Affect: Mood normal.         Behavior: Behavior normal.         Thought Content: Thought content normal.         Judgment: Judgment normal.          Assessment & Plan  1. Right parotitis/facial cellulitis, secondary to MRSA and Enterobacter aerogenes, status post Vancomycin and Levaquin, clinically resolved. 2. MRSA bacteremia: secondary to above, in setting of prosthetic aortic Valve, no vegetations on TTE. 3. Tophaceous gout, both elbows, treated, stable    1. No further antibiotics needed at this time  2. If MRSA bacteremia occurs, would perform JONATHAN  3.  Follow-up with PCP  4.  Follow-up ID Clinic as needed    Fabiano Yi MD

## 2021-04-27 ENCOUNTER — OFFICE VISIT (OUTPATIENT)
Dept: ENT CLINIC | Age: 86
End: 2021-04-27
Payer: MEDICARE

## 2021-04-27 VITALS
DIASTOLIC BLOOD PRESSURE: 80 MMHG | OXYGEN SATURATION: 100 % | HEART RATE: 57 BPM | TEMPERATURE: 97.3 F | SYSTOLIC BLOOD PRESSURE: 124 MMHG

## 2021-04-27 DIAGNOSIS — L03.211 FACIAL CELLULITIS: ICD-10-CM

## 2021-04-27 DIAGNOSIS — K11.20 PAROTITIS: Primary | ICD-10-CM

## 2021-04-27 DIAGNOSIS — H61.23 BILATERAL IMPACTED CERUMEN: ICD-10-CM

## 2021-04-27 PROCEDURE — 69210 REMOVE IMPACTED EAR WAX UNI: CPT | Performed by: OTOLARYNGOLOGY

## 2021-04-27 PROCEDURE — 99203 OFFICE O/P NEW LOW 30 MIN: CPT | Performed by: OTOLARYNGOLOGY

## 2021-04-27 NOTE — LETTER
4/28/2021 Patient: Ann-Marie Plascencia. YOB: 1935 Date of Visit: 4/27/2021 Mabel Tellez MD 
[place] [address] Via Fax: [phone] Dear Mabel Tellez MD, Thank you for referring Mr. Delonte Carreon to [address] [place] [address], NOSE, THROAT AND ALLERGY [facility] for evaluation. My notes for this consultation are attached. If you have questions, please do not hesitate to call me. I look forward to following your patient along with you. Sincerely, Michele Mays MD

## 2021-04-27 NOTE — PROGRESS NOTES
Otolaryngology-Head and Neck Surgery  New Patient Visit     Patient: Billy Santana YOB: 1935  MRN: [id number]  Date of Service: 4/27/2021    Chief Complaint:  Follow up parotitis     History of Present Illness: Billy Santana is a 81y.o. year old male who presents in follow up of hospitalization for MRSA parotitis. He's here with his caregiver. Completed a course of IV antibiotics. No issues. No dysphagia, odynophagia. Resolution of facial swelling    Past Medical History:  Past Medical History:   Diagnosis Date    Anemia     Arthritis     CKD (chronic kidney disease), stage III ([address] 2011    Dementia (HCC)     Depression     Erectile dysfunction     GERD (gastroesophageal reflux disease)     GI bleed     Gout     gout, osteoarthritis    Heart failure (HCC)     acute heart failure    Hemorrhoids     Hypertension     Multiple myeloma (HCC)     PAF (paroxysmal atrial fibrillation) ([facility] 02/17/2016    s/p cardioversion    Pulmonary HTN ([facility]     S/P AVR (aortic valve replacement) 07/26/2016    merrill gill md -     S/P cardiac cath 7/6/16    severe ai, normal coronaries     S/P colonoscopy with polypectomy 05/09/2016    Kar Delcid MD    Stroke [facility])        Past Surgical History:   Past Surgical History:   Procedure Laterality Date    HX HEENT      nasal procedure 20 years ago    HX KNEE REPLACEMENT Bilateral     bilateral knee replacement    HX ORTHOPAEDIC Bilateral     wrist    HX UROLOGICAL  01/05/2020    cystoscopy     OR CARDIAC SURG PROCEDURE UNLIST      cardiac cath    OR CARDIAC SURG PROCEDURE UNLIST  07/26/2016    AVR       Medications:   Current Outpatient Medications   Medication Instructions    allopurinoL (ZYLOPRIM) 100 mg tablet TAKE 1 TABLET BY MOUTH EVERY DAY    ammonium lactate (AMLACTIN) 12 % topical cream Topical, 2 TIMES DAILY, rub in to affected area well    colchicine 0.6 mg, Oral, DAILY    diclofenac (VOLTAREN) 1 % gel APPLY TWO GRAMS FOUR TIMES DAILY AS NEEDED FOR ELBOW PAIN    escitalopram oxalate (LEXAPRO) 20 mg, Oral, DAILY    ferrous sulfate (IRON) 325 mg (65 mg iron) EC tablet TAKE ONE TABLET BY MOUTH EACH DAY WITH BREAKFAST.  folic acid (FOLVITE) 1 mg tablet TAKE ONE TABLET BY MOUTH DAILY    furosemide (LASIX) 40 mg, Oral, DAILY    magnesium oxide (MAG-OX) 400 mg tablet TAKE ONE TABLET BY MOUTH DAILY    melatonin 10 mg TbER TAKE ONE TABLET BY MOUTH AT BEDTIME.  potassium chloride (K-DUR, KLOR-CON) 20 mEq tablet TAKE 1 TABLET BY MOUTH EACH MORNING WITH FOOD.  rifAMPin (RIFADIN) 300 mg, Oral, EVERY 12 HOURS    simvastatin (ZOCOR) 40 mg tablet TAKE ONE TABLET BY MOUTH AT BEDTIME.  tamsulosin (FLOMAX) 0.4 mg, Oral, DAILY    terbinafine HCL (LAMISIL) 250 mg, Oral, DAILY    thiamine HCL (B-1) 100 mg tablet TAKE ONE TABLET BY MOUTH DAILY    timolol (TIMOPTIC) 0.5 % ophthalmic solution INSTILL 1 DROPS INTO RIGHT EYE ONCE DAILY    traZODone (DESYREL) 50 mg tablet TAKE ONE TABLET BY MOUTH AT BEDTIME.  vancomycin 750 mg 750 mg, vial-mate 1 Device IVPB 750 mg, IntraVENous, EVERY 24 HOURS    Vitamin C 250 mg tablet TAKE ONE TABLET BY MOUTH DAILY       Allergies: Allergies   Allergen Reactions    Amiodarone Other (comments)     Bradycardia -excessive    Carvedilol Unknown (comments)       Social History:   Social History     Socioeconomic History    Marital status:    Tobacco Use    Smoking status: Former Smoker     Years: 20.00    Smokeless tobacco: Never Used   Substance and Sexual Activity    Alcohol use: Not Currently     Frequency: Never     Binge frequency: Never    Drug use: Never    Sexual activity: Not Currently   Social History Narrative    Family History: Mother:  36 yrs, Chronic anemiaFather:  80 yrs, I think aneurysm hypertensionBrother(s):  79 yrs,    obese,dm,drug abuse,miSon(s): alive, adopted1 son(s) . Ronald Osborn [phone]    Social History: Alcohol Use Patient uses alcohol, Drinks per occasion: 2, Drinks per w [place]: 10. Smoking Status Patient is a never smoker. Marital Status: . Lives w ith: alone. Occupation/W ork: employed full time insurance. Education/School: has highschool diploma. Family History:  Family History   Problem Relation Age of Onset    Anemia Mother     No Known Problems Father     Other Brother         drug abuse       Review of Systems:    Consitutional: denies fever, excessive weight gain or loss. Eyes: denies diplopia, eye pain. Integumentary: denies new concerning skin lesions. Ears, Nose, Mouth, Throat: denies except as per HPI. Endocrine: denies hot or cold intolerance, increased thirst.  Respiratory: denies cough, hemoptysis, wheezing  Gastrointestinal: denies trouble swallowing, nausea, emesis, regurgitation  Musculoskeletal: denies muscle weakness or wasting  Cardiovascular: denies chest pain, shortness of breath  Neurologic: denies seizures, numbness or tingling, syncope  Hematologic: denies easy bleeding or bruising    Physical Examination:   There were no vitals filed for this visit. General: Comfortable, pleasant, appears stated age. Answers questions appropriately and follows commands  Voice: Strong, speaking in full sentences, no stridor    Face: No masses or lesions, facial strength symmetric   Ears: External ears unremarkable. Bilateral complete impactions, following debridement, Bilateral ear canal clear. Tympanic membrane clear and intact, with visible landmarks. Clear middle ear space  Nose: External nose unremarkable. Dorsum midline. Anterior rhinoscopy demonstrates no lesions. Septum midline. Turbinates without hypertrophy. Oral Cavity / Oropharynx: No trismus. Mucosa pink and moist. No lesions. Tongue is midline and mobile. Palate elevates symmetrically. Uvula midline. Tonsils unremarkable. Base of tongue soft. Floor of mouth soft. Neck: Supple. No adenopathy. Thyroid unremarkable.  Palpable laryngeal landmarks. Full neck range of motion   Neurologic: CN II - XI intact. Wheelchair bound    PROCEDURE: BILATERAL MICROSCOPY WITH CERUMEN DEBRIDEMENT    Using the microscope, both ears were examined. A right angle alligator were used to debride the ears of cerumen revealing a clear and intact TM bilaterally. Patient tolerated the procedure well       Assessment and Plan:   1. Parotitis  2. Facial cellulitis  - No clinical evidence of residual infection  - Doing well    3. Bilateral cerumen impactions  - Debrided as above    RTC PRN         The patient was instructed to return to clinic if no improvement or progression of symptoms. Signs to watch out for reviewed.       Ginger Elder MD   [facility]  [address] [place] [address]  Office Phone: [phone]

## 2021-04-28 DIAGNOSIS — F10.27 DEMENTIA ASSOCIATED WITH ALCOHOLISM WITH BEHAVIORAL DISTURBANCE (HCC): ICD-10-CM

## 2021-04-28 DIAGNOSIS — I10 ESSENTIAL HYPERTENSION: ICD-10-CM

## 2021-04-30 DIAGNOSIS — G47.00 INSOMNIA, UNSPECIFIED TYPE: ICD-10-CM

## 2021-04-30 DIAGNOSIS — E83.42 HYPOMAGNESEMIA: ICD-10-CM

## 2021-04-30 RX ORDER — ESCITALOPRAM OXALATE 20 MG/1
20 TABLET ORAL DAILY
Qty: 30 TAB | Refills: 0 | Status: SHIPPED | OUTPATIENT
Start: 2021-04-30 | End: 2021-05-30

## 2021-04-30 RX ORDER — AMLODIPINE BESYLATE 5 MG/1
5 TABLET ORAL DAILY
Qty: 30 TAB | Refills: 0 | Status: SHIPPED | OUTPATIENT
Start: 2021-04-30 | End: 2021-05-30

## 2021-04-30 RX ORDER — FESOTERODINE FUMARATE 4 MG/1
4 TABLET, FILM COATED, EXTENDED RELEASE ORAL DAILY
Qty: 30 TAB | Refills: 0 | Status: SHIPPED | OUTPATIENT
Start: 2021-04-30 | End: 2021-05-30

## 2021-04-30 RX ORDER — TRAZODONE HYDROCHLORIDE 50 MG/1
50 TABLET ORAL
Qty: 30 TAB | Refills: 0 | Status: SHIPPED | OUTPATIENT
Start: 2021-04-30 | End: 2021-05-03 | Stop reason: SDUPTHER

## 2021-04-30 RX ORDER — LANOLIN ALCOHOL/MO/W.PET/CERES
400 CREAM (GRAM) TOPICAL DAILY
Qty: 30 TAB | Refills: 0 | Status: SHIPPED | OUTPATIENT
Start: 2021-04-30 | End: 2021-05-30

## 2021-04-30 RX ORDER — FERROUS SULFATE 325(65) MG
325 TABLET, DELAYED RELEASE (ENTERIC COATED) ORAL
Qty: 30 TAB | Refills: 0 | Status: SHIPPED | OUTPATIENT
Start: 2021-04-30 | End: 2021-05-30

## 2021-04-30 RX ORDER — ASCORBIC ACID 250 MG
250 TABLET ORAL DAILY
Qty: 30 TAB | Refills: 0 | Status: SHIPPED | OUTPATIENT
Start: 2021-04-30 | End: 2021-05-30

## 2021-04-30 RX ORDER — SIMVASTATIN 40 MG/1
40 TABLET, FILM COATED ORAL
Qty: 30 TAB | Refills: 0 | Status: SHIPPED | OUTPATIENT
Start: 2021-04-30 | End: 2021-05-30

## 2021-04-30 RX ORDER — FOLIC ACID 1 MG/1
1 TABLET ORAL DAILY
Qty: 30 TAB | Refills: 0 | Status: SHIPPED | OUTPATIENT
Start: 2021-04-30 | End: 2021-05-30

## 2021-04-30 RX ORDER — LANOLIN ALCOHOL/MO/W.PET/CERES
100 CREAM (GRAM) TOPICAL DAILY
Qty: 30 TAB | Refills: 0 | Status: SHIPPED | OUTPATIENT
Start: 2021-04-30 | End: 2021-05-30

## 2021-05-01 RX ORDER — AMLODIPINE BESYLATE 5 MG/1
TABLET ORAL
Qty: 30 TAB | Refills: 0 | Status: SHIPPED | OUTPATIENT
Start: 2021-05-01 | End: 2022-05-23 | Stop reason: SDUPTHER

## 2021-05-01 RX ORDER — ESCITALOPRAM OXALATE 20 MG/1
TABLET ORAL
Qty: 30 TAB | Refills: 0 | Status: SHIPPED | OUTPATIENT
Start: 2021-05-01 | End: 2022-05-23 | Stop reason: SDUPTHER

## 2021-05-03 DIAGNOSIS — G47.00 INSOMNIA, UNSPECIFIED TYPE: ICD-10-CM

## 2021-05-06 RX ORDER — TRAZODONE HYDROCHLORIDE 50 MG/1
50 TABLET ORAL
Qty: 90 TAB | Refills: 0 | Status: SHIPPED | OUTPATIENT
Start: 2021-05-06 | End: 2021-08-04

## 2021-12-21 ENCOUNTER — OFFICE VISIT (OUTPATIENT)
Dept: FAMILY MEDICINE CLINIC | Age: 86
End: 2021-12-21
Payer: MEDICARE

## 2021-12-21 VITALS
DIASTOLIC BLOOD PRESSURE: 82 MMHG | SYSTOLIC BLOOD PRESSURE: 136 MMHG | HEIGHT: 66 IN | TEMPERATURE: 97.1 F | HEART RATE: 84 BPM | OXYGEN SATURATION: 96 % | RESPIRATION RATE: 18 BRPM | WEIGHT: 172.8 LBS | BODY MASS INDEX: 27.77 KG/M2

## 2021-12-21 DIAGNOSIS — M10.079 ACUTE IDIOPATHIC GOUT OF FOOT, UNSPECIFIED LATERALITY: ICD-10-CM

## 2021-12-21 DIAGNOSIS — K43.9 VENTRAL HERNIA WITHOUT OBSTRUCTION OR GANGRENE: ICD-10-CM

## 2021-12-21 DIAGNOSIS — G30.0 EARLY ONSET ALZHEIMER'S DEMENTIA WITH BEHAVIORAL DISTURBANCE (HCC): ICD-10-CM

## 2021-12-21 DIAGNOSIS — R29.6 FREQUENT FALLS: ICD-10-CM

## 2021-12-21 DIAGNOSIS — Z98.890 HISTORY OF CARDIOVERSION: ICD-10-CM

## 2021-12-21 DIAGNOSIS — N18.32 STAGE 3B CHRONIC KIDNEY DISEASE (HCC): ICD-10-CM

## 2021-12-21 DIAGNOSIS — F33.0 MILD EPISODE OF RECURRENT MAJOR DEPRESSIVE DISORDER (HCC): ICD-10-CM

## 2021-12-21 DIAGNOSIS — I11.9 MALIGNANT HYPERTENSIVE HEART DISEASE WITHOUT HEART FAILURE: Primary | ICD-10-CM

## 2021-12-21 DIAGNOSIS — F99 ABNORMAL MMSE: ICD-10-CM

## 2021-12-21 DIAGNOSIS — F33.1 MAJOR DEPRESSIVE DISORDER, RECURRENT, MODERATE (HCC): ICD-10-CM

## 2021-12-21 DIAGNOSIS — F02.818 EARLY ONSET ALZHEIMER'S DEMENTIA WITH BEHAVIORAL DISTURBANCE (HCC): ICD-10-CM

## 2021-12-21 DIAGNOSIS — F33.0 MAJOR DEPRESSIVE DISORDER, RECURRENT, MILD (HCC): ICD-10-CM

## 2021-12-21 DIAGNOSIS — Z23 ENCOUNTER FOR IMMUNIZATION: ICD-10-CM

## 2021-12-21 DIAGNOSIS — N52.9 ERECTILE DYSFUNCTION, UNSPECIFIED ERECTILE DYSFUNCTION TYPE: ICD-10-CM

## 2021-12-21 DIAGNOSIS — I48.0 PAROXYSMAL ATRIAL FIBRILLATION (HCC): ICD-10-CM

## 2021-12-21 DIAGNOSIS — Z86.2 HISTORY OF ANEMIA: ICD-10-CM

## 2021-12-21 PROBLEM — F33.9 MAJOR DEPRESSIVE DISORDER, RECURRENT, UNSPECIFIED (HCC): Status: ACTIVE | Noted: 2021-12-21

## 2021-12-21 PROCEDURE — G8752 SYS BP LESS 140: HCPCS | Performed by: FAMILY MEDICINE

## 2021-12-21 PROCEDURE — G8427 DOCREV CUR MEDS BY ELIG CLIN: HCPCS | Performed by: FAMILY MEDICINE

## 2021-12-21 PROCEDURE — G8754 DIAS BP LESS 90: HCPCS | Performed by: FAMILY MEDICINE

## 2021-12-21 PROCEDURE — G8536 NO DOC ELDER MAL SCRN: HCPCS | Performed by: FAMILY MEDICINE

## 2021-12-21 PROCEDURE — G0008 ADMIN INFLUENZA VIRUS VAC: HCPCS | Performed by: FAMILY MEDICINE

## 2021-12-21 PROCEDURE — 1101F PT FALLS ASSESS-DOCD LE1/YR: CPT | Performed by: FAMILY MEDICINE

## 2021-12-21 PROCEDURE — G8419 CALC BMI OUT NRM PARAM NOF/U: HCPCS | Performed by: FAMILY MEDICINE

## 2021-12-21 PROCEDURE — G9717 DOC PT DX DEP/BP F/U NT REQ: HCPCS | Performed by: FAMILY MEDICINE

## 2021-12-21 PROCEDURE — 99204 OFFICE O/P NEW MOD 45 MIN: CPT | Performed by: FAMILY MEDICINE

## 2021-12-21 PROCEDURE — 90694 VACC AIIV4 NO PRSRV 0.5ML IM: CPT | Performed by: FAMILY MEDICINE

## 2021-12-21 RX ORDER — TAMSULOSIN HYDROCHLORIDE 0.4 MG/1
0.4 CAPSULE ORAL DAILY
Qty: 30 CAPSULE | Refills: 2 | Status: SHIPPED | OUTPATIENT
Start: 2021-12-21 | End: 2022-01-20

## 2021-12-21 RX ORDER — TRAZODONE HYDROCHLORIDE 50 MG/1
25 TABLET ORAL
Qty: 30 TABLET | Refills: 2 | Status: SHIPPED | OUTPATIENT
Start: 2021-12-21 | End: 2022-01-20

## 2021-12-21 RX ORDER — FUROSEMIDE 40 MG/1
40 TABLET ORAL DAILY
Qty: 30 TABLET | Refills: 2 | Status: SHIPPED | OUTPATIENT
Start: 2021-12-21 | End: 2022-01-20

## 2021-12-21 RX ORDER — ASCORBIC ACID 250 MG
250 TABLET ORAL DAILY
COMMUNITY
End: 2022-05-23 | Stop reason: SDUPTHER

## 2021-12-21 RX ORDER — DONEPEZIL HYDROCHLORIDE 5 MG/1
5 TABLET, FILM COATED ORAL
Qty: 30 TABLET | Refills: 2 | Status: SHIPPED | OUTPATIENT
Start: 2021-12-21 | End: 2022-01-20

## 2021-12-21 RX ORDER — POTASSIUM CHLORIDE 20 MEQ/1
TABLET, EXTENDED RELEASE ORAL
Qty: 90 TABLET | Refills: 1 | Status: SHIPPED | OUTPATIENT
Start: 2021-12-21 | End: 2022-05-23 | Stop reason: SDUPTHER

## 2021-12-21 RX ORDER — TRAZODONE HYDROCHLORIDE 50 MG/1
25 TABLET ORAL
COMMUNITY
End: 2021-12-21 | Stop reason: SDUPTHER

## 2021-12-21 RX ORDER — MELATONIN 5 MG
5 CAPSULE ORAL
COMMUNITY
End: 2022-05-24 | Stop reason: SDUPTHER

## 2021-12-21 RX ORDER — ALLOPURINOL 100 MG/1
TABLET ORAL
Qty: 90 TABLET | Refills: 2 | Status: SHIPPED | OUTPATIENT
Start: 2021-12-21 | End: 2022-05-23 | Stop reason: SDUPTHER

## 2021-12-21 RX ORDER — FOLIC ACID 1 MG/1
1 TABLET ORAL DAILY
COMMUNITY
End: 2022-05-23 | Stop reason: SDUPTHER

## 2021-12-21 RX ORDER — AMLODIPINE BESYLATE 2.5 MG/1
2.5 TABLET ORAL DAILY
Qty: 30 TABLET | Refills: 2 | Status: SHIPPED | OUTPATIENT
Start: 2021-12-21 | End: 2022-01-20

## 2021-12-21 RX ORDER — SIMVASTATIN 20 MG/1
20 TABLET, FILM COATED ORAL
COMMUNITY
End: 2021-12-21 | Stop reason: SDUPTHER

## 2021-12-21 RX ORDER — LANOLIN ALCOHOL/MO/W.PET/CERES
100 CREAM (GRAM) TOPICAL DAILY
COMMUNITY
End: 2022-05-23 | Stop reason: SDUPTHER

## 2021-12-21 RX ORDER — SIMVASTATIN 20 MG/1
20 TABLET, FILM COATED ORAL
Qty: 30 TABLET | Refills: 2 | Status: SHIPPED | OUTPATIENT
Start: 2021-12-21 | End: 2022-05-23 | Stop reason: SDUPTHER

## 2021-12-21 NOTE — PROGRESS NOTES
Torres Crocker is a 81 y.o. male and presents with New Patient, Cholesterol Problem, and Medication Refill  . HPI     Subjective:  Cardiovascular Review:  The patient has hypertension   Diet and Lifestyle: generally follows a low fat low cholesterol diet, generally follows a low sodium diet, exercises sporadically  Home BP Monitoring: is not measured at home. Pertinent ROS: taking medications as instructed, no medication side effects noted, no TIA's, no chest pain on exertion, no dyspnea on exertion, no swelling of ankles. Review of Systems  ROS      Past Medical History:   Diagnosis Date    Anemia     Arthritis     CKD (chronic kidney disease), stage III ([facility] 2011    Dementia ([facility]     Depression     Erectile dysfunction     GERD (gastroesophageal reflux disease)     GI bleed     Gout     gout, osteoarthritis    Heart failure (HCC)     acute heart failure    Hemorrhoids     Hypertension     Multiple myeloma (HCC)     PAF (paroxysmal atrial fibrillation) ([facility] 02/17/2016    s/p cardioversion    Pulmonary HTN ([facility]     S/P AVR (aortic valve replacement) 07/26/2016    merrill gill md -     S/P cardiac cath 7/6/16    severe ai, normal coronaries     S/P colonoscopy with polypectomy 05/09/2016    Kar Pina MD    Stroke [facility])      Past Surgical History:   Procedure Laterality Date    HX HEENT      nasal procedure 20 years ago   William HX KNEE REPLACEMENT Bilateral     bilateral knee replacement    HX ORTHOPAEDIC Bilateral     wrist    HX UROLOGICAL  01/05/2020    cystoscopy     SD CARDIAC SURG PROCEDURE UNLIST      cardiac cath    SD CARDIAC SURG PROCEDURE UNLIST  07/26/2016    AVR     Social History     Socioeconomic History    Marital status:    Tobacco Use    Smoking status: Former Smoker     Years: 20.00    Smokeless tobacco: Never Used   Vaping Use    Vaping Use: Never used   Substance and Sexual Activity    Alcohol use: Not Currently    Drug use: Never    Sexual activity: Not Currently   Social History Narrative    Family History: Mother:  36 yrs, Chronic anemiaFather:  80 yrs, I think aneurysm hypertensionBrother(s):  79 yrs,    obese,dm,drug abuse,miSon(s): alive, adopted1 son(s) . Mar Chong [phone]    Social History: Alcohol Use Patient uses alcohol, Drinks per occasion: 2, Drinks per w [place]: 10. Smoking Status Patient is a never smoker. Marital Status: . Lives w ith: alone. Occupation/W ork: employed full time insurance. Education/School: has highschool diploma. Family History   Problem Relation Age of Onset    Anemia Mother     No Known Problems Father     Other Brother         drug abuse     Current Outpatient Medications   Medication Sig Dispense Refill    melatonin 5 mg cap capsule Take 5 mg by mouth nightly. Takes 2 tabs at bedtime      simvastatin (ZOCOR) 20 mg tablet Take 20 mg by mouth nightly.  folic acid (FOLVITE) 1 mg tablet Take 1 mg by mouth daily.  traZODone (DESYREL) 50 mg tablet Take 25 mg by mouth nightly.  thiamine HCL (Vitamin B-1) 100 mg tablet Take 100 mg by mouth daily.  ascorbic acid, vitamin C, (Vitamin C) 250 mg tablet Take 250 mg by mouth daily.  escitalopram oxalate (LEXAPRO) 20 mg tablet TAKE 1 TABLET BY MOUTH DAILY. 30 Tab 0    amLODIPine (NORVASC) 5 mg tablet TAKE ONE TABLET BY MOUTH DAILY 30 Tab 0    potassium chloride (K-DUR, KLOR-CON) 20 mEq tablet TAKE 1 TABLET BY MOUTH EACH MORNING WITH FOOD. 90 Tab 1    furosemide (Lasix) 40 mg tablet Take 40 mg by mouth daily.  tamsulosin (FLOMAX) 0.4 mg capsule Take 1 Cap by mouth daily. 90 Cap 3    timolol (TIMOPTIC) 0.5 % ophthalmic solution INSTILL 1 DROPS INTO RIGHT EYE ONCE DAILY 5 mL 5    allopurinoL (ZYLOPRIM) 100 mg tablet TAKE 1 TABLET BY MOUTH EVERY DAY 90 Tab 2    colchicine 0.6 mg tablet Take 1 Tab by mouth daily.  Indications: acute inflammation of the joints due to gout attack (Patient not taking: Reported on 12/21/2021) 10 Tab 0    rifAMPin (RIFADIN) 300 mg capsule Take 1 Cap by mouth every twelve (12) hours. (Patient not taking: Reported on 12/21/2021) 28 Cap 0    vancomycin 750 mg 750 mg, vial-mate 1 Device IVPB 750 mg by IntraVENous route every twenty-four (24) hours. (Patient not taking: Reported on 12/21/2021) 14 Dose 0    diclofenac (VOLTAREN) 1 % gel APPLY TWO GRAMS FOUR TIMES DAILY AS NEEDED FOR ELBOW PAIN (Patient not taking: Reported on 12/21/2021) 200 g 3    terbinafine HCL (LAMISIL) 250 mg tablet Take 1 Tab by mouth daily. (Patient not taking: Reported on 12/21/2021) 90 Tab 0    ammonium lactate (AMLACTIN) 12 % topical cream Apply  to affected area two (2) times a day. rub in to affected area well (Patient not taking: Reported on 12/21/2021) 280 g 5     Allergies   Allergen Reactions    Amiodarone Other (comments)     Bradycardia -excessive    Carvedilol Unknown (comments)       Objective:  Visit Vitals  /82 (BP 1 Location: Right arm, BP Patient Position: Sitting, BP Cuff Size: Adult)   Pulse 84   Temp 97.1 °F (36.2 °C) (Temporal)   Resp 18   Ht 5' 6\" (1.676 m)   Wt 172 lb 12.8 oz (78.4 kg)   SpO2 96%   BMI 27.89 kg/m²       Physical Exam:   Physical Exam        Results for orders placed or performed during the hospital encounter of 03/13/21   CULTURE, BLOOD, PAIRED    Specimen: Blood   Result Value Ref Range    Special Requests: No Special Requests      Culture result: (A)       * methicillin resistant staphylococcus aureus * growing in 2 of 4 bottles drawn    Culture result:        Gram Positive Cocci in clusters growing in 1 of 4 bottles drawn CALLED TO AND READ BACK BY Yari Davis. [facility] AT [facility] AT [address] BY SARAY       Susceptibility    Staphylococcus aureus Methcillin Resistant - MARIANELA     Ciprofloxacin ($) >=8 Resistant ug/mL     Daptomycin ($$$$$) 0.25 Susceptible ug/mL     Doxycycline ($$) <=0.5 Susceptible ug/mL     Erythromycin ($$$$) <=0.25 Susceptible ug/mL     Gentamicin ($) <=0.5 Susceptible ug/mL     Levofloxacin ($) >=8 Resistant ug/mL     Linezolid ($$$$$) 2 Susceptible ug/mL     Oxacillin >=4 Resistant ug/mL     Rifampin ($$$$)* <=0.5 Susceptible ug/mL      * Rifampin is not to be used for mono-therapy. Tetracycline <=1 Susceptible ug/mL     Trimeth/Sulfa <=10 Susceptible ug/mL     Vancomycin ($) 1 Susceptible ug/mL   CULTURE, WOUND W GRAM STAIN    Specimen: Mouth; Wound   Result Value Ref Range    Special Requests: No Special Requests      GRAM STAIN Few WBCs seen      GRAM STAIN 2+ Gram Negative Rods      GRAM STAIN 1+ Gram Positive Cocci in pairs AND CLUSTERS      GRAM STAIN Occasional Budding yeast      Culture result:        Heavy * methicillin resistant staphylococcus aureus *    Culture result: Light Enterobacter aerogenes      Culture result: Heavy Yeast, (apparent candida albicans)      Culture result:  Moderate  NORMAL ORAL ROYAL           Susceptibility    Enterobacter aerogenes - MARIANELA     Amikacin ($) <=2 Susceptible ug/mL     Cefazolin ($) >=64 Resistant ug/mL     Ceftazidime ($) <=1 Susceptible ug/mL     Ceftriaxone ($) <=1 Susceptible ug/mL     Cefepime ($$) <=1 Susceptible ug/mL     Ciprofloxacin ($) <=0.25 Susceptible ug/mL     Gentamicin ($) <=1 Susceptible ug/mL     Levofloxacin ($) <=0.12 Susceptible ug/mL     Meropenem ($$) <=0.25 Susceptible ug/mL     Piperacillin/Tazobac ($) <=4 Susceptible ug/mL     Tobramycin ($) <=1 Susceptible ug/mL     Trimeth/Sulfa <=20 Susceptible ug/mL     Cefoxitin >=64 Resistant ug/mL    Staphylococcus aureus Methcillin Resistant - MARIANELA     Ciprofloxacin ($) >=8 Resistant ug/mL     Daptomycin ($$$$$) 0.25 Susceptible ug/mL     Doxycycline ($$) <=0.5 Susceptible ug/mL     Erythromycin ($$$$) <=0.25 Susceptible ug/mL     Gentamicin ($) <=0.5 Susceptible ug/mL     Levofloxacin ($) >=8 Resistant ug/mL     Linezolid ($$$$$) 2 Susceptible ug/mL     Oxacillin >=4 Resistant ug/mL     Rifampin ($$$$)* <=0.5 Susceptible ug/mL      * Rifampin is not to be used for mono-therapy. Tetracycline <=1 Susceptible ug/mL     Trimeth/Sulfa <=10 Susceptible ug/mL     Vancomycin ($) 1 Susceptible ug/mL   CULTURE, BLOOD    Specimen: Blood   Result Value Ref Range    Special Requests: No Special Requests      Culture result: No growth 6 days     CULTURE, BODY FLUID W GRAM STAIN    Specimen: Body Fluid   Result Value Ref Range    Special Requests: No Special Requests      GRAM STAIN Occasional WBCs seen      GRAM STAIN No organisms seen      Culture result: No growth 4 days     CULTURE, BODY FLUID W GRAM STAIN    Specimen: Elbow    RIGHT ELBOW   Result Value Ref Range    Special Requests: No Special Requests      GRAM STAIN 2+ WBCs seen      GRAM STAIN No organisms seen      Culture result: culture performed on unspun fluid      Culture result: No growth 4 days     CBC WITH AUTOMATED DIFF   Result Value Ref Range    WBC 12.9 (H) 4.1 - 11.1 K/uL    RBC 3.25 (L) 4.10 - 5.70 M/uL    HGB 9.6 (L) 12.1 - 17.0 g/dL    HCT 31.4 (L) 36.6 - 50.3 %    MCV 96.6 80.0 - 99.0 FL    MCH 29.5 26.0 - 34.0 PG    MCHC 30.6 30.0 - 36.5 g/dL    RDW 16.9 (H) 11.5 - 14.5 %    PLATELET 299 069 - 116 K/uL    MPV 11.1 8.9 - 12.9 FL    NEUTROPHILS 91 (H) 32 - 75 %    LYMPHOCYTES 4 (L) 12 - 49 %    MONOCYTES 5 5 - 13 %    EOSINOPHILS 0 0 - 7 %    BASOPHILS 0 0 - 1 %    IMMATURE GRANULOCYTES 0 0.0 - 0.5 %    ABS. NEUTROPHILS 11.6 (H) 1.8 - 8.0 K/UL    ABS. LYMPHOCYTES 0.6 (L) 0.8 - 3.5 K/UL    ABS. MONOCYTES 0.7 0.0 - 1.0 K/UL    ABS. EOSINOPHILS 0.0 0.0 - 0.4 K/UL    ABS. BASOPHILS 0.0 0.0 - 0.1 K/UL    ABS. IMM.  GRANS. 0.0 0.00 - 0.04 K/UL    DF AUTOMATED     METABOLIC PANEL, BASIC   Result Value Ref Range    Sodium 146 (H) 136 - 145 mmol/L    Potassium 4.2 3.5 - 5.1 mmol/L    Chloride 109 (H) 97 - 108 mmol/L    CO2 26 21 - 32 mmol/L    Anion gap 11 5 - 15 mmol/L    Glucose 116 (H) 65 - 100 mg/dL    BUN 42 (H) 6 - 20 mg/dL    Creatinine 2.71 (H) 0.70 - 1.30 mg/dL    BUN/Creatinine ratio 15 12 - 20      GFR est AA 27 (L) >60 ml/min/1.73m2    GFR est non-AA 23 (L) >60 ml/min/1.73m2    Calcium 9.4 8.5 - 10.1 mg/dL   LACTIC ACID   Result Value Ref Range    Lactic acid 1.0 0.4 - 2.0 mmol/L   VANCOMYCIN, RANDOM   Result Value Ref Range    Vancomycin, random 8.1 ug/mL    Reported dose date 0      Reported dose: 0 Units   VANCOMYCIN, RANDOM   Result Value Ref Range    Vancomycin, random 13.3 ug/mL    Reported dose date 0      Reported dose: 0 Units   CBC WITH AUTOMATED DIFF   Result Value Ref Range    WBC 11.9 (H) 4.1 - 11.1 K/uL    RBC 2.84 (L) 4.10 - 5.70 M/uL    HGB 8.0 (L) 12.1 - 17.0 g/dL    HCT 26.3 (L) 36.6 - 50.3 %    MCV 92.6 80.0 - 99.0 FL    MCH 28.2 26.0 - 34.0 PG    MCHC 30.4 30.0 - 36.5 g/dL    RDW 17.5 (H) 11.5 - 14.5 %    PLATELET 884 313 - 535 K/uL    MPV 9.3 8.9 - 12.9 FL    NRBC 0.0 0  WBC    ABSOLUTE NRBC 0.00 0.00 - 0.01 K/uL    NEUTROPHILS 87 (H) 32 - 75 %    LYMPHOCYTES 7 (L) 12 - 49 %    MONOCYTES 4 (L) 5 - 13 %    EOSINOPHILS 1 0 - 7 %    BASOPHILS 0 0 - 1 %    IMMATURE GRANULOCYTES 1 (H) 0.0 - 0.5 %    ABS. NEUTROPHILS 10.3 (H) 1.8 - 8.0 K/UL    ABS. LYMPHOCYTES 0.9 0.8 - 3.5 K/UL    ABS. MONOCYTES 0.5 0.0 - 1.0 K/UL    ABS. EOSINOPHILS 0.2 0.0 - 0.4 K/UL    ABS. BASOPHILS 0.0 0.0 - 0.1 K/UL    ABS. IMM.  GRANS. 0.1 (H) 0.00 - 0.04 K/UL    DF AUTOMATED     RENAL FUNCTION PANEL   Result Value Ref Range    Sodium 144 136 - 145 mmol/L    Potassium 3.9 3.5 - 5.1 mmol/L    Chloride 114 (H) 97 - 108 mmol/L    CO2 23 21 - 32 mmol/L    Anion gap 7 5 - 15 mmol/L    Glucose 96 65 - 100 mg/dL    BUN 32 (H) 6 - 20 mg/dL    Creatinine 2.27 (H) 0.70 - 1.30 mg/dL    BUN/Creatinine ratio 14 12 - 20      GFR est AA 34 (L) >60 ml/min/1.73m2    GFR est non-AA 28 (L) >60 ml/min/1.73m2    Calcium 9.0 8.5 - 10.1 mg/dL    Phosphorus 2.4 (L) 2.6 - 4.7 mg/dL    Albumin 1.2 (L) 3.5 - 5.0 g/dL   C REACTIVE PROTEIN, QT   Result Value Ref Range    C-Reactive protein 24.20 (H) 0.00 - 0.60 mg/dL   PROCALCITONIN Result Value Ref Range    Procalcitonin 0.55 (H) 0 ng/mL   VANCOMYCIN, RANDOM   Result Value Ref Range    Vancomycin, random 16.6 ug/mL    Reported dose date Not provided      Reported dose: Not provided Units   RENAL FUNCTION PANEL   Result Value Ref Range    Sodium 143 136 - 145 mmol/L    Potassium 3.9 3.5 - 5.1 mmol/L    Chloride 110 (H) 97 - 108 mmol/L    CO2 29 21 - 32 mmol/L    Anion gap 4 (L) 5 - 15 mmol/L    Glucose 145 (H) 65 - 100 mg/dL    BUN 29 (H) 6 - 20 mg/dL    Creatinine 2.14 (H) 0.70 - 1.30 mg/dL    BUN/Creatinine ratio 14 12 - 20      GFR est AA 36 (L) >60 ml/min/1.73m2    GFR est non-AA 30 (L) >60 ml/min/1.73m2    Calcium 8.8 8.5 - 10.1 mg/dL    Phosphorus 2.5 (L) 2.6 - 4.7 mg/dL    Albumin 1.6 (L) 3.5 - 5.0 g/dL   VANCOMYCIN, RANDOM   Result Value Ref Range    Vancomycin, random 16.6 ug/mL    Reported dose date Dose Dependent      Reported dose: Dose Dependent Units   CBC WITH AUTOMATED DIFF   Result Value Ref Range    WBC 7.2 4.1 - 11.1 K/uL    RBC 2.59 (L) 4.10 - 5.70 M/uL    HGB 7.3 (L) 12.1 - 17.0 g/dL    HCT 23.7 (L) 36.6 - 50.3 %    MCV 91.5 80.0 - 99.0 FL    MCH 28.2 26.0 - 34.0 PG    MCHC 30.8 30.0 - 36.5 g/dL    RDW 17.4 (H) 11.5 - 14.5 %    PLATELET 139 169 - 909 K/uL    MPV 9.3 8.9 - 12.9 FL    NEUTROPHILS 82 (H) 32 - 75 %    LYMPHOCYTES 8 (L) 12 - 49 %    MONOCYTES 7 5 - 13 %    EOSINOPHILS 2 0 - 7 %    BASOPHILS 0 0 - 1 %    IMMATURE GRANULOCYTES 1 (H) 0.0 - 0.5 %    ABS. NEUTROPHILS 6.0 1.8 - 8.0 K/UL    ABS. LYMPHOCYTES 0.6 (L) 0.8 - 3.5 K/UL    ABS. MONOCYTES 0.5 0.0 - 1.0 K/UL    ABS. EOSINOPHILS 0.2 0.0 - 0.4 K/UL    ABS. BASOPHILS 0.0 0.0 - 0.1 K/UL    ABS. IMM.  GRANS. 0.1 (H) 0.00 - 0.04 K/UL    DF AUTOMATED     URIC ACID   Result Value Ref Range    Uric acid 6.1 3.5 - 7.2 mg/dL   VANCOMYCIN, RANDOM   Result Value Ref Range    Vancomycin, random 16.2 ug/mL   CELL COUNT, BODY FLUID   Result Value Ref Range    BODY FLUID TYPE Bursal fluid      FLUID COLOR white      FLUID APPEARANCE TURBID     FLUID RBC CT. 40,000 (H) 0 /cu mm    FLUID WBC COUNT 34,520 (H) 0 - 5 /cu mm   CRYSTALS, SYNOVIAL FLUID   Result Value Ref Range    FLUID TYPE(7) Bursal fluid      Crystals, body fluid       POSITIVE. Presumptive screen for Uric Acid Crystals. Pathologist will issue Final Report.    VANCOMYCIN, RANDOM   Result Value Ref Range    Vancomycin, random 15.3 ug/mL    Reported dose date Not available      Reported dose: Not available Units   CREATININE   Result Value Ref Range    Creatinine 1.60 (H) 0.70 - 1.30 mg/dL   CBC W/O DIFF   Result Value Ref Range    WBC 4.4 4.1 - 11.1 K/uL    RBC 2.61 (L) 4.10 - 5.70 M/uL    HGB 7.2 (L) 12.1 - 17.0 g/dL    HCT 23.9 (L) 36.6 - 50.3 %    MCV 91.6 80.0 - 99.0 FL    MCH 27.6 26.0 - 34.0 PG    MCHC 30.1 30.0 - 36.5 g/dL    RDW 16.9 (H) 11.5 - 14.5 %    PLATELET 623 830 - 141 K/uL    MPV 9.1 8.9 - 33.1 FL   METABOLIC PANEL, BASIC   Result Value Ref Range    Sodium 137 136 - 145 mmol/L    Potassium 3.4 (L) 3.5 - 5.1 mmol/L    Chloride 104 97 - 108 mmol/L    CO2 29 21 - 32 mmol/L    Anion gap 4 (L) 5 - 15 mmol/L    Glucose 78 65 - 100 mg/dL    BUN 20 6 - 20 mg/dL    Creatinine 1.63 (H) 0.70 - 1.30 mg/dL    BUN/Creatinine ratio 12 12 - 20      GFR est AA 49 (L) >60 ml/min/1.73m2    GFR est non-AA 41 (L) >60 ml/min/1.73m2    Calcium 8.4 (L) 8.5 - 10.1 mg/dL   VANCOMYCIN, RANDOM   Result Value Ref Range    Vancomycin, random 18.9 ug/mL    Reported dose date Not provided      Reported dose: Not provided Units   ECHO ADULT COMPLETE   Result Value Ref Range    AR .00 ms    AR Max Velocity PISA 425.00 cm/s    HI Max Velocity 352.00 cm/s    AV Peak Gradient 50.00 mmHg    AV Mean Gradient 24.00 mmHg    AV VTI 65.70 cm    AV Mean Velocity 219.00 cm/s    AV Area by VTI 0.89 cm2    AV Area by Peak Velocity 0.82 cm2    Aortic Root 3.60 cm    IVSd M-mode 1.81 (A) cm    LVIDd M-mode 3.85 (A) cm    LVIDs M-mode 2.77 cm    LVOT Diameter 2.00 cm    Left Ventricular Outflow Tract Mean Gradient 2.00 mmHg    LVOT VTI 18.60 cm    LVOT VTI 17.50 cm    LVOT VTI 22.60 cm    LVOT VTI 15.80 cm    MS Max Velocity 92.00 cm/s    LVOT Peak Gradient 3.00 mmHg    LVPWd M-mode 1.81 (A) cm    LV E' Septal Velocity 6.14 cm/s    EF BP 54.9 (A) 55.0 - 100.0 %    LV ESV A2C 28.80 cm3    E/E' Septal 24.76     LVOT SV 35.8 cm3    LVOT SV 58.0 cm3    LA Major [place] 4.60 cm    LA/AO Root Ratio 1.28     MS Max Velocity 506.00 cm/s    Mitral Valve Deceleration [place] 4,340.00 mm/s2    Mitral Valve Deceleration [place] 4,340.00 mm/s2    MV E Wave Deceleration Time 109.00 ms    MV PHT 82.00 ms    MV A Velocity 76.50 cm/s    MV E Velocity 152.00 cm/s    MV Area by PHT 2.68 cm2    MV E/A 1.99     MS Max Velocity 105.00 cm/s    PV Systolic Peak Instantaneous Gradient 4.00 mmHg    MS Max Velocity 125.00 cm/s    PV Systolic Peak Instantaneous Gradient 6.00 mmHg    Est. 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 History of cardioversion  Z98.890 V15.1    3. Acute idiopathic gout of foot, unspecified laterality  M10.079 274.01    4. Erectile dysfunction, unspecified erectile dysfunction type  N52.9 607.84    5. Frequent falls  R29.6 V15.88    6. Stage 3b chronic kidney disease (HCC)  N18.32 585.3    7. Paroxysmal atrial fibrillation (HCC)  I48.0 427.31 potassium chloride (K-DUR, KLOR-CON M20) 20 mEq tablet   8. 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 Ventral hernia without obstruction or gangrene  K43.9 553.20    10. Abnormal MMSE  F99 780.97     MMSE:22/30   11.  History of anemia  Z86.2 V12.3 CBC W/O DIFF   12. Major depressive disorder, recurrent, mild  F33.0 296.31    13. Major depressive disorder, recurrent, moderate  F33.1 296.32    14. Mild episode of recurrent major depressive disorder (HCC)  F33.0 296.31      Orders Placed This Encounter    melatonin 5 mg cap capsule     Sig: Take 5 mg by mouth nightly. Takes 2 tabs at bedtime    simvastatin (ZOCOR) 20 mg tablet     Sig: Take 20 mg by mouth nightly.  folic acid (FOLVITE) 1 mg tablet     Sig: Take 1 mg by mouth daily.  traZODone (DESYREL) 50 mg tablet     Sig: Take 25 mg by mouth nightly.  thiamine HCL (Vitamin B-1) 100 mg tablet     Sig: Take 100 mg by mouth daily.  ascorbic acid, vitamin C, (Vitamin C) 250 mg tablet     Sig: Take 250 mg by mouth daily. Cannot display discharge medications since this is not an admission.

## 2021-12-21 NOTE — PROGRESS NOTES
1. Have you been to the ER, urgent care clinic since your last visit? Hospitalized since your last visit? No    2. Have you seen or consulted any other health care providers outside of the [address] [place] [address] since your last visit? Include any pap smears or colon screening.  No     Chief Complaint   Patient presents with    New Patient    Cholesterol Problem    Medication Refill       Visit Vitals  /82 (BP 1 Location: Right arm, BP Patient Position: Sitting, BP Cuff Size: Adult)   Pulse 84   Temp 97.1 °F (36.2 °C) (Temporal)   Resp 18   Ht 5' 6\" (1.676 m)   Wt 172 lb 12.8 oz (78.4 kg)   SpO2 96%   BMI 27.89 kg/m²       Patient is here as a new patient, cholesterol problems, hypertension

## 2021-12-22 NOTE — ADDENDUM NOTE
Addended by: Lauro Saenz on: 5/10/2019 05:37 PM 
 
 Modules accepted: Luma Patient called and was inquiring about her Disability paperwork. Patient notified that we have received and should be completed and faxed by the end of the day.

## 2021-12-28 ENCOUNTER — APPOINTMENT (OUTPATIENT)
Dept: CT IMAGING | Age: 86
End: 2021-12-28
Attending: EMERGENCY MEDICINE
Payer: MEDICARE

## 2021-12-28 ENCOUNTER — APPOINTMENT (OUTPATIENT)
Dept: GENERAL RADIOLOGY | Age: 86
End: 2021-12-28
Attending: EMERGENCY MEDICINE
Payer: MEDICARE

## 2021-12-28 ENCOUNTER — HOSPITAL ENCOUNTER (EMERGENCY)
Age: 86
Discharge: HOME | End: 2021-12-28
Attending: EMERGENCY MEDICINE
Payer: MEDICARE

## 2021-12-28 VITALS
WEIGHT: 172 LBS | DIASTOLIC BLOOD PRESSURE: 93 MMHG | HEIGHT: 68 IN | OXYGEN SATURATION: 98 % | RESPIRATION RATE: 18 BRPM | SYSTOLIC BLOOD PRESSURE: 180 MMHG | HEART RATE: 62 BPM | TEMPERATURE: 97.9 F | BODY MASS INDEX: 26.07 KG/M2

## 2021-12-28 DIAGNOSIS — K52.9 GASTROENTERITIS: Primary | ICD-10-CM

## 2021-12-28 LAB
ALBUMIN SERPL-MCNC: 3.3 G/DL (ref 3.5–5)
ALBUMIN/GLOB SERPL: 0.9 {RATIO} (ref 1.1–2.2)
ALP SERPL-CCNC: 85 U/L (ref 45–117)
ALT SERPL-CCNC: 25 U/L (ref 12–78)
ANION GAP SERPL CALC-SCNC: 6 MMOL/L (ref 5–15)
AST SERPL W P-5'-P-CCNC: 26 U/L (ref 15–37)
BASOPHILS # BLD: 0 K/UL (ref 0–0.1)
BASOPHILS NFR BLD: 0 % (ref 0–1)
BILIRUB SERPL-MCNC: 0.6 MG/DL (ref 0.2–1)
BUN SERPL-MCNC: 33 MG/DL (ref 6–20)
BUN/CREAT SERPL: 19 (ref 12–20)
CA-I BLD-MCNC: 9 MG/DL (ref 8.5–10.1)
CHLORIDE SERPL-SCNC: 110 MMOL/L (ref 97–108)
CO2 SERPL-SCNC: 28 MMOL/L (ref 21–32)
CREAT SERPL-MCNC: 1.75 MG/DL (ref 0.7–1.3)
DIFFERENTIAL METHOD BLD: ABNORMAL
EOSINOPHIL # BLD: 0.1 K/UL (ref 0–0.4)
EOSINOPHIL NFR BLD: 1 % (ref 0–7)
ERYTHROCYTE [DISTWIDTH] IN BLOOD BY AUTOMATED COUNT: 14.1 % (ref 11.5–14.5)
GLOBULIN SER CALC-MCNC: 3.8 G/DL (ref 2–4)
GLUCOSE SERPL-MCNC: 86 MG/DL (ref 65–100)
HCT VFR BLD AUTO: 35.1 % (ref 36.6–50.3)
HGB BLD-MCNC: 11.2 G/DL (ref 12.1–17)
IMM GRANULOCYTES # BLD AUTO: 0 K/UL (ref 0–0.04)
IMM GRANULOCYTES NFR BLD AUTO: 0 % (ref 0–0.5)
LIPASE SERPL-CCNC: 152 U/L (ref 73–393)
LYMPHOCYTES # BLD: 0.6 K/UL (ref 0.8–3.5)
LYMPHOCYTES NFR BLD: 12 % (ref 12–49)
MCH RBC QN AUTO: 31.9 PG (ref 26–34)
MCHC RBC AUTO-ENTMCNC: 31.9 G/DL (ref 30–36.5)
MCV RBC AUTO: 100 FL (ref 80–99)
MONOCYTES # BLD: 0.4 K/UL (ref 0–1)
MONOCYTES NFR BLD: 7 % (ref 5–13)
NEUTS SEG # BLD: 4.4 K/UL (ref 1.8–8)
NEUTS SEG NFR BLD: 80 % (ref 32–75)
NRBC # BLD: 0 K/UL (ref 0–0.01)
NRBC BLD-RTO: 0 PER 100 WBC
PLATELET # BLD AUTO: 161 K/UL (ref 150–400)
PMV BLD AUTO: 10.2 FL (ref 8.9–12.9)
POTASSIUM SERPL-SCNC: 3.9 MMOL/L (ref 3.5–5.1)
PROT SERPL-MCNC: 7.1 G/DL (ref 6.4–8.2)
RBC # BLD AUTO: 3.51 M/UL (ref 4.1–5.7)
SODIUM SERPL-SCNC: 144 MMOL/L (ref 136–145)
TROPONIN-HIGH SENSITIVITY: 22 NG/L (ref 0–76)
WBC # BLD AUTO: 5.5 K/UL (ref 4.1–11.1)

## 2021-12-28 PROCEDURE — 71045 X-RAY EXAM CHEST 1 VIEW: CPT

## 2021-12-28 PROCEDURE — 74011250636 HC RX REV CODE- 250/636: Performed by: EMERGENCY MEDICINE

## 2021-12-28 PROCEDURE — 84484 ASSAY OF TROPONIN QUANT: CPT

## 2021-12-28 PROCEDURE — 83690 ASSAY OF LIPASE: CPT

## 2021-12-28 PROCEDURE — 85025 COMPLETE CBC W/AUTO DIFF WBC: CPT

## 2021-12-28 PROCEDURE — 36415 COLL VENOUS BLD VENIPUNCTURE: CPT

## 2021-12-28 PROCEDURE — 74011000636 HC RX REV CODE- 636: Performed by: EMERGENCY MEDICINE

## 2021-12-28 PROCEDURE — 93005 ELECTROCARDIOGRAM TRACING: CPT

## 2021-12-28 PROCEDURE — 80053 COMPREHEN METABOLIC PANEL: CPT

## 2021-12-28 PROCEDURE — 96360 HYDRATION IV INFUSION INIT: CPT

## 2021-12-28 PROCEDURE — 74177 CT ABD & PELVIS W/CONTRAST: CPT

## 2021-12-28 PROCEDURE — 99285 EMERGENCY DEPT VISIT HI MDM: CPT

## 2021-12-28 RX ORDER — ONDANSETRON 2 MG/ML
4 INJECTION INTRAMUSCULAR; INTRAVENOUS
Status: DISCONTINUED | OUTPATIENT
Start: 2021-12-28 | End: 2021-12-29 | Stop reason: HOSPADM

## 2021-12-28 RX ADMIN — IOPAMIDOL 100 ML: 755 INJECTION, SOLUTION INTRAVENOUS at 16:18

## 2021-12-28 RX ADMIN — SODIUM CHLORIDE 1000 ML: 9 INJECTION, SOLUTION INTRAVENOUS at 16:32

## 2021-12-28 NOTE — ED TRIAGE NOTES
Per EMS, pt has had n/v/d since about 0130 last night. Pt lives in a group home, unsure the name of home. Phone number [phone] provided to call for information. Name illegible, possibly Kyra Plate. Pt stated he is having liquid bowels. Denies pain. Last vomited yesterday. Denies current nausea. Oriented to self, place, and situation.

## 2021-12-28 NOTE — DISCHARGE INSTRUCTIONS
You were seen in the ER for your vomiting and diarrhea. Thankfully we were able to rule out dangerous causes like a heart attack, blockage or infection in your stomach or other dangerous problem in your abdomen. Take in plenty of fluids to stay hydrated to replace anything you lose from your bottom. Return to the ER for any new or concerning symptoms. Thank you! Thank you for allowing me to care for you in the emergency department. I sincerely hope that you are satisfied with your visit today. It is my goal to provide you with excellent care. Below you will find a list of your labs and imaging from your visit today. Should you have any questions regarding these results please do not hesitate to call the emergency department. Labs -     Recent Results (from the past 12 hour(s))   CBC WITH AUTOMATED DIFF    Collection Time: 12/28/21  1:55 PM   Result Value Ref Range    WBC 5.5 4.1 - 11.1 K/uL    RBC 3.51 (L) 4.10 - 5.70 M/uL    HGB 11.2 (L) 12.1 - 17.0 g/dL    HCT 35.1 (L) 36.6 - 50.3 %    .0 (H) 80.0 - 99.0 FL    MCH 31.9 26.0 - 34.0 PG    MCHC 31.9 30.0 - 36.5 g/dL    RDW 14.1 11.5 - 14.5 %    PLATELET 966 706 - 387 K/uL    MPV 10.2 8.9 - 12.9 FL    NRBC 0.0 0.0  WBC    ABSOLUTE NRBC 0.00 0.00 - 0.01 K/uL    NEUTROPHILS 80 (H) 32 - 75 %    LYMPHOCYTES 12 12 - 49 %    MONOCYTES 7 5 - 13 %    EOSINOPHILS 1 0 - 7 %    BASOPHILS 0 0 - 1 %    IMMATURE GRANULOCYTES 0 0 - 0.5 %    ABS. NEUTROPHILS 4.4 1.8 - 8.0 K/UL    ABS. LYMPHOCYTES 0.6 (L) 0.8 - 3.5 K/UL    ABS. MONOCYTES 0.4 0.0 - 1.0 K/UL    ABS. EOSINOPHILS 0.1 0.0 - 0.4 K/UL    ABS. BASOPHILS 0.0 0.0 - 0.1 K/UL    ABS. IMM.  GRANS. 0.0 0.00 - 0.04 K/UL    DF AUTOMATED     METABOLIC PANEL, COMPREHENSIVE    Collection Time: 12/28/21  1:55 PM   Result Value Ref Range    Sodium 144 136 - 145 mmol/L    Potassium 3.9 3.5 - 5.1 mmol/L    Chloride 110 (H) 97 - 108 mmol/L    CO2 28 21 - 32 mmol/L    Anion gap 6 5 - 15 mmol/L    Glucose 86 65 - 100 mg/dL    BUN 33 (H) 6 - 20 mg/dL    Creatinine 1.75 (H) 0.70 - 1.30 mg/dL    BUN/Creatinine ratio 19 12 - 20      GFR est AA 45 (L) >60 ml/min/1.73m2    GFR est non-AA 37 (L) >60 ml/min/1.73m2    Calcium 9.0 8.5 - 10.1 mg/dL    Bilirubin, total 0.6 0.2 - 1.0 mg/dL    AST (SGOT) 26 15 - 37 U/L    ALT (SGPT) 25 12 - 78 U/L    Alk. phosphatase 85 45 - 117 U/L    Protein, total 7.1 6.4 - 8.2 g/dL    Albumin 3.3 (L) 3.5 - 5.0 g/dL    Globulin 3.8 2.0 - 4.0 g/dL    A-G Ratio 0.9 (L) 1.1 - 2.2     LIPASE    Collection Time: 12/28/21  1:55 PM   Result Value Ref Range    Lipase 152 73 - 393 U/L   TROPONIN-HIGH SENSITIVITY    Collection Time: 12/28/21  1:55 PM   Result Value Ref Range    Troponin-High Sensitivity 22 0 - 76 ng/L       Radiologic Studies -   CT ABD PELV W CONT   Final Result   No bowel obstruction. Colonic diverticulosis. No CT evidence for   appendicitis or diverticulitis. Cardiomegaly. IVC distention. XR CHEST PORT   Final Result        CT Results  (Last 48 hours)                 12/28/21 1615  CT ABD PELV W CONT Final result    Impression:  No bowel obstruction. Colonic diverticulosis. No CT evidence for   appendicitis or diverticulitis. Cardiomegaly. IVC distention. Narrative:  CT abdomen and pelvis with IV contrast.        Comparison CT chest August 1, 2020. Axial images are reviewed along with reformatted sagittal/coronal images. 100 mL   Isovue 370 administered. Dose reduction: All CT scans at this facility are performed using dose reduction   optimization techniques as appropriate to a performed exam including the   following-   automated exposure control, adjustments of mA and/or Kv according to patient   size, or use of iterative reconstructive technique. .       Mild left posterior basilar atelectasis. Prior sternotomy. Cardiomegaly, right   atrial enlargement. No pericardial effusion. IVC distention. Normal enhancement of the liver. Gallbladder nondistended. Pancreas and spleen   appear unremarkable. Unchanged appearance bilateral adrenal glands. Right kidney with mild cortical atrophy. No hydronephrosis. Small renal cysts left kidney. Cortical atrophy. No hydronephrosis. Stomach and small bowel loops are decompressed. Stool and air through colon. Left-sided colonic diverticula. No CT evidence for appendicitis or   diverticulitis. Uniform prostate enlargement. Atherosclerotic change normal caliber abdominal aorta. Grade I anterolisthesis L5 on S1. Lumbar spondylosis. CXR Results  (Last 48 hours)                 12/28/21 1224  XR CHEST PORT Final result    Narrative:  Chest single view. Comparison single view chest March 21, 2021. Similar appearance for the lungs; no gross interstitial or alveolar pulmonary   edema. Cardiac silhouette enlargement. Sternal wires, evidence for prior   intrathoracic surgery. Thoracic aorta atherosclerosis. No pneumothorax or   sizable pleural effusion. If you feel that you have not received excellent quality care or timely care, please ask to speak to the nurse manager. Please choose us in the future for your continued health care needs. ------------------------------------------------------------------------------------------------------------  The exam and treatment you received in the Emergency Department were for an urgent problem and are not intended as complete care. It is important that you follow-up with a doctor, nurse practitioner, or physician assistant to:  (1) confirm your diagnosis,  (2) re-evaluation of changes in your illness and treatment, and  (3) for ongoing care. If your symptoms become worse or you do not improve as expected and you are unable to reach your usual health care provider, you should return to the Emergency Department. We are available 24 hours a day.      Please take your discharge instructions with you when you go to your follow-up appointment. If you have any problem arranging a follow-up appointment, contact the Emergency Department immediately. If a prescription has been provided, please have it filled as soon as possible to prevent a delay in treatment. Read the entire medication instruction sheet provided to you by the pharmacy. If you have any questions or reservations about taking the medication due to side effects or interactions with other medications, please call your primary care physician or contact the ER to speak with the charge nurse. Make an appointment with your family doctor or the physician you were referred to for follow-up of this visit as instructed on your discharge paperwork, as this is a mandatory follow-up. Return to the ER if you are unable to be seen or if you are unable to be seen in a timely manner. If you have any problem arranging the follow-up visit, contact the Emergency Department immediately.

## 2021-12-28 NOTE — ED PROVIDER NOTES
EMERGENCY DEPARTMENT HISTORY AND PHYSICAL EXAM      Date: 12/28/2021  Patient Name: Franny Adan. History of Presenting Illness     Chief Complaint   Patient presents with    Nausea    Vomiting       History Provided By: Patient    HPI: Franny Naranjo, 81 y.o. male with a past medical history significant for Dementia, CKD, CHF, CVA presents to the ED with cc of N/V/D. Patient is very poor historian. Reporting few episodes NBNB emesis today and multiple episodes watery diarrhea. Denies CP, SOB, abd pain, cough, F/C. Unknown if he is vaccinated against COVID or has been in contact w/sick people, lives in nursing home. There are no other complaints, changes, or physical findings at this time. PCP: Unknown, Provider, DEBRA    Current Facility-Administered Medications   Medication Dose Route Frequency Provider Last Rate Last Admin    ondansetron (ZOFRAN) injection 4 mg  4 mg IntraVENous NOW Diane Brock MD         Current Outpatient Medications   Medication Sig Dispense Refill    melatonin 5 mg cap capsule Take 5 mg by mouth nightly. Takes 2 tabs at bedtime      folic acid (FOLVITE) 1 mg tablet Take 1 mg by mouth daily. thiamine HCL (Vitamin B-1) 100 mg tablet Take 100 mg by mouth daily. ascorbic acid, vitamin C, (Vitamin C) 250 mg tablet Take 250 mg by mouth daily. potassium chloride (K-DUR, KLOR-CON M20) 20 mEq tablet TAKE 1 TABLET BY MOUTH EACH MORNING WITH FOOD. 90 Tablet 1    allopurinoL (ZYLOPRIM) 100 mg tablet TAKE 1 TABLET BY MOUTH EVERY DAY 90 Tablet 2    furosemide (Lasix) 40 mg tablet Take 1 Tablet by mouth daily for 30 days. 30 Tablet 2    simvastatin (ZOCOR) 20 mg tablet Take 1 Tablet by mouth nightly. 30 Tablet 2    tamsulosin (FLOMAX) 0.4 mg capsule Take 1 Capsule by mouth daily for 30 days. 30 Capsule 2    traZODone (DESYREL) 50 mg tablet Take 0.5 Tablets by mouth nightly for 30 days.  30 Tablet 2    amLODIPine (NORVASC) 2.5 mg tablet Take 1 Tablet by mouth daily for 30 days. 30 Tablet 2    donepeziL (ARICEPT) 5 mg tablet Take 1 Tablet by mouth nightly for 30 days. 30 Tablet 2    escitalopram oxalate (LEXAPRO) 20 mg tablet TAKE 1 TABLET BY MOUTH DAILY. 30 Tab 0    amLODIPine (NORVASC) 5 mg tablet TAKE ONE TABLET BY MOUTH DAILY 30 Tab 0    colchicine 0.6 mg tablet Take 1 Tab by mouth daily. Indications: acute inflammation of the joints due to gout attack (Patient not taking: Reported on 12/21/2021) 10 Tab 0    rifAMPin (RIFADIN) 300 mg capsule Take 1 Cap by mouth every twelve (12) hours. (Patient not taking: Reported on 12/21/2021) 28 Cap 0    vancomycin 750 mg 750 mg, vial-mate 1 Device IVPB 750 mg by IntraVENous route every twenty-four (24) hours. (Patient not taking: Reported on 12/21/2021) 14 Dose 0    timolol (TIMOPTIC) 0.5 % ophthalmic solution INSTILL 1 DROPS INTO RIGHT EYE ONCE DAILY 5 mL 5    diclofenac (VOLTAREN) 1 % gel APPLY TWO GRAMS FOUR TIMES DAILY AS NEEDED FOR ELBOW PAIN (Patient not taking: Reported on 12/21/2021) 200 g 3    terbinafine HCL (LAMISIL) 250 mg tablet Take 1 Tab by mouth daily. (Patient not taking: Reported on 12/21/2021) 90 Tab 0    ammonium lactate (AMLACTIN) 12 % topical cream Apply  to affected area two (2) times a day. rub in to affected area well (Patient not taking: Reported on 12/21/2021) 280 g 5       Past History     Past Medical History:  Past Medical History:   Diagnosis Date    Anemia     Arthritis     CKD (chronic kidney disease), stage III ([facility] [address] 2011    Dementia (HCC)     Depression     Erectile dysfunction     GERD (gastroesophageal reflux disease)     GI bleed     Gout     gout, osteoarthritis    Heart failure (HCC)     acute heart failure    Hemorrhoids     Hypertension     Multiple myeloma (HCC)     PAF (paroxysmal atrial fibrillation) ([facility] [address] 02/17/2016    s/p cardioversion    Pulmonary HTN (HCC)     S/P AVR (aortic valve replacement) 07/26/2016    merrill gill md -     S/P cardiac cath 7/6/16    severe ai, normal coronaries     S/P colonoscopy with polypectomy 05/09/2016    Jayant P. Elvera Buerger, MD    Stroke [facility])        Past Surgical History:  Past Surgical History:   Procedure Laterality Date    HX HEENT      nasal procedure 20 years ago    HX KNEE REPLACEMENT Bilateral     bilateral knee replacement    HX ORTHOPAEDIC Bilateral     wrist    HX UROLOGICAL  01/05/2020    cystoscopy     [facility] CARDIAC SURG PROCEDURE UNLIST      cardiac cath    CA CARDIAC SURG PROCEDURE UNLIST  07/26/2016    AVR       Family History:  Family History   Problem Relation Age of Onset    Anemia Mother     No Known Problems Father     Other Brother         drug abuse       Social History:  Social History     Tobacco Use    Smoking status: Former Smoker     Years: 20.00    Smokeless tobacco: Never Used   Vaping Use    Vaping Use: Never used   Substance Use Topics    Alcohol use: Not Currently    Drug use: Never       Allergies: Allergies   Allergen Reactions    Amiodarone Other (comments)     Bradycardia -excessive    Carvedilol Unknown (comments)         Review of Systems   Constitutional: Negative except as in HPI. Eyes: Negative except as in HPI.  ENT: Negative except as in HPI. Cardiovascular: Negative except as in HPI. Respiratory: Negative except as in HPI. Gastrointestinal: Negative except as in HPI. Genitourinary: Negative except as in HPI. Musculoskeletal: Negative except as in HPI. Integumentary: Negative except as in HPI. Neurological: Negative except as in HPI. Psychiatric: Negative except as in HPI. Endocrine: Negative except as in HPI. Hematologic/Lymphatic: Negative except as in HPI. Allergic/Immunologic: Negative except as in HPI.     Physical Exam   Constitutional: Awake and alert, interactive, NAD  Eyes: PERRL, no injection or scleral icterus, no discharge  HEENT: NCAT, neck supple, MMM, no oropharyngeal exudates  CV: RRR, no m/r/g  Respiratory: CTAB, no r/r/w  GI: Abd soft, nondistended, nontender  : Deferred  MSK: FROM, no joint effusions or edema  Skin: No rashes  Neuro: CN2-12 intact, symmetric facies, fluent speech. Psych: Well-groomed, normal speech, behavior, appropriate mood    Lab and Diagnostic Study Results     Labs -     Recent Results (from the past 12 hour(s))   CBC WITH AUTOMATED DIFF    Collection Time: 12/28/21  1:55 PM   Result Value Ref Range    WBC 5.5 4.1 - 11.1 K/uL    RBC 3.51 (L) 4.10 - 5.70 M/uL    HGB 11.2 (L) 12.1 - 17.0 g/dL    HCT 35.1 (L) 36.6 - 50.3 %    .0 (H) 80.0 - 99.0 FL    MCH 31.9 26.0 - 34.0 PG    MCHC 31.9 30.0 - 36.5 g/dL    RDW 14.1 11.5 - 14.5 %    PLATELET 422 777 - 130 K/uL    MPV 10.2 8.9 - 12.9 FL    NRBC 0.0 0.0  WBC    ABSOLUTE NRBC 0.00 0.00 - 0.01 K/uL    NEUTROPHILS 80 (H) 32 - 75 %    LYMPHOCYTES 12 12 - 49 %    MONOCYTES 7 5 - 13 %    EOSINOPHILS 1 0 - 7 %    BASOPHILS 0 0 - 1 %    IMMATURE GRANULOCYTES 0 0 - 0.5 %    ABS. NEUTROPHILS 4.4 1.8 - 8.0 K/UL    ABS. LYMPHOCYTES 0.6 (L) 0.8 - 3.5 K/UL    ABS. MONOCYTES 0.4 0.0 - 1.0 K/UL    ABS. EOSINOPHILS 0.1 0.0 - 0.4 K/UL    ABS. BASOPHILS 0.0 0.0 - 0.1 K/UL    ABS. IMM. GRANS. 0.0 0.00 - 0.04 K/UL    DF AUTOMATED     METABOLIC PANEL, COMPREHENSIVE    Collection Time: 12/28/21  1:55 PM   Result Value Ref Range    Sodium 144 136 - 145 mmol/L    Potassium 3.9 3.5 - 5.1 mmol/L    Chloride 110 (H) 97 - 108 mmol/L    CO2 28 21 - 32 mmol/L    Anion gap 6 5 - 15 mmol/L    Glucose 86 65 - 100 mg/dL    BUN 33 (H) 6 - 20 mg/dL    Creatinine 1.75 (H) 0.70 - 1.30 mg/dL    BUN/Creatinine ratio 19 12 - 20      GFR est AA 45 (L) >60 ml/min/1.73m2    GFR est non-AA 37 (L) >60 ml/min/1.73m2    Calcium 9.0 8.5 - 10.1 mg/dL    Bilirubin, total 0.6 0.2 - 1.0 mg/dL    AST (SGOT) 26 15 - 37 U/L    ALT (SGPT) 25 12 - 78 U/L    Alk.  phosphatase 85 45 - 117 U/L    Protein, total 7.1 6.4 - 8.2 g/dL    Albumin 3.3 (L) 3.5 - 5.0 g/dL    Globulin 3.8 2.0 - 4.0 g/dL    A-G Ratio 0.9 (L) 1.1 - 2.2     LIPASE    Collection Time: 12/28/21  1:55 PM   Result Value Ref Range Lipase 152 73 - 393 U/L   TROPONIN-HIGH SENSITIVITY    Collection Time: 12/28/21  1:55 PM   Result Value Ref Range    Troponin-High Sensitivity 22 0 - 76 ng/L       Radiologic Studies -   [unfilled]  CT Results  (Last 48 hours)                 12/28/21 1615  CT ABD PELV W CONT Final result    Impression:  No bowel obstruction. Colonic diverticulosis. No CT evidence for   appendicitis or diverticulitis. Cardiomegaly. IVC distention. Narrative:  CT abdomen and pelvis with IV contrast.        Comparison CT chest August 1, 2020. Axial images are reviewed along with reformatted sagittal/coronal images. 100 mL   Isovue 370 administered. Dose reduction: All CT scans at this facility are performed using dose reduction   optimization techniques as appropriate to a performed exam including the   following-   automated exposure control, adjustments of mA and/or Kv according to patient   size, or use of iterative reconstructive technique. .       Mild left posterior basilar atelectasis. Prior sternotomy. Cardiomegaly, right   atrial enlargement. No pericardial effusion. IVC distention. Normal enhancement of the liver. Gallbladder nondistended. Pancreas and spleen   appear unremarkable. Unchanged appearance bilateral adrenal glands. Right kidney with mild cortical atrophy. No hydronephrosis. Small renal cysts left kidney. Cortical atrophy. No hydronephrosis. Stomach and small bowel loops are decompressed. Stool and air through colon. Left-sided colonic diverticula. No CT evidence for appendicitis or   diverticulitis. Uniform prostate enlargement. Atherosclerotic change normal caliber abdominal aorta. Grade I anterolisthesis L5 on S1. Lumbar spondylosis. CXR Results  (Last 48 hours)                 12/28/21 1224  XR CHEST PORT Final result    Narrative:  Chest single view. Comparison single view chest March 21, 2021.        Similar appearance for the lungs; no gross interstitial or alveolar pulmonary   edema. Cardiac silhouette enlargement. Sternal wires, evidence for prior   intrathoracic surgery. Thoracic aorta atherosclerosis. No pneumothorax or   sizable pleural effusion. Medical Decision Making and ED Course   - I am the first and primary provider for this patient AND AM THE PRIMARY PROVIDER OF RECORD. - I reviewed the vital signs, available nursing notes, past medical history, past surgical history, family history and social history. - Initial assessment performed. The patients presenting problems have been discussed, and the staff are in agreement with the care plan formulated and outlined with them. I have encouraged them to ask questions as they arise throughout their visit. Vital Signs-Reviewed the patient's vital signs. Patient Vitals for the past 12 hrs:   Temp Pulse Resp BP SpO2   12/28/21 1946 -- 62 -- (!) 180/93 98 %   12/28/21 1756 -- (!) 58 18 (!) 202/95 100 %   12/28/21 1141 97.9 °F (36.6 °C) 63 18 (!) 173/79 100 %       EKG interpretation:         Provider Notes (Medical Decision Making):   86M w/N/V/D c/w gastroenteritis, possibly from [facility]. Given age and GI complaints will get troponin, CTAP to eval for ACS or diverticulitis, obstruction. Dispo pending workup. IVF for rehydration, will PO challenge with zofran. ED Course:       ED Course as of 12/28/21 2055   Tue Dec 28, 2021   1534 Bloodwork unremarkable, CXR no PNA. Pending CTAP [YA]   9235 CT ABD PELV W CONT    IMPRESSION  No bowel obstruction. Colonic diverticulosis. No CT evidence for  appendicitis or diverticulitis. Cardiomegaly. IVC distention. [YA]   1720 Tolerating PO. States his diarrhea was mostly soft not very watery and only vomited once. Will discharge with return precautions. [YA]      ED Course User Index  [YA] Xiomara Garcia MD         Disposition     Disposition: DC- Adult Discharges:  All of the diagnostic tests were reviewed and questions answered. Diagnosis, care plan and treatment options were discussed. The patient understands the instructions and will follow up as directed. The patients results have been reviewed with them. They have been counseled regarding their diagnosis. The patient verbally convey understanding and agreement of the signs, symptoms, diagnosis, treatment and prognosis and additionally agrees to follow up as recommended with their PCP in 24 - 48 hours. They also agree with the care-plan and convey that all of their questions have been answered. I have also put together some discharge instructions for them that include: 1) educational information regarding their diagnosis, 2) how to care for their diagnosis at home, as well a 3) list of reasons why they would want to return to the ED prior to their follow-up appointment, should their condition change. Discharged      Diagnosis     Clinical Impression:   1. Gastroenteritis        Attestations:     Reynold Fam MD

## 2021-12-29 LAB
ATRIAL RATE: 51 BPM
CALCULATED R AXIS, ECG10: 106 DEGREES
CALCULATED T AXIS, ECG11: 122 DEGREES
DIAGNOSIS, 93000: NORMAL
Q-T INTERVAL, ECG07: 552 MS
QRS DURATION, ECG06: 100 MS
QTC CALCULATION (BEZET), ECG08: 498 MS
VENTRICULAR RATE, ECG03: 49 BPM

## 2021-12-29 NOTE — ED NOTES
Report given to MIRLANDE WATSON. Pt awaiting cab. [facility] David [phone] stated he is at the house to recieve him. Pt ambulatory to restroom as needed.

## 2022-01-02 ENCOUNTER — HOSPITAL ENCOUNTER (EMERGENCY)
Age: 87
Discharge: HOME | End: 2022-01-02
Attending: EMERGENCY MEDICINE
Payer: MEDICARE

## 2022-01-02 VITALS
HEIGHT: 68 IN | TEMPERATURE: 98.5 F | BODY MASS INDEX: 26.07 KG/M2 | SYSTOLIC BLOOD PRESSURE: 157 MMHG | RESPIRATION RATE: 18 BRPM | WEIGHT: 172 LBS | HEART RATE: 52 BPM | OXYGEN SATURATION: 96 % | DIASTOLIC BLOOD PRESSURE: 91 MMHG

## 2022-01-02 DIAGNOSIS — R53.81 MALAISE AND FATIGUE: Primary | ICD-10-CM

## 2022-01-02 DIAGNOSIS — R53.83 MALAISE AND FATIGUE: Primary | ICD-10-CM

## 2022-01-02 LAB
ALBUMIN SERPL-MCNC: 3.2 G/DL (ref 3.5–5)
ALBUMIN/GLOB SERPL: 0.8 {RATIO} (ref 1.1–2.2)
ALP SERPL-CCNC: 85 U/L (ref 45–117)
ALT SERPL-CCNC: 36 U/L (ref 12–78)
ANION GAP SERPL CALC-SCNC: 7 MMOL/L (ref 5–15)
APPEARANCE UR: CLEAR
AST SERPL W P-5'-P-CCNC: 23 U/L (ref 15–37)
BACTERIA URNS QL MICRO: ABNORMAL /HPF
BASOPHILS # BLD: 0 K/UL (ref 0–0.1)
BASOPHILS NFR BLD: 0 % (ref 0–1)
BILIRUB DIRECT SERPL-MCNC: 0.2 MG/DL (ref 0–0.2)
BILIRUB SERPL-MCNC: 0.9 MG/DL (ref 0.2–1)
BILIRUB UR QL: NEGATIVE
BUN SERPL-MCNC: 24 MG/DL (ref 6–20)
BUN/CREAT SERPL: 12 (ref 12–20)
CA-I BLD-MCNC: 8.5 MG/DL (ref 8.5–10.1)
CHLORIDE SERPL-SCNC: 106 MMOL/L (ref 97–108)
CO2 SERPL-SCNC: 32 MMOL/L (ref 21–32)
COLOR UR: YELLOW
CREAT SERPL-MCNC: 1.98 MG/DL (ref 0.7–1.3)
DIFFERENTIAL METHOD BLD: ABNORMAL
EOSINOPHIL # BLD: 0.1 K/UL (ref 0–0.4)
EOSINOPHIL NFR BLD: 1 % (ref 0–7)
ERYTHROCYTE [DISTWIDTH] IN BLOOD BY AUTOMATED COUNT: 14.2 % (ref 11.5–14.5)
GLOBULIN SER CALC-MCNC: 3.9 G/DL (ref 2–4)
GLUCOSE SERPL-MCNC: 98 MG/DL (ref 65–100)
GLUCOSE UR STRIP.AUTO-MCNC: NEGATIVE MG/DL
HCT VFR BLD AUTO: 36.3 % (ref 36.6–50.3)
HGB BLD-MCNC: 12 G/DL (ref 12.1–17)
HGB UR QL STRIP: ABNORMAL
IMM GRANULOCYTES # BLD AUTO: 0 K/UL (ref 0–0.04)
IMM GRANULOCYTES NFR BLD AUTO: 0 % (ref 0–0.5)
KETONES UR QL STRIP.AUTO: NEGATIVE MG/DL
LEUKOCYTE ESTERASE UR QL STRIP.AUTO: NEGATIVE
LIPASE SERPL-CCNC: 263 U/L (ref 73–393)
LYMPHOCYTES # BLD: 0.7 K/UL (ref 0.8–3.5)
LYMPHOCYTES NFR BLD: 12 % (ref 12–49)
MCH RBC QN AUTO: 32.9 PG (ref 26–34)
MCHC RBC AUTO-ENTMCNC: 33.1 G/DL (ref 30–36.5)
MCV RBC AUTO: 99.5 FL (ref 80–99)
MONOCYTES # BLD: 0.4 K/UL (ref 0–1)
MONOCYTES NFR BLD: 7 % (ref 5–13)
NEUTS SEG # BLD: 5.1 K/UL (ref 1.8–8)
NEUTS SEG NFR BLD: 80 % (ref 32–75)
NITRITE UR QL STRIP.AUTO: NEGATIVE
PH UR STRIP: 6 [PH] (ref 5–8)
PLATELET # BLD AUTO: 145 K/UL (ref 150–400)
PMV BLD AUTO: 9.7 FL (ref 8.9–12.9)
POTASSIUM SERPL-SCNC: 3.2 MMOL/L (ref 3.5–5.1)
PROT SERPL-MCNC: 7.1 G/DL (ref 6.4–8.2)
PROT UR STRIP-MCNC: 100 MG/DL
RBC # BLD AUTO: 3.65 M/UL (ref 4.1–5.7)
RBC #/AREA URNS HPF: ABNORMAL /HPF (ref 0–5)
SODIUM SERPL-SCNC: 145 MMOL/L (ref 136–145)
SP GR UR REFRACTOMETRY: 1.01 (ref 1–1.03)
TROPONIN-HIGH SENSITIVITY: 36 NG/L (ref 0–76)
TROPONIN-HIGH SENSITIVITY: 40 NG/L (ref 0–76)
UROBILINOGEN UR QL STRIP.AUTO: 0.1 EU/DL (ref 0.2–1)
WBC # BLD AUTO: 6.4 K/UL (ref 4.1–11.1)
WBC URNS QL MICRO: ABNORMAL /HPF (ref 0–4)

## 2022-01-02 PROCEDURE — 80076 HEPATIC FUNCTION PANEL: CPT

## 2022-01-02 PROCEDURE — 83690 ASSAY OF LIPASE: CPT

## 2022-01-02 PROCEDURE — 85025 COMPLETE CBC W/AUTO DIFF WBC: CPT

## 2022-01-02 PROCEDURE — 74011250637 HC RX REV CODE- 250/637: Performed by: EMERGENCY MEDICINE

## 2022-01-02 PROCEDURE — 36415 COLL VENOUS BLD VENIPUNCTURE: CPT

## 2022-01-02 PROCEDURE — 93005 ELECTROCARDIOGRAM TRACING: CPT

## 2022-01-02 PROCEDURE — 99285 EMERGENCY DEPT VISIT HI MDM: CPT

## 2022-01-02 PROCEDURE — 84484 ASSAY OF TROPONIN QUANT: CPT

## 2022-01-02 PROCEDURE — 81001 URINALYSIS AUTO W/SCOPE: CPT

## 2022-01-02 PROCEDURE — 80048 BASIC METABOLIC PNL TOTAL CA: CPT

## 2022-01-02 RX ORDER — CLONIDINE HYDROCHLORIDE 0.1 MG/1
0.2 TABLET ORAL
Status: COMPLETED | OUTPATIENT
Start: 2022-01-02 | End: 2022-01-02

## 2022-01-02 RX ADMIN — CLONIDINE HYDROCHLORIDE 0.2 MG: 0.1 TABLET ORAL at 02:58

## 2022-01-02 NOTE — ED TRIAGE NOTES
Patient reports \"I dont feel good\". Poor historian. Does state he feels week. States symptoms started yesterday.

## 2022-01-02 NOTE — ED PROVIDER NOTES
EMERGENCY DEPARTMENT HISTORY AND PHYSICAL EXAM      Date: 1/2/2022  Patient Name: Williams Moulton. History of Presenting Illness     Chief Complaint   Patient presents with    Fatigue       History Provided By: Patient    HPI: Williams Moulton., 81 y.o. male   presents to the ED with cc of [place] and generalized weakness. Patient complains of generalized weakness and fatigue for last several days. Patient denies headache, chest pain, shortness of breath, abdominal pain, or back pain. No nausea vomiting diarrhea. No signs of GI bleed. No dysuria hematuria. No cough. No fever chills. No URI symptoms. Normal appetite. PCP: Unknown, Provider, DPM    No current facility-administered medications on file prior to encounter. Current Outpatient Medications on File Prior to Encounter   Medication Sig Dispense Refill    melatonin 5 mg cap capsule Take 5 mg by mouth nightly. Takes 2 tabs at bedtime      folic acid (FOLVITE) 1 mg tablet Take 1 mg by mouth daily.  thiamine HCL (Vitamin B-1) 100 mg tablet Take 100 mg by mouth daily.  ascorbic acid, vitamin C, (Vitamin C) 250 mg tablet Take 250 mg by mouth daily.  potassium chloride (K-DUR, KLOR-CON M20) 20 mEq tablet TAKE 1 TABLET BY MOUTH EACH MORNING WITH FOOD. 90 Tablet 1    allopurinoL (ZYLOPRIM) 100 mg tablet TAKE 1 TABLET BY MOUTH EVERY DAY 90 Tablet 2    furosemide (Lasix) 40 mg tablet Take 1 Tablet by mouth daily for 30 days. 30 Tablet 2    simvastatin (ZOCOR) 20 mg tablet Take 1 Tablet by mouth nightly. 30 Tablet 2    tamsulosin (FLOMAX) 0.4 mg capsule Take 1 Capsule by mouth daily for 30 days. 30 Capsule 2    traZODone (DESYREL) 50 mg tablet Take 0.5 Tablets by mouth nightly for 30 days. 30 Tablet 2    amLODIPine (NORVASC) 2.5 mg tablet Take 1 Tablet by mouth daily for 30 days. 30 Tablet 2    donepeziL (ARICEPT) 5 mg tablet Take 1 Tablet by mouth nightly for 30 days.  30 Tablet 2    escitalopram oxalate (LEXAPRO) 20 mg tablet TAKE 1 TABLET BY MOUTH DAILY. 30 Tab 0    amLODIPine (NORVASC) 5 mg tablet TAKE ONE TABLET BY MOUTH DAILY 30 Tab 0    colchicine 0.6 mg tablet Take 1 Tab by mouth daily. Indications: acute inflammation of the joints due to gout attack (Patient not taking: Reported on 12/21/2021) 10 Tab 0    rifAMPin (RIFADIN) 300 mg capsule Take 1 Cap by mouth every twelve (12) hours. (Patient not taking: Reported on 12/21/2021) 28 Cap 0    vancomycin 750 mg 750 mg, vial-mate 1 Device IVPB 750 mg by IntraVENous route every twenty-four (24) hours. (Patient not taking: Reported on 12/21/2021) 14 Dose 0    timolol (TIMOPTIC) 0.5 % ophthalmic solution INSTILL 1 DROPS INTO RIGHT EYE ONCE DAILY 5 mL 5    diclofenac (VOLTAREN) 1 % gel APPLY TWO GRAMS FOUR TIMES DAILY AS NEEDED FOR ELBOW PAIN (Patient not taking: Reported on 12/21/2021) 200 g 3    terbinafine HCL (LAMISIL) 250 mg tablet Take 1 Tab by mouth daily. (Patient not taking: Reported on 12/21/2021) 90 Tab 0    ammonium lactate (AMLACTIN) 12 % topical cream Apply  to affected area two (2) times a day. rub in to affected area well (Patient not taking: Reported on 12/21/2021) 280 g 5       Past History     Past Medical History:  Past Medical History:   Diagnosis Date    Anemia     Arthritis     CKD (chronic kidney disease), stage III ([address] 2011    Dementia ([address]     Depression     Erectile dysfunction     GERD (gastroesophageal reflux disease)     GI bleed     Gout     gout, osteoarthritis    Heart failure (HCC)     acute heart failure    Hemorrhoids     Hypertension     Multiple myeloma ([address]     PAF (paroxysmal atrial fibrillation) ([address] 02/17/2016    s/p cardioversion    Pulmonary HTN ([address]     S/P AVR (aortic valve replacement) 07/26/2016    merrill gill md -     S/P cardiac cath 7/6/16    severe ai, normal coronaries     S/P colonoscopy with polypectomy 05/09/2016    Kar Ortiz MD    Stroke [facility])        Past Surgical History:  Past Surgical History:   Procedure Laterality Date    HX HEENT      nasal procedure 20 years ago    HX KNEE REPLACEMENT Bilateral     bilateral knee replacement    HX ORTHOPAEDIC Bilateral     wrist    HX UROLOGICAL  01/05/2020    cystoscopy     [place] CARDIAC SUR[place] PROCEDURE [place]LIST      cardiac cath    [place] CARDI[place] PROCEDURE [place]LIST  07/26/2016    AVR       Family History:  Family History   Problem Relation Age of Onset    Anemia Mother     No Known Problems Father     Other Brother         drug abuse       Social History:  Social History     Tobacco Use    Smoking status: Former Smoker     Years: 20.00    Smokeless tobacco: Never Used   Vaping Use    Vaping Use: Never used   Substance Use Topics    Alcohol use: Not Currently    Drug use: Never       Allergies: Allergies   Allergen Reactions    Amiodarone Other (comments)     Bradycardia -excessive    Carvedilol Unknown (comments)         Review of Systems   Review of Systems   Constitutional: Negative for chills and fever. HENT: Negative for sore throat. Eyes: Negative for visual disturbance. Respiratory: Negative for cough and shortness of breath. Cardiovascular: Negative for chest pain. Gastrointestinal: Negative for abdominal pain and vomiting. Endocrine: Negative for polyuria. Genitourinary: Negative for difficulty urinating. Musculoskeletal: Negative for arthralgias. Skin: Negative for rash. Neurological: Negative for headaches. Hematological: Does not bruise/bleed easily. Psychiatric/Behavioral: Negative for suicidal ideas. All other systems reviewed and are negative. Physical Exam   Physical Exam  Vitals and nursing note reviewed. Constitutional:       General: He is not in acute distress. Appearance: Normal appearance. He is not ill-appearing, toxic-appearing or diaphoretic. HENT:      Head: Normocephalic and atraumatic. Nose: No congestion.       Mouth/Throat:      Mouth: Mucous membranes are moist.   Eyes: General: No scleral icterus. Conjunctiva/sclera: Conjunctivae normal.   Cardiovascular:      Rate and Rhythm: Normal rate and regular rhythm. Heart sounds: Normal heart sounds. Pulmonary:      Effort: Pulmonary effort is normal.      Breath sounds: Normal breath sounds. Abdominal:      General: Abdomen is flat. Bowel sounds are normal. There is no distension. Palpations: Abdomen is soft. Tenderness: There is no abdominal tenderness. There is no guarding or rebound. Musculoskeletal:      Cervical back: Neck supple. Right lower leg: No edema. Left lower leg: No edema. Skin:     General: Skin is warm and dry. Neurological:      General: No focal deficit present. Mental Status: He is alert. Psychiatric:         Behavior: Behavior normal.         Thought Content: Thought content normal.         Diagnostic Study Results     Labs -     Recent Results (from the past 12 hour(s))   CBC WITH AUTOMATED DIFF    Collection Time: 01/02/22  1:40 AM   Result Value Ref Range    WBC 6.4 4.1 - 11.1 K/uL    RBC 3.65 (L) 4.10 - 5.70 M/uL    HGB 12.0 (L) 12.1 - 17.0 g/dL    HCT 36.3 (L) 36.6 - 50.3 %    MCV 99.5 (H) 80.0 - 99.0 FL    MCH 32.9 26.0 - 34.0 PG    MCHC 33.1 30.0 - 36.5 g/dL    RDW 14.2 11.5 - 14.5 %    PLATELET 438 (L) 043 - 400 K/uL    MPV 9.7 8.9 - 12.9 FL    NEUTROPHILS 80 (H) 32 - 75 %    LYMPHOCYTES 12 12 - 49 %    MONOCYTES 7 5 - 13 %    EOSINOPHILS 1 0 - 7 %    BASOPHILS 0 0 - 1 %    IMMATURE GRANULOCYTES 0 0.0 - 0.5 %    ABS. NEUTROPHILS 5.1 1.8 - 8.0 K/UL    ABS. LYMPHOCYTES 0.7 (L) 0.8 - 3.5 K/UL    ABS. MONOCYTES 0.4 0.0 - 1.0 K/UL    ABS. EOSINOPHILS 0.1 0.0 - 0.4 K/UL    ABS. BASOPHILS 0.0 0.0 - 0.1 K/UL    ABS. IMM.  GRANS. 0.0 0.00 - 0.04 K/UL    DF AUTOMATED     METABOLIC PANEL, BASIC    Collection Time: 01/02/22  1:40 AM   Result Value Ref Range    Sodium 145 136 - 145 mmol/L    Potassium 3.2 (L) 3.5 - 5.1 mmol/L    Chloride 106 97 - 108 mmol/L    CO2 32 21 - 32 mmol/L    Anion gap 7 5 - 15 mmol/L    Glucose 98 65 - 100 mg/dL    BUN 24 (H) 6 - 20 mg/dL    Creatinine 1.98 (H) 0.70 - 1.30 mg/dL    BUN/Creatinine ratio 12 12 - 20      GFR est AA 39 (L) >60 ml/min/1.73m2    GFR est non-AA 32 (L) >60 ml/min/1.73m2    Calcium 8.5 8.5 - 10.1 mg/dL   TROPONIN-HIGH SENSITIVITY    Collection Time: 01/02/22  1:40 AM   Result Value Ref Range    Troponin-High Sensitivity 40 0 - 76 ng/L   HEPATIC FUNCTION PANEL    Collection Time: 01/02/22  1:40 AM   Result Value Ref Range    Protein, total 7.1 6.4 - 8.2 g/dL    Albumin 3.2 (L) 3.5 - 5.0 g/dL    Globulin 3.9 2.0 - 4.0 g/dL    A-G Ratio 0.8 (L) 1.1 - 2.2      Bilirubin, total 0.9 0.2 - 1.0 mg/dL    Bilirubin, direct 0.2 0.0 - 0.2 mg/dL    Alk. phosphatase 85 45 - 117 U/L    AST (SGOT) 23 15 - 37 U/L    ALT (SGPT) 36 12 - 78 U/L   LIPASE    Collection Time: 01/02/22  1:40 AM   Result Value Ref Range    Lipase 263 73 - 393 U/L   URINALYSIS W/ RFLX MICROSCOPIC    Collection Time: 01/02/22  2:00 AM   Result Value Ref Range    Color Yellow      Appearance Clear Clear      Specific gravity 1.010 1.003 - 1.030      pH (UA) 6.0 5.0 - 8.0      Protein 100 (A) Negative mg/dL    Glucose Negative Negative mg/dL    Ketone Negative Negative mg/dL    Bilirubin Negative Negative      Blood Small (A) Negative      Urobilinogen 0.1 (L) 0.2 - 1.0 EU/dL    Nitrites Negative Negative      Leukocyte Esterase Negative Negative     URINE MICROSCOPIC    Collection Time: 01/02/22  2:00 AM   Result Value Ref Range    WBC 0-4 0 - 4 /hpf    RBC 0-5 0 - 5 /hpf    Bacteria 1+ (A) Negative /hpf   TROPONIN-HIGH SENSITIVITY    Collection Time: 01/02/22  3:45 AM   Result Value Ref Range    Troponin-High Sensitivity 36 0 - 76 ng/L       Radiologic Studies -   No orders to display     CT Results  (Last 48 hours)    None        CXR Results  (Last 48 hours)    None            Medical Decision Making   I am the first provider for this patient.     I reviewed the vital signs, available nursing notes, past medical history, past surgical history, family history and social history. Vital Signs-Reviewed the patient's vital signs. Patient Vitals for the past 12 hrs:   Temp Pulse Resp BP SpO2   01/02/22 0414  (!) 52  (!) 157/91 96 %   01/02/22 0258  (!) 53  (!) 192/87    01/02/22 0221  (!) 55  (!) 196/104 98 %   01/02/22 0109 98.5 °F (36.9 °C) (!) 57 18 (!) 160/93 99 %       Records Reviewed:     Provider Notes (Medical Decision Making):   Patient present with a vague symptoms of not feeling well and generalized weakness. Labs were within normal limits. No signs of sepsis. Cardiac work-up is negative. Patient is discharged stable condition. ED Course:   Initial assessment performed. The patients presenting problems have been discussed, and they are in agreement with the care plan formulated and outlined with them. I have encouraged them to ask questions as they arise throughout their visit. No vomiting. No respiratory distress. Alert and oriented. PROCEDURES      Disposition: Condition stable   DC- Adult Discharges: All of the diagnostic tests were reviewed and questions answered. Diagnosis, care plan and treatment options were discussed. understand instructions and will follow up as directed. The patients results have been reviewed with them. They have been counseled regarding their diagnosis. The patient verbally convey understanding and agreement of the signs, symptoms, diagnosis, treatment and prognosis and additionally agrees to follow up as recommended. They also agree with the care-plan and convey that all of their questions have been answered.   I have also put together some discharge instructions for them that include: 1) educational information regarding their diagnosis, 2) how to care for their diagnosis at home, as well a 3) list of reasons why they would want to return to the ED prior to their follow-up appointment, should their condition change. PLAN:  1. Current Discharge Medication List        2. Follow-up Information     Follow up With Specialties Details Why Contact Info    Follow up with your primary care physician  Schedule an appointment as soon as possible for a visit in 3 days As needed         Return to ED if worse     Diagnosis     Clinical Impression:   1. Malaise and fatigue        Please note that this dictation was completed with [employer], the computer voice recognition software. Quite often unanticipated grammatical, syntax, homophones, and other interpretive errors are inadvertently transcribed by the computer software. Please disregard these errors. Please excuse any errors that have escaped final proofreading. Thank you.

## 2022-01-03 LAB
ATRIAL RATE: 61 BPM
CALCULATED P AXIS, ECG09: 74 DEGREES
CALCULATED R AXIS, ECG10: 115 DEGREES
CALCULATED T AXIS, ECG11: 115 DEGREES
DIAGNOSIS, 93000: NORMAL
Q-T INTERVAL, ECG07: 492 MS
QRS DURATION, ECG06: 100 MS
QTC CALCULATION (BEZET), ECG08: 435 MS
VENTRICULAR RATE, ECG03: 47 BPM

## 2022-03-10 ENCOUNTER — OFFICE VISIT (OUTPATIENT)
Dept: FAMILY MEDICINE CLINIC | Age: 87
End: 2022-03-10
Payer: MEDICARE

## 2022-03-10 VITALS
BODY MASS INDEX: 25.98 KG/M2 | HEIGHT: 68 IN | TEMPERATURE: 97.5 F | OXYGEN SATURATION: 99 % | WEIGHT: 171.4 LBS | RESPIRATION RATE: 18 BRPM

## 2022-03-10 DIAGNOSIS — F02.818 EARLY ONSET ALZHEIMER'S DEMENTIA WITH BEHAVIORAL DISTURBANCE (HCC): ICD-10-CM

## 2022-03-10 DIAGNOSIS — I73.9 PERIPHERAL VASCULAR DISEASE (HCC): ICD-10-CM

## 2022-03-10 DIAGNOSIS — M10.079 ACUTE IDIOPATHIC GOUT OF FOOT, UNSPECIFIED LATERALITY: ICD-10-CM

## 2022-03-10 DIAGNOSIS — Z98.890 HISTORY OF CARDIOVERSION: ICD-10-CM

## 2022-03-10 DIAGNOSIS — C90.00 MULTIPLE MYELOMA, REMISSION STATUS UNSPECIFIED (HCC): ICD-10-CM

## 2022-03-10 DIAGNOSIS — Z98.890 S/P COLONOSCOPY WITH POLYPECTOMY: ICD-10-CM

## 2022-03-10 DIAGNOSIS — I35.1 NONRHEUMATIC AORTIC VALVE INSUFFICIENCY: ICD-10-CM

## 2022-03-10 DIAGNOSIS — F33.1 MAJOR DEPRESSIVE DISORDER, RECURRENT, MODERATE (HCC): ICD-10-CM

## 2022-03-10 DIAGNOSIS — G30.0 EARLY ONSET ALZHEIMER'S DEMENTIA WITH BEHAVIORAL DISTURBANCE (HCC): ICD-10-CM

## 2022-03-10 DIAGNOSIS — D47.2 MGUS (MONOCLONAL GAMMOPATHY OF UNKNOWN SIGNIFICANCE): ICD-10-CM

## 2022-03-10 DIAGNOSIS — I27.20 PULMONARY HTN (HCC): ICD-10-CM

## 2022-03-10 DIAGNOSIS — I48.0 PAROXYSMAL ATRIAL FIBRILLATION (HCC): ICD-10-CM

## 2022-03-10 DIAGNOSIS — I11.9 MALIGNANT HYPERTENSIVE HEART DISEASE WITHOUT HEART FAILURE: Primary | ICD-10-CM

## 2022-03-10 DIAGNOSIS — Z86.2 HISTORY OF ANEMIA: ICD-10-CM

## 2022-03-10 PROCEDURE — 1101F PT FALLS ASSESS-DOCD LE1/YR: CPT | Performed by: FAMILY MEDICINE

## 2022-03-10 PROCEDURE — G8756 NO BP MEASURE DOC: HCPCS | Performed by: FAMILY MEDICINE

## 2022-03-10 PROCEDURE — 99214 OFFICE O/P EST MOD 30 MIN: CPT | Performed by: FAMILY MEDICINE

## 2022-03-10 PROCEDURE — G9717 DOC PT DX DEP/BP F/U NT REQ: HCPCS | Performed by: FAMILY MEDICINE

## 2022-03-10 PROCEDURE — G8419 CALC BMI OUT NRM PARAM NOF/U: HCPCS | Performed by: FAMILY MEDICINE

## 2022-03-10 PROCEDURE — G8427 DOCREV CUR MEDS BY ELIG CLIN: HCPCS | Performed by: FAMILY MEDICINE

## 2022-03-10 PROCEDURE — G8536 NO DOC ELDER MAL SCRN: HCPCS | Performed by: FAMILY MEDICINE

## 2022-03-10 RX ORDER — LANOLIN ALCOHOL/MO/W.PET/CERES
325 CREAM (GRAM) TOPICAL
COMMUNITY
End: 2022-05-23 | Stop reason: SDUPTHER

## 2022-03-10 RX ORDER — CHOLECALCIFEROL (VITAMIN D3) 125 MCG
5 CAPSULE ORAL
COMMUNITY
End: 2022-05-23 | Stop reason: SDUPTHER

## 2022-03-10 RX ORDER — LANOLIN ALCOHOL/MO/W.PET/CERES
400 CREAM (GRAM) TOPICAL DAILY
COMMUNITY
End: 2022-05-23 | Stop reason: SDUPTHER

## 2022-03-10 RX ORDER — FUROSEMIDE 40 MG/1
40 TABLET ORAL DAILY
COMMUNITY
End: 2022-05-23 | Stop reason: SDUPTHER

## 2022-03-10 RX ORDER — DONEPEZIL HYDROCHLORIDE 5 MG/1
5 TABLET, FILM COATED ORAL
COMMUNITY
End: 2022-03-10 | Stop reason: SDUPTHER

## 2022-03-10 RX ORDER — TAMSULOSIN HYDROCHLORIDE 0.4 MG/1
0.4 CAPSULE ORAL DAILY
COMMUNITY
End: 2022-05-23 | Stop reason: SDUPTHER

## 2022-03-10 RX ORDER — TRAZODONE HYDROCHLORIDE 50 MG/1
50 TABLET ORAL
COMMUNITY
End: 2022-05-23 | Stop reason: SDUPTHER

## 2022-03-10 RX ORDER — DONEPEZIL HYDROCHLORIDE 5 MG/1
10 TABLET, FILM COATED ORAL
Qty: 30 TABLET | Refills: 2 | Status: SHIPPED | OUTPATIENT
Start: 2022-03-10 | End: 2022-04-09

## 2022-03-10 NOTE — PROGRESS NOTES
1. \"Have you been to the ER, urgent care clinic since your last visit? Hospitalized since your last visit? \" No    2. \"Have you seen or consulted any other health care providers outside of the [address] [place] [address] since your last visit? \" No     3. For patients aged 39-70: Has the patient had a colonoscopy / FIT/ Cologuard? NA - based on age      If the patient is female:    4. For patients aged 41-77: Has the patient had a mammogram within the past 2 years? NA - based on age or sex      11. For patients aged 21-65: Has the patient had a pap smear?  NA - based on age or sex     Chief Complaint   Patient presents with    Follow-up    Hypertension    Medication Refill    Other     no sleeping at night, having SOB, c/o area on ABD       Visit Vitals  Temp 97.5 °F (36.4 °C) (Temporal)   Resp 18   Ht 5' 8\" (1.727 m)   Wt 171 lb 6.4 oz (77.7 kg)   SpO2 99%   BMI 26.06 kg/m²       Patient is here for a f/u hypertension, medication refills, patient is not sleeping at night, c/o area on ABD

## 2022-03-10 NOTE — PATIENT INSTRUCTIONS
[address] March 10, 2022       RE: [id number] [address] To Whom It May Concern,    This is to certify that [address] [place] [address]. may Special Instructions:  Patient has a history of Dementia and Depression with behavioral problems fairly stable with medication treatment in patient currently residing at a group home    Please feel free to contact my office if you have any questions or concerns. Thank you for your assistance in this matter.       Sincerely,  Anne Dewitt MD

## 2022-03-10 NOTE — PROGRESS NOTES
Afshan Mar. is a 81 y.o. male and presents with Follow-up, Hypertension, Medication Refill, and Other (no sleeping at night, having SOB, c/o area on ABD)  . HPI     82 yo AAM here on follow up with son who is requesting forms filled for POA and obtaining Medicare coverage. Admits to  of cardioversion and other cardiac problems during recovery from anesthesia for a doulble knee replacement about 15 yrs ago  Subjective:  Cardiovascular Review:  The patient has hypertension   Diet and Lifestyle: generally follows a low fat low cholesterol diet, generally follows a low sodium diet, exercises sporadically  Home BP Monitoring: is not measured at home. Pertinent ROS: taking medications as instructed, no medication side effects noted, no TIA's, no chest pain on exertion, no dyspnea on exertion, no swelling of ankles. Review of Systems  Review of Systems   Constitutional: Negative. Negative for chills and fever. HENT: Negative. Negative for congestion, ear discharge, hearing loss, nosebleeds and tinnitus. Eyes: Negative. Negative for blurred vision, double vision, photophobia and pain. Respiratory: Negative. Negative for cough, hemoptysis and sputum production. Cardiovascular: Negative. Negative for chest pain and palpitations. Gastrointestinal: Negative. Negative for heartburn, nausea and vomiting. Genitourinary: Negative. Negative for dysuria, frequency and urgency. Musculoskeletal: Negative. Negative for back pain and myalgias. Skin: Negative. Neurological: Negative. Negative for dizziness, tingling, weakness and headaches. Endo/Heme/Allergies: Negative. Psychiatric/Behavioral: Negative. Negative for depression and suicidal ideas. The patient does not have insomnia. All other systems reviewed and are negative.         Past Medical History:   Diagnosis Date    Anemia     Arthritis     CKD (chronic kidney disease), stage III ([facility] [address] 2011    Dementia ([facility] [address]     Depression     Erectile dysfunction     GERD (gastroesophageal reflux disease)     GI bleed     Gout     gout, osteoarthritis    Heart failure (HCC)     acute heart failure    Hemorrhoids     Hypertension     Multiple myeloma (HCC)     PAF (paroxysmal atrial fibrillation) ([facility] [address] 2016    s/p cardioversion    Pulmonary HTN ([facility] [address]     S/P AVR (aortic valve replacement) 2016    merrill gill md -     S/P cardiac cath 16    severe ai, normal coronaries     S/P colonoscopy with polypectomy 2016    Kar Stahl MD    Stroke [facility])      Past Surgical History:   Procedure Laterality Date    HX HEENT      nasal procedure 20 years ago   [facility] HX KNEE REPLACEMENT Bilateral     bilateral knee replacement    HX ORTHOPAEDIC Bilateral     wrist    HX UROLOGICAL  2020    cystoscopy     MA CARDIAC SURG PROCEDURE UNLIST      cardiac cath    MA CARDIAC SURG PROCEDURE UNLIST  2016    AVR     Social History     Socioeconomic History    Marital status:    Tobacco Use    Smoking status: Former Smoker     Years: 20.00    Smokeless tobacco: Never Used   Vaping Use    Vaping Use: Never used   Substance and Sexual Activity    Alcohol use: Not Currently    Drug use: Never    Sexual activity: Not Currently   Social History Narrative    Family History: Mother:  36 yrs, Chronic anemiaFather:  80 yrs, I think aneurysm hypertensionBrother(s):  79 yrs,    obese,dm,drug abuse,miSon(s): alive, adopted1 son(s) . [place] [phone]    Social History: Alcohol Use Patient uses alcohol, Drinks per occasion: 2, Drinks per w [place]: 10. Smoking Status Patient is a never smoker. Marital Status: . Lives w ith: alone. Occupation/W ork: employed full time insurance. Education/School: has highschool diploma.      Family History   Problem Relation Age of Onset    Anemia Mother     No Known Problems Father     Other Brother         drug abuse     Current Outpatient Medications   Medication Sig Dispense Refill    donepeziL (ARICEPT) 5 mg tablet Take 5 mg by mouth nightly.  ferrous sulfate 325 mg (65 mg iron) tablet Take 325 mg by mouth Daily (before breakfast).  furosemide (LASIX) 40 mg tablet Take 40 mg by mouth daily.  magnesium oxide (MAG-OX) 400 mg tablet Take 400 mg by mouth daily.  melatonin 5 mg tablet Take 5 mg by mouth nightly. TAKE 2 AT BEDTIME      tamsulosin (FLOMAX) 0.4 mg capsule Take 0.4 mg by mouth daily. Take every evening      traZODone (DESYREL) 50 mg tablet Take 50 mg by mouth nightly.  folic acid (FOLVITE) 1 mg tablet Take 1 mg by mouth daily.  thiamine HCL (Vitamin B-1) 100 mg tablet Take 100 mg by mouth daily.  ascorbic acid, vitamin C, (Vitamin C) 250 mg tablet Take 250 mg by mouth daily.  potassium chloride (K-DUR, KLOR-CON M20) 20 mEq tablet TAKE 1 TABLET BY MOUTH EACH MORNING WITH FOOD. 90 Tablet 1    allopurinoL (ZYLOPRIM) 100 mg tablet TAKE 1 TABLET BY MOUTH EVERY DAY 90 Tablet 2    simvastatin (ZOCOR) 20 mg tablet Take 1 Tablet by mouth nightly. 30 Tablet 2    escitalopram oxalate (LEXAPRO) 20 mg tablet TAKE 1 TABLET BY MOUTH DAILY. 30 Tab 0    amLODIPine (NORVASC) 5 mg tablet TAKE ONE TABLET BY MOUTH DAILY 30 Tab 0    timolol (TIMOPTIC) 0.5 % ophthalmic solution INSTILL 1 DROPS INTO RIGHT EYE ONCE DAILY 5 mL 5    melatonin 5 mg cap capsule Take 5 mg by mouth nightly. Takes 2 tabs at bedtime (Patient not taking: Reported on 3/10/2022)      colchicine 0.6 mg tablet Take 1 Tab by mouth daily. Indications: acute inflammation of the joints due to gout attack (Patient not taking: Reported on 12/21/2021) 10 Tab 0    rifAMPin (RIFADIN) 300 mg capsule Take 1 Cap by mouth every twelve (12) hours. (Patient not taking: Reported on 12/21/2021) 28 Cap 0    vancomycin 750 mg 750 mg, vial-mate 1 Device IVPB 750 mg by IntraVENous route every twenty-four (24) hours.  (Patient not taking: Reported on 12/21/2021) 14 Dose 0    diclofenac (VOLTAREN) 1 % gel APPLY TWO GRAMS FOUR TIMES DAILY AS NEEDED FOR ELBOW PAIN (Patient not taking: Reported on 12/21/2021) 200 g 3    terbinafine HCL (LAMISIL) 250 mg tablet Take 1 Tab by mouth daily. (Patient not taking: Reported on 12/21/2021) 90 Tab 0    ammonium lactate (AMLACTIN) 12 % topical cream Apply  to affected area two (2) times a day. rub in to affected area well (Patient not taking: Reported on 12/21/2021) 280 g 5     Allergies   Allergen Reactions    Amiodarone Other (comments)     Bradycardia -excessive    Carvedilol Unknown (comments)       Objective:  Visit Vitals  Temp 97.5 °F (36.4 °C) (Temporal)   Resp 18   Ht 5' 8\" (1.727 m)   Wt 171 lb 6.4 oz (77.7 kg)   SpO2 99%   BMI 26.06 kg/m²       Physical Exam:   Physical Exam  Vitals and nursing note reviewed. Constitutional:       General: He is not in acute distress. Appearance: Normal appearance. He is obese. He is not ill-appearing, toxic-appearing or diaphoretic. HENT:      Head: Normocephalic and atraumatic. Right Ear: Tympanic membrane, ear canal and external ear normal. There is no impacted cerumen. Left Ear: Tympanic membrane, ear canal and external ear normal. There is no impacted cerumen. Nose: Nose normal. No congestion or rhinorrhea. Mouth/Throat:      Mouth: Mucous membranes are moist.      Pharynx: Oropharynx is clear. No oropharyngeal exudate or posterior oropharyngeal erythema. Eyes:      General: No scleral icterus. Right eye: No discharge. Left eye: No discharge. Extraocular Movements: Extraocular movements intact. Conjunctiva/sclera: Conjunctivae normal.      Pupils: Pupils are equal, round, and reactive to light. Neck:      Vascular: No carotid bruit. Cardiovascular:      Rate and Rhythm: Normal rate and regular rhythm. Pulses: Normal pulses. Heart sounds: Normal heart sounds. No murmur heard. No friction rub. No gallop.     Pulmonary: Effort: Pulmonary effort is normal. No respiratory distress. Breath sounds: Normal breath sounds. No stridor. No wheezing, rhonchi or rales. Chest:      Chest wall: No tenderness. Abdominal:      General: Abdomen is flat. Bowel sounds are normal. There is no distension. Palpations: Abdomen is soft. There is no mass. Tenderness: There is no abdominal tenderness. There is no right CVA tenderness, left CVA tenderness, guarding or rebound. Hernia: No hernia is present. Musculoskeletal:         General: No swelling, tenderness, deformity or signs of injury. Normal range of motion. Cervical back: Normal range of motion and neck supple. No rigidity. No muscular tenderness. Right lower leg: No edema. Left lower leg: No edema. Lymphadenopathy:      Cervical: No cervical adenopathy. Skin:     General: Skin is warm. Capillary Refill: Capillary refill takes 2 to 3 seconds. Coloration: Skin is not jaundiced or pale. Findings: No bruising, erythema, lesion or rash. Neurological:      General: No focal deficit present. Mental Status: He is alert and oriented to person, place, and time. Mental status is at baseline. Cranial Nerves: No cranial nerve deficit. Sensory: No sensory deficit. Motor: No weakness. Coordination: Coordination normal.      Gait: Gait normal.      Deep Tendon Reflexes: Reflexes normal.   Psychiatric:         Mood and Affect: Mood normal.         Behavior: Behavior normal.         Thought Content:  Thought content normal.         Judgment: Judgment normal.             Results for orders placed or performed during the hospital encounter of 01/02/22   CBC WITH AUTOMATED DIFF   Result Value Ref Range    WBC 6.4 4.1 - 11.1 K/uL    RBC 3.65 (L) 4.10 - 5.70 M/uL    HGB 12.0 (L) 12.1 - 17.0 g/dL    HCT 36.3 (L) 36.6 - 50.3 %    MCV 99.5 (H) 80.0 - 99.0 FL    MCH 32.9 26.0 - 34.0 PG    MCHC 33.1 30.0 - 36.5 g/dL    RDW 14.2 11.5 - 14.5 %    PLATELET 705 (L) 422 - 400 K/uL    MPV 9.7 8.9 - 12.9 FL    NEUTROPHILS 80 (H) 32 - 75 %    LYMPHOCYTES 12 12 - 49 %    MONOCYTES 7 5 - 13 %    EOSINOPHILS 1 0 - 7 %    BASOPHILS 0 0 - 1 %    IMMATURE GRANULOCYTES 0 0.0 - 0.5 %    ABS. NEUTROPHILS 5.1 1.8 - 8.0 K/UL    ABS. LYMPHOCYTES 0.7 (L) 0.8 - 3.5 K/UL    ABS. MONOCYTES 0.4 0.0 - 1.0 K/UL    ABS. EOSINOPHILS 0.1 0.0 - 0.4 K/UL    ABS. BASOPHILS 0.0 0.0 - 0.1 K/UL    ABS. IMM. GRANS. 0.0 0.00 - 0.04 K/UL    DF AUTOMATED     METABOLIC PANEL, BASIC   Result Value Ref Range    Sodium 145 136 - 145 mmol/L    Potassium 3.2 (L) 3.5 - 5.1 mmol/L    Chloride 106 97 - 108 mmol/L    CO2 32 21 - 32 mmol/L    Anion gap 7 5 - 15 mmol/L    Glucose 98 65 - 100 mg/dL    BUN 24 (H) 6 - 20 mg/dL    Creatinine 1.98 (H) 0.70 - 1.30 mg/dL    BUN/Creatinine ratio 12 12 - 20      GFR est AA 39 (L) >60 ml/min/1.73m2    GFR est non-AA 32 (L) >60 ml/min/1.73m2    Calcium 8.5 8.5 - 10.1 mg/dL   TROPONIN-HIGH SENSITIVITY   Result Value Ref Range    Troponin-High Sensitivity 40 0 - 76 ng/L   HEPATIC FUNCTION PANEL   Result Value Ref Range    Protein, total 7.1 6.4 - 8.2 g/dL    Albumin 3.2 (L) 3.5 - 5.0 g/dL    Globulin 3.9 2.0 - 4.0 g/dL    A-G Ratio 0.8 (L) 1.1 - 2.2      Bilirubin, total 0.9 0.2 - 1.0 mg/dL    Bilirubin, direct 0.2 0.0 - 0.2 mg/dL    Alk.  phosphatase 85 45 - 117 U/L    AST (SGOT) 23 15 - 37 U/L    ALT (SGPT) 36 12 - 78 U/L   LIPASE   Result Value Ref Range    Lipase 263 73 - 393 U/L   URINALYSIS W/ RFLX MICROSCOPIC   Result Value Ref Range    Color Yellow      Appearance Clear Clear      Specific gravity 1.010 1.003 - 1.030      pH (UA) 6.0 5.0 - 8.0      Protein 100 (A) Negative mg/dL    Glucose Negative Negative mg/dL    Ketone Negative Negative mg/dL    Bilirubin Negative Negative      Blood Small (A) Negative      Urobilinogen 0.1 (L) 0.2 - 1.0 EU/dL    Nitrites Negative Negative      Leukocyte Esterase Negative Negative     URINE MICROSCOPIC   Result Value Ref Range    WBC 0-4 0 - 4 /hpf    RBC 0-5 0 - 5 /hpf    Bacteria 1+ (A) Negative /hpf   TROPONIN-HIGH SENSITIVITY   Result Value Ref Range    Troponin-High Sensitivity 36 0 - 76 ng/L   EKG, 12 LEAD, INITIAL   Result Value Ref Range    Ventricular Rate 47 BPM    Atrial Rate 61 BPM    QRS Duration 100 ms    Q-T Interval 492 ms    QTC Calculation (Bezet) 435 ms    Calculated P Axis 74 degrees    Calculated R Axis 115 degrees    Calculated T Axis 115 degrees    Diagnosis       Possible Complete heart block with junctional escape  Left posterior fascicular block  Anterior infarct , age undetermined  Abnormal ECG  When compared with ECG of 28-DEC-2021 13:49,  Current undetermined rhythm precludes rhythm comparison, needs review  T wave inversion no longer evident in Anterior leads  QT has shortened  Confirmed by Martha Ray ([id number]) on 1/3/2022 9:17:14 AM         Assessment/Plan:    ICD-10-CM ICD-9-CM    1. Malignant hypertensive heart disease without heart failure  I11.9 402.00 REFERRAL TO CARDIOLOGY   2. Paroxysmal atrial fibrillation (HCC)  I48.0 427.31 REFERRAL TO CARDIOLOGY   3. Pulmonary HTN (HCC)  I27.20 416.8    4. Peripheral vascular disease (HCC)  I73.9 443.9    5. Nonrheumatic aortic valve insufficiency  I35.1 424.1 REFERRAL TO CARDIOLOGY   6. 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 MGUS (monoclonal gammopathy of unknown significance)  D47.2 273.1    8. Multiple myeloma, remission status unspecified ([facility])  C90.00 203.00    9. S/P colonoscopy with polypectomy  Z98.890 V45.89    10. Major depressive disorder, recurrent, moderate  F33.1 296.32    11. Acute idiopathic gout of foot, unspecified laterality  M10.079 274.01    12. History of anemia  Z86.2 V12.3    13. History of cardioversion  Z98.890 V15.1 REFERRAL TO CARDIOLOGY     Orders Placed This Encounter    donepeziL (ARICEPT) 5 mg tablet     Sig: Take 5 mg by mouth nightly.     ferrous sulfate 325 mg (65 mg iron) tablet     Sig: Take 325 mg by mouth Daily (before breakfast).  furosemide (LASIX) 40 mg tablet     Sig: Take 40 mg by mouth daily.  magnesium oxide (MAG-OX) 400 mg tablet     Sig: Take 400 mg by mouth daily.  melatonin 5 mg tablet     Sig: Take 5 mg by mouth nightly. TAKE 2 AT BEDTIME    tamsulosin (FLOMAX) 0.4 mg capsule     Sig: Take 0.4 mg by mouth daily. Take every evening    traZODone (DESYREL) 50 mg tablet     Sig: Take 50 mg by mouth nightly. Cannot display discharge medications since this is not an admission.

## 2022-03-18 PROBLEM — N18.32 STAGE 3B CHRONIC KIDNEY DISEASE (HCC): Status: ACTIVE | Noted: 2020-10-31

## 2022-03-18 PROBLEM — F99 ABNORMAL MMSE: Status: ACTIVE | Noted: 2021-12-21

## 2022-03-18 PROBLEM — D63.8 ANEMIA OF CHRONIC DISEASE: Status: ACTIVE | Noted: 2020-08-26

## 2022-03-18 PROBLEM — K11.20 PAROTIDITIS: Status: ACTIVE | Noted: 2021-03-14

## 2022-03-18 PROBLEM — G30.0 EARLY ONSET ALZHEIMER'S DEMENTIA WITH BEHAVIORAL DISTURBANCE (HCC): Status: ACTIVE | Noted: 2021-12-21

## 2022-03-18 PROBLEM — F02.818 EARLY ONSET ALZHEIMER'S DEMENTIA WITH BEHAVIORAL DISTURBANCE (HCC): Status: ACTIVE | Noted: 2021-12-21

## 2022-03-18 PROBLEM — Z86.2 HISTORY OF ANEMIA: Status: ACTIVE | Noted: 2021-12-21

## 2022-03-18 PROBLEM — L85.3 XEROSIS CUTIS: Status: ACTIVE | Noted: 2020-08-19

## 2022-03-18 PROBLEM — I73.9 PERIPHERAL VASCULAR DISEASE (HCC): Status: ACTIVE | Noted: 2020-08-19

## 2022-03-18 PROBLEM — R35.0 FREQUENCY OF URINATION: Status: ACTIVE | Noted: 2020-12-02

## 2022-03-19 PROBLEM — S62.102G: Status: ACTIVE | Noted: 2020-08-26

## 2022-03-19 PROBLEM — N13.8 BPH WITH OBSTRUCTION/LOWER URINARY TRACT SYMPTOMS: Status: ACTIVE | Noted: 2021-01-05
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
## 2022-03-19 PROBLEM — I82.622 ACUTE DEEP VEIN THROMBOSIS (DVT) OF RADIAL VEIN OF LEFT UPPER EXTREMITY (HCC): Status: ACTIVE | Noted: 2020-08-26

## 2022-03-19 PROBLEM — F33.0 MAJOR DEPRESSIVE DISORDER, RECURRENT, MILD (HCC): Status: ACTIVE | Noted: 2021-12-21

## 2022-03-19 PROBLEM — N39.44 NOCTURNAL ENURESIS: Status: ACTIVE | Noted: 2020-12-02

## 2022-03-19 PROBLEM — R31.29 MICROHEMATURIA: Status: ACTIVE | Noted: 2021-02-05

## 2022-03-19 PROBLEM — B35.1 ONYCHOMYCOSIS: Status: ACTIVE | Noted: 2020-08-19

## 2022-03-19 PROBLEM — R53.81 DEBILITY: Status: ACTIVE | Noted: 2020-06-19

## 2022-03-19 PROBLEM — K43.9 VENTRAL HERNIA WITHOUT OBSTRUCTION OR GANGRENE: Status: ACTIVE | Noted: 2021-12-21

## 2022-03-19 PROBLEM — Z92.89 HISTORY OF CARDIOVERSION: Status: ACTIVE | Noted: 2017-02-17

## 2022-03-19 PROBLEM — L03.90 CELLULITIS: Status: ACTIVE | Noted: 2021-03-14

## 2022-03-19 PROBLEM — F03.90 DEMENTIA (HCC): Status: ACTIVE | Noted: 2020-07-09

## 2022-03-19 PROBLEM — R32 URINARY INCONTINENCE: Status: ACTIVE | Noted: 2021-02-05

## 2022-03-19 PROBLEM — R29.6 FALLS FREQUENTLY: Status: ACTIVE | Noted: 2020-07-04

## 2022-03-19 PROBLEM — D47.2 MGUS (MONOCLONAL GAMMOPATHY OF UNKNOWN SIGNIFICANCE): Status: ACTIVE | Noted: 2019-02-19

## 2022-03-19 PROBLEM — R29.6 FREQUENT FALLS: Status: ACTIVE | Noted: 2020-07-09

## 2022-03-19 PROBLEM — F33.9 MAJOR DEPRESSIVE DISORDER, RECURRENT, UNSPECIFIED (HCC): Status: ACTIVE | Noted: 2021-12-21

## 2022-03-19 PROBLEM — S62.308A: Status: ACTIVE | Noted: 2020-06-04

## 2022-03-19 PROBLEM — N40.1 BPH WITH OBSTRUCTION/LOWER URINARY TRACT SYMPTOMS: Status: ACTIVE | Noted: 2021-01-05

## 2022-03-19 PROBLEM — F33.1 MAJOR DEPRESSIVE DISORDER, RECURRENT, MODERATE (HCC): Status: ACTIVE | Noted: 2021-12-21

## 2022-03-20 PROBLEM — I11.9 MALIGNANT HYPERTENSIVE HEART DISEASE WITHOUT HEART FAILURE: Status: ACTIVE | Noted: 2021-12-21

## 2022-03-20 PROBLEM — L98.8 MACERATION OF SKIN: Status: ACTIVE | Noted: 2020-08-19

## 2022-04-15 ENCOUNTER — TELEPHONE (OUTPATIENT)
Dept: FAMILY MEDICINE CLINIC | Age: 87
End: 2022-04-15

## 2022-04-15 NOTE — TELEPHONE ENCOUNTER
[address] WANTS TO KNOW IF YOU WILL FOLLOW PATIENT [address] [facility]  [phone]

## 2022-04-19 NOTE — TELEPHONE ENCOUNTER
Dr. Jamilah Salas,    [address] [place] [address] wants to know if you will follow Mr. Josiah Lala for home care for Physical Therapy. Dr. Jamilah Salas,    He was in [facility] but now he is in a [address] brings him here to see you. They are asking for in home  PT, OT and skilled nursing from [address] [place] [address].   [phone]

## 2022-05-12 DIAGNOSIS — F10.27 DEMENTIA ASSOCIATED WITH ALCOHOLISM WITH BEHAVIORAL DISTURBANCE (HCC): ICD-10-CM

## 2022-05-12 DIAGNOSIS — I10 ESSENTIAL HYPERTENSION: ICD-10-CM

## 2022-05-12 DIAGNOSIS — I48.0 PAROXYSMAL ATRIAL FIBRILLATION (HCC): ICD-10-CM

## 2022-05-12 RX ORDER — TAMSULOSIN HYDROCHLORIDE 0.4 MG/1
0.4 CAPSULE ORAL DAILY
Qty: 30 CAPSULE | Refills: 0 | OUTPATIENT
Start: 2022-05-12 | End: 2022-06-11

## 2022-05-12 RX ORDER — AMLODIPINE BESYLATE 5 MG/1
5 TABLET ORAL DAILY
Qty: 30 TABLET | Refills: 0 | OUTPATIENT
Start: 2022-05-12

## 2022-05-12 RX ORDER — LANOLIN ALCOHOL/MO/W.PET/CERES
325 CREAM (GRAM) TOPICAL
OUTPATIENT
Start: 2022-05-12

## 2022-05-12 RX ORDER — FOLIC ACID 1 MG/1
1 TABLET ORAL DAILY
OUTPATIENT
Start: 2022-05-12

## 2022-05-12 RX ORDER — TRAZODONE HYDROCHLORIDE 50 MG/1
50 TABLET ORAL
OUTPATIENT
Start: 2022-05-12

## 2022-05-12 RX ORDER — FUROSEMIDE 40 MG/1
40 TABLET ORAL DAILY
OUTPATIENT
Start: 2022-05-12

## 2022-05-12 RX ORDER — CHOLECALCIFEROL (VITAMIN D3) 125 MCG
5 CAPSULE ORAL
OUTPATIENT
Start: 2022-05-12

## 2022-05-12 RX ORDER — POTASSIUM CHLORIDE 20 MEQ/1
TABLET, EXTENDED RELEASE ORAL
Qty: 90 TABLET | Refills: 0 | OUTPATIENT
Start: 2022-05-12

## 2022-05-12 RX ORDER — LANOLIN ALCOHOL/MO/W.PET/CERES
100 CREAM (GRAM) TOPICAL DAILY
OUTPATIENT
Start: 2022-05-12

## 2022-05-12 RX ORDER — ASCORBIC ACID 250 MG
250 TABLET ORAL DAILY
OUTPATIENT
Start: 2022-05-12

## 2022-05-12 RX ORDER — LANOLIN ALCOHOL/MO/W.PET/CERES
400 CREAM (GRAM) TOPICAL DAILY
OUTPATIENT
Start: 2022-05-12

## 2022-05-12 RX ORDER — ALLOPURINOL 100 MG/1
TABLET ORAL
Qty: 90 TABLET | Refills: 2 | OUTPATIENT
Start: 2022-05-12

## 2022-05-12 RX ORDER — SIMVASTATIN 20 MG/1
20 TABLET, FILM COATED ORAL
Qty: 30 TABLET | Refills: 2 | OUTPATIENT
Start: 2022-05-12

## 2022-05-12 RX ORDER — ESCITALOPRAM OXALATE 20 MG/1
20 TABLET ORAL DAILY
Qty: 30 TABLET | Refills: 0 | OUTPATIENT
Start: 2022-05-12

## 2022-05-20 ENCOUNTER — TELEPHONE (OUTPATIENT)
Dept: FAMILY MEDICINE CLINIC | Age: 87
End: 2022-05-20

## 2022-05-20 NOTE — TELEPHONE ENCOUNTER
Refills needed on  ferrous sulfate  Furosemide  magnesuim oxide  Melatonin  tamsulosin  Trazodone  Melatonin  Folic acid  Thiamine  Ascorbic acid vitamin c  allpurinlol  Amlodipine  Potassium  escitalopram  simvastatin

## 2022-05-23 DIAGNOSIS — I10 ESSENTIAL HYPERTENSION: ICD-10-CM

## 2022-05-23 DIAGNOSIS — I48.0 PAROXYSMAL ATRIAL FIBRILLATION (HCC): ICD-10-CM

## 2022-05-23 DIAGNOSIS — F10.27 DEMENTIA ASSOCIATED WITH ALCOHOLISM WITH BEHAVIORAL DISTURBANCE (HCC): ICD-10-CM

## 2022-05-23 RX ORDER — LANOLIN ALCOHOL/MO/W.PET/CERES
100 CREAM (GRAM) TOPICAL DAILY
Qty: 90 TABLET | Refills: 0 | Status: SHIPPED | OUTPATIENT
Start: 2022-05-23 | End: 2022-05-25 | Stop reason: SDUPTHER

## 2022-05-23 RX ORDER — ALLOPURINOL 100 MG/1
TABLET ORAL
Qty: 90 TABLET | Refills: 2 | Status: SHIPPED | OUTPATIENT
Start: 2022-05-23 | End: 2022-05-25 | Stop reason: SDUPTHER

## 2022-05-23 RX ORDER — TRAZODONE HYDROCHLORIDE 50 MG/1
50 TABLET ORAL
Qty: 90 TABLET | Refills: 0 | Status: SHIPPED | OUTPATIENT
Start: 2022-05-23 | End: 2022-05-25 | Stop reason: SDUPTHER

## 2022-05-23 RX ORDER — SIMVASTATIN 20 MG/1
20 TABLET, FILM COATED ORAL
Qty: 30 TABLET | Refills: 2 | Status: SHIPPED | OUTPATIENT
Start: 2022-05-23 | End: 2022-05-24 | Stop reason: SDUPTHER

## 2022-05-23 RX ORDER — AMLODIPINE BESYLATE 5 MG/1
5 TABLET ORAL DAILY
Qty: 30 TABLET | Refills: 0 | Status: SHIPPED | OUTPATIENT
Start: 2022-05-23 | End: 2022-05-25 | Stop reason: SDUPTHER

## 2022-05-23 RX ORDER — POTASSIUM CHLORIDE 20 MEQ/1
TABLET, EXTENDED RELEASE ORAL
Qty: 90 TABLET | Refills: 1 | Status: SHIPPED | OUTPATIENT
Start: 2022-05-23 | End: 2022-05-25 | Stop reason: SDUPTHER

## 2022-05-23 RX ORDER — ASCORBIC ACID 250 MG
250 TABLET ORAL DAILY
Qty: 90 TABLET | Refills: 0 | Status: SHIPPED | OUTPATIENT
Start: 2022-05-23 | End: 2022-05-25 | Stop reason: SDUPTHER

## 2022-05-23 RX ORDER — FUROSEMIDE 40 MG/1
40 TABLET ORAL DAILY
Qty: 90 TABLET | Refills: 0 | Status: SHIPPED | OUTPATIENT
Start: 2022-05-23 | End: 2022-05-25 | Stop reason: SDUPTHER

## 2022-05-23 RX ORDER — CHOLECALCIFEROL (VITAMIN D3) 125 MCG
5 CAPSULE ORAL
Qty: 90 TABLET | Refills: 0 | Status: SHIPPED | OUTPATIENT
Start: 2022-05-23

## 2022-05-23 RX ORDER — FOLIC ACID 1 MG/1
1 TABLET ORAL DAILY
Qty: 90 TABLET | Refills: 0 | Status: SHIPPED | OUTPATIENT
Start: 2022-05-23 | End: 2022-05-25 | Stop reason: SDUPTHER

## 2022-05-23 RX ORDER — LANOLIN ALCOHOL/MO/W.PET/CERES
400 CREAM (GRAM) TOPICAL DAILY
Qty: 90 TABLET | Refills: 0 | Status: SHIPPED | OUTPATIENT
Start: 2022-05-23 | End: 2022-05-25 | Stop reason: SDUPTHER

## 2022-05-23 RX ORDER — TAMSULOSIN HYDROCHLORIDE 0.4 MG/1
0.4 CAPSULE ORAL DAILY
Qty: 90 CAPSULE | Refills: 0 | Status: SHIPPED | OUTPATIENT
Start: 2022-05-23 | End: 2022-05-25 | Stop reason: SDUPTHER

## 2022-05-23 RX ORDER — ESCITALOPRAM OXALATE 20 MG/1
20 TABLET ORAL DAILY
Qty: 30 TABLET | Refills: 0 | Status: SHIPPED | OUTPATIENT
Start: 2022-05-23 | End: 2022-05-25 | Stop reason: SDUPTHER

## 2022-05-23 RX ORDER — LANOLIN ALCOHOL/MO/W.PET/CERES
325 CREAM (GRAM) TOPICAL
Qty: 90 TABLET | Refills: 0 | Status: SHIPPED | OUTPATIENT
Start: 2022-05-23

## 2022-05-24 DIAGNOSIS — F10.27 DEMENTIA ASSOCIATED WITH ALCOHOLISM WITH BEHAVIORAL DISTURBANCE (HCC): ICD-10-CM

## 2022-05-24 DIAGNOSIS — I48.0 PAROXYSMAL ATRIAL FIBRILLATION (HCC): ICD-10-CM

## 2022-05-24 DIAGNOSIS — I10 ESSENTIAL HYPERTENSION: ICD-10-CM

## 2022-05-24 RX ORDER — SIMVASTATIN 20 MG/1
20 TABLET, FILM COATED ORAL
Qty: 30 TABLET | Refills: 2 | Status: SHIPPED | OUTPATIENT
Start: 2022-05-24 | End: 2022-05-25 | Stop reason: SDUPTHER

## 2022-05-24 RX ORDER — MELATONIN 5 MG
5 CAPSULE ORAL
Qty: 30 CAPSULE | Refills: 1 | Status: SHIPPED | OUTPATIENT
Start: 2022-05-24 | End: 2022-06-23

## 2022-05-25 RX ORDER — FUROSEMIDE 40 MG/1
40 TABLET ORAL DAILY
Qty: 90 TABLET | Refills: 0 | Status: SHIPPED | OUTPATIENT
Start: 2022-05-25

## 2022-05-25 RX ORDER — ESCITALOPRAM OXALATE 20 MG/1
20 TABLET ORAL DAILY
Qty: 30 TABLET | Refills: 0 | Status: SHIPPED | OUTPATIENT
Start: 2022-05-25

## 2022-05-25 RX ORDER — POTASSIUM CHLORIDE 20 MEQ/1
TABLET, EXTENDED RELEASE ORAL
Qty: 90 TABLET | Refills: 1 | Status: SHIPPED | OUTPATIENT
Start: 2022-05-25

## 2022-05-25 RX ORDER — LANOLIN ALCOHOL/MO/W.PET/CERES
100 CREAM (GRAM) TOPICAL DAILY
Qty: 90 TABLET | Refills: 0 | Status: SHIPPED | OUTPATIENT
Start: 2022-05-25

## 2022-05-25 RX ORDER — AMLODIPINE BESYLATE 5 MG/1
5 TABLET ORAL DAILY
Qty: 30 TABLET | Refills: 0 | Status: SHIPPED | OUTPATIENT
Start: 2022-05-25

## 2022-05-25 RX ORDER — FOLIC ACID 1 MG/1
1 TABLET ORAL DAILY
Qty: 90 TABLET | Refills: 0 | Status: SHIPPED | OUTPATIENT
Start: 2022-05-25

## 2022-05-25 RX ORDER — SIMVASTATIN 20 MG/1
20 TABLET, FILM COATED ORAL
Qty: 30 TABLET | Refills: 2 | Status: SHIPPED | OUTPATIENT
Start: 2022-05-25

## 2022-05-25 RX ORDER — ASCORBIC ACID 250 MG
250 TABLET ORAL DAILY
Qty: 90 TABLET | Refills: 0 | Status: SHIPPED | OUTPATIENT
Start: 2022-05-25

## 2022-05-25 RX ORDER — TAMSULOSIN HYDROCHLORIDE 0.4 MG/1
0.4 CAPSULE ORAL DAILY
Qty: 90 CAPSULE | Refills: 0 | Status: SHIPPED | OUTPATIENT
Start: 2022-05-25

## 2022-05-25 RX ORDER — TRAZODONE HYDROCHLORIDE 50 MG/1
50 TABLET ORAL
Qty: 90 TABLET | Refills: 0 | Status: SHIPPED | OUTPATIENT
Start: 2022-05-25

## 2022-05-25 RX ORDER — LANOLIN ALCOHOL/MO/W.PET/CERES
400 CREAM (GRAM) TOPICAL DAILY
Qty: 90 TABLET | Refills: 0 | Status: SHIPPED | OUTPATIENT
Start: 2022-05-25

## 2022-05-25 RX ORDER — ALLOPURINOL 100 MG/1
TABLET ORAL
Qty: 90 TABLET | Refills: 2 | Status: SHIPPED | OUTPATIENT
Start: 2022-05-25

## 2022-05-28 ENCOUNTER — HOSPITAL ENCOUNTER (EMERGENCY)
Age: 87
Discharge: HOME | End: 2022-05-28
Attending: EMERGENCY MEDICINE
Payer: MEDICARE

## 2022-05-28 ENCOUNTER — APPOINTMENT (OUTPATIENT)
Dept: CT IMAGING | Age: 87
End: 2022-05-28
Attending: EMERGENCY MEDICINE
Payer: MEDICARE

## 2022-05-28 VITALS
OXYGEN SATURATION: 99 % | SYSTOLIC BLOOD PRESSURE: 186 MMHG | BODY MASS INDEX: 24.25 KG/M2 | HEART RATE: 60 BPM | WEIGHT: 160 LBS | HEIGHT: 68 IN | DIASTOLIC BLOOD PRESSURE: 92 MMHG | TEMPERATURE: 97.8 F | RESPIRATION RATE: 18 BRPM

## 2022-05-28 DIAGNOSIS — R10.2 SUPRAPUBIC ABDOMINAL PAIN: Primary | ICD-10-CM

## 2022-05-28 LAB
APPEARANCE UR: CLEAR
BACTERIA URNS QL MICRO: NEGATIVE /HPF
BILIRUB UR QL: NEGATIVE
COLOR UR: ABNORMAL
GLUCOSE UR STRIP.AUTO-MCNC: NEGATIVE MG/DL
HGB UR QL STRIP: ABNORMAL
KETONES UR QL STRIP.AUTO: NEGATIVE MG/DL
LEUKOCYTE ESTERASE UR QL STRIP.AUTO: NEGATIVE
MUCOUS THREADS URNS QL MICRO: ABNORMAL /LPF
NITRITE UR QL STRIP.AUTO: NEGATIVE
PH UR STRIP: 6 [PH] (ref 5–8)
PROT UR STRIP-MCNC: >300 MG/DL
RBC #/AREA URNS HPF: ABNORMAL /HPF (ref 0–5)
SP GR UR REFRACTOMETRY: 1.01 (ref 1–1.03)
UA: UC IF INDICATED,UAUC: ABNORMAL
UROBILINOGEN UR QL STRIP.AUTO: 0.1 EU/DL (ref 0.1–1)
WBC URNS QL MICRO: ABNORMAL /HPF (ref 0–4)

## 2022-05-28 PROCEDURE — 74176 CT ABD & PELVIS W/O CONTRAST: CPT

## 2022-05-28 PROCEDURE — 99284 EMERGENCY DEPT VISIT MOD MDM: CPT

## 2022-05-28 PROCEDURE — 81001 URINALYSIS AUTO W/SCOPE: CPT

## 2022-05-28 NOTE — ED PROVIDER NOTES
EMERGENCY DEPARTMENT HISTORY AND PHYSICAL EXAM      Date: 5/28/2022  Patient Name: Hortensia Zaidi. History of Presenting Illness     Chief Complaint   Patient presents with    Urinary Pain       History Provided By: Patient and EMS    HPI: Hortensia Zaidi., 81 y.o. male with a past medical history significant for hypertension and dementia presents to the ED with cc of dysuria. Patient was sent from a group home for the same. History otherwise limited due to patient's underlying dementia, per group home patient is at baseline. There are no other complaints, changes, or physical findings at this time. PCP: Shama Bergeron MD    No current facility-administered medications on file prior to encounter. Current Outpatient Medications on File Prior to Encounter   Medication Sig Dispense Refill    allopurinoL (ZYLOPRIM) 100 mg tablet TAKE 1 TABLET BY MOUTH EVERY DAY 90 Tablet 2    amLODIPine (NORVASC) 5 mg tablet Take 1 Tablet by mouth daily. 30 Tablet 0    ascorbic acid, vitamin C, (Vitamin C) 250 mg tablet Take 1 Tablet by mouth daily. 90 Tablet 0    folic acid (FOLVITE) 1 mg tablet Take 1 Tablet by mouth daily. 90 Tablet 0    furosemide (LASIX) 40 mg tablet Take 1 Tablet by mouth daily. 90 Tablet 0    magnesium oxide (MAG-OX) 400 mg tablet Take 1 Tablet by mouth daily. 90 Tablet 0    potassium chloride (K-DUR, KLOR-CON M20) 20 mEq tablet TAKE 1 TABLET BY MOUTH EACH MORNING WITH FOOD. 90 Tablet 1    simvastatin (ZOCOR) 20 mg tablet Take 1 Tablet by mouth nightly. 30 Tablet 2    thiamine HCL (Vitamin B-1) 100 mg tablet Take 1 Tablet by mouth daily. 90 Tablet 0    traZODone (DESYREL) 50 mg tablet Take 1 Tablet by mouth nightly. 90 Tablet 0    escitalopram oxalate (LEXAPRO) 20 mg tablet Take 1 Tablet by mouth daily. 30 Tablet 0    tamsulosin (FLOMAX) 0.4 mg capsule Take 1 Capsule by mouth daily.  Take every evening 90 Capsule 0    melatonin 5 mg cap capsule Take 1 Capsule by mouth nightly for 30 days. Takes 2 tabs at bedtime 30 Capsule 1    ferrous sulfate 325 mg (65 mg iron) tablet Take 1 Tablet by mouth Daily (before breakfast). 90 Tablet 0    melatonin 5 mg tablet Take 1 Tablet by mouth nightly. TAKE 2 AT BEDTIME 90 Tablet 0    colchicine 0.6 mg tablet Take 1 Tab by mouth daily. Indications: acute inflammation of the joints due to gout attack (Patient not taking: Reported on 12/21/2021) 10 Tab 0    rifAMPin (RIFADIN) 300 mg capsule Take 1 Cap by mouth every twelve (12) hours. (Patient not taking: Reported on 12/21/2021) 28 Cap 0    vancomycin 750 mg 750 mg, vial-mate 1 Device IVPB 750 mg by IntraVENous route every twenty-four (24) hours. (Patient not taking: Reported on 12/21/2021) 14 Dose 0    timolol (TIMOPTIC) 0.5 % ophthalmic solution INSTILL 1 DROPS INTO RIGHT EYE ONCE DAILY 5 mL 5    diclofenac (VOLTAREN) 1 % gel APPLY TWO GRAMS FOUR TIMES DAILY AS NEEDED FOR ELBOW PAIN (Patient not taking: Reported on 12/21/2021) 200 g 3    terbinafine HCL (LAMISIL) 250 mg tablet Take 1 Tab by mouth daily. (Patient not taking: Reported on 12/21/2021) 90 Tab 0    ammonium lactate (AMLACTIN) 12 % topical cream Apply  to affected area two (2) times a day. rub in to affected area well (Patient not taking: Reported on 12/21/2021) 280 g 5       Past History     Past Medical History:  Past Medical History:   Diagnosis Date    Anemia     Arthritis     CKD (chronic kidney disease), stage III ([address] 2011    Dementia ([address]     Depression     Erectile dysfunction     GERD (gastroesophageal reflux disease)     GI bleed     Gout     gout, osteoarthritis    Heart failure (HCC)     acute heart failure    Hemorrhoids     Hypertension     Multiple myeloma ([address]     PAF (paroxysmal atrial fibrillation) ([address] 02/17/2016    s/p cardioversion    Pulmonary HTN ([address]     S/P AVR (aortic valve replacement) 07/26/2016    merrill gill md -     S/P cardiac cath 7/6/16    severe ai, normal coronaries     S/P colonoscopy with polypectomy 05/09/2016    Kar Ruvalcaba MD    Stroke [facility])        Past Surgical History:  Past Surgical History:   Procedure Laterality Date    HX HEENT      nasal procedure 20 years ago   [place] HX KNEE REPLACEMENT Bilateral     bilateral knee replacement    HX ORTHOPAEDIC Bilateral     wrist    HX UROLOGICAL  01/05/2020    cystoscopy     PA CARDIAC SURG PROCEDURE UNLIST      cardiac cath    PA CARDIAC SURG PROCEDURE UNLIST  07/26/2016    AVR       Family History:  Family History   Problem Relation Age of Onset    Anemia Mother     No Known Problems Father     Other Brother         drug abuse       Social History:  Social History     Tobacco Use    Smoking status: Former Smoker     Years: 20.00    Smokeless tobacco: Never Used   Vaping Use    Vaping Use: Never used   Substance Use Topics    Alcohol use: Not Currently    Drug use: Never       Allergies: Allergies   Allergen Reactions    Amiodarone Other (comments)     Bradycardia -excessive    Carvedilol Unknown (comments)         Review of Systems   Unable to obtain complete ROS secondary to pain shows dementia  Review of Systems    Physical Exam   Physical Exam  Constitutional:       General: No acute distress. Appearance: Normal appearance. Not toxic-appearing. HENT:      Head: Normocephalic and atraumatic. Nose: Nose normal.      Mouth/Throat:      Mouth: Mucous membranes are moist.   Eyes:      Extraocular Movements: Extraocular movements intact. Pupils: Pupils are equal, round, and reactive to light. Cardiovascular:      Rate and Rhythm: Normal rate. Pulses: Normal pulses. Pulmonary:      Effort: Pulmonary effort is normal.      Breath sounds: No stridor. Abdominal:      General: Abdomen is flat. There is no distension. No tenderness to palpation. Musculoskeletal:         General: Normal range of motion. Cervical back: Normal range of motion and neck supple.    Skin:     General: Skin is warm and dry. Capillary Refill: Capillary refill takes less than 2 seconds. Neurological:      General: No focal deficit present. Mental Status: Aert and oriented to person, place. Psychiatric:         Mood and Affect: Mood normal.         Behavior: Behavior normal.       Physical Exam    Lab and Diagnostic Study Results     Labs -     Recent Results (from the past 12 hour(s))   URINALYSIS W/ REFLEX CULTURE    Collection Time: 05/28/22 12:58 PM    Specimen: Urine   Result Value Ref Range    Color Yellow/Straw      Appearance Clear Clear      Specific gravity 1.012 1.003 - 1.030      pH (UA) 6.0 5.0 - 8.0      Protein >300 (A) Negative mg/dL    Glucose Negative Negative mg/dL    Ketone Negative Negative mg/dL    Bilirubin Negative Negative      Blood Moderate (A) Negative      Urobilinogen 0.1 0.1 - 1.0 EU/dL    Nitrites Negative Negative      Leukocyte Esterase Negative Negative      UA:UC IF INDICATED Culture not indicated by UA result Culture not indicated by UA result      WBC 5-10 0 - 4 /hpf    RBC 20-50 0 - 5 /hpf    Bacteria Negative Negative /hpf    Mucus Trace /lpf       Radiologic Studies -   @lastxrresult@  CT Results  (Last 48 hours)               05/28/22 1443  CT ABD PELV WO CONT Final result    Impression:  Cardiomegaly. s/p TAVR. IVC and hepatic vein distention. No bowel obstruction. Colonic diverticulosis. No CT evidence for appendicitis or   diverticulitis. Prostate enlargement. Similar probable left renal cysts. No calcified renal stone. No hydronephrosis. Bladder nondistended. Cause for hematuria undetermined. Other findings as above. Narrative:  CT abdomen and pelvis without IV contrast.       Comparison CT abdomen and pelvis December 28, 2021. Axial images are reviewed along with reformatted sagittal/coronal images. No IV   contrast administered.     Dose reduction: All CT scans at this facility are performed using dose reduction   optimization techniques as appropriate to a performed exam including the   following-   automated exposure control, adjustments of mA and/or Kv according to patient   size, or use of iterative reconstructive technique. Lung bases are clear. Cardiomegaly. s/p TAVR. Liver unchanged appearance. IVC and hepatic vein distention. Gallbladder nondistended. Pancreas appears   unremarkable. Normal volume spleen. Unchanged appearance bilateral adrenal   glands. Unchanged appearance bilateral kidneys again noting probable left renal cysts. Stomach and small bowel loops are decompressed. Appendix unremarkable. Stool and   air through colon. Scattered colonic diverticula. No CT evidence for   appendicitis or diverticulitis. Uniform prostate enlargement. Bladder nondistended. Atherosclerotic change normal caliber abdominal aorta. Lumbar spondylosis. Grade I-II anterolisthesis L5 on S1. CXR Results  (Last 48 hours)    None            Medical Decision Making   - I am the first provider for this patient. - I reviewed the vital signs, available nursing notes, past medical history, past surgical history, family history and social history. - Initial assessment performed. The patients presenting problems have been discussed, and they are in agreement with the care plan formulated and outlined with them. I have encouraged them to ask questions as they arise throughout their visit. Vital Signs-Reviewed the patient's vital signs. Patient Vitals for the past 12 hrs:   Temp Pulse Resp BP SpO2   05/28/22 1454  60 18 (!) 186/92 99 %   05/28/22 1324     99 %   05/28/22 1255 97.8 °F (36.6 °C) 61 18 (!) 181/96 99 %           Disposition   Disposition: DC- Adult Discharges: All of the diagnostic tests were reviewed and questions answered. Diagnosis, care plan and treatment options were discussed. The patient understands the instructions and will follow up as directed.  The patients results have been reviewed with them. They have been counseled regarding their diagnosis. The patient verbally convey understanding and agreement of the signs, symptoms, diagnosis, treatment and prognosis and additionally agrees to follow up as recommended with their PCP in 24 - 48 hours. They also agree with the care-plan and convey that all of their questions have been answered. I have also put together some discharge instructions for them that include: 1) educational information regarding their diagnosis, 2) how to care for their diagnosis at home, as well a 3) list of reasons why they would want to return to the ED prior to their follow-up appointment, should their condition change. Discharged    DISCHARGE PLAN:  1. Current Discharge Medication List      CONTINUE these medications which have NOT CHANGED    Details   allopurinoL (ZYLOPRIM) 100 mg tablet TAKE 1 TABLET BY MOUTH EVERY DAY  Qty: 90 Tablet, Refills: 2      amLODIPine (NORVASC) 5 mg tablet Take 1 Tablet by mouth daily. Qty: 30 Tablet, Refills: 0    Associated Diagnoses: Essential hypertension      ascorbic acid, vitamin C, (Vitamin C) 250 mg tablet Take 1 Tablet by mouth daily. Qty: 90 Tablet, Refills: 0      folic acid (FOLVITE) 1 mg tablet Take 1 Tablet by mouth daily. Qty: 90 Tablet, Refills: 0      furosemide (LASIX) 40 mg tablet Take 1 Tablet by mouth daily. Qty: 90 Tablet, Refills: 0      magnesium oxide (MAG-OX) 400 mg tablet Take 1 Tablet by mouth daily. Qty: 90 Tablet, Refills: 0      potassium chloride (K-DUR, KLOR-CON M20) 20 mEq tablet TAKE 1 TABLET BY MOUTH EACH MORNING WITH FOOD. Qty: 90 Tablet, Refills: 1    Associated Diagnoses: Paroxysmal atrial fibrillation (HCC)      simvastatin (ZOCOR) 20 mg tablet Take 1 Tablet by mouth nightly. Qty: 30 Tablet, Refills: 2      thiamine HCL (Vitamin B-1) 100 mg tablet Take 1 Tablet by mouth daily.   Qty: 90 Tablet, Refills: 0      traZODone (DESYREL) 50 mg tablet Take 1 Tablet by mouth nightly. Qty: 90 Tablet, Refills: 0      escitalopram oxalate (LEXAPRO) 20 mg tablet Take 1 Tablet by mouth daily. 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 Take every evening  Qty: 90 Capsule, Refills: 0      melatonin 5 mg cap capsule Take 1 Capsule by mouth nightly for 30 days. Takes 2 tabs at bedtime  Qty: 30 Capsule, Refills: 1      ferrous sulfate 325 mg (65 mg iron) tablet Take 1 Tablet by mouth Daily (before breakfast). Qty: 90 Tablet, Refills: 0      melatonin 5 mg tablet Take 1 Tablet by mouth nightly. TAKE 2 AT BEDTIME  Qty: 90 Tablet, Refills: 0      colchicine 0.6 mg tablet Take 1 Tab by mouth daily. Indications: acute inflammation of the joints due to gout attack  Qty: 10 Tab, Refills: 0      rifAMPin (RIFADIN) 300 mg capsule Take 1 Cap by mouth every twelve (12) hours. Qty: 28 Cap, Refills: 0      vancomycin 750 mg 750 mg, vial-mate 1 Device IVPB 750 mg by IntraVENous route every twenty-four (24) hours. Qty: 14 Dose, Refills: 0    Comments: Check vanco trough in 7 days and contact ID Dr. Urban Church with results      timolol (TIMOPTIC) 0.5 % ophthalmic solution INSTILL 1 DROPS INTO RIGHT EYE ONCE DAILY  Qty: 5 mL, Refills: 5      diclofenac (VOLTAREN) 1 % gel APPLY TWO GRAMS FOUR TIMES DAILY AS NEEDED FOR ELBOW PAIN  Qty: 200 g, Refills: 3      terbinafine HCL (LAMISIL) 250 mg tablet Take 1 Tab by mouth daily. Qty: 90 Tab, Refills: 0    Associated Diagnoses: Onychomycosis      ammonium lactate (AMLACTIN) 12 % topical cream Apply  to affected area two (2) times a day. rub in to affected area well  Qty: 280 g, Refills: 5    Associated Diagnoses: Xerosis cutis           2.    Follow-up Information     Follow up With Specialties Details Why Contact Info    Stacy Brink MD Family Medicine In 2 days  [address] [facility] [address]  [phone]          3. Return to ED if worse   4. Current Discharge Medication List            Diagnosis     Clinical Impression:   1. Suprapubic abdominal pain        Attestations:    Ana Goodson MD    Please note that this dictation was completed with [employer], the computer voice recognition software. Quite often unanticipated grammatical, syntax, homophones, and other interpretive errors are inadvertently transcribed by the computer software. Please disregard these errors. Please excuse any errors that have escaped final proofreading. Thank you.

## 2022-05-28 NOTE — DISCHARGE INSTRUCTIONS
Thank you! Thank you for allowing me to care for you in the emergency department. I sincerely hope that you are satisfied with your visit today. It is my goal to provide you with excellent care. Below you will find a list of your labs and imaging from your visit today. Should you have any questions regarding these results please do not hesitate to call the emergency department. Labs -     Recent Results (from the past 12 hour(s))   URINALYSIS W/ REFLEX CULTURE    Collection Time: 05/28/22 12:58 PM    Specimen: Urine   Result Value Ref Range    Color Yellow/Straw      Appearance Clear Clear      Specific gravity 1.012 1.003 - 1.030      pH (UA) 6.0 5.0 - 8.0      Protein >300 (A) Negative mg/dL    Glucose Negative Negative mg/dL    Ketone Negative Negative mg/dL    Bilirubin Negative Negative      Blood Moderate (A) Negative      Urobilinogen 0.1 0.1 - 1.0 EU/dL    Nitrites Negative Negative      Leukocyte Esterase Negative Negative      UA:UC IF INDICATED Culture not indicated by UA result Culture not indicated by UA result      WBC 5-10 0 - 4 /hpf    RBC 20-50 0 - 5 /hpf    Bacteria Negative Negative /hpf    Mucus Trace /lpf       Radiologic Studies -   CT ABD PELV WO CONT   Final Result   Cardiomegaly. s/p TAVR. IVC and hepatic vein distention. No bowel obstruction. Colonic diverticulosis. No CT evidence for appendicitis or   diverticulitis. Prostate enlargement. Similar probable left renal cysts. No calcified renal stone. No hydronephrosis. Bladder nondistended. Cause for hematuria undetermined. Other findings as above. CT Results  (Last 48 hours)                 05/28/22 1443  CT ABD PELV WO CONT Final result    Impression:  Cardiomegaly. s/p TAVR. IVC and hepatic vein distention. No bowel obstruction. Colonic diverticulosis. No CT evidence for appendicitis or   diverticulitis. Prostate enlargement. Similar probable left renal cysts.  No calcified renal stone. No hydronephrosis. Bladder nondistended. Cause for hematuria undetermined. Other findings as above. Narrative:  CT abdomen and pelvis without IV contrast.       Comparison CT abdomen and pelvis December 28, 2021. Axial images are reviewed along with reformatted sagittal/coronal images. No IV   contrast administered. Dose reduction: All CT scans at this facility are performed using dose reduction   optimization techniques as appropriate to a performed exam including the   following-   automated exposure control, adjustments of mA and/or Kv according to patient   size, or use of iterative reconstructive technique. Lung bases are clear. Cardiomegaly. s/p TAVR. Liver unchanged appearance. IVC and hepatic vein distention. Gallbladder nondistended. Pancreas appears   unremarkable. Normal volume spleen. Unchanged appearance bilateral adrenal   glands. Unchanged appearance bilateral kidneys again noting probable left renal cysts. Stomach and small bowel loops are decompressed. Appendix unremarkable. Stool and   air through colon. Scattered colonic diverticula. No CT evidence for   appendicitis or diverticulitis. Uniform prostate enlargement. Bladder nondistended. Atherosclerotic change normal caliber abdominal aorta. Lumbar spondylosis. Grade I-II anterolisthesis L5 on S1. CXR Results  (Last 48 hours)      None               If you feel that you have not received excellent quality care or timely care, please ask to speak to the nurse manager. Please choose us in the future for your continued health care needs. ------------------------------------------------------------------------------------------------------------  The exam and treatment you received in the Emergency Department were for an urgent problem and are not intended as complete care.  It is important that you follow-up with a doctor, nurse practitioner, or physician assistant to:  (1) confirm your diagnosis,  (2) re-evaluation of changes in your illness and treatment, and  (3) for ongoing care. If your symptoms become worse or you do not improve as expected and you are unable to reach your usual health care provider, you should return to the Emergency Department. We are available 24 hours a day. Please take your discharge instructions with you when you go to your follow-up appointment. If you have any problem arranging a follow-up appointment, contact the Emergency Department immediately. If a prescription has been provided, please have it filled as soon as possible to prevent a delay in treatment. Read the entire medication instruction sheet provided to you by the pharmacy. If you have any questions or reservations about taking the medication due to side effects or interactions with other medications, please call your primary care physician or contact the ER to speak with the charge nurse. Make an appointment with your family doctor or the physician you were referred to for follow-up of this visit as instructed on your discharge paperwork, as this is a mandatory follow-up. Return to the ER if you are unable to be seen or if you are unable to be seen in a timely manner. If you have any problem arranging the follow-up visit, contact the Emergency Department immediately.

## 2022-06-29 ENCOUNTER — TELEPHONE (OUTPATIENT)
Dept: FAMILY MEDICINE CLINIC | Age: 87
End: 2022-06-29

## 2022-08-01 ENCOUNTER — TELEPHONE (OUTPATIENT)
Dept: FAMILY MEDICINE CLINIC | Age: 87
End: 2022-08-01

## 2022-08-01 NOTE — TELEPHONE ENCOUNTER
----- Message from [facility] sent at 7/27/2022 10:50 AM EDT -----  Subject: Message to Provider    QUESTIONS  Information for Provider? Joey Reyes ([id number]) called in to see if his   father's PCP could send him another paper stating his father's condition   and to make sure the paperwork is signed please. He received one   previously but it was signed by the provider. He would like for the   provider to fax it to him. He would like it faxed it [phone]. Please   contact Carlos Bowen to further assist.   ---------------------------------------------------------------------------  --------------  Megan Graff [facility]  [phone]; OK to leave message on voicemail  ---------------------------------------------------------------------------  --------------  SCRIPT ANSWERS  Relationship to Patient? Other  Representative Name? Joey Reyes  Is the Representative on the appropriate HIPAA document in Epic?  Yes

## 2022-08-26 ENCOUNTER — HOSPITAL ENCOUNTER (EMERGENCY)
Age: 87
Discharge: HOME | End: 2022-08-26
Attending: EMERGENCY MEDICINE
Payer: MEDICARE

## 2022-08-26 ENCOUNTER — APPOINTMENT (OUTPATIENT)
Dept: GENERAL RADIOLOGY | Age: 87
End: 2022-08-26
Attending: EMERGENCY MEDICINE
Payer: MEDICARE

## 2022-08-26 VITALS
RESPIRATION RATE: 19 BRPM | HEART RATE: 57 BPM | WEIGHT: 160 LBS | TEMPERATURE: 98.2 F | OXYGEN SATURATION: 97 % | HEIGHT: 68 IN | BODY MASS INDEX: 24.25 KG/M2 | DIASTOLIC BLOOD PRESSURE: 89 MMHG | SYSTOLIC BLOOD PRESSURE: 180 MMHG

## 2022-08-26 DIAGNOSIS — I50.9 CONGESTIVE HEART FAILURE, UNSPECIFIED HF CHRONICITY, UNSPECIFIED HEART FAILURE TYPE (HCC): Primary | ICD-10-CM

## 2022-08-26 LAB
ALBUMIN SERPL-MCNC: 3.5 G/DL (ref 3.5–5)
ALBUMIN/GLOB SERPL: 1 {RATIO} (ref 1.1–2.2)
ALP SERPL-CCNC: 106 U/L (ref 45–117)
ALT SERPL-CCNC: 31 U/L (ref 12–78)
ANION GAP SERPL CALC-SCNC: 7 MMOL/L (ref 5–15)
AST SERPL W P-5'-P-CCNC: 17 U/L (ref 15–37)
BASOPHILS # BLD: 0 K/UL (ref 0–0.1)
BASOPHILS NFR BLD: 0 % (ref 0–1)
BILIRUB SERPL-MCNC: 1.1 MG/DL (ref 0.2–1)
BNP SERPL-MCNC: 7290 PG/ML
BUN SERPL-MCNC: 22 MG/DL (ref 6–20)
BUN/CREAT SERPL: 10 (ref 12–20)
CA-I BLD-MCNC: 8.8 MG/DL (ref 8.5–10.1)
CHLORIDE SERPL-SCNC: 112 MMOL/L (ref 97–108)
CO2 SERPL-SCNC: 28 MMOL/L (ref 21–32)
COVID-19 RAPID TEST, COVR: NOT DETECTED
CREAT SERPL-MCNC: 2.14 MG/DL (ref 0.7–1.3)
DIFFERENTIAL METHOD BLD: ABNORMAL
EOSINOPHIL # BLD: 0.1 K/UL (ref 0–0.4)
EOSINOPHIL NFR BLD: 2 % (ref 0–7)
ERYTHROCYTE [DISTWIDTH] IN BLOOD BY AUTOMATED COUNT: 16.9 % (ref 11.5–14.5)
GLOBULIN SER CALC-MCNC: 3.6 G/DL (ref 2–4)
GLUCOSE SERPL-MCNC: 90 MG/DL (ref 65–100)
HCT VFR BLD AUTO: 34.2 % (ref 36.6–50.3)
HGB BLD-MCNC: 10.8 G/DL (ref 12.1–17)
IMM GRANULOCYTES # BLD AUTO: 0 K/UL (ref 0–0.04)
IMM GRANULOCYTES NFR BLD AUTO: 0 % (ref 0–0.5)
LYMPHOCYTES # BLD: 0.4 K/UL (ref 0.8–3.5)
LYMPHOCYTES NFR BLD: 12 % (ref 12–49)
MCH RBC QN AUTO: 31.2 PG (ref 26–34)
MCHC RBC AUTO-ENTMCNC: 31.6 G/DL (ref 30–36.5)
MCV RBC AUTO: 98.8 FL (ref 80–99)
MONOCYTES # BLD: 0.3 K/UL (ref 0–1)
MONOCYTES NFR BLD: 8 % (ref 5–13)
NEUTS SEG # BLD: 2.6 K/UL (ref 1.8–8)
NEUTS SEG NFR BLD: 78 % (ref 32–75)
NRBC # BLD: 0 K/UL (ref 0–0.01)
NRBC BLD-RTO: 0 PER 100 WBC
PLATELET # BLD AUTO: 94 K/UL (ref 150–400)
PMV BLD AUTO: 11.1 FL (ref 8.9–12.9)
POTASSIUM SERPL-SCNC: 3.3 MMOL/L (ref 3.5–5.1)
PROT SERPL-MCNC: 7.1 G/DL (ref 6.4–8.2)
RBC # BLD AUTO: 3.46 M/UL (ref 4.1–5.7)
SODIUM SERPL-SCNC: 147 MMOL/L (ref 136–145)
TROPONIN-HIGH SENSITIVITY: 30 NG/L (ref 0–76)
WBC # BLD AUTO: 3.4 K/UL (ref 4.1–11.1)

## 2022-08-26 PROCEDURE — 71045 X-RAY EXAM CHEST 1 VIEW: CPT

## 2022-08-26 PROCEDURE — 36415 COLL VENOUS BLD VENIPUNCTURE: CPT

## 2022-08-26 PROCEDURE — 83880 ASSAY OF NATRIURETIC PEPTIDE: CPT

## 2022-08-26 PROCEDURE — 80053 COMPREHEN METABOLIC PANEL: CPT

## 2022-08-26 PROCEDURE — 93005 ELECTROCARDIOGRAM TRACING: CPT

## 2022-08-26 PROCEDURE — 99285 EMERGENCY DEPT VISIT HI MDM: CPT

## 2022-08-26 PROCEDURE — 85025 COMPLETE CBC W/AUTO DIFF WBC: CPT

## 2022-08-26 PROCEDURE — 74011250636 HC RX REV CODE- 250/636: Performed by: EMERGENCY MEDICINE

## 2022-08-26 PROCEDURE — 87635 SARS-COV-2 COVID-19 AMP PRB: CPT

## 2022-08-26 PROCEDURE — 96374 THER/PROPH/DIAG INJ IV PUSH: CPT

## 2022-08-26 PROCEDURE — 84484 ASSAY OF TROPONIN QUANT: CPT

## 2022-08-26 RX ORDER — FUROSEMIDE 10 MG/ML
40 INJECTION INTRAMUSCULAR; INTRAVENOUS
Status: COMPLETED | OUTPATIENT
Start: 2022-08-26 | End: 2022-08-26

## 2022-08-26 RX ADMIN — FUROSEMIDE 40 MG: 10 INJECTION, SOLUTION INTRAMUSCULAR; INTRAVENOUS at 11:35

## 2022-08-26 NOTE — ED NOTES
Several attempts have been made to call report to [facility]. [phone] .  answers but then when transfers to the floor. . no one picks upl.

## 2022-08-26 NOTE — DISCHARGE INSTRUCTIONS
Thank you! Thank you for allowing me to care for you in the emergency department. It is my goal to provide you with excellent care. If you have not received excellent quality care, please ask to speak to the nurse manager. Please fill out the survey that will come to you by mail or email since we listen to your feedback! Below you will find a list of your tests from today's visit. Should you have any questions, please do not hesitate to call the emergency department. Labs  Recent Results (from the past 12 hour(s))   CBC WITH AUTOMATED DIFF    Collection Time: 08/26/22 10:27 AM   Result Value Ref Range    WBC 3.4 (L) 4.1 - 11.1 K/uL    RBC 3.46 (L) 4.10 - 5.70 M/uL    HGB 10.8 (L) 12.1 - 17.0 g/dL    HCT 34.2 (L) 36.6 - 50.3 %    MCV 98.8 80.0 - 99.0 FL    MCH 31.2 26.0 - 34.0 PG    MCHC 31.6 30.0 - 36.5 g/dL    RDW 16.9 (H) 11.5 - 14.5 %    PLATELET 94 (L) 533 - 400 K/uL    MPV 11.1 8.9 - 12.9 FL    NRBC 0.0 0.0  WBC    ABSOLUTE NRBC 0.00 0.00 - 0.01 K/uL    NEUTROPHILS 78 (H) 32 - 75 %    LYMPHOCYTES 12 12 - 49 %    MONOCYTES 8 5 - 13 %    EOSINOPHILS 2 0 - 7 %    BASOPHILS 0 0 - 1 %    IMMATURE GRANULOCYTES 0 0 - 0.5 %    ABS. NEUTROPHILS 2.6 1.8 - 8.0 K/UL    ABS. LYMPHOCYTES 0.4 (L) 0.8 - 3.5 K/UL    ABS. MONOCYTES 0.3 0.0 - 1.0 K/UL    ABS. EOSINOPHILS 0.1 0.0 - 0.4 K/UL    ABS. BASOPHILS 0.0 0.0 - 0.1 K/UL    ABS. IMM.  GRANS. 0.0 0.00 - 0.04 K/UL    DF AUTOMATED     METABOLIC PANEL, COMPREHENSIVE    Collection Time: 08/26/22 10:27 AM   Result Value Ref Range    Sodium 147 (H) 136 - 145 mmol/L    Potassium 3.3 (L) 3.5 - 5.1 mmol/L    Chloride 112 (H) 97 - 108 mmol/L    CO2 28 21 - 32 mmol/L    Anion gap 7 5 - 15 mmol/L    Glucose 90 65 - 100 mg/dL    BUN 22 (H) 6 - 20 mg/dL    Creatinine 2.14 (H) 0.70 - 1.30 mg/dL    BUN/Creatinine ratio 10 (L) 12 - 20      GFR est AA 36 (L) >60 ml/min/1.73m2    GFR est non-AA 30 (L) >60 ml/min/1.73m2    Calcium 8.8 8.5 - 10.1 mg/dL    Bilirubin, total 1.1 (H) 0.2 - 1.0 mg/dL    AST (SGOT) 17 15 - 37 U/L    ALT (SGPT) 31 12 - 78 U/L    Alk. phosphatase 106 45 - 117 U/L    Protein, total 7.1 6.4 - 8.2 g/dL    Albumin 3.5 3.5 - 5.0 g/dL    Globulin 3.6 2.0 - 4.0 g/dL    A-G Ratio 1.0 (L) 1.1 - 2.2     TROPONIN-HIGH SENSITIVITY    Collection Time: 08/26/22 10:27 AM   Result Value Ref Range    Troponin-High Sensitivity 30 0 - 76 ng/L   NT-PRO BNP    Collection Time: 08/26/22 10:27 AM   Result Value Ref Range    NT pro-BNP 7,290 (H) <450 pg/mL   COVID-19 RAPID TEST    Collection Time: 08/26/22 12:03 PM   Result Value Ref Range    COVID-19 rapid test Not Detected Not Detected         Radiologic Studies  XR CHEST PORT   Final Result      Widespread patchy hazy interstitial opacities may reflect edema and/or atypical   infectious or inflammatory process. Moderate right pleural effusion. CT Results  (Last 48 hours)      None          CXR Results  (Last 48 hours)                 08/26/22 0938  XR CHEST PORT Final result    Impression:      Widespread patchy hazy interstitial opacities may reflect edema and/or atypical   infectious or inflammatory process. Moderate right pleural effusion. Narrative:  EXAM:  XR CHEST PORT       INDICATION: sob       COMPARISON: Chest radiograph 12/28/2021       TECHNIQUE: Upright portable chest AP view       FINDINGS:        Median sternotomy. Cardiac mediastinal silhouette is stably enlarged. Widespread   hazy patchy interstitial opacities. Moderate right pleural effusion. No definite   pneumothorax.                 ------------------------------------------------------------------------------------------------------------  The exam and treatment you received in the Emergency Department were for an urgent problem and are not intended as complete care.  It is important that you follow-up with a doctor, nurse practitioner, or physician assistant to:  (1) confirm your diagnosis,  (2) re-evaluation of changes in your illness and treatment, and  (3) for ongoing care. Please take your discharge instructions with you when you go to your follow-up appointment. If you have any problem arranging a follow-up appointment, contact the Emergency Department. If your symptoms become worse or you do not improve as expected and you are unable to reach your health care provider, please return to the Emergency Department. We are available 24 hours a day. If a prescription has been provided, please have it filled as soon as possible to prevent a delay in treatment. If you have any questions or reservations about taking the medication due to side effects or interactions with other medications, please call your primary care provider or contact the ER.

## 2022-08-26 NOTE — ED TRIAGE NOTES
EMS dispatched to [facility] for person c/o SOB that started yesterday and worsened this morning.   Staff placed pt on 2L

## 2022-08-27 NOTE — ED PROVIDER NOTES
EMERGENCY DEPARTMENT HISTORY AND PHYSICAL EXAM      Date: 8/26/2022  Patient Name: Kit Wells. History of Presenting Illness     Chief Complaint   Patient presents with    Shortness of Breath       History Provided By: Patient    HPI: Kit Wells., 81 y.o. male with past medical history significant for CKD, pulmonary hypertension, multiple myeloma, dementia presenting to the emergency department for evaluation of reported shortness of breath. Patient presents from outside facility for evaluation of increased work of breathing. EMS reports that patient placed patient on 2 L nasal cannula. No reported history of hypoxia. Further history difficult to obtain due to patient's baseline dementia. There are no other complaints, changes, or physical findings at this time. PCP: Lana Rahman MD    No current facility-administered medications on file prior to encounter. Current Outpatient Medications on File Prior to Encounter   Medication Sig Dispense Refill    allopurinoL (ZYLOPRIM) 100 mg tablet TAKE 1 TABLET BY MOUTH EVERY DAY 90 Tablet 2    amLODIPine (NORVASC) 5 mg tablet Take 1 Tablet by mouth daily. 30 Tablet 0    ascorbic acid, vitamin C, (Vitamin C) 250 mg tablet Take 1 Tablet by mouth daily. 90 Tablet 0    folic acid (FOLVITE) 1 mg tablet Take 1 Tablet by mouth daily. 90 Tablet 0    furosemide (LASIX) 40 mg tablet Take 1 Tablet by mouth daily. 90 Tablet 0    magnesium oxide (MAG-OX) 400 mg tablet Take 1 Tablet by mouth daily. 90 Tablet 0    potassium chloride (K-DUR, KLOR-CON M20) 20 mEq tablet TAKE 1 TABLET BY MOUTH EACH MORNING WITH FOOD. 90 Tablet 1    simvastatin (ZOCOR) 20 mg tablet Take 1 Tablet by mouth nightly. 30 Tablet 2    thiamine HCL (Vitamin B-1) 100 mg tablet Take 1 Tablet by mouth daily. 90 Tablet 0    traZODone (DESYREL) 50 mg tablet Take 1 Tablet by mouth nightly. 90 Tablet 0    escitalopram oxalate (LEXAPRO) 20 mg tablet Take 1 Tablet by mouth daily.  30 Tablet 0 tamsulosin (FLOMAX) 0.4 mg capsule Take 1 Capsule by mouth daily. Take every evening 90 Capsule 0    ferrous sulfate 325 mg (65 mg iron) tablet Take 1 Tablet by mouth Daily (before breakfast). 90 Tablet 0    melatonin 5 mg tablet Take 1 Tablet by mouth nightly. TAKE 2 AT BEDTIME 90 Tablet 0    colchicine 0.6 mg tablet Take 1 Tab by mouth daily. Indications: acute inflammation of the joints due to gout attack (Patient not taking: Reported on 12/21/2021) 10 Tab 0    rifAMPin (RIFADIN) 300 mg capsule Take 1 Cap by mouth every twelve (12) hours. (Patient not taking: Reported on 12/21/2021) 28 Cap 0    vancomycin 750 mg 750 mg, vial-mate 1 Device IVPB 750 mg by IntraVENous route every twenty-four (24) hours. (Patient not taking: Reported on 12/21/2021) 14 Dose 0    timolol (TIMOPTIC) 0.5 % ophthalmic solution INSTILL 1 DROPS INTO RIGHT EYE ONCE DAILY 5 mL 5    diclofenac (VOLTAREN) 1 % gel APPLY TWO GRAMS FOUR TIMES DAILY AS NEEDED FOR ELBOW PAIN (Patient not taking: Reported on 12/21/2021) 200 g 3    terbinafine HCL (LAMISIL) 250 mg tablet Take 1 Tab by mouth daily. (Patient not taking: Reported on 12/21/2021) 90 Tab 0    ammonium lactate (AMLACTIN) 12 % topical cream Apply  to affected area two (2) times a day. rub in to affected area well (Patient not taking: Reported on 12/21/2021) 280 g 5       Past History     Past Medical History:  Past Medical History:   Diagnosis Date    Anemia     Arthritis     CKD (chronic kidney disease), stage III ([facility] [address] 2011    Dementia (HCC)     Depression     Erectile dysfunction     GERD (gastroesophageal reflux disease)     GI bleed     Gout     gout, osteoarthritis    Heart failure (HCC)     acute heart failure    Hemorrhoids     Hypertension     Multiple myeloma (HCC)     PAF (paroxysmal atrial fibrillation) ([facility] [address] 02/17/2016    s/p cardioversion    Pulmonary HTN (HCC)     S/P AVR (aortic valve replacement) 07/26/2016    merrill gill md -     S/P cardiac cath 7/6/16    severe ai, normal coronaries     S/P colonoscopy with polypectomy 05/09/2016    Kar Leon MD    Stroke [facility])        Past Surgical History:  Past Surgical History:   Procedure Laterality Date    HX HEENT      nasal procedure 20 years ago    HX KNEE REPLACEMENT Bilateral     bilateral knee replacement    HX ORTHOPAEDIC Bilateral     wrist    HX UROLOGICAL  01/05/2020    cystoscopy     GA CARDIAC SURG PROCEDURE UNLIST      cardiac cath    GA CARDIAC SURG PROCEDURE UNLIST  07/26/2016    AVR       Family History:  Family History   Problem Relation Age of Onset    Anemia Mother     No Known Problems Father     Other Brother         drug abuse       Social History:  Social History     Tobacco Use    Smoking status: Former     Years: 20.00     Types: Cigarettes    Smokeless tobacco: Never   Vaping Use    Vaping Use: Never used   Substance Use Topics    Alcohol use: Not Currently     Comment: Hx ETOH abuse in the past    Drug use: Never       Allergies: Allergies   Allergen Reactions    Amiodarone Other (comments)     Bradycardia -excessive    Carvedilol Unknown (comments)       Review of Systems   Review of Systems   Unable to perform ROS: Dementia     Physical Exam   Physical Exam  Constitutional:       General: He is not in acute distress. Appearance: Normal appearance. He is not ill-appearing. HENT:      Head: Normocephalic and atraumatic. Right Ear: External ear normal.      Left Ear: External ear normal.      Nose: Nose normal.      Mouth/Throat:      Mouth: Mucous membranes are moist.   Eyes:      Extraocular Movements: Extraocular movements intact. Conjunctiva/sclera: Conjunctivae normal.   Cardiovascular:      Rate and Rhythm: Normal rate and regular rhythm. Pulses: Normal pulses. Pulmonary:      Effort: Pulmonary effort is normal. No respiratory distress. Breath sounds: Normal breath sounds. Abdominal:      General: Abdomen is flat. There is no distension.    Musculoskeletal:         General: Normal range of motion. Cervical back: Normal range of motion. Right lower leg: Edema present. Left lower leg: Edema present. Skin:     General: Skin is warm and dry. Neurological:      General: No focal deficit present. Mental Status: He is alert. He is disoriented. Psychiatric:         Mood and Affect: Mood normal.         Behavior: Behavior normal.         Thought Content: Thought content normal.         Cognition and Memory: Cognition is impaired. Memory is impaired. Judgment: Judgment normal.       Lab and Diagnostic Study Results   Labs -   No results found for this or any previous visit (from the past 12 hour(s)). Radiologic Studies -   @lastxrresult@  CT Results  (Last 48 hours)      None          CXR Results  (Last 48 hours)                 08/26/22 0938  XR CHEST PORT Final result    Impression:      Widespread patchy hazy interstitial opacities may reflect edema and/or atypical   infectious or inflammatory process. Moderate right pleural effusion. Narrative:  EXAM:  XR CHEST PORT       INDICATION: sob       COMPARISON: Chest radiograph 12/28/2021       TECHNIQUE: Upright portable chest AP view       FINDINGS:        Median sternotomy. Cardiac mediastinal silhouette is stably enlarged. Widespread   hazy patchy interstitial opacities. Moderate right pleural effusion. No definite   pneumothorax. Medical Decision Making and ED Course   Differential Diagnosis & Medical Decision Making Provider Note:   78-year-old male presenting to the emergency department for reported increased work of breathing/shortness of breath. On arrival to the emergency department, patient with SPO2 100% on 2 L nasal cannula. Patient not tachypneic. He does have coarse breath sounds. Patient with bilateral lower extremity edema. Chest x-ray demonstrating bilateral patchy pulmonary infiltrate consistent with CHF versus infectious etiology.   Patient given IV Lasix with significant urinary output. Patient removed from nasal cannula and has maintain SPO2 in the high 90s. Suspect CHF as etiology of patient's symptoms. Stable for discharge home at this time. - I am the first provider for this patient. I reviewed the vital signs, available nursing notes, past medical history, past surgical history, family history and social history. The patients presenting problems have been discussed, and they are in agreement with the care plan formulated and outlined with them. I have encouraged them to ask questions as they arise throughout their visit. Vital Signs-Reviewed the patient's vital signs. No data found. ED Course:   ED Course as of 08/27/22 0755   Fri Aug 26, 2022   1324 Patient provided with IV Lasix with significant urine output. SPO2 97% on room air. [RS]      ED Course User Index  [RS] Dionna Guerra DO         Procedures   Performed by: Raymundo Hoover DO  Procedures      Disposition   Disposition: Condition stable and improved  DC- Adult Discharges: All of the diagnostic tests were reviewed and questions answered. Diagnosis, care plan and treatment options were discussed. The patient understands the instructions and will follow up as directed. The patients results have been reviewed with them. They have been counseled regarding their diagnosis. The patient verbally convey understanding and agreement of the signs, symptoms, diagnosis, treatment and prognosis and additionally agrees to follow up as recommended with their PCP in 24 - 48 hours. They also agree with the care-plan and convey that all of their questions have been answered. I have also put together some discharge instructions for them that include: 1) educational information regarding their diagnosis, 2) how to care for their diagnosis at home, as well a 3) list of reasons why they would want to return to the ED prior to their follow-up appointment, should their condition change.   DC-The patient was given verbal follow-up and heart failure instructions    DISCHARGE PLAN:  1. Cannot display discharge medications since this patient is not currently admitted. 2.   Follow-up Information       Follow up With Specialties Details Why [address] [facility] EMERGENCY DEPT Emergency Medicine  As needed, If symptoms worsen [address] [facility] [id number] [phone]    Karli Isbell MD Family Medicine Schedule an appointment as soon as possible for a visit   [id number] [facility] [address] [phone]            3. Return to ED if worse   4. Discharge Medication List as of 8/26/2022  2:46 PM         Remove if admitted/transferred    Diagnosis/Clinical Impression     Clinical Impression:   1. Congestive heart failure, unspecified HF chronicity, unspecified heart failure type [facility])        Attestations: Fab Gallardo, DO, am the primary clinician of record. Please note that this dictation was completed with [employer], the computer voice recognition software. Quite often unanticipated grammatical, syntax, homophones, and other interpretive errors are inadvertently transcribed by the computer software. Please disregard these errors. Please excuse any errors that have escaped final proofreading. Thank you.

## 2022-08-28 PROBLEM — N18.4 CKD (CHRONIC KIDNEY DISEASE) STAGE 4, GFR 15-29 ML/MIN (HCC): Status: ACTIVE | Noted: 2022-08-28

## 2022-08-28 PROBLEM — N18.4 CKD (CHRONIC KIDNEY DISEASE) STAGE 4, GFR 15-29 ML/MIN (HCC): Status: RESOLVED | Noted: 2022-08-28 | Resolved: 2022-08-28

## 2022-08-28 LAB
ATRIAL RATE: 52 BPM
CALCULATED R AXIS, ECG10: 113 DEGREES
CALCULATED T AXIS, ECG11: 124 DEGREES
DIAGNOSIS, 93000: NORMAL
Q-T INTERVAL, ECG07: 458 MS
QRS DURATION, ECG06: 100 MS
QTC CALCULATION (BEZET), ECG08: 458 MS
VENTRICULAR RATE, ECG03: 60 BPM

## 2023-01-13 ENCOUNTER — HOSPITAL ENCOUNTER (EMERGENCY)
Age: 88
Discharge: HOME | End: 2023-01-13
Attending: STUDENT IN AN ORGANIZED HEALTH CARE EDUCATION/TRAINING PROGRAM | Admitting: STUDENT IN AN ORGANIZED HEALTH CARE EDUCATION/TRAINING PROGRAM
Payer: MEDICARE

## 2023-01-13 ENCOUNTER — APPOINTMENT (OUTPATIENT)
Dept: GENERAL RADIOLOGY | Age: 88
End: 2023-01-13
Attending: STUDENT IN AN ORGANIZED HEALTH CARE EDUCATION/TRAINING PROGRAM
Payer: MEDICARE

## 2023-01-13 VITALS
HEIGHT: 68 IN | BODY MASS INDEX: 24.25 KG/M2 | SYSTOLIC BLOOD PRESSURE: 118 MMHG | DIASTOLIC BLOOD PRESSURE: 65 MMHG | WEIGHT: 160 LBS | OXYGEN SATURATION: 96 % | HEART RATE: 55 BPM | TEMPERATURE: 98.3 F | RESPIRATION RATE: 18 BRPM

## 2023-01-13 DIAGNOSIS — J18.9 PNEUMONIA OF RIGHT LOWER LOBE DUE TO INFECTIOUS ORGANISM: Primary | ICD-10-CM

## 2023-01-13 LAB
ALBUMIN SERPL-MCNC: 2.5 G/DL (ref 3.5–5)
ALBUMIN/GLOB SERPL: 0.6 (ref 1.1–2.2)
ALP SERPL-CCNC: 76 U/L (ref 45–117)
ALT SERPL-CCNC: 16 U/L (ref 12–78)
ANION GAP SERPL CALC-SCNC: 5 MMOL/L (ref 5–15)
AST SERPL W P-5'-P-CCNC: 8 U/L (ref 15–37)
ATRIAL RATE: 153 BPM
BASOPHILS # BLD: 0 K/UL (ref 0–0.1)
BASOPHILS NFR BLD: 0 % (ref 0–1)
BILIRUB SERPL-MCNC: 0.3 MG/DL (ref 0.2–1)
BNP SERPL-MCNC: 7981 PG/ML
BUN SERPL-MCNC: 31 MG/DL (ref 6–20)
BUN/CREAT SERPL: 13 (ref 12–20)
CA-I BLD-MCNC: 8.6 MG/DL (ref 8.5–10.1)
CALCULATED P AXIS, ECG09: -95 DEGREES
CALCULATED R AXIS, ECG10: 116 DEGREES
CALCULATED T AXIS, ECG11: 126 DEGREES
CHLORIDE SERPL-SCNC: 108 MMOL/L (ref 97–108)
CO2 SERPL-SCNC: 29 MMOL/L (ref 21–32)
CREAT SERPL-MCNC: 2.41 MG/DL (ref 0.7–1.3)
DIAGNOSIS, 93000: NORMAL
DIFFERENTIAL METHOD BLD: ABNORMAL
EOSINOPHIL # BLD: 0 K/UL (ref 0–0.4)
EOSINOPHIL NFR BLD: 1 % (ref 0–7)
ERYTHROCYTE [DISTWIDTH] IN BLOOD BY AUTOMATED COUNT: 15.1 % (ref 11.5–14.5)
GLOBULIN SER CALC-MCNC: 4.1 G/DL (ref 2–4)
GLUCOSE SERPL-MCNC: 129 MG/DL (ref 65–100)
HCT VFR BLD AUTO: 28.2 % (ref 36.6–50.3)
HGB BLD-MCNC: 9.1 G/DL (ref 12.1–17)
IMM GRANULOCYTES # BLD AUTO: 0 K/UL (ref 0–0.04)
IMM GRANULOCYTES NFR BLD AUTO: 1 % (ref 0–0.5)
LYMPHOCYTES # BLD: 0.4 K/UL (ref 0.8–3.5)
LYMPHOCYTES NFR BLD: 7 % (ref 12–49)
MCH RBC QN AUTO: 31.4 PG (ref 26–34)
MCHC RBC AUTO-ENTMCNC: 32.3 G/DL (ref 30–36.5)
MCV RBC AUTO: 97.2 FL (ref 80–99)
MONOCYTES # BLD: 0.4 K/UL (ref 0–1)
MONOCYTES NFR BLD: 7 % (ref 5–13)
NEUTS SEG # BLD: 5 K/UL (ref 1.8–8)
NEUTS SEG NFR BLD: 84 % (ref 32–75)
NRBC # BLD: 0 K/UL (ref 0–0.01)
NRBC BLD-RTO: 0 PER 100 WBC
PLATELET # BLD AUTO: 202 K/UL (ref 150–400)
PMV BLD AUTO: 9.9 FL (ref 8.9–12.9)
POTASSIUM SERPL-SCNC: 3.7 MMOL/L (ref 3.5–5.1)
PROT SERPL-MCNC: 6.6 G/DL (ref 6.4–8.2)
Q-T INTERVAL, ECG07: 544 MS
QRS DURATION, ECG06: 104 MS
QTC CALCULATION (BEZET), ECG08: 520 MS
RBC # BLD AUTO: 2.9 M/UL (ref 4.1–5.7)
SODIUM SERPL-SCNC: 142 MMOL/L (ref 136–145)
TROPONIN-HIGH SENSITIVITY: 27 NG/L (ref 0–76)
VENTRICULAR RATE, ECG03: 55 BPM
WBC # BLD AUTO: 5.9 K/UL (ref 4.1–11.1)

## 2023-01-13 PROCEDURE — 71045 X-RAY EXAM CHEST 1 VIEW: CPT

## 2023-01-13 PROCEDURE — 83880 ASSAY OF NATRIURETIC PEPTIDE: CPT

## 2023-01-13 PROCEDURE — 80053 COMPREHEN METABOLIC PANEL: CPT

## 2023-01-13 PROCEDURE — 74011250637 HC RX REV CODE- 250/637: Performed by: STUDENT IN AN ORGANIZED HEALTH CARE EDUCATION/TRAINING PROGRAM

## 2023-01-13 PROCEDURE — 84484 ASSAY OF TROPONIN QUANT: CPT

## 2023-01-13 PROCEDURE — 99285 EMERGENCY DEPT VISIT HI MDM: CPT

## 2023-01-13 PROCEDURE — 85025 COMPLETE CBC W/AUTO DIFF WBC: CPT

## 2023-01-13 PROCEDURE — 93005 ELECTROCARDIOGRAM TRACING: CPT

## 2023-01-13 PROCEDURE — 36415 COLL VENOUS BLD VENIPUNCTURE: CPT

## 2023-01-13 RX ORDER — LEVOFLOXACIN 750 MG/1
750 TABLET ORAL DAILY
Qty: 7 TABLET | Refills: 0 | Status: SHIPPED | OUTPATIENT
Start: 2023-01-13 | End: 2023-01-20

## 2023-01-13 RX ADMIN — LEVOFLOXACIN 750 MG: 500 TABLET, FILM COATED ORAL at 15:37

## 2023-01-13 NOTE — ED PROVIDER NOTES
EMERGENCY DEPARTMENT HISTORY AND PHYSICAL EXAM      Date: 1/13/2023  Patient Name: Xochitl Bonds. History of Presenting Illness     Chief Complaint   Patient presents with    Shortness of Breath       History Provided By: Patient and Nursing Home/SNF/Rehab Center    HPI: Xochitl Bonds., 81 y.o. male with a past medical history significant  CKD, dementia, pulmonary hypertension, CAD  presents to the ED with cc of shortness of breath. Patient states he does not know why he is in the emergency department, according to EMS patient was noted to be having some difficulty breathing at nursing home, patient unsure if he is on oxygen. According to medical record as of August patient was not on supplemental oxygen. Patient denies any symptoms of shortness of breath, chest pain, recent cough. No abdominal pain nausea or vomiting. There are no other complaints, changes, or physical findings at this time. PCP: Jessica Cha MD    No current facility-administered medications on file prior to encounter. Current Outpatient Medications on File Prior to Encounter   Medication Sig Dispense Refill    allopurinoL (ZYLOPRIM) 100 mg tablet TAKE 1 TABLET BY MOUTH EVERY DAY 90 Tablet 2    amLODIPine (NORVASC) 5 mg tablet Take 1 Tablet by mouth daily. 30 Tablet 0    ascorbic acid, vitamin C, (Vitamin C) 250 mg tablet Take 1 Tablet by mouth daily. 90 Tablet 0    folic acid (FOLVITE) 1 mg tablet Take 1 Tablet by mouth daily. 90 Tablet 0    furosemide (LASIX) 40 mg tablet Take 1 Tablet by mouth daily. 90 Tablet 0    magnesium oxide (MAG-OX) 400 mg tablet Take 1 Tablet by mouth daily. 90 Tablet 0    potassium chloride (K-DUR, KLOR-CON M20) 20 mEq tablet TAKE 1 TABLET BY MOUTH EACH MORNING WITH FOOD. 90 Tablet 1    simvastatin (ZOCOR) 20 mg tablet Take 1 Tablet by mouth nightly. 30 Tablet 2    thiamine HCL (Vitamin B-1) 100 mg tablet Take 1 Tablet by mouth daily.  90 Tablet 0    traZODone (DESYREL) 50 mg tablet Take 1 Tablet by mouth nightly. 90 Tablet 0    escitalopram oxalate (LEXAPRO) 20 mg tablet Take 1 Tablet by mouth daily. 30 Tablet 0    tamsulosin (FLOMAX) 0.4 mg capsule Take 1 Capsule by mouth daily. Take every evening 90 Capsule 0    ferrous sulfate 325 mg (65 mg iron) tablet Take 1 Tablet by mouth Daily (before breakfast). 90 Tablet 0    melatonin 5 mg tablet Take 1 Tablet by mouth nightly. TAKE 2 AT BEDTIME 90 Tablet 0    colchicine 0.6 mg tablet Take 1 Tab by mouth daily. Indications: acute inflammation of the joints due to gout attack (Patient not taking: Reported on 12/21/2021) 10 Tab 0    rifAMPin (RIFADIN) 300 mg capsule Take 1 Cap by mouth every twelve (12) hours. (Patient not taking: Reported on 12/21/2021) 28 Cap 0    vancomycin 750 mg 750 mg, vial-mate 1 Device IVPB 750 mg by IntraVENous route every twenty-four (24) hours. (Patient not taking: Reported on 12/21/2021) 14 Dose 0    timolol (TIMOPTIC) 0.5 % ophthalmic solution INSTILL 1 DROPS INTO RIGHT EYE ONCE DAILY 5 mL 5    diclofenac (VOLTAREN) 1 % gel APPLY TWO GRAMS FOUR TIMES DAILY AS NEEDED FOR ELBOW PAIN (Patient not taking: Reported on 12/21/2021) 200 g 3    terbinafine HCL (LAMISIL) 250 mg tablet Take 1 Tab by mouth daily. (Patient not taking: Reported on 12/21/2021) 90 Tab 0    ammonium lactate (AMLACTIN) 12 % topical cream Apply  to affected area two (2) times a day.  rub in to affected area well (Patient not taking: Reported on 12/21/2021) 280 g 5       Past History     Past Medical History:  Past Medical History:   Diagnosis Date    Anemia     Arthritis     CKD (chronic kidney disease), stage III ([facility] [address] 2011    Dementia (HCC)     Depression     Erectile dysfunction     GERD (gastroesophageal reflux disease)     GI bleed     Gout     gout, osteoarthritis    Heart failure (HCC)     acute heart failure    Hemorrhoids     Hypertension     Multiple myeloma (HCC)     PAF (paroxysmal atrial fibrillation) ([facility] [address] 02/17/2016    s/p cardioversion    Pulmonary HTN ([address]     S/P AVR (aortic valve replacement) 07/26/2016    merrill gill md -     S/P cardiac cath 7/6/16    severe ai, normal coronaries     S/P colonoscopy with polypectomy 05/09/2016    Kar Cobb MD    Stroke [facility])        Past Surgical History:  Past Surgical History:   Procedure Laterality Date    HX HEENT      nasal procedure 20 years ago    HX KNEE REPLACEMENT Bilateral     bilateral knee replacement    HX ORTHOPAEDIC Bilateral     wrist    HX UROLOGICAL  01/05/2020    cystoscopy     AK CARDIAC SURG PROCEDURE UNLIST      cardiac cath    AK CARDIAC SURG PROCEDURE UNLIST  07/26/2016    AVR       Family History:  Family History   Problem Relation Age of Onset    Anemia Mother     No Known Problems Father     Other Brother         drug abuse       Social History:  Social History     Tobacco Use    Smoking status: Former     Years: 20.00     Types: Cigarettes    Smokeless tobacco: Never   Vaping Use    Vaping Use: Never used   Substance Use Topics    Alcohol use: Not Currently     Comment: Hx ETOH abuse in the past    Drug use: Never       Allergies: Allergies   Allergen Reactions    Amiodarone Other (comments)     Bradycardia -excessive    Carvedilol Unknown (comments)         Review of Systems     Review of Systems   Unable to perform ROS: Dementia     Physical Exam     Physical Exam  Vitals and nursing note reviewed. Constitutional:       Appearance: Normal appearance. He is normal weight. HENT:      Head: Normocephalic and atraumatic. Nose: Nose normal.      Mouth/Throat:      Mouth: Mucous membranes are moist.   Eyes:      Extraocular Movements: Extraocular movements intact. Pupils: Pupils are equal, round, and reactive to light. Cardiovascular:      Rate and Rhythm: Normal rate and regular rhythm. Pulses: Normal pulses. Heart sounds: Normal heart sounds. Pulmonary:      Effort: Pulmonary effort is normal. No tachypnea or accessory muscle usage.       Breath sounds: Normal breath sounds. Abdominal:      General: Abdomen is flat. Bowel sounds are normal.      Palpations: Abdomen is soft. Musculoskeletal:         General: No swelling or tenderness. Normal range of motion. Cervical back: Normal range of motion and neck supple. Skin:     General: Skin is warm and dry. Capillary Refill: Capillary refill takes less than 2 seconds. Neurological:      General: No focal deficit present. Mental Status: He is alert and oriented to person, place, and time. Cranial Nerves: No cranial nerve deficit. Sensory: No sensory deficit. Motor: No weakness. Psychiatric:         Mood and Affect: Mood normal.         Behavior: Behavior normal.       Diagnostic Study Results     Labs -     Recent Results (from the past 12 hour(s))   CBC WITH AUTOMATED DIFF    Collection Time: 01/13/23 12:12 PM   Result Value Ref Range    WBC 5.9 4.1 - 11.1 K/uL    RBC 2.90 (L) 4.10 - 5.70 M/uL    HGB 9.1 (L) 12.1 - 17.0 g/dL    HCT 28.2 (L) 36.6 - 50.3 %    MCV 97.2 80.0 - 99.0 FL    MCH 31.4 26.0 - 34.0 PG    MCHC 32.3 30.0 - 36.5 g/dL    RDW 15.1 (H) 11.5 - 14.5 %    PLATELET 378 479 - 345 K/uL    MPV 9.9 8.9 - 12.9 FL    NRBC 0.0 0.0  WBC    ABSOLUTE NRBC 0.00 0.00 - 0.01 K/uL    NEUTROPHILS 84 (H) 32 - 75 %    LYMPHOCYTES 7 (L) 12 - 49 %    MONOCYTES 7 5 - 13 %    EOSINOPHILS 1 0 - 7 %    BASOPHILS 0 0 - 1 %    IMMATURE GRANULOCYTES 1 (H) 0 - 0.5 %    ABS. NEUTROPHILS 5.0 1.8 - 8.0 K/UL    ABS. LYMPHOCYTES 0.4 (L) 0.8 - 3.5 K/UL    ABS. MONOCYTES 0.4 0.0 - 1.0 K/UL    ABS. EOSINOPHILS 0.0 0.0 - 0.4 K/UL    ABS. BASOPHILS 0.0 0.0 - 0.1 K/UL    ABS. IMM.  GRANS. 0.0 0.00 - 0.04 K/UL    DF AUTOMATED     METABOLIC PANEL, COMPREHENSIVE    Collection Time: 01/13/23 12:12 PM   Result Value Ref Range    Sodium 142 136 - 145 mmol/L    Potassium 3.7 3.5 - 5.1 mmol/L    Chloride 108 97 - 108 mmol/L    CO2 29 21 - 32 mmol/L    Anion gap 5 5 - 15 mmol/L    Glucose 129 (H) 65 - 100 mg/dL BUN 31 (H) 6 - 20 mg/dL    Creatinine 2.41 (H) 0.70 - 1.30 mg/dL    BUN/Creatinine ratio 13 12 - 20      eGFR 26 (L) >60 ml/min/1.73m2    Calcium 8.6 8.5 - 10.1 mg/dL    Bilirubin, total 0.3 0.2 - 1.0 mg/dL    AST (SGOT) 8 (L) 15 - 37 U/L    ALT (SGPT) 16 12 - 78 U/L    Alk. phosphatase 76 45 - 117 U/L    Protein, total 6.6 6.4 - 8.2 g/dL    Albumin 2.5 (L) 3.5 - 5.0 g/dL    Globulin 4.1 (H) 2.0 - 4.0 g/dL    A-G Ratio 0.6 (L) 1.1 - 2.2     TROPONIN-HIGH SENSITIVITY    Collection Time: 01/13/23 12:12 PM   Result Value Ref Range    Troponin-High Sensitivity 27 0 - 76 ng/L   NT-PRO BNP    Collection Time: 01/13/23 12:12 PM   Result Value Ref Range    NT pro-BNP 7,981 (H) <450 pg/mL   EKG, 12 LEAD, INITIAL    Collection Time: 01/13/23 12:19 PM   Result Value Ref Range    Ventricular Rate 55 BPM    Atrial Rate 153 BPM    QRS Duration 104 ms    Q-T Interval 544 ms    QTC Calculation (Bezet) 520 ms    Calculated P Axis -95 degrees    Calculated R Axis 116 degrees    Calculated T Axis 126 degrees    Diagnosis       Possible junctional rhythm  Right axis deviation  Anterior infarct (cited on or before 02-JAN-2022)  T wave abnormality, consider lateral ischemia  Prolonged QT  Abnormal ECG  When compared with ECG of 26-AUG-2022 09:08,  Current undetermined rhythm precludes rhythm comparison, needs review  Nonspecific T wave abnormality no longer evident in Inferior leads  Inverted T waves have replaced nonspecific T wave abnormality in Lateral   leads  QT has lengthened  Confirmed by Troy Hunter ([id number]) on 1/13/2023 3:35:17 PM         Radiologic Studies -   @[email or web address]@  CT Results  (Last 48 hours)      None          CXR Results  (Last 48 hours)                 01/13/23 1234  XR CHEST PORT Final result    Impression:  1. Enlarged cardiac silhouette, acute airspace disease right base likely   infectious or inflammatory.  Recommend follow-up to ensure resolution       Narrative:  INDICATION:  chest pain        EXAM: Single portable view of chest 1233 . Comparison:8/26/2022       Findings: Cardiac silhouette is enlarged. Pulmonary vasculature is not engorged. Patchy airspace disease at the right base. No pneumothorax or effusion. Patient   is status post median sternotomy. .                     Medical Decision Making   I am the first provider for this patient. I reviewed the vital signs, available nursing notes, past medical history, past surgical history, family history and social history. Vital Signs-Reviewed the patient's vital signs. Patient Vitals for the past 12 hrs:   Temp Pulse Resp BP SpO2   01/13/23 1435 -- (!) 55 18 118/65 96 %   01/13/23 1158 98.3 °F (36.8 °C) (!) 51 20 114/60 100 %     EKG: Completed at 1219, interpreted by myself at 1422, sinus bradycardia 55, no ST elevations, right axis deviation, inverted T waves in the lateral leads    Records Reviewed: Nursing Notes    The patient presents with shortness of breath with a differential diagnosis of pneumonia, pulmonary edema, ACS, CHF, PE      Provider Notes (Medical Decision Making):     MDM     81-year-old male, history of dementia CKD CAD, CHF, presents emergency department for reported shortness of breath from nursing home. Physical exam shows well-appearing male no distress, saturations 99% on 2 L nasal cannula. Patient afebrile, normotensive, clear breath sounds, no lower extremity swelling. Will draw basic lab work, including cardiac enzymes, chest x-ray, continue to evaluate      ED Course:   Initial assessment performed. The patients presenting problems have been discussed, and they are in agreement with the care plan formulated and outlined with them. I have encouraged them to ask questions as they arise throughout their visit. ED Course as of 01/13/23 2248   Fri Jan 13, 2023   1503 Patient's lab work resulting, hemoglobin stable 9.1, no leukocytosis, troponin 27, patient's has baseline renal insufficiency.   Patient is saturating at 96% on room air, in no distress. Chest x-ray resulted, I reviewed film personally, right lower lobe airspace disease noted. We will treat patient for pneumonia, at this time given no hypoxia, stable vitals, feel that patient can be discharged back to nursing home with p.o. antibiotics. [PZ]      ED Course User Index  [PZ] Kiara Miner MD         PROCEDURES  Procedures         PLAN:  1. Discharge Medication List as of 1/13/2023  4:09 PM        START taking these medications    Details   levoFLOXacin (Levaquin) 750 mg tablet Take 1 Tablet by mouth daily for 7 days. , Normal, Disp-7 Tablet, R-0           CONTINUE these medications which have NOT CHANGED    Details   allopurinoL (ZYLOPRIM) 100 mg tablet TAKE 1 TABLET BY MOUTH EVERY DAY, Normal, Disp-90 Tablet, R-2      amLODIPine (NORVASC) 5 mg tablet Take 1 Tablet by mouth daily. , Normal, Disp-30 Tablet, R-0      ascorbic acid, vitamin C, (Vitamin C) 250 mg tablet Take 1 Tablet by mouth daily. , Normal, Disp-90 Tablet, R-0      folic acid (FOLVITE) 1 mg tablet Take 1 Tablet by mouth daily. , Normal, Disp-90 Tablet, R-0      furosemide (LASIX) 40 mg tablet Take 1 Tablet by mouth daily. , Normal, Disp-90 Tablet, R-0      magnesium oxide (MAG-OX) 400 mg tablet Take 1 Tablet by mouth daily. , Normal, Disp-90 Tablet, R-0      potassium chloride (K-DUR, KLOR-CON M20) 20 mEq tablet TAKE 1 TABLET BY MOUTH EACH MORNING WITH FOOD., Normal, Disp-90 Tablet, R-1      simvastatin (ZOCOR) 20 mg tablet Take 1 Tablet by mouth nightly., Normal, Disp-30 Tablet, R-2      thiamine HCL (Vitamin B-1) 100 mg tablet Take 1 Tablet by mouth daily. , Normal, Disp-90 Tablet, R-0      traZODone (DESYREL) 50 mg tablet Take 1 Tablet by mouth nightly., Normal, Disp-90 Tablet, R-0      escitalopram oxalate (LEXAPRO) 20 mg tablet Take 1 Tablet by mouth daily. , Normal, Disp-30 Tablet, R-0      tamsulosin (FLOMAX) 0.4 mg capsule Take 1 Capsule by mouth daily.  Take every evening, Normal, Disp-90 Capsule, R-0      ferrous sulfate 325 mg (65 mg iron) tablet Take 1 Tablet by mouth Daily (before breakfast). , Normal, Disp-90 Tablet, R-0      melatonin 5 mg tablet Take 1 Tablet by mouth nightly. TAKE 2 AT BEDTIME, Normal, Disp-90 Tablet, R-0      colchicine 0.6 mg tablet Take 1 Tab by mouth daily. Indications: acute inflammation of the joints due to gout attack, Print, Disp-10 Tab, R-0      rifAMPin (RIFADIN) 300 mg capsule Take 1 Cap by mouth every twelve (12) hours. , Print, Disp-28 Cap, R-0      vancomycin 750 mg 750 mg, vial-mate 1 Device IVPB 750 mg by IntraVENous route every twenty-four (24) hours. , Print, Disp-14 Dose, R-0Check vanco trough in 7 days and contact ID Dr. Jamel Romeo with results      timolol (TIMOPTIC) 0.5 % ophthalmic solution INSTILL 1 DROPS INTO RIGHT EYE ONCE DAILY, Normal, Disp-5 mL,R-5      diclofenac (VOLTAREN) 1 % gel APPLY TWO GRAMS FOUR TIMES DAILY AS NEEDED FOR ELBOW PAIN, Normal, Disp-200 g,R-3      terbinafine HCL (LAMISIL) 250 mg tablet Take 1 Tab by mouth daily. , Normal, Disp-90 Tab,R-0      ammonium lactate (AMLACTIN) 12 % topical cream Apply  to affected area two (2) times a day. rub in to affected area well, Normal, Disp-280 g,R-5           2. Follow-up Information    None       Return to ED if worse     Diagnosis     Clinical Impression:   1.  Pneumonia of right lower lobe due to infectious organism